# Patient Record
Sex: MALE | Race: WHITE | NOT HISPANIC OR LATINO | Employment: OTHER | URBAN - METROPOLITAN AREA
[De-identification: names, ages, dates, MRNs, and addresses within clinical notes are randomized per-mention and may not be internally consistent; named-entity substitution may affect disease eponyms.]

---

## 2017-03-28 ENCOUNTER — TELEPHONE (OUTPATIENT)
Dept: SPORTS MEDICINE | Facility: CLINIC | Age: 77
End: 2017-03-28

## 2017-03-28 NOTE — TELEPHONE ENCOUNTER
Returning call to patient.  Patient is interested in getting cortisone injection and joint aspiration.  He will call when he is ready to schedule.

## 2017-03-28 NOTE — TELEPHONE ENCOUNTER
----- Message from Carlton Franco sent at 3/28/2017 10:34 AM CDT -----  Contact: self  Pt request a call regarding the injections he received.

## 2017-04-12 ENCOUNTER — TELEPHONE (OUTPATIENT)
Dept: SPORTS MEDICINE | Facility: CLINIC | Age: 77
End: 2017-04-12

## 2017-04-12 NOTE — TELEPHONE ENCOUNTER
----- Message from Dinora Rice sent at 4/12/2017 11:11 AM CDT -----  Contact: self@home  Pt called to schedule an appt for the pain and swelling in his right knee. Pt is also requesting injection in the left knee. First available was 5/4/2017, but Pt feels he needs to be seen sooner. Please call Pt and advise    Call back is [phone]    Thank you

## 2017-04-18 ENCOUNTER — HOSPITAL ENCOUNTER (OUTPATIENT)
Dept: RADIOLOGY | Facility: HOSPITAL | Age: 77
Discharge: HOME | End: 2017-04-18
Attending: FAMILY MEDICINE
Payer: MEDICARE

## 2017-04-18 ENCOUNTER — OFFICE VISIT (OUTPATIENT)
Dept: SPORTS MEDICINE | Facility: CLINIC | Age: 77
End: 2017-04-18
Payer: MEDICARE

## 2017-04-18 VITALS — TEMPERATURE: 98 F | HEIGHT: 71 IN | WEIGHT: 225 LBS | BODY MASS INDEX: 31.5 KG/M2

## 2017-04-18 DIAGNOSIS — M17.0 PRIMARY OSTEOARTHRITIS OF BOTH KNEES: Primary | ICD-10-CM

## 2017-04-18 DIAGNOSIS — M25.562 PAIN IN BOTH KNEES, UNSPECIFIED CHRONICITY: ICD-10-CM

## 2017-04-18 DIAGNOSIS — M25.561 PAIN IN BOTH KNEES, UNSPECIFIED CHRONICITY: ICD-10-CM

## 2017-04-18 DIAGNOSIS — M17.12 PRIMARY OSTEOARTHRITIS OF LEFT KNEE: ICD-10-CM

## 2017-04-18 DIAGNOSIS — M25.562 CHRONIC PAIN OF BOTH KNEES: ICD-10-CM

## 2017-04-18 DIAGNOSIS — M25.561 CHRONIC PAIN OF BOTH KNEES: ICD-10-CM

## 2017-04-18 DIAGNOSIS — G89.29 CHRONIC PAIN OF BOTH KNEES: ICD-10-CM

## 2017-04-18 PROCEDURE — 73564 X-RAY EXAM KNEE 4 OR MORE: CPT | Mod: 26,50,, | Performed by: RADIOLOGY

## 2017-04-18 PROCEDURE — 73564 X-RAY EXAM KNEE 4 OR MORE: CPT | Mod: TC,50,PO

## 2017-04-18 PROCEDURE — 1157F ADVNC CARE PLAN IN RCRD: CPT | Mod: S$GLB,,, | Performed by: FAMILY MEDICINE

## 2017-04-18 PROCEDURE — 20611 DRAIN/INJ JOINT/BURSA W/US: CPT | Mod: 50,S$GLB,, | Performed by: FAMILY MEDICINE

## 2017-04-18 PROCEDURE — 1125F AMNT PAIN NOTED PAIN PRSNT: CPT | Mod: S$GLB,,, | Performed by: FAMILY MEDICINE

## 2017-04-18 PROCEDURE — 99214 OFFICE O/P EST MOD 30 MIN: CPT | Mod: 25,S$GLB,, | Performed by: FAMILY MEDICINE

## 2017-04-18 PROCEDURE — 99999 PR PBB SHADOW E&M-EST. PATIENT-LVL III: CPT | Mod: PBBFAC,,, | Performed by: FAMILY MEDICINE

## 2017-04-18 PROCEDURE — 1159F MED LIST DOCD IN RCRD: CPT | Mod: S$GLB,,, | Performed by: FAMILY MEDICINE

## 2017-04-18 PROCEDURE — 1160F RVW MEDS BY RX/DR IN RCRD: CPT | Mod: S$GLB,,, | Performed by: FAMILY MEDICINE

## 2017-04-18 RX ORDER — TRIAMCINOLONE ACETONIDE 40 MG/ML
40 INJECTION, SUSPENSION INTRA-ARTICULAR; INTRAMUSCULAR
Status: DISCONTINUED | OUTPATIENT
Start: 2017-04-18 | End: 2017-04-18 | Stop reason: HOSPADM

## 2017-04-18 RX ADMIN — TRIAMCINOLONE ACETONIDE 40 MG: 40 INJECTION, SUSPENSION INTRA-ARTICULAR; INTRAMUSCULAR at 03:04

## 2017-04-18 NOTE — MR AVS SNAPSHOT
[facility]  [address]  [facility] [address]  Phone: [phone]                  Korey Santos   2017 2:15 PM   Appointment    Description:  Male : 1940   Provider:  Vega Camarena MD   Department:  [facility]                To Do List           Future Appointments        Provider Department Dept Phone    2017 2:15 PM Vega Camarena MD [facility] [phone]    2017 10:15 AM Edgardo Tellez MD [facility] [phone]      Goals (5 Years of Data)     None      [facility]     [facility] On [facility] Nurse Care Line -  Assistance  Unless otherwise directed by your provider, please contact [facility], our nurse care line that is available for  assistance.     Registered nurses in the [facility] On Call Center provide: appointment scheduling, clinical advisement, health education, and other advisory services.  Call: [phone] (toll free)               Medications           Message regarding Medications     Verify the changes and/or additions to your medication regime listed below are the same as discussed with your clinician today.  If any of these changes or additions are incorrect, please notify your healthcare provider.             Verify that the below list of medications is an accurate representation of the medications you are currently taking.  If none reported, the list may be blank. If incorrect, please contact your healthcare provider. Carry this list with you in case of emergency.           Current Medications     aspirin (ECOTRIN) 325 MG EC tablet Take 1 tablet (325 mg total) by mouth 2 (two) times daily.    fluoxetine (PROZAC) 10 MG capsule Take 1 capsule (10 mg total) by mouth once daily.    meloxicam (MOBIC) 15 MG tablet Take 1 tablet (15 mg total) by mouth once daily.    multivitamin capsule Take 1 capsule by mouth once daily.      pravastatin (PRAVACHOL) 40 MG tablet  Take 1 tablet (40 mg total) by mouth once daily.    tamsulosin (FLOMAX) 0.4 mg Cp24 Take 1 capsule (0.4 mg total) by mouth once daily.    tramadol (ULTRAM) 50 mg tablet TAKE ONE TO TWO TABLETS BY MOUTH TWICE DAILY AS NEEDED FOR PAIN           Clinical Reference Information           Allergies as of 4/18/2017     Clindamycin      Immunizations Administered on Date of Encounter - 4/18/2017     None      [facility] Sign-Up     Activating your [facility] account is as easy as 1-2-3!     1) Visit [email or web address], select Sign Up Now, enter this activation code and your date of birth, then select Next.  [email or web address]  Expires: 6/2/2017  2:07 PM      2) Create a username and password to use when you visit [facility] in the future and select a security question in case you lose your password and select Next.    3) Enter your e-mail address and click Sign Up!    Additional Information  If you have questions, please e-mail [email or web address] or call [phone] to talk to our [facility] staff. Remember, [facility] is NOT to be used for urgent needs. For medical emergencies, dial 911.         Language Assistance Services     ATTENTION: Language assistance services are available, free of charge. Please call [phone].      ATENCIÓN: Si habla español, tiene a steele disposición servicios gratuitos de asistencia lingüística. Llame al [phone].     CHÚ Ý: N?u b?n nói Ti?ng Vi?t, có các d?ch v? h? tr? ngôn ng? mi?n phí dành cho b?n. G?i s? [phone].         [facility] complies with applicable Federal civil rights laws and does not discriminate on the basis of race, color, national origin, age, disability, or sex.

## 2017-04-18 NOTE — PROGRESS NOTES
Korey Santos, a 76 y.o. male, presents today for evaluation of his RIGHT and LEFT knee.      HISTORY OF PRESENT ILLNESS   Location: anterior knee, bilateral  Onset: insidious  Palliative:    Relative rest   Oral analgesics   R - VSI, 16.12, [place]  Provocative:    ADLs  Prior:    16.02 - R knee - arthroscopy - [facility]  Progression: worsening discomfort  Quality:    Swollen   Sharp at times    Radiation: none  Severity: per nursing documentation  Timing: intermittent w/ use  Trauma: none    Review of systems (ROS):  A 10+ review of systems was performed with pertinent positives and negatives noted above in the history of present illness. Other systems were negative unless otherwise specified.      PHYSICAL EXAMINATION  General:  The patient is alert and oriented x 3. Mood is pleasant. Observation of ears, eyes and nose reveal no gross abnormalities. HEENT: NCAT, sclera anicteric.   Lungs: Respirations are equal and unlabored.  Gait is coordinated. Patient can toe walk and heel walk without difficulty.    RIGHT/LEFT KNEE EXAMINATION    Observation/Inspection  Gait:   Antalgic   Alignment:  Neutral   Scars:   None   Muscle atrophy: Mild  Effusion:  Present   Warmth:  None   Discoloration:   none     Tenderness / Crepitus (T / C):         T / C      T / C  Patella   - / -   Lateral joint line   - / -     Peripatellar medial  -  Medial joint line    + / -  Peripatellar lateral -  Medial plica   - / -  Patellar tendon -   Popliteal fossa   - / -  Quad tendon   -   Gastrocnemius   -  Prepatellar Bursa - / -   Quadricep   -  Tibial tubercle  -  Thigh/hamstring  -  Pes anserine/HS -  Fibula    -  ITB   - / -  Tibia     -  Tib/fib joint  - / -  LCL    -    MFC   - / -   MCL: Proximal  -    LFC   - / -   Distal    -          ROM: (* = pain)  PASSIVE   ACTIVE    Left :   5 / 0 / 145*   5 / 0 / 145*     Right :    5 / 0 / 145*   5 / 0 / 145*    Patellofemoral examination:  See above noted areas of tenderness.    Patella position    Subluxation / dislocation: Centered        Sup. / Inf;   Normal   Crepitus (PF):    Absent   Patellar Mobility:       Medial-lateral:   Normal    Superior-inferior:  Normal    Inferior tilt   Normal    Patellar tendon:  Normal   Lateral tilt:    Normal   J-sign:     None   Patellofemoral grind:   No pain       Meniscal Signs:     Pain on terminal extension:  +*  Pain on terminal flexion:  +*  Tyrones maneuver:  +*  Squat     NT    Ligament Examination:  ACL / Lachman:  WNL  PCL-Post.  drawer: normal 0 to 2mm  MCL- Valgus:  normal 0 to 2mm  LCL- Varus:    normal 0 to 2mm  Pivot shift:  guarding   Dial Test:   difference c/w other side   At 30° flexion: normal (< 5°)    At 90° flexion: normal (< 5°)   Reverse Pivot Shift:   normal (Equal)     Strength: (* = with pain) Painful Side  Quadriceps   5/5  Hamstrin/5    Extremity Neuro-vascular Examination:   Sensation:  Grossly intact to light touch all dermatomal regions.   Motor Function:  Fully intact motor function at hip, knee, foot and ankle    DTRs;  quadriceps and  achilles 2+.  No clonus and downgoing Babinski.    Vascular status:  DP and PT pulses 2+, brisk capillary refill, symmetric.     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, panchito med compartment, right  #2 Kellgren-Kevin Grade II osteoarthritis of knee, panchito med compartment, left    No evidence of neurologic pathology  No evidence of vascular pathology    Imaging studies reviewed:   X-ray knee, bilateral 17.04    Plan:    We discussed the importance of appropriate diet, weight, and regular exercise including quadriceps strengthening     We discussed options including:  #1 watchful waiting  #2 physical therapy aimed at:    Core stability   RoM knee   Strengthening quadriceps   Gait training   #3 injection therapy:   CSI iaknee     Right,     Left,    VSI iaknee    Right, prior    Left, prior    Orthobiologics    #4 consultation re: TKA     The patient chooses #3 csi oscar moeller    Pain management: handout given  Bracing:   Physical therapy:   Activity (e.g. sports, work) restrictions: as tolerated   school/vocation:     Follow up appointment offered, deferred by patient  Should symptoms worsen or fail to resolve, consider:  Revisiting the above options

## 2017-04-18 NOTE — PROCEDURES
Large Joint Aspiration/Injection  Date/Time: 4/18/2017 3:16 PM  Performed by: CHAPIN SOTO  Authorized by: CHAPIN SOTO     Consent Done?:  Yes (Verbal)  Indications:  Pain  Procedure site marked: Yes    Timeout: Prior to procedure the correct patient, procedure, and site was verified      Location:  Knee  Site:  R knee and L knee  Prep: Patient was prepped and draped in usual sterile fashion    Ultrasonic Guidance for needle placement: Yes  Images are saved and documented.  Needle size:  18 G  Approach:  Lateral  Medications:  40 mg triamcinolone acetonide 40 mg/mL; 40 mg triamcinolone acetonide 40 mg/mL  Patient tolerance:  Patient tolerated the procedure well with no immediate complications    Additional Comments: Description of ultrasound utilization for needle guidance:   Ultrasound guidance used for needle localization.  Images saved and stored for documentation.  The knee joint was visualized.  Dynamic visualization of the 20g x 1.5  needle was continuous throughout the procedure.

## 2017-04-24 ENCOUNTER — TELEPHONE (OUTPATIENT)
Dept: SPORTS MEDICINE | Facility: CLINIC | Age: 77
End: 2017-04-24

## 2017-04-24 NOTE — TELEPHONE ENCOUNTER
----- Message from Marie Ibanez sent at 4/24/2017 11:22 AM CDT -----  Contact: self/home  Pt would like to speak with you regarding an appt for injections.

## 2017-05-04 ENCOUNTER — OFFICE VISIT (OUTPATIENT)
Dept: SPORTS MEDICINE | Facility: CLINIC | Age: 77
End: 2017-05-04
Payer: MEDICARE

## 2017-05-04 VITALS — HEIGHT: 71 IN | TEMPERATURE: 98 F | WEIGHT: 225 LBS | BODY MASS INDEX: 31.5 KG/M2

## 2017-05-04 DIAGNOSIS — M17.11 PRIMARY OSTEOARTHRITIS OF RIGHT KNEE: Primary | ICD-10-CM

## 2017-05-04 PROCEDURE — 99999 PR PBB SHADOW E&M-EST. PATIENT-LVL III: CPT | Mod: PBBFAC,,, | Performed by: FAMILY MEDICINE

## 2017-05-04 PROCEDURE — 20611 DRAIN/INJ JOINT/BURSA W/US: CPT | Mod: RT,S$GLB,, | Performed by: FAMILY MEDICINE

## 2017-05-04 PROCEDURE — 99499 UNLISTED E&M SERVICE: CPT | Mod: S$GLB,,, | Performed by: FAMILY MEDICINE

## 2017-05-04 NOTE — PROCEDURES
Large Joint Aspiration/Injection  Date/Time: 5/4/2017 11:53 AM  Performed by: CHAPIN SOTO  Authorized by: CHAPIN SOTO     Consent Done?:  Yes (Verbal)  Indications:  Pain  Procedure site marked: Yes    Timeout: Prior to procedure the correct patient, procedure, and site was verified      Location:  Knee  Site:  R knee  Prep: Patient was prepped and draped in usual sterile fashion    Ultrasonic Guidance for needle placement: Yes  Images are saved and documented.  Needle size:  20 G  Approach:  Lateral  Medications:  48 mg hylan g-f 20 48 mg/6 mL  Patient tolerance:  Patient tolerated the procedure well with no immediate complications

## 2017-05-18 ENCOUNTER — TELEPHONE (OUTPATIENT)
Dept: SPORTS MEDICINE | Facility: CLINIC | Age: 77
End: 2017-05-18

## 2017-05-18 DIAGNOSIS — M17.12 PRIMARY OSTEOARTHRITIS OF LEFT KNEE: Primary | ICD-10-CM

## 2017-05-18 NOTE — TELEPHONE ENCOUNTER
----- Message from Dinora Rice sent at 5/18/2017 12:48 PM CDT -----  Contact: self@home, [phone]  Pt called in stating that he needs his left knee injections and will need approval by his insurance. Please call Pt and advise    Thank you

## 2017-05-25 ENCOUNTER — OFFICE VISIT (OUTPATIENT)
Dept: SPORTS MEDICINE | Facility: CLINIC | Age: 77
End: 2017-05-25
Payer: MEDICARE

## 2017-05-25 VITALS — BODY MASS INDEX: 31.5 KG/M2 | TEMPERATURE: 98 F | HEIGHT: 71 IN | WEIGHT: 225 LBS

## 2017-05-25 DIAGNOSIS — M17.12 PRIMARY OSTEOARTHRITIS OF LEFT KNEE: ICD-10-CM

## 2017-05-25 DIAGNOSIS — M25.461 EFFUSION OF KNEE JOINT RIGHT: ICD-10-CM

## 2017-05-25 DIAGNOSIS — M17.0 PRIMARY OSTEOARTHRITIS OF BOTH KNEES: Primary | ICD-10-CM

## 2017-05-25 PROCEDURE — 99499 UNLISTED E&M SERVICE: CPT | Mod: S$GLB,,, | Performed by: FAMILY MEDICINE

## 2017-05-25 PROCEDURE — 20611 DRAIN/INJ JOINT/BURSA W/US: CPT | Mod: LT,S$GLB,, | Performed by: FAMILY MEDICINE

## 2017-05-25 PROCEDURE — 99999 PR PBB SHADOW E&M-EST. PATIENT-LVL III: CPT | Mod: PBBFAC,,, | Performed by: FAMILY MEDICINE

## 2017-05-25 NOTE — PROCEDURES
"Large Joint Aspiration/Injection  Date/Time: 5/25/2017 11:59 AM  Performed by: CHAPIN SOOT  Authorized by: CHAPIN SOTO     Consent Done?:  Yes (Verbal)  Indications:  Pain  Procedure site marked: Yes    Timeout: Prior to procedure the correct patient, procedure, and site was verified      Location:  Knee  Site:  L knee  Prep: Patient was prepped and draped in usual sterile fashion    Ultrasonic Guidance for needle placement: Yes  Images are saved and documented.  Needle size:  18 G  Approach:  Lateral  Medications:  48 mg hylan g-f 20 48 mg/6 mL  Patient tolerance:  Patient tolerated the procedure well with no immediate complications    Additional Comments: Description of ultrasound utilization for needle guidance:   Ultrasound guidance used for needle localization.  Images saved and stored for documentation.  The knee joint was visualized.  Dynamic visualization of the 20g x 1.5"  needle was continuous throughout the procedure.      "

## 2017-05-25 NOTE — PROCEDURES
"Large Joint Aspiration/Injection  Date/Time: 5/25/2017 12:00 PM  Performed by: CHAPIN SOTO  Authorized by: CHAPIN SOTO     Consent Done?:  Yes (Verbal)  Indications:  Pain  Procedure site marked: Yes    Timeout: Prior to procedure the correct patient, procedure, and site was verified      Location:  Knee  Site:  R knee  Prep: Patient was prepped and draped in usual sterile fashion    Ultrasonic Guidance for needle placement: Yes  Images are saved and documented.  Needle size:  18 G  Approach:  Lateral  Aspirate amount (ml):  22  Aspirate:  Clear  Patient tolerance:  Patient tolerated the procedure well with no immediate complications    Additional Comments: Description of ultrasound utilization for needle guidance:   Ultrasound guidance used for needle localization.  Images saved and stored for documentation.  The knee joint was visualized.  Dynamic visualization of the 20g x 1.5"  needle was continuous throughout the procedure.      "

## 2017-06-16 RX ORDER — TAMSULOSIN HYDROCHLORIDE 0.4 MG/1
1 CAPSULE ORAL DAILY
Qty: 90 CAPSULE | Refills: 0 | Status: SHIPPED | OUTPATIENT
Start: 2017-06-16 | End: 2017-09-29 | Stop reason: SDUPTHER

## 2017-06-16 NOTE — TELEPHONE ENCOUNTER
----- Message from Breann Felder sent at 6/16/2017 10:10 AM CDT -----  Contact: self/[phone]      RX request - refill or new RX.  Is this a refill or new RX:  Refill  RX name and strength: tamsulosin (FLOMAX) 0.4 mg Cp24  Directions: Take 1 capsule (0.4 mg total) by mouth once daily. - Oral  Is this a 30 day or 90 day RX:    Pharmacy name and phone #: [facility], [place] - [address] [facility].  # [phone]   Comments:  Please call and confirm when rx is ready.       Thank you!!!

## 2017-06-19 ENCOUNTER — TELEPHONE (OUTPATIENT)
Dept: INTERNAL MEDICINE | Facility: CLINIC | Age: 77
End: 2017-06-19

## 2017-06-19 RX ORDER — FLUOXETINE 10 MG/1
10 CAPSULE ORAL DAILY
Qty: 30 CAPSULE | Refills: 11 | Status: SHIPPED | OUTPATIENT
Start: 2017-06-19 | End: 2017-08-16

## 2017-06-19 RX ORDER — PRAVASTATIN SODIUM 40 MG/1
40 TABLET ORAL DAILY
Qty: 30 TABLET | Refills: 11 | Status: SHIPPED | OUTPATIENT
Start: 2017-06-19 | End: 2017-08-16 | Stop reason: SDUPTHER

## 2017-06-19 NOTE — TELEPHONE ENCOUNTER
MARRY: Spoke with Tomasz @ [facility] pharmacy to confirm receipt of Flomax rx that was sent electronically on 6/16/17. Harper has confirmed that pt picked up rx today.

## 2017-06-19 NOTE — TELEPHONE ENCOUNTER
Patient called direct and complained that pharmacy had been trying to reach us for 2 weeks and that no one had responded. Chart reviewed, flomax filled by Dr. William on 6/16.    I asked patient to contact pharmacy first for future refills and that they should be requesting refills ELECTRONICALLY, not by fax and also to contact our office should there be an issue.

## 2017-06-26 ENCOUNTER — TELEPHONE (OUTPATIENT)
Dept: SPORTS MEDICINE | Facility: CLINIC | Age: 77
End: 2017-06-26

## 2017-06-26 NOTE — TELEPHONE ENCOUNTER
----- Message from Alessandra Reyes sent at 6/26/2017 10:19 AM CDT -----  Contact: self   Pt is requesting a call back regarding being seen by Dr. Camarena sometime next week. pt stated he is going out of town the second week of July and needs to be seen before then. Please call pt at [phone]

## 2017-07-07 ENCOUNTER — TELEPHONE (OUTPATIENT)
Dept: INTERNAL MEDICINE | Facility: CLINIC | Age: 77
End: 2017-07-07

## 2017-07-07 ENCOUNTER — OFFICE VISIT (OUTPATIENT)
Dept: SPORTS MEDICINE | Facility: CLINIC | Age: 77
End: 2017-07-07
Payer: MEDICARE

## 2017-07-07 VITALS — HEIGHT: 71 IN | BODY MASS INDEX: 31.5 KG/M2 | TEMPERATURE: 98 F | WEIGHT: 225 LBS

## 2017-07-07 DIAGNOSIS — M25.461 EFFUSION OF RIGHT KNEE: ICD-10-CM

## 2017-07-07 DIAGNOSIS — M25.561 CHRONIC PAIN OF RIGHT KNEE: ICD-10-CM

## 2017-07-07 DIAGNOSIS — M17.11 PRIMARY OSTEOARTHRITIS OF RIGHT KNEE: Primary | ICD-10-CM

## 2017-07-07 DIAGNOSIS — G89.29 CHRONIC PAIN OF RIGHT KNEE: ICD-10-CM

## 2017-07-07 PROCEDURE — 99999 PR PBB SHADOW E&M-EST. PATIENT-LVL III: CPT | Mod: PBBFAC,,, | Performed by: FAMILY MEDICINE

## 2017-07-07 PROCEDURE — 99499 UNLISTED E&M SERVICE: CPT | Mod: S$GLB,,, | Performed by: FAMILY MEDICINE

## 2017-07-07 PROCEDURE — 99214 OFFICE O/P EST MOD 30 MIN: CPT | Mod: 25,S$GLB,, | Performed by: FAMILY MEDICINE

## 2017-07-07 PROCEDURE — 1159F MED LIST DOCD IN RCRD: CPT | Mod: S$GLB,,, | Performed by: FAMILY MEDICINE

## 2017-07-07 PROCEDURE — 20611 DRAIN/INJ JOINT/BURSA W/US: CPT | Mod: S$GLB,,, | Performed by: FAMILY MEDICINE

## 2017-07-07 PROCEDURE — 1125F AMNT PAIN NOTED PAIN PRSNT: CPT | Mod: S$GLB,,, | Performed by: FAMILY MEDICINE

## 2017-07-07 PROCEDURE — 1157F ADVNC CARE PLAN IN RCRD: CPT | Mod: S$GLB,,, | Performed by: FAMILY MEDICINE

## 2017-07-07 RX ORDER — TRIAMCINOLONE ACETONIDE 40 MG/ML
40 INJECTION, SUSPENSION INTRA-ARTICULAR; INTRAMUSCULAR
Status: DISCONTINUED | OUTPATIENT
Start: 2017-07-07 | End: 2017-07-07 | Stop reason: HOSPADM

## 2017-07-07 RX ADMIN — TRIAMCINOLONE ACETONIDE 40 MG: 40 INJECTION, SUSPENSION INTRA-ARTICULAR; INTRAMUSCULAR at 12:07

## 2017-07-07 NOTE — TELEPHONE ENCOUNTER
I think he will be OK to wait until then, but we can override him the last week of July if needed. Thank you.

## 2017-07-07 NOTE — PROGRESS NOTES
Korey Santos, a 76 y.o. male, presents today for evaluation of his RIGHT and LEFT knee.      HISTORY OF PRESENT ILLNESS   Location: anterior knee, bilateral  Onset: insidious  Palliative:    Relative rest   Oral analgesics   R - VSI, 16.12, [facility]   R VSI, iaKnee, 05/04/17, moderate%   L VSI, iaKnee, 05/25/17, moderate%   R asp, iaKnee, 05/25/17,    CSI, iaKnee, 04/18/17, moderate improvement  Provocative:    ADLs  Prior:    16.02 - R knee - arthroscopy - [facility]  Progression: worsening discomfort Right  Quality:    Swollen   Sharp at times  Radiation: none  Severity: per nursing documentation  Timing: intermittent w/ use  Trauma: none    Review of systems (ROS):  A 10+ review of systems was performed with pertinent positives and negatives noted above in the history of present illness. Other systems were negative unless otherwise specified.      PHYSICAL EXAMINATION  General:  The patient is alert and oriented x 3. Mood is pleasant. Observation of ears, eyes and nose reveal no gross abnormalities. HEENT: NCAT, sclera anicteric.   Lungs: Respirations are equal and unlabored.  Gait is coordinated. Patient can toe walk and heel walk without difficulty.    RIGHT/LEFT KNEE EXAMINATION    Observation/Inspection  Gait:   Antalgic   Alignment:  Neutral   Scars:   None   Muscle atrophy: Mild  Effusion:  Present   Warmth:  None   Discoloration:   none     Tenderness / Crepitus (T / C):         T / C      T / C  Patella   - / -   Lateral joint line   - / -     Peripatellar medial  -  Medial joint line    + / -  Peripatellar lateral -  Medial plica   - / -  Patellar tendon -   Popliteal fossa   - / -  Quad tendon   -   Gastrocnemius   -  Prepatellar Bursa - / -   Quadricep   -  Tibial tubercle  -  Thigh/hamstring  -  Pes anserine/HS -  Fibula    -  ITB   - / -  Tibia     -  Tib/fib joint  - / -  LCL    -    MFC   - / -   MCL: Proximal  -    LFC   - / -   Distal    -          ROM: (* = pain)  PASSIVE   ACTIVE     Left :   5 / 0 / 145*   5 / 0 / 145*     Right :    5 / 0 / 145*   5 / 0 / 145*    Patellofemoral examination:  See above noted areas of tenderness.   Patella position    Subluxation / dislocation: Centered        Sup. / Inf;   Normal   Crepitus (PF):    Absent   Patellar Mobility:       Medial-lateral:   Normal    Superior-inferior:  Normal    Inferior tilt   Normal    Patellar tendon:  Normal   Lateral tilt:    Normal   J-sign:     None   Patellofemoral grind:   No pain       Meniscal Signs:     Pain on terminal extension:  +*  Pain on terminal flexion:  +*  Tyrones maneuver:  +*  Squat     NT    Ligament Examination:  ACL / Lachman:  WNL  PCL-Post.  drawer: normal 0 to 2mm  MCL- Valgus:  normal 0 to 2mm  LCL- Varus:    normal 0 to 2mm  Pivot shift:  guarding   Dial Test:   difference c/w other side   At 30° flexion: normal (< 5°)    At 90° flexion: normal (< 5°)   Reverse Pivot Shift:   normal (Equal)     Strength: (* = with pain) Painful Side  Quadriceps   5/5  Hamstrin/5    Extremity Neuro-vascular Examination:   Sensation:  Grossly intact to light touch all dermatomal regions.   Motor Function:  Fully intact motor function at hip, knee, foot and ankle    DTRs;  quadriceps and  achilles 2+.  No clonus and downgoing Babinski.    Vascular status:  DP and PT pulses 2+, brisk capillary refill, symmetric.     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, panchito med compartment, right   W/ recurrent knee effusions  #2 Kellgren-Kevin Grade II osteoarthritis of knee, panchito med compartment, left    No evidence of neurologic pathology  No evidence of vascular pathology    Imaging studies reviewed:   X-ray knee, bilateral 17.04    Plan:    We discussed the importance of appropriate diet, weight, and regular exercise including quadriceps strengthening     We discussed options including:  #1 watchful waiting  #2 physical therapy aimed at:    Core  stability   RoM knee   Strengthening quadriceps   Gait training   #3 injection therapy:   CSI iaknee     Right, effective moderate%, repeat    Left, effective moderate%, repeat   VSI iaknee    Right, effective moderate%, repeat    Left, effective moderate%, repeat   Orthobiologics   #4 consultation re: TKA     The patient chooses #3 csi iaknee right    Pain management: handout given  Bracing:   Physical therapy:   Activity (e.g. sports, work) restrictions: as tolerated   school/vocation:     Follow up appointment offered, deferred by patient  A/e csi iaknee right  Should symptoms worsen or fail to resolve, consider:  Revisiting the above options

## 2017-07-07 NOTE — TELEPHONE ENCOUNTER
Left message for patient to call office regarding BP and forwarded message to JOSH Yu RN triage call.

## 2017-07-07 NOTE — PROCEDURES
"Large Joint Aspiration/Injection  Date/Time: 7/7/2017 12:05 PM  Performed by: CHAPIN SOTO  Authorized by: CHAPIN SOTO     Consent Done?:  Yes (Verbal)  Indications:  Pain  Procedure site marked: Yes    Timeout: Prior to procedure the correct patient, procedure, and site was verified      Location:  Knee  Site:  R knee  Prep: Patient was prepped and draped in usual sterile fashion    Ultrasonic Guidance for needle placement: Yes  Images are saved and documented.  Needle size:  18 G  Approach:  Lateral  Medications:  40 mg triamcinolone acetonide 40 mg/mL  Aspirate amount (ml):  35  Aspirate:  Clear  Patient tolerance:  Patient tolerated the procedure well with no immediate complications    Additional Comments: Description of ultrasound utilization for needle guidance:   Ultrasound guidance used for needle localization.  Images saved and stored for documentation.  The knee joint was visualized.  Dynamic visualization of the 20g x 1.5"  needle was continuous throughout the procedure.      "

## 2017-07-07 NOTE — TELEPHONE ENCOUNTER
----- Message from Nasima LISSETTE Coleman sent at 7/7/2017  4:31 PM CDT -----  Contact: Pt [phone]  Pt called requesting an appt before 08-09-17 for Hypertension.   Pt was requested to schedule with PCP for 190/? By another physician

## 2017-07-08 ENCOUNTER — OFFICE VISIT (OUTPATIENT)
Dept: FAMILY MEDICINE | Facility: CLINIC | Age: 77
End: 2017-07-08
Payer: MEDICARE

## 2017-07-08 ENCOUNTER — LAB VISIT (OUTPATIENT)
Dept: LAB | Facility: HOSPITAL | Age: 77
End: 2017-07-08
Attending: FAMILY MEDICINE
Payer: MEDICARE

## 2017-07-08 VITALS
WEIGHT: 233.94 LBS | BODY MASS INDEX: 31.69 KG/M2 | HEIGHT: 72 IN | DIASTOLIC BLOOD PRESSURE: 80 MMHG | OXYGEN SATURATION: 97 % | HEART RATE: 67 BPM | SYSTOLIC BLOOD PRESSURE: 152 MMHG

## 2017-07-08 DIAGNOSIS — Z12.5 SCREENING FOR MALIGNANT NEOPLASM OF PROSTATE: ICD-10-CM

## 2017-07-08 DIAGNOSIS — Z79.899 MEDICATION MANAGEMENT: ICD-10-CM

## 2017-07-08 DIAGNOSIS — R03.0 ELEVATED BLOOD PRESSURE READING: Primary | ICD-10-CM

## 2017-07-08 DIAGNOSIS — E78.5 HYPERLIPIDEMIA, UNSPECIFIED HYPERLIPIDEMIA TYPE: ICD-10-CM

## 2017-07-08 DIAGNOSIS — R03.0 ELEVATED BLOOD PRESSURE READING: ICD-10-CM

## 2017-07-08 LAB
25(OH)D3+25(OH)D2 SERPL-MCNC: 49 NG/ML
ALBUMIN SERPL BCP-MCNC: 4 G/DL
ALP SERPL-CCNC: 65 U/L
ALT SERPL W/O P-5'-P-CCNC: 20 U/L
ANION GAP SERPL CALC-SCNC: 11 MMOL/L
AST SERPL-CCNC: 22 U/L
BASOPHILS # BLD AUTO: 0.01 K/UL
BASOPHILS NFR BLD: 0.1 %
BILIRUB SERPL-MCNC: 0.4 MG/DL
BUN SERPL-MCNC: 15 MG/DL
CALCIUM SERPL-MCNC: 9.7 MG/DL
CHLORIDE SERPL-SCNC: 107 MMOL/L
CHOLEST/HDLC SERPL: 4.1 {RATIO}
CO2 SERPL-SCNC: 23 MMOL/L
COMPLEXED PSA SERPL-MCNC: 4.7 NG/ML
CREAT SERPL-MCNC: 1 MG/DL
DIFFERENTIAL METHOD: ABNORMAL
EOSINOPHIL # BLD AUTO: 0 K/UL
EOSINOPHIL NFR BLD: 0 %
ERYTHROCYTE [DISTWIDTH] IN BLOOD BY AUTOMATED COUNT: 13.8 %
EST. GFR  (AFRICAN AMERICAN): >60 ML/MIN/1.73 M^2
EST. GFR  (NON AFRICAN AMERICAN): >60 ML/MIN/1.73 M^2
ESTIMATED AVG GLUCOSE: 120 MG/DL
GLUCOSE SERPL-MCNC: 137 MG/DL
HBA1C MFR BLD HPLC: 5.8 %
HCT VFR BLD AUTO: 45.5 %
HDL/CHOLESTEROL RATIO: 24.3 %
HDLC SERPL-MCNC: 235 MG/DL
HDLC SERPL-MCNC: 57 MG/DL
HGB BLD-MCNC: 15 G/DL
LDLC SERPL CALC-MCNC: 169.8 MG/DL
LYMPHOCYTES # BLD AUTO: 1.6 K/UL
LYMPHOCYTES NFR BLD: 15.1 %
MCH RBC QN AUTO: 29.8 PG
MCHC RBC AUTO-ENTMCNC: 33 %
MCV RBC AUTO: 91 FL
MONOCYTES # BLD AUTO: 0.5 K/UL
MONOCYTES NFR BLD: 4.4 %
NEUTROPHILS # BLD AUTO: 8.3 K/UL
NEUTROPHILS NFR BLD: 80.2 %
NONHDLC SERPL-MCNC: 178 MG/DL
PLATELET # BLD AUTO: 215 K/UL
PMV BLD AUTO: 11.7 FL
POTASSIUM SERPL-SCNC: 4.5 MMOL/L
PROT SERPL-MCNC: 7.4 G/DL
RBC # BLD AUTO: 5.03 M/UL
SODIUM SERPL-SCNC: 141 MMOL/L
TRIGL SERPL-MCNC: 41 MG/DL
TSH SERPL DL<=0.005 MIU/L-ACNC: 0.64 UIU/ML
WBC # BLD AUTO: 10.37 K/UL

## 2017-07-08 PROCEDURE — 99213 OFFICE O/P EST LOW 20 MIN: CPT | Mod: S$GLB,,, | Performed by: FAMILY MEDICINE

## 2017-07-08 PROCEDURE — 84153 ASSAY OF PSA TOTAL: CPT

## 2017-07-08 PROCEDURE — 85025 COMPLETE CBC W/AUTO DIFF WBC: CPT

## 2017-07-08 PROCEDURE — 83036 HEMOGLOBIN GLYCOSYLATED A1C: CPT

## 2017-07-08 PROCEDURE — 80053 COMPREHEN METABOLIC PANEL: CPT

## 2017-07-08 PROCEDURE — 99499 UNLISTED E&M SERVICE: CPT | Mod: S$GLB,,, | Performed by: FAMILY MEDICINE

## 2017-07-08 PROCEDURE — 36415 COLL VENOUS BLD VENIPUNCTURE: CPT | Mod: PO

## 2017-07-08 PROCEDURE — 1159F MED LIST DOCD IN RCRD: CPT | Mod: S$GLB,,, | Performed by: FAMILY MEDICINE

## 2017-07-08 PROCEDURE — 80061 LIPID PANEL: CPT

## 2017-07-08 PROCEDURE — 1125F AMNT PAIN NOTED PAIN PRSNT: CPT | Mod: S$GLB,,, | Performed by: FAMILY MEDICINE

## 2017-07-08 PROCEDURE — 84443 ASSAY THYROID STIM HORMONE: CPT

## 2017-07-08 PROCEDURE — 99999 PR PBB SHADOW E&M-EST. PATIENT-LVL III: CPT | Mod: PBBFAC,,, | Performed by: FAMILY MEDICINE

## 2017-07-08 PROCEDURE — 82306 VITAMIN D 25 HYDROXY: CPT

## 2017-07-08 PROCEDURE — 1157F ADVNC CARE PLAN IN RCRD: CPT | Mod: S$GLB,,, | Performed by: FAMILY MEDICINE

## 2017-07-08 NOTE — PROGRESS NOTES
Office Visit    Patient Name: Korey Santos    : 1940  MRN: [id number]    Subjective:  Korey is a 76 y.o. male who presents today for:    Hypertension (went to Dr Camarena yesterday and B/P was 190/80)    Patient of Dr Bustamante with no history of hypertension (has borderline high blood pressure that is being monitored off of medications) here due to very elevated BP at Dr Camarena's sports med office. He was advised to come here for further evaluation after his BP was 190/80s yesterday.  He was not feeling well at the time-- sweaty and fatigued from the heat-- but he does report being in significant knee pain due to needing it drained and he had also just drunk a fair amount of coffee just prior to his visit. Shortly after his visit at the sports med office he started feeling back to normal.  He knee hurts significantly less now. He never had chest pain, lightheadedness, shortness of breath, nausea/vomiting.  He feels fine today and reports he can cut the grass outside in the heat for 1 hour without cardiopulmonary symptoms. He has an upcoming physical scheduled with his PCP Dr Bustamante and would like labs today since he is fasting.     Past Medical History  Past Medical History:   Diagnosis Date    Arthritis     Arthritis     Arthritis     Back pain, chronic     Hypertension     Sinus trouble        Past Surgical History  Past Surgical History:   Procedure Laterality Date    BACK SURGERY  2014    KNEE SURGERY      left hand      LEG SURGERY         Family History  Family History   Problem Relation Age of Onset    Cancer Father      lung or smoking    No Known Problems Mother     No Known Problems Sister     No Known Problems Brother     No Known Problems Maternal Aunt     No Known Problems Maternal Uncle     No Known Problems Paternal Aunt     No Known Problems Paternal Uncle     No Known Problems Maternal Grandmother     No Known Problems Maternal Grandfather     No Known  "Problems Paternal Grandmother     No Known Problems Paternal Grandfather     Glaucoma Neg Hx     Diabetes Neg Hx     Amblyopia Neg Hx     Blindness Neg Hx     Cataracts Neg Hx     Hypertension Neg Hx     Macular degeneration Neg Hx     Retinal detachment Neg Hx     Strabismus Neg Hx     Stroke Neg Hx     Thyroid disease Neg Hx        Social History  Social History     Social History    Marital status: Single     Spouse name: N/A    Number of children: N/A    Years of education: N/A     Occupational History    Not on file.     Social History Main Topics    Smoking status: Never Smoker    Smokeless tobacco: Never Used    Alcohol use 0.0 oz/week      Comment: very rarely    Drug use: No    Sexual activity: Yes     Partners: Female     Birth control/ protection: None     Other Topics Concern    Not on file     Social History Narrative    Ret. 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       Current Medications  Medications reviewed and updated.     Allergies   Review of patient's allergies indicates:   Allergen Reactions    Clindamycin Swelling     Throat swells       Review of Systems (Pertinent positives)  Review of Systems   Constitutional: Positive for fatigue. Negative for chills and fever.   Respiratory: Negative for shortness of breath.    Cardiovascular: Negative for chest pain.   Gastrointestinal: Negative for nausea and vomiting.   Musculoskeletal: Positive for arthralgias.   Neurological: Negative for dizziness and light-headedness.       BP (!) 152/80   Pulse 67   Ht 5' 11.5" (1.816 m)   Wt 106.1 kg (233 lb 14.5 oz)   SpO2 97%   BMI 32.17 kg/m²     Physical Exam   Constitutional: He is oriented to person, place, and time. He appears well-developed and well-nourished. No distress.   HENT:   Head: Normocephalic and atraumatic.   Eyes: Conjunctivae are normal.   Cardiovascular: Normal rate, regular rhythm and normal heart sounds.    Pulmonary/Chest: Effort normal and breath sounds " normal.   Musculoskeletal: He exhibits no edema.   Neurological: He is alert and oriented to person, place, and time.   Psychiatric: He has a normal mood and affect.   Vitals reviewed.        Assessment/Plan:  Korey Santos is a 76 y.o. male who presents today for :    Korey was seen today for hypertension.    Diagnoses and all orders for this visit:    Elevated blood pressure reading  Comments:  check and record home blood pressures and bring to upcoming physical with Dr Solorzano. blood work today as he is fasting-- to review at physical  Orders:  -     Comprehensive metabolic panel; Future  -     Lipid panel; Future  -     TSH; Future    Hyperlipidemia, unspecified hyperlipidemia type  -     Comprehensive metabolic panel; Future  -     Lipid panel; Future  -     TSH; Future    Medication management  -     Hemoglobin A1c; Future  -     Comprehensive metabolic panel; Future  -     Lipid panel; Future  -     TSH; Future  -     CBC auto differential; Future  -     PSA, Screening; Future  -     Vitamin D; Future    Screening for malignant neoplasm of prostate  -     PSA, Screening; Future            ICD-10-CM ICD-9-CM    1. Elevated blood pressure reading R03.0 796.2 Comprehensive metabolic panel      Lipid panel      TSH    check and record home blood pressures and bring to upcoming physical with Dr Solorzano. blood work today as he is fasting-- to review at physical   2. Hyperlipidemia, unspecified hyperlipidemia type E78.5 272.4 Comprehensive metabolic panel      Lipid panel      TSH   3. Medication management Z79.899 V58.69 Hemoglobin A1c      Comprehensive metabolic panel      Lipid panel      TSH      CBC auto differential      PSA, Screening      Vitamin D   4. Screening for malignant neoplasm of prostate Z12.5 V76.44 PSA, Screening       Return for return as needed for new concerns.

## 2017-07-12 NOTE — TELEPHONE ENCOUNTER
----- Message from Erik Orosco sent at 7/12/2017 12:54 PM CDT -----  Contact: self/ [phone] cell  Type: Sooner appointment than  is able to schedule    When is the first available appointment? 08/16/2017    What is the nature of the appointment? f/u    What appointment type: ep    Comments: Pt was told to make a f/u appt on next week for his lab results.  Please call and advise.    Thank you

## 2017-07-26 ENCOUNTER — TELEPHONE (OUTPATIENT)
Dept: FAMILY MEDICINE | Facility: CLINIC | Age: 77
End: 2017-07-26

## 2017-07-26 NOTE — TELEPHONE ENCOUNTER
----- Message from Erinn Chowdhury sent at 7/26/2017 10:02 AM CDT -----  Contact: Self/[phone] cell   Patient would like to get test results.  Name of test :  Labs  Date of test:  7/8/2017  Ordering provider: Dr.Laura Mata  Where was the test performed:  [facility]  Comments:  Pt also would like a copy of his lab results mailed to him at the address on file. Please call and advise        Thanks!!!

## 2017-07-26 NOTE — TELEPHONE ENCOUNTER
Called patient to inform her the requesting results have been printed. Also called to ask if wanted to pick them up or have them mailed. Left a message requesting a call back.

## 2017-07-27 ENCOUNTER — TELEPHONE (OUTPATIENT)
Dept: FAMILY MEDICINE | Facility: CLINIC | Age: 77
End: 2017-07-27

## 2017-07-27 NOTE — TELEPHONE ENCOUNTER
----- Message from Annette Jimenez sent at 7/27/2017 11:44 AM CDT -----  Contact: [phone]/ self   Patient called in returning your call. Please advise.

## 2017-07-28 NOTE — TELEPHONE ENCOUNTER
Returned patients call. He is asking that you review his labs. He stated his appointment with his PCP was cancelled. The physician is out of town. Please advise.

## 2017-07-28 NOTE — TELEPHONE ENCOUNTER
PSA level and cholesterol LDL levels are a bit elevated but overall stable.  He will need to discuss with his PCP whether his cholesterol medication should be changed.  Other labs such as complete blood count, thyroid,and  metabolic panel that checks liver function kidney function and electrolytes overall look okay.  There is nothing urgent, but I would still advise that he reschedule his appointment with his PCP as he needs to be seen regularly, especially since she is on prescription medications, thank you

## 2017-07-28 NOTE — TELEPHONE ENCOUNTER
----- Message from Stacie Dean sent at 7/28/2017 10:09 AM CDT -----  Contact: [phone]/self  Pt states he's returning your call and he would like you to leave a message on why you are calling.  Please advise

## 2017-08-16 ENCOUNTER — OFFICE VISIT (OUTPATIENT)
Dept: INTERNAL MEDICINE | Facility: CLINIC | Age: 77
End: 2017-08-16
Attending: FAMILY MEDICINE
Payer: MEDICARE

## 2017-08-16 VITALS
HEART RATE: 62 BPM | HEIGHT: 71 IN | BODY MASS INDEX: 33.02 KG/M2 | DIASTOLIC BLOOD PRESSURE: 80 MMHG | WEIGHT: 235.88 LBS | SYSTOLIC BLOOD PRESSURE: 142 MMHG | OXYGEN SATURATION: 97 %

## 2017-08-16 DIAGNOSIS — F51.03 PARADOXICAL INSOMNIA: ICD-10-CM

## 2017-08-16 DIAGNOSIS — E78.5 HYPERLIPIDEMIA, UNSPECIFIED HYPERLIPIDEMIA TYPE: ICD-10-CM

## 2017-08-16 DIAGNOSIS — Z00.00 ANNUAL PHYSICAL EXAM: Primary | ICD-10-CM

## 2017-08-16 DIAGNOSIS — I10 HYPERTENSION, ESSENTIAL: ICD-10-CM

## 2017-08-16 DIAGNOSIS — M17.0 OSTEOARTHRITIS OF BOTH KNEES, UNSPECIFIED OSTEOARTHRITIS TYPE: ICD-10-CM

## 2017-08-16 DIAGNOSIS — R73.9 ELEVATED BLOOD SUGAR: ICD-10-CM

## 2017-08-16 DIAGNOSIS — R97.20 ELEVATED PSA: ICD-10-CM

## 2017-08-16 PROBLEM — Z28.21 REFUSED PNEUMOCOCCAL VACCINE: Status: ACTIVE | Noted: 2017-08-16

## 2017-08-16 PROCEDURE — 99499 UNLISTED E&M SERVICE: CPT | Mod: S$GLB,,, | Performed by: FAMILY MEDICINE

## 2017-08-16 PROCEDURE — 99999 PR PBB SHADOW E&M-EST. PATIENT-LVL V: CPT | Mod: PBBFAC,,, | Performed by: FAMILY MEDICINE

## 2017-08-16 PROCEDURE — 99397 PER PM REEVAL EST PAT 65+ YR: CPT | Mod: S$GLB,,, | Performed by: FAMILY MEDICINE

## 2017-08-16 RX ORDER — PRAVASTATIN SODIUM 40 MG/1
40 TABLET ORAL DAILY
Qty: 30 TABLET | Refills: 11 | Status: SHIPPED | OUTPATIENT
Start: 2017-08-16 | End: 2023-01-26 | Stop reason: SDUPTHER

## 2017-08-16 RX ORDER — LISINOPRIL 10 MG/1
10 TABLET ORAL DAILY
Qty: 30 TABLET | Refills: 5 | Status: SHIPPED | OUTPATIENT
Start: 2017-08-16 | End: 2017-10-26

## 2017-08-16 NOTE — PROGRESS NOTES
Subjective:       Patient ID: Korey Santos is a 76 y.o. male.    Chief Complaint: Annual Exam    Established patient for an annual wellness check/physical exam. No specific complaints, please see ROS.        Review of Systems   Constitutional: Negative for chills, fatigue and fever.   HENT: Negative for congestion and trouble swallowing.    Eyes: Negative for redness.   Respiratory: Negative for cough, chest tightness and shortness of breath.    Cardiovascular: Negative for chest pain, palpitations and leg swelling.   Gastrointestinal: Negative for abdominal pain and blood in stool.   Genitourinary: Negative for hematuria.   Musculoskeletal: Positive for arthralgias, gait problem and joint swelling. Negative for back pain, myalgias and neck pain.   Skin: Negative for color change and rash.   Neurological: Negative for tremors, speech difficulty, weakness, numbness and headaches.   Hematological: Negative for adenopathy. Does not bruise/bleed easily.   Psychiatric/Behavioral: Positive for sleep disturbance. Negative for behavioral problems and confusion. The patient is not nervous/anxious.        Objective:      Physical Exam   Constitutional: He is oriented to person, place, and time. He appears well-developed and well-nourished. No distress.   Eyes: No scleral icterus.   Neck: Normal range of motion. Neck supple. No JVD present. Carotid bruit is not present. No tracheal deviation present. No thyromegaly present.   Cardiovascular: Normal rate, regular rhythm, normal heart sounds and intact distal pulses.  Exam reveals no gallop and no friction rub.    No murmur heard.  Pulmonary/Chest: Effort normal and breath sounds normal. No respiratory distress. He has no wheezes. He has no rales.   Abdominal: Soft. Bowel sounds are normal. He exhibits no distension and no mass. There is no tenderness. There is no rebound and no guarding.   Musculoskeletal: He exhibits no edema.        Right lower leg: He exhibits no  edema.        Left lower leg: He exhibits no edema.   Lymphadenopathy:     He has no cervical adenopathy.   Neurological: He is alert and oriented to person, place, and time. He displays no tremor. No cranial nerve deficit. Coordination and gait normal.   Skin: Skin is warm and dry. No rash noted. He is not diaphoretic. No erythema.   Psychiatric: He has a normal mood and affect. His behavior is normal. Judgment and thought content normal.   Nursing note and vitals reviewed.      Assessment:       1. Annual physical exam    2. Elevated blood sugar    3. Osteoarthritis of both knees, unspecified osteoarthritis type    4. Elevated PSA    5. Paradoxical insomnia    6. Hypertension, essential    7. Hyperlipidemia, unspecified hyperlipidemia type        Plan:   Korey was seen today for annual exam.    Diagnoses and all orders for this visit:    Annual physical exam    Elevated blood sugar  -     Glucose, fasting; Future  -     Hemoglobin A1c; Future    Osteoarthritis of both knees, unspecified osteoarthritis type    Elevated PSA  -     Ambulatory referral to Urology    Paradoxical insomnia  -     Ambulatory consult for Sleep Study    Hypertension, essential    Hyperlipidemia, unspecified hyperlipidemia type    Other orders  -     lisinopril 10 MG tablet; Take 1 tablet (10 mg total) by mouth once daily.  -     pravastatin (PRAVACHOL) 40 MG tablet; Take 1 tablet (40 mg total) by mouth once daily.      See meds, orders, follow up, routing and instructions sections of encounter.  A 76-year-old established male patient is in for annual physical examination.    Multiple questions concerning laboratory was drawn by another physician and this   was not reported to him yet.  He also has consistent elevated blood pressure   readings and consistent elevated sugar and lipid readings.    Discussed all the issues above.  He is overdue for followup with Dr. Quintero for   a stable but elevated PSA.  His main complaint is with knees  bilaterally.  He   has seen Dr. Camarena for evaluation.    Reassess in one month.  See laboratory orders for repeat A1c and fasting blood   glucose.  The patient has not been taking any antihypertensive or cholesterol   medicine recently.  We will initiate lisinopril 10 mg and renew pravastatin 40   mg.      VIKASH/HN  dd: 08/16/2017 17:35:43 (CDT)  td: 08/17/2017 02:18:33 (CDT)  Doc ID   #[id number]  Job ID #[id number]    CC:

## 2017-08-16 NOTE — PATIENT INSTRUCTIONS
I recommend that you follow a low-fat diet, which may help lower the LDL. Information about cholesterol, high blood pressure and healthy diet and activity recommendations can be found at the following links on the Internet.    http://www.nhlbi.nih.gov/health/health-topics/topics/hbc    http://www.nhlbi.nih.gov/health/educational/lose_wt/index.htm    http://www.nhlbi.nih.gov/files/docs/public/heart/hbp_low.pdf

## 2017-08-28 ENCOUNTER — OFFICE VISIT (OUTPATIENT)
Dept: SPORTS MEDICINE | Facility: CLINIC | Age: 77
End: 2017-08-28
Payer: MEDICARE

## 2017-08-28 VITALS — HEIGHT: 71 IN | BODY MASS INDEX: 32.9 KG/M2 | WEIGHT: 235 LBS | TEMPERATURE: 98 F

## 2017-08-28 DIAGNOSIS — M17.0 PRIMARY OSTEOARTHRITIS OF BOTH KNEES: Primary | ICD-10-CM

## 2017-08-28 PROCEDURE — 99214 OFFICE O/P EST MOD 30 MIN: CPT | Mod: 25,S$GLB,, | Performed by: FAMILY MEDICINE

## 2017-08-28 PROCEDURE — 20611 DRAIN/INJ JOINT/BURSA W/US: CPT | Mod: 50,S$GLB,, | Performed by: FAMILY MEDICINE

## 2017-08-28 PROCEDURE — 99999 PR PBB SHADOW E&M-EST. PATIENT-LVL III: CPT | Mod: PBBFAC,,, | Performed by: FAMILY MEDICINE

## 2017-08-28 PROCEDURE — 1157F ADVNC CARE PLAN IN RCRD: CPT | Mod: S$GLB,,, | Performed by: FAMILY MEDICINE

## 2017-08-28 PROCEDURE — 1159F MED LIST DOCD IN RCRD: CPT | Mod: S$GLB,,, | Performed by: FAMILY MEDICINE

## 2017-08-28 PROCEDURE — 1125F AMNT PAIN NOTED PAIN PRSNT: CPT | Mod: S$GLB,,, | Performed by: FAMILY MEDICINE

## 2017-08-28 PROCEDURE — 3008F BODY MASS INDEX DOCD: CPT | Mod: S$GLB,,, | Performed by: FAMILY MEDICINE

## 2017-08-28 RX ORDER — TRIAMCINOLONE ACETONIDE 40 MG/ML
40 INJECTION, SUSPENSION INTRA-ARTICULAR; INTRAMUSCULAR
Status: DISCONTINUED | OUTPATIENT
Start: 2017-08-28 | End: 2017-08-28 | Stop reason: HOSPADM

## 2017-08-28 RX ADMIN — TRIAMCINOLONE ACETONIDE 40 MG: 40 INJECTION, SUSPENSION INTRA-ARTICULAR; INTRAMUSCULAR at 11:08

## 2017-08-28 NOTE — PROGRESS NOTES
Korey Santos, a 76 y.o. male, presents today for evaluation of his RIGHT and LEFT knee.      HISTORY OF PRESENT ILLNESS   Location: anterior knee, bilateral  Onset: insidious  Palliative:    Relative rest   Oral analgesics   R - VSI, 16.12, [facility]   R VSI, iaKnee, 05/04/17, moderate%   L VSI, iaKnee, 05/25/17, moderate%   R asp, iaKnee, 05/25/17,    CSI, iaKnee, 04/18/17, moderate improvement   CSI, iaKnee, 07/07/17  Provocative:    ADLs  Prior:    16.02 - R knee - arthroscopy - [facility]  Progression: worsening discomfort Right  Quality:    Swollen   Sharp at times  Radiation: none  Severity: per nursing documentation  Timing: intermittent w/ use  Trauma: none    Review of systems (ROS):  A 10+ review of systems was performed with pertinent positives and negatives noted above in the history of present illness. Other systems were negative unless otherwise specified.      PHYSICAL EXAMINATION  General:  The patient is alert and oriented x 3. Mood is pleasant. Observation of ears, eyes and nose reveal no gross abnormalities. HEENT: NCAT, sclera anicteric.   Lungs: Respirations are equal and unlabored.  Gait is coordinated. Patient can toe walk and heel walk without difficulty.    RIGHT/LEFT KNEE EXAMINATION    Observation/Inspection  Gait:   Antalgic   Alignment:  Neutral   Scars:   None   Muscle atrophy: Mild  Effusion:  Present   Warmth:  None   Discoloration:   none     Tenderness / Crepitus (T / C):         T / C      T / C  Patella   - / -   Lateral joint line   - / -     Peripatellar medial  -  Medial joint line    + / -  Peripatellar lateral -  Medial plica   - / -  Patellar tendon -   Popliteal fossa   - / -  Quad tendon   -   Gastrocnemius   -  Prepatellar Bursa - / -   Quadricep   -  Tibial tubercle  -  Thigh/hamstring  -  Pes anserine/HS -  Fibula    -  ITB   - / -  Tibia     -  Tib/fib joint  - / -  LCL    -    MFC   - / -   MCL: Proximal  -    LFC   - / -   Distal    -          ROM: (* =  pain)  PASSIVE   ACTIVE    Left :   5 / 0 / 145*   5 / 0 / 145*     Right :    5 / 0 / 145*   5 / 0 / 145*    Patellofemoral examination:  See above noted areas of tenderness.   Patella position    Subluxation / dislocation: Centered        Sup. / Inf;   Normal   Crepitus (PF):    Absent   Patellar Mobility:       Medial-lateral:   Normal    Superior-inferior:  Normal    Inferior tilt   Normal    Patellar tendon:  Normal   Lateral tilt:    Normal   J-sign:     None   Patellofemoral grind:   No pain       Meniscal Signs:     Pain on terminal extension:  +*  Pain on terminal flexion:  +*  Tyrones maneuver:  +*  Squat     NT    Ligament Examination:  ACL / Lachman:  WNL  PCL-Post.  drawer: normal 0 to 2mm  MCL- Valgus:  normal 0 to 2mm  LCL- Varus:    normal 0 to 2mm  Pivot shift:  guarding   Dial Test:   difference c/w other side   At 30° flexion: normal (< 5°)    At 90° flexion: normal (< 5°)   Reverse Pivot Shift:   normal (Equal)     Strength: (* = with pain) Painful Side  Quadriceps   5/5  Hamstrin/5    Extremity Neuro-vascular Examination:   Sensation:  Grossly intact to light touch all dermatomal regions.   Motor Function:  Fully intact motor function at hip, knee, foot and ankle    DTRs;  quadriceps and  achilles 2+.  No clonus and downgoing Babinski.    Vascular status:  DP and PT pulses 2+, brisk capillary refill, symmetric.     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, panchito med compartment, right   W/ recurrent knee effusions  #2 Kellgren-Kevin Grade II osteoarthritis of knee, panchito med compartment, left    No evidence of neurologic pathology  No evidence of vascular pathology    Imaging studies reviewed:   X-ray knee, bilateral 17.04    Plan:    We discussed the importance of appropriate diet, weight, and regular exercise including quadriceps strengthening     We discussed options including:  #1 watchful waiting  #2 physical  therapy aimed at:    Core stability   RoM knee   Strengthening quadriceps   Gait training   #3 injection therapy:   CSI iaknee     Right, effective moderate%, repeat    Left, effective moderate%, repeat   VSI iaknee    Right, effective moderate%, repeat    Left, effective moderate%, repeat   Orthobiologics   #4 consultation re: TKA     The patient chooses #3 csi iaknee right    Pain management: handout given  Bracing:   Physical therapy:   Activity (e.g. sports, work) restrictions: as tolerated   school/vocation:     Follow up in x wks   A/e csi iaknee bilat  Should symptoms worsen or fail to resolve, consider:  Revisiting the above options

## 2017-08-28 NOTE — PROCEDURES
"Large Joint Aspiration/Injection  Date/Time: 8/28/2017 11:54 AM  Performed by: CHAPIN SOTO  Authorized by: CHAPIN SOTO     Consent Done?:  Yes (Verbal)  Indications:  Pain  Procedure site marked: Yes    Timeout: Prior to procedure the correct patient, procedure, and site was verified      Location:  Knee  Site:  R knee and L knee  Prep: Patient was prepped and draped in usual sterile fashion    Ultrasonic Guidance for needle placement: Yes  Images are saved and documented.  Needle size:  18 G  Approach:  Lateral  Medications:  40 mg triamcinolone acetonide 40 mg/mL; 40 mg triamcinolone acetonide 40 mg/mL  Patient tolerance:  Patient tolerated the procedure well with no immediate complications    Additional Comments: Description of ultrasound utilization for needle guidance:   Ultrasound guidance used for needle localization.  Images saved and stored for documentation.  The knee joint was visualized.  Dynamic visualization of the 20g x 1.5"  needle was continuous throughout the procedure.      "

## 2017-09-06 ENCOUNTER — OFFICE VISIT (OUTPATIENT)
Dept: UROLOGY | Facility: CLINIC | Age: 77
End: 2017-09-06
Attending: FAMILY MEDICINE
Payer: MEDICARE

## 2017-09-06 VITALS
HEART RATE: 65 BPM | WEIGHT: 233.25 LBS | SYSTOLIC BLOOD PRESSURE: 146 MMHG | DIASTOLIC BLOOD PRESSURE: 79 MMHG | HEIGHT: 71 IN | BODY MASS INDEX: 32.65 KG/M2

## 2017-09-06 DIAGNOSIS — N13.8 BPH WITH URINARY OBSTRUCTION: ICD-10-CM

## 2017-09-06 DIAGNOSIS — E78.5 HYPERLIPIDEMIA, UNSPECIFIED HYPERLIPIDEMIA TYPE: ICD-10-CM

## 2017-09-06 DIAGNOSIS — I10 HYPERTENSION, ESSENTIAL: ICD-10-CM

## 2017-09-06 DIAGNOSIS — N52.01 ERECTILE DYSFUNCTION DUE TO ARTERIAL INSUFFICIENCY: ICD-10-CM

## 2017-09-06 DIAGNOSIS — N40.1 BPH WITH URINARY OBSTRUCTION: ICD-10-CM

## 2017-09-06 DIAGNOSIS — R97.20 ELEVATED PSA: Primary | ICD-10-CM

## 2017-09-06 PROBLEM — Z28.21 REFUSED PNEUMOCOCCAL VACCINE: Status: RESOLVED | Noted: 2017-08-16 | Resolved: 2017-09-06

## 2017-09-06 PROCEDURE — 3077F SYST BP >= 140 MM HG: CPT | Mod: S$GLB,,, | Performed by: UROLOGY

## 2017-09-06 PROCEDURE — 1125F AMNT PAIN NOTED PAIN PRSNT: CPT | Mod: S$GLB,,, | Performed by: UROLOGY

## 2017-09-06 PROCEDURE — 3078F DIAST BP <80 MM HG: CPT | Mod: S$GLB,,, | Performed by: UROLOGY

## 2017-09-06 PROCEDURE — 99999 PR PBB SHADOW E&M-EST. PATIENT-LVL III: CPT | Mod: PBBFAC,,, | Performed by: UROLOGY

## 2017-09-06 PROCEDURE — 99214 OFFICE O/P EST MOD 30 MIN: CPT | Mod: S$GLB,,, | Performed by: UROLOGY

## 2017-09-06 PROCEDURE — 99499 UNLISTED E&M SERVICE: CPT | Mod: S$GLB,,, | Performed by: UROLOGY

## 2017-09-06 PROCEDURE — 1159F MED LIST DOCD IN RCRD: CPT | Mod: S$GLB,,, | Performed by: UROLOGY

## 2017-09-06 PROCEDURE — 1157F ADVNC CARE PLAN IN RCRD: CPT | Mod: S$GLB,,, | Performed by: UROLOGY

## 2017-09-06 PROCEDURE — 3008F BODY MASS INDEX DOCD: CPT | Mod: S$GLB,,, | Performed by: UROLOGY

## 2017-09-06 RX ORDER — SILDENAFIL CITRATE 20 MG/1
TABLET ORAL
Qty: 50 TABLET | Refills: 11 | Status: SHIPPED | OUTPATIENT
Start: 2017-09-06 | End: 2019-08-29 | Stop reason: ALTCHOICE

## 2017-09-06 NOTE — PATIENT INSTRUCTIONS
Oral Medications for Erectile Dysfunction  Prescription oral medications can be used for ED. They often work well. But, like all medications, they can have side effects. Also, they cant be used if a man has certain health problems or takes certain other medications. Talk with your doctor about oral ED medication. Ask whether it is right for you.  Types of Oral ED Medications  There are three types of prescription oral ED medications. Each one increases blood flow to the penis. When the penis is stimulated, an erection results. The three types are:  · Sildenafil citrate (Viagra)  · Tadalafil (Cialis)  · Vardenafil HCl (Levitra)  What Oral ED Medications Dont Do  There are some things ED medications cant do.  · They dont cure the cause of your ED. Without the medication, youll still have trouble getting an erection.  · They cant produce an erection without sexual stimulation.  · They wont increase sexual desire. They also wont solve any other sexual issues. Psychological, emotional, or relationship issues will not be fixed.  Taking Oral ED Medications Safely  · Do not combine ED medications with other treatments unless your doctor tells you to.  · Dont take ED medications more often or in larger doses than prescribed.  · Tell your doctor your health history. Mention all medications you take. This includes over-the-counter drugs, supplements, and herbs.  · Ask your doctor about whether you can drink alcohol while taking ED medication.  Possible Side Effects of Oral ED Medications  · Headache  · Facial flushing  · Runny or stuffy nose  · Indigestion  · Distortion of your color vision for a short time  · Sudden vision loss or hearing loss (rare)  Risks of Oral ED Medications   · Do not take ED medications if you take medications containing nitrates. The combination may drop your blood pressure to a dangerous level. Nitrates include nitroglycerin (a drug for angina). They are also in poppers, an inhaled  recreational drug. If youre not sure whether youre taking nitrates, ask your doctor or pharmacist.  · Medications called alpha-blockers can interact with ED medications. They can cause a sudden drop in blood pressure. Alpha-blockers are a common treatment for prostate problems. They also treat high blood pressure. Be sure your doctor knows if you take these medications.  · If youve had a heart attack or have heart disease and you have not had sex for a while, talk to your doctor. Having sex again can put extra strain on your heart. Your doctor can confirm that your heart is healthy enough for sex.  · It is rare, but some men taking ED medications have had sudden vision loss. This may be more likely if other health problems are present. These include high blood pressure and diabetes. Ask your doctor if you are at risk for this type of vision loss.  · In rare cases, an erection may last too long. This can badly damage the blood vessels in your penis. If an erection lasts longer than 4 hours, go to the emergency room right away.       © 5133-4424 [employer] Rio[employer], [address] [place] [address], [place] [address]. All rights reserved. This information is not intended as a substitute for professional medical care. Always follow your healthcare professional's instructions.

## 2017-09-06 NOTE — PROGRESS NOTES
CHIEF COMPLAINT:    Mr. Santos is a 76 y.o. male presenting with LUTS.    PRESENTING ILLNESS:    Korey Santos is a 76 y.o. male who c/o LUTS.  He has nocturia, decreased FOS, straining, and occasional urgency.  He is on flomax. He has noticed an improvement with the medication. No hematuria.  No dysuria.    He also c/o ED.  This has been present for > 1 year.  His T is normal.  He complains that oral meds are too expensive.     REVIEW OF SYSTEMS:    Patient denies bleeding diathesis, chills, dysuria, fever, flank pain, hematuria, stones, stress or urgency incontinence, TB or genitourinary trauma and urethral discharge.    NGUYEN  1. 1  2. 2  3. 2  4. 2  5. 2     PATIENT HISTORY:    Past Medical History:   Diagnosis Date    Arthritis     Arthritis     Arthritis     Back pain, chronic     Hypertension     Sinus trouble        Past Surgical History:   Procedure Laterality Date    BACK SURGERY  2014    KNEE SURGERY      left hand      LEG SURGERY         Family History   Problem Relation Age of Onset    Cancer Father      lung or smoking    No Known Problems Mother     No Known Problems Sister     No Known Problems Brother     No Known Problems Maternal Aunt     No Known Problems Maternal Uncle     No Known Problems Paternal Aunt     No Known Problems Paternal Uncle     No Known Problems Maternal Grandmother     No Known Problems Maternal Grandfather     No Known Problems Paternal Grandmother     No Known Problems Paternal Grandfather     Glaucoma Neg Hx     Diabetes Neg Hx     Amblyopia Neg Hx     Blindness Neg Hx     Cataracts Neg Hx     Hypertension Neg Hx     Macular degeneration Neg Hx     Retinal detachment Neg Hx     Strabismus Neg Hx     Stroke Neg Hx     Thyroid disease Neg Hx        Social History     Social History    Marital status: Single     Spouse name: N/A    Number of children: N/A    Years of education: N/A     Occupational History    Not on file.     Social  History Main Topics    Smoking status: Never Smoker    Smokeless tobacco: Never Used    Alcohol use 0.0 oz/week      Comment: very rarely    Drug use: No    Sexual activity: Yes     Partners: Female     Birth control/ protection: None     Other Topics Concern    Not on file     Social History Narrative    Ret. 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       Allergies:  Clindamycin    Medications:    Current Outpatient Prescriptions:     lisinopril 10 MG tablet, Take 1 tablet (10 mg total) by mouth once daily., Disp: 30 tablet, Rfl: 5    multivitamin capsule, Take 1 capsule by mouth once daily.  , Disp: , Rfl:     pravastatin (PRAVACHOL) 40 MG tablet, Take 1 tablet (40 mg total) by mouth once daily., Disp: 30 tablet, Rfl: 11    tamsulosin (FLOMAX) 0.4 mg Cp24, Take 1 capsule (0.4 mg total) by mouth once daily., Disp: 90 capsule, Rfl: 0    aspirin (ECOTRIN) 325 MG EC tablet, Take 1 tablet (325 mg total) by mouth 2 (two) times daily., Disp: 84 tablet, Rfl: 0    PHYSICAL EXAMINATION:    The patient generally appears in good health, is appropriately interactive, and is in no apparent distress.    Skin: No lesions.    Mental: Cooperative with normal affect.    Neuro: Grossly intact.    HEENT: Normal. No evidence of lymphadenopathy.    Chest: Clear to ascultation bilaterally, normal inspiratory effort.    Heart: Regular rhythm.  No murmurs    Abdomen: BS active. Soft, non-tender. No masses or organomegaly. Bladder is not palpable. No evidence of flank discomfort. No evidence of inguinal hernia.    Genitourinary: The penis is not circumcised with no evidence of plaques or induration. The urethral meatus is normal. The testes, epididymides, and cord structures are normal in size and contour bilaterally. The scrotum is normal in size and contour.    Normal anal sphincter tone. No rectal mass.    The prostate is 30 g. Normal landmarks. Lateral sulci. Median furrow intact.  No nodularity or induration. Seminal vesicles  are normal.    Extremities: No clubbing, cyanosis, or edema      LABS:  UA dipped negative today    Lab Results   Component Value Date    PSA 4.7 (H) 07/08/2017    PSA 4.7 (H) 10/07/2014    PSA 3.2 12/18/2013    PSADIAG 2.9 10/22/2014       IMPRESSION:    Encounter Diagnoses   Name Primary?    Elevated PSA Yes    BPH with urinary obstruction     Erectile dysfunction due to arterial insufficiency     Hyperlipidemia, unspecified hyperlipidemia type     Hypertension, essential    HTN, controlled      PLAN:    1. Continue flomax for his LUTS.    2. Discussed that his PSA is elevated.  However, based off his age and life expectancy a PSA is not recommended in him.  He voiced understanding.  We discussed that he could have occult prostate cancer, and the only way to diagnose this would be with a biopsy.  He doesn't want this.    3. Would no longer check a PSA on him.  4. Will try generic sildenafil for the ED. Side effects discussed.  A new Rx was given   5. RTC 1 year to see an GERMANIA.  6. I spent 25 minutes with the patient of which more than half was spent in direct consultation with the patient in regards to our treatment and plan.       Copy to:

## 2017-09-16 ENCOUNTER — LAB VISIT (OUTPATIENT)
Dept: LAB | Facility: HOSPITAL | Age: 77
End: 2017-09-16
Attending: FAMILY MEDICINE
Payer: MEDICARE

## 2017-09-16 DIAGNOSIS — R73.9 ELEVATED BLOOD SUGAR: ICD-10-CM

## 2017-09-16 LAB
ESTIMATED AVG GLUCOSE: 120 MG/DL
GLUCOSE SERPL-MCNC: 92 MG/DL
HBA1C MFR BLD HPLC: 5.8 %

## 2017-09-16 PROCEDURE — 82947 ASSAY GLUCOSE BLOOD QUANT: CPT

## 2017-09-16 PROCEDURE — 36415 COLL VENOUS BLD VENIPUNCTURE: CPT

## 2017-09-16 PROCEDURE — 83036 HEMOGLOBIN GLYCOSYLATED A1C: CPT

## 2017-09-18 ENCOUNTER — TELEPHONE (OUTPATIENT)
Dept: INTERNAL MEDICINE | Facility: CLINIC | Age: 77
End: 2017-09-18

## 2017-09-18 NOTE — TELEPHONE ENCOUNTER
----- Message from Juan Bustamante MD sent at 9/17/2017  7:14 AM CDT -----  Please call patient and report normal test/lab results. Thanks.

## 2017-09-27 ENCOUNTER — OFFICE VISIT (OUTPATIENT)
Dept: SPORTS MEDICINE | Facility: CLINIC | Age: 77
End: 2017-09-27
Payer: MEDICARE

## 2017-09-27 VITALS — WEIGHT: 233 LBS | HEIGHT: 71 IN | TEMPERATURE: 98 F | BODY MASS INDEX: 32.62 KG/M2

## 2017-09-27 DIAGNOSIS — M17.0 PRIMARY OSTEOARTHRITIS OF BOTH KNEES: Primary | ICD-10-CM

## 2017-09-27 PROCEDURE — 99999 PR PBB SHADOW E&M-EST. PATIENT-LVL III: CPT | Mod: PBBFAC,,, | Performed by: FAMILY MEDICINE

## 2017-09-27 PROCEDURE — 1159F MED LIST DOCD IN RCRD: CPT | Mod: S$GLB,,, | Performed by: FAMILY MEDICINE

## 2017-09-27 PROCEDURE — 1125F AMNT PAIN NOTED PAIN PRSNT: CPT | Mod: S$GLB,,, | Performed by: FAMILY MEDICINE

## 2017-09-27 PROCEDURE — 20611 DRAIN/INJ JOINT/BURSA W/US: CPT | Mod: 50,S$GLB,, | Performed by: FAMILY MEDICINE

## 2017-09-27 PROCEDURE — 1157F ADVNC CARE PLAN IN RCRD: CPT | Mod: S$GLB,,, | Performed by: FAMILY MEDICINE

## 2017-09-27 PROCEDURE — 99214 OFFICE O/P EST MOD 30 MIN: CPT | Mod: 25,S$GLB,, | Performed by: FAMILY MEDICINE

## 2017-09-27 PROCEDURE — 99499 UNLISTED E&M SERVICE: CPT | Mod: S$GLB,,, | Performed by: FAMILY MEDICINE

## 2017-09-27 PROCEDURE — 3008F BODY MASS INDEX DOCD: CPT | Mod: S$GLB,,, | Performed by: FAMILY MEDICINE

## 2017-09-27 RX ORDER — TRIAMCINOLONE ACETONIDE 40 MG/ML
40 INJECTION, SUSPENSION INTRA-ARTICULAR; INTRAMUSCULAR
Status: DISCONTINUED | OUTPATIENT
Start: 2017-09-27 | End: 2017-09-27 | Stop reason: HOSPADM

## 2017-09-27 RX ADMIN — TRIAMCINOLONE ACETONIDE 40 MG: 40 INJECTION, SUSPENSION INTRA-ARTICULAR; INTRAMUSCULAR at 10:09

## 2017-09-27 NOTE — PROCEDURES
"Large Joint Aspiration/Injection  Date/Time: 9/27/2017 10:02 AM  Performed by: CHAPIN SOTO  Authorized by: CHAPIN SOTO     Consent Done?:  Yes (Verbal)  Indications:  Pain  Procedure site marked: Yes    Timeout: Prior to procedure the correct patient, procedure, and site was verified      Location:  Knee  Site:  R knee and L knee  Prep: Patient was prepped and draped in usual sterile fashion    Ultrasonic Guidance for needle placement: Yes  Images are saved and documented.  Needle size:  18 G  Approach:  Lateral  Medications:  40 mg triamcinolone acetonide 40 mg/mL; 40 mg triamcinolone acetonide 40 mg/mL  Aspirate amount (ml):  18  Aspirate:  Clear  Patient tolerance:  Patient tolerated the procedure well with no immediate complications    Additional Comments: Description of ultrasound utilization for needle guidance:   Ultrasound guidance used for needle localization.  Images saved and stored for documentation.  The knee joint was visualized.  Dynamic visualization of the 20g x 1.5"  needle was continuous throughout the procedure.      "

## 2017-09-27 NOTE — PROGRESS NOTES
Korey Santos, a 76 y.o. male, presents today for evaluation of his RIGHT and LEFT knee.      HISTORY OF PRESENT ILLNESS   Location: anterior knee, bilateral  Onset: insidious  Palliative:    Relative rest   Oral analgesics   R - VSI, 16.12, [facility]   R VSI, iaKnee, 05/04/17, moderate%   L VSI, iaKnee, 05/25/17, moderate%   R asp, iaKnee, 05/25/17,    CSI, iaKnee, 04/18/17, moderate improvement   CSI, iaKnee, 07/07/17   Asp/CSI, iaknee, 08/28/17, moderate improvement   Provocative:    ADLs  Prior:    16.02 - R knee - arthroscopy - [facility]  Progression: worsening discomfort   Quality:    Swollen   Sharp at times  Radiation: none  Severity: per nursing documentation  Timing: intermittent w/ use  Trauma: none    Review of systems (ROS):  A 10+ review of systems was performed with pertinent positives and negatives noted above in the history of present illness. Other systems were negative unless otherwise specified.    PHYSICAL EXAMINATION  General:  The patient is alert and oriented x 3. Mood is pleasant. Observation of ears, eyes and nose reveal no gross abnormalities. HEENT: NCAT, sclera anicteric.   Lungs: Respirations are equal and unlabored.  Gait is coordinated. Patient can toe walk and heel walk without difficulty.    RIGHT/LEFT KNEE EXAMINATION    Observation/Inspection  Gait:   Antalgic   Alignment:  Neutral   Scars:   None   Muscle atrophy: Mild  Effusion:  Present   Warmth:  None   Discoloration:   none     Tenderness / Crepitus (T / C):         T / C      T / C  Patella   - / -   Lateral joint line   - / -     Peripatellar medial  -  Medial joint line    + / -  Peripatellar lateral -  Medial plica   - / -  Patellar tendon -   Popliteal fossa   - / -  Quad tendon   -   Gastrocnemius   -  Prepatellar Bursa - / -   Quadricep   -  Tibial tubercle  -  Thigh/hamstring  -  Pes anserine/HS -  Fibula    -  ITB   - / -  Tibia     -  Tib/fib joint  - / -  LCL    -    MFC   - / -    MCL: Proximal  -    LFC   - / -   Distal    -          ROM: (* = pain)  PASSIVE   ACTIVE    Left :   5 / 0 / 145*   5 / 0 / 145*     Right :    5 / 0 / 145*   5 / 0 / 145*    Patellofemoral examination:  See above noted areas of tenderness.   Patella position    Subluxation / dislocation: Centered        Sup. / Inf;   Normal   Crepitus (PF):    Absent   Patellar Mobility:       Medial-lateral:   Normal    Superior-inferior:  Normal    Inferior tilt   Normal    Patellar tendon:  Normal   Lateral tilt:    Normal   J-sign:     None   Patellofemoral grind:   No pain       Meniscal Signs:     Pain on terminal extension:  +*  Pain on terminal flexion:  +*  Tyrones maneuver:  +*  Squat     NT    Ligament Examination:  ACL / Lachman:  WNL  PCL-Post.  drawer: normal 0 to 2mm  MCL- Valgus:  normal 0 to 2mm  LCL- Varus:    normal 0 to 2mm  Pivot shift:  guarding   Dial Test:   difference c/w other side   At 30° flexion: normal (< 5°)    At 90° flexion: normal (< 5°)   Reverse Pivot Shift:   normal (Equal)     Strength: (* = with pain) Painful Side  Quadriceps   5/5  Hamstrin/5    Extremity Neuro-vascular Examination:   Sensation:  Grossly intact to light touch all dermatomal regions.   Motor Function:  Fully intact motor function at hip, knee, foot and ankle    DTRs;  quadriceps and  achilles 2+.  No clonus and downgoing Babinski.    Vascular status:  DP and PT pulses 2+, brisk capillary refill, symmetric.     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, panchito med compartment, right   W/ recurrent knee effusions  #2 Kellgren-Kevin Grade II osteoarthritis of knee, panchito med compartment, left   W/ recurrent knee effusions    No evidence of neurologic pathology  No evidence of vascular pathology    Imaging studies reviewed:   X-ray knee, bilateral 17.04    Plan:    We discussed the importance of appropriate diet, weight, and regular exercise including  quadriceps strengthening     We discussed options including:  #1 watchful waiting  #2 physical therapy aimed at:    Core stability   RoM knee   Strengthening quadriceps   Gait training   #3 injection therapy:   CSI iaknee     Right, effective moderate%, repeat    Left, effective moderate%, repeat   VSI iaknee    Right, effective moderate%, repeat    Left, effective moderate%, repeat   Orthobiologics   #4 consultation re: TKA     The patient chooses #3 csi iaknee bilat    Pain management: handout given  Bracing:   Physical therapy:   Activity (e.g. sports, work) restrictions: as tolerated   school/vocation: member of [employer]     Follow up per patient   Should symptoms worsen or fail to resolve, consider:  Revisiting the above options

## 2017-09-29 ENCOUNTER — OFFICE VISIT (OUTPATIENT)
Dept: INTERNAL MEDICINE | Facility: CLINIC | Age: 77
End: 2017-09-29
Attending: FAMILY MEDICINE
Payer: MEDICARE

## 2017-09-29 VITALS
SYSTOLIC BLOOD PRESSURE: 128 MMHG | OXYGEN SATURATION: 97 % | DIASTOLIC BLOOD PRESSURE: 68 MMHG | HEART RATE: 64 BPM | HEIGHT: 67 IN | BODY MASS INDEX: 37.44 KG/M2 | WEIGHT: 238.56 LBS | TEMPERATURE: 98 F

## 2017-09-29 DIAGNOSIS — K59.00 CONSTIPATION, UNSPECIFIED CONSTIPATION TYPE: ICD-10-CM

## 2017-09-29 DIAGNOSIS — I10 HYPERTENSION, ESSENTIAL: Primary | ICD-10-CM

## 2017-09-29 DIAGNOSIS — R13.10 DYSPHAGIA, UNSPECIFIED TYPE: ICD-10-CM

## 2017-09-29 DIAGNOSIS — E78.5 HYPERLIPIDEMIA, UNSPECIFIED HYPERLIPIDEMIA TYPE: ICD-10-CM

## 2017-09-29 PROBLEM — R73.9 ELEVATED BLOOD SUGAR: Status: RESOLVED | Noted: 2017-08-16 | Resolved: 2017-09-29

## 2017-09-29 PROCEDURE — 3074F SYST BP LT 130 MM HG: CPT | Mod: S$GLB,,, | Performed by: FAMILY MEDICINE

## 2017-09-29 PROCEDURE — 1159F MED LIST DOCD IN RCRD: CPT | Mod: S$GLB,,, | Performed by: FAMILY MEDICINE

## 2017-09-29 PROCEDURE — 1157F ADVNC CARE PLAN IN RCRD: CPT | Mod: S$GLB,,, | Performed by: FAMILY MEDICINE

## 2017-09-29 PROCEDURE — 99999 PR PBB SHADOW E&M-EST. PATIENT-LVL IV: CPT | Mod: PBBFAC,,, | Performed by: FAMILY MEDICINE

## 2017-09-29 PROCEDURE — 3078F DIAST BP <80 MM HG: CPT | Mod: S$GLB,,, | Performed by: FAMILY MEDICINE

## 2017-09-29 PROCEDURE — 3008F BODY MASS INDEX DOCD: CPT | Mod: S$GLB,,, | Performed by: FAMILY MEDICINE

## 2017-09-29 PROCEDURE — 99214 OFFICE O/P EST MOD 30 MIN: CPT | Mod: S$GLB,,, | Performed by: FAMILY MEDICINE

## 2017-09-29 PROCEDURE — 99499 UNLISTED E&M SERVICE: CPT | Mod: S$GLB,,, | Performed by: FAMILY MEDICINE

## 2017-09-29 PROCEDURE — 1125F AMNT PAIN NOTED PAIN PRSNT: CPT | Mod: S$GLB,,, | Performed by: FAMILY MEDICINE

## 2017-09-29 RX ORDER — TAMSULOSIN HYDROCHLORIDE 0.4 MG/1
0.8 CAPSULE ORAL DAILY
Qty: 180 CAPSULE | Refills: 0 | Status: SHIPPED | OUTPATIENT
Start: 2017-09-29 | End: 2018-04-25 | Stop reason: SDUPTHER

## 2017-09-29 RX ORDER — POLYETHYLENE GLYCOL 3350 17 G/17G
17 POWDER, FOR SOLUTION ORAL DAILY PRN
Qty: 510 G | Refills: 1 | Status: SHIPPED | OUTPATIENT
Start: 2017-09-29 | End: 2017-09-29 | Stop reason: SDUPTHER

## 2017-09-29 RX ORDER — POLYETHYLENE GLYCOL 3350 17 G/17G
17 POWDER, FOR SOLUTION ORAL DAILY PRN
Qty: 510 G | Refills: 1 | Status: SHIPPED | OUTPATIENT
Start: 2017-09-29 | End: 2019-12-04 | Stop reason: ALTCHOICE

## 2017-09-29 NOTE — PROGRESS NOTES
Patient, Korey Santos (MRN #[id number]), presented with a recorded BMI of 37.36 kg/m^2 and a documented comorbidity(s):  - Hypertension  - Hyperlipidemia  to which the severe obesity is a contributing factor. This is consistent with the definition of severe obesity (BMI 35.0-35.9) with comorbidity (ICD-10 E66.01, Z68.35). The patient's severe obesity was monitored, evaluated, addressed and/or treated. This addendum to the medical record is made on 09/29/2017.

## 2017-09-29 NOTE — PROGRESS NOTES
Subjective:       Patient ID: Korey Santos is a 76 y.o. male.    Chief Complaint: Follow-up    HPI  Review of Systems   Constitutional: Negative for chills, fatigue and fever.   HENT: Negative for congestion and trouble swallowing.    Eyes: Negative for redness.   Respiratory: Negative for cough, chest tightness and shortness of breath.    Cardiovascular: Negative for chest pain, palpitations and leg swelling.   Gastrointestinal: Positive for constipation. Negative for abdominal pain and blood in stool.   Genitourinary: Positive for difficulty urinating. Negative for hematuria.   Musculoskeletal: Negative for arthralgias, back pain, gait problem, joint swelling, myalgias and neck pain.   Skin: Negative for color change and rash.   Neurological: Negative for tremors, speech difficulty, weakness, numbness and headaches.   Hematological: Negative for adenopathy. Does not bruise/bleed easily.   Psychiatric/Behavioral: Negative for behavioral problems, confusion and sleep disturbance. The patient is not nervous/anxious.        Objective:      Physical Exam   Constitutional: He is oriented to person, place, and time. He appears well-developed and well-nourished. No distress.   Neck: Neck supple.   Pulmonary/Chest: Effort normal.   Musculoskeletal: He exhibits no edema.        Right lower leg: He exhibits no edema.        Left lower leg: He exhibits no edema.   Neurological: He is alert and oriented to person, place, and time.   Skin: Skin is warm and dry. No rash noted.   Psychiatric: He has a normal mood and affect. His behavior is normal. Judgment and thought content normal.   Nursing note and vitals reviewed.      Assessment:       1. Hypertension, essential    2. Hyperlipidemia, unspecified hyperlipidemia type    3. Dysphagia, unspecified type    4. Constipation, unspecified constipation type        Plan:   Korey was seen today for follow-up.    Diagnoses and all orders for this visit:    Hypertension,  essential    Hyperlipidemia, unspecified hyperlipidemia type  -     Lipid panel; Future  -     AST (SGOT); Future    Dysphagia, unspecified type  -     Ambulatory referral to Gastroenterology    Constipation, unspecified constipation type    Other orders  -     Discontinue: polyethylene glycol (GLYCOLAX) 17 gram/dose powder; Take 17 g by mouth daily as needed.  -     polyethylene glycol (GLYCOLAX) 17 gram/dose powder; Take 17 g by mouth daily as needed.  -     tamsulosin (FLOMAX) 0.4 mg Cp24; Take 2 capsules (0.8 mg total) by mouth once daily.      See meds, orders, follow up, routing and instructions sections of encounter.  A 76-year-old established male patient.  He is in for a followup on blood   pressure.  He had seen Dr. Camarena concerning his knee.  He had an injection.    States that, that makes him feel somewhat improved; and he is also having   arthrocentesis.    I went over all of his medications today.  He is compliant.  He will be due for   a repeat lipid panel in approximately six to eight weeks.      NETTIE  dd: 09/29/2017 16:31:40 (CDT)  td: 09/30/2017 08:12:16 (CDT)  Doc ID   #[id number]  Job ID #[id number]    CC:

## 2017-10-05 ENCOUNTER — OFFICE VISIT (OUTPATIENT)
Dept: GASTROENTEROLOGY | Facility: CLINIC | Age: 77
End: 2017-10-05
Attending: FAMILY MEDICINE
Payer: MEDICARE

## 2017-10-05 ENCOUNTER — TELEPHONE (OUTPATIENT)
Dept: GASTROENTEROLOGY | Facility: CLINIC | Age: 77
End: 2017-10-05

## 2017-10-05 VITALS
BODY MASS INDEX: 32.16 KG/M2 | HEART RATE: 57 BPM | DIASTOLIC BLOOD PRESSURE: 75 MMHG | HEIGHT: 71 IN | SYSTOLIC BLOOD PRESSURE: 127 MMHG | WEIGHT: 229.75 LBS

## 2017-10-05 DIAGNOSIS — K59.04 CHRONIC IDIOPATHIC CONSTIPATION: ICD-10-CM

## 2017-10-05 DIAGNOSIS — R13.12 OROPHARYNGEAL DYSPHAGIA: Primary | ICD-10-CM

## 2017-10-05 DIAGNOSIS — Z12.11 COLON CANCER SCREENING: ICD-10-CM

## 2017-10-05 PROCEDURE — 99204 OFFICE O/P NEW MOD 45 MIN: CPT | Mod: S$GLB,,, | Performed by: PHYSICIAN ASSISTANT

## 2017-10-05 PROCEDURE — 99999 PR PBB SHADOW E&M-EST. PATIENT-LVL V: CPT | Mod: PBBFAC,,, | Performed by: PHYSICIAN ASSISTANT

## 2017-10-05 NOTE — PROGRESS NOTES
[facility] Consultation Note    Reason for Consult:  The primary encounter diagnosis was Oropharyngeal dysphagia. Diagnoses of Chronic idiopathic constipation and Colon cancer screening were also pertinent to this visit.    PCP:   Juan Solorzano   [address] [place] [id number]    Referring MD:  Juan Solorzano Md  [address] [place] [address]    HPI:  This is a 76 y.o. male referred by Dr. Solorzano for evaluation of dysphagia    Today he reports orpharygeal dysphagia of pills, liquids, and food x 1 months  He says it started after he started taking blood pressure medicine 1 month ago.  Denies esophageal dysphagia or GERD  Also denies hx of TIA or CVA  He says he was told by his PCP that he will be having an EGD today, and is fustrated that this is not the case.    He also reports having chronic constipation requiring  multiple laxatives in order to have a BM. NO relief with taking a tool softener or miralax daily    2011 colonoscopy revealed three tubular adenomatous polyps  Today he says he was told  By his PCP that he did not need a colonoscopy, and says he does not wish to schedule one. I did discuss with him the possibility of missing a polyp that could potentially develop into cancer.    ROS:  Constitutional: No fevers, chills, No weight loss  ENT: No allergies  CV: No chest pain  Pulm: No cough, No shortness of breath  Ophtho: No vision changes  GI: see HPI  Derm: No rash  Heme: No lymphadenopathy, No bruising  MSK: No arthritis  : No dysuria, No hematuria  Endo: No hot or cold intolerance  Neuro: No syncope, No seizure  Psych: No anxiety, No depression    Medical History:  has a past medical history of Arthritis; Back pain, chronic; Chronic constipation; Hyperlipidemia; Hypertension; and Sinus trouble.    Surgical History:  has a past surgical history that includes left hand; Back surgery (2014); Leg Surgery; Knee surgery; Colonoscopy; and Upper  "gastrointestinal endoscopy.    Family History: family history includes Cancer in his father; No Known Problems in his brother, maternal aunt, maternal grandfather, maternal grandmother, maternal uncle, mother, paternal aunt, paternal grandfather, paternal grandmother, paternal uncle, and sister..     Social History:  reports that he has never smoked. He has never used smokeless tobacco. He reports that he drinks alcohol. He reports that he does not use drugs.    Review of patient's allergies indicates:   Allergen Reactions    Clindamycin Swelling     Throat swells       Current Outpatient Prescriptions on File Prior to Visit   Medication Sig Dispense Refill    lisinopril 10 MG tablet Take 1 tablet (10 mg total) by mouth once daily. 30 tablet 5    multivitamin capsule Take 1 capsule by mouth once daily.        polyethylene glycol (GLYCOLAX) 17 gram/dose powder Take 17 g by mouth daily as needed. 510 g 1    pravastatin (PRAVACHOL) 40 MG tablet Take 1 tablet (40 mg total) by mouth once daily. 30 tablet 11    sildenafil (REVATIO) 20 mg Tab Take 5 po 1 hour before sexual activity 50 tablet 11    tamsulosin (FLOMAX) 0.4 mg Cp24 Take 2 capsules (0.8 mg total) by mouth once daily. 180 capsule 0    aspirin (ECOTRIN) 325 MG EC tablet Take 1 tablet (325 mg total) by mouth 2 (two) times daily. 84 tablet 0     No current facility-administered medications on file prior to visit.          Objective Findings:    Vital Signs:  /75   Pulse (!) 57   Ht 5' 11" (1.803 m)   Wt 104.2 kg (229 lb 11.5 oz)   BMI 32.04 kg/m²   Body mass index is 32.04 kg/m².    Physical Exam:  General Appearance: Well appearing in no acute distress  Head:   Normocephalic, without obvious abnormality  Eyes:    No scleral icterus  ENT: no thush on throat exam  Lungs: CTA bilaterally in anterior and posterior fields, no wheezes, no crackles.  Heart:  Regular rate and rhythm, S1, S2 normal, no murmurs heard  Abdomen: Soft, non tender, non " distended with positive bowel sounds in all four quadrants.  Extremities: no edema  Skin: No rash  Neurologic: AAO x 3      Labs:  Lab Results   Component Value Date    WBC 10.37 07/08/2017    HGB 15.0 07/08/2017    HCT 45.5 07/08/2017     07/08/2017    CHOL 235 (H) 07/08/2017    TRIG 41 07/08/2017    HDL 57 07/08/2017    ALT 20 07/08/2017    AST 22 07/08/2017     07/08/2017    K 4.5 07/08/2017     07/08/2017    CREATININE 1.0 07/08/2017    BUN 15 07/08/2017    CO2 23 07/08/2017    TSH 0.642 07/08/2017    PSA 4.7 (H) 07/08/2017    GLUF 92 09/16/2017    HGBA1C 5.8 (H) 09/16/2017       Imaging:    Endoscopy:    2011 colonoscopy revealed three tubular adenomatous polyps    Assessment:  1. Oropharyngeal dysphagia    2. Chronic idiopathic constipation    3. Colon cancer screening      75yo M with oropharyngeal dysphagia x 1 month. The patient attributes this to the start of new medication, but the etiology is unclear.    Recommendations:  1. Order MBSS to evaluate oropharyngeal dysphagia     2. Schedule EGD at pt and pt's PCP request    3. Give yourself an enema, then drink a bottle of magnesium citrate.  Then Start linzess 145mcg for constipation    If EGD and MBSS is unrevealing, consider ENT referral.     Return in about 2 months (around 12/5/2017).      Order summary:  Orders Placed This Encounter    Fl Modified Barium Swallow Speech    SLP video swallow    linaclotide (LINZESS) 145 mcg Cap capsule    Case request GI: ESOPHAGOGASTRODUODENOSCOPY (EGD)         Thank you so much for allowing me to participate in the care of Korey Ramirez PA-C

## 2017-10-05 NOTE — TELEPHONE ENCOUNTER
Please call patient to schedule a modified barium with speech. Orders placed in epic by coy salazar  Thanks

## 2017-10-06 ENCOUNTER — TELEPHONE (OUTPATIENT)
Dept: GASTROENTEROLOGY | Facility: CLINIC | Age: 77
End: 2017-10-06

## 2017-10-10 ENCOUNTER — TELEPHONE (OUTPATIENT)
Dept: INTERNAL MEDICINE | Facility: CLINIC | Age: 77
End: 2017-10-10

## 2017-10-10 ENCOUNTER — TELEPHONE (OUTPATIENT)
Dept: GASTROENTEROLOGY | Facility: CLINIC | Age: 77
End: 2017-10-10

## 2017-10-10 DIAGNOSIS — R13.10 DYSPHAGIA, UNSPECIFIED TYPE: Primary | ICD-10-CM

## 2017-10-10 NOTE — TELEPHONE ENCOUNTER
----- Message from Lesli Matson MA sent at 10/10/2017  9:28 AM CDT -----  Contact: Home: [phone]   Fabienne   Pt said he spoke to you last week and that you were going to have someone call in a Rx for him (He does not know the name of the medication) and you were going to get him an appt in ENT. He is following up on both matters      Home: [phone]

## 2017-10-10 NOTE — TELEPHONE ENCOUNTER
Spoke with patient preciously at 11:20am. Notified pt that he does not have to go to ENT prior to EGD. Also that medication were already called out to pharmacy.    Spoke with pharmacy, they did receive RX and it is ready for . Will notify patient.    Patient previously stated that pharmacy did not receive medication.

## 2017-10-10 NOTE — TELEPHONE ENCOUNTER
----- Message from Sukh Fraire sent at 10/10/2017  9:44 AM CDT -----  Contact: self [phone]  Patient would like a call back from the office stated stomach doctor inform him he need to see an ENT doctor . Requesting a call back to discuss need some understanding ,. Please call and advise , Thanks !

## 2017-10-11 NOTE — TELEPHONE ENCOUNTER
Pt requesting referral ENT due to throat tightness and soreness for the past month. Pt states Gastro physician stated: pt should have went to the ENT before scheduling with the office.  Please advise

## 2017-10-12 ENCOUNTER — TELEPHONE (OUTPATIENT)
Dept: INTERNAL MEDICINE | Facility: CLINIC | Age: 77
End: 2017-10-12

## 2017-10-12 NOTE — TELEPHONE ENCOUNTER
Spoke with patient in reference  to scheduling ENT appt. Pt was scheduled appt at available time patient.

## 2017-10-12 NOTE — TELEPHONE ENCOUNTER
----- Message from Debi Antonio sent at 10/12/2017 11:18 AM CDT -----  Contact: pt [phone]  The is so upset he said no is calling him back in regards to a referral,please advise pt

## 2017-10-16 ENCOUNTER — NURSE TRIAGE (OUTPATIENT)
Dept: ADMINISTRATIVE | Facility: CLINIC | Age: 77
End: 2017-10-16

## 2017-10-16 NOTE — TELEPHONE ENCOUNTER
"    Reason for Disposition   [1] Severe difficulty swallowing (e.g., drooling or spitting, can't swallow water) AND [2] new onset AND [3] normal breathing    Answer Assessment - Initial Assessment Questions  1. SYMPTOM: "Are you having difficulty swallowing liquids, solids, or both?"      -  2. ONSET: "When did the swallowing problems begin?"       Weeks ago  3. CAUSE: "What do you think is causing the problem?"       unsure  4. CHRONIC/RECURRENT: "Is this a new problem for you?"  If no, ask: "How long have you had this problem?" (e.g., days, weeks, months)       -  5. OTHER SYMPTOMS: "Do you have any other symptoms?" (e.g., difficulty breathing, sore throat, swollen tongue, chest pain)      -  6. PREGNANCY: "Is there any chance you are pregnant?" "When was your last menstrual period?"      -  Patient was currently on the line to schedule an earlier appointment with ENT. Patient advised to go to ED if symptoms are worsening. Patient states "Ok", then disconnected call. Unable to perform detail triage, upset caller.    Protocols used: ST SWALLOWING DIFFICULTY-A-AH      "

## 2017-10-17 ENCOUNTER — OFFICE VISIT (OUTPATIENT)
Dept: OTOLARYNGOLOGY | Facility: CLINIC | Age: 77
End: 2017-10-17
Payer: MEDICARE

## 2017-10-17 VITALS
TEMPERATURE: 97 F | BODY MASS INDEX: 32.16 KG/M2 | WEIGHT: 230.63 LBS | DIASTOLIC BLOOD PRESSURE: 76 MMHG | SYSTOLIC BLOOD PRESSURE: 147 MMHG | HEART RATE: 61 BPM

## 2017-10-17 DIAGNOSIS — R68.2 DRY MOUTH: ICD-10-CM

## 2017-10-17 DIAGNOSIS — J34.9 SINUS DISORDER: ICD-10-CM

## 2017-10-17 DIAGNOSIS — J34.3 HYPERTROPHY OF BOTH INFERIOR NASAL TURBINATES: ICD-10-CM

## 2017-10-17 DIAGNOSIS — R07.0 THROAT DISCOMFORT: ICD-10-CM

## 2017-10-17 DIAGNOSIS — J34.2 NASAL SEPTAL DEVIATION: Primary | ICD-10-CM

## 2017-10-17 PROCEDURE — 99999 PR PBB SHADOW E&M-EST. PATIENT-LVL III: CPT | Mod: PBBFAC,,, | Performed by: OTOLARYNGOLOGY

## 2017-10-17 PROCEDURE — 99203 OFFICE O/P NEW LOW 30 MIN: CPT | Mod: S$GLB,,, | Performed by: OTOLARYNGOLOGY

## 2017-10-17 PROCEDURE — 99499 UNLISTED E&M SERVICE: CPT | Mod: S$GLB,,, | Performed by: OTOLARYNGOLOGY

## 2017-10-17 NOTE — PATIENT INSTRUCTIONS
Endoscopy performed  Anti GERD measures may help; literature provided   MBSS previously ordered; to be completed    1 tsp liquid benadryl +  1 tsp Maalox ; swallowed slowly TID may help soothe throat  Pt. is a candidate for septoplasty/turbinate reduction procedrues for nasal patency; Dr. NELLI Alvarez's card provied  Consider withdrawal/substitution for Lisinopril  Use of NSS may help; Flonase NSS 1-2 sprays @ lateral nostril once a day x 3-6 weeks may help  Consider sinus CT pending course  Keep appointment with Dr. MARIAH Perea 10/30/17

## 2017-10-17 NOTE — PROGRESS NOTES
"Subjective:       Patient ID: Korey Santos is a 77 y.o. male.    Chief Complaint: No chief complaint on file.    HPI: Mr. Garnica is a 77 year old CM whose handwritten reason for the visit today is "throat".    He made a call to nurse triage 10/16/17 with significant complaints of dysphagia.  The call was routed to Dr. Bustamante.  He points to the midline of the neck below the thyroid cartilage as the location of discomfort.  He denies delbert GERD symptoms.  He was placed on 10 mg of lisinopril ( and one other) about a month ago, about the same time the throat symptoms started.  He was examined by the GI service recently.  A modified barium swallow study was ordered but not yet completed. The patient indicates a GI endoscopy procedure performed years ago.  He was examined by Dr. Juan Bustamante 9/29/17 for hypertension, hyperlipidemia, constipation and unspecified dysphagia.  He indicates his "bad sinuses".  He indicates a constant runny nose symptoms without seasonal ALLERGIES.  He cannot sleep on his side due to nasal discharge and drip.  He only sleeps on his back.  He indicates dry mouth in the morning due to nasal obstruction symptoms chronically.    I believe he has an  appointment scheduled with Dr. Gutierrez Perea at the end of this month.    He is status post a CT scan of the neck without contrast 3/12/12 which indicated a subcutaneous lesion within the posterior neck or presenting a lipoma probably.  There was a hypodense lesion along the left floor the mouth compatible with a small ranula.  There is patchy opacification of the left ethmoid air cells also noted then.    PMH: High blood pressure, high cholesterol, arthritis, sinus and constipation  PSH: Back surgery, knee surgery  Family history:  ALLERGIES: Clindamycin caused throat swelling  Habits: 1 caffeinated drink per day  Review of Systems     Nose:  Positive for nasal obstruction and postnasal drip.    Mouth/Throat: Positive for pain swallowing, " impaired swallowing, hoarse voice, oral ulcers and neck lumps.   Cardiovascular:  Positive for chest pain.   Respiratory:  Positive for asthma, history of tuberculosis and recent cough.    Gastrointestinal:  Positive for history of stomach ulcers or pain, acid reflux and blood in stool.   Other:  Positive for kidney problem, bladder problem, prostate disease, arthritis, heat intolerance, cold intolerance and swollen glands. Negative for rash.    His medication list includes lisinopril 10 mg, Linzess capsules, multivitamins, Pravachol 40, sidenafill 20, Flomax 0.4, GlycoLax  His medical problem list includes BPH, ED, lumbar spondylosis and stenosis, hyperlipidemia, left inguinal hernia, insomnia, hypertension, medial meniscus tear, neck mass (posterior neck /lipoma)      Objective:     The patient has consented to an endoscopic study today which is been explained to him in detail.  The patient is transferred into the endoscopy suite.  Scope number [id number] is used.  Pictures are recorded through the device.  Procedure: 4% Xylocaine and Kvng-Synephrine sprayed in each nasal passage ×2.  Cetacaine is applied to the posterior pharynx ×2.  After waiting several minutes scoping is performed.  The left nasal passage is used as the conduit for the study is it is more patent.  There is evidence for a superior right nasal septal deviation/deflection with right inferior turbinate hypertrophy.  There is evidence for a divot the superior central nasopharynx without evidence for granulation or cyst formation.  The base of the tongue appears within normal limits.  The vallecula is clear.  The epiglottis appears within normal limits.  There are prominent tissues of the false cords bilaterally.  There is some edema of the right arytenoid greater than the left.  The vocal process areas of very mildly inflamed without delbert granuloma formation.  The true vocal cords appear within normal limits.  The upper tracheal mucosa appears within  normal limits.  The piriform sinuses are clear.  There is some mucus collection in each.  There is no evidence of true vocal cord paresis or paralysis.  The scope was withdrawn.    Blood pressure 147/76 pulse 61 temperature 97.1 height 5 feet 11 inches weight 230 pounds  Gen.: Alert and oriented gentleman in no acute distress; he does not sound hoarse; he is easily able to swallow his own secretions.  He is not dyspneic.  Physical Exam   Constitutional: He is oriented to person, place, and time. He appears well-developed and well-nourished.   HENT:   Head: Normocephalic.   Right Ear: Hearing, tympanic membrane and ear canal normal. No drainage. No foreign bodies. No mastoid tenderness. Tympanic membrane is not perforated. No decreased hearing is noted.   Left Ear: Hearing, tympanic membrane and ear canal normal. No drainage. No foreign bodies. No mastoid tenderness. Tympanic membrane is not perforated. No decreased hearing is noted.   Nose: Septal deviation present. No nose lacerations, nasal deformity or nasal septal hematoma. No epistaxis. Right sinus exhibits no maxillary sinus tenderness and no frontal sinus tenderness. Left sinus exhibits no maxillary sinus tenderness and no frontal sinus tenderness.       Mouth/Throat: Uvula is midline, oropharynx is clear and moist and mucous membranes are normal. He does not have dentures. No oral lesions. No trismus in the jaw. No uvula swelling or dental caries. No oropharyngeal exudate or tonsillar abscesses.       Neck: No thyromegaly present.       Pulmonary/Chest: Effort normal. No stridor.   Lymphadenopathy:     He has no cervical adenopathy.   Neurological: He is alert and oriented to person, place, and time.   Skin: No rash noted.   Psychiatric: His behavior is normal.       Assessment:       1. Nasal septal deviation    2. Hypertrophy of both inferior nasal turbinates    3. Dry mouth    4. Throat discomfort    5. Sinus disorder      6.    Small ranula left floor of  mouth per CT  7.      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 is a candidate for septoplasty/turbinate reduction procedrues for nasal patency; Dr. NELLI Alvarez's card provied  Consider withdrawal/substitution for Lisinopril  Use of NSS may help; Flonase NSS 1-2 sprays @ lateral nostril once a day x 3-6 weeks may help  Consider sinus CT pending course  Keep appointment with Dr. MARIAH Perea 10/30/17

## 2017-10-20 ENCOUNTER — OFFICE VISIT (OUTPATIENT)
Dept: SLEEP MEDICINE | Facility: CLINIC | Age: 77
End: 2017-10-20
Attending: FAMILY MEDICINE
Payer: MEDICARE

## 2017-10-20 VITALS
BODY MASS INDEX: 32.1 KG/M2 | SYSTOLIC BLOOD PRESSURE: 136 MMHG | HEIGHT: 71 IN | WEIGHT: 229.25 LBS | DIASTOLIC BLOOD PRESSURE: 82 MMHG | HEART RATE: 65 BPM

## 2017-10-20 DIAGNOSIS — G47.00 INSOMNIA, UNSPECIFIED TYPE: Primary | ICD-10-CM

## 2017-10-20 PROCEDURE — 99204 OFFICE O/P NEW MOD 45 MIN: CPT | Mod: S$GLB,,, | Performed by: PSYCHIATRY & NEUROLOGY

## 2017-10-20 PROCEDURE — 99999 PR PBB SHADOW E&M-EST. PATIENT-LVL III: CPT | Mod: PBBFAC,,, | Performed by: PSYCHIATRY & NEUROLOGY

## 2017-10-20 RX ORDER — TRAZODONE HYDROCHLORIDE 100 MG/1
TABLET ORAL
Qty: 15 TABLET | Refills: 0 | Status: SHIPPED | OUTPATIENT
Start: 2017-10-20 | End: 2019-08-29 | Stop reason: ALTCHOICE

## 2017-10-20 NOTE — PROGRESS NOTES
Korey Santos  was seen at the request of  Juan Bustamante MD for sleep evaluation.    10/20/2017 INITIAL HISTORY OF PRESENT ILLNESS:  Korey Sanots is a 77 y.o. male is here to be evaluated for a sleep disorder.       CHIEF COMPLAINT:      Reports difficulty falling and staying asleep since his 40's.  He just  Sits in his recliner most of the day and does not have a good structure - sleeps better when in relationship. Lives alone now.  Reports chronic sinus problem - states he requires surgery. Sinus problem is the reason he is sleeping better upright.     He states that he was tested for DALE in the 90's, but was not able to sleep.     Recently reports his throat tightening.     Using a rx med from his dentist for dry mouth.    Used Nasal sprays - preparing for nasal breathing. Sleeps better in upright position, while the TV is on all night  Goes to the gym 9-10 PM. Fixes things around the house. But some days he watches TV all day.    Drinks several glasses of wine and beer at night    Taking OTC sleep aid and ETOH to fall asleep.    Does not worry significantly - sometimes worries about his family.    Reports  dry mouth and sore throat  Denies nasal congestion   Reports  morning headaches  Denies  interrupted sleep  Denies frequent leg movements  Denies symptoms concerning for parasomnia    The ESS ([place] Sleepiness Score) taken on initial visit is 3 /24      SLEEP ROUTINE AND LIFESTYLE 10/20/2017 :    Occupation:retired    Bed partner:      Time to bed - wake up time on a workday : 6-7 Pm - dozes off - goes to bed -> wide awake-> back to chair to 6 Am to 11 AM.     Back to the recliner - using coffee to feel more alert. 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    BPH with urinary obstruction 04/23/2014    Erectile  dysfunction 04/23/2014    DJD (degenerative joint disease) of hip 05/09/2014    Lumbar spondylosis 06/11/2014    Lumbar stenosis 06/17/2014    Lumbar herniated disc 09/22/2014    Neck mass 10/07/2014    Hyperlipidemia 10/07/2014    Left inguinal hernia 10/28/2014    Osteoarthritis of both knees 03/26/2015    Insomnia 01/22/2016    Hypertension, essential 01/22/2016    Medial meniscus tear 02/22/2016    Elevated PSA 08/16/2017     Resolved Ambulatory Problems     Diagnosis Date Noted    DJD (degenerative joint disease) 09/04/2012    Chronic kidney disease, stage II (mild) 12/21/2012    Arthritis     Hip pain 05/09/2014    Sciatica 06/11/2014    Hernia 06/11/2014    Thoracic or lumbosacral neuritis or radiculitis 06/17/2014    Extremity pain 06/17/2014    Trigger finger 07/11/2014    Lumbar radiculitis 08/12/2014    Pain of left calf 08/27/2014    Lumbosacral or thoracic radiculopathy 08/27/2014    Preoperative clearance 09/17/2014    Lumbosacral radiculopathy 09/17/2014    Leg pain 09/25/2014    Low back pain radiating to left leg 11/19/2014    Impaired gait 11/19/2014    Decreased spinal mobility 11/19/2014    Mass 12/23/2014    Refused pneumococcal vaccine 08/16/2017    Elevated blood sugar 08/16/2017     Past Medical History:   Diagnosis Date    Arthritis     Back pain, chronic     Chronic constipation     Hyperlipidemia     Hypertension     Sinus trouble                 PAST SURGICAL HISTORY:    Past Surgical History:   Procedure Laterality Date    BACK SURGERY  2014    COLONOSCOPY      KNEE SURGERY      left hand      LEG SURGERY      UPPER GASTROINTESTINAL ENDOSCOPY           FAMILY HISTORY:                Family History   Problem Relation Age of Onset    Cancer Father      lung or smoking    No Known Problems Mother     No Known Problems Sister     No Known Problems Brother     No Known Problems Maternal Aunt     No Known Problems Maternal Uncle     No Known  Problems Paternal Aunt     No Known Problems Paternal Uncle     No Known Problems Maternal Grandmother     No Known Problems Maternal Grandfather     No Known Problems Paternal Grandmother     No Known Problems Paternal Grandfather     Glaucoma Neg Hx     Diabetes Neg Hx     Amblyopia Neg Hx     Blindness Neg Hx     Cataracts Neg Hx     Hypertension Neg Hx     Macular degeneration Neg Hx     Retinal detachment Neg Hx     Strabismus Neg Hx     Stroke Neg Hx     Thyroid disease Neg Hx     Colon cancer Neg Hx     Esophageal cancer Neg Hx        SOCIAL HISTORY:          Tobacco:   History   Smoking Status    Never Smoker   Smokeless Tobacco    Never Used       alcohol use:    History   Alcohol Use    0.0 oz/week     Comment: very rarely                   ALLERGIES:    Review of patient's allergies indicates:   Allergen Reactions    Clindamycin Swelling     Throat swells       CURRENT MEDICATIONS:    Current Outpatient Prescriptions   Medication Sig Dispense Refill    linaclotide (LINZESS) 145 mcg Cap capsule Take 1 capsule (145 mcg total) by mouth once daily. 30 capsule 3    lisinopril 10 MG tablet Take 1 tablet (10 mg total) by mouth once daily. 30 tablet 5    multivitamin capsule Take 1 capsule by mouth once daily.        polyethylene glycol (GLYCOLAX) 17 gram/dose powder Take 17 g by mouth daily as needed. 510 g 1    pravastatin (PRAVACHOL) 40 MG tablet Take 1 tablet (40 mg total) by mouth once daily. 30 tablet 11    sildenafil (REVATIO) 20 mg Tab Take 5 po 1 hour before sexual activity 50 tablet 11    tamsulosin (FLOMAX) 0.4 mg Cp24 Take 2 capsules (0.8 mg total) by mouth once daily. 180 capsule 0    aspirin (ECOTRIN) 325 MG EC tablet Take 1 tablet (325 mg total) by mouth 2 (two) times daily. 84 tablet 0     No current facility-administered medications for this visit.                       REVIEW OF SYSTEMS:   Sleep related symptoms as per HPI    denies weight gain  Denies  "dyspnea  Denies palpitations  Denies acid reflux   Denies polyuria  Denies  mood diturbance  Denies  anemia  Denies  muscle pain  Denies  Gait imbalance    Otherwise, a balance of 10 systems reviewed is negative.    PHYSICAL EXAM:  /82 (BP Location: Left arm, Patient Position: Sitting, BP Method: Large (Automatic))   Pulse 65   Ht 5' 11" (1.803 m)   Wt 104 kg (229 lb 4.5 oz)   BMI 31.98 kg/m²   GENERAL: Normal development, well groomed.  HEENT:   HEENT:  Conjunctivae are non-erythematous; Pupils equal, round, and reactive to light; Nose is symmetrical; Nasal mucosa is pink and moist; Septum is midline; Inferior turbinates are normal; Nasal airflow is normal; Posterior pharynx is pink; Modified Mallampati:III-IV; Posterior palate is low; Tonsils not visualized; Uvula is wide and elongated;Tongue is enlarged; Dentition is fair; No TMJ tenderness; Jaw opening and protrusion without click and without discomfort.  NECK: Supple. Neck circumference is 17 inches. No thyromegaly. No palpable nodes.     SKIN: On face and neck: No abrasions, no rashes, no lesions.  No subcutaneous nodules are palpable.  RESPIRATORY: Chest is clear to auscultation.  Normal chest expansion and non-labored breathing at rest.  CARDIOVASCULAR: Normal S1, S2.  No murmurs, gallops or rubs. No carotid bruits bilaterally.  No edema. No clubbing. No cyanosis.    NEURO: Oriented to time, place and person. Normal attention span and concentration. Gait normal.    PSYCH: Affect is full. Mood is normal  MUSCULOSKELETAL: Moves 4 extremities. Gait normal.         Using [facility]:       ASSESSMENT:        PLAN:    1.We had a big discussion on sleep hygiene.  2. Will start Trazodone PO PRN insomnia.  3. Will postpone the sleep study till some improvement in his sleep schedule.      Following recommendations were given in the AVS:   Please don't watch TV in the same recliner you sleep in - your brain  Please set up "bedtime/reclinertime"  No exercise " after 6 PM - but still try to exercise earlier    Will keep sleep study later.    Try to read instead of watching TV    Please try to follow through with nasal surgery recommended by ENT    Please try to walk outdoors/stay active during the day and see daylight early morning    Try to replace TV by radio at night.    Fitbit Charge, or Fitbit Kalpana, or Fitbit Blaze - with the heart rate    I gave you the sleep log.    The patient expressed understanding, but leans to mono-pharmacotherapy approach. I explained to him that there is a high chance of fail without sleep hygiene modification.          More than 25 minutes of this 45 minutes visit was spent in counseling: during our discussion today, we talked about the etiology of  DALE as well as the potential ramifications of untreated sleep apnea, which could include daytime sleepiness, hypertension, heart disease and/or stroke.  We discussed potential treatment options, which could include weight loss, body positioning, continuous positive airway pressure (CPAP), or referral for surgical consideration. Meanwhile, he  is urged to avoid supine sleep, weight gain and alcoholic beverages since all of these can worsen DALE.     Precautions: The patient was advised to abstain from driving should he feel sleepy or drowsy.    Follow up: MD/NP  after the sleep study has been completed.     Thank you for allowing me the opportunity to participate in the care of your patient.    This visit summary will be sent to referring provider via inbasket

## 2017-10-20 NOTE — PATIENT INSTRUCTIONS
"[email or web address]  Mon-Thursday    Please don't watch TV in the same recliner you sleep in - your brain  Please set up "bedtime/reclinertime"  No exercise after 6 PM - but still try to exercise earlier    Will keep sleep study later.    Try to read instead of watching TV    Please try to follow through with nasal surgery recommended by ENT    Please try to walk outdoors/stay active during the day and see daylight early morning    Try to replace TV by radio at night.    Fitbit Charge, or Fitbit Kalpana, or Fitbit Blaze - with the heart rate    I gave you the sleep log.        "

## 2017-10-24 ENCOUNTER — HOSPITAL ENCOUNTER (OUTPATIENT)
Dept: RADIOLOGY | Facility: HOSPITAL | Age: 77
Discharge: HOME | End: 2017-10-24
Attending: PHYSICIAN ASSISTANT
Payer: MEDICARE

## 2017-10-24 ENCOUNTER — CLINICAL SUPPORT (OUTPATIENT)
Dept: SPEECH THERAPY | Facility: HOSPITAL | Age: 77
End: 2017-10-24
Attending: OTOLARYNGOLOGY
Payer: MEDICARE

## 2017-10-24 DIAGNOSIS — R13.12 OROPHARYNGEAL DYSPHAGIA: ICD-10-CM

## 2017-10-24 PROCEDURE — 74230 X-RAY XM SWLNG FUNCJ C+: CPT | Mod: TC

## 2017-10-24 PROCEDURE — G8998 SWALLOW D/C STATUS: HCPCS | Mod: CH

## 2017-10-24 PROCEDURE — G8996 SWALLOW CURRENT STATUS: HCPCS | Mod: CH

## 2017-10-24 PROCEDURE — G8997 SWALLOW GOAL STATUS: HCPCS | Mod: CH

## 2017-10-24 PROCEDURE — 92611 MOTION FLUOROSCOPY/SWALLOW: CPT

## 2017-10-24 PROCEDURE — 74230 X-RAY XM SWLNG FUNCJ C+: CPT | Mod: 26,,, | Performed by: RADIOLOGY

## 2017-10-24 NOTE — PROGRESS NOTES
"TIME RECORD    Date: 10/24/2017    Start Time:  11:00  Stop Time:  11:16    PROCEDURES:    TIMED  Procedure Time Min.    Start:  Stop:     Start:  Stop:     Start:  Stop:     Start:  Stop:          UNTIMED  Procedure Time Min.   MBS  Start: 11:00  Stop: 11:16 16 mins     Start:  Stop:      Total Timed Minutes:  16  Total Timed Units:  0  Total Untimed Units:  1  Charges Billed/# of units:  1      REHAB SERVICE VIDEO SWALLOW STUDY    HICN:  [id number]  Onset Date:  9/2017  SOC Date:  10/24/17  Certification Period:  10/24/17 to 11/24/17  Primary Diagnosis:  Dysphagia   Treatment Diagnosis: n/a    Pertinent Medical History:    Past Medical History:   Diagnosis Date    Arthritis     Back pain, chronic     Chronic constipation     Hyperlipidemia     Hypertension     Sinus trouble      Prior Therapy Dates/Results (same condition):  No therapy received.   Prior Level of Function:  Independent with ADLs  Functional Deficits Leading to Referral:  He reports "throat tightness" when swallowing.  Social Cultural:  Pt drives, resides at home, independent with ADLs  Nutrition:  Regular diet and thin liquids  Signs of Abuse:  No  Medication:  See EMR  Alertness/Attention:  Alert and awake, attentive  Oral Motor:  WFL, adequate dentition in place  Patient Position:  Sitting  View:  Lateral  Presentations:  5cc, 10cc, 20cc, and self regulated sips of thin liquid barium via cup, straw;  tsp bites of barium pudding; tsp bites of diced peaches (drained) coated in barium powder; 1/2"  lexy cracker with barium paste frosting, and 1 barium tablet.  MBSS completed in lateral view.     Oral Phase:  WFL, timely mastication,  And no oral residue post swallow.   Pharyngeal Phase:  Pt presents with timely swallow reflex, adequate base of tongue retraction, adequate laryngeal lift and adequate glottic closure present. NO penetration or aspiration across all consistencies presented. Pt safe for continued oral diet of regular and thin " liquids.   Strategies/Compensatory Techniques Utilized:  n/a  Impressions:  Pt presents with functional and timely oral and pharyngeal phase of the swallow. No penetration or risk of aspiration or airway threat noted with any consistencies.   Recommendations/Precautions:    Upright for meals, straws ok, regular diet and thin liquids, whole meds with water.     Plan:    1. SLP reviewed results with pt s/p mbs.   2. NO further ST needs indicated.   3. SLP will send report to referring MD via [employer] system.     Therapist's Name: MEAVE Velazquez, [facility]-SLP  Speech Pathologist 10/24/2017      Therapist's Signature: ___________________________________  Date: 10/24/2017    I CERTIFY THE NEED FOR THESE SERVICES FURNISHED UNDER THIS PLAN OF TREATMENT AND WHILE UNDER MY CARE    Physician's comments: ________________________________________________________________________________________________________________________________________________      Physician's Name: ___________________________________

## 2017-10-26 ENCOUNTER — TELEPHONE (OUTPATIENT)
Dept: INTERNAL MEDICINE | Facility: CLINIC | Age: 77
End: 2017-10-26

## 2017-10-26 ENCOUNTER — TELEPHONE (OUTPATIENT)
Dept: SLEEP MEDICINE | Facility: CLINIC | Age: 77
End: 2017-10-26

## 2017-10-26 RX ORDER — LOSARTAN POTASSIUM 25 MG/1
25 TABLET ORAL DAILY
Qty: 30 TABLET | Refills: 1 | Status: SHIPPED | OUTPATIENT
Start: 2017-10-26 | End: 2018-04-25 | Stop reason: SDUPTHER

## 2017-10-26 NOTE — TELEPHONE ENCOUNTER
Pt is requesting that his lisinopril be changed to another bp medication because he believes that it is contributing to his swallowing problem. I offered pt to schedule an appointment to come in and discuss with you but he said he doesn't see the need and he has talked about it with you already.   Please advise.

## 2017-10-26 NOTE — TELEPHONE ENCOUNTER
Ale,    Please ask if he tried Trazodone up to 3 pills.  Please ask him to combine with Melatonin .

## 2017-10-26 NOTE — TELEPHONE ENCOUNTER
----- Message from Ale Michel MA sent at 10/26/2017 11:34 AM CDT -----  Contact: pt      ----- Message -----  From: Dusty Franco  Sent: 10/26/2017   9:54 AM  To: [facility] Staff    x_ 1st Request   _ 2nd Request   _ 3rd Request     Who: KYRA PEREZ [[id number]]    Why: pt is requesting a call back in reference to letting you know that neither of the sleeping medication helped him sleep.    What Number to Call Back: [phone]    When to Expect a call back: (Before the end of the day)   -- if call after 3:00 call back will be tomorrow.

## 2017-10-26 NOTE — TELEPHONE ENCOUNTER
----- Message from MAEVE Velazquez, CCC-SLP sent at 10/24/2017  1:40 PM CDT -----  Attn Doctor- Please see attached Modified Barium Swallow Study Report or Speech Evaluation.  This report is for your review. If you have any questions, please contact me at [phone].  No further ST needs.     Thank you,  MAEVE Velazquez, CCC/SLP   Speech-Language Pathologist  10/24/2017

## 2017-10-26 NOTE — TELEPHONE ENCOUNTER
----- Message from Valencia Ana Luisa sent at 10/26/2017  9:59 AM CDT -----  Contact: self/ [phone] cell  Pt would like to speak with Dr. Solorzano regarding the problems with his swallowing and his BP medication (lisinopril 10 MG tablet).  The pt states that it's an ongoing problem and was told by several of his other doctors that the medication may be contributing to the problems with his swallowing.  He stopped taking them for about 3 or 4 days and the problem seems to have gotten better.   He has taken all test recommended and nothing was found.  He wants to discuss an alternate BP medication.  He would like a call back today, If possible.  Please call and advise.    Thank you

## 2017-10-26 NOTE — TELEPHONE ENCOUNTER
Please relay to patient that the swallowing study is normal. Thank you.    Also, I will discontinue his lisinopril and substitute another medicine called losartan. Please schedule him a follow up in 2-3 weeks to check his Bp. Thank you.

## 2017-10-30 ENCOUNTER — OFFICE VISIT (OUTPATIENT)
Dept: OTOLARYNGOLOGY | Facility: CLINIC | Age: 77
End: 2017-10-30
Payer: MEDICARE

## 2017-10-30 VITALS
WEIGHT: 239.19 LBS | TEMPERATURE: 97 F | HEART RATE: 62 BPM | SYSTOLIC BLOOD PRESSURE: 155 MMHG | DIASTOLIC BLOOD PRESSURE: 85 MMHG | BODY MASS INDEX: 33.36 KG/M2

## 2017-10-30 DIAGNOSIS — J31.0 CHRONIC RHINITIS, UNSPECIFIED TYPE: ICD-10-CM

## 2017-10-30 DIAGNOSIS — R49.0 HOARSENESS: ICD-10-CM

## 2017-10-30 DIAGNOSIS — K21.9 LARYNGOPHARYNGEAL REFLUX: ICD-10-CM

## 2017-10-30 DIAGNOSIS — R13.14 DYSPHAGIA, PHARYNGOESOPHAGEAL: Primary | ICD-10-CM

## 2017-10-30 PROCEDURE — 31579 LARYNGOSCOPY TELESCOPIC: CPT | Mod: S$GLB,,, | Performed by: OTOLARYNGOLOGY

## 2017-10-30 PROCEDURE — 99499 UNLISTED E&M SERVICE: CPT | Mod: S$GLB,,, | Performed by: OTOLARYNGOLOGY

## 2017-10-30 PROCEDURE — 99214 OFFICE O/P EST MOD 30 MIN: CPT | Mod: 25,S$GLB,, | Performed by: OTOLARYNGOLOGY

## 2017-10-30 PROCEDURE — 99999 PR PBB SHADOW E&M-EST. PATIENT-LVL III: CPT | Mod: PBBFAC,,, | Performed by: OTOLARYNGOLOGY

## 2017-10-30 NOTE — PATIENT INSTRUCTIONS
Consider using FLONASE spray (over the counter) routinely.  Consider meeting with our sinus specialist, Dr. Guillaume.  Follow up with gastroenterology to complete their workup and to discuss the risks/benefits of a trial of treatment for reflux.  Call with questions        ACID REFLUX   What is acid reflux?    When we eat, food passes from the throat and into the stomach through a tube called the esophagus. At the bottom of the esophagus is a ring of muscles that acts as a valve between the esophagus and stomach, called the lower esophageal sphincter. Smoking, alcohol, and certain types of food may weaken the sphincter, so it may stop closing properly. The contents in the stomach then may leak back, or reflux, into the esophagus. This problem is called gastroesophageal reflux disease (GERD). Symptoms of GERD include heartburn, belching, regurgitation of stomach contents, and swallowing difficulties.    Sometimes, the stomach acid travels up through the esophagus and spills into the larynx or pharynx (voice box). This is called laryngopharyngeal reflux (LPR) and is irritating to the vocal folds and surrounding tissues. Often, patients with LPR do not experience heartburn as a symptom. More commonly, symptoms of LPR include hoarseness, excessive mucous resulting in frequent throat clearing, post-nasal drip, coughing, throat soreness or burning, choking episodes, difficulty swallowing, and sensation of a lump in the throat.     How is acid reflux treated?   Treatment for acid reflux can involve any combination of medication, lifestyle modifications, and surgery.   · Medications. Your doctor may prescribe a proton pump inhibitor (PPI) or an H2 blocker. If you are prescribed a PPI, take in on an empty stomach in the morning 30 minutes prior to eating breakfast. Keep in mind that it may take 4-6 weeks before symptoms begin to resolve, so do not stop medications without consulting your doctor.   · Lifestyle and dietary  modifications. Eat smaller meals at a slower pace. Avoid over-eating. If you are overweight, try to lose weight. Do not lie down or exercise directly after eating; eat your last meal of the day at least 2-3 hours prior to going to sleep. Avoid tight-fitting clothes. If you are a smoker, reduce or quit smoking. Elevate your head of bed 4-6 inches by putting phone books under the legs at the head of your bed or buy a wedge pillow, but do not use more than two regular pillows as this causes the body to curl and compresses your stomach.     Food group Foods to avoid to reduce reflux   Beverages  Whole milk, 2% milk, chocolate milk/hot chocolate, alcohol, coffee (regular and decaf), caffeinated tea, mint tea, carbonated beverages, citrus juice    Breads/grains Commercial sweet rolls, doughnuts, croissants, and other high-fat pastries    Fruits and vegetables Fried or cream-style vegetables, tomatoes, tomato-based products, citrus fruits, hot peppers    Soups and seasonings Cream, cheese, tomato-based soups, vinegar    Meats and proteins Fatty or fried meat/fish, senior, sausage, pepperoni, lunch meat, fried eggs    Fats and oil Lard, senior drippings, salt pork, meat drippings, gravies, highly seasoned salad dressings, nuts    Sweets/desserts Anything made with or from chocolate, peppermint, spearmint, whole milk, or cream; high-fat pastries, gum, hard candy             THROAT CLEARING     Why am I clearing my throat so often?   Irritation of the vocal folds and surrounding area can cause the urge to clear your throat. This irritation can be caused by acid reflux, allergies, or environmental factors, as well as from a cold or sore throat. Talk with your doctor about the treatment of possible underlying causes. However, throat clearing can turn into a cycle. You clear your throat after feeling an irritation, which causes more irritation, which causes you to continue clearing your throat, which causes more irritation.      What can I do about it?   · Ask a friend or family member to help you keep track - you may not even realize how often you do it.   · Replace the throat clearing with a different behavior.   · Take a sip of water and then swallow. Repeat until the sensation subsides.  · Swallow hard (even without water)   · Use the silent throat clear method by making the h sound when you exhale  · Breathe in deeply through your nose and exhale on shhh   · Hum quietly   · Keep yourself hydrated.   · Drink plenty of water   · Eat wet snacks (grapes, apples, melon, cucumber)   · Use a humidifier at home or at work   · Suck on hard candies, but avoid too much sugar and avoid anything with mint, menthol, camphor, or eucaplyptus, as these will have a more irritating effect.

## 2017-10-30 NOTE — LETTER
November 2, 2017      Christos Douglas III, MD  [address]  [facility] [address]           [facility]  [address] [facility] [address]  [facility] [address]  Phone: [phone]  Fax: [phone]          Patient: Korey Santos   MR Number: [id number]   YOB: 1940   Date of Visit: 10/30/2017       Dear Dr. Christos Douglas III:    Thank you for referring Korey Santos to me for evaluation. Attached you will find relevant portions of my assessment and plan of care.    If you have questions, please do not hesitate to call me. I look forward to following Korey Santos along with you.    Sincerely,    Holden Perea MD    Enclosure  CC:  Juan Bustamante MD    If you would like to receive this communication electronically, please contact [email or web address] or [phone] to request more information on [employer] Link access.    For providers and/or their staff who would like to refer a patient to [facility], please contact us through our one-stop-shop provider referral line, [facility], at [phone].    If you feel you have received this communication in error or would no longer like to receive these types of communications, please e-mail [email or web address]

## 2017-10-30 NOTE — PROGRESS NOTES
"[facility]  Department of Otorhinolaryngology and Communication Sciences    Korey Santos is a 77 y.o. male who presents to the [facility] for consultation at the kind request of Dr. Christos Douglas* for further management of dysphagia.    He complains of tightness with swallowing; primarily noticeable with large pills, but globus at baseline. Onset was gradual. Symptoms are mild. Duration is about 1 month; he attributes the problem to starting lisinopril at the same time.  His PCP changed his BP medication to losartan about a week ago; since this change, he has noted a little bit of relief. Time course is constant. Symptoms are  improving. He denies any exacerbating factors. Alleviating factors include stopping lisinopril; he feels the problem improved since he stopped taking it. Associated symptoms include mild voice change; he reports his voice is slightly lower. There have not been prior episodes.  He also reports a "bad sinus problem" and that his nose is constantly running.      He had an MBSS recently at [facility] which was normal.    He is not actively smoking. He denies otalgia, hemoptysis, hematemesis, fevers, chills, sweats, involuntary weight loss, neck mass.     He does not drink a significant amount of caffeine. He does not drink a significant amount of plain water.    Voice Handicap Index-10 (VHI-10) score is 14.   Reflux Symptom Index (RSI) score is 26.   Eating Assessment Tool-10 (EAT-10) score is 22.   Dyspnea Index (DI) score is 19.  Cough Severity Index (CSI) score is 0.    Past Medical History  He has a past medical history of Arthritis; Back pain, chronic; Chronic constipation; Hyperlipidemia; Hypertension; and Sinus trouble.    Past Surgical History  He has a past surgical history that includes left hand; Back surgery (2014); Leg Surgery; Knee surgery; Colonoscopy; and Upper gastrointestinal endoscopy.    Family History  His family history includes Cancer in his father; " No Known Problems in his brother, maternal aunt, maternal grandfather, maternal grandmother, maternal uncle, mother, paternal aunt, paternal grandfather, paternal grandmother, paternal uncle, and sister.    Social History  He reports that he has never smoked. He has never used smokeless tobacco. He reports that he drinks alcohol. He reports that he does not use drugs.    Allergies  He is allergic to clindamycin.    Medications  He has a current medication list which includes the following prescription(s): linaclotide, losartan, multivitamin, polyethylene glycol, pravastatin, sildenafil, tamsulosin, trazodone, and aspirin.    Review of Systems   Constitutional: Negative for fever.   HENT: Positive for sore throat.    Eyes: Negative for visual disturbance.   Respiratory: Negative for wheezing.    Cardiovascular: Negative for chest pain.   Gastrointestinal: Negative for nausea.   Musculoskeletal: Positive for arthralgias.   Skin: Negative for rash.   Neurological: Negative for tremors.   Hematological: Does not bruise/bleed easily.   Psychiatric/Behavioral: The patient is nervous/anxious.           Objective:     BP (!) 155/85   Pulse 62   Temp 96.5 °F (35.8 °C)   Wt 108.5 kg (239 lb 3.2 oz)   BMI 33.36 kg/m²    Physical Exam    Constitutional: comfortable, well dressed  Psychiatric: appropriate affect  Respiratory: comfortably breathing, symmetric chest rise, no stridor  Cardiovascular: upper extremities non-edematous  Lymphatic: no cervical lymphadenopathy  Neurologic: cranial nerves 3-12 grossly intact  Head: normocephalic  Eyes: conjunctivae and sclerae clear  Ears: normal pinnae, normal external auditory canals, tympanic membranes intact  Nose: mucosa pink and noncongested, no masses, no mucopurulence, no polyps  Oral cavity / oropharynx: no mucosal lesions  Neck: soft, full range of motion, laryngotracheal complex palpable with appropriate landmarks, larynx elevates on swallowing  Indirect laryngoscopy:  limited due to gag    Procedure  Flexible Laryngeal Videostroboscopy (67994): Laryngeal videostroboscopy is indicated to assess laryngeal vibratory biomechanics and vocal fold oscillation, which cannot be assessed with a plain light examination. This was carried out transnasally with a distal chip videoendoscope. After verbal consent was obtained, the patient was positioned and the nose was topically decongested with 1% phenylephrine and topically anesthetized with 4% lidocaine. The endoscope was passed through the most patent nasal cavity and positioned to image the nasopharynx, larynx, and hypopharynx in detail. The following features were examined: nasopharyngeal, laryngeal, hypopharyngeal masses; velopharyngeal strength, closure, and symmetry of motion; vocal fold range and symmetry of motion; laryngeal mucosal edema, erythema, inflammation, and hydration; salivary pooling; and gross laryngeal sensation. During phonation, the vocal folds were assessed for glottal closure; mucosal wave; vocal fold lesions; vibratory periodicity, amplitude, and phase symmetry; and vertical height match. The equipment was removed. The patient tolerated the procedure well without complication. All findings were normal:  - no masses, no mucosal abnormalities, no evidence of paralysis/paresis    Data Reviewed    MBSS 10/24/2017      Assessment:     Korey Santos is a 77 y.o. male with multiple upper aerodigestive complaints despite a normal head and neck examination, including laryngeal videostroboscopy, and normal MBSS. There may be contributions to his symptoms from allergy or reflux. It is possible that some of his symptoms may be lingering adverse effects of his prior ACE-inhibitor. Further, I suspect a behavioral/functoinal component to his symptoms.     Plan:        I had a discussion with the patient regarding his condition and the further workup and management options.      I provided him with behavioral throat clearing  suppression strategies, to which I suggested he adhere.    I recommended routine use of a topical nasal steroid spray. He may benefit from evaluation by an allergist-immunologist and/or my rhinologist partner, Dr. Guillaume.     I educated the patient on the impact of reflux on laryngopharyngeal disorders and provided suggestions on diet and lifestyle modifications that may help improve control of ongoing reflux. He may benefit from comprehensive gastroenterology evaluation.    He will follow up with me in the future on an as-needed basis.    All questions were answered, and the patient is in agreement with the above.     This visit was 25 minutes in duration, with over 50% of the time spent in direct face-to-face counseling and coordination of care regarding the issues outlined above.    Holden Perea M.D.  [facility]  Department of Otorhinolaryngology and Communication Sciences

## 2017-10-31 ENCOUNTER — TELEPHONE (OUTPATIENT)
Dept: INTERNAL MEDICINE | Facility: CLINIC | Age: 77
End: 2017-10-31

## 2017-10-31 NOTE — TELEPHONE ENCOUNTER
Called patient - lisinopril is not active on chart. Should not have been Rf'ed by pharmacy.    States he is on losartan and understood the directions from the nurse.    Sees me in 2 weeks

## 2017-10-31 NOTE — TELEPHONE ENCOUNTER
----- Message from Juan Bustamante MD sent at 10/31/2017  2:27 PM CDT -----  Contact: Pt [phone]      ----- Message -----  From: Farrah Arriaga  Sent: 10/31/2017  11:11 AM  To: Dianna PARADA Staff    Pt would like a call back from the nurse regarding why a prescription was sent in for his old blood pressure medication. Patient is confused on why this was being prescribed since he is now on new blood pressure medication.

## 2017-11-15 ENCOUNTER — LAB VISIT (OUTPATIENT)
Dept: LAB | Facility: HOSPITAL | Age: 77
End: 2017-11-15
Attending: FAMILY MEDICINE
Payer: MEDICARE

## 2017-11-15 DIAGNOSIS — E78.5 HYPERLIPIDEMIA, UNSPECIFIED HYPERLIPIDEMIA TYPE: ICD-10-CM

## 2017-11-15 LAB
AST SERPL-CCNC: 17 U/L
CHOLEST SERPL-MCNC: 186 MG/DL
CHOLEST/HDLC SERPL: 4 {RATIO}
HDLC SERPL-MCNC: 46 MG/DL
HDLC SERPL: 24.7 %
LDLC SERPL CALC-MCNC: 117.2 MG/DL
NONHDLC SERPL-MCNC: 140 MG/DL
TRIGL SERPL-MCNC: 114 MG/DL

## 2017-11-15 PROCEDURE — 84450 TRANSFERASE (AST) (SGOT): CPT

## 2017-11-15 PROCEDURE — 80061 LIPID PANEL: CPT

## 2017-11-15 PROCEDURE — 36415 COLL VENOUS BLD VENIPUNCTURE: CPT

## 2017-11-30 ENCOUNTER — TELEPHONE (OUTPATIENT)
Dept: SPORTS MEDICINE | Facility: CLINIC | Age: 77
End: 2017-11-30

## 2017-11-30 NOTE — TELEPHONE ENCOUNTER
----- Message from Alessandra Reyes sent at 11/30/2017  9:58 AM CST -----  Contact: self   Pt called stating he is experiencing Lt knee pain. He would like to be seen as soon as possible for draining and an injection. He wasn't sure if he needed a cortisone or gel injection. The next appt available was 12/11th and he stated he would like to be seen much sooner than that. [phone]

## 2017-12-01 ENCOUNTER — OFFICE VISIT (OUTPATIENT)
Dept: SPORTS MEDICINE | Facility: CLINIC | Age: 77
End: 2017-12-01
Payer: MEDICARE

## 2017-12-01 VITALS
DIASTOLIC BLOOD PRESSURE: 81 MMHG | BODY MASS INDEX: 32.2 KG/M2 | WEIGHT: 230 LBS | HEIGHT: 71 IN | SYSTOLIC BLOOD PRESSURE: 157 MMHG | HEART RATE: 53 BPM

## 2017-12-01 DIAGNOSIS — M17.11 PRIMARY OSTEOARTHRITIS OF RIGHT KNEE: Primary | ICD-10-CM

## 2017-12-01 DIAGNOSIS — M17.12 PRIMARY OSTEOARTHRITIS OF LEFT KNEE: ICD-10-CM

## 2017-12-01 PROCEDURE — 99214 OFFICE O/P EST MOD 30 MIN: CPT | Mod: 25,S$GLB,, | Performed by: PHYSICIAN ASSISTANT

## 2017-12-01 PROCEDURE — 20610 DRAIN/INJ JOINT/BURSA W/O US: CPT | Mod: LT,S$GLB,, | Performed by: PHYSICIAN ASSISTANT

## 2017-12-01 PROCEDURE — 99999 PR PBB SHADOW E&M-EST. PATIENT-LVL III: CPT | Mod: PBBFAC,,, | Performed by: PHYSICIAN ASSISTANT

## 2017-12-01 RX ORDER — TRIAMCINOLONE ACETONIDE 40 MG/ML
80 INJECTION, SUSPENSION INTRA-ARTICULAR; INTRAMUSCULAR
Status: COMPLETED | OUTPATIENT
Start: 2017-12-01 | End: 2017-12-01

## 2017-12-01 RX ORDER — BUPIVACAINE HYDROCHLORIDE 2.5 MG/ML
3 INJECTION, SOLUTION INFILTRATION; PERINEURAL
Status: COMPLETED | OUTPATIENT
Start: 2017-12-01 | End: 2017-12-01

## 2017-12-01 RX ADMIN — BUPIVACAINE HYDROCHLORIDE 7.5 MG: 2.5 INJECTION, SOLUTION INFILTRATION; PERINEURAL at 11:12

## 2017-12-01 RX ADMIN — TRIAMCINOLONE ACETONIDE 80 MG: 40 INJECTION, SUSPENSION INTRA-ARTICULAR; INTRAMUSCULAR at 11:12

## 2017-12-01 NOTE — PROGRESS NOTES
Korey Santos, a 77 y.o. male, presents today for evaluation of his left knee. He states that he is experiencing swelling and pain in his left knee. He has had multiple aspirations, steroid injections, and Synvisc injections in his bilateral knee in the past by Dr. Camarena. He is requesting a steroid injection today. He would like to receive the Synvisc injection again when it is approved.    HISTORY OF PRESENT ILLNESS   Location: anterior knee, bilateral  Onset: insidious  Palliative:    Relative rest   Oral analgesics   R - VSI, 16.12, [facility]   R VSI, iaKnee, 05/04/17, moderate%   L VSI, iaKnee, 05/25/17, moderate%   R asp, iaKnee, 05/25/17,    CSI, iaKnee, 04/18/17, moderate improvement   CSI, iaKnee, 07/07/17   Asp/CSI, iaknee, 08/28/17, moderate improvement   Provocative:    ADLs  Prior:    16.02 - R knee - arthroscopy - [facility]  Progression: worsening discomfort   Quality:    Swollen   Sharp at times  Radiation: none  Severity: per nursing documentation  Timing: intermittent w/ use  Trauma: none    Review of systems (ROS):  A 10+ review of systems was performed with pertinent positives and negatives noted above in the history of present illness. Other systems were negative unless otherwise specified.    PHYSICAL EXAMINATION  General:  The patient is alert and oriented x 3. Mood is pleasant. Observation of ears, eyes and nose reveal no gross abnormalities. HEENT: NCAT, sclera anicteric.   Lungs: Respirations are equal and unlabored.  Gait is coordinated. Patient can toe walk and heel walk without difficulty.    RIGHT/LEFT KNEE EXAMINATION    Observation/Inspection  Gait:   Antalgic   Alignment:  Neutral   Scars:   None   Muscle atrophy: Mild  Effusion:  Present   Warmth:  None   Discoloration:   none     Tenderness / Crepitus (T / C):         T / C      T / C  Patella   - / -   Lateral joint line   - / -     Peripatellar medial  -  Medial joint line    + / -  Peripatellar lateral -  Medial  plica   - / -  Patellar tendon -   Popliteal fossa   - / -  Quad tendon   -   Gastrocnemius   -  Prepatellar Bursa - / -   Quadricep   -  Tibial tubercle  -  Thigh/hamstring  -  Pes anserine/HS -  Fibula    -  ITB   - / -  Tibia     -  Tib/fib joint  - / -  LCL    -    MFC   - / -   MCL: Proximal  -    LFC   - / -   Distal    -          ROM: (* = pain)  PASSIVE   ACTIVE    Left :   5 / 0 / 140*   5 / 0 / 140*     Right :    5 / 0 / 140*   5 / 0 / 140*    Patellofemoral examination:  See above noted areas of tenderness.   Patella position    Subluxation / dislocation: Centered        Sup. / Inf;   Normal   Crepitus (PF):    Absent   Patellar Mobility:       Medial-lateral:   Normal    Superior-inferior:  Normal    Inferior tilt   Normal    Patellar tendon:  Normal   Lateral tilt:    Normal   J-sign:     None   Patellofemoral grind:   No pain       Meniscal Signs:     Pain on terminal extension:  +*  Pain on terminal flexion:  +*  Tyrones maneuver:  +*  Squat     NT    Ligament Examination:  ACL / Lachman:  WNL  PCL-Post.  drawer: normal 0 to 2mm  MCL- Valgus:  normal 0 to 2mm  LCL- Varus:    normal 0 to 2mm  Pivot shift:  guarding   Dial Test:   difference c/w other side   At 30° flexion: normal (< 5°)    At 90° flexion: normal (< 5°)   Reverse Pivot Shift:   normal (Equal)     Strength: (* = with pain) Painful Side  Quadriceps   5/5  Hamstrin/5    Extremity Neuro-vascular Examination:   Sensation:  Grossly intact to light touch all dermatomal regions.   Motor Function:  Fully intact motor function at hip, knee, foot and ankle    DTRs;  quadriceps and  achilles 2+.  No clonus and downgoing Babinski.    Vascular status:  DP and PT pulses 2+, brisk capillary refill, symmetric.     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  placed for bilateral knee Synvisc series    2. NSAIDS or Tylenol prn pain    3. Aspiration/Injection Procedure    A time out was performed, including verification of patient ID, procedure, site and side, availability of information and equipment, review of safety issues, and agreement with consent, the procedure site was marked.    Aspiration:  After time out was performed, the patient was prepped aseptically with povidone-iodine swabsticks. 15cc's of normal joint fluid was aspirated from the Superolateral  aspect of the left Knee Joint using a 18g x 1.5 needle in sterile fashion without complication.     Injection:  Following aspiration, a diagnostic and therapeutic injection of 2:3cc Kenalog/Marcaine was given under sterile technique in the supine position.     Koreykalpesh Santos had no adverse reactions to the medication. Pain decreased. He was instructed to apply ice to the joint for 20 minutes and avoid strenuous activities for 24-36 hours following the injection. He was warned of possible blood sugar and/or blood pressure changes during that time. Following that time, he can resume regular activities.    4. ACE wrap applied    5. RTC in 2 week with Alley Bailey PA-C for bilateral Synvisc series    6. I made the decision to obtain old records of the patient including previous notes and imaging.  I independently reviewed and interpreted the radiographs and/or MRIs today as well as prior imaging.

## 2017-12-14 ENCOUNTER — OFFICE VISIT (OUTPATIENT)
Dept: SPORTS MEDICINE | Facility: CLINIC | Age: 77
End: 2017-12-14
Payer: MEDICARE

## 2017-12-14 VITALS
WEIGHT: 230 LBS | BODY MASS INDEX: 32.2 KG/M2 | SYSTOLIC BLOOD PRESSURE: 161 MMHG | DIASTOLIC BLOOD PRESSURE: 88 MMHG | HEART RATE: 69 BPM | HEIGHT: 71 IN

## 2017-12-14 DIAGNOSIS — M17.12 PRIMARY OSTEOARTHRITIS OF LEFT KNEE: Primary | ICD-10-CM

## 2017-12-14 PROCEDURE — 99499 UNLISTED E&M SERVICE: CPT | Mod: S$GLB,,, | Performed by: PHYSICIAN ASSISTANT

## 2017-12-14 PROCEDURE — 20610 DRAIN/INJ JOINT/BURSA W/O US: CPT | Mod: LT,S$GLB,, | Performed by: PHYSICIAN ASSISTANT

## 2017-12-14 PROCEDURE — 99999 PR PBB SHADOW E&M-EST. PATIENT-LVL III: CPT | Mod: PBBFAC,,, | Performed by: PHYSICIAN ASSISTANT

## 2017-12-14 NOTE — PROGRESS NOTES
Patient is here for follow up of knee arthritis. Pt is requesting synvisc injection #1 left knee.  [facility] Has failed other conservative modalities including NSAIDS, activity modification, weight loss.    The prior shot was tolerated well.    PHYSICAL EXAMINATION:     General: The patient is alert and oriented x 3. Mood is pleasant.   Observation of ears, eyes and nose reveals no gross abnormalities. No   labored breathing observed.     No signs of infection or adverse reaction to knee.    PROCEDURE NOTE:  Injection Procedure  A time out was performed, including verification of patient ID, procedure, site and side, availability of information and equipment, review of safety issues, and agreement with consent, the procedure site was marked.    After time out was performed, the patient was prepped aseptically with povidone-iodine swabsticks. A diagnostic and therapeutic injection of 2cc Synvisc (1st) was given under sterile technique using a 22g x 1.5 needle from the Superolateral  aspect of the left Knee Joint in the supine position.      Koreykalpesh Santos had no adverse reactions to the medication. Pain decreased. He was instructed to apply ice to the joint for 20 minutes and avoid strenuous activities for 24-36 hours following the injection. He was warned of possible blood sugar and/or blood pressure changes during that time. Following that time, he can resume regular activities.    He was reminded to call the clinic immediately for any adverse side effects as explained in clinic today.    RTC 1 week for 2nd injection.  All questions were answered, pt will contact us for questions or concerns in the interim.

## 2017-12-22 ENCOUNTER — OFFICE VISIT (OUTPATIENT)
Dept: SPORTS MEDICINE | Facility: CLINIC | Age: 77
End: 2017-12-22
Payer: MEDICARE

## 2017-12-22 VITALS
DIASTOLIC BLOOD PRESSURE: 86 MMHG | SYSTOLIC BLOOD PRESSURE: 151 MMHG | HEART RATE: 64 BPM | WEIGHT: 230 LBS | HEIGHT: 71 IN | BODY MASS INDEX: 32.2 KG/M2

## 2017-12-22 DIAGNOSIS — M17.0 OSTEOARTHRITIS OF BOTH KNEES, UNSPECIFIED OSTEOARTHRITIS TYPE: Primary | ICD-10-CM

## 2017-12-22 PROCEDURE — 20610 DRAIN/INJ JOINT/BURSA W/O US: CPT | Mod: LT,S$GLB,, | Performed by: PHYSICIAN ASSISTANT

## 2017-12-22 PROCEDURE — 99999 PR PBB SHADOW E&M-EST. PATIENT-LVL III: CPT | Mod: PBBFAC,,, | Performed by: PHYSICIAN ASSISTANT

## 2017-12-22 PROCEDURE — 99499 UNLISTED E&M SERVICE: CPT | Mod: S$GLB,,, | Performed by: PHYSICIAN ASSISTANT

## 2017-12-22 NOTE — PROGRESS NOTES
Patient is here for follow up of knee arthritis. Pt is requesting synvisc injection #2 left knee.  [facility] Has failed other conservative modalities including NSAIDS, activity modification, weight loss.    The prior shot was tolerated well.    PHYSICAL EXAMINATION:     General: The patient is alert and oriented x 3. Mood is pleasant.   Observation of ears, eyes and nose reveals no gross abnormalities. No   labored breathing observed.     No signs of infection or adverse reaction to knee.    PROCEDURE NOTE:  Injection Procedure  A time out was performed, including verification of patient ID, procedure, site and side, availability of information and equipment, review of safety issues, and agreement with consent, the procedure site was marked.    After time out was performed, the patient was prepped aseptically with povidone-iodine swabsticks. A diagnostic and therapeutic injection of 2cc Synvisc (2nd) was given under sterile technique using a 22g x 1.5 needle from the Superolateral  aspect of the left Knee Joint in the supine position.      Koreykalpesh Santos had no adverse reactions to the medication. Pain decreased. He was instructed to apply ice to the joint for 20 minutes and avoid strenuous activities for 24-36 hours following the injection. He was warned of possible blood sugar and/or blood pressure changes during that time. Following that time, he can resume regular activities.    He was reminded to call the clinic immediately for any adverse side effects as explained in clinic today.    RTC 1 week for 3nd injection.  All questions were answered, pt will contact us for questions or concerns in the interim.

## 2017-12-29 ENCOUNTER — OFFICE VISIT (OUTPATIENT)
Dept: SPORTS MEDICINE | Facility: CLINIC | Age: 77
End: 2017-12-29
Payer: MEDICARE

## 2017-12-29 VITALS
HEART RATE: 54 BPM | HEIGHT: 71 IN | WEIGHT: 230 LBS | BODY MASS INDEX: 32.2 KG/M2 | DIASTOLIC BLOOD PRESSURE: 75 MMHG | SYSTOLIC BLOOD PRESSURE: 123 MMHG

## 2017-12-29 DIAGNOSIS — M17.0 PRIMARY OSTEOARTHRITIS OF BOTH KNEES: Primary | ICD-10-CM

## 2017-12-29 PROCEDURE — 99499 UNLISTED E&M SERVICE: CPT | Mod: S$GLB,,, | Performed by: PHYSICIAN ASSISTANT

## 2017-12-29 PROCEDURE — 99999 PR PBB SHADOW E&M-EST. PATIENT-LVL III: CPT | Mod: PBBFAC,,, | Performed by: PHYSICIAN ASSISTANT

## 2017-12-29 PROCEDURE — 20610 DRAIN/INJ JOINT/BURSA W/O US: CPT | Mod: LT,S$GLB,, | Performed by: PHYSICIAN ASSISTANT

## 2017-12-29 NOTE — PROGRESS NOTES
Patient is here for follow up of knee arthritis. Pt is requesting synvisc injection #3 left knee.  [facility] record. Has failed other conservative modalities including NSAIDS, activity modification, weight loss.    The prior shot was tolerated well.    PHYSICAL EXAMINATION:     General: The patient is alert and oriented x 3. Mood is pleasant.   Observation of ears, eyes and nose reveals no gross abnormalities. No   labored breathing observed.     No signs of infection or adverse reaction to knee.    PROCEDURE NOTE:  Injection Procedure  A time out was performed, including verification of patient ID, procedure, site and side, availability of information and equipment, review of safety issues, and agreement with consent, the procedure site was marked.    After time out was performed, the patient was prepped aseptically with povidone-iodine swabsticks. A diagnostic and therapeutic injection of 2cc Synvisc (3rd) was given under sterile technique using a 22g x 1.5 needle from the Superolateral  aspect of the left Knee Joint in the supine position.      Korey Santos had no adverse reactions to the medication. Pain decreased. He was instructed to apply ice to the joint for 20 minutes and avoid strenuous activities for 24-36 hours following the injection. He was warned of possible blood sugar and/or blood pressure changes during that time. Following that time, he can resume regular activities.    He was reminded to call the clinic immediately for any adverse side effects as explained in clinic today.    RTC to see Yeyo Solis PA-C in 5 months for follow-up.    Patient has been told in the past that he was a candidate for total knee arthroplasty. He would like to give the injections some time to work and then talk to Dr. Franco or Dr. Tolbert about a surgery. Wants to have surgery at [facility] [address] [facility].    All questions were answered, pt will contact us for questions or concerns in the  interim.

## 2018-01-16 ENCOUNTER — PES CALL (OUTPATIENT)
Dept: ADMINISTRATIVE | Facility: CLINIC | Age: 78
End: 2018-01-16

## 2018-01-24 ENCOUNTER — HOSPITAL ENCOUNTER (EMERGENCY)
Facility: HOSPITAL | Age: 78
Discharge: HOME | End: 2018-01-24
Attending: FAMILY MEDICINE
Payer: MEDICARE

## 2018-01-24 VITALS
DIASTOLIC BLOOD PRESSURE: 90 MMHG | HEIGHT: 71 IN | BODY MASS INDEX: 32.2 KG/M2 | SYSTOLIC BLOOD PRESSURE: 160 MMHG | WEIGHT: 230 LBS | RESPIRATION RATE: 20 BRPM | TEMPERATURE: 99 F | OXYGEN SATURATION: 97 % | HEART RATE: 103 BPM

## 2018-01-24 DIAGNOSIS — J32.9 SINUSITIS, UNSPECIFIED CHRONICITY, UNSPECIFIED LOCATION: Primary | ICD-10-CM

## 2018-01-24 PROCEDURE — 99283 EMERGENCY DEPT VISIT LOW MDM: CPT

## 2018-01-24 RX ORDER — FLUTICASONE PROPIONATE 50 MCG
1 SPRAY, SUSPENSION (ML) NASAL 2 TIMES DAILY PRN
Qty: 15 G | Refills: 0 | Status: SHIPPED | OUTPATIENT
Start: 2018-01-24 | End: 2018-01-30 | Stop reason: SDUPTHER

## 2018-01-24 RX ORDER — LISINOPRIL 10 MG/1
10 TABLET ORAL DAILY
Qty: 30 TABLET | Refills: 0 | OUTPATIENT
Start: 2018-01-24 | End: 2019-01-24

## 2018-01-24 RX ORDER — AMOXICILLIN AND CLAVULANATE POTASSIUM 875; 125 MG/1; MG/1
1 TABLET, FILM COATED ORAL 2 TIMES DAILY
Qty: 14 TABLET | Refills: 0 | Status: SHIPPED | OUTPATIENT
Start: 2018-01-24 | End: 2018-04-25

## 2018-01-24 NOTE — ED TRIAGE NOTES
PT reports fever, body aches, runny nose, sore throat and headache x 1 week. Pt reports fever of 101 at home

## 2018-01-24 NOTE — ED PROVIDER NOTES
Encounter Date: 1/24/2018       History     Chief Complaint   Patient presents with    Fever     PT reports fever, body aches, runny nose, sore throat and headache x 1 week. Pt reports fever of 101 at home     77-year-old male presents with chief complaint of fever.  Patient reports been having cough which is nonproductive and facial pressure for the course of last 1 week.  Reports his nose is been running for several months but has been worse over the last week.  Denies any nausea vomiting.  Does report a fever up to 100.3 this morning.          Review of patient's allergies indicates:   Allergen Reactions    Clindamycin Swelling     Throat swells     Past Medical History:   Diagnosis Date    Arthritis     Back pain, chronic     Chronic constipation     Hyperlipidemia     Hypertension     Sinus trouble      Past Surgical History:   Procedure Laterality Date    BACK SURGERY  2014    COLONOSCOPY      KNEE SURGERY      left hand      LEG SURGERY      UPPER GASTROINTESTINAL ENDOSCOPY       Family History   Problem Relation Age of Onset    Cancer Father      lung or smoking    No Known Problems Mother     No Known Problems Sister     No Known Problems Brother     No Known Problems Maternal Aunt     No Known Problems Maternal Uncle     No Known Problems Paternal Aunt     No Known Problems Paternal Uncle     No Known Problems Maternal Grandmother     No Known Problems Maternal Grandfather     No Known Problems Paternal Grandmother     No Known Problems Paternal Grandfather     Glaucoma Neg Hx     Diabetes Neg Hx     Amblyopia Neg Hx     Blindness Neg Hx     Cataracts Neg Hx     Hypertension Neg Hx     Macular degeneration Neg Hx     Retinal detachment Neg Hx     Strabismus Neg Hx     Stroke Neg Hx     Thyroid disease Neg Hx     Colon cancer Neg Hx     Esophageal cancer Neg Hx      Social History   Substance Use Topics    Smoking status: Never Smoker    Smokeless tobacco: Never Used     Alcohol use 0.0 oz/week      Comment: very rarely     Review of Systems   Constitutional: Positive for fever. Negative for chills.   HENT: Positive for congestion and rhinorrhea.    Respiratory: Positive for cough. Negative for shortness of breath.    All other systems reviewed and are negative.      Physical Exam     Initial Vitals [01/24/18 1450]   BP Pulse Resp Temp SpO2   (!) 143/87 103 20 98.5 °F (36.9 °C) 97 %      MAP       105.67         Physical Exam    Nursing note and vitals reviewed.  Constitutional: He appears well-developed and well-nourished.   HENT:   Head: Normocephalic and atraumatic.   Nose: Right sinus exhibits maxillary sinus tenderness and frontal sinus tenderness. Left sinus exhibits maxillary sinus tenderness and frontal sinus tenderness.   Eyes: Conjunctivae and EOM are normal. Pupils are equal, round, and reactive to light.   Neck: Normal range of motion. Neck supple.   Cardiovascular: Normal rate, regular rhythm and normal heart sounds.   Pulmonary/Chest: Breath sounds normal.   Abdominal: Soft. Bowel sounds are normal.   Musculoskeletal: Normal range of motion.   Neurological: He is alert and oriented to person, place, and time. He has normal reflexes.   Skin: Skin is warm. Capillary refill takes less than 2 seconds.   Psychiatric: He has a normal mood and affect. His behavior is normal.         ED Course   Procedures  Labs Reviewed - No data to display                            ED Course      Clinical Impression:   The encounter diagnosis was Sinusitis, unspecified chronicity, unspecified location.                           Guy Escoto MD  01/24/18 2423

## 2018-01-28 ENCOUNTER — NURSE TRIAGE (OUTPATIENT)
Dept: ADMINISTRATIVE | Facility: CLINIC | Age: 78
End: 2018-01-28

## 2018-01-28 NOTE — TELEPHONE ENCOUNTER
ED 1/24  Pt called re sinus pblms. Pt wants to know name of doctor to help with sinus problem. Sent office message to Dr Guillaume to call pt re appt. Call back with questions.     Reason for Disposition   Requesting regular office appointment    Protocols used: ST INFORMATION ONLY CALL-A-AH

## 2018-01-29 ENCOUNTER — TELEPHONE (OUTPATIENT)
Dept: OTOLARYNGOLOGY | Facility: CLINIC | Age: 78
End: 2018-01-29

## 2018-01-29 NOTE — TELEPHONE ENCOUNTER
I spoke with patient and due to flu-like symptoms advised him to see PCP.  He states he has appt with PCP tomorrow.  I advised him to call us back after that appt.  In the meantime I will ask Dr. Douglas to order CT of sinuses and make appt with either  or .

## 2018-01-30 ENCOUNTER — OFFICE VISIT (OUTPATIENT)
Dept: INTERNAL MEDICINE | Facility: CLINIC | Age: 78
End: 2018-01-30
Payer: MEDICARE

## 2018-01-30 ENCOUNTER — TELEPHONE (OUTPATIENT)
Dept: INTERNAL MEDICINE | Facility: CLINIC | Age: 78
End: 2018-01-30

## 2018-01-30 VITALS
DIASTOLIC BLOOD PRESSURE: 76 MMHG | WEIGHT: 229.25 LBS | HEIGHT: 71 IN | BODY MASS INDEX: 32.1 KG/M2 | SYSTOLIC BLOOD PRESSURE: 136 MMHG | HEART RATE: 86 BPM

## 2018-01-30 DIAGNOSIS — J31.0 CHRONIC RHINITIS, UNSPECIFIED TYPE: ICD-10-CM

## 2018-01-30 DIAGNOSIS — J06.9 UPPER RESPIRATORY TRACT INFECTION, UNSPECIFIED TYPE: Primary | ICD-10-CM

## 2018-01-30 DIAGNOSIS — J34.2 DEVIATED SEPTUM: ICD-10-CM

## 2018-01-30 LAB
FLUAV AG SPEC QL IA: NEGATIVE
FLUBV AG SPEC QL IA: NEGATIVE
SPECIMEN SOURCE: NORMAL

## 2018-01-30 PROCEDURE — 87400 INFLUENZA A/B EACH AG IA: CPT

## 2018-01-30 PROCEDURE — 99214 OFFICE O/P EST MOD 30 MIN: CPT | Mod: S$GLB,,, | Performed by: INTERNAL MEDICINE

## 2018-01-30 PROCEDURE — 99999 PR PBB SHADOW E&M-EST. PATIENT-LVL III: CPT | Mod: PBBFAC,,, | Performed by: INTERNAL MEDICINE

## 2018-01-30 PROCEDURE — 99499 UNLISTED E&M SERVICE: CPT | Mod: S$GLB,,, | Performed by: INTERNAL MEDICINE

## 2018-01-30 RX ORDER — FLUTICASONE PROPIONATE 50 MCG
1 SPRAY, SUSPENSION (ML) NASAL 2 TIMES DAILY PRN
Qty: 15 G | Refills: 0 | Status: SHIPPED | OUTPATIENT
Start: 2018-01-30 | End: 2019-08-29 | Stop reason: SDUPTHER

## 2018-01-30 RX ORDER — HYDROCODONE BITARTRATE AND ACETAMINOPHEN 5; 325 MG/1; MG/1
1 TABLET ORAL EVERY 6 HOURS PRN
Qty: 30 TABLET | Refills: 0 | Status: ON HOLD | OUTPATIENT
Start: 2018-01-30 | End: 2018-05-05 | Stop reason: HOSPADM

## 2018-01-30 RX ORDER — AMOXICILLIN AND CLAVULANATE POTASSIUM 875; 125 MG/1; MG/1
1 TABLET, FILM COATED ORAL 2 TIMES DAILY
Qty: 10 TABLET | Refills: 0 | Status: SHIPPED | OUTPATIENT
Start: 2018-01-30 | End: 2018-02-04

## 2018-01-30 NOTE — PROGRESS NOTES
CHIEF COMPLAINT:  Sinuses.    HISTORY OF PRESENT ILLNESS:  The patient is a 77-year-old gentleman who is   currently being treated for hypertension and hyperlipidemia who does have a   history of a deviated septum, who was referred to Dr. Alvarez for surgical   correction, who comes in today with complaints of sinus problems.  He has been   having pain, pressure, and fever.  Symptoms started about three weeks ago.    Symptoms got so bad that he went to the Emergency Room in [place].  He was   placed on Augmentin for 5 days.  He still has about two pills left.  He is   feeling somewhat better, but still feels bad.  His symptoms are not going away   completely.    REVIEW OF SYSTEMS:  The patient reports he had fever of 101 a few days ago.  No   cough.  He does complain of a frontal headache.  He states that his hair feels   like it is pins and needles when he puts his hat on.    PHYSICAL EXAMINATION:  GENERAL APPEARANCE:  No acute distress.  HEENT:  Conjunctivae clear.  He has no photophobia.  He does have bilateral   cataracts.  TMs were clear.  Nasal septum was midline.  He had a white   discharge.  He did report some pain on palpation over the frontal, but not the   maxillary sinuses.  Oropharynx was without erythema.  NECK:  Trachea is midline.  PULMONARY:  Good inspiratory and expiratory breath sounds were heard.  Lungs are   clear to auscultation.  CARDIOVASCULAR:  S1, S2.  EXTREMITIES:  With trace edema.  ABDOMEN:  Nontender, nondistended, without hepatosplenomegaly.    COMMENTS:  Did discuss with the patient about continuing Augmentin for 5 more   days.  A nasal swab was taken just to check for flu.  The patient is requesting   something for pain.  He has already been using Tylenol as well as Advil for his   headaches.    ASSESSMENT:  1.  Upper respiratory infection.  2.  Hypertension.  3.  Hyperlipidemia.    PLAN:  Did discuss with the patient about Norco.  He can take one 3 times a day   as needed for pain.   We will go ahead and refill Augmentin for 5 more days.  A   nasal swab will be sent in today.  We will contact the patient with results.      NILSON/ODALIS  dd: 01/30/2018 11:00:39 (CST)  td: 01/31/2018 04:25:48 (CST)  Doc ID   #[id number]  Job ID #[id number]    CC:

## 2018-01-30 NOTE — TELEPHONE ENCOUNTER
----- Message from Juan Bustamante MD sent at 1/30/2018  1:11 PM CST -----  Contact: self [phone]      ----- Message -----  From: Donna Chan  Sent: 1/29/2018  10:43 AM  To: Dianna Martinez A Staff    Patient has been having a bad sinus headache issue for 2 weeks, has been to the ED with no results and relief, he would like to be seen soon by his doctor and or be recommended to see a specialist. Please call patient back and advise.     thank you

## 2018-01-30 NOTE — TELEPHONE ENCOUNTER
----- Message from Bart Lopez MD sent at 1/30/2018  1:30 PM CST -----  Please notify that his flu swab was negative.

## 2018-02-01 ENCOUNTER — HOSPITAL ENCOUNTER (EMERGENCY)
Facility: HOSPITAL | Age: 78
Discharge: HOME | End: 2018-02-01
Attending: EMERGENCY MEDICINE
Payer: MEDICARE

## 2018-02-01 VITALS
RESPIRATION RATE: 16 BRPM | HEART RATE: 76 BPM | TEMPERATURE: 99 F | DIASTOLIC BLOOD PRESSURE: 78 MMHG | SYSTOLIC BLOOD PRESSURE: 162 MMHG | HEIGHT: 72 IN | OXYGEN SATURATION: 100 % | WEIGHT: 225 LBS | BODY MASS INDEX: 30.48 KG/M2

## 2018-02-01 DIAGNOSIS — I10 HTN (HYPERTENSION): ICD-10-CM

## 2018-02-01 DIAGNOSIS — R51.9 HEADACHE: ICD-10-CM

## 2018-02-01 DIAGNOSIS — G89.29 CHRONIC LOW BACK PAIN: ICD-10-CM

## 2018-02-01 DIAGNOSIS — R53.81 MALAISE: Primary | ICD-10-CM

## 2018-02-01 DIAGNOSIS — R07.9 CHEST PAIN: ICD-10-CM

## 2018-02-01 DIAGNOSIS — M54.50 CHRONIC LOW BACK PAIN: ICD-10-CM

## 2018-02-01 DIAGNOSIS — R53.1 WEAKNESS: ICD-10-CM

## 2018-02-01 LAB
ALBUMIN SERPL BCP-MCNC: 3.7 G/DL
ALP SERPL-CCNC: 56 U/L
ALT SERPL W/O P-5'-P-CCNC: 28 U/L
ANION GAP SERPL CALC-SCNC: 14 MMOL/L
AST SERPL-CCNC: 24 U/L
BASOPHILS # BLD AUTO: 0.04 K/UL
BASOPHILS NFR BLD: 0.5 %
BILIRUB SERPL-MCNC: 0.4 MG/DL
BILIRUB UR QL STRIP: NEGATIVE
BUN SERPL-MCNC: 16 MG/DL
CALCIUM SERPL-MCNC: 9.5 MG/DL
CHLORIDE SERPL-SCNC: 105 MMOL/L
CLARITY UR REFRACT.AUTO: ABNORMAL
CO2 SERPL-SCNC: 22 MMOL/L
COLOR UR AUTO: ABNORMAL
CREAT SERPL-MCNC: 0.9 MG/DL
DIFFERENTIAL METHOD: NORMAL
EOSINOPHIL # BLD AUTO: 0.2 K/UL
EOSINOPHIL NFR BLD: 2 %
ERYTHROCYTE [DISTWIDTH] IN BLOOD BY AUTOMATED COUNT: 13 %
EST. GFR  (AFRICAN AMERICAN): >60 ML/MIN/1.73 M^2
EST. GFR  (NON AFRICAN AMERICAN): >60 ML/MIN/1.73 M^2
GLUCOSE SERPL-MCNC: 94 MG/DL
GLUCOSE UR QL STRIP: NEGATIVE
HCT VFR BLD AUTO: 44.1 %
HGB BLD-MCNC: 14.4 G/DL
HGB UR QL STRIP: NEGATIVE
IMM GRANULOCYTES # BLD AUTO: 0.03 K/UL
IMM GRANULOCYTES NFR BLD AUTO: 0.4 %
KETONES UR QL STRIP: ABNORMAL
LEUKOCYTE ESTERASE UR QL STRIP: NEGATIVE
LYMPHOCYTES # BLD AUTO: 2.4 K/UL
LYMPHOCYTES NFR BLD: 29.4 %
MAGNESIUM SERPL-MCNC: 2.6 MG/DL
MCH RBC QN AUTO: 28.6 PG
MCHC RBC AUTO-ENTMCNC: 32.7 G/DL
MCV RBC AUTO: 88 FL
MONOCYTES # BLD AUTO: 0.7 K/UL
MONOCYTES NFR BLD: 8.4 %
NEUTROPHILS # BLD AUTO: 4.9 K/UL
NEUTROPHILS NFR BLD: 59.3 %
NITRITE UR QL STRIP: NEGATIVE
NRBC BLD-RTO: 0 /100 WBC
PH UR STRIP: 5 [PH] (ref 5–8)
PLATELET # BLD AUTO: 275 K/UL
PMV BLD AUTO: 10.4 FL
POTASSIUM SERPL-SCNC: 4.6 MMOL/L
PROT SERPL-MCNC: 7.6 G/DL
PROT UR QL STRIP: NEGATIVE
RBC # BLD AUTO: 5.03 M/UL
SODIUM SERPL-SCNC: 141 MMOL/L
SP GR UR STRIP: >=1.03 (ref 1–1.03)
TROPONIN I SERPL DL<=0.01 NG/ML-MCNC: <0.006 NG/ML
URN SPEC COLLECT METH UR: ABNORMAL
UROBILINOGEN UR STRIP-ACNC: 2 EU/DL
WBC # BLD AUTO: 8.31 K/UL

## 2018-02-01 PROCEDURE — 83735 ASSAY OF MAGNESIUM: CPT

## 2018-02-01 PROCEDURE — 99284 EMERGENCY DEPT VISIT MOD MDM: CPT | Mod: 25

## 2018-02-01 PROCEDURE — 96361 HYDRATE IV INFUSION ADD-ON: CPT

## 2018-02-01 PROCEDURE — 99285 EMERGENCY DEPT VISIT HI MDM: CPT | Mod: ,,, | Performed by: EMERGENCY MEDICINE

## 2018-02-01 PROCEDURE — 84484 ASSAY OF TROPONIN QUANT: CPT

## 2018-02-01 PROCEDURE — 93005 ELECTROCARDIOGRAM TRACING: CPT

## 2018-02-01 PROCEDURE — 93010 ELECTROCARDIOGRAM REPORT: CPT | Mod: ,,, | Performed by: INTERNAL MEDICINE

## 2018-02-01 PROCEDURE — 25000003 PHARM REV CODE 250: Performed by: EMERGENCY MEDICINE

## 2018-02-01 PROCEDURE — 85025 COMPLETE CBC W/AUTO DIFF WBC: CPT

## 2018-02-01 PROCEDURE — 81003 URINALYSIS AUTO W/O SCOPE: CPT

## 2018-02-01 PROCEDURE — 96360 HYDRATION IV INFUSION INIT: CPT

## 2018-02-01 PROCEDURE — 80053 COMPREHEN METABOLIC PANEL: CPT

## 2018-02-01 RX ADMIN — SODIUM CHLORIDE 1000 ML: 0.9 INJECTION, SOLUTION INTRAVENOUS at 06:02

## 2018-02-01 NOTE — ED TRIAGE NOTES
Presents to ER with sinusitis for 2 weeks along with headaches and nervousness.    Pt identifiers checked and correct  LOC: The patient is awake, alert, aware of environment with an appropriate affect. Oriented x3, speaking appropriately  APPEARANCE: Pt resting comfortably, in no acute distress, pt is clean and well groomed, clothing properly fastened  SKIN: Skin warm, dry and intact, normal skin turgor, moist mucus membranes  RESPIRATORY: Airway is open and patent, respirations are spontaneous, even and unlabored, normal effort and rate  MUSCULOSKELETAL: No obvious deformities.

## 2018-02-02 ENCOUNTER — TELEPHONE (OUTPATIENT)
Dept: INTERNAL MEDICINE | Facility: CLINIC | Age: 78
End: 2018-02-02

## 2018-02-02 NOTE — ED PROVIDER NOTES
Encounter Date: 2/1/2018       History     Chief Complaint   Patient presents with    Sinusitis     when in ent office moved around and got dizzy sent here for poss dehydration, ? sinus infection , had chills , had neg flu swab recently    Dehydration     77-year-old white male with a history of hypertension chronic low back pain hyperlipidemia and sinusitis went to the ENT doctor's office today and told him that he felt generally weak had some loss of weight over the last few weeks so he was sent to the ER for evaluation          Review of patient's allergies indicates:   Allergen Reactions    Clindamycin Swelling     Throat swells     Past Medical History:   Diagnosis Date    Arthritis     Back pain, chronic     Chronic constipation     Hyperlipidemia     Hypertension     Sinus trouble      Past Surgical History:   Procedure Laterality Date    BACK SURGERY  2014    COLONOSCOPY      KNEE SURGERY      left hand      LEG SURGERY      UPPER GASTROINTESTINAL ENDOSCOPY       Family History   Problem Relation Age of Onset    Cancer Father      lung or smoking    No Known Problems Mother     No Known Problems Sister     No Known Problems Brother     No Known Problems Maternal Aunt     No Known Problems Maternal Uncle     No Known Problems Paternal Aunt     No Known Problems Paternal Uncle     No Known Problems Maternal Grandmother     No Known Problems Maternal Grandfather     No Known Problems Paternal Grandmother     No Known Problems Paternal Grandfather     Glaucoma Neg Hx     Diabetes Neg Hx     Amblyopia Neg Hx     Blindness Neg Hx     Cataracts Neg Hx     Hypertension Neg Hx     Macular degeneration Neg Hx     Retinal detachment Neg Hx     Strabismus Neg Hx     Stroke Neg Hx     Thyroid disease Neg Hx     Colon cancer Neg Hx     Esophageal cancer Neg Hx      Social History   Substance Use Topics    Smoking status: Never Smoker    Smokeless tobacco: Never Used    Alcohol  use 0.0 oz/week      Comment: very rarely     Review of Systems   Constitutional: Negative for fever.   HENT: Negative for sore throat.    Respiratory: Negative for shortness of breath.    Cardiovascular: Negative for chest pain.   Gastrointestinal: Negative for nausea.   Genitourinary: Negative for dysuria.   Musculoskeletal: Negative for back pain.   Skin: Negative for rash.   Neurological: Negative for weakness.   Hematological: Does not bruise/bleed easily.       Physical Exam     Initial Vitals [02/01/18 1655]   BP Pulse Resp Temp SpO2   (!) 160/89 82 18 99 °F (37.2 °C) 98 %      MAP       112.67         Physical Exam    Nursing note and vitals reviewed.  Constitutional: He appears well-developed and well-nourished. No distress.   HENT:   Head: Normocephalic and atraumatic.   Mouth/Throat: Oropharynx is clear and moist.   Eyes: Conjunctivae and EOM are normal. Pupils are equal, round, and reactive to light.   Neck: Normal range of motion. Neck supple.   Cardiovascular: Normal rate, regular rhythm, normal heart sounds and intact distal pulses. Exam reveals no gallop and no friction rub.    No murmur heard.  Pulmonary/Chest: Breath sounds normal. No respiratory distress. He has no wheezes. He has no rhonchi. He has no rales.   Abdominal: Soft. Bowel sounds are normal. He exhibits no distension and no mass. There is no tenderness. There is no rebound and no guarding.   Musculoskeletal: Normal range of motion. He exhibits no edema or tenderness.   Neurological: He is alert and oriented to person, place, and time. He has normal strength and normal reflexes. He displays normal reflexes. No cranial nerve deficit or sensory deficit.   Skin: Skin is warm and dry. Capillary refill takes less than 2 seconds. No rash noted. No erythema.   Psychiatric: He has a normal mood and affect. His behavior is normal. Judgment and thought content normal.         ED Course   Procedures  Labs Reviewed - No data to display                             ED Course      Clinical Impression:   General malaise    Disposition:   Disposition: Discharged  77-year-old white male history of hypertension chronic low back pain sinusitis and hyperlipidemia went to the ENT doctor's office today told him they felt generally sort of weak and had some 10 pounds weight loss over the last few months and so they sent him to the ER for evaluation.  Here in the ER the patient does not look sick or toxic temp 99 pulse 82 respiratory rate of 18 blood pressure 160/89 and O2 sat room air 98% lungs clear to auscultation bilaterally heart regular rate and rhythmfor murmurs abdomen positive bowel sounds soft nontender nondistended no rebound guarding bruits or masses extremities no clubbing cyanosis or edema peripheral pulses 2+ ago I applied extremity neuro alert ×3 moving all extremities well nonfocal skin warm dry no rash or diaphoresis labs CBC normal comprehensive metabolic panel normal EKG normal sinus rhythm without arrhythmia or ischemia troponin negative chest x-ray read by radiologist as no acute process magnesium is normal urinalysis completely normal CT scan of the head no acute process per radiologist.  At the time of disposition the patient's neurological exam is again completely benign vital signs normal and stable I will send him home at this time diagnosis generalized malaise I asked him to follow up with his primary care doctor tomorrow return to the ER for altered mental status fever rash chest pain abdominal pain vomiting bleeding                        Abhilash Pulliam MD  02/01/18 3137

## 2018-02-02 NOTE — ED NOTES
"Patient stating he has concerns about getting to his car. Patient states he is unable to walk and was wheeled here from his doctor's office.  Patient appears frustrated and states he doesn't believe he should be discharged. Patient raising voice stating "what am I supposed to do, just get up and walk out?"  MD aware. CN notified. Transport requested to bring patient to parking garage.   "

## 2018-02-02 NOTE — TELEPHONE ENCOUNTER
----- Message from Percy Patterson LPN sent at 2/2/2018 10:47 AM CST -----  Above pt came to ENT on 02/01/2018 complaining of sinus problems. Upon assessment, pt was found to have generalized malaise, weakness, shakiness, and feeling lightheaded. Dr. Alvarez had the pt go to the ER to be seen. The pt received a CT scan of the head in the ER. This showed that the pt's sinuses are clear. Dr. Alvarez states that the pt does not need to f/u with ENT as he has no sinus issues at present. Pt will need to f/u with Primary Care or possibly an Allergist.    Thanks,  ÁNGEL Greer

## 2018-02-02 NOTE — ED NOTES
Patient assisted to bathroom with w/c and nurse assist for urine clean catch specimen collection.

## 2018-02-02 NOTE — TELEPHONE ENCOUNTER
FYI...Called  to scheduled no answer left voicemail message requesting a call back in regards to scheduling.

## 2018-02-02 NOTE — TELEPHONE ENCOUNTER
Called pt no answer left message on voicemail requesting a call back in regards to scheduling a appointment

## 2018-02-07 ENCOUNTER — TELEPHONE (OUTPATIENT)
Dept: INTERNAL MEDICINE | Facility: CLINIC | Age: 78
End: 2018-02-07

## 2018-02-14 ENCOUNTER — TELEPHONE (OUTPATIENT)
Dept: OTOLARYNGOLOGY | Facility: CLINIC | Age: 78
End: 2018-02-14

## 2018-02-14 NOTE — TELEPHONE ENCOUNTER
----- Message from Gretchen Everett MA sent at 2/12/2018  3:44 PM CST -----  Contact: patient      ----- Message -----  From: Naomi Chamberlain  Sent: 2/12/2018   3:04 PM  To: Antonio CISNEROS III Staff    Please call above patient need to get in ASAP waiting on a call from the nurse thanks

## 2018-02-15 ENCOUNTER — TELEPHONE (OUTPATIENT)
Dept: OTOLARYNGOLOGY | Facility: CLINIC | Age: 78
End: 2018-02-15

## 2018-02-15 NOTE — TELEPHONE ENCOUNTER
"Returned call. Spoke with pt. Informed pt that sinuses are clear per Dr. Alvarez via CT scan done while in ER. Pt c/o problems swallowing and requested to be seen. Informed pt that Dr. Alvarez wants him to see his PCP or an Allergist. Pt started to raise his voice. Stated "you people are not taking me seriously. I have one doctor telling me one thing and another telling me something different". Informed Bart Stockton, clinic manager, of the situation. Bart advised to have pt follow up with Dr. Mosley in immunology/allergy. Attempted to encourage pt to schedule an appointment with Dr. Mosley. Pt refused. Pt stated, "you do what you have to do and I will do what I have to do". Pt hung up the phone. Advised by Bart Stockton to offer pt an appointment with Dr. Guillaume if pt calls back.  "

## 2018-02-15 NOTE — TELEPHONE ENCOUNTER
----- Message from Julio Cesar Rubin sent at 2/15/2018  2:13 PM CST -----  Contact: PT   Pt requesting return call in regard to setting up a follow up appt sooner than offered slot of 2/21, please call [phone]

## 2018-02-28 ENCOUNTER — HOSPITAL ENCOUNTER (OUTPATIENT)
Dept: RADIOLOGY | Facility: HOSPITAL | Age: 78
Discharge: HOME | End: 2018-02-28
Attending: PHYSICIAN ASSISTANT
Payer: MEDICARE

## 2018-02-28 ENCOUNTER — OFFICE VISIT (OUTPATIENT)
Dept: SPORTS MEDICINE | Facility: CLINIC | Age: 78
End: 2018-02-28
Payer: MEDICARE

## 2018-02-28 VITALS
DIASTOLIC BLOOD PRESSURE: 87 MMHG | BODY MASS INDEX: 30.48 KG/M2 | HEIGHT: 72 IN | HEART RATE: 66 BPM | SYSTOLIC BLOOD PRESSURE: 138 MMHG | WEIGHT: 225 LBS

## 2018-02-28 DIAGNOSIS — M17.0 PRIMARY OSTEOARTHRITIS OF BOTH KNEES: ICD-10-CM

## 2018-02-28 DIAGNOSIS — M25.562 LEFT KNEE PAIN, UNSPECIFIED CHRONICITY: Primary | ICD-10-CM

## 2018-02-28 DIAGNOSIS — M25.562 LEFT KNEE PAIN, UNSPECIFIED CHRONICITY: ICD-10-CM

## 2018-02-28 PROCEDURE — 3079F DIAST BP 80-89 MM HG: CPT | Mod: S$GLB,,, | Performed by: PHYSICIAN ASSISTANT

## 2018-02-28 PROCEDURE — 99214 OFFICE O/P EST MOD 30 MIN: CPT | Mod: 25,S$GLB,, | Performed by: PHYSICIAN ASSISTANT

## 2018-02-28 PROCEDURE — 73564 X-RAY EXAM KNEE 4 OR MORE: CPT | Mod: TC,50,FY,PO

## 2018-02-28 PROCEDURE — 3075F SYST BP GE 130 - 139MM HG: CPT | Mod: S$GLB,,, | Performed by: PHYSICIAN ASSISTANT

## 2018-02-28 PROCEDURE — 20610 DRAIN/INJ JOINT/BURSA W/O US: CPT | Mod: LT,S$GLB,, | Performed by: PHYSICIAN ASSISTANT

## 2018-02-28 PROCEDURE — 73564 X-RAY EXAM KNEE 4 OR MORE: CPT | Mod: 26,50,, | Performed by: RADIOLOGY

## 2018-02-28 PROCEDURE — 99999 PR PBB SHADOW E&M-EST. PATIENT-LVL III: CPT | Mod: PBBFAC,,, | Performed by: PHYSICIAN ASSISTANT

## 2018-02-28 RX ORDER — LIDOCAINE HYDROCHLORIDE 10 MG/ML
7 INJECTION INFILTRATION; PERINEURAL ONCE
Status: COMPLETED | OUTPATIENT
Start: 2018-02-28 | End: 2018-02-28

## 2018-02-28 RX ADMIN — LIDOCAINE HYDROCHLORIDE 7 ML: 10 INJECTION INFILTRATION; PERINEURAL at 03:02

## 2018-02-28 NOTE — PROGRESS NOTES
Subjective:          Chief Complaint: Korey Santos is a 77 y.o. male who had concerns including Follow-up of the Left Knee.    Korey Santos, a 77 y.o. male, presents today for evaluation of his left knee. He states that he is experiencing swelling and pain in both knees, worst in his left knee. He has had multiple aspirations, steroid injections, and Synvisc injections in his bilateral knees. He reports his last series synvisc injection in the left knee was 2 months ago which gave him 0% relief. He would like to discuss surgery options in his left knee and a possible injection in his right knee today. The pain started 3-5 years ago and is becoming progressively worseover the past few months. He reports that the pain is a 7 /10 aching and throbbing pain today and not responding adequately to conservative measures which have included activity modifications, rest, and oral medication. Is affecting ADLs and limiting desired level of activity. Denies numbness, tingling, and radiation.  Pain is 8 /10 at its worst    Mechanical symptoms: none  Subjective instability: (--)   Worse with ambulation  Better with rest.   Nocturnal symptoms: (+)    DATE OF PROCEDURE: 2/22/16     PREOPERATIVE DIAGNOSES:   1. Right knee medial meniscus tear.      POSTOPERATIVE DIAGNOSES:   1. Right knee medial meniscus tear.      PROCEDURES:   1. Right knee arthroscopic partial medial meniscectomy            Review of Systems   Constitution: Negative for chills and fever.   HENT: Negative for congestion and sore throat.    Eyes: Negative for discharge and double vision.   Cardiovascular: Negative for chest pain, palpitations and syncope.   Respiratory: Negative for cough and shortness of breath.    Endocrine: Negative for cold intolerance and heat intolerance.   Skin: Negative for dry skin and rash.   Musculoskeletal: Positive for arthritis, back pain, joint pain, joint swelling, muscle cramps and stiffness. Negative for falls, gout,  muscle weakness, myalgias and neck pain.   Gastrointestinal: Negative for abdominal pain, nausea and vomiting.   Neurological: Negative for focal weakness, numbness and paresthesias.       Pain Related Questions  Over the past 3 days, what was your average pain during activity? (I.e. running, jogging, walking, climbing stairs, getting dressed, ect.): 8  Over the past 3 days, what was your highest pain level?: 8  Over the past 3 days, what was your lowest pain level? : 7    Other  How many nights a week are you awakened by your affected body part?: 7  Was the patient's HEIGHT measured or patient reported?: Patient Reported  Was the patient's WEIGHT measured or patient reported?: Measured      Objective:        General: Korey is well-developed, well-nourished, appears stated age, in no acute distress, alert and oriented to time, place and person.     General    Vitals reviewed.  Constitutional: He is oriented to person, place, and time. He appears well-developed and well-nourished. No distress.   Eyes: Conjunctivae and EOM are normal. Pupils are equal, round, and reactive to light.   Neck: Normal range of motion. Neck supple. No JVD present.   Cardiovascular: Normal heart sounds and intact distal pulses.    No murmur heard.  Pulmonary/Chest: Effort normal and breath sounds normal. No respiratory distress.   Abdominal: Soft. Bowel sounds are normal. He exhibits no distension. There is no tenderness.   Neurological: He is alert and oriented to person, place, and time. Coordination normal.   Psychiatric: He has a normal mood and affect. His behavior is normal. Judgment and thought content normal.     General Musculoskeletal Exam   Gait: normal and antalgic       Right Knee Exam     Inspection   Erythema: absent  Scars: present  Swelling: absent  Effusion: effusion  Deformity: deformity  Bruising: absent    Crepitus   The patient has crepitus of the patella.    Range of Motion   Extension: 0   Flexion: 130     Tests    Meniscus   Tyrone:  Medial - negative Lateral - negative  Ligament Examination Lachman: normal (-1 to 2mm) PCL-Posterior Drawer: normal (0 to 2mm)     MCL - Valgus: normal (0 to 2mm)  LCL - Varus: normalDial Test at 90 degrees: normal (< 5 degrees)  Patella   Patellar Glide (quadrants): Lateral - 2   Medial - 2  Patellar Grind: negative    Other   Sensation: normal    Comments:  Genu varum    Left Knee Exam     Inspection   Erythema: absent  Scars: absent  Swelling: present  Effusion: present  Deformity: deformity  Bruising: absent    Tenderness   The patient tender to palpation of the medial joint line.    Crepitus   The patient has crepitus of the patella.    Range of Motion   Extension: 0   Flexion:  120 abnormal     Tests   Meniscus   Tyrone:  Medial - negative Lateral - negative  Stability Lachman: normal (-1 to 2mm) PCL-Posterior Drawer: normal (0 to 2mm)  MCL - Valgus: normal (0 to 2mm)  LCL - Varus: normal (0 to 2mm)Dial Test at 90 degrees: normal (< 5 degrees)  Patella   Patellar Glide (Quadrants): Lateral - 2 Medial - 2  Patellar Grind: positive    Other   Sensation: normal    Comments:  Genu varum    Right Hip Exam     Tests   Rishi: negative  Left Hip Exam     Tests   Rishi: negative          Muscle Strength   Right Lower Extremity   Hip Abduction: 5/5   Quadriceps:  5/5   Hamstrin/5   Left Lower Extremity   Hip Abduction: 5/5   Quadriceps:  4/5   Hamstrin/5     Vascular Exam     Right Pulses  Dorsalis Pedis:      2+  Posterior Tibial:      2+        Left Pulses  Dorsalis Pedis:      2+  Posterior Tibial:      2+        Edema  Right Lower Leg: absent  Left Lower Leg: absent    Radiographic Findings 2018:    4 views bilateral    Moderate DJD.  The medial tibiofemoral joint spaces are narrowed bilaterally slightly more on the left side.  Slight irregularity involving the tibia tuberosity of the left knee.  No fracture or dislocation.  No bone destruction identified    Xrays of the knees  were ordered and reviewed by me today. These findings were discussed and reviewed with the patient.            Assessment:       Encounter Diagnoses   Name Primary?    Left knee pain, unspecified chronicity Yes    Primary osteoarthritis of both knees           Plan:       1. Aspiration Procedure  A time out was performed, including verification of patient ID, procedure, site and side, availability of information and equipment, review of safety issues, and agreement with consent, the procedure site was marked.    After time out was performed, the patient was prepped aseptically with povidone-iodine swabsticks. Approximately 7cc of 1% lidocaine plain was injected with a 25-gauge needle into the aspiration site without difficulty.  35cc's of normal joint fluid were aspirated from the Superolateral  aspect of the left Knee Joint using an 18g x 1.5 needle in sterile fashion without complication. Bandage was applied.     Korey RUDDY Santos was reminded to rest with RICE for 48 hours post injection and to call the clinic immediately for any adverse side effects as explained in clinic today.    2. We reviewed with Korey today, the pathology and natural history of his diagnosis. We have discussed a variety of treatment options including medications, physical therapy and other alternative treatments. I also explained the indications, risks and benefits of surgery. After discussion, he decided to proceed with surgery. The decision was made to go forward with      1. LEFT Total knee arthroplasty     The details of the surgical procedure were explained, including the location of probable incisions and a description of likely hardware and/or grafts to be used.  The patient understands the likely convalescence after surgery.  Also, we have thoroughly discussed the risks, benefits and alternatives to surgery, including, but not limited to, the risk of infection, joint stiffness, blood clot (including DVT and/or pulmonary  embolus), neurologic and vascular injury.  It was explained that, if tissue has been repaired or reconstructed, there is a chance of failure, which may require further management.    3. Information was given for Attune Total knee today. Pt wants to have surgery on March 23rd at [facility].  4. Pre-Authorization for Synvisc-one in the RIGHT knee placed today.  5. Consider medial  brace for RIGHT knee next visit.  6. Ice to the affected area 2-3x day for 15-20 minutes as needed for pain management.    7. IKDC, SF-12 and KOOS was not filled out today in clinic.     RTC in 2 weeks with Dr. Renita Franco Patient will fill out IKDC, SF-12 and KOOS on return.      All of the patient's questions were answered and the patient will contact us if they have any questions or concerns in the interim.          Patient questionnaires may have been collected.

## 2018-03-14 ENCOUNTER — OFFICE VISIT (OUTPATIENT)
Dept: SPORTS MEDICINE | Facility: CLINIC | Age: 78
End: 2018-03-14
Payer: MEDICARE

## 2018-03-14 VITALS
HEIGHT: 71 IN | WEIGHT: 225 LBS | DIASTOLIC BLOOD PRESSURE: 93 MMHG | HEART RATE: 67 BPM | BODY MASS INDEX: 31.5 KG/M2 | SYSTOLIC BLOOD PRESSURE: 157 MMHG

## 2018-03-14 DIAGNOSIS — M21.162 GENU VARUM OF BOTH LOWER EXTREMITIES: Primary | ICD-10-CM

## 2018-03-14 DIAGNOSIS — M17.32 POST-TRAUMATIC OSTEOARTHRITIS OF LEFT KNEE: ICD-10-CM

## 2018-03-14 DIAGNOSIS — M21.161 GENU VARUM OF BOTH LOWER EXTREMITIES: Primary | ICD-10-CM

## 2018-03-14 DIAGNOSIS — M17.31 POST-TRAUMATIC OSTEOARTHRITIS OF RIGHT KNEE: ICD-10-CM

## 2018-03-14 PROCEDURE — 3077F SYST BP >= 140 MM HG: CPT | Mod: CPTII,S$GLB,, | Performed by: ORTHOPAEDIC SURGERY

## 2018-03-14 PROCEDURE — 3080F DIAST BP >= 90 MM HG: CPT | Mod: CPTII,S$GLB,, | Performed by: ORTHOPAEDIC SURGERY

## 2018-03-14 PROCEDURE — 20611 DRAIN/INJ JOINT/BURSA W/US: CPT | Mod: RT,S$GLB,, | Performed by: ORTHOPAEDIC SURGERY

## 2018-03-14 PROCEDURE — 99999 PR PBB SHADOW E&M-EST. PATIENT-LVL IV: CPT | Mod: PBBFAC,,, | Performed by: ORTHOPAEDIC SURGERY

## 2018-03-14 PROCEDURE — 99214 OFFICE O/P EST MOD 30 MIN: CPT | Mod: 25,S$GLB,, | Performed by: ORTHOPAEDIC SURGERY

## 2018-03-14 RX ORDER — CELECOXIB 200 MG/1
200 CAPSULE ORAL 2 TIMES DAILY
Qty: 60 CAPSULE | Refills: 2 | Status: SHIPPED | OUTPATIENT
Start: 2018-03-14 | End: 2018-04-13

## 2018-03-14 NOTE — LETTER
March 14, 2018        Juan Bustamante MD  [address]  [facility] [address]             [facility]  [address]  [facility] [address]  Phone: [phone]   Patient: Korey Santos   MR Number: [id number]   YOB: 1940   Date of Visit: 3/14/2018       Dear Dr. Bustamante:    Thank you for referring Korey Santos to me for evaluation. Below are the relevant portions of my assessment and plan of care.        Encounter Diagnoses   Name Primary?    Genu varum of both lower extremities Yes    Post-traumatic osteoarthritis of right knee     Post-traumatic osteoarthritis of left knee             1. Aspiration Procedure  A time out was performed, including verification of patient ID, procedure, site and side, availability of information and equipment, review of safety issues, and agreement with consent, the procedure site was marked.    After time out was performed, the patient was prepped aseptically with povidone-iodine swabsticks. Approximately 7cc of 1% lidocaine plain was injected with a 25-gauge needle into the aspiration site without difficulty.  35cc's of normal joint fluid were aspirated from the Superolateral  aspect of the left Knee Joint using an 18g x 1.5 needle in sterile fashion without complication. Bandage was applied.     Korey Santos was reminded to rest with RICE for 48 hours post injection and to call the clinic immediately for any adverse side effects as explained in clinic today.    2. Ultrasound Guidance Procedure  right Knee Joint visualized with documented right knee minimal effusion with DJD noted. Dynamic visualization of the 21.5g x 1.5 needle performed.        3.  We reviewed with Korey today, the pathology and natural history of his diagnosis. We have discussed a variety of treatment options including medications, physical therapy and other alternative treatments. I also explained the indications, risks and benefits of surgery.  After discussion, he decided to proceed with surgery. The decision was made to go forward with      1. Left Total knee arthroplasty  ConforMIS iTotal    Staged:  1. Right Total knee arthroplasty  ConforMIS iTotal     The details of the surgical procedure were explained, including the location of probable incisions and a description of likely hardware and/or grafts to be used.  The patient understands the likely convalescence after surgery.  Also, we have thoroughly discussed the risks, benefits and alternatives to surgery, including, but not limited to, the risk of infection, joint stiffness, blood clot (including DVT and/or pulmonary embolus), neurologic and vascular injury.  It was explained that, if tissue has been repaired or reconstructed, there is a chance of failure, which may require further management.    3. 19678 - Manoj Downs, performed a custom orthotic / brace adjustment, fitting and training with the patient. The patient demonstrated understanding and proper care. This was performed for 15 minutes. Bilateral viscoskin    4. Dr. Kye Solorzano for preoperative clearance    5. IKDC, SF-12 and KOOS was filled out today in clinic.     RTC in 2 months with Dr. Renita Franco for preoperative history and physical . Patient will not fill out IKDC, SF-12 and KOOS on return.          If you have questions, please do not hesitate to call me. I look forward to following Korey along with you.    Sincerely,      MD FRANDY Nogueira

## 2018-03-14 NOTE — PATIENT INSTRUCTIONS
DESCRIPTION  Synvisc-One (hylan G-F 20) is an elastoviscous high molecular weight fluid containing hylan A and hylan B polymers produced from [employer]. Hylans are derivatives of hyaluronan (sodium hyaluronate). Hylan G-F 20 is unique in that the hyaluronan is chemically cross linked. Hyaluronan is a long-chain polymer containing repeating disaccharide units of Gd-dbgzpqvfhzv-F-acetylglucosamine.     INDICATIONS FOR USE  Synvisc-One is indicated for the treatment of pain in osteoarthritis (OA) of the knee in patients who have failed to respond adequately to conservative nonpharmacologic therapy and simple analgesics, e.g., acetaminophen.     Who is a candidate for Synvisc-One  Patients with knee osteoarthritis, who have tried diet, exercise and over-the-counter pain medication but still have pain, should talk to their doctor to see if Synvisc-One is right for them.     How Synvisc-One is administered  Synvisc-One is a single injection. It's a simple, in-office procedure that only takes a few minutes.     What you can expect following a Synvisc-One knee injection Synvisc-One can provide up to six months of osteoarthritis knee pain relief. Everyone responds differently, but in a medical study* patients experienced relief starting one month after the injection.     After the injection, you can resume normal day-to-day activities, but you should avoid any strenuous activities for about 48 hours.     Contraindication  Do not administer to patients with known hypersensitivity (allergy) to hyaluronan (sodium hyaluronate) preparations.   Do not inject Synvisc-One in the knees of patients having knee joint infections or skin diseases or infections in the area of theinjection site.    What are possible side effects?  Synvisc may occur short-term pain, swelling at the injection site and / or the appearance of synovial exudate after injection. In some cases, exudation may be significant and cause more prolonged pain.      Important Safety Information  Before trying Synvisc-One or SYNVISC, tell your doctor if you are allergic to products from birds - such as feathers, eggs or poultry - or if your leg is swollen or infected. Synvisc-One and SYNVISC are only for injection into the knee, performed by a doctor or other qualified health care professional. Synvisc-One and SYNVISC have not been tested to show pain relief in joints other than the knee. Talk to your doctor before resuming strenuous weight-bearing activities after treatment. Synvisc-One and SYNVISC have not been tested in children, pregnant women or women who are nursing. You should tell your doctor if you think you are pregnant or if you are nursing a child. The side effects most commonly seen when Synvisc-One or SYNVISC is injected into the knee were pain, swelling and/or fluid buildup in or around the knee. Cases where the swelling is extensive or painful should be discussed with your doctor. Allergic reactions such as rash and hives have been reported rarely.       After Knee Replacement: The First Month  Youll apply the same movement skills you learned in the hospital or rehab center to your exercise program at home. You may also continue meeting with your physical therapist. Following your exercise program brings big rewards. With your knee in shape, youll walk more easily and return to an active life sooner.    Maintaining your exercise program  Exercising is the only way to regain your strength and range of motion. With continued exercise, you may gain even more strength and range of motion than you had before surgery. Thats because before surgery, pain and stiffness may have limited your movement. So make exercise part of your daily routine. Continue meeting with your physical therapist as directed. He or she may add riding a stationary bike, swimming, or other new exercises to your program.  Walking in stride  Walking helps build a more normal, comfortable  stride. It also keeps you in shape and helps prevent blood clots. Begin by taking three or four short walks every day. Gradually increase how far, how long, and how many times a day you walk. After your walk, lie down, elevate your knee, and ice it to reduce swelling. Your doctor or physical therapist will instruct you when and where to use your walker, crutches, or cane. He or she will also let you know when you can stop using them.  Date Last Reviewed: 9/20/2015 © 2000-2017 [employer]. [address] [place] [address]. All rights reserved. This information is not intended as a substitute for professional medical care. Always follow your healthcare professional's instructions.        Understanding Knee Replacement  The knee is a hingelike joint, formed where the thighbone (femur), shinbone (tibia), and kneecap (patella) meet. It is supported by muscles, tendons, and ligaments and lined with cushioning cartilage. Over time, cartilage can wear away. As it does, the knee becomes stiff and painful. A knee prosthesis (artificial joint) can replace the painful joint and restore movement.    A healthy knee  A healthy knee joint bends easily. Cartilage, a smooth tissue, covers the ends of the thighbone and shinbone and the underside of the kneecap. Healthy cartilage absorbs stress and allows the bones to glide freely over each other. Joint fluid lubricates the cartilage surfaces, making movement even easier.       A problem knee  A problem knee is stiff or painful. Cartilage cracks or wears away due to usage, inflammation, or injury. Worn, roughened cartilage no longer allows the joint to glide freely, so it feels stiff and painful. As more cartilage wears away, exposed bones rub together when the knee bends, causing pain. With time, bone surfaces also become rough, making pain worse.       A knee prosthesis  A knee prosthesis lets your knee bend easily again. The roughened ends of the thighbone  and shinbone and the underside of the kneecap are replaced with metal and strong plastic components. With new smooth surfaces, the bones can once again glide freely jwithout arthritic pain. A knee prosthesis does have limitations. But it can let you walk and move with greater comfort.  Date Last Reviewed: 9/20/2015  © 2920-5883 [employer]. [address] [place] [address]. All rights reserved. This information is not intended as a substitute for professional medical care. Always follow your healthcare professional's instructions.        After Knee Replacement: Controlling Swelling  Swelling is common after total knee replacement. It may be worse after exercise. To help control swelling, follow the steps below.  Ice your knee  Wrap an ice pack or bag of frozen peas in a thin cloth, then place it on your knee. Dont use ice for more than 20 minutes at a time. If you have an ice machine, use it as directed.  Elevate your leg  Elevate your leg above your heart. Ask your health care provider about safe positions to do this.  Do ankle pumps    Continue doing ankle pumps. They help reduce swelling, improve circulation, and prevent blood clots. Point, then flex both feet slowly. Repeat this 10 to 30 times each hour.  Date Last Reviewed: 9/20/2015 © 2000-2017 [employer]. [address] [place] [address]. All rights reserved. This information is not intended as a substitute for professional medical care. Always follow your healthcare professional's instructions.        Total Knee Replacement  During total knee replacement surgery, your damaged knee joint is replaced with an artificial joint, called a prosthesis. This surgery almost always reduces joint pain and improves your quality of life.     The parts of the prosthesis are secured to the bones of the knee. Together they form the new joint.   Before your surgery  You will most likely arrive at the hospital on the morning of  the surgery. Be sure to follow all of your doctors instructions on preparing for surgery:  · Follow any directions you are given for taking medicines or for not eating or drinking before surgery.  · At the hospital, your temperature, pulse, breathing, and blood pressure will be checked.  · An IV (intravenous) line will be started to provide fluids and medicines needed during surgery.  The surgical procedure  When the surgical team is ready, youll be taken to the operating room. There youll be given anesthesia to help you sleep through surgery, or to make you numb from the waist down. Then an incision is made on the front or side of your knee. Any damaged bone is cleaned away, and the new joint components are put into place. The incision is closed with surgical staples or stitches.  After your surgery  After surgery, youll be sent to the recovery room. When you are fully awake, youll be moved to your room. The nurses will give you medicines to ease your pain. You may have a catheter (small tube) in your bladder. A continuous passive motion machine may be used on your knee to keep it from getting stiff. A sequential compression machine may be used to prevent blood clots by gently squeezing then releasing your leg. You may be given medicine to prevent blood clots. Soon, healthcare providers will help you get up and moving.  When to call your doctor  Once at home, call your doctor if you have any of the symptoms below:  · An increase in knee pain  · Pain or swelling in the calf or leg  · Unusual redness, heat, or drainage at the incision site  · Fever of 100.4°F (38°C) or higher  · Shaking chills  · Trouble breathing or chest pain (call 911)   Date Last Reviewed: 9/20/2015 © 2000-2017 "[employer]. [address] [place] [address], [place] [address]. All rights reserved. This information is not intended as a substitute for professional medical care. Always follow your healthcare professional's  instructions.        After Knee Replacement: Right After Surgery  Your healthcare team will monitor your progress after your surgery. You may receive general anesthesia (being put to sleep), an epidural nerve block in your spinal canal (numb from the waist down), or a femoral nerve block in your leg (entire leg is numb). Sometimes the femoral nerve block is added to another anesthesia for better pain control. Your team will use support equipment to help you recover. Be sure to let them know how you feel and how well your pain is controlled. You may also receive medicines, such as antibiotics and blood thinners.    Support equipment  Special tubes and machines help you recover after surgery. They may include:  · An intravenous (IV) line to provide needed fluids and medicines.  · A catheter tube to help drain your bladder.  · A drainage tube in your leg to release excess fluid and reduce swelling.  · An ice machine or ice pack to reduce inflammation. The ice machine tube carries cold water to the knee joint.  · A sequential compression machine (SCM) to prevent blood clots by gently squeezing and then releasing your foot or calf.  · A continual passive motion machine (CPM) to increase flexibility by gently moving your knee.  Managing pain  When pain is controlled, youll walk sooner and recover faster. So be honest about how much pain you feel. And dont be afraid to ask for pain medicine when you need it. Your nurse may give you IV or oral pain medicine. Or, you may have a patient-controlled analgesia (PCA) machine. This machine lets you push a button to give yourself a measured dose of pain medicine. Tell your nurse if the medicines dont reduce pain or if you suddenly feel worse.  Date Last Reviewed: 10/1/2015  © 1209-7847 [employer]. [address] [place] [address]. All rights reserved. This information is not intended as a substitute for professional medical care. Always follow your  healthcare professional's instructions.

## 2018-03-14 NOTE — PROGRESS NOTES
Subjective:          Chief Complaint: Korey Santos is a 77 y.o. male who had concerns including Pain of the Right Knee.    Korey Santos, a 77 y.o. male, presents today for evaluation of his left knee. He states that he is experiencing swelling and pain in both knees, worst in his left knee. He has had multiple aspirations, steroid injections, and Synvisc injections in his bilateral knees. He reports his last series synvisc injection in the left knee was 2 months ago which gave him 0% relief. He would like to discuss surgery options in his left knee and a possible injection in his right knee today. The pain started 3-5 years ago and is becoming progressively worseover the past few months. He reports that the pain is a 7 /10 aching and throbbing pain today and not responding adequately to conservative measures which have included activity modifications, rest, and oral medication. Is affecting ADLs and limiting desired level of activity. Denies numbness, tingling, and radiation.  Pain is 8 /10 at its worst    Mechanical symptoms: none  Subjective instability: (--)   Worse with ambulation  Better with rest.   Nocturnal symptoms: (+)    DATE OF PROCEDURE: 2/22/16     PREOPERATIVE DIAGNOSES:   1. Right knee medial meniscus tear.      POSTOPERATIVE DIAGNOSES:   1. Right knee medial meniscus tear.      PROCEDURES:   1. Right knee arthroscopic partial medial meniscectomy        Pain   Associated symptoms include joint swelling. Pertinent negatives include no abdominal pain, chest pain, chills, congestion, coughing, fever, myalgias, nausea, neck pain, numbness, rash, sore throat or vomiting.       Review of Systems   Constitution: Negative for chills and fever.   HENT: Negative for congestion and sore throat.    Eyes: Negative for discharge and double vision.   Cardiovascular: Negative for chest pain, palpitations and syncope.   Respiratory: Negative for cough and shortness of breath.    Endocrine: Negative for  cold intolerance and heat intolerance.   Skin: Negative for dry skin and rash.   Musculoskeletal: Positive for arthritis, back pain, joint pain, joint swelling, muscle cramps and stiffness. Negative for falls, gout, muscle weakness, myalgias and neck pain.   Gastrointestinal: Negative for abdominal pain, nausea and vomiting.   Neurological: Negative for focal weakness, numbness and paresthesias.       Pain Related Questions  Over the past 3 days, what was your average pain during activity? (I.e. running, jogging, walking, climbing stairs, getting dressed, ect.): 8  Over the past 3 days, what was your highest pain level?: 8  Over the past 3 days, what was your lowest pain level? : 8    Other  How many nights a week are you awakened by your affected body part?: 7  Was the patient's HEIGHT measured or patient reported?: Patient Reported  Was the patient's WEIGHT measured or patient reported?: Measured      Objective:        General: Korey is well-developed, well-nourished, appears stated age, in no acute distress, alert and oriented to time, place and person.     General    Vitals reviewed.  Constitutional: He is oriented to person, place, and time. He appears well-developed and well-nourished. No distress.   HENT:   Mouth/Throat: No oropharyngeal exudate.   Eyes: Conjunctivae and EOM are normal. Pupils are equal, round, and reactive to light. Right eye exhibits no discharge. Left eye exhibits no discharge.   Neck: Normal range of motion. Neck supple. No JVD present.   Cardiovascular: Normal heart sounds and intact distal pulses.    No murmur heard.  Pulmonary/Chest: Effort normal and breath sounds normal. No respiratory distress.   Abdominal: Soft. Bowel sounds are normal. He exhibits no distension. There is no tenderness.   Neurological: He is alert and oriented to person, place, and time. He has normal reflexes. No cranial nerve deficit. Coordination normal.   Psychiatric: He has a normal mood and affect. His  behavior is normal. Judgment and thought content normal.     General Musculoskeletal Exam   Gait: normal and antalgic       Right Knee Exam     Inspection   Erythema: absent  Scars: present  Swelling: absent  Effusion: effusion  Deformity: deformity  Bruising: absent    Tenderness   The patient is experiencing no tenderness.         Crepitus   The patient has crepitus of the patella.    Range of Motion   Extension: 10   Flexion: 130     Tests   Meniscus   Tyrone:  Medial - negative Lateral - negative  Ligament Examination Lachman: normal (-1 to 2mm) PCL-Posterior Drawer: normal (0 to 2mm)     MCL - Valgus: normal (0 to 2mm)  LCL - Varus: normalPivot Shift: normal (Equal)Reverse Pivot Shift: normal (Equal)Dial Test at 30 degrees: normal (< 5 degrees)Dial Test at 90 degrees: normal (< 5 degrees)  Posterior Sag Test: negative  Posterolateral Corner: unstable (>15 degrees difference)  Patella   Patellar Apprehension: negative  Passive Patellar Tilt: neutral  Patellar Tracking: normal  Patellar Glide (quadrants): Lateral - 2   Medial - 2  Q-Angle at 90 degrees: normal  Patellar Grind: negative  J-Sign: none    Other   Meniscal Cyst: absent  Popliteal (Baker's) Cyst: absent  Sensation: normal    Comments:  Genu varum    Left Knee Exam     Inspection   Erythema: absent  Scars: absent  Swelling: present  Effusion: present  Deformity: deformity  Bruising: absent    Tenderness   The patient tender to palpation of the medial joint line.    Crepitus   The patient has crepitus of the patella and lateral joint line.    Range of Motion   Extension: 10   Flexion:  130 abnormal     Tests   Meniscus   Tyrone:  Medial - negative Lateral - negative  Stability Lachman: normal (-1 to 2mm) PCL-Posterior Drawer: normal (0 to 2mm)  MCL - Valgus: normal (0 to 2mm)  LCL - Varus: normal (0 to 2mm)Pivot Shift: normal (Equal)Reverse Pivot Shift: normal (Equal)Dial Test at 30 degrees: normal (< 5 degrees)Dial Test at 90 degrees: normal (< 5  degrees)  Posterior Sag Test: negative  Posterolateral Corner: unstable (>15 degrees difference)  Patella   Patellar Apprehension: negative  Passive Patellar Tilt: neutral  Patellar Tracking: normal  Patellar Glide (Quadrants): Lateral - 2 Medial - 2  Q-Angle at 90 degrees: normal  Patellar Grind: positive  J-Sign: J sign absent    Other   Meniscal Cyst: absent  Popliteal (Baker's) Cyst: absent  Sensation: normal    Comments:  Genu varum    Right Hip Exam     Tests   Rishi: negative  Left Hip Exam     Tests   Rishi: negative          Muscle Strength   Right Lower Extremity   Hip Abduction: 5/5   Quadriceps:  5/5   Hamstrin/5   Left Lower Extremity   Hip Abduction: 5/5   Quadriceps:  4/5   Hamstrin/5     Reflexes     Left Side  Quadriceps:  2+  Achilles:  2+    Right Side   Quadriceps:  2+  Achilles:  2+    Vascular Exam     Right Pulses  Dorsalis Pedis:      2+  Posterior Tibial:      2+        Left Pulses  Dorsalis Pedis:      2+  Posterior Tibial:      2+        Edema  Right Lower Leg: absent  Left Lower Leg: absent    Radiographic Findings 2018:    4 views bilateral    Moderate DJD.  The medial tibiofemoral joint spaces are narrowed bilaterally slightly more on the left side.  Slight irregularity involving the tibia tuberosity of the left knee.  No fracture or dislocation.  No bone destruction identified    Xrays of the knees were ordered and reviewed by me today. These findings were discussed and reviewed with the patient.            Assessment:       Encounter Diagnoses   Name Primary?    Genu varum of both lower extremities Yes    Post-traumatic osteoarthritis of right knee     Post-traumatic osteoarthritis of left knee           Plan:       1. Aspiration Procedure  A time out was performed, including verification of patient ID, procedure, site and side, availability of information and equipment, review of safety issues, and agreement with consent, the procedure site was marked.    After time  out was performed, the patient was prepped aseptically with povidone-iodine swabsticks. Approximately 7cc of 1% lidocaine plain was injected with a 25-gauge needle into the aspiration site without difficulty.  35cc's of normal joint fluid were aspirated from the Superolateral  aspect of the RIGHT Knee Joint using an 18g x 1.5 needle in sterile fashion without complication. Bandage was applied.     Korey Santos was reminded to rest with RICE for 48 hours post injection and to call the clinic immediately for any adverse side effects as explained in clinic today.    Injection Procedure    After time out was performed, including verification of patient ID, procedure, site and side, availability of information and equipment, review of safety issues, and agreement with consent, the procedure site was marked and the patient was prepped aseptically. A diagnostic and therapeutic injection of 6cc SynviscOne and ethyl chloride spray was given under sterile technique using a 22g x 1.5 needle into the right Knee Joint in seated position.     The patient had no adverse reactions to the medication. Pain decreased. The patient was instructed to apply ice to the joint for 20 minutes and avoid strenuous activities for 24-36 hours following the injection. Patient was warned of possible blood sugar and/or blood pressure changes during that time. Following that time, patient can resume regular activities.    Patient was reminded to call the clinic immediately for any adverse side effects as explained in clinic today.    2. Ultrasound Guidance Procedure  right Knee Joint visualized with documented right knee minimal effusion with DJD noted. Dynamic visualization of the 21.5g x 1.5 needle performed.        3.  We reviewed with Korey today, the pathology and natural history of his diagnosis. We have discussed a variety of treatment options including medications, physical therapy and other alternative treatments. I also explained the  indications, risks and benefits of surgery. After discussion, he decided to proceed with surgery. The decision was made to go forward with      1. Left Total knee arthroplasty  ConforMIS iTotal    Staged:  1. Right Total knee arthroplasty  ConforMIS iTotal     The details of the surgical procedure were explained, including the location of probable incisions and a description of likely hardware and/or grafts to be used.  The patient understands the likely convalescence after surgery.  Also, we have thoroughly discussed the risks, benefits and alternatives to surgery, including, but not limited to, the risk of infection, joint stiffness, blood clot (including DVT and/or pulmonary embolus), neurologic and vascular injury.  It was explained that, if tissue has been repaired or reconstructed, there is a chance of failure, which may require further management.    3. [id number], performed a custom orthotic / brace adjustment, fitting and training with the patient. The patient demonstrated understanding and proper care. This was performed for 15 minutes. Bilateral viscoskin    4. Dr. Kye Solorzano for preoperative clearance    5. IKDC, SF-12 and KOOS was filled out today in clinic.     RTC in 2 months with Dr. Renita Franco for preoperative history and physical . Patient will not fill out IKDC, SF-12 and KOOS on return.            Patient questionnaires may have been collected.

## 2018-03-14 NOTE — LETTER
March 14, 2018      Abhilash Solis PA-C  [address]  [place] [address]           [facility]  [address]  [facility] [address]  Phone: [phone]          Patient: Korey Santos   MR Number: [id number]   YOB: 1940   Date of Visit: 3/14/2018       Dear Abhilash Solis:    Thank you for referring Korey Santos to me for evaluation. Attached you will find relevant portions of my assessment and plan of care.    If you have questions, please do not hesitate to call me. I look forward to following Korey Santos along with you.    Sincerely,    Renita Franco MD    Enclosure  CC:  No Recipients    If you would like to receive this communication electronically, please contact [email or web address] or [phone] to request more information on [employer] Link access.    For providers and/or their staff who would like to refer a patient to [facility], please contact us through our one-stop-shop provider referral line, [facility], at [phone].    If you feel you have received this communication in error or would no longer like to receive these types of communications, please e-mail [email or web address]

## 2018-03-15 DIAGNOSIS — M17.12 PRIMARY LOCALIZED OSTEOARTHROSIS OF LEFT LOWER LEG: Primary | ICD-10-CM

## 2018-03-21 ENCOUNTER — HOSPITAL ENCOUNTER (OUTPATIENT)
Dept: RADIOLOGY | Facility: HOSPITAL | Age: 78
Discharge: HOME | End: 2018-03-21
Attending: ORTHOPAEDIC SURGERY
Payer: MEDICARE

## 2018-03-21 DIAGNOSIS — M17.31 POST-TRAUMATIC OSTEOARTHRITIS OF RIGHT KNEE: ICD-10-CM

## 2018-03-21 DIAGNOSIS — M21.162 GENU VARUM OF BOTH LOWER EXTREMITIES: ICD-10-CM

## 2018-03-21 DIAGNOSIS — M21.161 GENU VARUM OF BOTH LOWER EXTREMITIES: ICD-10-CM

## 2018-03-21 DIAGNOSIS — M17.32 POST-TRAUMATIC OSTEOARTHRITIS OF LEFT KNEE: ICD-10-CM

## 2018-03-21 PROCEDURE — 73700 CT LOWER EXTREMITY W/O DYE: CPT | Mod: 26,LT,, | Performed by: RADIOLOGY

## 2018-03-21 PROCEDURE — 73700 CT LOWER EXTREMITY W/O DYE: CPT | Mod: TC,LT

## 2018-03-21 PROCEDURE — 73700 CT LOWER EXTREMITY W/O DYE: CPT | Mod: 26,RT,, | Performed by: RADIOLOGY

## 2018-03-21 PROCEDURE — 73700 CT LOWER EXTREMITY W/O DYE: CPT | Mod: TC,RT

## 2018-03-22 ENCOUNTER — PES CALL (OUTPATIENT)
Dept: ADMINISTRATIVE | Facility: CLINIC | Age: 78
End: 2018-03-22

## 2018-04-17 ENCOUNTER — CLINICAL SUPPORT (OUTPATIENT)
Dept: REHABILITATION | Facility: HOSPITAL | Age: 78
End: 2018-04-17
Attending: ORTHOPAEDIC SURGERY
Payer: MEDICARE

## 2018-04-17 DIAGNOSIS — M17.0 PRIMARY OSTEOARTHRITIS OF BOTH KNEES: Primary | ICD-10-CM

## 2018-04-17 PROCEDURE — G8979 MOBILITY GOAL STATUS: HCPCS | Mod: CK

## 2018-04-17 PROCEDURE — 97161 PT EVAL LOW COMPLEX 20 MIN: CPT

## 2018-04-17 PROCEDURE — 97110 THERAPEUTIC EXERCISES: CPT

## 2018-04-17 PROCEDURE — G8978 MOBILITY CURRENT STATUS: HCPCS | Mod: CM

## 2018-04-17 NOTE — PROGRESS NOTES
[facility]   PATIENT EVALUATION    Date: 04/17/2018    Start Time:  03:08 pm  Stop Time:  04:00 pm    Evaluation Date: 4/17/2018  Visit # authorized: 1      History     Primary Diagnosis:   1. Primary osteoarthritis of both knees       Treatment Diagnosis: Knee Pain 2/2 OA  Past Medical History:   Diagnosis Date    Arthritis     Back pain, chronic     Chronic constipation     Hyperlipidemia     Hypertension     Sinus trouble        Current Outpatient Prescriptions   Medication Sig    amoxicillin-clavulanate 875-125mg (AUGMENTIN) 875-125 mg per tablet Take 1 tablet by mouth 2 (two) times daily.    aspirin (ECOTRIN) 325 MG EC tablet Take 1 tablet (325 mg total) by mouth 2 (two) times daily.    fluticasone (FLONASE) 50 mcg/actuation nasal spray 1 spray (50 mcg total) by Each Nare route 2 (two) times daily as needed for Rhinitis.    hydrocodone-acetaminophen 5-325mg (NORCO) 5-325 mg per tablet Take 1 tablet by mouth every 6 (six) hours as needed for Pain.    linaclotide (LINZESS) 145 mcg Cap capsule Take 1 capsule (145 mcg total) by mouth once daily.    losartan (COZAAR) 25 MG tablet Take 1 tablet (25 mg total) by mouth once daily.    multivitamin capsule Take 1 capsule by mouth once daily.      polyethylene glycol (GLYCOLAX) 17 gram/dose powder Take 17 g by mouth daily as needed.    pravastatin (PRAVACHOL) 40 MG tablet Take 1 tablet (40 mg total) by mouth once daily.    sildenafil (REVATIO) 20 mg Tab Take 5 po 1 hour before sexual activity    tamsulosin (FLOMAX) 0.4 mg Cp24 Take 2 capsules (0.8 mg total) by mouth once daily.    trazodone (DESYREL) 100 MG tablet 1 pill PO PRN for insomnia at bedtime. .     No current facility-administered medications for this visit.        Review of patient's allergies indicates:   Allergen Reactions    Clindamycin Swelling     Throat swells         Subjective     History of Present Condition: Patient with pain for the past several yrs with pain  becoming worse in the past year    Onset Date: n/a  Date of Surgery: n/a  Precautions: fall    Mechanism of Injury: none    Pain Location: L knee    Pain Description: Aching and Sharp  Current Pain: 7/10  Least Pain: 5/10  Worst Pain: 10/10  Aggravating Factors: standing and walking, laying down at night  Relieving Factors: moving around, ice    Desired Activities: walk around, socialize with friends    Occupation: retired    Sports/Recreational Activities: none    Prior Level of Function: Assistive Device-SPC  Functional Deficits Leading to Referral/Nature of Injury: decreased walking   Patient Therapy Goals: to learn exercises  Cultural/Environmental/Spiritual Barriers to Treatment or Learning: none        Objective     Observation: B knee swelling  Posture: B genu varus  Gait: moderate antalgic gait    Palpation: Mild swelling in B knee  Joint Mobility: decreased B knee      Range of Motion: Passive   Left Right   Knee Extension  -7 0   Knee Flexion 125 125         Lower Extremity Strength:    Left Right   Hip Flexion 5/5 5/5   Hip Extension 4/5 4/5   Hip ABD 4-/5 4-/5   Knee extension 4+/5 4+/5   Knee Flexion 4/5 4/5       Treatment: NILDA WISDMO      PT reviewed FOTO scores for Korey Santos on 04/17/2018  FOTO score: 1/100    Functional Limitations Reports - G Codes  Category: mobility  Tool: FOTO      Current ():  99% limitation  Goal (): 67% limitation  Discharge ():  n/a      Assessment   Pt is a 77 y.o. male referred to outpatient PT presenting with L knee pain. Upon examination, pt demonstrates impairments in ROM, strength, ambulation, endurance and balance. These factors lead to pain and difficulty performing ADL's . FOTO score of 1 indicates sev disability due to pain. Pt will benefit from skilled PT services to address above listed impairments, thereby improving participation in ADL's and improving quality of life.  Pt demonstrates fair rehab potential.     The following goals were  discussed with the patient and patient is in agreement with them as to be addressed in the treatment plan. Pt was given HEP as listed above consisting of QS, SLR, performing each exercise with proper form and technique during session. Pt verbally understood the instructions as they were given and demonstrated proper form and technique during therapy. Pt was advised to perform these exercises free of pain, and to stop performing if pain occurs.       Initial Assessment (Pertinent finding, problem list and factors affecting outcome):   Medical necessity is demonstrated by the following problem list:   - Pain limits function of effected part for all activities  - Unable to participate in daily activities   - Requires skilled supervision to complete and progress HEP  - Fall risk - impaired balance   - Continued inability to participate in vocational pursuits      Rehab Potiential: fair    Short Term Goals (4 Weeks):   - Pt will increase ROM to 100 deg  - Pt will increase strength to 4+/5  - Decrease Pain to 6/10  - Pt independent with HEP with progressions.     Long Term Goals (12 Weeks):   - Pt will increase ROM to 120  - Pt will increase strength to 5/5  - Decrease Pain to 2/10  - Pt to return to I ambulation and ADL's with min pain.      Plan     Pt will be seen 2 times per week for 12 weeks post-op. Recommended Treatment Plan will include:   Therapeutic Exercise   Neuromuscular Reeducation   AAROM/PROM exercise  Manual soft tissue and/or joint mobilization  Modalities   Individualized Home Exercise Program   Patient education    Therapist: Genie Samayoa, PT  I CERTIFY THE NEED FOR THESE SERVICES FURNISHED UNDER THIS PLAN OF TREATMENT AND WHILE UNDER MY CARE    Physician's comments: ________________________________________________________________________________________________________________________________________________    Physician's Name: ___________________________________

## 2018-04-19 NOTE — PLAN OF CARE
[facility]   PATIENT EVALUATION    Date: 04/17/2018    Start Time:  03:08 pm  Stop Time:  04:00 pm    Evaluation Date: 4/17/2018  Visit # authorized: 1      History     Primary Diagnosis:   1. Primary osteoarthritis of both knees       Treatment Diagnosis: Knee Pain 2/2 OA  Past Medical History:   Diagnosis Date    Arthritis     Back pain, chronic     Chronic constipation     Hyperlipidemia     Hypertension     Sinus trouble        Current Outpatient Prescriptions   Medication Sig    amoxicillin-clavulanate 875-125mg (AUGMENTIN) 875-125 mg per tablet Take 1 tablet by mouth 2 (two) times daily.    aspirin (ECOTRIN) 325 MG EC tablet Take 1 tablet (325 mg total) by mouth 2 (two) times daily.    fluticasone (FLONASE) 50 mcg/actuation nasal spray 1 spray (50 mcg total) by Each Nare route 2 (two) times daily as needed for Rhinitis.    hydrocodone-acetaminophen 5-325mg (NORCO) 5-325 mg per tablet Take 1 tablet by mouth every 6 (six) hours as needed for Pain.    linaclotide (LINZESS) 145 mcg Cap capsule Take 1 capsule (145 mcg total) by mouth once daily.    losartan (COZAAR) 25 MG tablet Take 1 tablet (25 mg total) by mouth once daily.    multivitamin capsule Take 1 capsule by mouth once daily.      polyethylene glycol (GLYCOLAX) 17 gram/dose powder Take 17 g by mouth daily as needed.    pravastatin (PRAVACHOL) 40 MG tablet Take 1 tablet (40 mg total) by mouth once daily.    sildenafil (REVATIO) 20 mg Tab Take 5 po 1 hour before sexual activity    tamsulosin (FLOMAX) 0.4 mg Cp24 Take 2 capsules (0.8 mg total) by mouth once daily.    trazodone (DESYREL) 100 MG tablet 1 pill PO PRN for insomnia at bedtime. .     No current facility-administered medications for this visit.        Review of patient's allergies indicates:   Allergen Reactions    Clindamycin Swelling     Throat swells         Subjective     History of Present Condition: Patient with pain for the past several yrs with pain  becoming worse in the past year    Onset Date: n/a  Date of Surgery: n/a  Precautions: fall    Mechanism of Injury: none    Pain Location: L knee    Pain Description: Aching and Sharp  Current Pain: 7/10  Least Pain: 5/10  Worst Pain: 10/10  Aggravating Factors: standing and walking, laying down at night  Relieving Factors: moving around, ice    Desired Activities: walk around, socialize with friends    Occupation: retired    Sports/Recreational Activities: none    Prior Level of Function: Assistive Device-SPC  Functional Deficits Leading to Referral/Nature of Injury: decreased walking   Patient Therapy Goals: to learn exercises  Cultural/Environmental/Spiritual Barriers to Treatment or Learning: none        Objective     Observation: B knee swelling  Posture: B genu varus  Gait: moderate antalgic gait    Palpation: Mild swelling in B knee  Joint Mobility: decreased B knee      Range of Motion: Passive   Left Right   Knee Extension  -7 0   Knee Flexion 125 125         Lower Extremity Strength:    Left Right   Hip Flexion 5/5 5/5   Hip Extension 4/5 4/5   Hip ABD 4-/5 4-/5   Knee extension 4+/5 4+/5   Knee Flexion 4/5 4/5       Treatment: NILDA WISDOM      PT reviewed FOTO scores for Korey Santos on 04/17/2018  FOTO score: 1/100    Functional Limitations Reports - G Codes  Category: mobility  Tool: FOTO      Current ():  99% limitation  Goal (): 67% limitation  Discharge ():  n/a      Assessment   Pt is a 77 y.o. male referred to outpatient PT presenting with L knee pain. Upon examination, pt demonstrates impairments in ROM, strength, ambulation, endurance and balance. These factors lead to pain and difficulty performing ADL's . FOTO score of 1 indicates sev disability due to pain. Pt will benefit from skilled PT services to address above listed impairments, thereby improving participation in ADL's and improving quality of life.  Pt demonstrates fair rehab potential.     The following goals were  discussed with the patient and patient is in agreement with them as to be addressed in the treatment plan. Pt was given HEP as listed above consisting of QS, SLR, performing each exercise with proper form and technique during session. Pt verbally understood the instructions as they were given and demonstrated proper form and technique during therapy. Pt was advised to perform these exercises free of pain, and to stop performing if pain occurs.       Initial Assessment (Pertinent finding, problem list and factors affecting outcome):   Medical necessity is demonstrated by the following problem list:   - Pain limits function of effected part for all activities  - Unable to participate in daily activities   - Requires skilled supervision to complete and progress HEP  - Fall risk - impaired balance   - Continued inability to participate in vocational pursuits      Rehab Potiential: fair    Short Term Goals (4 Weeks):   - Pt will increase ROM to 100 deg  - Pt will increase strength to 4+/5  - Decrease Pain to 6/10  - Pt independent with HEP with progressions.     Long Term Goals (12 Weeks):   - Pt will increase ROM to 120  - Pt will increase strength to 5/5  - Decrease Pain to 2/10  - Pt to return to I ambulation and ADL's with min pain.      Plan     Pt will be seen 2 times per week for 12 weeks post-op. Recommended Treatment Plan will include:   Therapeutic Exercise   Neuromuscular Reeducation   AAROM/PROM exercise  Manual soft tissue and/or joint mobilization  Modalities   Individualized Home Exercise Program   Patient education    Therapist: Genie Samayoa, PT  I CERTIFY THE NEED FOR THESE SERVICES FURNISHED UNDER THIS PLAN OF TREATMENT AND WHILE UNDER MY CARE    Physician's comments: ________________________________________________________________________________________________________________________________________________    Physician's Name: ___________________________________

## 2018-04-23 ENCOUNTER — OFFICE VISIT (OUTPATIENT)
Dept: SPORTS MEDICINE | Facility: CLINIC | Age: 78
End: 2018-04-23
Payer: MEDICARE

## 2018-04-23 VITALS
SYSTOLIC BLOOD PRESSURE: 154 MMHG | HEIGHT: 71 IN | BODY MASS INDEX: 31.5 KG/M2 | HEART RATE: 64 BPM | WEIGHT: 225 LBS | DIASTOLIC BLOOD PRESSURE: 87 MMHG

## 2018-04-23 DIAGNOSIS — M21.161 GENU VARUM OF BOTH LOWER EXTREMITIES: Primary | ICD-10-CM

## 2018-04-23 DIAGNOSIS — M17.0 PRIMARY OSTEOARTHRITIS OF BOTH KNEES: ICD-10-CM

## 2018-04-23 DIAGNOSIS — M21.162 GENU VARUM OF BOTH LOWER EXTREMITIES: Primary | ICD-10-CM

## 2018-04-23 DIAGNOSIS — M17.31 POST-TRAUMATIC OSTEOARTHRITIS OF RIGHT KNEE: ICD-10-CM

## 2018-04-23 PROCEDURE — 99999 PR PBB SHADOW E&M-EST. PATIENT-LVL III: CPT | Mod: PBBFAC,,, | Performed by: ORTHOPAEDIC SURGERY

## 2018-04-23 PROCEDURE — 99214 OFFICE O/P EST MOD 30 MIN: CPT | Mod: 25,S$GLB,, | Performed by: ORTHOPAEDIC SURGERY

## 2018-04-23 PROCEDURE — 3079F DIAST BP 80-89 MM HG: CPT | Mod: CPTII,S$GLB,, | Performed by: ORTHOPAEDIC SURGERY

## 2018-04-23 PROCEDURE — 20610 DRAIN/INJ JOINT/BURSA W/O US: CPT | Mod: RT,S$GLB,, | Performed by: ORTHOPAEDIC SURGERY

## 2018-04-23 PROCEDURE — 3077F SYST BP >= 140 MM HG: CPT | Mod: CPTII,S$GLB,, | Performed by: ORTHOPAEDIC SURGERY

## 2018-04-23 RX ORDER — PROMETHAZINE HYDROCHLORIDE 25 MG/1
25 TABLET ORAL EVERY 6 HOURS PRN
Qty: 60 TABLET | Refills: 0 | Status: SHIPPED | OUTPATIENT
Start: 2018-04-23 | End: 2019-08-29 | Stop reason: ALTCHOICE

## 2018-04-23 RX ORDER — CELECOXIB 200 MG/1
200 CAPSULE ORAL 2 TIMES DAILY
Qty: 60 CAPSULE | Refills: 0 | Status: SHIPPED | OUTPATIENT
Start: 2018-04-23 | End: 2018-04-25

## 2018-04-23 RX ORDER — SODIUM CHLORIDE 9 MG/ML
INJECTION, SOLUTION INTRAVENOUS CONTINUOUS
Status: CANCELLED | OUTPATIENT
Start: 2018-04-23

## 2018-04-23 RX ORDER — TRAMADOL HYDROCHLORIDE 50 MG/1
50 TABLET ORAL EVERY 6 HOURS PRN
Qty: 60 TABLET | Refills: 0 | Status: SHIPPED | OUTPATIENT
Start: 2018-04-23 | End: 2018-04-25

## 2018-04-23 RX ORDER — ASPIRIN 325 MG
325 TABLET, DELAYED RELEASE (ENTERIC COATED) ORAL DAILY
Qty: 42 TABLET | Refills: 0 | COMMUNITY
Start: 2018-04-23 | End: 2024-03-05

## 2018-04-23 RX ORDER — OXYCODONE AND ACETAMINOPHEN 10; 325 MG/1; MG/1
1 TABLET ORAL EVERY 6 HOURS PRN
Qty: 60 TABLET | Refills: 0 | Status: SHIPPED | OUTPATIENT
Start: 2018-04-23 | End: 2018-09-19 | Stop reason: ALTCHOICE

## 2018-04-23 RX ORDER — MUPIROCIN 20 MG/G
OINTMENT TOPICAL
Status: CANCELLED | OUTPATIENT
Start: 2018-04-23

## 2018-04-23 NOTE — PROGRESS NOTES
Subjective:          Chief Complaint: Korey Santos is a 77 y.o. male who had concerns including Pain of the Left Knee.    Korey Santos, a 77 y.o. male, presents today for evaluation of his left knee. He states that he is experiencing swelling and pain in both knees, worst in his left knee. He has had multiple aspirations, steroid injections, and Synvisc injections in his bilateral knees. He reports his last series synvisc injection in the left knee was 2 months ago which gave him 0% relief. He would like to discuss surgery options in his left knee and a possible injection in his right knee today. The pain started 3-5 years ago and is becoming progressively worseover the past few months. He reports that the pain is a 7 /10 aching and throbbing pain today and not responding adequately to conservative measures which have included activity modifications, rest, and oral medication. Is affecting ADLs and limiting desired level of activity. Denies numbness, tingling, and radiation.  Pain is 8 /10 at its worst. He is asking to have his right knee drained and a cortisone injection placed.    Mechanical symptoms: none  Subjective instability: (--)   Worse with ambulation  Better with rest.   Nocturnal symptoms: (+)    DATE OF PROCEDURE: 2/22/16     PREOPERATIVE DIAGNOSES:   1. Right knee medial meniscus tear.      POSTOPERATIVE DIAGNOSES:   1. Right knee medial meniscus tear.      PROCEDURES:   1. Right knee arthroscopic partial medial meniscectomy        Pain   Associated symptoms include joint swelling. Pertinent negatives include no abdominal pain, chest pain, chills, congestion, coughing, fever, myalgias, nausea, neck pain, numbness, rash, sore throat or vomiting.       Review of Systems   Constitution: Negative for chills and fever.   HENT: Negative for congestion and sore throat.    Eyes: Negative for discharge and double vision.   Cardiovascular: Negative for chest pain, palpitations and syncope.    Respiratory: Negative for cough and shortness of breath.    Endocrine: Negative for cold intolerance and heat intolerance.   Skin: Negative for dry skin and rash.   Musculoskeletal: Positive for arthritis, back pain, joint pain, joint swelling, muscle cramps and stiffness. Negative for falls, gout, muscle weakness, myalgias and neck pain.   Gastrointestinal: Negative for abdominal pain, nausea and vomiting.   Neurological: Negative for focal weakness, numbness and paresthesias.       Pain Related Questions  Over the past 3 days, what was your average pain during activity? (I.e. running, jogging, walking, climbing stairs, getting dressed, ect.): 8  Over the past 3 days, what was your highest pain level?: 8  Over the past 3 days, what was your lowest pain level? : 6    Other  How many nights a week are you awakened by your affected body part?: 7  Was the patient's HEIGHT measured or patient reported?: Patient Reported  Was the patient's WEIGHT measured or patient reported?: Measured      Objective:        General: Korey is well-developed, well-nourished, appears stated age, in no acute distress, alert and oriented to time, place and person.     General    Vitals reviewed.  Constitutional: He is oriented to person, place, and time. He appears well-developed and well-nourished. No distress.   HENT:   Mouth/Throat: No oropharyngeal exudate.   Eyes: Conjunctivae and EOM are normal. Pupils are equal, round, and reactive to light. Right eye exhibits no discharge. Left eye exhibits no discharge.   Neck: Normal range of motion. Neck supple. No JVD present.   Cardiovascular: Normal heart sounds and intact distal pulses.    No murmur heard.  Pulmonary/Chest: Effort normal and breath sounds normal. No respiratory distress.   Abdominal: Soft. Bowel sounds are normal. He exhibits no distension. There is no tenderness.   Neurological: He is alert and oriented to person, place, and time. He has normal reflexes. No cranial nerve  deficit. Coordination normal.   Psychiatric: He has a normal mood and affect. His behavior is normal. Judgment and thought content normal.     General Musculoskeletal Exam   Gait: normal and antalgic       Right Knee Exam     Inspection   Erythema: absent  Scars: present  Swelling: absent  Effusion: effusion  Deformity: deformity  Bruising: absent    Tenderness   The patient is experiencing no tenderness.         Crepitus   The patient has crepitus of the patella.    Range of Motion   Extension: 10   Flexion: 130     Tests   Meniscus   Tyrone:  Medial - negative Lateral - negative  Ligament Examination Lachman: normal (-1 to 2mm) PCL-Posterior Drawer: normal (0 to 2mm)     MCL - Valgus: normal (0 to 2mm)  LCL - Varus: normalPivot Shift: normal (Equal)Reverse Pivot Shift: normal (Equal)Dial Test at 30 degrees: normal (< 5 degrees)Dial Test at 90 degrees: normal (< 5 degrees)  Posterior Sag Test: negative  Posterolateral Corner: unstable (>15 degrees difference)  Patella   Patellar Apprehension: negative  Passive Patellar Tilt: neutral  Patellar Tracking: normal  Patellar Glide (quadrants): Lateral - 2   Medial - 2  Q-Angle at 90 degrees: normal  Patellar Grind: negative  J-Sign: none    Other   Meniscal Cyst: absent  Popliteal (Baker's) Cyst: absent  Sensation: normal    Comments:  Genu varum    Left Knee Exam     Inspection   Erythema: absent  Scars: absent  Swelling: present  Effusion: present  Deformity: deformity  Bruising: absent    Tenderness   The patient tender to palpation of the medial joint line.    Crepitus   The patient has crepitus of the patella and lateral joint line.    Range of Motion   Extension: 10   Flexion:  130 abnormal     Tests   Meniscus   Tyrone:  Medial - negative Lateral - negative  Stability Lachman: normal (-1 to 2mm) PCL-Posterior Drawer: normal (0 to 2mm)  MCL - Valgus: normal (0 to 2mm)  LCL - Varus: normal (0 to 2mm)Pivot Shift: normal (Equal)Reverse Pivot Shift: normal  (Equal)Dial Test at 30 degrees: normal (< 5 degrees)Dial Test at 90 degrees: normal (< 5 degrees)  Posterior Sag Test: negative  Posterolateral Corner: unstable (>15 degrees difference)  Patella   Patellar Apprehension: negative  Passive Patellar Tilt: neutral  Patellar Tracking: normal  Patellar Glide (Quadrants): Lateral - 2 Medial - 2  Q-Angle at 90 degrees: normal  Patellar Grind: positive  J-Sign: J sign absent    Other   Meniscal Cyst: absent  Popliteal (Baker's) Cyst: absent  Sensation: normal    Comments:  Genu varum    Right Hip Exam     Tests   Rishi: negative  Left Hip Exam     Tests   Rishi: negative          Muscle Strength   Right Lower Extremity   Hip Abduction: 5/5   Quadriceps:  5/5   Hamstrin/5   Left Lower Extremity   Hip Abduction: 5/5   Quadriceps:  4/5   Hamstrin/5     Reflexes     Left Side  Quadriceps:  2+  Achilles:  2+    Right Side   Quadriceps:  2+  Achilles:  2+    Vascular Exam     Right Pulses  Dorsalis Pedis:      2+  Posterior Tibial:      2+        Left Pulses  Dorsalis Pedis:      2+  Posterior Tibial:      2+        Edema  Right Lower Leg: absent  Left Lower Leg: absent    Radiographic Findings 2018:    4 views bilateral    Moderate DJD.  The medial tibiofemoral joint spaces are narrowed bilaterally slightly more on the left side.  Slight irregularity involving the tibia tuberosity of the left knee.  No fracture or dislocation.  No bone destruction identified    Xrays of the knees were ordered and reviewed by me today. These findings were discussed and reviewed with the patient.            Assessment:       Encounter Diagnoses   Name Primary?    Genu varum of both lower extremities Yes    Post-traumatic osteoarthritis of right knee     Primary osteoarthritis of both knees           Plan:       1. IKDC, SF-12 and KOOS was not filled out today in clinic.     RTC in 3 weeks with Dr. Renita Franco Patient will not fill out IKDC, SF-12 and KOOS on return.    2.  We  reviewed with Korey today, the pathology and natural history of his diagnosis. We have discussed a variety of treatment options including medications, physical therapy and other alternative treatments. I also explained the indications, risks and benefits of surgery. After discussion, he decided to proceed with surgery. The decision was made to go forward with      1. Left Total knee arthroplasty  ConforMIS iTotal    Staged:  1. Right Total knee arthroplasty  ConforMIS iTotal     The details of the surgical procedure were explained, including the location of probable incisions and a description of likely hardware and/or grafts to be used.  The patient understands the likely convalescence after surgery.  Also, we have thoroughly discussed the risks, benefits and alternatives to surgery, including, but not limited to, the risk of infection, joint stiffness, blood clot (including DVT and/or pulmonary embolus), neurologic and vascular injury.  It was explained that, if tissue has been repaired or reconstructed, there is a chance of failure, which may require further management.    3. Aspiration Procedure RIGHT KNEE    After time out was performed, including verification of patient ID, procedure, site and side, availability of information and equipment, review of safety issues, and agreement with consent, the procedure site was marked and the patient was prepped aseptically. 10cc's of serosanguineous joint fluid was aspirated from the right Knee Joint using an 22g x 1.5 needle in sterile fashion without complication. Bandage was applied. Patient was reminded to rest with RICE for 48 hours post injection and to call the clinic immediately for any adverse side effects as explained in clinic today.            Patient questionnaires may have been collected.

## 2018-04-23 NOTE — H&P
Korey Santos  is here for a completion of his perioperative paperwork. he  Is scheduled to undergo     1. Left total knee arthroplasty (Conformis iTotal)     on 5/4/18.  He is a healthy individual and does need clearance for this procedure.     Risks, indications and benefits of the surgical procedure were discussed with the patient. All questions with regard to surgery, rehab, expected return to functional activities, activities of daily living and recreational endeavors were answered to his satisfaction.    Review of Systems   Constitution: Negative. Negative for chills, fever and night sweats.   HENT: Negative for congestion and headaches.    Eyes: Negative for blurred vision, left vision loss and right vision loss.   Cardiovascular: Negative for chest pain and syncope.   Respiratory: Negative for cough and shortness of breath.    Endocrine: Negative for polydipsia, polyphagia and polyuria.   Hematologic/Lymphatic: Negative for bleeding problem. Does not bruise/bleed easily.   Skin: Negative for dry skin, itching and rash.   Musculoskeletal: Negative for falls and muscle weakness.   Gastrointestinal: Negative for abdominal pain and bowel incontinence.   Genitourinary: Negative for bladder incontinence and nocturia.   Neurological: Negative for disturbances in coordination, loss of balance and seizures.   Psychiatric/Behavioral: Negative for depression. The patient does not have insomnia.    Allergic/Immunologic: Negative for hives and persistent infections.       PAST MEDICAL HISTORY:   Past Medical History:   Diagnosis Date    Arthritis     Back pain, chronic     Chronic constipation     Hyperlipidemia     Hypertension     Sinus trouble      PAST SURGICAL HISTORY:   Past Surgical History:   Procedure Laterality Date    BACK SURGERY  2014    COLONOSCOPY      KNEE SURGERY      left hand      LEG SURGERY      UPPER GASTROINTESTINAL ENDOSCOPY       FAMILY HISTORY:   Family History   Problem  Relation Age of Onset    Cancer Father      lung or smoking    No Known Problems Mother     No Known Problems Sister     No Known Problems Brother     No Known Problems Maternal Aunt     No Known Problems Maternal Uncle     No Known Problems Paternal Aunt     No Known Problems Paternal Uncle     No Known Problems Maternal Grandmother     No Known Problems Maternal Grandfather     No Known Problems Paternal Grandmother     No Known Problems Paternal Grandfather     Glaucoma Neg Hx     Diabetes Neg Hx     Amblyopia Neg Hx     Blindness Neg Hx     Cataracts Neg Hx     Hypertension Neg Hx     Macular degeneration Neg Hx     Retinal detachment Neg Hx     Strabismus Neg Hx     Stroke Neg Hx     Thyroid disease Neg Hx     Colon cancer Neg Hx     Esophageal cancer Neg Hx      SOCIAL HISTORY:   Social History     Social History    Marital status: Single     Spouse name: N/A    Number of children: N/A    Years of education: N/A     Occupational History    Not on file.     Social History Main Topics    Smoking status: Never Smoker    Smokeless tobacco: Never Used    Alcohol use 0.0 oz/week      Comment: very rarely    Drug use: No    Sexual activity: Yes     Partners: Female     Birth control/ protection: None     Other Topics Concern    Not on file     Social History Narrative    Ret. 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       MEDICATIONS:   Current Outpatient Prescriptions:     amoxicillin-clavulanate 875-125mg (AUGMENTIN) 875-125 mg per tablet, Take 1 tablet by mouth 2 (two) times daily., Disp: 14 tablet, Rfl: 0    aspirin (ECOTRIN) 325 MG EC tablet, Take 1 tablet (325 mg total) by mouth 2 (two) times daily., Disp: 84 tablet, Rfl: 0    fluticasone (FLONASE) 50 mcg/actuation nasal spray, 1 spray (50 mcg total) by Each Nare route 2 (two) times daily as needed for Rhinitis., Disp: 15 g, Rfl: 0    hydrocodone-acetaminophen 5-325mg (NORCO) 5-325 mg per tablet, Take 1 tablet by mouth  "every 6 (six) hours as needed for Pain., Disp: 30 tablet, Rfl: 0    linaclotide (LINZESS) 145 mcg Cap capsule, Take 1 capsule (145 mcg total) by mouth once daily., Disp: 30 capsule, Rfl: 3    losartan (COZAAR) 25 MG tablet, Take 1 tablet (25 mg total) by mouth once daily., Disp: 30 tablet, Rfl: 1    multivitamin capsule, Take 1 capsule by mouth once daily.  , Disp: , Rfl:     polyethylene glycol (GLYCOLAX) 17 gram/dose powder, Take 17 g by mouth daily as needed., Disp: 510 g, Rfl: 1    pravastatin (PRAVACHOL) 40 MG tablet, Take 1 tablet (40 mg total) by mouth once daily., Disp: 30 tablet, Rfl: 11    sildenafil (REVATIO) 20 mg Tab, Take 5 po 1 hour before sexual activity, Disp: 50 tablet, Rfl: 11    tamsulosin (FLOMAX) 0.4 mg Cp24, Take 2 capsules (0.8 mg total) by mouth once daily., Disp: 180 capsule, Rfl: 0    trazodone (DESYREL) 100 MG tablet, 1 pill PO PRN for insomnia at bedtime. ., Disp: 15 tablet, Rfl: 0  ALLERGIES:   Review of patient's allergies indicates:   Allergen Reactions    Clindamycin Swelling     Throat swells       VITAL SIGNS: BP (!) 154/87   Pulse 64   Ht 5' 11" (1.803 m)   Wt 102.1 kg (225 lb)   BMI 31.38 kg/m²        Once no other questions were asked, a brief history and physical exam was then performed.      PHYSICAL EXAM:  GEN: A&Ox3, WD WN NAD  HEENT: WNL  CHEST: CTAB, no W/R/R  HEART: RRR, no M/R/G  ABD: Soft, NT ND, BS x4 QUADS  MS; See Epic  NEURO: CN II-XII intact       The surgical consent was then reviewed with the patient, who agreed with all the contents of the consent form and it was signed. he was then given the [facility] surgery packet to bring with him to [facility] for the anesthesia portion of his perioperative paperwork.     PHYSICAL THERAPY:  He was also instructed regarding physical therapy and will begin on  POD#3. He was given a copy of the original prescription to schedule. Another copy of this prescription was also faxed to [facility].    POST OP CARE:instructions " were reviewed including care of the wound and dressing after surgery and when he can shower.     PAIN MANAGEMENT: Korey Santos was also given standard pain management regime, which includes the TENS unit given to him by Manoj Downs along with the education required for its use. He was also instructed regarding the Polar ice unit that will be in place after surgery and his postoperative pain medications.     PAIN MEDICATION:  Percocet 10/325mg 1 po q 4-6 hours prn pain  Ultram 50 mg one p.o. q.4-6 hours p.r.n. breakthrough pain,   Phenergan 25 mg one p.o. q.4-6 hours p.r.n. nausea and vomiting.  Celebrex 200 mg PO BID   mg PO daily    As there were no other questions to be asked, he was given my business card along with Renita Franco MD business card if he has any questions or concerns prior to surgery or in the postop period.

## 2018-04-23 NOTE — LETTER
April 23, 2018        Juan Bustamante MD  [address]  [facility] [address]             [facility]  [address]  [facility] [address]  Phone: [phone]   Patient: Korey Santos   MR Number: [id number]   YOB: 1940   Date of Visit: 4/23/2018       Dear Dr. Bustamante:    Thank you for referring Korey Santos to me for evaluation. Below are the relevant portions of my assessment and plan of care.        Encounter Diagnoses   Name Primary?    Genu varum of both lower extremities Yes    Post-traumatic osteoarthritis of right knee     Primary osteoarthritis of both knees             1. IKDC, SF-12 and KOOS was not filled out today in clinic.     RTC in 6 weeks with Dr. Renita Franco for post operative visitPatient will not fill out IKDC, SF-12 and KOOS on return.    2.  We reviewed with Korey today, the pathology and natural history of his diagnosis. We have discussed a variety of treatment options including medications, physical therapy and other alternative treatments. I also explained the indications, risks and benefits of surgery. After discussion, he decided to proceed with surgery. The decision was made to go forward with      1. Left Total knee arthroplasty  ConforMIS iTotal    Staged:  1. Right Total knee arthroplasty  ConforMIS iTotal     The details of the surgical procedure were explained, including the location of probable incisions and a description of likely hardware and/or grafts to be used.  The patient understands the likely convalescence after surgery.  Also, we have thoroughly discussed the risks, benefits and alternatives to surgery, including, but not limited to, the risk of infection, joint stiffness, blood clot (including DVT and/or pulmonary embolus), neurologic and vascular injury.  It was explained that, if tissue has been repaired or reconstructed, there is a chance of failure, which may require further management.    3. Dr. Fishman  Rashad for preoperative clearance          If you have questions, please do not hesitate to call me. I look forward to following Korey along with you.    Sincerely,      Ricardo Valdes MD           CC  No Recipients

## 2018-04-25 ENCOUNTER — OFFICE VISIT (OUTPATIENT)
Dept: INTERNAL MEDICINE | Facility: CLINIC | Age: 78
End: 2018-04-25
Attending: FAMILY MEDICINE
Payer: MEDICARE

## 2018-04-25 ENCOUNTER — CLINICAL SUPPORT (OUTPATIENT)
Dept: REHABILITATION | Facility: HOSPITAL | Age: 78
End: 2018-04-25
Attending: ORTHOPAEDIC SURGERY
Payer: MEDICARE

## 2018-04-25 VITALS — SYSTOLIC BLOOD PRESSURE: 145 MMHG | DIASTOLIC BLOOD PRESSURE: 70 MMHG | HEART RATE: 72 BPM

## 2018-04-25 DIAGNOSIS — M17.0 PRIMARY OSTEOARTHRITIS OF BOTH KNEES: Primary | ICD-10-CM

## 2018-04-25 DIAGNOSIS — I10 HYPERTENSION, ESSENTIAL: ICD-10-CM

## 2018-04-25 DIAGNOSIS — Z00.00 ANNUAL PHYSICAL EXAM: Primary | ICD-10-CM

## 2018-04-25 DIAGNOSIS — E78.5 HYPERLIPIDEMIA, UNSPECIFIED HYPERLIPIDEMIA TYPE: ICD-10-CM

## 2018-04-25 DIAGNOSIS — M17.0 OSTEOARTHRITIS OF BOTH KNEES, UNSPECIFIED OSTEOARTHRITIS TYPE: ICD-10-CM

## 2018-04-25 DIAGNOSIS — N40.1 BPH WITH URINARY OBSTRUCTION: ICD-10-CM

## 2018-04-25 DIAGNOSIS — N13.8 BPH WITH URINARY OBSTRUCTION: ICD-10-CM

## 2018-04-25 PROCEDURE — 3078F DIAST BP <80 MM HG: CPT | Mod: CPTII,S$GLB,, | Performed by: FAMILY MEDICINE

## 2018-04-25 PROCEDURE — 99214 OFFICE O/P EST MOD 30 MIN: CPT | Mod: S$GLB,,, | Performed by: FAMILY MEDICINE

## 2018-04-25 PROCEDURE — 97110 THERAPEUTIC EXERCISES: CPT

## 2018-04-25 PROCEDURE — 99999 PR PBB SHADOW E&M-EST. PATIENT-LVL II: CPT | Mod: PBBFAC,,, | Performed by: FAMILY MEDICINE

## 2018-04-25 PROCEDURE — 99499 UNLISTED E&M SERVICE: CPT | Mod: S$PBB,,, | Performed by: FAMILY MEDICINE

## 2018-04-25 PROCEDURE — 3077F SYST BP >= 140 MM HG: CPT | Mod: CPTII,S$GLB,, | Performed by: FAMILY MEDICINE

## 2018-04-25 RX ORDER — TAMSULOSIN HYDROCHLORIDE 0.4 MG/1
0.4 CAPSULE ORAL DAILY
Qty: 90 CAPSULE | Refills: 1 | Status: SHIPPED | OUTPATIENT
Start: 2018-04-25 | End: 2019-03-04 | Stop reason: SDUPTHER

## 2018-04-25 RX ORDER — LOSARTAN POTASSIUM 50 MG/1
50 TABLET ORAL DAILY
Qty: 90 TABLET | Refills: 1 | Status: SHIPPED | OUTPATIENT
Start: 2018-04-25 | End: 2018-06-25 | Stop reason: SDUPTHER

## 2018-04-25 NOTE — PROGRESS NOTES
Subjective:       Patient ID: Korey Santos is a 77 y.o. male.    Chief Complaint: No chief complaint on file.    Patient referred for a preoperative clearance. No acute complaints today. Refer to EPIC history, meds, allergies and problem list. Did not take BP med today. Had been changed from lisinopril to losartan recenlty.        Review of Systems   Constitutional: Negative for chills, fatigue and fever.   HENT: Negative for congestion and trouble swallowing.    Eyes: Negative for redness.   Respiratory: Negative for cough, chest tightness and shortness of breath.    Cardiovascular: Negative for chest pain, palpitations and leg swelling.   Gastrointestinal: Negative for abdominal pain and blood in stool.   Genitourinary: Positive for difficulty urinating. Negative for hematuria.   Musculoskeletal: Negative for arthralgias, back pain, gait problem, joint swelling, myalgias and neck pain.   Skin: Negative for color change and rash.   Neurological: Negative for tremors, speech difficulty, weakness, numbness and headaches.   Hematological: Negative for adenopathy. Does not bruise/bleed easily.   Psychiatric/Behavioral: Negative for behavioral problems, confusion and sleep disturbance. The patient is not nervous/anxious.        Objective:      Physical Exam   Constitutional: He is oriented to person, place, and time. He appears well-developed and well-nourished.   Eyes: No scleral icterus.   Neck: Normal range of motion. Neck supple. Carotid bruit is not present.   Cardiovascular: Normal rate, regular rhythm, normal heart sounds and intact distal pulses.  Exam reveals no gallop and no friction rub.    No murmur heard.  Pulmonary/Chest: Effort normal and breath sounds normal. No respiratory distress. He has no wheezes. He has no rales.   Abdominal: Soft. Bowel sounds are normal.   Musculoskeletal: He exhibits no edema.   Neurological: He is alert and oriented to person, place, and time. He displays no tremor. No  cranial nerve deficit. Coordination and gait normal.   Skin: Skin is warm and dry. No rash noted. He is not diaphoretic. No erythema.   Psychiatric: He has a normal mood and affect. His behavior is normal. Judgment and thought content normal.   Nursing note and vitals reviewed.      Assessment:       1. Annual physical exam    2. Hypertension, essential    3. Hyperlipidemia, unspecified hyperlipidemia type    4. Osteoarthritis of both knees, unspecified osteoarthritis type    5. BPH with urinary obstruction        Plan:   Diagnoses and all orders for this visit:    Annual physical exam    Hypertension, essential    Hyperlipidemia, unspecified hyperlipidemia type    Osteoarthritis of both knees, unspecified osteoarthritis type    BPH with urinary obstruction    Other orders  -     losartan (COZAAR) 50 MG tablet; Take 1 tablet (50 mg total) by mouth once daily.  -     tamsulosin (FLOMAX) 0.4 mg Cp24; Take 1 capsule (0.4 mg total) by mouth once daily.      See meds, orders, follow up, routing and instructions sections of encounter.  Dictation #1  MRN:[id number]  CSN:[id number]    ER visit - ?? Lisinopril allergy - CT and labs reviewed.    Feb labs and EKG nml.    Increase losartan to 50.    See meds, orders, follow up and instructions sections of encounter. Clear for surgery and cc to referring physician.    Renew flomax (has not been taking). Should see Urologist this summer. Last note reviewed.

## 2018-04-25 NOTE — PROGRESS NOTES
Outpatient Physical Therapy Progress Note     Start of treatment: 11:00  End of treatment: 12:15  Total Treatment time: 75        Subjective: Pt reports 7/10 knee pn upon arrival, reports that pn is worse with ext    Objective: Pt amb with SC in R hand, ACE wrap around L knee in tourniquet fashion    Treatment: Pt was instructed in and performed ther ex x 60 min for LE strengthening, stretching, ROM, flexibility, endurance  Stat bike 10 min  GSS 2 min  HSS 2 min  mini squat 20x  heel raise 20x  HS Curl 20x  LAQ 20x  SLR 20x  LLR 20x  Pt was instructed in proper donning of ACE wrap.    Pt received CP to L knee x 10 min      Assessment: Pt tatyana tx with ther ex fair due to continued c/o L knee pn. Pt with copious questions about all exs, and instructions requiring increased time to complete activities. Pt is a 77 y.o. male referred to outpatient PT presenting with L knee pain. Upon examination, pt demonstrates impairments in ROM, strength, ambulation, endurance and balance. These factors lead to pain and difficulty performing ADL's .  Pt will benefit from skilled PT services to address above listed impairments, thereby improving participation in ADL's and improving quality of life.  Pt demonstrates fair rehab potential.        Medical necessity is demonstrated by:   Fall risk  Unable to participate in daily activities  Continued inability to participate in vocational pursuits  Pain limits function of effected part for all activities  Unable to participate fully in daily activities  Requires skilled supervision to complete and progress HEP      Progress towards goals: fair    New/Revised Goals:    Plan:  Continue with established Plan of Care toward PT goals.

## 2018-05-02 ENCOUNTER — CLINICAL SUPPORT (OUTPATIENT)
Dept: REHABILITATION | Facility: HOSPITAL | Age: 78
End: 2018-05-02
Attending: ORTHOPAEDIC SURGERY
Payer: MEDICARE

## 2018-05-02 DIAGNOSIS — M17.0 PRIMARY OSTEOARTHRITIS OF BOTH KNEES: Primary | ICD-10-CM

## 2018-05-02 PROCEDURE — 97110 THERAPEUTIC EXERCISES: CPT

## 2018-05-02 NOTE — PROGRESS NOTES
Outpatient Physical Therapy Progress Note     Start of treatment: 11:00  End of treatment: 12:10  Total Treatment time: 70        Subjective: Pt reports 7/10 knee pn upon arrival.    Objective: Pt amb with SC in R hand, ACE wrap around L knee     Treatment: Pt was instructed in and performed ther ex x 60 min for LE strengthening, stretching, ROM, flexibility, endurance  Stat bike 10 min  GSS 2 min  HSS 2 min  mini squat 30x  heel raise 30x  HS Curl 30x  LAQ 30x  SLR 30x  LLR 30x      Pt received CP to L knee x 10 min      Assessment: Pt tatyana tx with progression of ther ex good with no increase c/o L knee pn.  Pt is a 77 y.o. male referred to outpatient PT presenting with L knee pain. Upon examination, pt demonstrates impairments in ROM, strength, ambulation, endurance and balance. These factors lead to pain and difficulty performing ADL's .  Pt will benefit from skilled PT services to address above listed impairments, thereby improving participation in ADL's and improving quality of life.  Pt demonstrates fair rehab potential.        Medical necessity is demonstrated by:   Fall risk  Unable to participate in daily activities  Continued inability to participate in vocational pursuits  Pain limits function of effected part for all activities  Unable to participate fully in daily activities  Requires skilled supervision to complete and progress HEP      Progress towards goals: fair    New/Revised Goals:    Plan:  Continue with established Plan of Care toward PT goals.

## 2018-05-03 ENCOUNTER — ANESTHESIA EVENT (OUTPATIENT)
Dept: SURGERY | Facility: HOSPITAL | Age: 78
DRG: 470 | End: 2018-05-03
Payer: MEDICARE

## 2018-05-03 ENCOUNTER — TELEPHONE (OUTPATIENT)
Dept: SPORTS MEDICINE | Facility: CLINIC | Age: 78
End: 2018-05-03

## 2018-05-03 NOTE — TELEPHONE ENCOUNTER
Spoke with patient and reinformed him of his surgery arrival time for 5:15 am tomorrow morning 5/4/18.

## 2018-05-04 ENCOUNTER — HOSPITAL ENCOUNTER (INPATIENT)
Facility: HOSPITAL | Age: 78
LOS: 2 days | Discharge: HOME HEALTH | DRG: 470 | End: 2018-05-06
Attending: ORTHOPAEDIC SURGERY | Admitting: ORTHOPAEDIC SURGERY
Payer: MEDICARE

## 2018-05-04 ENCOUNTER — ANESTHESIA (OUTPATIENT)
Dept: SURGERY | Facility: HOSPITAL | Age: 78
DRG: 470 | End: 2018-05-04
Payer: MEDICARE

## 2018-05-04 DIAGNOSIS — M21.161 GENU VARUM OF BOTH LOWER EXTREMITIES: ICD-10-CM

## 2018-05-04 DIAGNOSIS — M17.0 PRIMARY OSTEOARTHRITIS OF BOTH KNEES: ICD-10-CM

## 2018-05-04 DIAGNOSIS — M21.162 GENU VARUM OF BOTH LOWER EXTREMITIES: ICD-10-CM

## 2018-05-04 LAB
ABO + RH BLD: NORMAL
ANION GAP SERPL CALC-SCNC: 6 MMOL/L
BLD GP AB SCN CELLS X3 SERPL QL: NORMAL
BUN SERPL-MCNC: 15 MG/DL
CALCIUM SERPL-MCNC: 8.4 MG/DL
CHLORIDE SERPL-SCNC: 112 MMOL/L
CO2 SERPL-SCNC: 22 MMOL/L
CREAT SERPL-MCNC: 0.8 MG/DL
EST. GFR  (AFRICAN AMERICAN): >60 ML/MIN/1.73 M^2
EST. GFR  (NON AFRICAN AMERICAN): >60 ML/MIN/1.73 M^2
GLUCOSE SERPL-MCNC: 103 MG/DL
INR PPP: 1
POTASSIUM SERPL-SCNC: 4.1 MMOL/L
PROTHROMBIN TIME: 10.6 SEC
SODIUM SERPL-SCNC: 140 MMOL/L

## 2018-05-04 PROCEDURE — 37000008 HC ANESTHESIA 1ST 15 MINUTES: Performed by: ORTHOPAEDIC SURGERY

## 2018-05-04 PROCEDURE — 63600175 PHARM REV CODE 636 W HCPCS: Performed by: NURSE ANESTHETIST, CERTIFIED REGISTERED

## 2018-05-04 PROCEDURE — 25000003 PHARM REV CODE 250: Performed by: NURSE ANESTHETIST, CERTIFIED REGISTERED

## 2018-05-04 PROCEDURE — 64448 NJX AA&/STRD FEM NRV NFS IMG: CPT | Mod: 59,LT,, | Performed by: ANESTHESIOLOGY

## 2018-05-04 PROCEDURE — 36415 COLL VENOUS BLD VENIPUNCTURE: CPT

## 2018-05-04 PROCEDURE — 85610 PROTHROMBIN TIME: CPT

## 2018-05-04 PROCEDURE — 27201423 OPTIME MED/SURG SUP & DEVICES STERILE SUPPLY: Performed by: ORTHOPAEDIC SURGERY

## 2018-05-04 PROCEDURE — 97535 SELF CARE MNGMENT TRAINING: CPT

## 2018-05-04 PROCEDURE — 94799 UNLISTED PULMONARY SVC/PX: CPT

## 2018-05-04 PROCEDURE — 25000003 PHARM REV CODE 250: Performed by: ORTHOPAEDIC SURGERY

## 2018-05-04 PROCEDURE — 71000039 HC RECOVERY, EACH ADD'L HOUR: Performed by: ORTHOPAEDIC SURGERY

## 2018-05-04 PROCEDURE — 36000711: Performed by: ORTHOPAEDIC SURGERY

## 2018-05-04 PROCEDURE — 11000001 HC ACUTE MED/SURG PRIVATE ROOM

## 2018-05-04 PROCEDURE — 86850 RBC ANTIBODY SCREEN: CPT

## 2018-05-04 PROCEDURE — 80048 BASIC METABOLIC PNL TOTAL CA: CPT

## 2018-05-04 PROCEDURE — 97530 THERAPEUTIC ACTIVITIES: CPT

## 2018-05-04 PROCEDURE — C1776 JOINT DEVICE (IMPLANTABLE): HCPCS | Performed by: ORTHOPAEDIC SURGERY

## 2018-05-04 PROCEDURE — 27200750 HC INSULATED NEEDLE/ STIMUPLEX: Performed by: ANESTHESIOLOGY

## 2018-05-04 PROCEDURE — 63600175 PHARM REV CODE 636 W HCPCS: Performed by: ANESTHESIOLOGY

## 2018-05-04 PROCEDURE — 63600175 PHARM REV CODE 636 W HCPCS

## 2018-05-04 PROCEDURE — 27800517 HC TRAY,EPIDURAL-CONTINUOUS: Performed by: ANESTHESIOLOGY

## 2018-05-04 PROCEDURE — D9220A PRA ANESTHESIA: Mod: CRNA,,, | Performed by: NURSE ANESTHETIST, CERTIFIED REGISTERED

## 2018-05-04 PROCEDURE — C1729 CATH, DRAINAGE: HCPCS | Performed by: ORTHOPAEDIC SURGERY

## 2018-05-04 PROCEDURE — 25000003 PHARM REV CODE 250

## 2018-05-04 PROCEDURE — C1713 ANCHOR/SCREW BN/BN,TIS/BN: HCPCS | Performed by: ORTHOPAEDIC SURGERY

## 2018-05-04 PROCEDURE — 25000003 PHARM REV CODE 250: Performed by: ANESTHESIOLOGY

## 2018-05-04 PROCEDURE — 0SRD0JZ REPLACEMENT OF LEFT KNEE JOINT WITH SYNTHETIC SUBSTITUTE, OPEN APPROACH: ICD-10-PCS | Performed by: ORTHOPAEDIC SURGERY

## 2018-05-04 PROCEDURE — 97161 PT EVAL LOW COMPLEX 20 MIN: CPT

## 2018-05-04 PROCEDURE — D9220A PRA ANESTHESIA: Mod: ANES,,, | Performed by: ANESTHESIOLOGY

## 2018-05-04 PROCEDURE — 97165 OT EVAL LOW COMPLEX 30 MIN: CPT

## 2018-05-04 PROCEDURE — 71000033 HC RECOVERY, INTIAL HOUR: Performed by: ORTHOPAEDIC SURGERY

## 2018-05-04 PROCEDURE — 64450 NJX AA&/STRD OTHER PN/BRANCH: CPT | Performed by: ANESTHESIOLOGY

## 2018-05-04 PROCEDURE — 37000009 HC ANESTHESIA EA ADD 15 MINS: Performed by: ORTHOPAEDIC SURGERY

## 2018-05-04 PROCEDURE — 76942 ECHO GUIDE FOR BIOPSY: CPT | Performed by: ANESTHESIOLOGY

## 2018-05-04 PROCEDURE — 27447 TOTAL KNEE ARTHROPLASTY: CPT | Mod: 62,LT,, | Performed by: ORTHOPAEDIC SURGERY

## 2018-05-04 PROCEDURE — 76942 ECHO GUIDE FOR BIOPSY: CPT | Mod: 26,,, | Performed by: ANESTHESIOLOGY

## 2018-05-04 PROCEDURE — 36000710: Performed by: ORTHOPAEDIC SURGERY

## 2018-05-04 PROCEDURE — 63600175 PHARM REV CODE 636 W HCPCS: Performed by: ORTHOPAEDIC SURGERY

## 2018-05-04 PROCEDURE — 64450 NJX AA&/STRD OTHER PN/BRANCH: CPT | Mod: 59,LT,, | Performed by: ANESTHESIOLOGY

## 2018-05-04 PROCEDURE — 94761 N-INVAS EAR/PLS OXIMETRY MLT: CPT

## 2018-05-04 DEVICE — IMPLANTABLE DEVICE: Type: IMPLANTABLE DEVICE | Site: KNEE | Status: FUNCTIONAL

## 2018-05-04 DEVICE — CEMENT BONE SMPLX HV GENTMYCN: Type: IMPLANTABLE DEVICE | Site: KNEE | Status: FUNCTIONAL

## 2018-05-04 RX ORDER — CEFAZOLIN SODIUM 1 G/3ML
2 INJECTION, POWDER, FOR SOLUTION INTRAMUSCULAR; INTRAVENOUS
Status: DISCONTINUED | OUTPATIENT
Start: 2018-05-04 | End: 2018-05-04 | Stop reason: HOSPADM

## 2018-05-04 RX ORDER — ACETAMINOPHEN 10 MG/ML
1000 INJECTION, SOLUTION INTRAVENOUS ONCE
Status: COMPLETED | OUTPATIENT
Start: 2018-05-04 | End: 2018-05-04

## 2018-05-04 RX ORDER — MIDAZOLAM HYDROCHLORIDE 1 MG/ML
INJECTION, SOLUTION INTRAMUSCULAR; INTRAVENOUS
Status: DISCONTINUED | OUTPATIENT
Start: 2018-05-04 | End: 2018-05-04

## 2018-05-04 RX ORDER — MUPIROCIN 20 MG/G
1 OINTMENT TOPICAL
Status: DISCONTINUED | OUTPATIENT
Start: 2018-05-04 | End: 2018-05-04

## 2018-05-04 RX ORDER — LIDOCAINE HCL/PF 100 MG/5ML
SYRINGE (ML) INTRAVENOUS
Status: DISCONTINUED | OUTPATIENT
Start: 2018-05-04 | End: 2018-05-04

## 2018-05-04 RX ORDER — MORPHINE SULFATE 2 MG/ML
2 INJECTION, SOLUTION INTRAMUSCULAR; INTRAVENOUS
Status: DISCONTINUED | OUTPATIENT
Start: 2018-05-04 | End: 2018-05-06 | Stop reason: HOSPADM

## 2018-05-04 RX ORDER — VANCOMYCIN HYDROCHLORIDE
1500
Status: COMPLETED | OUTPATIENT
Start: 2018-05-04 | End: 2018-05-04

## 2018-05-04 RX ORDER — ROPIVACAINE HYDROCHLORIDE 2 MG/ML
8 INJECTION, SOLUTION EPIDURAL; INFILTRATION; PERINEURAL CONTINUOUS
Status: DISPENSED | OUTPATIENT
Start: 2018-05-04 | End: 2018-05-06

## 2018-05-04 RX ORDER — MIDAZOLAM HYDROCHLORIDE 1 MG/ML
INJECTION INTRAMUSCULAR; INTRAVENOUS
Status: COMPLETED
Start: 2018-05-04 | End: 2018-05-04

## 2018-05-04 RX ORDER — FENTANYL CITRATE 50 UG/ML
25 INJECTION, SOLUTION INTRAMUSCULAR; INTRAVENOUS EVERY 5 MIN PRN
Status: DISCONTINUED | OUTPATIENT
Start: 2018-05-04 | End: 2018-05-04 | Stop reason: HOSPADM

## 2018-05-04 RX ORDER — SODIUM CHLORIDE 9 MG/ML
INJECTION, SOLUTION INTRAVENOUS
Status: DISCONTINUED | OUTPATIENT
Start: 2018-05-04 | End: 2018-05-04 | Stop reason: HOSPADM

## 2018-05-04 RX ORDER — SODIUM CHLORIDE 9 MG/ML
INJECTION, SOLUTION INTRAVENOUS CONTINUOUS
Status: DISCONTINUED | OUTPATIENT
Start: 2018-05-04 | End: 2018-05-05

## 2018-05-04 RX ORDER — MUPIROCIN 20 MG/G
OINTMENT TOPICAL
Status: DISCONTINUED | OUTPATIENT
Start: 2018-05-04 | End: 2018-05-04 | Stop reason: HOSPADM

## 2018-05-04 RX ORDER — PREGABALIN 75 MG/1
75 CAPSULE ORAL NIGHTLY
Status: DISCONTINUED | OUTPATIENT
Start: 2018-05-04 | End: 2018-05-04

## 2018-05-04 RX ORDER — CELECOXIB 200 MG/1
200 CAPSULE ORAL DAILY
Status: DISCONTINUED | OUTPATIENT
Start: 2018-05-05 | End: 2018-05-06 | Stop reason: HOSPADM

## 2018-05-04 RX ORDER — MORPHINE SULFATE 2 MG/ML
2 INJECTION, SOLUTION INTRAMUSCULAR; INTRAVENOUS
Status: DISCONTINUED | OUTPATIENT
Start: 2018-05-04 | End: 2018-05-04

## 2018-05-04 RX ORDER — CEFAZOLIN SODIUM 1 G/3ML
2 INJECTION, POWDER, FOR SOLUTION INTRAMUSCULAR; INTRAVENOUS
Status: COMPLETED | OUTPATIENT
Start: 2018-05-04 | End: 2018-05-04

## 2018-05-04 RX ORDER — OXYCODONE HYDROCHLORIDE 5 MG/1
10 TABLET ORAL
Status: DISCONTINUED | OUTPATIENT
Start: 2018-05-04 | End: 2018-05-04

## 2018-05-04 RX ORDER — PROPOFOL 10 MG/ML
VIAL (ML) INTRAVENOUS
Status: DISCONTINUED | OUTPATIENT
Start: 2018-05-04 | End: 2018-05-04

## 2018-05-04 RX ORDER — KETAMINE HCL IN 0.9 % NACL 50 MG/5 ML
SYRINGE (ML) INTRAVENOUS
Status: DISPENSED
Start: 2018-05-04 | End: 2018-05-04

## 2018-05-04 RX ORDER — OXYCODONE HYDROCHLORIDE 5 MG/1
5 TABLET ORAL
Status: DISCONTINUED | OUTPATIENT
Start: 2018-05-04 | End: 2018-05-04

## 2018-05-04 RX ORDER — PREGABALIN 75 MG/1
75 CAPSULE ORAL ONCE
Status: COMPLETED | OUTPATIENT
Start: 2018-05-04 | End: 2018-05-04

## 2018-05-04 RX ORDER — SODIUM CHLORIDE 9 MG/ML
INJECTION, SOLUTION INTRAVENOUS CONTINUOUS
Status: ACTIVE | OUTPATIENT
Start: 2018-05-04 | End: 2018-05-05

## 2018-05-04 RX ORDER — SODIUM CHLORIDE 0.9 % (FLUSH) 0.9 %
3 SYRINGE (ML) INJECTION EVERY 8 HOURS PRN
Status: DISCONTINUED | OUTPATIENT
Start: 2018-05-04 | End: 2018-05-06 | Stop reason: HOSPADM

## 2018-05-04 RX ORDER — CELECOXIB 200 MG/1
CAPSULE ORAL
Status: DISPENSED
Start: 2018-05-04 | End: 2018-05-04

## 2018-05-04 RX ORDER — PREGABALIN 75 MG/1
CAPSULE ORAL
Status: COMPLETED
Start: 2018-05-04 | End: 2018-05-04

## 2018-05-04 RX ORDER — ACETAMINOPHEN 500 MG
1000 TABLET ORAL EVERY 6 HOURS
Status: DISCONTINUED | OUTPATIENT
Start: 2018-05-04 | End: 2018-05-04

## 2018-05-04 RX ORDER — OXYCODONE HYDROCHLORIDE 5 MG/1
5 TABLET ORAL
Status: DISCONTINUED | OUTPATIENT
Start: 2018-05-04 | End: 2018-05-06 | Stop reason: HOSPADM

## 2018-05-04 RX ORDER — POLYETHYLENE GLYCOL 3350 17 G/17G
17 POWDER, FOR SOLUTION ORAL DAILY
Status: DISCONTINUED | OUTPATIENT
Start: 2018-05-05 | End: 2018-05-06 | Stop reason: HOSPADM

## 2018-05-04 RX ORDER — ONDANSETRON 2 MG/ML
4 INJECTION INTRAMUSCULAR; INTRAVENOUS EVERY 8 HOURS PRN
Status: DISCONTINUED | OUTPATIENT
Start: 2018-05-04 | End: 2018-05-04

## 2018-05-04 RX ORDER — AMOXICILLIN 250 MG
1 CAPSULE ORAL 2 TIMES DAILY
Status: DISCONTINUED | OUTPATIENT
Start: 2018-05-04 | End: 2018-05-06 | Stop reason: HOSPADM

## 2018-05-04 RX ORDER — PREGABALIN 75 MG/1
75 CAPSULE ORAL NIGHTLY
Status: DISCONTINUED | OUTPATIENT
Start: 2018-05-04 | End: 2018-05-06 | Stop reason: HOSPADM

## 2018-05-04 RX ORDER — ACETAMINOPHEN 500 MG
1000 TABLET ORAL EVERY 6 HOURS
Status: DISPENSED | OUTPATIENT
Start: 2018-05-04 | End: 2018-05-06

## 2018-05-04 RX ORDER — NALOXONE HCL 0.4 MG/ML
0.02 VIAL (ML) INJECTION
Status: DISCONTINUED | OUTPATIENT
Start: 2018-05-04 | End: 2018-05-06 | Stop reason: HOSPADM

## 2018-05-04 RX ORDER — VANCOMYCIN HYDROCHLORIDE
1500
Status: COMPLETED | OUTPATIENT
Start: 2018-05-04 | End: 2018-05-05

## 2018-05-04 RX ORDER — FAMOTIDINE 20 MG/1
20 TABLET, FILM COATED ORAL 2 TIMES DAILY
Status: DISCONTINUED | OUTPATIENT
Start: 2018-05-04 | End: 2018-05-06 | Stop reason: HOSPADM

## 2018-05-04 RX ORDER — PROPOFOL 10 MG/ML
VIAL (ML) INTRAVENOUS CONTINUOUS PRN
Status: DISCONTINUED | OUTPATIENT
Start: 2018-05-04 | End: 2018-05-04

## 2018-05-04 RX ORDER — OXYCODONE HYDROCHLORIDE 5 MG/1
15 TABLET ORAL
Status: DISCONTINUED | OUTPATIENT
Start: 2018-05-04 | End: 2018-05-06 | Stop reason: HOSPADM

## 2018-05-04 RX ORDER — ONDANSETRON 2 MG/ML
4 INJECTION INTRAMUSCULAR; INTRAVENOUS EVERY 12 HOURS PRN
Status: DISCONTINUED | OUTPATIENT
Start: 2018-05-04 | End: 2018-05-04

## 2018-05-04 RX ORDER — ACETAMINOPHEN 10 MG/ML
1000 INJECTION, SOLUTION INTRAVENOUS ONCE
Status: DISCONTINUED | OUTPATIENT
Start: 2018-05-04 | End: 2018-05-04

## 2018-05-04 RX ORDER — ASPIRIN 325 MG
325 TABLET, DELAYED RELEASE (ENTERIC COATED) ORAL 2 TIMES DAILY
Status: DISCONTINUED | OUTPATIENT
Start: 2018-05-04 | End: 2018-05-06 | Stop reason: HOSPADM

## 2018-05-04 RX ORDER — OXYCODONE HYDROCHLORIDE 5 MG/1
15 TABLET ORAL
Status: DISCONTINUED | OUTPATIENT
Start: 2018-05-04 | End: 2018-05-04

## 2018-05-04 RX ORDER — ROPIVACAINE HYDROCHLORIDE 2 MG/ML
8 INJECTION, SOLUTION EPIDURAL; INFILTRATION; PERINEURAL CONTINUOUS
Status: DISCONTINUED | OUTPATIENT
Start: 2018-05-04 | End: 2018-05-04

## 2018-05-04 RX ORDER — DEXAMETHASONE SODIUM PHOSPHATE 4 MG/ML
INJECTION, SOLUTION INTRA-ARTICULAR; INTRALESIONAL; INTRAMUSCULAR; INTRAVENOUS; SOFT TISSUE
Status: DISCONTINUED | OUTPATIENT
Start: 2018-05-04 | End: 2018-05-04

## 2018-05-04 RX ORDER — RAMELTEON 8 MG/1
8 TABLET ORAL NIGHTLY PRN
Status: DISCONTINUED | OUTPATIENT
Start: 2018-05-04 | End: 2018-05-06 | Stop reason: HOSPADM

## 2018-05-04 RX ORDER — ONDANSETRON 8 MG/1
8 TABLET, ORALLY DISINTEGRATING ORAL EVERY 8 HOURS PRN
Status: DISCONTINUED | OUTPATIENT
Start: 2018-05-04 | End: 2018-05-06 | Stop reason: HOSPADM

## 2018-05-04 RX ORDER — SODIUM CHLORIDE 0.9 % (FLUSH) 0.9 %
3 SYRINGE (ML) INJECTION
Status: DISCONTINUED | OUTPATIENT
Start: 2018-05-04 | End: 2018-05-04 | Stop reason: HOSPADM

## 2018-05-04 RX ORDER — FENTANYL CITRATE 50 UG/ML
INJECTION, SOLUTION INTRAMUSCULAR; INTRAVENOUS
Status: COMPLETED
Start: 2018-05-04 | End: 2018-05-04

## 2018-05-04 RX ORDER — MIDAZOLAM HYDROCHLORIDE 1 MG/ML
0.5 INJECTION INTRAMUSCULAR; INTRAVENOUS EVERY 5 MIN PRN
Status: DISCONTINUED | OUTPATIENT
Start: 2018-05-04 | End: 2018-05-04 | Stop reason: HOSPADM

## 2018-05-04 RX ORDER — OXYCODONE HYDROCHLORIDE 5 MG/1
10 TABLET ORAL
Status: DISCONTINUED | OUTPATIENT
Start: 2018-05-04 | End: 2018-05-06 | Stop reason: HOSPADM

## 2018-05-04 RX ORDER — BISACODYL 10 MG
10 SUPPOSITORY, RECTAL RECTAL EVERY 12 HOURS PRN
Status: DISCONTINUED | OUTPATIENT
Start: 2018-05-04 | End: 2018-05-06 | Stop reason: HOSPADM

## 2018-05-04 RX ORDER — KETAMINE HCL IN 0.9 % NACL 50 MG/5 ML
SYRINGE (ML) INTRAVENOUS
Status: DISCONTINUED | OUTPATIENT
Start: 2018-05-04 | End: 2018-05-04

## 2018-05-04 RX ORDER — CELECOXIB 200 MG/1
400 CAPSULE ORAL ONCE
Status: COMPLETED | OUTPATIENT
Start: 2018-05-04 | End: 2018-05-04

## 2018-05-04 RX ADMIN — FENTANYL CITRATE 50 MCG: 50 INJECTION, SOLUTION INTRAMUSCULAR; INTRAVENOUS at 08:05

## 2018-05-04 RX ADMIN — MIDAZOLAM HYDROCHLORIDE 2 MG: 1 INJECTION, SOLUTION INTRAMUSCULAR; INTRAVENOUS at 07:05

## 2018-05-04 RX ADMIN — TRANEXAMIC ACID 1 G: 100 INJECTION, SOLUTION INTRAVENOUS at 07:05

## 2018-05-04 RX ADMIN — ACETAMINOPHEN 1000 MG: 500 TABLET, FILM COATED ORAL at 04:05

## 2018-05-04 RX ADMIN — OXYCODONE HYDROCHLORIDE 10 MG: 5 TABLET ORAL at 11:05

## 2018-05-04 RX ADMIN — ROPIVACAINE HYDROCHLORIDE 8 ML/HR: 2 INJECTION, SOLUTION EPIDURAL; INFILTRATION at 10:05

## 2018-05-04 RX ADMIN — FAMOTIDINE 20 MG: 20 TABLET, FILM COATED ORAL at 09:05

## 2018-05-04 RX ADMIN — Medication 50 MG: at 07:05

## 2018-05-04 RX ADMIN — PROPOFOL 50 MG: 10 INJECTION, EMULSION INTRAVENOUS at 07:05

## 2018-05-04 RX ADMIN — FAMOTIDINE 20 MG: 20 TABLET, FILM COATED ORAL at 10:05

## 2018-05-04 RX ADMIN — PREGABALIN 75 MG: 75 CAPSULE ORAL at 07:05

## 2018-05-04 RX ADMIN — MUPIROCIN: 20 OINTMENT TOPICAL at 06:05

## 2018-05-04 RX ADMIN — ASPIRIN 325 MG: 325 TABLET, DELAYED RELEASE ORAL at 04:05

## 2018-05-04 RX ADMIN — ACETAMINOPHEN 1000 MG: 10 INJECTION, SOLUTION INTRAVENOUS at 10:05

## 2018-05-04 RX ADMIN — SODIUM CHLORIDE 1000 ML: 0.9 INJECTION, SOLUTION INTRAVENOUS at 06:05

## 2018-05-04 RX ADMIN — RAMELTEON 8 MG: 8 TABLET, FILM COATED ORAL at 10:05

## 2018-05-04 RX ADMIN — Medication 1500 MG: at 10:05

## 2018-05-04 RX ADMIN — CEFAZOLIN SODIUM 2 G: 1 INJECTION, POWDER, FOR SOLUTION INTRAMUSCULAR; INTRAVENOUS at 04:05

## 2018-05-04 RX ADMIN — OXYCODONE HYDROCHLORIDE 15 MG: 5 TABLET ORAL at 09:05

## 2018-05-04 RX ADMIN — CEFAZOLIN SODIUM 2 G: 1 INJECTION, POWDER, FOR SOLUTION INTRAMUSCULAR; INTRAVENOUS at 10:05

## 2018-05-04 RX ADMIN — SODIUM CHLORIDE, SODIUM GLUCONATE, SODIUM ACETATE, POTASSIUM CHLORIDE, MAGNESIUM CHLORIDE, SODIUM PHOSPHATE, DIBASIC, AND POTASSIUM PHOSPHATE: .53; .5; .37; .037; .03; .012; .00082 INJECTION, SOLUTION INTRAVENOUS at 07:05

## 2018-05-04 RX ADMIN — Medication 1500 MG: at 07:05

## 2018-05-04 RX ADMIN — CEFAZOLIN 2 G: 330 INJECTION, POWDER, FOR SOLUTION INTRAMUSCULAR; INTRAVENOUS at 07:05

## 2018-05-04 RX ADMIN — CELECOXIB 400 MG: 200 CAPSULE ORAL at 07:05

## 2018-05-04 RX ADMIN — PREGABALIN 75 MG: 75 CAPSULE ORAL at 09:05

## 2018-05-04 RX ADMIN — ACETAMINOPHEN 1000 MG: 500 TABLET, FILM COATED ORAL at 11:05

## 2018-05-04 RX ADMIN — DEXAMETHASONE SODIUM PHOSPHATE 8 MG: 4 INJECTION, SOLUTION INTRAMUSCULAR; INTRAVENOUS at 07:05

## 2018-05-04 RX ADMIN — STANDARDIZED SENNA CONCENTRATE AND DOCUSATE SODIUM 1 TABLET: 8.6; 5 TABLET, FILM COATED ORAL at 09:05

## 2018-05-04 RX ADMIN — PROPOFOL 100 MCG/KG/MIN: 10 INJECTION, EMULSION INTRAVENOUS at 07:05

## 2018-05-04 RX ADMIN — SODIUM CHLORIDE: 0.9 INJECTION, SOLUTION INTRAVENOUS at 10:05

## 2018-05-04 RX ADMIN — FENTANYL CITRATE 100 MCG: 50 INJECTION, SOLUTION INTRAMUSCULAR; INTRAVENOUS at 06:05

## 2018-05-04 RX ADMIN — LIDOCAINE HYDROCHLORIDE 75 MG: 20 INJECTION, SOLUTION INTRAVENOUS at 07:05

## 2018-05-04 RX ADMIN — MIDAZOLAM HYDROCHLORIDE 2 MG: 1 INJECTION, SOLUTION INTRAMUSCULAR; INTRAVENOUS at 06:05

## 2018-05-04 RX ADMIN — STANDARDIZED SENNA CONCENTRATE AND DOCUSATE SODIUM 1 TABLET: 8.6; 5 TABLET, FILM COATED ORAL at 10:05

## 2018-05-04 NOTE — ANESTHESIA POSTPROCEDURE EVALUATION
"Anesthesia Post Evaluation    Patient: Korey Santos    Procedure(s) Performed: Procedure(s) (LRB):  REPLACEMENT-KNEE-TOTAL (Left)    Final Anesthesia Type: spinal  Patient location during evaluation: PACU  Patient participation: Yes- Able to Participate  Level of consciousness: awake and alert  Post-procedure vital signs: reviewed and stable  Pain management: adequate  Airway patency: patent  PONV status at discharge: No PONV  Anesthetic complications: no      Cardiovascular status: hemodynamically stable  Respiratory status: unassisted  Hydration status: euvolemic  Follow-up not needed.        Visit Vitals  /60   Pulse 65   Temp 36.6 °C (97.9 °F) (Temporal)   Resp 17   Ht 5' 11" (1.803 m)   Wt 102.1 kg (225 lb)   SpO2 99%   BMI 31.38 kg/m²       Pain/Meredith Score: Pain Assessment Performed: Yes (5/4/2018  7:00 AM)  Presence of Pain: non-verbal indicators absent (5/4/2018  7:00 AM)  Pain Rating Prior to Med Admin: 0 (5/4/2018  7:11 AM)  Meredith Score: 8 (5/4/2018  7:00 AM)      "

## 2018-05-04 NOTE — PLAN OF CARE
Problem: Occupational Therapy Goal  Goal: Occupational Therapy Goal  Goals to be met by: 7 days    Patient will increase functional independence with ADLs by performing:    UE Dressing with Supervision.  LE Dressing with Supervision with AD as needed.  Grooming while standing with Supervision.  Toileting from bedside commode with Supervision for hygiene and clothing management.   Stand pivot transfers with Supervision.  Toilet transfer to bedside commode with Supervision.       Outcome: Ongoing (interventions implemented as appropriate)  OT eval complete. Pt tolerated session well. Pt's functional outcomes established today based on his presenting functional level    Comments: Cont OT POC

## 2018-05-04 NOTE — BRIEF OP NOTE
Operative Note       Surgery Date: 5/4/2018     Surgeon(s) and Role:     * Renita Franco MD - Primary     * Ricardo Valdes MD - Fellow     * Alley Bailey PA-C    Pre-op Diagnosis:  Primary localized osteoarthrosis of left lower leg [M17.12]    Post-op Diagnosis:  Primary osteoarthritis left knee    Procedure(s) (LRB):  REPLACEMENT-KNEE-TOTAL (Left)    Anesthesia: General    Findings/Key Components:  As above, left knee degenerative joint disease    Core Measure Documentation:  Were antibiotics extended? No  Was the patient administered a VTE Prophylaxis? No. Short procedure; low risk  Estimated Blood Loss: minimal           Specimens     None        Implants:   Implant Name Type Inv. Item Serial No.  Lot No. LRB No. Used   Tibial Tray Implant   [id number] CONFORMIS  Left 1   Femoral Implant   [id number] CONFORMIS  Left 1   Patella     CONFORMIS [id number] Left 1   CEMENT BONE SIMPLEX HV - [id number]  CEMENT BONE SIMPLEX HV  [employer] CATHERINE. [id number] Left 1   CEMENT BONE SIMPLEX HV - [id number]  CEMENT BONE SIMPLEX HV  [employer] CATHERINE. [id number] Left 1   Lateral Insert A   [id number]   Left 1   6mm Medial insert     [id number]     Left 1       Complications: none           Disposition: PACU - hemodynamically stable.           Condition: Stable    Attestation:  I was present for the entire procedure.

## 2018-05-04 NOTE — PT/OT/SLP EVAL
Occupational Therapy   Evaluation    Name: Korey Santos  MRN: [id number]  Admitting Diagnosis:  <principal problem not specified> Day of Surgery    Recommendations:     Discharge recommendations: Outpatient PT    Barriers to discharge: Inaccessible home environment    Equipment recommendations: rolling walker and shower chair    History:     Occupational Profile:  Living Environment: Pt lives alone in duplex w/ 1STE and full set of stairs to 2nd floor. Pt reports he is able to live on first floor initially. He has both a tub/shower and walk-in.   Previous level of function: PTA, pt was independent w/ ADLs and mod I for mobility  Equipment Owned: rolling walker (may be out of date), cane (using PTA) and raised toilet seat   Assistance Upon Discharge: Pt reports he will have assistance from his neighbors upon d/c from hospital.     Past Medical History:   Diagnosis Date    Arthritis     Back pain, chronic     BPH with urinary obstruction     Chronic constipation     DJD (degenerative joint disease)     Hip    Hyperlipidemia     Hypertension     Laryngopharyngeal reflux     Left inguinal hernia     Lumbar herniated disc     Lumbar stenosis     OA (osteoarthritis) of knee     Bilateral    Paradoxical insomnia     Sinus trouble        Past Surgical History:   Procedure Laterality Date    BACK SURGERY  2014    COLONOSCOPY      KNEE SURGERY      left hand      LEG SURGERY      UPPER GASTROINTESTINAL ENDOSCOPY         Subjective     Communicated with: RN prior to session.    Pt agreeable to Evaluation.    Pain/Comfort:  Pain level: 0/10 in L knee    Objective:     Pt found supine in bed with blood pressure cuff, del valle catheter, telemetry, PNC, PCEA, pulse ox, Peripheral IV, FCD, hemovac, and CPM    Orthopedic Precautions: LLE weight bearing as tolerated    Occupational Performance:    Bed Mobility:    Supine to sit: CGA   Sit to supine: CGA    Transfers:    Sit<>Stand: CGA, SW     Ambulation:    Pt  ambulated 3 steps forward, backward, and side stepped to HOB w/ CGA and SW - no signs of LOB or SOB noted.     Activities of Daily Living:  UE dressing: Maximum Assistance to margarito gown around back  LE dressing: Total Assistance to margarito socks  Pt educated on LE dressing techniques and need for AD with LE dressing      Patient left supine in bed with all lines intact and RN notified.    [facility] 6 Click: Self-care  [facility] Total Score: 16    Education:    Assessment:     Korey Santos is a 77 y.o. male with a medical diagnosis of <principal problem not specified>.  He presents with decreased function of L LE impeding his ability to perform ADLs and functional mobility and would benefit from OT services to maximize functional (I) and safety.    Performance deficits affecting function are weakness, impaired endurance, impaired self care skills, impaired functional mobilty, gait instability, impaired balance, decreased lower extremity function, decreased safety awareness, pain, impaired skin, decreased ROM, edema, orthopedic precautions.    Rehab Prognosis:  Good    Plan:     Patient to be seen QD to address the above listed problems via self-care/home management, therapeutic activities, therapeutic exercises    Plan of Care Reviewed with: patient    This Plan of care has been discussed with the patient who was involved in its development and understands and is in agreement with the identified goals and treatment plan    GOALS:    Occupational Therapy Goals        Problem: Occupational Therapy Goal    Goal Priority Disciplines Outcome Interventions   Occupational Therapy Goal     OT, PT/OT Ongoing (interventions implemented as appropriate)    Description:  Goals to be met by: 7 days    Patient will increase functional independence with ADLs by performing:    UE Dressing with Supervision.  LE Dressing with Supervision with AD as needed.  Grooming while standing with Supervision.  Toileting from bedside commode with  Supervision for hygiene and clothing management.   Stand pivot transfers with Supervision.  Toilet transfer to bedside commode with Supervision.                         Time Tracking:     OT Received On: 5/4/2018  OT Start Time: 1305  OT Stop Time: 1325   OT Total Time: 20 minutes     Billable Minutes:  Evaluation 12 minutes  Self Care/Home Management 8 minutes    Lauren Spann, OT  5/4/2018

## 2018-05-04 NOTE — PROGRESS NOTES
Notified Dr. Mullins with anesthesia that pt did hold aspirin for procedure.  MD states that is fine. No new orders received at this time. Will cont to monitor pt.

## 2018-05-04 NOTE — ANESTHESIA PROCEDURE NOTES
IPACK    Patient location during procedure: pre-op   Block not for primary anesthetic.  Reason for block: at surgeon's request and post-op pain management   Post-op Pain Location: left posterior knee  Start time: 5/4/2018 6:40 AM  Timeout: 5/4/2018 6:37 AM   End time: 5/4/2018 7:00 AM  Staffing  Anesthesiologist: SYLVIE AVILES  Resident/CRNA: XAVI DAWKINS  Performed: resident/CRNA   Preanesthetic Checklist  Completed: patient identified, site marked, surgical consent, pre-op evaluation, timeout performed, IV checked, risks and benefits discussed and monitors and equipment checked  Peripheral Block  Patient position: supine  Prep: ChloraPrep  Patient monitoring: heart rate, cardiac monitor, continuous pulse ox, continuous capnometry and frequent blood pressure checks  Block type: I PACK  Laterality: left  Injection technique: single shot  Needle  Needle type: Stimuplex   Needle gauge: 21 G  Needle length: 4 in  Needle localization: anatomical landmarks and ultrasound guidance   -ultrasound image captured on disc.  Assessment  Injection assessment: negative aspiration, negative parasthesia and local visualized surrounding nerve  Paresthesia pain: none  Heart rate change: no  Slow fractionated injection: yes  Medications:  Bolus administered: 20 mL of 0.25 ropivacaine  Epinephrine added: 3.75 mcg/mL (1/300,000)  Additional Notes  VSS.  [facility] RN monitoring vitals throughout procedure.  Patient tolerated procedure well.

## 2018-05-04 NOTE — TRANSFER OF CARE
"Anesthesia Transfer of Care Note    Patient: Korey Santos    Procedure(s) Performed: Procedure(s) (LRB):  REPLACEMENT-KNEE-TOTAL (Left)    Patient location: PACU    Anesthesia Type: general    Transport from OR: Transported from OR on 100% O2 by closed face mask with adequate spontaneous ventilation    Post pain: adequate analgesia    Post assessment: no apparent anesthetic complications and tolerated procedure well    Post vital signs: stable    Level of consciousness: awake and oriented    Nausea/Vomiting: no nausea/vomiting    Complications: none    Transfer of care protocol was followed      Last vitals:   Visit Vitals  BP (!) 142/64 (BP Location: Right arm, Patient Position: Lying)   Pulse 73   Temp 36.7 °C (98 °F) (Oral)   Resp 11   Ht 5' 11" (1.803 m)   Wt 102.1 kg (225 lb)   SpO2 99%   BMI 31.38 kg/m²     "

## 2018-05-04 NOTE — DISCHARGE INSTRUCTIONS
[address], [place]                                                                                          [phone]                   Postoperative Instructions for Knee Surgery                 Your Surgery Included:   Open               Arthroscopic   [] Ligament Repair       [] Diagnostic           [] ACL     [] PCL     [] MCL     [] PLLC      [] Synovectomy / Plica Removal [] Meniscal Cartilage Repair / Transplantation      [] Lysis of Adhesions / Manipulation [] Articular Cartilage Repair      [] Interval Release           [] Microfracture       [] OATS   [] ACI      [] Meniscectomy           [] Osteochondral Allograft      [] Meniscal Cartilage Repair  [] Patellar Realignment       [] Debridement / Chondroplasty         [] Lateral Release   [] Ligament Repair      [] Articular Cartilage Repair          [] Extensor Mechanism             []   Microfracture  []  OATS         []  Cartilage Biopsy [] Tendon Repair          [] Ligament Reconstruction          [] Patella                  [] Quadriceps             []   ACL    []   PCL  [] High Tibial Osteotomy       [] PRP Arthrocentesis  [] Joint Replacement         [] Amniox Arthrocentesis           [] Unicompartmental   [] Patellofemoral                    [x] Total Knee                  Call our office ([phone]) immediately if you experience any of the following:       Excessive bleeding or pus like drainage at the incision site       Uncontrollable pain not relieved by pain medication       Excessive swelling or redness at the incision site       Fever above 101.5 degrees not controlled with Tylenol or Motrin       Shortness of Breath       Any foul odor or blistering from the surgery site    FOR EMERGENCIES: If any unusual problems or difficulties occur, call our office at [phone], or page the  at [phone] who will direct your call appropriately    1.   Pain Management: A cold therapy cuff,  pain medications, local injections, and in some cases, regional anesthesia injections are used to manage your post-operative pain. The decision to use each of these options is based on their risks and benefits.     Medications: You were given one or more of the following medication prescriptions before leaving the hospital. Have the prescriptions filled at a pharmacy on your way home and follow the instructions on the bottles. If you need a refill, please call your pharmacy.      Narcotic Medication (usually Vicodin ES, Lortab, Percocet or Nucynta): Begin taking the medication before your knee starts to hurt. Some patients do not like to take any medication, but if you wait until your pain is severe before taking, you will be very uncomfortable for several hours waiting for the narcotic to work. Always take with food.     Nausea / Vomiting: For this issue, we prescribe Phenergan, use this medication as directed.     Cold Therapy: You may have been sent home with a [facility]® cold therapy unit and wrap for your knee. Fill with ice and water to the indicated fill line and use throughout the day for the first two days and then as needed to help relieve pain and control swelling.      Regional Anesthesia Injections (Blocks): You may have been given a regional nerve block either before or after surgery. This may make your entire leg numb for 24-36 hours.                            * Proceed with caution when bearing weight on your leg.     2.   Diet: Eat a bland diet for the first day after surgery. Progress your diet as tolerated. Constipation may occur with Narcotic usage, contact our office if you are experiencing constipation.    3.   Activity: Limit your activity during the first 48 hours, keep your leg elevated with pillows under your heel. After the first 48 hours at home, increase your activity level based on your symptoms.    4.   Dressing Change: Remove the dressing on the 3rd day. It is normal for some blood  "to be seen on the dressings. It is also normal for you to see apparent bruising on the skin around your knee when you remove the dressing. If present, leave the steri-strip tape across the incisions. If you are concerned by the drainage or the appearance of your knee, please call one of the numbers listed below.    5.   Showering: You may shower on the 3rd day after surgery with waterproof bandages over small incisions. If you have an incision with Prineo (clear mesh), it does not need to be covered. Do not submerge in any water until after your postoperative appointment in clinic.    6.   Your procedure did not require a post-operative brace.    7.   Your procedure did not require a Continuous Passive Motion (CPM) device.    8.   Weight Bearing: You may have been sent home with crutches. If instructed (see below), use these crutches at all times unless at complete rest.      [] Non-weight bearing for     0 weeks (you may touch your toes to the floor)      [] Partial weight bearing for  0 weeks    [] 25% Body Weight   [] 50% Body Weight      [x] Full weight bearing            [x]  NOW    []  after 0 weeks     9.  Knee Exercises: Begin these exercises the first day after surgery in order to help you regain your motion and strength. You may do the following marked exercises:     [x] Quad Sets - Begin activating your quadriceps muscle by driving your          knee downward into full knee extension while seated on a table or bed   with a towel rolled and propped under your heel     [x] Straight Leg Raise (SLR) - While luis alberto your quadriceps muscle, lift     your fully extended leg to the level of your non-operative knee (as shown)     [x] Heel Slides - With the knee straight, slide your heel slowly toward your   buttocks, hold at the endpoint for 10-15 seconds, then slowly straighten     [x] Ankle pumps - With your knee straight, move your ankle in a "pumping"    fashion to activate your calf and leg muscles      " 10.  Physical Therapy: Physical therapy is an essential component to your recovery from surgery. Your physical therapy will start in 3 days.    FIRST POSTOPERATIVE VISIT: As scheduled.

## 2018-05-04 NOTE — OP NOTE
DATE OF PROCEDURE:  5/4/2018    ATTENDING SURGEON: Surgeon(s) and Role:     * Renita Franco MD - Primary  Co-surgeon:  Ricardo Valdes MD      Co-Surgeon Duties: Due to the complexity of the case and the need for significant intra-operative decision making co-surgeon duties were medically necessary. The chronicity of the disease process and the level of deformity dictated that co-surgeon duties be undertaken. A separate note will be dictated/reported by Dr. Ricardo Valdes stating the specific co-surgeon activities and roles performed during the surgery.           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    COMPLICATIONS: NONE    URINE OUTPUT: 0 mL    ESTIMATED BLOOD LOSS: 200 mL    CONDITION:  Good      INDICATIONS FOR OPERATIVE PROCEDURES:  Korey Santos is a 77 y.o. male with history of left knee pain and pathology. He noted significant problems in the area of concern with problems on activities of daily living and aggressive use of the right leg. As a result of these problems and problems with overall activity level, the patient was deemed to be an appropriate candidate for operative intervention. There was significant genu varus noted and based on the patient's age and functional level, the patient was deemed to be an appropriate candidate for use of Conformis Implant products.    IMPLANTS UTILIZED:   ConforMIS tibial femoral implant  ConforMIS total tibial tray  ConforMIS 6 mm medial insert  ConforMIS lateral A insert  ConforMIS 41 mm x 10 mm patellar component  Smartset Gentamicin  bone Cement    FINDINGS:     ARTICULAR CARTILAGE LESION(S):  Medial Femoral Condyle: ICRS Grade 4A      Size: 3.5 x 4.0 cm  Medial tibial plateau: ICRS Grade 4A      Size: 3.0 x 3.5 cm        Lateral Femoral Condyle: ICRS Grade 2      Size: 1.5 x 2.0 cm  Lateral tibial plateau: ICRS Grade 2      Size: 2.0 x 2.0 cm        Patellar surface: ICRS Grade 4A      Size: 3.0 x 3.5 cm  Trochlear groove: ICRS Grade 4A      Size: 3.0 x 3.0 cm    EXAMINATION UNDER ANESTHESIA:   Extension 5 degrees  Flexion 140 degrees  Lachman Maneuver:  Negative  Anterior Drawer: Negative  Posterior Drawer:  Negative    DESCRIPTION OF PROCEDURE:   The patient was brought into the Operating Room and placed in supine position. Upon application of femoral block in the preoperative holding area, the patient underwentGeneral to stabilize the area. The patient was given the appropriate dose of antibiotics based on body weight. Timeout was utilized to verify the RIGHT LEFT BILATERAL:[id number]} side as the operative side. Both upper extremities were placed in comfortable position. The nonoperative leg was carefully padded along the heel and peroneal nerve regions. Carpenter catheter was applied. Operative leg was then prepped and draped in a sterile fashion with ChloraPrep material with a bump under the right hip and popliteal post along the right side of the table. Once prepped and draped in sterile fashion, Coban was placed on the knee. A medial based incision was carried down the skin down to fat and fascia. A medial subvastus approach was performed and the patella was subluxed lateral  Flaps were raised superiorly and inferiorly as well as medially and laterally along the region of concern.      Attention was turned to the distal femur and the # 1 preparation device from ConforMIS was used to create the distal lug holes for the implant and # 2 guide. We then drilled the distal femoral region as medially and laterally along the region of concern. We placed  a # 2 cutting block, which was applied made the distal pin holes, which were then removed simultaneously and drilled proximal pin holes and pins to stabilize the distal femoral cutting block. This was left in place and the saw blade used to create the distal femoral cut. Bone was removed. We then assessed our cut with a #3 guide from the ConforMIS set. The cut was found to be excellent. As a result, we turned our attention to the proximal tibia. ACL structure was resected. The PCL structure was recessed. Medial and lateral  meniscus remnants were removed with the Bovie cautery. We then applied the # 4 cutting system directly over the area of concern. We used to currettes to remove the cartilage from the proximal tibia down to the subchondral bone level. Extramedullary alignment polly was used to verify appropriate alignment. The cutting guide was pinned into position and an oscillating saw used, we made the proximal cut. We used the # 3 guide to assess our extension gap and the # 6 guide to assess our flexion gap. There gaps were symmetric flexion and extension gaps with appropriate alingment as well.    With this performed, we then visualized the distal femus once again and placed the # 7 femoral cutting guide into position and pinned this with the appropriate distal femoral rotation. The previously placed pin holes along the distal femur were used to position this cutting guide and the oblique pin holes created and the pins placed to hold the guide in the appropriated rotation. The central pins were removed. Anterior and posterior condylar and chamfer cuts were created as well as the distal lug holes for the femoral component. The # 8/9 femoral guide was applied and the final posterior chamfer cuts were created. We placed the trial femoral and tibial components demonstrating great stability in flexion and extension with varus and valgus load as well as great flexion without shift of the tibial component.    We  re-exposed the proximal tibia, placed the preparation tray for the tibial Region and pinned this into position. The central tower was applied and the proximal tibia drilled centrally followed by application of the trial keel. We then removed the trial keel.We exposed the patella, sized the patella, demonstrating the 41 mm patellar component would most normalize the patient's anatomy. As a result, we removed approximately 10 mm of bone, drilled 3 peg holes and placed the trial patella fit in excellent fashion. The knee was taken through range of motion demonstrating excellent tracking and stability. As a result, trial components were left in place. An Esmarch was then used to exsanguinate the limb. Tourniquet was inflated. Trial components were removed. We exposed the femoral, proximal tibial plateau and patellar regions. Pulsavac was used to remove all blood elements and the areas dried and prepared for cementing. We then mixed cement and placed cement first at the tibial region and placed the tibial component position and extruded cement was removed. We then turned our attention to the femoral and patellar regions. These dried areas were then cemented with extruded cement removed from the femoral component as well as applied cement to the patellar component. The we then performed trial reduction with the trial inserts applied. Knee was taken to extension demonstrating excellent stability with no signs of hyperextension remaining. The tourniquet was released and all bleeding sites were cauterized. Final pulsavac lavage was performed with application of 3 liters of fluid through the knee. Trial components were removed and the actual 6 mm medial insert was applied along with the size A lateral insert. Prior to placement of these inserts Exparel mixture was applied to the posterior capsular, medial subvastus region and anterior fatpad regions of the knee with a 20 gauge spinal needle. Further irrigation performed along  the area of concern. A drain was taken out of the lateral aspect of the knee. Following placement of the drain, we then closed the synovial layer and parapatellar tendon region with a series of #1 Vicryl sutures placed in figure-of-eight fashion. We then closed subcutaneous tissues with   3-0 Vicryl sutures placed in inverted fashion. Skin was closed with running 4-0 Monocryl sutures placed in subcuticular fashion along with application of Dermabond ointment and tape. We did apply intraarticular Exparel for postoperative pain control. We applied Xeroform gauze, ABD pads, cast padding, sterile electrode pads proximally and distally, a long-leg FELICE hose stocking and cooling unit.   The patient was allowed to recover from the anesthetic. was removed. The patient was taken to Recovery Room in stable condition. At the completion case, all instrument and sponge counts were correct. Subcutaneous tissues were closed with 3-0 Vicryl sutures placed in inverted fashion. Skin was closed with running 4-0 Monocryl sutures placed in subcuticular fashion along with application of Dermabond ointment and Dermabond tape. We then applied Xeroform gauze, ABD pads, cast padding, a long-leg FELICE hose stocking and cooling unit. The patient was allowed to recover from the anesthetic. LMA was removed. The patient was taken to Recovery Room in stable condition. At the completion of the case, all instrument and sponge counts were correct.    NOTE: I was present and scrubbed for the key portions of the procedure.    PHYSICAL THERAPY:  The patient should begin physical therapy on postoperative   day # 3 and will be advanced to outpatient therapy as soon as   Possible following discharge.    Weight bearing:as tolerated  left leg  Range of Motion:Full normal motion symmetric to opposite side    Continuous passive motion (CPM) machine start on   postop day # 0 at  10 degrees hyperextension to 120 degrees flexion as tolerated for 6-8 hours per day  for 4 weeks.     The patient is to be taken out of the continuous passive motion (CPM)  machine as much as possible and begin active assisted range of motion programs.    Immediate specific exercises should include:extension exercises, quadriceps load with a heel roll, prone hang procedures with 5-10 lbs. ankle weights.    Gait program should include: active extension with a heel-to-toe gait working on maintaining extension    Discharge home  Follow-up as scheduled  Condition stable  Activity as above

## 2018-05-04 NOTE — PLAN OF CARE
Problem: Physical Therapy Goal  Goal: Physical Therapy Goal  Goals to be met by: 18     Patient will increase functional independence with mobility by performin. Supine to sit with Supervision  2. Sit to supine with Supervision  3. Sit to stand transfer with Stand-by Assistance  4. Gait  x 150 feet with Stand-by Assistance using Rolling Walker.   5. Ascend/Descend 6 inch curb step with Contact Guard Assistance using Rolling Walker.  6. Lower extremity exercise program x 30 reps per handout, with independence    Outcome: Outcome(s) achieved Date Met: 18  PT eval complete and POC initiated.

## 2018-05-04 NOTE — ANESTHESIA PREPROCEDURE EVALUATION
05/04/2018  Korey Santos is a 77 y.o., male presenting for L TKA.    Past Medical History:   Diagnosis Date    Arthritis     Back pain, chronic     BPH with urinary obstruction     Chronic constipation     DJD (degenerative joint disease)     Hip    Hyperlipidemia     Hypertension     Laryngopharyngeal reflux     Left inguinal hernia     Lumbar herniated disc     Lumbar stenosis     OA (osteoarthritis) of knee     Bilateral    Paradoxical insomnia     Sinus trouble      Past Surgical History:   Procedure Laterality Date    BACK SURGERY  2014    COLONOSCOPY      KNEE SURGERY      left hand      LEG SURGERY      UPPER GASTROINTESTINAL ENDOSCOPY       Review of patient's allergies indicates:   Allergen Reactions    Clindamycin Swelling     Throat swells    Lisinopril Swelling and Other (See Comments)     Throat swelling     No current facility-administered medications on file prior to encounter.      Current Outpatient Prescriptions on File Prior to Encounter   Medication Sig Dispense Refill    fluticasone (FLONASE) 50 mcg/actuation nasal spray 1 spray (50 mcg total) by Each Nare route 2 (two) times daily as needed for Rhinitis. 15 g 0    hydrocodone-acetaminophen 5-325mg (NORCO) 5-325 mg per tablet Take 1 tablet by mouth every 6 (six) hours as needed for Pain. 30 tablet 0    multivitamin capsule Take 1 capsule by mouth every morning.       polyethylene glycol (GLYCOLAX) 17 gram/dose powder Take 17 g by mouth daily as needed. 510 g 1    linaclotide (LINZESS) 145 mcg Cap capsule Take 1 capsule (145 mcg total) by mouth once daily. (Patient taking differently: Take 145 mcg by mouth daily as needed. ) 30 capsule 3    pravastatin (PRAVACHOL) 40 MG tablet Take 1 tablet (40 mg total) by mouth once daily. (Patient taking differently: Take 40 mg by mouth nightly. ) 30 tablet 11     sildenafil (REVATIO) 20 mg Tab Take 5 po 1 hour before sexual activity 50 tablet 11    trazodone (DESYREL) 100 MG tablet 1 pill PO PRN for insomnia at bedtime. . 15 tablet 0     Lab Results   Component Value Date    WBC 8.31 02/01/2018    HGB 14.4 02/01/2018    HCT 44.1 02/01/2018    MCV 88 02/01/2018     02/01/2018     BMP  Lab Results   Component Value Date     02/01/2018    K 4.6 02/01/2018     02/01/2018    CO2 22 (L) 02/01/2018    BUN 16 02/01/2018    CREATININE 0.9 02/01/2018    CALCIUM 9.5 02/01/2018    ANIONGAP 14 02/01/2018    ESTGFRAFRICA >60.0 02/01/2018    EGFRNONAA >60.0 02/01/2018         Anesthesia Evaluation    I have reviewed the Patient Summary Reports.     I have reviewed the Medications.     Review of Systems  Anesthesia Hx:  No problems with previous Anesthesia   Denies Personal Hx of Anesthesia complications.   Cardiovascular:   Exercise tolerance: poor Hypertension Limited by knee pain   Pulmonary:  Pulmonary Normal    Renal/:  Renal/ Normal     Hepatic/GI:   GERD, well controlled    Musculoskeletal:   Arthritis  Previous L4/5 microdiskectomy   Neurological:   Denies CVA. Denies Seizures.        Physical Exam  General:  Obesity    Airway/Jaw/Neck:  Airway Findings: Mouth Opening: Normal Tongue: Normal  General Airway Assessment: Adult  Mallampati: III  TM Distance: Normal, at least 6 cm  Jaw/Neck Findings:  Neck ROM: Extension Decreased, Mild      Dental:  Dental Findings: In tact        Mental Status:  Mental Status Findings:  Cooperative, Alert and Oriented, Flat Affect         Anesthesia Plan  Type of Anesthesia, risks & benefits discussed:  Anesthesia Type:  spinal, regional  Patient's Preference:   Intra-op Monitoring Plan: standard ASA monitors  Intra-op Monitoring Plan Comments:   Post Op Pain Control Plan: per primary service following discharge from PACU, IV/PO Opioids PRN and multimodal analgesia  Post Op Pain Control Plan Comments:   Induction:   IV  Beta  Blocker:  Patient is not currently on a Beta-Blocker (No further documentation required).       Informed Consent: Patient understands risks and agrees with Anesthesia plan.  Questions answered. Anesthesia consent signed with patient.  ASA Score: 3     Day of Surgery Review of History & Physical:    H&P update referred to the surgeon.         Ready For Surgery From Anesthesia Perspective.

## 2018-05-04 NOTE — INTERVAL H&P NOTE
The patient has been examined and the H&P has been reviewed:    I concur with the findings and no changes have occurred since H&P was written.    Anesthesia/Surgery risks, benefits and alternative options discussed and understood by patient/family.          Active Hospital Problems    Diagnosis  POA    Genu varum of both lower extremities [M21.161, M21.162]  Yes      Resolved Hospital Problems    Diagnosis Date Resolved POA   No resolved problems to display.

## 2018-05-04 NOTE — ANESTHESIA PROCEDURE NOTES
Spinal    Diagnosis: Osteoarthritis  Patient location during procedure: OR  Start time: 5/4/2018 7:16 AM  Timeout: 5/4/2018 7:15 AM  End time: 5/4/2018 7:24 AM  Staffing  Anesthesiologist: ANTOINETTE STEVE  Performed: anesthesiologist   Preanesthetic Checklist  Completed: patient identified, site marked, surgical consent, pre-op evaluation, timeout performed, IV checked, risks and benefits discussed and monitors and equipment checked  Spinal Block  Patient position: sitting  Prep: ChloraPrep  Patient monitoring: heart rate and continuous pulse ox  Approach: midline  Location: L3-4  Injection technique: single shot  CSF Fluid: clear free-flowing CSF  Needle  Needle type: Sofia   Needle gauge: 25 G  Needle length: 3.5 in  Additional Documentation: negative aspiration for heme, incremental injection and no paresthesia on injection  Needle localization: anatomical landmarks  Medications:  Bolus administered: 2.3 mL of 2.0 mepivacaine  Epinephrine added: none  Opioid administered: 2.3 mcg of

## 2018-05-04 NOTE — PT/OT/SLP EVAL
Physical Therapy Evaluation    Patient Name:  Korey Santos   MRN:  [id number]    Recommendations:     Discharge recommendations: Outpatient PT  Barriers to discharge: None  Equipment recommendations: rolling walker and shower chair    Assessment:     Korey Santos is a 77 y.o. male admitted with a medical diagnosis of L TKA.  He presents with the below impairments/functional limitations.  Pt tolerated evaluation well today and is motivated to do well with recent joint replacement.  Pt is a good candidate for skilled PT services to address the below deficits and to increase functional independence.      Rehab Prognosis: good; patient would benefit from acute skilled PT services to address these deficits and reach maximum level of function.      Rehab Identified impairments/functional limitations:   weakness, impaired endurance, impaired sensation, impaired self care skills, impaired functional mobilty, gait instability, impaired balance, decreased coordination, decreased lower extremity function, decreased safety awareness, pain, decreased ROM, impaired skin, edema, orthopedic precautions    Recent Surgery: Procedure(s) (LRB):  REPLACEMENT-KNEE-TOTAL (Left) Day of Surgery    Plan:     During this hospitalization, patient to be seen BID to address the above listed problems via gait training, therapeutic activity, therapeutic exercise, and neuromuscular re-education   Plan of Care Reviewed with: patient    Subjective     Communicated with RN prior to session.  Patient found supine upon PT entry, agreeable to evaluation.      Chief Complaint: none  Patient comments/goals: to return home safely    Pain/Comfort:  Pain level prior: 3/10 in L knee  Pain level post: 3/10 in L knee    Patients cultural, spiritual, Anabaptist conflicts given the current situation: none stated    Living Environment:  Pt lives alone in [facility] w/ 1STE and full set of stairs to 2nd floor. Pt reports he is able to live on first floor  initially. He has both a tub/shower and walk-in.   Previous level of function: PTA, pt was independent w/ ADLs and mod I for mobility  Equipment Owned: rolling walker (may be out of date), cane (using PTA) and raised toilet seat   Assistance Upon Discharge: Pt reports he will have assistance from his neighbors upon d/c from hospital.   Objective:     Patient found supine in bed with blood pressure cuff, telemetry, perineural catheter, polar ice, peripheral IV, pulse ox, and FCD.     General Precautions: Standard, fall  Orthopedic Precautions:  LLE weight bearing as tolerated  Braces: none     Physical Exam:  Postural examination/scapula alignment: WFL    Skin integrity: Visible skin intact  Edema: Mild LLE    Sensation:   Intact    Lower Extremity Range of Motion:  Right Lower Extremity: WFL  Left Lower Extremity: WFL except knee    Lower Extremity Strength:  Right Lower Extremity: WFL  Left Lower Extremity: WFL except knee    Functional Mobility:    Bed Mobility:    Supine to sit: CGA  Sit to supine: CGA    Transfers:    Sit<>Stand: CGA with SW    Ambulation:    Pt ambulated 3 steps forward and backward and 1-2 sidesteps with SW and CGA.  Pt educated on 3 point gait pattern.  Pt able to verbalize and demonstrate understanding.       AM-PAC 6 CLICK MOBILITY  Total Score: 18     Therapeutic Activities and Exercises:  PT evaluation performed.  Pt educated on role of PT, safety with functional mobility.     Patient left supine in bed with all lines intact and RN notified.    GOALS:    Physical Therapy Goals        Problem: Physical Therapy Goal    Goal Priority Disciplines Outcome Goal Variances Interventions   Physical Therapy Goal     PT/OT, PT      Description:  Goals to be met by: 18     Patient will increase functional independence with mobility by performin. Supine to sit with Supervision  2. Sit to supine with Supervision  3. Sit to stand transfer with Stand-by Assistance  4. Gait  x 150 feet with  Stand-by Assistance using Rolling Walker.   5. Ascend/Descend 6 inch curb step with Contact Guard Assistance using Rolling Walker.  6. Lower extremity exercise program x 30 reps per handout, with independence                       History:     Past Medical History:   Diagnosis Date    Arthritis     Back pain, chronic     BPH with urinary obstruction     Chronic constipation     DJD (degenerative joint disease)     Hip    Hyperlipidemia     Hypertension     Laryngopharyngeal reflux     Left inguinal hernia     Lumbar herniated disc     Lumbar stenosis     OA (osteoarthritis) of knee     Bilateral    Paradoxical insomnia     Sinus trouble        Past Surgical History:   Procedure Laterality Date    BACK SURGERY  2014    COLONOSCOPY      KNEE SURGERY      left hand      LEG SURGERY      UPPER GASTROINTESTINAL ENDOSCOPY         Time Tracking:     PT Received On: 05/04/18  PT Start Time: 1305     PT Stop Time: 1325  PT Total Time (min): 20 min     Billable Minutes: Evaluation 12; Therapeutic Activity 8      Chalo Paulino, PT, DPT  5/4/2018   [phone]

## 2018-05-04 NOTE — H&P (VIEW-ONLY)
Korey Santos  is here for a completion of his perioperative paperwork. he  Is scheduled to undergo     1. Left total knee arthroplasty (Conformis iTotal)     on 5/4/18.  He is a healthy individual and does need clearance for this procedure.     Risks, indications and benefits of the surgical procedure were discussed with the patient. All questions with regard to surgery, rehab, expected return to functional activities, activities of daily living and recreational endeavors were answered to his satisfaction.    Review of Systems   Constitution: Negative. Negative for chills, fever and night sweats.   HENT: Negative for congestion and headaches.    Eyes: Negative for blurred vision, left vision loss and right vision loss.   Cardiovascular: Negative for chest pain and syncope.   Respiratory: Negative for cough and shortness of breath.    Endocrine: Negative for polydipsia, polyphagia and polyuria.   Hematologic/Lymphatic: Negative for bleeding problem. Does not bruise/bleed easily.   Skin: Negative for dry skin, itching and rash.   Musculoskeletal: Negative for falls and muscle weakness.   Gastrointestinal: Negative for abdominal pain and bowel incontinence.   Genitourinary: Negative for bladder incontinence and nocturia.   Neurological: Negative for disturbances in coordination, loss of balance and seizures.   Psychiatric/Behavioral: Negative for depression. The patient does not have insomnia.    Allergic/Immunologic: Negative for hives and persistent infections.       PAST MEDICAL HISTORY:   Past Medical History:   Diagnosis Date    Arthritis     Back pain, chronic     Chronic constipation     Hyperlipidemia     Hypertension     Sinus trouble      PAST SURGICAL HISTORY:   Past Surgical History:   Procedure Laterality Date    BACK SURGERY  2014    COLONOSCOPY      KNEE SURGERY      left hand      LEG SURGERY      UPPER GASTROINTESTINAL ENDOSCOPY       FAMILY HISTORY:   Family History   Problem  Relation Age of Onset    Cancer Father      lung or smoking    No Known Problems Mother     No Known Problems Sister     No Known Problems Brother     No Known Problems Maternal Aunt     No Known Problems Maternal Uncle     No Known Problems Paternal Aunt     No Known Problems Paternal Uncle     No Known Problems Maternal Grandmother     No Known Problems Maternal Grandfather     No Known Problems Paternal Grandmother     No Known Problems Paternal Grandfather     Glaucoma Neg Hx     Diabetes Neg Hx     Amblyopia Neg Hx     Blindness Neg Hx     Cataracts Neg Hx     Hypertension Neg Hx     Macular degeneration Neg Hx     Retinal detachment Neg Hx     Strabismus Neg Hx     Stroke Neg Hx     Thyroid disease Neg Hx     Colon cancer Neg Hx     Esophageal cancer Neg Hx      SOCIAL HISTORY:   Social History     Social History    Marital status: Single     Spouse name: N/A    Number of children: N/A    Years of education: N/A     Occupational History    Not on file.     Social History Main Topics    Smoking status: Never Smoker    Smokeless tobacco: Never Used    Alcohol use 0.0 oz/week      Comment: very rarely    Drug use: No    Sexual activity: Yes     Partners: Female     Birth control/ protection: None     Other Topics Concern    Not on file     Social History Narrative    Ret. 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       MEDICATIONS:   Current Outpatient Prescriptions:     amoxicillin-clavulanate 875-125mg (AUGMENTIN) 875-125 mg per tablet, Take 1 tablet by mouth 2 (two) times daily., Disp: 14 tablet, Rfl: 0    aspirin (ECOTRIN) 325 MG EC tablet, Take 1 tablet (325 mg total) by mouth 2 (two) times daily., Disp: 84 tablet, Rfl: 0    fluticasone (FLONASE) 50 mcg/actuation nasal spray, 1 spray (50 mcg total) by Each Nare route 2 (two) times daily as needed for Rhinitis., Disp: 15 g, Rfl: 0    hydrocodone-acetaminophen 5-325mg (NORCO) 5-325 mg per tablet, Take 1 tablet by mouth  "every 6 (six) hours as needed for Pain., Disp: 30 tablet, Rfl: 0    linaclotide (LINZESS) 145 mcg Cap capsule, Take 1 capsule (145 mcg total) by mouth once daily., Disp: 30 capsule, Rfl: 3    losartan (COZAAR) 25 MG tablet, Take 1 tablet (25 mg total) by mouth once daily., Disp: 30 tablet, Rfl: 1    multivitamin capsule, Take 1 capsule by mouth once daily.  , Disp: , Rfl:     polyethylene glycol (GLYCOLAX) 17 gram/dose powder, Take 17 g by mouth daily as needed., Disp: 510 g, Rfl: 1    pravastatin (PRAVACHOL) 40 MG tablet, Take 1 tablet (40 mg total) by mouth once daily., Disp: 30 tablet, Rfl: 11    sildenafil (REVATIO) 20 mg Tab, Take 5 po 1 hour before sexual activity, Disp: 50 tablet, Rfl: 11    tamsulosin (FLOMAX) 0.4 mg Cp24, Take 2 capsules (0.8 mg total) by mouth once daily., Disp: 180 capsule, Rfl: 0    trazodone (DESYREL) 100 MG tablet, 1 pill PO PRN for insomnia at bedtime. ., Disp: 15 tablet, Rfl: 0  ALLERGIES:   Review of patient's allergies indicates:   Allergen Reactions    Clindamycin Swelling     Throat swells       VITAL SIGNS: BP (!) 154/87   Pulse 64   Ht 5' 11" (1.803 m)   Wt 102.1 kg (225 lb)   BMI 31.38 kg/m²        Once no other questions were asked, a brief history and physical exam was then performed.      PHYSICAL EXAM:  GEN: A&Ox3, WD WN NAD  HEENT: WNL  CHEST: CTAB, no W/R/R  HEART: RRR, no M/R/G  ABD: Soft, NT ND, BS x4 QUADS  MS; See Epic  NEURO: CN II-XII intact       The surgical consent was then reviewed with the patient, who agreed with all the contents of the consent form and it was signed. he was then given the [facility] surgery packet to bring with him to [facility] for the anesthesia portion of his perioperative paperwork.     PHYSICAL THERAPY:  He was also instructed regarding physical therapy and will begin on  POD#3. He was given a copy of the original prescription to schedule. Another copy of this prescription was also faxed to [facility].    POST OP CARE:instructions " were reviewed including care of the wound and dressing after surgery and when he can shower.     PAIN MANAGEMENT: Korey Santos was also given standard pain management regime, which includes the TENS unit given to him by Manoj Downs along with the education required for its use. He was also instructed regarding the Polar ice unit that will be in place after surgery and his postoperative pain medications.     PAIN MEDICATION:  Percocet 10/325mg 1 po q 4-6 hours prn pain  Ultram 50 mg one p.o. q.4-6 hours p.r.n. breakthrough pain,   Phenergan 25 mg one p.o. q.4-6 hours p.r.n. nausea and vomiting.  Celebrex 200 mg PO BID   mg PO daily    As there were no other questions to be asked, he was given my business card along with Renita Franco MD business card if he has any questions or concerns prior to surgery or in the postop period.

## 2018-05-04 NOTE — NURSING
Pt arrived to unit via stretcher. AA+Ox4. Follows commands. Denies numbness and tingling. Rates pain as tolerable. Denies nausea. Oriented to room. Call bell in reach. Advised pt to call with any concerns. Will continue to monitor.

## 2018-05-04 NOTE — ANESTHESIA PROCEDURE NOTES
Adductor Canal Catheter    Patient location during procedure: pre-op   Block not for primary anesthetic.  Reason for block: at surgeon's request and post-op pain management   Post-op Pain Location: Left knee pain  Start time: 5/4/2018 6:37 AM  Timeout: 5/4/2018 6:37 AM   End time: 5/4/2018 6:57 AM  Staffing  Anesthesiologist: SYLVIE AVILES  Resident/CRNA: XAVI DAWKINS  Performed: resident/CRNA   Preanesthetic Checklist  Completed: patient identified, site marked, surgical consent, pre-op evaluation, timeout performed, IV checked, risks and benefits discussed and monitors and equipment checked  Peripheral Block  Patient position: supine  Prep: ChloraPrep and site prepped and draped  Patient monitoring: heart rate, cardiac monitor, continuous pulse ox, continuous capnometry and frequent blood pressure checks  Block type: adductor canal  Laterality: left  Injection technique: continuous  Needle  Needle type: Tuohy   Needle gauge: 17 G  Needle length: 3.5 in  Needle localization: anatomical landmarks and ultrasound guidance  Catheter type: spring wound  Catheter size: 19 G  Test dose: lidocaine 1.5% with Epi 1-to-200,000 and negative   -ultrasound image captured on disc.  Assessment  Injection assessment: negative aspiration, negative parasthesia and local visualized surrounding nerve  Paresthesia pain: none  Heart rate change: no  Slow fractionated injection: yes  Medications:  Bolus administered: 20 mL of 0.25 ropivacaine  Epinephrine added: 3.75 mcg/mL (1/300,000)  Additional Notes  VSS.  [facility] RN monitoring vitals throughout procedure.  Patient tolerated procedure well.

## 2018-05-05 PROBLEM — M17.12 OSTEOARTHRITIS OF LEFT KNEE: Status: ACTIVE | Noted: 2018-05-05

## 2018-05-05 PROCEDURE — 25000003 PHARM REV CODE 250: Performed by: ORTHOPAEDIC SURGERY

## 2018-05-05 PROCEDURE — 97116 GAIT TRAINING THERAPY: CPT

## 2018-05-05 PROCEDURE — 11000001 HC ACUTE MED/SURG PRIVATE ROOM

## 2018-05-05 PROCEDURE — 97110 THERAPEUTIC EXERCISES: CPT

## 2018-05-05 PROCEDURE — 99231 SBSQ HOSP IP/OBS SF/LOW 25: CPT | Mod: ,,, | Performed by: ANESTHESIOLOGY

## 2018-05-05 PROCEDURE — 25000003 PHARM REV CODE 250: Performed by: STUDENT IN AN ORGANIZED HEALTH CARE EDUCATION/TRAINING PROGRAM

## 2018-05-05 PROCEDURE — 97535 SELF CARE MNGMENT TRAINING: CPT

## 2018-05-05 PROCEDURE — 25000003 PHARM REV CODE 250: Performed by: ANESTHESIOLOGY

## 2018-05-05 PROCEDURE — 63600175 PHARM REV CODE 636 W HCPCS: Performed by: ANESTHESIOLOGY

## 2018-05-05 PROCEDURE — 97530 THERAPEUTIC ACTIVITIES: CPT

## 2018-05-05 RX ORDER — PRAVASTATIN SODIUM 40 MG/1
40 TABLET ORAL NIGHTLY
Status: DISCONTINUED | OUTPATIENT
Start: 2018-05-05 | End: 2018-05-06 | Stop reason: HOSPADM

## 2018-05-05 RX ORDER — ASPIRIN 325 MG
325 TABLET, DELAYED RELEASE (ENTERIC COATED) ORAL EVERY MORNING
Status: DISCONTINUED | OUTPATIENT
Start: 2018-05-05 | End: 2018-05-06 | Stop reason: HOSPADM

## 2018-05-05 RX ORDER — POLYETHYLENE GLYCOL 3350 17 G/17G
17 POWDER, FOR SOLUTION ORAL DAILY
Status: DISCONTINUED | OUTPATIENT
Start: 2018-05-05 | End: 2018-05-06 | Stop reason: HOSPADM

## 2018-05-05 RX ORDER — FLUTICASONE PROPIONATE 50 MCG
1 SPRAY, SUSPENSION (ML) NASAL 2 TIMES DAILY PRN
Status: DISCONTINUED | OUTPATIENT
Start: 2018-05-05 | End: 2018-05-06 | Stop reason: HOSPADM

## 2018-05-05 RX ORDER — POLYETHYLENE GLYCOL 3350 17 G/17G
17 POWDER, FOR SOLUTION ORAL ONCE
Status: COMPLETED | OUTPATIENT
Start: 2018-05-05 | End: 2018-05-05

## 2018-05-05 RX ORDER — CELECOXIB 200 MG/1
200 CAPSULE ORAL DAILY
Status: DISCONTINUED | OUTPATIENT
Start: 2018-05-05 | End: 2018-05-06 | Stop reason: HOSPADM

## 2018-05-05 RX ORDER — TRAZODONE HYDROCHLORIDE 100 MG/1
100 TABLET ORAL NIGHTLY PRN
Status: DISCONTINUED | OUTPATIENT
Start: 2018-05-05 | End: 2018-05-06 | Stop reason: HOSPADM

## 2018-05-05 RX ORDER — LOSARTAN POTASSIUM 25 MG/1
50 TABLET ORAL EVERY MORNING
Status: DISCONTINUED | OUTPATIENT
Start: 2018-05-05 | End: 2018-05-06 | Stop reason: HOSPADM

## 2018-05-05 RX ORDER — TAMSULOSIN HYDROCHLORIDE 0.4 MG/1
0.4 CAPSULE ORAL EVERY MORNING
Status: DISCONTINUED | OUTPATIENT
Start: 2018-05-05 | End: 2018-05-06 | Stop reason: HOSPADM

## 2018-05-05 RX ADMIN — POLYETHYLENE GLYCOL 3350 17 G: 17 POWDER, FOR SOLUTION ORAL at 03:05

## 2018-05-05 RX ADMIN — TAMSULOSIN HYDROCHLORIDE 0.4 MG: 0.4 CAPSULE ORAL at 07:05

## 2018-05-05 RX ADMIN — ROPIVACAINE HYDROCHLORIDE 8 ML/HR: 2 INJECTION, SOLUTION EPIDURAL; INFILTRATION at 09:05

## 2018-05-05 RX ADMIN — ACETAMINOPHEN 1000 MG: 500 TABLET, FILM COATED ORAL at 11:05

## 2018-05-05 RX ADMIN — POLYETHYLENE GLYCOL 3350 17 G: 17 POWDER, FOR SOLUTION ORAL at 09:05

## 2018-05-05 RX ADMIN — ROPIVACAINE HYDROCHLORIDE 8 ML/HR: 2 INJECTION, SOLUTION EPIDURAL; INFILTRATION at 11:05

## 2018-05-05 RX ADMIN — OXYCODONE HYDROCHLORIDE 15 MG: 5 TABLET ORAL at 06:05

## 2018-05-05 RX ADMIN — THERA TABS 1 TABLET: TAB at 09:05

## 2018-05-05 RX ADMIN — RAMELTEON 8 MG: 8 TABLET, FILM COATED ORAL at 08:05

## 2018-05-05 RX ADMIN — CELECOXIB 200 MG: 200 CAPSULE ORAL at 09:05

## 2018-05-05 RX ADMIN — FAMOTIDINE 20 MG: 20 TABLET, FILM COATED ORAL at 08:05

## 2018-05-05 RX ADMIN — ASPIRIN 325 MG: 325 TABLET, DELAYED RELEASE ORAL at 08:05

## 2018-05-05 RX ADMIN — ACETAMINOPHEN 1000 MG: 500 TABLET, FILM COATED ORAL at 05:05

## 2018-05-05 RX ADMIN — STANDARDIZED SENNA CONCENTRATE AND DOCUSATE SODIUM 1 TABLET: 8.6; 5 TABLET, FILM COATED ORAL at 08:05

## 2018-05-05 RX ADMIN — FAMOTIDINE 20 MG: 20 TABLET, FILM COATED ORAL at 09:05

## 2018-05-05 RX ADMIN — OXYCODONE HYDROCHLORIDE 15 MG: 5 TABLET ORAL at 12:05

## 2018-05-05 RX ADMIN — PREGABALIN 75 MG: 75 CAPSULE ORAL at 08:05

## 2018-05-05 RX ADMIN — ASPIRIN 325 MG: 325 TABLET, DELAYED RELEASE ORAL at 07:05

## 2018-05-05 RX ADMIN — STANDARDIZED SENNA CONCENTRATE AND DOCUSATE SODIUM 1 TABLET: 8.6; 5 TABLET, FILM COATED ORAL at 09:05

## 2018-05-05 RX ADMIN — LOSARTAN POTASSIUM 50 MG: 25 TABLET, FILM COATED ORAL at 07:05

## 2018-05-05 NOTE — SUBJECTIVE & OBJECTIVE
Principal Problem:Osteoarthritis of left knee    Principal Orthopedic Problem: same    Interval History: Patient seen and examined at bedside.  No acute events overnight.  Pain controlled.    Review of patient's allergies indicates:   Allergen Reactions    Clindamycin Swelling     Throat swells    Lisinopril Swelling and Other (See Comments)     Throat swelling       Current Facility-Administered Medications   Medication    0.9%  NaCl infusion    acetaminophen tablet 1,000 mg    aspirin EC tablet 325 mg    aspirin EC tablet 325 mg    bisacodyl suppository 10 mg    celecoxib capsule 200 mg    celecoxib capsule 200 mg    famotidine tablet 20 mg    fluticasone 50 mcg/actuation nasal spray 50 mcg    losartan tablet 50 mg    morphine injection 2 mg    morphine injection 2 mg    morphine injection 2 mg    multivitamin tablet 1 tablet    naloxone 0.4 mg/mL injection 0.02 mg    ondansetron disintegrating tablet 8 mg    oxyCODONE immediate release tablet 10 mg    oxyCODONE immediate release tablet 15 mg    oxyCODONE immediate release tablet 5 mg    polyethylene glycol packet 17 g    polyethylene glycol packet 17 g    pravastatin tablet 40 mg    pregabalin capsule 75 mg    promethazine (PHENERGAN) 6.25 mg in dextrose 5 % 50 mL IVPB    ramelteon tablet 8 mg    ropivacaine (PF) 2 mg/ml (0.2%) infusion    ropivacaine 0.2% ON-Q C-BLOC 400 ML (SELECT A FLOW)    senna-docusate 8.6-50 mg per tablet 1 tablet    sodium chloride 0.9% flush 3 mL    tamsulosin 24 hr capsule 0.4 mg    tranexamic acid (CYKLOKAPRON) 1,000 mg in sodium chloride 0.9% 100 mL    traZODone tablet 100 mg     Objective:     Vital Signs (Most Recent):  Temp: 96.5 °F (35.8 °C) (05/05/18 0545)  Pulse: 76 (05/05/18 0545)  Resp: 18 (05/05/18 0545)  BP: (!) 148/76 (05/05/18 0545)  SpO2: 97 % (05/05/18 0545) Vital Signs (24h Range):  Temp:  [96.5 °F (35.8 °C)-97.9 °F (36.6 °C)] 96.5 °F (35.8 °C)  Pulse:  [54-87] 76  Resp:  [10-20]  "18  SpO2:  [93 %-100 %] 97 %  BP: (112-164)/(57-88) 148/76     Weight: 102.1 kg (225 lb)  Height: 5' 11" (180.3 cm)  Body mass index is 31.38 kg/m².      Intake/Output Summary (Last 24 hours) at 05/05/18 0644  Last data filed at 05/05/18 0552   Gross per 24 hour   Intake             2000 ml   Output             1925 ml   Net               75 ml       Ortho/SPM Exam   AAOx4  NAD  RRR  No increased WOB  Aquacel c/d/i  Polar ice in place  SILT and motor intact T/SP/DP  WWP extremities  FCDs in place and functioning      Significant Labs: All pertinent labs within the past 24 hours have been reviewed.    Significant Imaging: X-Ray: I have reviewed all pertinent results/findings and my personal findings are:  stable appearing implants  "

## 2018-05-05 NOTE — PT/OT/SLP PROGRESS
Physical Therapy      Patient Name:  Korey Santos   MRN:  [id number]    Patient not seen today secondary to Patient is scheduled to be discharged today, but attempted PM treatment and patient was in bed with a pillow covering his forehead as He reported feeling very nauseated and with swelling and pain on his stomach, RN was aware. Will follow-up on next scheduled visit.    Trell Baker, PTA

## 2018-05-05 NOTE — HOSPITAL COURSE
On 5/4/2018, the patient arrived to the [facility] for proper pre-operative management.  Upon completion of pre-operative preparation, the patient was taken back to the operative theatre.  A left TKA was performed without complication and the patient was transported to the post anesthesia care unit in stable condition.  After appropriate recovery from the anaesthetic agents used during the surgery, the patient was then transported to the hospital inpatient floor.  The interim of the hospital stay from arrival on the floor up to discharge has been uncomplicated. The patient has experienced minimal electrolyte imbalances that have been repleated accordingly.  The patient's diet has progressed to a regular diet with no nausea or vomiting.  The patient's pain has been controlled using a multimodal approach with the help of the anesthesia pain service. Currently, the patient's pain is well controlled on an oral regimen.  The patient has been voiding without difficulty ever since surgery. The patient began participation in physical therapy on post-operative day one and has progressed throughout the entire hospital stay.  Currently the patient is ambulating with moderate assistance and the physical therapy team feels that the patient's progress is sufficient to allow the patient to be discharged home safely.  The patient agrees with this assessment and desires a discharge to home today.

## 2018-05-05 NOTE — ANESTHESIA POST-OP PAIN MANAGEMENT
Acute Pain Service Progress Note    Korey Santos is a 77 y.o., male, [id number].    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       Subjective:     General appearance of alert, oriented, no complaints   Pain with rest: 6    Numbers   Pain with movement: 8    Numbers   Side Effects    1. Pruritis No    2. Nausea No    3. Motor Blockade No, 0=Ability to raise lower extremities off bed    4. Sedation No, 1=awake and alert    Objective:       Catheter site clean, dry, intact        Vitals   Vitals:    05/05/18 0730   BP: 128/72   Pulse: (!) 58   Resp: 18   Temp: 35.6 °C (96.1 °F)        Labs    Admission on 05/04/2018   Component Date Value Ref Range Status    Prothrombin Time 05/04/2018 10.6  9.0 - 12.5 sec Final    INR 05/04/2018 1.0  0.8 - 1.2 Final    Group & Rh 05/04/2018 O NEG   Final    Indirect Nadya 05/04/2018 NEG   Final    Sodium 05/04/2018 140  136 - 145 mmol/L Final    Potassium 05/04/2018 4.1  3.5 - 5.1 mmol/L Final    Chloride 05/04/2018 112* 95 - 110 mmol/L Final    CO2 05/04/2018 22* 23 - 29 mmol/L Final    Glucose 05/04/2018 103  70 - 110 mg/dL Final    BUN, Bld 05/04/2018 15  8 - 23 mg/dL Final    Creatinine 05/04/2018 0.8  0.5 - 1.4 mg/dL Final    Calcium 05/04/2018 8.4* 8.7 - 10.5 mg/dL Final    Anion Gap 05/04/2018 6* 8 - 16 mmol/L Final    eGFR if African American 05/04/2018 >60.0  >60 mL/min/1.73 m^2 Final    eGFR if non African American 05/04/2018  >60.0  >60 mL/min/1.73 m^2 Final        Meds   Current Facility-Administered Medications   Medication Dose Route Frequency Provider Last Rate Last Dose    acetaminophen tablet 1,000 mg  1,000 mg Oral Q6H Ricardo Valdes MD   1,000 mg at 05/05/18 0531    aspirin EC tablet 325 mg  325 mg Oral BID Ricardo Valdes MD   325 mg at 05/04/18 1659    aspirin EC tablet 325 mg  325 mg Oral FLAQUITO Valdes MD   325 mg at 05/05/18 0714    bisacodyl suppository 10 mg  10 mg Rectal Q12H PRN Ricardo Valdes MD        celecoxib capsule 200 mg  200 mg Oral Daily Nickie Hernandes MD   200 mg at 05/05/18 0915    celecoxib capsule 200 mg  200 mg Oral Daily Ricardo Valdes MD        famotidine tablet 20 mg  20 mg Oral BID Ricardo Valdes MD   20 mg at 05/05/18 0914    fluticasone 50 mcg/actuation nasal spray 50 mcg  1 spray Each Nare BID PRN Ricardo Valdes MD        losartan tablet 50 mg  50 mg Oral FLAQUITO Valdes MD   50 mg at 05/05/18 0714    morphine injection 2 mg  2 mg Intravenous Q3H PRN Ricardo Valdes MD        morphine injection 2 mg  2 mg Intravenous Q3H PRN Ricardo Valdes MD        morphine injection 2 mg  2 mg Intravenous Q3H PRN Ricardo Valdes MD        multivitamin tablet 1 tablet  1 tablet Oral Daily Ricardo Valdes MD   1 tablet at 05/05/18 0917    naloxone 0.4 mg/mL injection 0.02 mg  0.02 mg Intravenous PRN Ricardo Valdes MD        ondansetron disintegrating tablet 8 mg  8 mg Oral Q8H PRN Nickie Hernandes MD        oxyCODONE immediate release tablet 10 mg  10 mg Oral Q3H PRN Ricardo Valdes MD   10 mg at 05/04/18 1100    oxyCODONE immediate release tablet 15 mg  15 mg Oral Q3H PRN Ricardo Valdes MD   15 mg at 05/05/18 0621    oxyCODONE immediate release tablet 5 mg  5 mg Oral Q3H PRN Ricardo Valdes MD        polyethylene glycol packet 17 g  17 g Oral Daily Ricardo Valdes MD   17 g at 05/05/18 0940    polyethylene glycol packet 17 g  17 g Oral Daily Ricardo Valdes MD        pravastatin  tablet 40 mg  40 mg Oral Nightly Ricardo Valdes MD        pregabalin capsule 75 mg  75 mg Oral QHS Ricardo Valdes MD   75 mg at 05/04/18 2125    promethazine (PHENERGAN) 6.25 mg in dextrose 5 % 50 mL IVPB  6.25 mg Intravenous Q6H PRN Ricardo Valdes MD        ramelteon tablet 8 mg  8 mg Oral Nightly PRN Ricardo Valdes MD   8 mg at 05/04/18 2222    ropivacaine (PF) 2 mg/ml (0.2%) infusion  8 mL/hr Perineural Continuous Arlene Muñoz MD 8 mL/hr at 05/05/18 0928 8 mL/hr at 05/05/18 0928    ropivacaine 0.2% ON-Q C-BLOC 400 ML (SELECT A FLOW)   Perineural Continuous PRN Ricardo Valdes MD        senna-docusate 8.6-50 mg per tablet 1 tablet  1 tablet Oral BID Ricardo Valdes MD   1 tablet at 05/05/18 0914    sodium chloride 0.9% flush 3 mL  3 mL Intra-Catheter Q8H PRN Ricardo Valdes MD        tamsulosin 24 hr capsule 0.4 mg  0.4 mg Oral QAM Ricardo Valdes MD   0.4 mg at 05/05/18 0713    tranexamic acid (CYKLOKAPRON) 1,000 mg in sodium chloride 0.9% 100 mL  1,000 mg Intravenous Once Ricardo Valdes MD        traZODone tablet 100 mg  100 mg Oral Nightly PRN Ricardo Valdes MD            Anticoagulant dose ASA at 325mg.    Assessment/Plan:     Pain control adequate. Bolused 10cc this AM. Patient is not a PSH patient, and does not have a caregiver or family member to be home with him for the next 48hours for going home with an OnQ. Paused PNC at 1030, and will see how patient does with PO PRN meds. Plan for d/c today after PNC removal per primary team.       Evaluator Daniela Olivares    Patient seen on rounds with Dr. Olivares.  He is doing well and discharge is anticipated today as long as pain is manageable. On Q is not an option for him.

## 2018-05-05 NOTE — NURSING
During nursing rounds, patient is in bed, sleeping, nonverbal pain indicators absent.  Siderails up x2, polar ice on left knee, ropivacaine infusing at 8ml/hr.  Valerie Puente LPN

## 2018-05-05 NOTE — PLAN OF CARE
Pt being discharged home, with [facility], put in call to let them know that patient was being discharged home today.  Also, patient will need walker with wheels.  Put in call to [employer], and faxed orders and paperwork to [phone].  CM confirmed patient's address, [address], [facility], [place], [place], [address].  Also, confirmed phone number [phone].  Pt ready to discharge home after walker with wheels is delivered to the room.      Pt's family is providing transportation home.

## 2018-05-05 NOTE — ANESTHESIA POST-OP PAIN MANAGEMENT
Acute Pain Service and Perioperative Surgical Home Progress Note    HPI  Korey Sanots is a 77 y.o., male, PHx significant for HTN, HLD and BPH.       Interval history  No issues overnight. Bolused catheter 10cc this AM as patient complaining of pain.     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       Subjective:       General appearance of alert, oriented, no complaints   Pain with rest: 6    Numbers   Pain with movement: 9    Numbers   Side Effects    1. Pruritis No    2. Nausea No    3. Motor Blockade No, 0=Ability to raise lower extremities off bed    4. Sedation No, 1=awake and alert    Schedule Medications:    acetaminophen  1,000 mg Oral Q6H    aspirin  325 mg Oral BID    aspirin  325 mg Oral QAM    celecoxib  200 mg Oral Daily    celecoxib  200 mg Oral Daily    famotidine  20 mg Oral BID    losartan  50 mg Oral QAM    multivitamin  1 tablet Oral Daily    polyethylene glycol  17 g Oral Daily    polyethylene glycol  17 g Oral Daily    pravastatin  40 mg Oral Nightly    pregabalin  75 mg Oral QHS    senna-docusate 8.6-50 mg  1 tablet Oral BID    tamsulosin  0.4 mg Oral QAM    tranexamic acid (CYKLOKAPRON) IVPB  1,000 mg Intravenous Once        Continuous Infusions:   sodium chloride 0.9% 150 mL/hr at 05/04/18 1036    ropivacaine (PF) 2 mg/ml (0.2%) 8 mL/hr (05/05/18 0928)    ropivacaine          PRN Medications:  bisacodyl, fluticasone, morphine, morphine, morphine, naloxone, ondansetron, oxyCODONE, oxyCODONE, oxyCODONE, promethazine (PHENERGAN) IVPB, ramelteon, ropivacaine, sodium  chloride 0.9%, traZODone       Antibiotics:  Antibiotics     None             Objective:     Catheter site clean, dry, intact          Vital Signs (Most Recent):  Temp: 35.6 °C (96.1 °F) (05/05/18 0730)  Pulse: (!) 58 (05/05/18 0730)  Resp: 18 (05/05/18 0730)  BP: 128/72 (05/05/18 0730)  SpO2: 99 % (05/05/18 0730) Vital Signs Range (Last 24H):  Temp:  [35.6 °C (96.1 °F)-36.6 °C (97.9 °F)]   Pulse:  [54-87]   Resp:  [12-20]   BP: (112-164)/(57-88)   SpO2:  [93 %-100 %]          I & O (Last 24H):  Intake/Output Summary (Last 24 hours) at 05/05/18 0937  Last data filed at 05/05/18 0552   Gross per 24 hour   Intake              500 ml   Output             1775 ml   Net            -1275 ml       Physical Exam:    GA: Alert, comfortable, no acute distress.   Pulmonary: Clear to auscultation A/P/L. No wheezing, crackles, or rhonchi.  Cardiac: RRR S1 & S2 w/o rubs/murmurs/gallops.   Abdominal:Bowel sounds present. No tenderness to palpation or distension. No appreciable hepatosplenomegaly.   Skin: No jaundice, rashes, or visible lesions.         Laboratory:  CBC: No results for input(s): WBC, RBC, HGB, HCT, PLT, MCV, MCH, MCHC in the last 72 hours.    BMP: Recent Labs      05/04/18   1038   NA  140   K  4.1   CO2  22*   CL  112*   BUN  15   CREATININE  0.8   GLU  103   CALCIUM  8.4*       Recent Labs      05/04/18   0620   INR  1.0         Anticoagulant dose ASA at 325mg.    Assessment:         Pain control adequate. Bolused catheter this AM as patient with 6/10 pain at rest.     Plan:     1) Pain:    Adductor canal perineural catheter in place and infusing. Good level. Multimodal pain regimen with acetaminophen, Celebrex, Lyrica, and prn oxycodone given  Will continue to monitor. Plan to D/C home with On-[facility] if patient discharged today.   2) HTN: restuarted home meds. BP well controlled   3) FEN/GI: Tolerating liquids, advance diet as tolerated.    4) Dispo: Pt working well with PT/OT. Case management and SW for placement.  Plan for D/C today if patient works well with PT and meets goals.          Evaluator Daniela Olivares MD

## 2018-05-05 NOTE — PLAN OF CARE
Problem: Patient Care Overview  Goal: Plan of Care Review  Patient AAox4, plan was for patient to be discharged home today.  However, patient is not feeling well and states that he is nauseated due to constipation issues.  Order obtained for additional miralax and this was given to patient, patient did refuse the bisocodyl suppository.  Patient has polar ice and tricia hose to left knee, ropivicaine was restarted at 8ml/hr.  Patient remains free from falls and ambulated with PT/OT today and in room.  Valerie Puente LPN

## 2018-05-05 NOTE — PT/OT/SLP PROGRESS
"Physical Therapy Treatment    Patient Name:  Korey Santos   MRN:  [id number]    Recommendations:     Discharge Recommendations:  outpatient PT   Discharge Equipment Recommendations: shower chair, walker, rolling   Barriers to discharge: None    Assessment:     Korey Santos is a 77 y.o. male admitted with a medical diagnosis of Osteoarthritis of left knee.  He presents with the following impairments/functional limitations:  weakness, impaired endurance, impaired functional mobilty, gait instability, decreased lower extremity function, pain, edema, decreased ROM, impaired skin . Patient appears anxious at times, interrupting treatment to ask questions, but overall participation and mobility was very good. Patient ambulates with decreased cecilia and antalgic gait pattern.    Rehab Prognosis:  good; patient would benefit from acute skilled PT services to address these deficits and reach maximum level of function.      Recent Surgery: Procedure(s) (LRB):  REPLACEMENT-KNEE-TOTAL (Left) 1 Day Post-Op    Plan:     During this hospitalization, patient to be seen BID to address the above listed problems via gait training, therapeutic activities, therapeutic exercises, neuromuscular re-education  · Plan of Care Expires:  06/04/18   Plan of Care Reviewed with: patient    Subjective     Communicated with RN prior to session.  Patient found on bedside chair upon PT entry to room, agreeable to treatment.    Patient states " They want to send me home today, but I would like to stay an extra day, because I live by myself".    Chief Complaint: tightness on the back of his knee".  Patient comments/goals: to get stronger  Pain/Comfort:  · Pain Rating 1: 4/10  · Location - Side 1: Left  · Location - Orientation 1: generalized  · Location 1: knee  · Pain Addressed 1: Pre-medicate for activity, Reposition, Distraction  · Pain Rating Post-Intervention 1: 4/10    Patients cultural, spiritual, Taoism conflicts given the " current situation: None    Objective:     Patient found with: perineural catheter     General Precautions: Standard,     Orthopedic Precautions:N/A   Braces:       Functional Mobility:  · Transfers:     · Sit to Stand:  stand by assistance with rolling walker  · Bed to Chair: stand by assistance with  rolling walker  using  Stand Pivot  · Gait: 100 ft with RW and SBA with chair follow and cues to correct posture and proper RW management.      AM-PAC 6 CLICK MOBILITY  Turning over in bed (including adjusting bedclothes, sheets and blankets)?: 3  Sitting down on and standing up from a chair with arms (e.g., wheelchair, bedside commode, etc.): 4  Moving from lying on back to sitting on the side of the bed?: 3  Moving to and from a bed to a chair (including a wheelchair)?: 3  Need to walk in hospital room?: 3  Climbing 3-5 steps with a railing?: 3  Total Score: 19       Therapeutic Activities and Exercises:   Assisted changing from pants to shorts. Educated on proper management of the RW, posture and transfer mechanics. Treatment was interrupted by MD. There ex : SAQ, HS, QUAD SETS AND AP 2X12 REPS B LE.    Patient left up in chair with all lines intact and call button in reach..    GOALS:    Physical Therapy Goals        Problem: Physical Therapy Goal    Goal Priority Disciplines Outcome Goal Variances Interventions   Physical Therapy Goal     PT/OT, PT Ongoing (interventions implemented as appropriate)     Description:  Goals to be met by: 18     Patient will increase functional independence with mobility by performin. Supine to sit with Supervision  2. Sit to supine with Supervision  3. Sit to stand transfer with Stand-by Assistance- MET  4. Gait  x 150 feet with Stand-by Assistance using Rolling Walker.   5. Ascend/Descend 6 inch curb step with Contact Guard Assistance using Rolling Walker.  6. Lower extremity exercise program x 30 reps per handout, with independence                        Time Tracking:      PT Received On: 05/05/18  PT Start Time: 1036     PT Stop Time: 1119  PT Total Time (min): 43 min     Billable Minutes: Gait Training 15, Therapeutic Activity 15 and Therapeutic Exercise 13    Treatment Type: Treatment  PT/PTA: PTA     PTA Visit Number: 1     Trell Baker, PTA  05/05/2018

## 2018-05-05 NOTE — PLAN OF CARE
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       Future Appointments  Date Time Provider Department Center   5/16/2018 1:15 PM Renita Franco MD [facility] [place]   6/25/2018 1:20 PM Juan Solorzano MD [facility]     Follow-up Information     Renita Franco MD On 5/16/2018.    Specialties:  Sports Medicine, Orthopedic Surgery  Why:  For wound re-check  Contact information:  [address] [facility] [address]  [phone]                     I have seen and examined this patient face to face today. My clinical findings that support the need for the home health skilled services and home bound status are the following:  Weakness/numbness causing balance and gait disturbance due to Joint Replacement making it taxing to leave home.    Allergies:  Review of patient's allergies indicates:   Allergen Reactions    Clindamycin Swelling     Throat swells    Lisinopril Swelling and Other (See Comments)     Throat swelling       Diet: regular diet    Activities: activity as tolerated    Nursing:   SN to complete comprehensive assessment including routine vital signs. Instruct on disease process and s/s of complications to report to MD. Follow specific home health arthoplasty protocol. Review/verify medication list sent home with the patient at time of discharge  and instruct patient/caregiver as needed.    Notify MD if SBP > 160 or < 90; DBP > 90 or <  50; HR > 120 or < 50; Temp > 101    Home Medical Equipment:  Walker, 3-1 bedside commode, transfer tub bench    CONSULTS:    Physical Therapy to evaluate and treat. Evaluate for home safety and equipment needs; Establish/upgrade home exercise program. Perform / instruct on therapeutic exercises, gait training, transfer training, and Range of Motion.    WOUND CARE ORDERS  Do not remove dressings unless directed by sports clinic    Do not get dressings wet. Do not shower.     If dressing continues to be saturated or there are signs of infection, please call sports Clinic [phone] for further instructions and to make appt to be seen.       Medications: Review discharge medications with patient and family and provide education.      Current Discharge Medication List      CONTINUE these medications which have NOT CHANGED    Details   aspirin (ECOTRIN) 325 MG EC tablet Take 1 tablet (325 mg total) by mouth once daily.  Qty: 42 tablet, Refills: 0      fluticasone (FLONASE) 50 mcg/actuation nasal spray 1 spray (50 mcg total) by Each Nare route 2 (two) times daily as needed for Rhinitis.  Qty: 15 g, Refills: 0    Associated Diagnoses: Chronic rhinitis, unspecified type      losartan (COZAAR) 50 MG tablet Take 1 tablet (50 mg total) by mouth once daily.  Qty: 90 tablet, Refills: 1      polyethylene glycol (GLYCOLAX) 17 gram/dose powder Take 17 g by mouth daily as needed.  Qty: 510 g, Refills: 1      tamsulosin (FLOMAX) 0.4 mg Cp24 Take 1 capsule (0.4 mg total) by mouth once daily.  Qty: 90 capsule, Refills: 1      linaclotide (LINZESS) 145 mcg Cap capsule Take 1 capsule (145 mcg total) by mouth once daily.  Qty: 30 capsule, Refills: 3    Associated Diagnoses: Chronic idiopathic constipation      oxyCODONE-acetaminophen (PERCOCET)  mg per tablet Take 1 tablet by mouth every 6 (six) hours as needed for Pain.  Qty: 60 tablet, Refills: 0      pravastatin (PRAVACHOL) 40 MG tablet Take 1 tablet (40 mg total) by mouth once  daily.  Qty: 30 tablet, Refills: 11      promethazine (PHENERGAN) 25 MG tablet Take 1 tablet (25 mg total) by mouth every 6 (six) hours as needed for Nausea.  Qty: 60 tablet, Refills: 0      sildenafil (REVATIO) 20 mg Tab Take 5 po 1 hour before sexual activity  Qty: 50 tablet, Refills: 11      trazodone (DESYREL) 100 MG tablet 1 pill PO PRN for insomnia at bedtime. .  Qty: 15 tablet, Refills: 0         STOP taking these medications       hydrocodone-acetaminophen 5-325mg (NORCO) 5-325 mg per tablet Comments:   Reason for Stopping:         multivitamin capsule Comments:   Reason for Stopping:               I certify that this patient is confined to his home and needs intermittent skilled nursing care and physical therapy.

## 2018-05-05 NOTE — PROGRESS NOTES
[facility]  Orthopedics  Progress Note    Patient Name: Korey Santos  MRN: [id number]  Admission Date: 5/4/2018  Hospital Length of Stay: 1 days  Attending Provider: Renita Franco MD  Primary Care Provider: Juan Solorzano MD  Follow-up For: Procedure(s) (LRB):  REPLACEMENT-KNEE-TOTAL (Left)    Post-Operative Day: 1 Day Post-Op  Subjective:     Principal Problem:Osteoarthritis of left knee    Principal Orthopedic Problem: same    Interval History: Patient seen and examined at bedside.  No acute events overnight.  Pain controlled.    Review of patient's allergies indicates:   Allergen Reactions    Clindamycin Swelling     Throat swells    Lisinopril Swelling and Other (See Comments)     Throat swelling       Current Facility-Administered Medications   Medication    0.9%  NaCl infusion    acetaminophen tablet 1,000 mg    aspirin EC tablet 325 mg    aspirin EC tablet 325 mg    bisacodyl suppository 10 mg    celecoxib capsule 200 mg    celecoxib capsule 200 mg    famotidine tablet 20 mg    fluticasone 50 mcg/actuation nasal spray 50 mcg    losartan tablet 50 mg    morphine injection 2 mg    morphine injection 2 mg    morphine injection 2 mg    multivitamin tablet 1 tablet    naloxone 0.4 mg/mL injection 0.02 mg    ondansetron disintegrating tablet 8 mg    oxyCODONE immediate release tablet 10 mg    oxyCODONE immediate release tablet 15 mg    oxyCODONE immediate release tablet 5 mg    polyethylene glycol packet 17 g    polyethylene glycol packet 17 g    pravastatin tablet 40 mg    pregabalin capsule 75 mg    promethazine (PHENERGAN) 6.25 mg in dextrose 5 % 50 mL IVPB    ramelteon tablet 8 mg    ropivacaine (PF) 2 mg/ml (0.2%) infusion    ropivacaine 0.2% ON-Q C-BLOC 400 ML (SELECT A FLOW)    senna-docusate 8.6-50 mg per tablet 1 tablet    sodium chloride 0.9% flush 3 mL    tamsulosin 24 hr capsule 0.4 mg    tranexamic acid (CYKLOKAPRON) 1,000 mg in sodium  "chloride 0.9% 100 mL    traZODone tablet 100 mg     Objective:     Vital Signs (Most Recent):  Temp: 96.5 °F (35.8 °C) (05/05/18 0545)  Pulse: 76 (05/05/18 0545)  Resp: 18 (05/05/18 0545)  BP: (!) 148/76 (05/05/18 0545)  SpO2: 97 % (05/05/18 0545) Vital Signs (24h Range):  Temp:  [96.5 °F (35.8 °C)-97.9 °F (36.6 °C)] 96.5 °F (35.8 °C)  Pulse:  [54-87] 76  Resp:  [10-20] 18  SpO2:  [93 %-100 %] 97 %  BP: (112-164)/(57-88) 148/76     Weight: 102.1 kg (225 lb)  Height: 5' 11" (180.3 cm)  Body mass index is 31.38 kg/m².      Intake/Output Summary (Last 24 hours) at 05/05/18 0644  Last data filed at 05/05/18 0552   Gross per 24 hour   Intake             2000 ml   Output             1925 ml   Net               75 ml       Ortho/SPM Exam   AAOx4  NAD  RRR  No increased WOB  Aquacel c/d/i  Polar ice in place  SILT and motor intact T/SP/DP  WWP extremities  FCDs in place and functioning      Significant Labs: All pertinent labs within the past 24 hours have been reviewed.    Significant Imaging: X-Ray: I have reviewed all pertinent results/findings and my personal findings are:  stable appearing implants    Assessment/Plan:     * Osteoarthritis of left knee    77 y.o. male POD1 s/p left TKA    Pain control: multimodal  PT/OT: WBAT LLE  DVT PPx:  BID, FCDs at all times when not ambulating  Abx: postop Ancef  Drain: discontinued this morning    Dispo: f/u PT recs, home today                Sandro Lopez MD  Orthopedics  [facility]  "

## 2018-05-05 NOTE — HPI
Korey Santos, a 77 y.o. male, presents today for evaluation of his left knee. He states that he is experiencing swelling and pain in both knees, worst in his left knee. He has had multiple aspirations, steroid injections, and Synvisc injections in his bilateral knees. He reports his last series synvisc injection in the left knee was 2 months ago which gave him 0% relief. He would like to discuss surgery options in his left knee and a possible injection in his right knee today. The pain started 3-5 years ago and is becoming progressively worseover the past few months. He reports that the pain is a 7 /10 aching and throbbing pain today and not responding adequately to conservative measures which have included activity modifications, rest, and oral medication. Is affecting ADLs and limiting desired level of activity. Denies numbness, tingling, and radiation.  Pain is 8 /10 at its worst

## 2018-05-05 NOTE — PLAN OF CARE
Problem: Physical Therapy Goal  Goal: Physical Therapy Goal  Goals to be met by: 18     Patient will increase functional independence with mobility by performin. Supine to sit with Supervision  2. Sit to supine with Supervision  3. Sit to stand transfer with Stand-by Assistance- MET  4. Gait  x 150 feet with Stand-by Assistance using Rolling Walker.   5. Ascend/Descend 6 inch curb step with Contact Guard Assistance using Rolling Walker.  6. Lower extremity exercise program x 30 reps per handout, with independence      Outcome: Ongoing (interventions implemented as appropriate)  Patient's mobility continues to improve well as evidence of ability to transfer and walking range.

## 2018-05-05 NOTE — PT/OT/SLP PROGRESS
Occupational Therapy   Treatment    Name: Korey Santos  MRN: [id number]  Admitting Diagnosis:  Osteoarthritis of left knee  1 Day Post-Op    Recommendations:     Discharge Recommendations: outpatient OT  Discharge Equipment Recommendations:  walker, rolling, shower chair  Barriers to discharge:  None    Subjective     Communicated with: RN prior to session.  Pain/Comfort:  · Pain Rating 1: 0/10    Patients cultural, spiritual, Baptism conflicts given the current situation: None    Objective:     Patient found with: perineural catheter    General Precautions: Standard, fall   Orthopedic Precautions:LLE weight bearing as tolerated   Braces: N/A     Occupational Performance:    Bed Mobility:    · Patient completed Supine to Sit with stand by assistance     Functional Mobility/Transfers:  · Patient completed Sit <> Stand Transfer with stand by assistance  with  rolling walker   · Patient completed Bed <> Chair Transfer using Stand Pivot technique with contact guard assistance with rolling walker  · Patient completed Toilet Transfer Stand Pivot technique with contact guard assistance with  rolling walker  · Functional Mobility: Pt was able to walk to bathroom c CGA and RW.    Activities of Daily Living:  · Grooming: modified independence to wash hands while standing at sink.  · UB Dressing: modified independence to don t-shirt.  · LB Dressing: modified independence to don underwear and pants, and min A to don socks and shoes c sock-aid and long handled shoe horn.  · Toileting: modified independence for toilet hygiene.    Patient left up in chair with all lines intact, call button in reach and RN notified    AMPA 6 Click:  AMPA Total Score: 23    Treatment & Education:  Educated pt on bathing and car T/F's.  Education:    Assessment:     Korey Santos is a 77 y.o. male with a medical diagnosis of Osteoarthritis of left knee.  He was able to perform supine/sit, sit/stand, bed/chair, and toilet T/F c SBA and  RW.  Able to perform UB/LB dressing, grooming, and toilet T/F c mod I.  Pt required cues for safety and to take his time.  Performance deficits affecting function are impaired self care skills, impaired functional mobilty, orthopedic precautions.      Rehab Prognosis:  Good; patient would benefit from acute skilled OT services to address these deficits and reach maximum level of function.       Plan:     Patient to be seen daily to address the above listed problems via self-care/home management, therapeutic activities, therapeutic exercises  · Plan of Care Expires:    · Plan of Care Reviewed with: patient    This Plan of care has been discussed with the patient who was involved in its development and understands and is in agreement with the identified goals and treatment plan    GOALS:    Occupational Therapy Goals        Problem: Occupational Therapy Goal    Goal Priority Disciplines Outcome Interventions   Occupational Therapy Goal     OT, PT/OT Ongoing (interventions implemented as appropriate)    Description:  Goals to be met by: 7 days    Patient will increase functional independence with ADLs by performing:    UE Dressing with Supervision.  LE Dressing with Supervision with AD as needed.  Grooming while standing with Supervision.  Toileting from bedside commode with Supervision for hygiene and clothing management.   Stand pivot transfers with Supervision.  Toilet transfer to bedside commode with Supervision.                         Time Tracking:     OT Date of Treatment: 05/05/18  OT Start Time: 0920  OT Stop Time: 1013  OT Total Time (min): 53 min    Billable Minutes:Self Care/Home Management 53    AMIE Vargas  5/5/2018

## 2018-05-05 NOTE — DISCHARGE SUMMARY
[facility]  Orthopedics  Discharge Summary      Patient Name: Korey Santos  MRN: [id number]  Admission Date: 5/4/2018  Hospital Length of Stay: 2 days  Discharge Date and Time:  05/06/2018  Attending Physician: Renita Franco MD   Discharging Provider: Sandro Lopez MD  Primary Care Provider: Juan Solorzano MD    HPI:   Korey Santos, a 77 y.o. male, presents today for evaluation of his left knee. He states that he is experiencing swelling and pain in both knees, worst in his left knee. He has had multiple aspirations, steroid injections, and Synvisc injections in his bilateral knees. He reports his last series synvisc injection in the left knee was 2 months ago which gave him 0% relief. He would like to discuss surgery options in his left knee and a possible injection in his right knee today. The pain started 3-5 years ago and is becoming progressively worseover the past few months. He reports that the pain is a 7 /10 aching and throbbing pain today and not responding adequately to conservative measures which have included activity modifications, rest, and oral medication. Is affecting ADLs and limiting desired level of activity. Denies numbness, tingling, and radiation.  Pain is 8 /10 at its worst    Procedure(s) (LRB):  REPLACEMENT-KNEE-TOTAL (Left)      Hospital Course:  On 5/4/2018, the patient arrived to the [facility] for proper pre-operative management.  Upon completion of pre-operative preparation, the patient was taken back to the operative theatre.  A left TKA was performed without complication and the patient was transported to the post anesthesia care unit in stable condition.  After appropriate recovery from the anaesthetic agents used during the surgery, the patient was then transported to the hospital inpatient floor.  The interim of the hospital stay from arrival on the floor up to discharge has been uncomplicated. The patient has  experienced minimal electrolyte imbalances that have been repleated accordingly.  The patient's diet has progressed to a regular diet. He had some nausea on POD1 that improved before discharge.  The patient's pain has been controlled using a multimodal approach with the help of the anesthesia pain service. Currently, the patient's pain is well controlled on an oral regimen.  The patient has been voiding without difficulty ever since surgery. The patient began participation in physical therapy on post-operative day one and has progressed throughout the entire hospital stay.  Currently the patient is ambulating with moderate assistance and the physical therapy team feels that the patient's progress is sufficient to allow the patient to be discharged home safely.  The patient agrees with this assessment and desires a discharge to home today.            Consults         Status Ordering Provider     Inpatient consult to Pain Management  Once     Provider:  (Not yet assigned)    Acknowledged SUBHASH ALEGRIA     Inpatient consult to Respiratory Care  Once     Provider:  (Not yet assigned)    Acknowledged SUBHASH ALEGRIA     Inpatient consult to Social Work  Once     Provider:  (Not yet assigned)    Acknowledged SUBHASH ALEGRIA          Significant Diagnostic Studies: Labs:   BMP:   Recent Labs  Lab 05/04/18  1038         K 4.1   *   CO2 22*   BUN 15   CREATININE 0.8   CALCIUM 8.4*     Radiology: X-Ray: left knee      Pending Diagnostic Studies:     None        Final Active Diagnoses:    Diagnosis Date Noted POA    PRINCIPAL PROBLEM:  Osteoarthritis of left knee [M17.12] 05/05/2018 Yes    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    Specialties:  Sports Medicine,  "Orthopedic Surgery  Why:  For wound re-check  Contact information:  [address] [address]  [phone]                 Patient Instructions:     WALKER FOR HOME USE   Order Specific Question Answer Comments   Type of Walker: Adult (5'4"-6'6")    With wheels? No    Height: 5' 11" (1.803 m)    Weight: 102.1 kg (225 lb)    Length of need (1-99 months): 99    Please check all that apply: Walker will be used for gait training.      3 IN 1 COMMODE FOR HOME USE   Order Specific Question Answer Comments   Type: Standard    Height: 5' 11" (1.803 m)    Weight: 102.1 kg (225 lb)    Length of need (1-99 months): 99      TRANSFER TUB BENCH FOR HOME USE   Order Specific Question Answer Comments   Type of Transfer Tub Bench: Unpadded    Height: 5' 11" (1.803 m)    Weight: 102.1 kg (225 lb)    Length of need (1-99 months): 99      Activity as tolerated     Sponge bath only until clinic visit     Keep surgical extremity elevated     Lifting restrictions   Order Comments: No strenuous exercise or lifting of > 10 lbs     Weight bearing as tolerated     No driving, operating heavy equipment or signing legal documents while taking pain medication     Leave dressing on - Keep it clean, dry, and intact until clinic visit   Order Comments: Do not remove surgical dressing for 2 weeks post-op. This will be done only by MD at initial post-op visit. If dressing is completely saturated, replace with identical dressing - silver-impregnated hydrocolloid dressing.     Do not get dressings wet. Do not shower.     If dressing continues to be saturated or there are signs of infection, please call [facility] [phone] for further instructions and to make appt to be seen.     Call MD for:  temperature >100.4     Call MD for:  persistent nausea and vomiting     Call MD for:  severe uncontrolled pain     Call MD for:  difficulty breathing, headache or visual disturbances     Call MD for:  redness, tenderness, or signs of infection " (pain, swelling, redness, odor or green/yellow discharge around incision site)     Call MD for:  hives     Call MD for:  persistent dizziness or light-headedness     Call MD for:  extreme fatigue       Medications:  Reconciled Home Medications:      Medication List      CHANGE how you take these medications    aspirin 325 MG EC tablet  Commonly known as:  ECOTRIN  Take 1 tablet (325 mg total) by mouth once daily.  What changed:  when to take this     linaclotide 145 mcg Cap capsule  Commonly known as:  LINZESS  Take 1 capsule (145 mcg total) by mouth once daily.  What changed:  · when to take this  · reasons to take this     losartan 50 MG tablet  Commonly known as:  COZAAR  Take 1 tablet (50 mg total) by mouth once daily.  What changed:  when to take this     pravastatin 40 MG tablet  Commonly known as:  PRAVACHOL  Take 1 tablet (40 mg total) by mouth once daily.  What changed:  when to take this     tamsulosin 0.4 mg Cp24  Commonly known as:  FLOMAX  Take 1 capsule (0.4 mg total) by mouth once daily.  What changed:  when to take this        CONTINUE taking these medications    fluticasone 50 mcg/actuation nasal spray  Commonly known as:  FLONASE  1 spray (50 mcg total) by Each Nare route 2 (two) times daily as needed for Rhinitis.     oxyCODONE-acetaminophen  mg per tablet  Commonly known as:  PERCOCET  Take 1 tablet by mouth every 6 (six) hours as needed for Pain.     polyethylene glycol 17 gram/dose powder  Commonly known as:  GLYCOLAX  Take 17 g by mouth daily as needed.     promethazine 25 MG tablet  Commonly known as:  PHENERGAN  Take 1 tablet (25 mg total) by mouth every 6 (six) hours as needed for Nausea.     sildenafil (antihypertensive) 20 mg Tab  Commonly known as:  REVATIO  Take 5 po 1 hour before sexual activity     traZODone 100 MG tablet  Commonly known as:  DESYREL  1 pill PO PRN for insomnia at bedtime. .        STOP taking these medications    hydrocodone-acetaminophen 5-325mg 5-325 mg per  tablet  Commonly known as:  NORCO     multivitamin capsule            Sandro Lopez MD  Orthopedics  [facility]

## 2018-05-05 NOTE — ASSESSMENT & PLAN NOTE
77 y.o. male POD1 s/p left TKA    Pain control: multimodal  PT/OT: WBAT LLE  DVT PPx:  BID, FCDs at all times when not ambulating  Abx: postop Ancef  Drain: discontinued this morning    Dispo: f/u PT recs, home today

## 2018-05-06 VITALS
BODY MASS INDEX: 31.5 KG/M2 | HEART RATE: 72 BPM | WEIGHT: 225 LBS | HEIGHT: 71 IN | DIASTOLIC BLOOD PRESSURE: 69 MMHG | SYSTOLIC BLOOD PRESSURE: 142 MMHG | RESPIRATION RATE: 16 BRPM | OXYGEN SATURATION: 95 % | TEMPERATURE: 96 F

## 2018-05-06 PROCEDURE — 97530 THERAPEUTIC ACTIVITIES: CPT

## 2018-05-06 PROCEDURE — 63600175 PHARM REV CODE 636 W HCPCS: Performed by: STUDENT IN AN ORGANIZED HEALTH CARE EDUCATION/TRAINING PROGRAM

## 2018-05-06 PROCEDURE — 97110 THERAPEUTIC EXERCISES: CPT

## 2018-05-06 PROCEDURE — 97116 GAIT TRAINING THERAPY: CPT

## 2018-05-06 PROCEDURE — 25000003 PHARM REV CODE 250: Performed by: STUDENT IN AN ORGANIZED HEALTH CARE EDUCATION/TRAINING PROGRAM

## 2018-05-06 PROCEDURE — 25000003 PHARM REV CODE 250: Performed by: ORTHOPAEDIC SURGERY

## 2018-05-06 PROCEDURE — 99231 SBSQ HOSP IP/OBS SF/LOW 25: CPT | Mod: ,,, | Performed by: ANESTHESIOLOGY

## 2018-05-06 RX ORDER — DIPHENHYDRAMINE HCL 25 MG
25 CAPSULE ORAL EVERY 6 HOURS PRN
Status: DISCONTINUED | OUTPATIENT
Start: 2018-05-06 | End: 2018-05-06

## 2018-05-06 RX ORDER — SYRING-NEEDL,DISP,INSUL,0.3 ML 29 G X1/2"
296 SYRINGE, EMPTY DISPOSABLE MISCELLANEOUS DAILY PRN
Status: DISCONTINUED | OUTPATIENT
Start: 2018-05-06 | End: 2018-05-06 | Stop reason: HOSPADM

## 2018-05-06 RX ORDER — PSEUDOEPHEDRINE/ACETAMINOPHEN 30MG-500MG
100 TABLET ORAL DAILY PRN
Status: DISCONTINUED | OUTPATIENT
Start: 2018-05-06 | End: 2018-05-06 | Stop reason: HOSPADM

## 2018-05-06 RX ORDER — DIPHENHYDRAMINE HYDROCHLORIDE 50 MG/ML
25 INJECTION INTRAMUSCULAR; INTRAVENOUS EVERY 6 HOURS PRN
Status: DISCONTINUED | OUTPATIENT
Start: 2018-05-06 | End: 2018-05-06 | Stop reason: HOSPADM

## 2018-05-06 RX ADMIN — THERA TABS 1 TABLET: TAB at 09:05

## 2018-05-06 RX ADMIN — ACETAMINOPHEN 1000 MG: 500 TABLET, FILM COATED ORAL at 06:05

## 2018-05-06 RX ADMIN — FAMOTIDINE 20 MG: 20 TABLET, FILM COATED ORAL at 09:05

## 2018-05-06 RX ADMIN — TAMSULOSIN HYDROCHLORIDE 0.4 MG: 0.4 CAPSULE ORAL at 06:05

## 2018-05-06 RX ADMIN — POLYETHYLENE GLYCOL 3350 17 G: 17 POWDER, FOR SOLUTION ORAL at 09:05

## 2018-05-06 RX ADMIN — DIPHENHYDRAMINE HYDROCHLORIDE 25 MG: 50 INJECTION INTRAMUSCULAR; INTRAVENOUS at 06:05

## 2018-05-06 RX ADMIN — BISACODYL 10 MG: 10 SUPPOSITORY RECTAL at 10:05

## 2018-05-06 RX ADMIN — STANDARDIZED SENNA CONCENTRATE AND DOCUSATE SODIUM 1 TABLET: 8.6; 5 TABLET, FILM COATED ORAL at 09:05

## 2018-05-06 RX ADMIN — ASPIRIN 325 MG: 325 TABLET, DELAYED RELEASE ORAL at 09:05

## 2018-05-06 RX ADMIN — MAGESIUM CITRATE 296 ML: 1.75 LIQUID ORAL at 02:05

## 2018-05-06 RX ADMIN — ENEMA 1 ENEMA: 19; 7 ENEMA RECTAL at 11:05

## 2018-05-06 RX ADMIN — Medication 100 ML: at 02:05

## 2018-05-06 RX ADMIN — SODIUM CHLORIDE 500 ML: 0.9 INJECTION, SOLUTION INTRAVENOUS at 02:05

## 2018-05-06 NOTE — PROGRESS NOTES
[facility]  Orthopedics  Progress Note    Patient Name: Korey Santos  MRN: [id number]  Admission Date: 5/4/2018  Hospital Length of Stay: 2 days  Attending Provider: Renita Franco MD  Primary Care Provider: Juan Solorzano MD  Follow-up For: Procedure(s) (LRB):  REPLACEMENT-KNEE-TOTAL (Left)    Post-Operative Day: 2 Days Post-Op  Subjective:     Principal Problem:Osteoarthritis of left knee    Principal Orthopedic Problem: same    Interval History: Patient seen and examined at bedside.  No acute events overnight.  Pain controlled. Walked 100 feet with PT yesterday. Had to stay overnight secondary to some nausea yesterday.    Review of patient's allergies indicates:   Allergen Reactions    Clindamycin Swelling     Throat swells    Lisinopril Swelling and Other (See Comments)     Throat swelling       Current Facility-Administered Medications   Medication    acetaminophen tablet 1,000 mg    aspirin EC tablet 325 mg    aspirin EC tablet 325 mg    bisacodyl suppository 10 mg    celecoxib capsule 200 mg    celecoxib capsule 200 mg    famotidine tablet 20 mg    fluticasone 50 mcg/actuation nasal spray 50 mcg    losartan tablet 50 mg    morphine injection 2 mg    morphine injection 2 mg    morphine injection 2 mg    multivitamin tablet 1 tablet    naloxone 0.4 mg/mL injection 0.02 mg    ondansetron disintegrating tablet 8 mg    oxyCODONE immediate release tablet 10 mg    oxyCODONE immediate release tablet 15 mg    oxyCODONE immediate release tablet 5 mg    polyethylene glycol packet 17 g    polyethylene glycol packet 17 g    pravastatin tablet 40 mg    pregabalin capsule 75 mg    promethazine (PHENERGAN) 6.25 mg in dextrose 5 % 50 mL IVPB    ramelteon tablet 8 mg    ropivacaine (PF) 2 mg/ml (0.2%) infusion    ropivacaine 0.2% ON-Q C-BLOC 400 ML (SELECT A FLOW)    senna-docusate 8.6-50 mg per tablet 1 tablet    sodium chloride 0.9% flush 3 mL    tamsulosin 24 hr  "capsule 0.4 mg    tranexamic acid (CYKLOKAPRON) 1,000 mg in sodium chloride 0.9% 100 mL    traZODone tablet 100 mg     Objective:     Vital Signs (Most Recent):  Temp: 98.7 °F (37.1 °C) (05/06/18 0435)  Pulse: 69 (05/06/18 0435)  Resp: 18 (05/06/18 0435)  BP: (!) 150/73 (05/06/18 0435)  SpO2: 98 % (05/06/18 0435) Vital Signs (24h Range):  Temp:  [96.1 °F (35.6 °C)-98.7 °F (37.1 °C)] 98.7 °F (37.1 °C)  Pulse:  [57-69] 69  Resp:  [16-18] 18  SpO2:  [97 %-99 %] 98 %  BP: (109-150)/(55-76) 150/73     Weight: 102.1 kg (225 lb)  Height: 5' 11" (180.3 cm)  Body mass index is 31.38 kg/m².      Intake/Output Summary (Last 24 hours) at 05/06/18 0623  Last data filed at 05/06/18 0448   Gross per 24 hour   Intake            919.2 ml   Output             1425 ml   Net           -505.8 ml       Ortho/SPM Exam     AAOx4  NAD  RRR  No increased WOB  Aquacel c/d/i  Polar ice in place  SILT and motor intact T/SP/DP  WWP extremities  FCDs in place and functioning      Significant Labs: All pertinent labs within the past 24 hours have been reviewed.    Significant Imaging: X-Ray: I have reviewed all pertinent results/findings and my personal findings are:  stable appearing implants    Assessment/Plan:     * Osteoarthritis of left knee    77 y.o. male POD2 s/p left TKA    Pain control: multimodal  PT/OT: WBAT LLE  DVT PPx:  BID, FCDs at all times when not ambulating  Abx: postop Ancef    Dispo: f/u PT recs, home today                Sandro Lopez MD  Orthopedics  [facility]  "

## 2018-05-06 NOTE — PLAN OF CARE
Problem: Physical Therapy Goal  Goal: Physical Therapy Goal  Goals to be met by: 18     Patient will increase functional independence with mobility by performin. Supine to sit with Supervision  2. Sit to supine with Supervision  3. Sit to stand transfer with Stand-by Assistance- MET  4. Gait  x 150 feet with Stand-by Assistance using Rolling Walker.   5. Ascend/Descend 6 inch curb step with Contact Guard Assistance using Rolling Walker.  6. Lower extremity exercise program x 30 reps per handout, with independence       Outcome: Ongoing (interventions implemented as appropriate)  Patient's strength continues to improve with each visit.

## 2018-05-06 NOTE — PLAN OF CARE
CM advised pt is going to d/c today. [facility] arranged yesterday, family will tx home. Pt is clear to d/c from a CM standpoint.

## 2018-05-06 NOTE — PLAN OF CARE
Problem: Patient Care Overview  Goal: Plan of Care Review  Patient AAOx4, patient is still pending discharge today.  However, patient is still with c/o of no bowel movement.  Patient has been given a bisacodyl suppository and enema this morning.  Patient refuses to take pain medication due to constipation issues.  Polar ice in applied to left knee, scd's used when in bed.  Valerie Puente LPN

## 2018-05-06 NOTE — ANESTHESIA POST-OP PAIN MANAGEMENT
Acute Pain Service Progress Note    Korey Santos is a 77 y.o., male, [id number].    Surgery:    REPLACEMENT-KNEE-TOTAL (Left Knee) - ORR LENGTH OF STAY:1;REGIONAL W/O CATHETER-ADDUCTOR;EXPAREL-BUPIVACAINE LIPOSOME INJECTION 120CC AND CLONIDINE/EPI/KETOROLAC/ROPIVACAINE INJECTION 30CC     Post Op Day #: 2    Catheter type: perineural  adductor    Infusion type: Ropivacaine 0.2%  8 basal    Problem List:    There are no hospital problems to display for this patient.      Subjective:     General appearance of alert, oriented, no complaints   Pain with rest: 6    Numbers   Pain with movement: 8    Numbers   Side Effects    1. Pruritis No    2. Nausea No    3. Motor Blockade No, 0=Ability to raise lower extremities off bed    4. Sedation No, 1=awake and alert    Objective:       Catheter site clean, dry, intact        Vitals   There were no vitals filed for this visit.     Labs    No visits with results within 2 Day(s) from this visit.   Latest known visit with results is:   Admission on 02/01/2018, Discharged on 02/01/2018   Component Date Value Ref Range Status    WBC 02/01/2018 8.31  3.90 - 12.70 K/uL Final    RBC 02/01/2018 5.03  4.60 - 6.20 M/uL Final    Hemoglobin 02/01/2018 14.4  14.0 - 18.0 g/dL Final    Hematocrit 02/01/2018 44.1  40.0 - 54.0 % Final    MCV 02/01/2018 88  82 - 98 fL Final    MCH 02/01/2018 28.6  27.0 - 31.0 pg Final    MCHC 02/01/2018 32.7  32.0 - 36.0 g/dL Final    RDW 02/01/2018 13.0  11.5 - 14.5 % Final    Platelets 02/01/2018 275  150 - 350 K/uL Final    MPV 02/01/2018 10.4  9.2 - 12.9 fL Final    Immature Granulocytes 02/01/2018 0.4  0.0 - 0.5 % Final    Gran # (ANC) 02/01/2018 4.9  1.8 - 7.7 K/uL Final    Immature Grans (Abs) 02/01/2018 0.03  0.00 - 0.04 K/uL Final    Lymph # 02/01/2018 2.4  1.0 - 4.8 K/uL Final    Mono # 02/01/2018 0.7  0.3 - 1.0 K/uL Final    Eos # 02/01/2018 0.2  0.0 - 0.5 K/uL Final    Baso # 02/01/2018 0.04  0.00 - 0.20 K/uL Final    nRBC  02/01/2018 0  0 /100 WBC Final    Gran% 02/01/2018 59.3  38.0 - 73.0 % Final    Lymph% 02/01/2018 29.4  18.0 - 48.0 % Final    Mono% 02/01/2018 8.4  4.0 - 15.0 % Final    Eosinophil% 02/01/2018 2.0  0.0 - 8.0 % Final    Basophil% 02/01/2018 0.5  0.0 - 1.9 % Final    Differential Method 02/01/2018 Automated   Final    Sodium 02/01/2018 141  136 - 145 mmol/L Final    Potassium 02/01/2018 4.6  3.5 - 5.1 mmol/L Final    Chloride 02/01/2018 105  95 - 110 mmol/L Final    CO2 02/01/2018 22* 23 - 29 mmol/L Final    Glucose 02/01/2018 94  70 - 110 mg/dL Final    BUN, Bld 02/01/2018 16  8 - 23 mg/dL Final    Creatinine 02/01/2018 0.9  0.5 - 1.4 mg/dL Final    Calcium 02/01/2018 9.5  8.7 - 10.5 mg/dL Final    Total Protein 02/01/2018 7.6  6.0 - 8.4 g/dL Final    Albumin 02/01/2018 3.7  3.5 - 5.2 g/dL Final    Total Bilirubin 02/01/2018 0.4  0.1 - 1.0 mg/dL Final    Alkaline Phosphatase 02/01/2018 56  55 - 135 U/L Final    AST 02/01/2018 24  10 - 40 U/L Final    ALT 02/01/2018 28  10 - 44 U/L Final    Anion Gap 02/01/2018 14  8 - 16 mmol/L Final    eGFR if African American 02/01/2018 >60.0  >60 mL/min/1.73 m^2 Final    eGFR if non African American 02/01/2018 >60.0  >60 mL/min/1.73 m^2 Final    Troponin I 02/01/2018 <0.006  0.000 - 0.026 ng/mL Final    Specimen UA 02/01/2018 Urine, Clean Catch   Final    Color, UA 02/01/2018 Mary  Yellow, Straw, Mary Final    Appearance, UA 02/01/2018 Hazy* Clear Final    pH, UA 02/01/2018 5.0  5.0 - 8.0 Final    Specific Gravity, UA 02/01/2018 >=1.030* 1.005 - 1.030 Final    Protein, UA 02/01/2018 Negative  Negative Final    Glucose, UA 02/01/2018 Negative  Negative Final    Ketones, UA 02/01/2018 1+* Negative Final    Bilirubin (UA) 02/01/2018 Negative  Negative Final    Occult Blood UA 02/01/2018 Negative  Negative Final    Nitrite, UA 02/01/2018 Negative  Negative Final    Urobilinogen, UA 02/01/2018 2.0  <2.0 EU/dL Final    Leukocytes, UA 02/01/2018  Negative  Negative Final    Magnesium 02/01/2018 2.6  1.6 - 2.6 mg/dL Final        Meds   No current facility-administered medications for this visit.      No current outpatient prescriptions on file.     Facility-Administered Medications Ordered in Other Visits   Medication Dose Route Frequency Provider Last Rate Last Dose    acetaminophen tablet 1,000 mg  1,000 mg Oral Q6H Ricardo Valdes MD   1,000 mg at 05/06/18 0614    aspirin EC tablet 325 mg  325 mg Oral BID Ricardo Valdes MD   325 mg at 05/05/18 2058    aspirin EC tablet 325 mg  325 mg Oral FLAQUITO Valdes MD   325 mg at 05/05/18 0714    bisacodyl suppository 10 mg  10 mg Rectal Q12H PRN Ricardo Valdes MD        celecoxib capsule 200 mg  200 mg Oral Daily Nickie Hernandes MD   200 mg at 05/05/18 0915    celecoxib capsule 200 mg  200 mg Oral Daily Ricardo Valdes MD        diphenhydrAMINE injection 25 mg  25 mg Intravenous Q6H PRN Sandro Adrián Li MD   25 mg at 05/06/18 0645    famotidine tablet 20 mg  20 mg Oral BID Ricardo Valdes MD   20 mg at 05/05/18 2058    fluticasone 50 mcg/actuation nasal spray 50 mcg  1 spray Each Nare BID PRN Ricardo Valdes MD        losartan tablet 50 mg  50 mg Oral FLAQUITO Valdes MD   50 mg at 05/05/18 0714    morphine injection 2 mg  2 mg Intravenous Q3H PRN Ricardo Valdes MD        morphine injection 2 mg  2 mg Intravenous Q3H PRN Ricardo Valdes MD        morphine injection 2 mg  2 mg Intravenous Q3H PRN Ricardo Valdes MD        multivitamin tablet 1 tablet  1 tablet Oral Daily Ricardo Valdes MD   1 tablet at 05/05/18 0917    naloxone 0.4 mg/mL injection 0.02 mg  0.02 mg Intravenous PRN Ricardo Valdes MD        ondansetron disintegrating tablet 8 mg  8 mg Oral Q8H PRN Nickie Hernandes MD        oxyCODONE immediate release tablet 10 mg  10 mg Oral Q3H PRN Ricardo Valdes MD   10 mg at 05/04/18 1100    oxyCODONE immediate release tablet 15 mg  15 mg Oral Q3H PRN Ricardo Valdes MD   15 mg  at 05/05/18 1219    oxyCODONE immediate release tablet 5 mg  5 mg Oral Q3H PRN Ricardo Valdes MD        polyethylene glycol packet 17 g  17 g Oral Daily Ricardo Valdes MD   17 g at 05/05/18 0917    polyethylene glycol packet 17 g  17 g Oral Daily Ricardo Valdes MD        pravastatin tablet 40 mg  40 mg Oral Nightly Ricardo Valdes MD        pregabalin capsule 75 mg  75 mg Oral QHS Ricardo Valdes MD   75 mg at 05/05/18 2057    promethazine (PHENERGAN) 6.25 mg in dextrose 5 % 50 mL IVPB  6.25 mg Intravenous Q6H PRN Ricardo Valdes MD        ramelteon tablet 8 mg  8 mg Oral Nightly PRN Ricardo Valdes MD   8 mg at 05/05/18 2057    ropivacaine (PF) 2 mg/ml (0.2%) infusion  8 mL/hr Perineural Continuous Arlene Muñoz MD 8 mL/hr at 05/05/18 2339 8 mL/hr at 05/05/18 2339    ropivacaine 0.2% ON-Q C-BLOC 400 ML (SELECT A FLOW)   Perineural Continuous PRN Ricardo Valdes MD        senna-docusate 8.6-50 mg per tablet 1 tablet  1 tablet Oral BID Ricardo Valdes MD   1 tablet at 05/05/18 2057    sodium chloride 0.9% flush 3 mL  3 mL Intra-Catheter Q8H PRN Ricardo Valdes MD        tamsulosin 24 hr capsule 0.4 mg  0.4 mg Oral QAM Ricardo Valdes MD   0.4 mg at 05/06/18 0615    tranexamic acid (CYKLOKAPRON) 1,000 mg in sodium chloride 0.9% 100 mL  1,000 mg Intravenous Once Ricardo Valdes MD        traZODone tablet 100 mg  100 mg Oral Nightly PRN Ricardo Valdes MD            Anticoagulant dose ASA at 325mg.    Assessment/Plan:     Pain control adequate. PNC paused this AM. Will pull later this morning.    Kenney Jones MD  PGY-4 Anesthesiology  P: [phone]

## 2018-05-06 NOTE — PT/OT/SLP PROGRESS
Physical Therapy Treatment    Patient Name:  Korey Santos   MRN:  [id number]    Recommendations:     Discharge Recommendations:  outpatient PT, outpatient OT   Discharge Equipment Recommendations: walker, rolling, shower chair   Barriers to discharge: None    Assessment:     Korey Santos is a 77 y.o. male admitted with a medical diagnosis of Osteoarthritis of left knee.  He presents with the following impairments/functional limitations:  weakness, impaired endurance, impaired self care skills, impaired functional mobilty, edema, decreased ROM . Patient declined to ambulate outside his room today due to fear of having an accident(BM) as RN just gave him a suppository. Patient ambulates with antalgic gait pattern, but no loss of balance of fall risk noted.    Rehab Prognosis:  good; patient would benefit from acute skilled PT services to address these deficits and reach maximum level of function.      Recent Surgery: Procedure(s) (LRB):  REPLACEMENT-KNEE-TOTAL (Left) 2 Days Post-Op    Plan:     During this hospitalization, patient to be seen BID to address the above listed problems via gait training, therapeutic activities, therapeutic exercises, neuromuscular re-education  · Plan of Care Expires:  06/04/18   Plan of Care Reviewed with: patient (Simultaneous filing. User may not have seen previous data.)    Subjective     Communicated with RN prior to session.  Patient found on bedside chair upon PT entry to room, agreeable to treatment.      Chief Complaint: stomach cramps  Patient comments/goals: to get better  Pain/Comfort:  · Pain Rating 1: 5/10  · Location - Side 1: Left  · Location - Orientation 1: generalized  · Location 1: knee  · Pain Addressed 1: Pre-medicate for activity, Reposition, Distraction  · Pain Rating Post-Intervention 1: 5/10    Patients cultural, spiritual, Mandaeism conflicts given the current situation: None stated    Objective:     Patient found with: cryotherapy     General  Precautions: Standard, fall   Orthopedic Precautions:LLE weight bearing as tolerated   Braces: N/A     Functional Mobility:  · Transfers:     · Sit to Stand:  supervision with rolling walker  · Bed to Chair: supervision with  no AD  using  Stand Pivot  · Gait: 72 ft in his room with RW and SBA.      AM-PAC 6 CLICK MOBILITY  Turning over in bed (including adjusting bedclothes, sheets and blankets)?: 4  Sitting down on and standing up from a chair with arms (e.g., wheelchair, bedside commode, etc.): 4  Moving from lying on back to sitting on the side of the bed?: 4  Moving to and from a bed to a chair (including a wheelchair)?: 4  Need to walk in hospital room?: 4  Climbing 3-5 steps with a railing?: 3  Total Score: 23       Therapeutic Activities and Exercises:   Doffed polar ice. Educated and assisted to bedside commode. There ex: SAQ, HS, AP AND QUAD SETS 2X15 REPS.    Patient left up in chair with all lines intact and call button in reach..    GOALS:    Physical Therapy Goals        Problem: Physical Therapy Goal    Goal Priority Disciplines Outcome Goal Variances Interventions   Physical Therapy Goal     PT/OT, PT Ongoing (interventions implemented as appropriate)     Description:  Goals to be met by: 18     Patient will increase functional independence with mobility by performin. Supine to sit with Supervision  2. Sit to supine with Supervision  3. Sit to stand transfer with Stand-by Assistance- MET  4. Gait  x 150 feet with Stand-by Assistance using Rolling Walker.   5. Ascend/Descend 6 inch curb step with Contact Guard Assistance using Rolling Walker.  6. Lower extremity exercise program x 30 reps per handout, with independence                        Time Tracking:     PT Received On: 18  PT Start Time: 1048     PT Stop Time: 1128  PT Total Time (min): 40 min     Billable Minutes: Gait Training 15, Therapeutic Activity 15 and Therapeutic Exercise 10    Treatment Type: Treatment  PT/PTA: PTA      PTA Visit Number: 2     Trell Baker, GADIEL  05/06/2018

## 2018-05-06 NOTE — ASSESSMENT & PLAN NOTE
77 y.o. male POD2 s/p left TKA    Pain control: multimodal  PT/OT: WBAT LLE  DVT PPx:  BID, FCDs at all times when not ambulating  Abx: postop Ancef    Dispo: f/u PT recs, home today

## 2018-05-06 NOTE — NURSING
Patient c/o of constipation. Scheduled miralax and senna given this AM. PRN suppository given. Now patient requesting enema. Explained to patient to let the suppository take effect first. Inquired about patient's ride home and he stated his friend in [place] is picking him up.     Dr. Graham ordered sodium phosphate enema to be given.

## 2018-05-06 NOTE — SUBJECTIVE & OBJECTIVE
Principal Problem:Osteoarthritis of left knee    Principal Orthopedic Problem: same    Interval History: Patient seen and examined at bedside.  No acute events overnight.  Pain controlled. Walked 100 feet with PT yesterday. Had to stay overnight secondary to some nausea yesterday.    Review of patient's allergies indicates:   Allergen Reactions    Clindamycin Swelling     Throat swells    Lisinopril Swelling and Other (See Comments)     Throat swelling       Current Facility-Administered Medications   Medication    acetaminophen tablet 1,000 mg    aspirin EC tablet 325 mg    aspirin EC tablet 325 mg    bisacodyl suppository 10 mg    celecoxib capsule 200 mg    celecoxib capsule 200 mg    famotidine tablet 20 mg    fluticasone 50 mcg/actuation nasal spray 50 mcg    losartan tablet 50 mg    morphine injection 2 mg    morphine injection 2 mg    morphine injection 2 mg    multivitamin tablet 1 tablet    naloxone 0.4 mg/mL injection 0.02 mg    ondansetron disintegrating tablet 8 mg    oxyCODONE immediate release tablet 10 mg    oxyCODONE immediate release tablet 15 mg    oxyCODONE immediate release tablet 5 mg    polyethylene glycol packet 17 g    polyethylene glycol packet 17 g    pravastatin tablet 40 mg    pregabalin capsule 75 mg    promethazine (PHENERGAN) 6.25 mg in dextrose 5 % 50 mL IVPB    ramelteon tablet 8 mg    ropivacaine (PF) 2 mg/ml (0.2%) infusion    ropivacaine 0.2% ON-Q C-BLOC 400 ML (SELECT A FLOW)    senna-docusate 8.6-50 mg per tablet 1 tablet    sodium chloride 0.9% flush 3 mL    tamsulosin 24 hr capsule 0.4 mg    tranexamic acid (CYKLOKAPRON) 1,000 mg in sodium chloride 0.9% 100 mL    traZODone tablet 100 mg     Objective:     Vital Signs (Most Recent):  Temp: 98.7 °F (37.1 °C) (05/06/18 0435)  Pulse: 69 (05/06/18 0435)  Resp: 18 (05/06/18 0435)  BP: (!) 150/73 (05/06/18 0435)  SpO2: 98 % (05/06/18 0435) Vital Signs (24h Range):  Temp:  [96.1 °F (35.6 °C)-98.7 °F  "(37.1 °C)] 98.7 °F (37.1 °C)  Pulse:  [57-69] 69  Resp:  [16-18] 18  SpO2:  [97 %-99 %] 98 %  BP: (109-150)/(55-76) 150/73     Weight: 102.1 kg (225 lb)  Height: 5' 11" (180.3 cm)  Body mass index is 31.38 kg/m².      Intake/Output Summary (Last 24 hours) at 05/06/18 0623  Last data filed at 05/06/18 0448   Gross per 24 hour   Intake            919.2 ml   Output             1425 ml   Net           -505.8 ml       Ortho/SPM Exam     AAOx4  NAD  RRR  No increased WOB  Aquacel c/d/i  Polar ice in place  SILT and motor intact T/SP/DP  WWP extremities  FCDs in place and functioning      Significant Labs: All pertinent labs within the past 24 hours have been reviewed.    Significant Imaging: X-Ray: I have reviewed all pertinent results/findings and my personal findings are:  stable appearing implants  "

## 2018-05-07 NOTE — OP NOTE
DATE OF PROCEDURE:  5/4/2018     ATTENDING SURGEON: Surgeon(s) and Role:     * Renita Franco MD - Primary  Co-surgeon:  Ricardo Valdes MD        Co-Surgeon Duties: Due to the complexity of the case and the need for significant intra-operative decision making co-surgeon duties were medically necessary. The chronicity of the disease process and the level of deformity dictated that co-surgeon duties be undertaken. A separate note will be dictated/reported by Dr. Renita Franco stating the specific co-surgeon activities and roles performed during the surgery.              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     COMPLICATIONS: NONE     URINE OUTPUT: 0 mL     ESTIMATED BLOOD LOSS: 200 mL     CONDITION:  Good        INDICATIONS FOR OPERATIVE PROCEDURES:  Korey Santos is a 77 y.o. male with history of left knee pain and pathology. He noted significant problems in the area of concern with problems on activities of daily living and aggressive use of the right leg. As a result of these problems and problems with overall activity level, the patient was deemed to be an appropriate candidate for operative intervention. There was significant genu varus noted and based on the patient's age and functional level, the patient was deemed to be an appropriate candidate for use of Conformis Implant products.     IMPLANTS UTILIZED:   ConforMIS tibial femoral implant  ConforMIS total tibial tray  ConforMIS 6 mm medial insert  ConforMIS lateral A insert  ConforMIS 41 mm x 10 mm patellar  component  Smartset Gentamicin bone Cement     FINDINGS:      ARTICULAR CARTILAGE LESION(S):  Medial Femoral Condyle: ICRS Grade 4A                 Size: 3.5 x 4.0 cm  Medial tibial plateau: ICRS Grade 4A                 Size: 3.0 x 3.5 cm           Lateral Femoral Condyle: ICRS Grade 2                 Size: 1.5 x 2.0 cm  Lateral tibial plateau: ICRS Grade 2                 Size: 2.0 x 2.0 cm           Patellar surface: ICRS Grade 4A                 Size: 3.0 x 3.5 cm  Trochlear groove: ICRS Grade 4A                 Size: 3.0 x 3.0 cm     EXAMINATION UNDER ANESTHESIA:   Extension 5 degrees  Flexion 140 degrees  Lachman Maneuver:  Negative  Anterior Drawer: Negative  Posterior Drawer:  Negative     DESCRIPTION OF PROCEDURE:   The patient was brought into the Operating Room and placed in supine position. Upon application of femoral block in the preoperative holding area, the patient underwentGeneral to stabilize the area. The patient was given the appropriate dose of antibiotics based on body weight. Timeout was utilized to verify the RIGHT LEFT BILATERAL:42125} side as the operative side. Both upper extremities were placed in comfortable position. The nonoperative leg was carefully padded along the heel and peroneal nerve regions. Carpenter catheter was applied. Operative leg was then prepped and draped in a sterile fashion with ChloraPrep material with a bump under the right hip and popliteal post along the right side of the table. Once prepped and draped in sterile fashion, Coban was placed on the knee. A medial based incision was carried down the skin down to fat and fascia. A medial subvastus approach was performed and the patella was subluxed lateral  Flaps were raised superiorly and inferiorly as well as medially and laterally along the region of concern.      Attention was turned to the distal femur and the # 1 preparation device from ConforMIS was used to create the distal lug holes for the implant and # 2 guide.  We then drilled the distal femoral region as medially and laterally along the region of concern. We placed a # 2 cutting block, which was applied made the distal pin holes, which were then removed simultaneously and drilled proximal pin holes and pins to stabilize the distal femoral cutting block. This was left in place and the saw blade used to create the distal femoral cut. Bone was removed. We then assessed our cut with a #3 guide from the ConforMIS set. The cut was found to be excellent. As a result, we turned our attention to the proximal tibia. ACL structure was resected. The PCL structure was recessed. Medial and lateral  meniscus remnants were removed with the Bovie cautery. We then applied the # 4 cutting system directly over the area of concern. We used to currettes to remove the cartilage from the proximal tibia down to the subchondral bone level. Extramedullary alignment polly was used to verify appropriate alignment. The cutting guide was pinned into position and an oscillating saw used, we made the proximal cut. We used the # 3 guide to assess our extension gap and the # 6 guide to assess our flexion gap. There gaps were symmetric flexion and extension gaps with appropriate alingment as well.     With this performed, we then visualized the distal femus once again and placed the # 7 femoral cutting guide into position and pinned this with the appropriate distal femoral rotation. The previously placed pin holes along the distal femur were used to position this cutting guide and the oblique pin holes created and the pins placed to hold the guide in the appropriated rotation. The central pins were removed. Anterior and posterior condylar and chamfer cuts were created as well as the distal lug holes for the femoral component. The # 8/9 femoral guide was applied and the final posterior chamfer cuts were created. We placed the trial femoral and tibial components demonstrating great stability in flexion and  extension with varus and valgus load as well as great flexion without shift of the tibial component.     We re-exposed the proximal tibia, placed the preparation tray for the tibial Region and pinned this into position. The central tower was applied and the proximal tibia drilled centrally followed by application of the trial keel. We then removed the trial keel.We exposed the patella, sized the patella, demonstrating the 41 mm patellar component would most normalize the patient's anatomy. As a result, we removed approximately 10 mm of bone, drilled 3 peg holes and placed the trial patella fit in excellent fashion. The knee was taken through range of motion demonstrating excellent tracking and stability. As a result, trial components were left in place. An Esmarch was then used to exsanguinate the limb. Tourniquet was inflated. Trial components were removed. We exposed the femoral, proximal tibial plateau and patellar regions. Pulsavac was used to remove all blood elements and the areas dried and prepared for cementing. We then mixed cement and placed cement first at the tibial region and placed the tibial component position and extruded cement was removed. We then turned our attention to the femoral and patellar regions. These dried areas were then cemented with extruded cement removed from the femoral component as well as applied cement to the patellar component. The we then performed trial reduction with the trial inserts applied. Knee was taken to extension demonstrating excellent stability with no signs of hyperextension remaining. The tourniquet was released and all bleeding sites were cauterized. Final pulsavac lavage was performed with application of 3 liters of fluid through the knee. Trial components were removed and the actual 6 mm medial insert was applied along with the size A lateral insert. Prior to placement of these inserts Exparel mixture was applied to the posterior capsular, medial subvastus  region and anterior fatpad regions of the knee with a 20 gauge spinal needle. Further irrigation performed along the area of concern. A drain was taken out of the lateral aspect of the knee. Following placement of the drain, we then closed the synovial layer and parapatellar tendon region with a series of #1 Vicryl sutures placed in figure-of-eight fashion. We then closed subcutaneous tissues with   3-0 Vicryl sutures placed in inverted fashion. Skin was closed with running 4-0 Monocryl sutures placed in subcuticular fashion along with application of Dermabond ointment and tape. We did apply intraarticular Exparel for postoperative pain control. We applied Xeroform gauze, ABD pads, cast padding, sterile electrode pads proximally and distally, a long-leg FELICE hose stocking and cooling unit.   The patient was allowed to recover from the anesthetic. was removed. The patient was taken to Recovery Room in stable condition. At the completion case, all instrument and sponge counts were correct. Subcutaneous tissues were closed with 3-0 Vicryl sutures placed in inverted fashion. Skin was closed with running 4-0 Monocryl sutures placed in subcuticular fashion along with application of Dermabond ointment and Dermabond tape. We then applied Xeroform gauze, ABD pads, cast padding, a long-leg FELICE hose stocking and cooling unit. The patient was allowed to recover from the anesthetic. LMA was removed. The patient was taken to Recovery Room in stable condition. At the completion of the case, all instrument and sponge counts were correct.     NOTE: I was present and scrubbed for the key portions of the procedure.     PHYSICAL THERAPY:  The patient should begin physical therapy on postoperative   day # 3 and will be advanced to outpatient therapy as soon as   Possible following discharge.     Weight bearing:as tolerated  left leg  Range of Motion:Full normal motion symmetric to opposite side     Continuous passive motion (CPM) machine  start on   postop day # 0 at  10 degrees hyperextension to 120 degrees flexion as tolerated for 6-8 hours per day for 4 weeks.     The patient is to be taken out of the continuous passive motion (CPM)  machine as much as possible and begin active assisted range of motion programs.     Immediate specific exercises should include:extension exercises, quadriceps load with a heel roll, prone hang procedures with 5-10 lbs. ankle weights.     Gait program should include: active extension with a heel-to-toe gait working on maintaining extension     Discharge home  Follow-up as scheduled  Condition stable  Activity as above

## 2018-05-08 ENCOUNTER — TELEPHONE (OUTPATIENT)
Dept: SPORTS MEDICINE | Facility: CLINIC | Age: 78
End: 2018-05-08

## 2018-05-08 NOTE — TELEPHONE ENCOUNTER
Called patient today. He had questions about TENs unit. He did not receive one prior to surgery. He also had several complaints pertaining to the medical equipment received after surgery. He was instructed to use CPM device for 6-8 hours for 3 weeks after surgery.    All of the patient's questions were answered and the patient will contact us if they have any questions or concerns in the interim.      ----- Message from Denise Lazar MA sent at 5/8/2018 10:36 AM CDT -----  Contact: Self      ----- Message -----  From: Martha Diggs  Sent: 5/8/2018  10:01 AM  To: [facility] Staff    Pt is calling stating he's been calling the past two days and has not received a returned call.  Pt says he even called Patient Relations and did not received a returned call.  Pt is requesting Staff contact him, as soon as possible.    He can be reached at [phone].    Thank you.

## 2018-05-09 ENCOUNTER — TELEPHONE (OUTPATIENT)
Dept: SPORTS MEDICINE | Facility: CLINIC | Age: 78
End: 2018-05-09

## 2018-05-09 NOTE — TELEPHONE ENCOUNTER
Called Ruel with [facility] back and he said that the patient never got the TENS unit at his pre-op appointment, I told him I will have Manoj Downs give him a call to see how they can get it to him since he has no one that can come pick it up. Ruel said the patient is hearing a lot of clicking and his pain is at a 7. Told him I spoke with Dr. Valdes and the clicking is normal he needs to be putting full weight on it and it should get better, if he is that concerned about it he can come in and see Yeyo Solis PA-C to get x-rays to show him that the component is fine in his knee and also the pain he is having is normal postop pain. Ruel said he will relay this to the patient.

## 2018-05-09 NOTE — TELEPHONE ENCOUNTER
Called patient, no answer. Message lefted.  ----- Message from Denise Lazar MA sent at 5/9/2018  2:54 PM CDT -----  Contact: patient Relations-Edna   Please give the patient a call   ----- Message -----  From: Gia Thorpe  Sent: 5/9/2018   2:26 PM  To: [facility] Staff    Pt is requesting a call back, Edna stated pt did not leave a detailed message.pt can be reached at [phone].

## 2018-05-09 NOTE — TELEPHONE ENCOUNTER
----- Message from Mirza Fragoso sent at 5/9/2018 11:04 AM CDT -----  Contact: [facility]   Need a call regarding patient equipment, tens unit.    States patient left hospital without the equipment.     Call him @ [phone]

## 2018-05-16 ENCOUNTER — HOSPITAL ENCOUNTER (OUTPATIENT)
Dept: RADIOLOGY | Facility: HOSPITAL | Age: 78
Discharge: HOME | End: 2018-05-16
Attending: ORTHOPAEDIC SURGERY
Payer: MEDICARE

## 2018-05-16 ENCOUNTER — OFFICE VISIT (OUTPATIENT)
Dept: SPORTS MEDICINE | Facility: CLINIC | Age: 78
End: 2018-05-16
Payer: MEDICARE

## 2018-05-16 VITALS
BODY MASS INDEX: 31.5 KG/M2 | DIASTOLIC BLOOD PRESSURE: 76 MMHG | HEART RATE: 81 BPM | WEIGHT: 225 LBS | HEIGHT: 71 IN | SYSTOLIC BLOOD PRESSURE: 152 MMHG

## 2018-05-16 DIAGNOSIS — Z09 SURGERY FOLLOW-UP EXAMINATION: ICD-10-CM

## 2018-05-16 DIAGNOSIS — M17.12 PRIMARY OSTEOARTHRITIS OF LEFT KNEE: Primary | ICD-10-CM

## 2018-05-16 DIAGNOSIS — M21.162 GENU VARUM OF BOTH LOWER EXTREMITIES: ICD-10-CM

## 2018-05-16 DIAGNOSIS — M17.31 POST-TRAUMATIC OSTEOARTHRITIS OF RIGHT KNEE: ICD-10-CM

## 2018-05-16 DIAGNOSIS — G89.29 CHRONIC PAIN OF LEFT KNEE: Primary | ICD-10-CM

## 2018-05-16 DIAGNOSIS — Z96.652 STATUS POST TOTAL LEFT KNEE REPLACEMENT: ICD-10-CM

## 2018-05-16 DIAGNOSIS — M25.562 CHRONIC PAIN OF LEFT KNEE: ICD-10-CM

## 2018-05-16 DIAGNOSIS — M21.161 GENU VARUM OF BOTH LOWER EXTREMITIES: ICD-10-CM

## 2018-05-16 DIAGNOSIS — M17.12 PRIMARY OSTEOARTHRITIS OF LEFT KNEE: ICD-10-CM

## 2018-05-16 DIAGNOSIS — M25.562 CHRONIC PAIN OF LEFT KNEE: Primary | ICD-10-CM

## 2018-05-16 DIAGNOSIS — G89.29 CHRONIC PAIN OF LEFT KNEE: ICD-10-CM

## 2018-05-16 PROCEDURE — 99024 POSTOP FOLLOW-UP VISIT: CPT | Mod: S$GLB,,, | Performed by: ORTHOPAEDIC SURGERY

## 2018-05-16 PROCEDURE — 73560 X-RAY EXAM OF KNEE 1 OR 2: CPT | Mod: 26,LT,, | Performed by: RADIOLOGY

## 2018-05-16 PROCEDURE — 73560 X-RAY EXAM OF KNEE 1 OR 2: CPT | Mod: TC,FY,PO,LT

## 2018-05-16 PROCEDURE — 99999 PR PBB SHADOW E&M-EST. PATIENT-LVL V: CPT | Mod: PBBFAC,,, | Performed by: ORTHOPAEDIC SURGERY

## 2018-05-16 RX ORDER — OXYCODONE AND ACETAMINOPHEN 10; 325 MG/1; MG/1
1 TABLET ORAL EVERY 6 HOURS PRN
Qty: 42 TABLET | Refills: 0 | Status: SHIPPED | OUTPATIENT
Start: 2018-05-16 | End: 2019-08-29 | Stop reason: ALTCHOICE

## 2018-05-16 RX ORDER — CELECOXIB 200 MG/1
200 CAPSULE ORAL 2 TIMES DAILY
Qty: 60 CAPSULE | Refills: 2 | Status: SHIPPED | OUTPATIENT
Start: 2018-05-16 | End: 2018-06-15

## 2018-05-16 RX ORDER — TRAMADOL HYDROCHLORIDE 50 MG/1
50 TABLET ORAL EVERY 6 HOURS PRN
Qty: 30 TABLET | Refills: 0 | Status: SHIPPED | OUTPATIENT
Start: 2018-05-16 | End: 2018-05-26

## 2018-05-18 ENCOUNTER — TELEPHONE (OUTPATIENT)
Dept: SPORTS MEDICINE | Facility: CLINIC | Age: 78
End: 2018-05-18

## 2018-05-18 NOTE — TELEPHONE ENCOUNTER
----- Message from Mayte Caba sent at 5/18/2018 10:44 AM CDT -----  Contact: self  Pt called requesting a call back from Dr. Franco or Denise regarding his Physical Therapy in [place], [address] [place] [address] [place] [address], [phone]. Stating that the therapy facility know nothing about him beginning therapy there. Pt could be reached at [phone]

## 2018-05-18 NOTE — TELEPHONE ENCOUNTER
Spoke with patient regarding his concerns on not being able to get in for PT. He said he attempted to call and schedule his appointments, but was told they did not receive the orders we faxed over. I resent the orders and called and left a message for someone at the PT office to call me back to make sure everything is in order. Will follow up with patient on Monday.

## 2018-05-21 ENCOUNTER — TELEPHONE (OUTPATIENT)
Dept: SPORTS MEDICINE | Facility: CLINIC | Age: 78
End: 2018-05-21

## 2018-05-21 NOTE — TELEPHONE ENCOUNTER
----- Message from Charisse Chaidez sent at 5/21/2018 11:44 AM CDT -----  Contact: Pt  Pt is calling to speak with nurse in regards to therapy and can be reached at [phone].    Thank you

## 2018-05-21 NOTE — TELEPHONE ENCOUNTER
Spoke with patient regarding his concerns about getting in soon enough for PT. I told him we were working with a supervisor at the [facility] location to get him in as soon as possible.   45

## 2018-05-22 ENCOUNTER — TELEPHONE (OUTPATIENT)
Dept: SPORTS MEDICINE | Facility: CLINIC | Age: 78
End: 2018-05-22

## 2018-05-22 NOTE — TELEPHONE ENCOUNTER
----- Message from Anabel Ruffin RN sent at 5/22/2018  8:41 AM CDT -----  Contact: adela Mcfadden, could you call him and make sure that he is ok? Let him know that Yeyo will be out until next week due to his wife going into labor this AM.    ----- Message -----  From: Mayte Caba  Sent: 5/21/2018  12:07 PM  To: Will Hung Staff    Pt called requesting another call back from Yeyo. Pt called stating that he received missed calls from Yeyo and Guerita and was returning their call. Pt could be reached at [phone]

## 2018-05-22 NOTE — TELEPHONE ENCOUNTER
Spoke with patient regarding his concerns about his PT scheduling. Have confirmed his appointment Friday 5/25/18. Have left another message with the therapy location, and still have not heard back regarding my original messages.

## 2018-05-25 ENCOUNTER — CLINICAL SUPPORT (OUTPATIENT)
Dept: REHABILITATION | Facility: HOSPITAL | Age: 78
End: 2018-05-25
Payer: MEDICARE

## 2018-05-25 DIAGNOSIS — R26.89 ANTALGIC GAIT: ICD-10-CM

## 2018-05-25 DIAGNOSIS — R26.89 DECREASED FUNCTIONAL MOBILITY: ICD-10-CM

## 2018-05-25 DIAGNOSIS — M25.662 DECREASED RANGE OF MOTION OF LEFT KNEE: ICD-10-CM

## 2018-05-25 DIAGNOSIS — R29.898 WEAKNESS OF BOTH LOWER EXTREMITIES: ICD-10-CM

## 2018-05-25 PROCEDURE — G8978 MOBILITY CURRENT STATUS: HCPCS | Mod: CM,PO

## 2018-05-25 PROCEDURE — 97110 THERAPEUTIC EXERCISES: CPT | Mod: PO

## 2018-05-25 PROCEDURE — G8979 MOBILITY GOAL STATUS: HCPCS | Mod: CK,PO

## 2018-05-25 PROCEDURE — 97161 PT EVAL LOW COMPLEX 20 MIN: CPT | Mod: PO

## 2018-05-25 NOTE — PLAN OF CARE
TIME RECORD    Date: 5/25/2018    Start Time:  11:00  Stop Time:  12:00    PROCEDURES:    TIMED  Procedure Min.   TE 15                     UNTIMED  Procedure Min.   PT eval 45         Total Timed Minutes:  15  Total Timed Units:  1  Total Untimed Units:  1  Charges Billed/# of units:  2    [facility]    PHYSICAL THERAPY EVALUATION  Onset Date: 05/04/18  Medical Diagnosis: decreased ROM L knee, weakness B LE, antalgic gait, decreased functional mobility  Treatment Diagnosis:   1. Decreased range of motion of left knee     2. Weakness of both lower extremities     3. Antalgic gait     4. Decreased functional mobility       Physician: Renita Franco MD  Past Medical History:   Diagnosis Date    Arthritis     Back pain, chronic     BPH with urinary obstruction     Chronic constipation     DJD (degenerative joint disease)     Hip    Hyperlipidemia     Hypertension     Laryngopharyngeal reflux     Left inguinal hernia     Lumbar herniated disc     Lumbar stenosis     OA (osteoarthritis) of knee     Bilateral    Paradoxical insomnia     Sinus trouble      Precautions: Standard,   Prior Therapy: PT previously for L and R knee  Medications: Korey FOX Yasmeenmala has a current medication list which includes the following prescription(s): aspirin, celecoxib, fluticasone, linaclotide, losartan, oxycodone-acetaminophen, oxycodone-acetaminophen, polyethylene glycol, pravastatin, promethazine, sildenafil, tamsulosin, and trazodone.  Nutrition:  Overweight  History of Present Illness: LTKA 05/04/18  Prior Level of Function: Independent  Social History: Lives by himself  Place of Residence (Steps/Adaptations): two story home, currently sleeping downstairs  Functional Deficits Leading to Referral/Nature of Injury: Difficulty ascending/descending steps, vehicle transfers, transfers from low surfaces, ambulating without an AD, performing usual household duties and yard work, and prolonged  "standing  Patient Therapy Goals: none    Subjective     Korey Santos states has had trouble with his knees for years and recently had L TKA. He is currently sleeping in his recliner as he has difficulty getting comfortable in his bed and his bedroom is upstairs. He has difficulty ascending/descending the steps in his home. He was discharged from home health over a week ago and has been doing his HEP from home health but not every day. He has been using the CPM but it is set at around 80 degrees because no one told him what to put it on. Currently he is having trouble walking without an AD as he feels off balance(prior to surgery he was using crutches to ambulate), having to use hands to lift his leg into his vehicle but no difficulty with driving. He is having difficulty with transfers from low surfaces and is still using the using BSC over toilet. He is getting help from his neighbors with his usual household duties and yard work as he is unable to perform them at this time. When standing for a long period of time he is having swelling in his leg.     Pain:  Location: groin  and L LE   Description: "bad pain, soreness, someone hit me with needle or ice pick"  Activities Which Increase Pain: Bending, Walking, Flexing and moving  Activities Which Decrease Pain: pain medication, ice and standing  Pain Scale: 6/10 at best 6/10 now  7/10 at worst    Objective     Posture: sacral sitting, L knee extending, B knees flexed in standing, genu varum R knee  Palpation: no TTP  Sensation: decreased light touch med and lat calf L  DTRs: N/A  Range of Motion/Strength:      Knee  Right   Left  Pain/Dysfunction with Movement    AROM PROM MMT AROM PROM MMT    Flexion 120 122 5/5 90 109 3+/5    Extension -9 -5 5/5 -15 -4 3+/5        L/E MMT Right Left Pain/Dysfunction with Movement   Hip Flexion 3+/5 3/5    Hip Extension 3/5 3/5    Hip Abduction 3/5 3/5    Ankle DF 5/5 5/5    Ankle PF 5/5 5/5        Flexibility: SLR 50*, R " 55  Gait: With AD.  Device Used -  Pick-up walker  Analysis: Assistance none, antalgic gait pattern, B knees flexed during stance phase  Bed Mobility:Independent  Transfers: Independent, increased reliance on UEs for transfers  Special Tests: patella hypomobile L compared to R,   Other: N/A      CMS Impairment/Limitation/Restriction for FOTO Knee Survey    Therapist reviewed FOTO scores for Korey Santos on 5/25/2018.   FOTO documents entered into [employer] - see Media section.    Limitation Score: 99%  Category: Mobility    Current : CM = at least 80% but < 100% impaired limited or restricted  Goal: CK = at least 40% but < 60% impaired, limited or restricted  Discharge: N/A at this time       TREATMENT     Time In: 11:45  Time Out: 12:00    PT Evaluation Completed? Yes  Discussed Plan of Care with patient: Yes    Korey received 15 minutes of therapeutic exercise & instruction including:  SLR, HS stretch in supine, long sitting, and seated   Korey received 0 minutes of manual therapy including:      Written Home Exercises Provided: Patient was given handouts of supine, seated, and long sitting HS stretching, and SLR  Korey demo fair understanding of the education provided. Patient demo fair return demo of skill of exercises.    See EMR in Patient Instructions for HEP given: Yes      Assessment       Initial Assessment (Pertinent finding, problem list and factors affecting outcome): Mr. Santos is a 77 year old male, who presents to the clinic with complaints of knee pain and decreased functional mobility s/p L TKA. He demonstrates decreased AROM of his L knee that limits his ability to perform transfers, ambulate with a normal gait pattern, and perform his usual ADLs. His L LE weakness also limits his ability to transfer without UE leverage, ambulate with a normal gait pattern and without an AD, and perform his usual ADLs. When ambulating he has an antalgic gait with B knee flexion during stance phase. When  ascending/descending steps he requires UE leverage and uses a step to gait pattern. His current score on FOTO Knee places him in the 80%<100% impaired, limited, or restricted category. He would benefit from physical therapy to improve his strength, ROM, flexibility, and gait in order to improve his functional mobility.    History  Co-morbidities and personal factors that may impact the plan of care Co-morbidities:   chronic pain, prior knee surgery R      Personal Factors:   no deficits     moderate   Examination  Body Structures and Functions, activity limitations and participation restrictions that may impact the plan of care Body Regions:   lower extremities    Body Systems:    ROM  strength  balance  gait    Participation Restrictions:   Difficulty with transfers, ambulation, prolonged standing, usual household duties, and yard work    Activity limitations:   Learning and applying knowledge  no deficits    General Tasks and Commands  no deficits    Communication  no deficits    Mobility  walking  using transportation (bus, train, plane, car)    Self care  dressing    Domestic Life  cooking  doing house work (cleaning house, washing dishes, laundry)  yard work    Interactions/Relationships  no deficits    Life Areas  no deficits    Community and Social Life  recreation and leisure         low   Clinical Presentation evolving clinical presentation with changing clinical characteristics moderate   Decision Making/ Complexity Score: FOTO 99%       Rehab Potiential: good  Spiritual/Cultural Needs: None  Barriers to Rehab: None  Short Term Goals (4 Weeks):   1.  This patient will be independent with initial HEP for L/E strengthening and knee ROM.  2.  This patient will increase L knee AROM to 115 degrees in order to be able to perform vehicle transfers with no difficulty.  3.  This patient will increase L hip and knee MMT 1 full muscle grade all deficit motions in order to perform his usual household duties with no  difficulty.  4.  This patient will be able to ambulate greater than 150 feet with a normal gait pattern without an AD.   5.  Patient will be able to achieve greater than or equal to 25 on the FOTO Knee placing patient in 60%<80% impaired, limited, or restricted category demonstrating overall improved functional ability with lower extremity.   Long Term Goals (8 Weeks):   1.  This patient will be independent with updated HEP.  2.  This patient will increase L knee AROM to 130 degrees in order to be able to perform his usual yard work with no difficulty.  3.  This patient will increase L L/E MMT to 5/5 in order to be able to ascend/descend steps with a reciprocal gait.  4.  This patient will be able to ambulate greater than 200 yards without use of assistive device without pain or gait deficits.  5.  Patient will be able to achieve greater than or equal to 50 on the FOTO Knee placing patient in 40%<60% impaired, limited, or restricted category demonstrating overall improved functional ability with lower extremity.     Plan     Certification Period: 05/25/18 to 07/25/18  Recommended Treatment Plan: 3 times per week for 8 weeks: Gait Training, Group Therapy, Manual Therapy, Moist Heat/ Ice, Neuromuscular Re-ed, Patient Education and Therapeutic Exercise  Other Recommendations: modalities prn, IASTM prn, kinesiotape prn, Functional Dry Needling prn       Marlyn Camarillo, PT  5/25/2018      I CERTIFY THE NEED FOR THESE SERVICES FURNISHED UNDER THIS PLAN OF TREATMENT AND WHILE UNDER MY CARE    Physician's comments: ________________________________________________________________________________________________________________________________________________      Physician's Name: ___________________________________

## 2018-05-25 NOTE — PATIENT INSTRUCTIONS
Supine: Leg Stretch With Strap (Intermediate)        Lie on back with one knee bent, foot flat on floor. Hook strap around other foot. Straighten knee. Raise leg further toward its maximal range. Hold 10 seconds. Relax leg completely down to floor.   Repeat 10 times per session. Do 2 sessions per day.    Copyright © [employer]. All rights reserved.   Chair Sitting        Sit at edge of seat, spine straight, one leg extended. Put a hand on each thigh and bend forward from the hip, keeping spine straight. Allow hand on extended leg to reach toward toes. Support upper body with other arm. Hold 10 seconds.  Repeat 10 times per session. Do 2 sessions per day.    Copyright © [employer]. All rights reserved.   Strengthening: Straight Leg Raise (Phase 1)        Tighten muscles on front of right thigh, then lift leg no higher than opposite knee, keeping knee locked.   Repeat 10 times per set. Do 3 sets per session. Do 2 sessions per day.     [email or web address]     Copyright © [employer]. All rights reserved.   Strengthening: Straight Leg Raise (Phase 1)        Tighten muscles on front of right thigh, then lift leg ____ inches from surface, keeping knee locked.   Repeat ____ times per set. Do ____ sets per session. Do ____ sessions per day.     [email or web address]     Copyright © [employer]. All rights reserved.   Hamstring Stretch        Sitting with operated leg straight on bed, and foot of other leg on floor, lean forward toward toes of straight leg. Hold 10 seconds.  Repeat 10 times. Do 2 sessions per day.     [email or web address]     Copyright © [employer]. All rights reserved.

## 2018-05-27 PROBLEM — R26.89 ANTALGIC GAIT: Status: ACTIVE | Noted: 2018-05-27

## 2018-05-27 PROBLEM — M25.662 DECREASED RANGE OF MOTION OF LEFT KNEE: Status: ACTIVE | Noted: 2018-05-27

## 2018-05-27 PROBLEM — R29.898 WEAKNESS OF BOTH LOWER EXTREMITIES: Status: ACTIVE | Noted: 2018-05-27

## 2018-05-27 PROBLEM — R26.89 DECREASED FUNCTIONAL MOBILITY: Status: ACTIVE | Noted: 2018-05-27

## 2018-05-28 ENCOUNTER — CLINICAL SUPPORT (OUTPATIENT)
Dept: REHABILITATION | Facility: HOSPITAL | Age: 78
End: 2018-05-28
Payer: MEDICARE

## 2018-05-28 DIAGNOSIS — R26.89 ANTALGIC GAIT: ICD-10-CM

## 2018-05-28 DIAGNOSIS — R29.898 WEAKNESS OF BOTH LOWER EXTREMITIES: ICD-10-CM

## 2018-05-28 DIAGNOSIS — R26.89 DECREASED FUNCTIONAL MOBILITY: ICD-10-CM

## 2018-05-28 DIAGNOSIS — M25.662 DECREASED RANGE OF MOTION OF LEFT KNEE: ICD-10-CM

## 2018-05-28 PROCEDURE — 97110 THERAPEUTIC EXERCISES: CPT | Mod: PO

## 2018-05-28 NOTE — PATIENT INSTRUCTIONS
Strengthening: Hip Extension (Prone)        Tighten muscles on front of left thigh, then lift leg, keeping knee locked.  Repeat 10 times per set. Do 3 sets per session. Do 2 sessions per day.     [email or web address]     Copyright © [employer]. All rights reserved.   Strengthening: Hip Abduction (Side-Lying)        Tighten muscles on front of left thigh, then lift leg, keeping knee locked.   Repeat 10 times per set. Do 3 sets per session. Do 2 sessions per day.     [email or web address]     Copyright © [employer]. All rights reserved.   Strengthening: Hip Adduction (Side-Lying)        Tighten muscles on front of right thigh, then lift leg 4 inches from surface, keeping knee locked.   Repeat 10 times per set. Do 3 sets per session. Do 2 sessions per day.     [email or web address]     Copyright © [employer]. All rights reserved.   Knee Extension: Resisted (Sitting)        With band looped around right ankle and under other foot, straighten leg with ankle loop. Keep other leg bent to increase resistance.  Repeat 10 times per set. Do 3 sets per session. Do 2 sessions per day.     [email or web address]     Copyright © [employer]. All rights reserved.

## 2018-05-28 NOTE — PROGRESS NOTES
"DAILY TREATMENT NOTE    DATE: 5/28/2018    Start Time:  3:00  Stop Time:  3:45    PROCEDURES:    TIMED  Procedure Min.   TE 40   Bike 5NC                 UNTIMED  Procedure Min.             Total Timed Minutes:  40  Total Timed Units:  3  Total Untimed Units:  0  Charges Billed/# of units:  3      Progress/Current Status    Subjective:     Patient ID: Korey Santos is a 77 y.o. male.  Diagnosis:   1. Decreased range of motion of left knee     2. Weakness of both lower extremities     3. Antalgic gait     4. Decreased functional mobility       Pain: 6 /10  Patient reports being really sore after his evaluation. He has some pain in his knee today and is still getting a lot of swelling. The popping in his knee is driving him crazy, he is only getting it when walking.     Objective:     Patient ambulated into the clinic with SPC with B knee flexion during stance phase of gait. He initiated the session on stationary bike x 5 minutes supervised by PT making full revolutions. He was educated and performed therapeutic exercises as per log, 1:1 with PT x 40 minutes to improve his strength, ROM, and flexibility. He declined a cold pack at the end of the session.    Date  05/28/18   VISIT 2/50   Gcode 06/25/18   FOTO 2/5   Cap Visit   Cap Total 90.96  204.16   FTF 06/25/18   MT --       HS stretch 10 x 10"       Bike st 12 L1 x 5'       SAQ 2 x 10   SLR 2 x 10   SL Hip Abd 1 x 10   Heel Slides 1 x 10   SL Hip Add 1 x 10   Prone hip ext 1 x 10   Prone hang 3'   LAQs 1 x 10   Seated Hip Flex --       TKE --   Hip Abd --   Hip Flex --   Hip Ext --   HS Curls --   Knee Ext --   Step Ups --   Step Downs --   Gait --   CP declined   Initials DG     AROM 104* flexion to -9*extension  Assessment:     Patient was able to begin making progress towards his goals as he was able to perform all of today's activities with no increase in symptoms prior to leaving the clinic. He required frequent cues to avoid substitutions and to perform " his exercises correctly. He would benefit from continued physical therapy to improve his strength, ROM, flexibility, and gait in order to return to his PLOF.    Patient Education/Response:     Patient given handouts of today's exercises to add to his HEP and instructed to perform his HEP twice a day to his tolerance. He was also educated that it was ok for him to return to the gym to ride the stationary bike there but to wait on using the weight machines. He verbalized understanding instructions.     Plans and Goals:     Continue with the plan of care and progress as the patient tolerates.     Short Term Goals (4 Weeks):   1.  This patient will be independent with initial HEP for L/E strengthening and knee ROM.  2.  This patient will increase L knee AROM to 115 degrees in order to be able to perform vehicle transfers with no difficulty.  3.  This patient will increase L hip and knee MMT 1 full muscle grade all deficit motions in order to perform his usual household duties with no difficulty.  4.  This patient will be able to ambulate greater than 150 feet with a normal gait pattern without an AD.   5.  Patient will be able to achieve greater than or equal to 25 on the FOTO Knee placing patient in 60%<80% impaired, limited, or restricted category demonstrating overall improved functional ability with lower extremity.   Long Term Goals (8 Weeks):   1.  This patient will be independent with updated HEP.  2.  This patient will increase L knee AROM to 130 degrees in order to be able to perform his usual yard work with no difficulty.  3.  This patient will increase L L/E MMT to 5/5 in order to be able to ascend/descend steps with a reciprocal gait.  4.  This patient will be able to ambulate greater than 200 yards without use of assistive device without pain or gait deficits.  5.  Patient will be able to achieve greater than or equal to 50 on the FOTO Knee placing patient in 40%<60% impaired, limited, or restricted  category demonstrating overall improved functional ability with lower extremity.

## 2018-05-30 ENCOUNTER — CLINICAL SUPPORT (OUTPATIENT)
Dept: REHABILITATION | Facility: HOSPITAL | Age: 78
End: 2018-05-30
Payer: MEDICARE

## 2018-05-30 DIAGNOSIS — R29.898 WEAKNESS OF BOTH LOWER EXTREMITIES: ICD-10-CM

## 2018-05-30 DIAGNOSIS — R26.89 ANTALGIC GAIT: ICD-10-CM

## 2018-05-30 DIAGNOSIS — R26.89 DECREASED FUNCTIONAL MOBILITY: ICD-10-CM

## 2018-05-30 DIAGNOSIS — M25.662 DECREASED RANGE OF MOTION OF LEFT KNEE: ICD-10-CM

## 2018-05-30 PROCEDURE — 97110 THERAPEUTIC EXERCISES: CPT | Mod: PO

## 2018-05-30 NOTE — PROGRESS NOTES
"DAILY TREATMENT NOTE    DATE: 5/30/2018    Start Time:  1:00  Stop Time:  2:15    PROCEDURES:    TIMED  Procedure Min.   TE 60   Bike 5NC   CP 10NC             UNTIMED  Procedure Min.             Total Timed Minutes:  60  Total Timed Units:  4  Total Untimed Units:  0  Charges Billed/# of units:  4      Progress/Current Status    Subjective:     Patient ID: Korey Santos is a 77 y.o. male.  Diagnosis:   1. Decreased range of motion of left knee     2. Weakness of both lower extremities     3. Antalgic gait     4. Decreased functional mobility       Pain: 6 /10  Patient reports his back started hurting yesterday after doing his exercises at home. He is still getting the popping and it is cramping his walking ability.     Objective:     Patient ambulated into the clinic with SPC with B knee flexion during stance phase of gait. He initiated the session on stationary bike x 5 minutes supervised by PT making full revolutions. He was educated and performed therapeutic exercises as per log, along with today's progressions 1:1 with PT x 60 minutes to improve his strength, ROM, and flexibility. He received a cold pack in long sitting to L knee x 10 minutes at the end of the session. .    Date  05/30/18 05/28/18   VISIT 3/50 2/50   Gcode 06/25/18 06/25/18   FOTO 3/5 2/5   Cap Visit   Cap Total 121.28  325.44 90.96  204.16   FTF 06/25/18 06/25/18   POC exp 07/25/18    MT -- --        HS stretch 10 x 10" 10 x 10"        Bike st 12 L1 x 5' L1 x 5'        SAQ 3 x 10 2 x 10   SLR 3 x 10 2 x 10   SL Hip Abd 2 x 10 1 x 10   Heel Slides 1 x 10 1 x 10   SL Hip Add 2 x 10 1 x 10   Prone hip ext 2 x 10 1 x 10   Prone hang 3' 3'   Prone HS curl 1 x 10    LAQs 2 x 10 1 x 10   Seated Hip Flex -- --        TKE -- --   Hip Abd -- --   Hip Flex -- --   Hip Ext -- --   HS Curls --- --   Knee Ext -- --   Step Ups -- --   Step Downs --- --   Gait -- --   CP  declined   Initials DG DG     AROM 105* flexion to -9*extension  Assessment: "     Patient continues to require frequent cues to perform his exercises correctly and to stay focused on the activity at hand instead discussing how he does not want to be discharged from therapy too soon and have to do his exercises on his own. He performed all of today's progressions with no increase in symptoms prior to leaving the clinic. He would benefit from continued physical therapy to improve his strength, ROM, flexibility, and gait in order to return to his PLOF.    Patient Education/Response:     Patient was instructed to perform his HEP twice a day to his tolerance. He was also educated again that it was ok for him to return to the gym to ride the stationary bike there but to wait on using the weight machines. He verbalized understanding instructions.     Plans and Goals:     Continue with the plan of care and progress as the patient tolerates. Add weight to SAQ next visit.     Short Term Goals (4 Weeks):   1.  This patient will be independent with initial HEP for L/E strengthening and knee ROM.  2.  This patient will increase L knee AROM to 115 degrees in order to be able to perform vehicle transfers with no difficulty.  3.  This patient will increase L hip and knee MMT 1 full muscle grade all deficit motions in order to perform his usual household duties with no difficulty.  4.  This patient will be able to ambulate greater than 150 feet with a normal gait pattern without an AD.   5.  Patient will be able to achieve greater than or equal to 25 on the FOTO Knee placing patient in 60%<80% impaired, limited, or restricted category demonstrating overall improved functional ability with lower extremity.   Long Term Goals (8 Weeks):   1.  This patient will be independent with updated HEP.  2.  This patient will increase L knee AROM to 130 degrees in order to be able to perform his usual yard work with no difficulty.  3.  This patient will increase L L/E MMT to 5/5 in order to be able to ascend/descend steps  with a reciprocal gait.  4.  This patient will be able to ambulate greater than 200 yards without use of assistive device without pain or gait deficits.  5.  Patient will be able to achieve greater than or equal to 50 on the FOTO Knee placing patient in 40%<60% impaired, limited, or restricted category demonstrating overall improved functional ability with lower extremity.

## 2018-06-04 ENCOUNTER — CLINICAL SUPPORT (OUTPATIENT)
Dept: REHABILITATION | Facility: HOSPITAL | Age: 78
End: 2018-06-04
Payer: MEDICARE

## 2018-06-04 DIAGNOSIS — R26.89 DECREASED FUNCTIONAL MOBILITY: ICD-10-CM

## 2018-06-04 DIAGNOSIS — R26.89 ANTALGIC GAIT: ICD-10-CM

## 2018-06-04 DIAGNOSIS — M25.662 DECREASED RANGE OF MOTION OF LEFT KNEE: ICD-10-CM

## 2018-06-04 DIAGNOSIS — R29.898 WEAKNESS OF BOTH LOWER EXTREMITIES: ICD-10-CM

## 2018-06-04 PROCEDURE — 97110 THERAPEUTIC EXERCISES: CPT | Mod: PO

## 2018-06-04 NOTE — PROGRESS NOTES
"DAILY TREATMENT NOTE    DATE: 6/4/2018    Start Time:  1:00  Stop Time:  2:10    PROCEDURES:    TIMED  Procedure Min.   Bike sup 5 NC   TE 55   CP 10 NC             UNTIMED  Procedure Min.             Total Timed Minutes:  55  Total Timed Units:  4  Total Untimed Units:  0  Charges Billed/# of units:  4 (TE-4)      Progress/Current Status    Subjective:     Patient ID: Korey Santos is a 77 y.o. male.  Diagnosis:   1. Decreased range of motion of left knee     2. Weakness of both lower extremities     3. Antalgic gait     4. Decreased functional mobility       Pain: 6 /10  Patient reports his left knee continues to hurt.  Patient reports his knee continues to clunk like two pieces of metal hitting.  Patient reports he is going to tell his doctor that he is not satisfied with the noise and demand that he do something about it.    Objective:     Patient ambulated into the clinic with SPC with B knee flexion during stance phase of gait.  He initiated the session on stationary bike x 5 minutes supervised by PTA making full revolutions.  He was educated and performed therapeutic exercises as per log below, along with new exercises added and today's progressions, 1:1 with PTA x 55 minutes to improve his strength, ROM, and flexibility. He received a cold pack in long sitting to L knee x 10 minutes at the end of the session. .    Date  06/04/18 05/30/18 05/28/18   VISIT 4/50 3/50 2/50   Gcode 06/25/18 06/25/18 06/25/18   FOTO 4/5 3/5 2/5   Cap Visit   Cap Total 121.28  446.72 121.28  325.44 90.96  204.16   FTF 06/25/18 06/25/18 06/25/18   POC exp 07/25/18 07/25/18          Bike st 12 L1 x 5' L1 x 5' L1 x 5'   Table:      HS stretch 10 x 10" 10 x 10" 10 x 10"   SAQ 3 x 10 3 x 10 2 x 10   Heel Slides 2 x 10 1 x 10 1 x 10   SLR 3 x 10 3 x 10 2 x 10   SL Hip Abd 3 x 10 2 x 10 1 x 10   SL Hip Add 3 x 10 2 x 10 1 x 10   Prone hip ext 3 x 10 2 x 10 1 x 10   Prone hang 3' 3' 3'   Prone HS curl 2 x 10 1 x 10    Seated:    "   LAQs 3 x 10 2 x 10 1 x 10   Seated Hip Flex -- -- --   Standing:      Heel raises 1 x 10 -- --   Toe raises 1 x 10     Hip Abd -- -- --   Hip Flex -- -- --   Hip Ext -- -- --   HS Curls -- --- --   Step Ups -- -- --   Gait -- -- --   CP declined  declined         Initials GWA 1/6 DG DG     AROM 105* flexion to -9*extension  Assessment:     Patient continues to require frequent cues to perform his exercises correctly and to stay focused on the activity at hand instead discussing how he does not want to be discharged from therapy too soon and have to do his exercises on his own.  Patient completed his therapy along with new exercises added and today's progressions with no increase in symptoms prior to leaving the clinic. He would benefit from continued physical therapy to improve his strength, ROM, flexibility, and gait in order to return to his PLOF.    Patient Education/Response:     Patient was instructed to perform his HEP twice a day to his tolerance. He was also educated again that it was ok for him to return to the gym to ride the stationary bike there but to wait on using the weight machines. He verbalized understanding instructions.     Plans and Goals:     Continue with the plan of care and progress as the patient tolerates. Add weight to SAQ next visit.     Short Term Goals (4 Weeks):   1.  This patient will be independent with initial HEP for L/E strengthening and knee ROM.  2.  This patient will increase L knee AROM to 115 degrees in order to be able to perform vehicle transfers with no difficulty.  3.  This patient will increase L hip and knee MMT 1 full muscle grade all deficit motions in order to perform his usual household duties with no difficulty.  4.  This patient will be able to ambulate greater than 150 feet with a normal gait pattern without an AD.   5.  Patient will be able to achieve greater than or equal to 25 on the FOTO Knee placing patient in 60%<80% impaired, limited, or restricted  category demonstrating overall improved functional ability with lower extremity.     Long Term Goals (8 Weeks):   1.  This patient will be independent with updated HEP.  2.  This patient will increase L knee AROM to 130 degrees in order to be able to perform his usual yard work with no difficulty.  3.  This patient will increase L L/E MMT to 5/5 in order to be able to ascend/descend steps with a reciprocal gait.  4.  This patient will be able to ambulate greater than 200 yards without use of assistive device without pain or gait deficits.  5.  Patient will be able to achieve greater than or equal to 50 on the FOTO Knee placing patient in 40%<60% impaired, limited, or restricted category demonstrating overall improved functional ability with lower extremity.

## 2018-06-06 ENCOUNTER — CLINICAL SUPPORT (OUTPATIENT)
Dept: REHABILITATION | Facility: HOSPITAL | Age: 78
End: 2018-06-06
Payer: MEDICARE

## 2018-06-06 DIAGNOSIS — R29.898 WEAKNESS OF BOTH LOWER EXTREMITIES: ICD-10-CM

## 2018-06-06 DIAGNOSIS — R26.89 ANTALGIC GAIT: ICD-10-CM

## 2018-06-06 DIAGNOSIS — R26.89 DECREASED FUNCTIONAL MOBILITY: ICD-10-CM

## 2018-06-06 DIAGNOSIS — M25.662 DECREASED RANGE OF MOTION OF LEFT KNEE: ICD-10-CM

## 2018-06-06 PROCEDURE — 97110 THERAPEUTIC EXERCISES: CPT | Mod: PO

## 2018-06-06 NOTE — PROGRESS NOTES
"DAILY TREATMENT NOTE    DATE: 6/6/2018    Start Time:  1:00  Stop Time:  2:00    PROCEDURES:    TIMED  Procedure Min.   TE 30   TE sup 25 NC   Bike sup 5 NC             UNTIMED  Procedure Min.             Total Timed Minutes:  30  Total Timed Units:  2  Total Untimed Units:  0  Charges Billed/# of units:  2 (TE-2)      Progress/Current Status    Subjective:     Patient ID: Korey Santos is a 77 y.o. male.  Diagnosis:   1. Decreased range of motion of left knee     2. Weakness of both lower extremities     3. Antalgic gait     4. Decreased functional mobility       Pain: 6 /10  Patient reports he continues with episodes of sharp pain and does not sleep through the night because of pain.    Objective:     Patient ambulated into the clinic with SPC with B knee flexion during stance phase of gait.  He initiated the session on stationary bike x 5 minutes supervised by PTA making full revolutions.  He was educated and performed therapeutic exercises as per log below, along with new exercises added and today's progressions, 1:1 with PTA x 30 minutes and supervised exercises by PTA x 25 minutes to improve his strength, ROM, and flexibility. He received a cold pack in long sitting to L knee x 10 minutes at the end of the session. .    Date  06/06/18 06/04/18 05/30/18 05/28/18   VISIT 5/50 4/50 3/50 2/50   Gcode 06/25/18 06/25/18 06/25/18 06/25/18   FOTO 5/5 4/5 3/5 2/5   Cap Visit   Cap Total 60.64  507.36 121.28  446.72 121.28  325.44 90.96  204.16   FTF 06/25/18 06/25/18 06/25/18 06/25/18   POC exp 07/25/18 07/25/18 07/25/18    DOS - 5/04/18              Bike st 12 L1 x 5' L1 x 5' L1 x 5' L1 x 5'   Table:       HS stretch 10 x 10" 10 x 10" 10 x 10" 10 x 10"   SAQ 3 x 10 3 x 10 3 x 10 2 x 10   Heel Slides 2 x 10 2 x 10 1 x 10 1 x 10   SLR 3 x 10 3 x 10 3 x 10 2 x 10   SL Hip Abd 3 x 10 3 x 10 2 x 10 1 x 10   SL Hip Add 3 x 10 3 x 10 2 x 10 1 x 10   Prone hip ext 3 x 10 3 x 10 2 x 10 1 x 10   Prone hang 3' x 3# 3' 3' 3' "   Prone HS curl 3 x 10 2 x 10 1 x 10    Seated:       LAQs 3 x 10 3 x 10 2 x 10 1 x 10   Seated Hip Flex -- -- -- --   Standing:       Heel raises 1 x 10 1 x 10 -- --   Toe raises 1 x 10 1 x 10     Hip Abd -- -- -- --   Hip Flex -- -- -- --   Hip Ext -- -- -- --   HS Curls -- -- --- --   Step Ups -- -- -- --   Gait -- -- -- --   CP declined declined  declined          Initials GWA 2/6 GWA 1/6 DG DG     Functional limitation reporting disability percentage was obtained through use of FOTO knee Survey indicating 74% disability     AROM 105* flexion to -9*extension  Assessment:     Patient continues to require frequent cues to perform his exercises correctly and to stay focused on the activity at hand instead discussing how he does not want to be discharged from therapy too soon and have to do his exercises on his own.  Patient completed his therapy along with today's progressions with no increase in symptoms prior to leaving the clinic. He would benefit from continued physical therapy to improve his strength, ROM, flexibility, and gait in order to return to his PLOF.    Patient Education/Response:     Patient was instructed to perform his HEP twice a day to his tolerance. He was also educated again that it was ok for him to return to the gym to ride the stationary bike there but to wait on using the weight machines. He verbalized understanding instructions.     Plans and Goals:     Continue with the plan of care and progress as the patient tolerates. Add weight to SAQ next visit.     Short Term Goals (4 Weeks):   1.  This patient will be independent with initial HEP for L/E strengthening and knee ROM.  2.  This patient will increase L knee AROM to 115 degrees in order to be able to perform vehicle transfers with no difficulty.  3.  This patient will increase L hip and knee MMT 1 full muscle grade all deficit motions in order to perform his usual household duties with no difficulty.  4.  This patient will be able to  ambulate greater than 150 feet with a normal gait pattern without an AD.   5.  Patient will be able to achieve greater than or equal to 25 on the FOTO Knee placing patient in 60%<80% impaired, limited, or restricted category demonstrating overall improved functional ability with lower extremity.     Long Term Goals (8 Weeks):   1.  This patient will be independent with updated HEP.  2.  This patient will increase L knee AROM to 130 degrees in order to be able to perform his usual yard work with no difficulty.  3.  This patient will increase L L/E MMT to 5/5 in order to be able to ascend/descend steps with a reciprocal gait.  4.  This patient will be able to ambulate greater than 200 yards without use of assistive device without pain or gait deficits.  5.  Patient will be able to achieve greater than or equal to 50 on the FOTO Knee placing patient in 40%<60% impaired, limited, or restricted category demonstrating overall improved functional ability with lower extremity.

## 2018-06-08 ENCOUNTER — CLINICAL SUPPORT (OUTPATIENT)
Dept: REHABILITATION | Facility: HOSPITAL | Age: 78
End: 2018-06-08
Payer: MEDICARE

## 2018-06-08 DIAGNOSIS — R26.89 DECREASED FUNCTIONAL MOBILITY: ICD-10-CM

## 2018-06-08 DIAGNOSIS — M25.662 DECREASED RANGE OF MOTION OF LEFT KNEE: ICD-10-CM

## 2018-06-08 DIAGNOSIS — R26.89 ANTALGIC GAIT: ICD-10-CM

## 2018-06-08 DIAGNOSIS — R29.898 WEAKNESS OF BOTH LOWER EXTREMITIES: ICD-10-CM

## 2018-06-08 PROCEDURE — 97110 THERAPEUTIC EXERCISES: CPT | Mod: PO

## 2018-06-08 NOTE — PROGRESS NOTES
"DAILY TREATMENT NOTE    DATE: 6/8/2018    Start Time:  11:00  Stop Time:  12:00    PROCEDURES:    TIMED  Procedure Min.   TE 55       Bike sup 5 NC             UNTIMED  Procedure Min.             Total Timed Minutes:  55  Total Timed Units:  4  Total Untimed Units:  0  Charges Billed/# of units:  4 (TE-4)      Progress/Current Status    Subjective:     Patient ID: Korey Santos is a 77 y.o. male.  Diagnosis:   1. Decreased range of motion of left knee     2. Weakness of both lower extremities     3. Antalgic gait     4. Decreased functional mobility       Pain: 6 /10  He reports having medial knee pain and still having that popping sound in his knee. He is requesting an easy day today as he is constipated and took a laxative before coming to therapy.     Objective:     Patient ambulated into the clinic with SPC with oversize steps gait.  He initiated the session on stationary bike x 5 minutes supervised by PT making full revolutions. He was educated and performed therapeutic exercises as per log below, along with today's progressions, 1:1 with PT x 55 minutes to improve his strength, ROM, and flexibility. He declined a cold pack at the end of the session. .    Date  06/08/18 06/06/18 06/04/18 05/30/18 05/28/18   VISIT 6/50 5/50 4/50 3/50 2/50   Gcode 06/25/18 06/25/18 06/25/18 06/25/18 06/25/18   FOTO -- 5/5 4/5 3/5 2/5   Cap Visit   Cap Total 121. 28  628.64 60.64  507.36 121.28  446.72 121.28  325.44 90.96  204.16   FTF 06/25/18 06/25/18 06/25/18 06/25/18 06/25/18   POC exp 07/25/18 07/25/18 07/25/18 07/25/18    DOS - 5/04/18                Bike st 12 L2 x 5', st 11 L1 x 5' L1 x 5' L1 x 5' L1 x 5'   Table:        HS stretch 10 x 10" 10 x 10" 10 x 10" 10 x 10" 10 x 10"   SAQ 2 x 10 x 1# 3 x 10 3 x 10 3 x 10 2 x 10   Heel Slides 1 x 10 2 x 10 2 x 10 1 x 10 1 x 10   SLR 1 x 10 x 1# 3 x 10 3 x 10 3 x 10 2 x 10   SL Hip Abd 2 x 10 x 1# 3 x 10 3 x 10 2 x 10 1 x 10   SL Hip Add 2 x 10 x 1# 3 x 10 3 x 10 2 x 10 1 x " "10   Prone hip ext 2 x 10 x 1# 3 x 10 3 x 10 2 x 10 1 x 10   Prone hang 4" x 3# 3' x 3# 3' 3' 3'   Prone HS curl oot 3 x 10 2 x 10 1 x 10    Seated:        LAQs 2 x 10 x 1# 3 x 10 3 x 10 2 x 10 1 x 10   Seated Hip Flex -- -- -- -- --   Standing:        Heel raises oot 1 x 10 1 x 10 -- --   Toe raises oot 1 x 10 1 x 10     Hip Abd -- -- -- -- --   Hip Flex -- -- -- -- --   Hip Ext -- -- -- -- --   HS Curls -- -- -- --- --   Step Ups -- -- -- -- --   Gait -- -- -- -- --   CP declined declined declined  declined           Initials DG GWA 2/6 GWA 1/6 DG DG     Functional limitation reporting disability percentage was obtained through use of FOTO knee Survey indicating 74% disability     Assessment:     He continues to require frequent cues to perform all of his exercises correctly. He did not complete all of his usual exercises to requiring frequent rest breaks due to stomach issues. Patient was able to complete all of today's activities with no increase in symptoms prior to leaving the clinic. He would benefit from continued physical therapy to improve his strength, ROM, flexibility, and gait in order to return to his PLOF.    Patient Education/Response:     Patient was instructed to perform his HEP twice a day to his tolerance. He verbalized understanding instructions.     Plans and Goals:     Continue with the plan of care and progress as the patient tolerates.     Short Term Goals (4 Weeks):   1.  This patient will be independent with initial HEP for L/E strengthening and knee ROM.  2.  This patient will increase L knee AROM to 115 degrees in order to be able to perform vehicle transfers with no difficulty.  3.  This patient will increase L hip and knee MMT 1 full muscle grade all deficit motions in order to perform his usual household duties with no difficulty.  4.  This patient will be able to ambulate greater than 150 feet with a normal gait pattern without an AD.   5.  Patient will be able to achieve greater " than or equal to 25 on the FOTO Knee placing patient in 60%<80% impaired, limited, or restricted category demonstrating overall improved functional ability with lower extremity. MET    Long Term Goals (8 Weeks):   1.  This patient will be independent with updated HEP.  2.  This patient will increase L knee AROM to 130 degrees in order to be able to perform his usual yard work with no difficulty.  3.  This patient will increase L L/E MMT to 5/5 in order to be able to ascend/descend steps with a reciprocal gait.  4.  This patient will be able to ambulate greater than 200 yards without use of assistive device without pain or gait deficits.  5.  Patient will be able to achieve greater than or equal to 50 on the FOTO Knee placing patient in 40%<60% impaired, limited, or restricted category demonstrating overall improved functional ability with lower extremity.

## 2018-06-11 ENCOUNTER — CLINICAL SUPPORT (OUTPATIENT)
Dept: REHABILITATION | Facility: HOSPITAL | Age: 78
End: 2018-06-11
Payer: MEDICARE

## 2018-06-11 DIAGNOSIS — R26.89 DECREASED FUNCTIONAL MOBILITY: ICD-10-CM

## 2018-06-11 DIAGNOSIS — R26.89 ANTALGIC GAIT: ICD-10-CM

## 2018-06-11 DIAGNOSIS — R29.898 WEAKNESS OF BOTH LOWER EXTREMITIES: ICD-10-CM

## 2018-06-11 DIAGNOSIS — M25.662 DECREASED RANGE OF MOTION OF LEFT KNEE: ICD-10-CM

## 2018-06-11 PROCEDURE — 97110 THERAPEUTIC EXERCISES: CPT | Mod: PO

## 2018-06-11 NOTE — PROGRESS NOTES
"DAILY TREATMENT NOTE    DATE: 6/11/2018    Start Time:  1:00  Stop Time:  2:00    PROCEDURES:    TIMED  Procedure Min.   TE 55   Bike sup 5 NC                 UNTIMED  Procedure Min.             Total Timed Minutes:  55  Total Timed Units:  4  Total Untimed Units:  0  Charges Billed/# of units:  4 (TE-4)      Progress/Current Status    Subjective:     Patient ID: Korey Santos is a 77 y.o. male.  Diagnosis:   1. Decreased range of motion of left knee     2. Weakness of both lower extremities     3. Antalgic gait     4. Decreased functional mobility       Pain: 6 /10  Patient reports he has trouble sleeping.    Objective:     Patient ambulated into the clinic with SPC with oversize steps gait.  He initiated the session on stationary bike x 5 minutes supervised by PTA making full revolutions.  He was educated and performed therapeutic exercises as per log below, along with today's progressions, 1:1 with PTA x 55 minutes to improve his strength, ROM, and flexibility. He declined a cold pack at the end of the session. .    Date  06/11/18 06/08/18 06/06/18 06/04/18 05/30/18 05/28/18   VISIT 7/50 6/50 5/50 4/50 3/50 2/50   Gcode 06/25/18 06/25/18 06/25/18 06/25/18 06/25/18 06/25/18   FOTO -- -- 5/5 4/5 3/5 2/5   Cap Visit   Cap Total 121.28  749.92 121. 28  628.64 60.64  507.36 121.28  446.72 121.28  325.44 90.96  204.16   FTF 06/25/18 06/25/18 06/25/18 06/25/18 06/25/18 06/25/18   POC exp 07/25/18 07/25/18 07/25/18 07/25/18 07/25/18    DOS - 5/04/18                  Bike st 12 L2 x 5', st 11 L2 x 5', st 11 L1 x 5' L1 x 5' L1 x 5' L1 x 5'   Table:         HS stretch 10 x 10" 10 x 10" 10 x 10" 10 x 10" 10 x 10" 10 x 10"   SAQ 3 x 10 x 1# 2 x 10 x 1# 3 x 10 3 x 10 3 x 10 2 x 10   Heel Slides 2 x 10 1 x 10 2 x 10 2 x 10 1 x 10 1 x 10   SLR 2 x 10 x 1# 1 x 10 x 1# 3 x 10 3 x 10 3 x 10 2 x 10   SL Hip Abd 2 x 10 x 1# 2 x 10 x 1# 3 x 10 3 x 10 2 x 10 1 x 10   SL Hip Add 2 x 10 x 1# 2 x 10 x 1# 3 x 10 3 x 10 2 x 10 1 x 10 " "  Prone hip ext 2 x 10 x 1# 2 x 10 x 1# 3 x 10 3 x 10 2 x 10 1 x 10   Prone hang 4' x 4# 4" x 3# 3' x 3# 3' 3' 3'   Prone HS curl oot oot 3 x 10 2 x 10 1 x 10    Seated:         LAQs 2 x 10 x 1# 2 x 10 x 1# 3 x 10 3 x 10 2 x 10 1 x 10   Seated Hip Flex -- -- -- -- -- --   Standing:         Heel raises oot oot 1 x 10 1 x 10 -- --   Toe raises oot oot 1 x 10 1 x 10     Hip Abd -- -- -- -- -- --   Hip Flex -- -- -- -- -- --   Hip Ext -- -- -- -- -- --   HS Curls -- -- -- -- --- --   Step Ups -- -- -- -- -- --   Gait -- -- -- -- -- --   CP declined declined declined declined  declined            Initials GWA 1/6 DG GWA 2/6 GWA 1/6 DG DG     AROM: flex - 112 deg, ext -  -11 deg  PROM: flex - 122 deg, ext -  -5 deg   Assessment:     Patient continues to require frequent cues to perform all of his exercises correctly and cues for proper positioning with side lying exercises.  Patient completed his therapy along with today's progressions with no increase in symptoms prior to leaving the clinic.  He would benefit from continued physical therapy to improve his strength, ROM, flexibility, and gait in order to return to his PLOF.    Patient Education/Response:     Patient was instructed to perform his HEP twice a day to his tolerance. He verbalized understanding instructions.     Plans and Goals:     Continue with the plan of care and progress as the patient tolerates.     Short Term Goals (4 Weeks):   1.  This patient will be independent with initial HEP for L/E strengthening and knee ROM.  2.  This patient will increase L knee AROM to 115 degrees in order to be able to perform vehicle transfers with no difficulty.  3.  This patient will increase L hip and knee MMT 1 full muscle grade all deficit motions in order to perform his usual household duties with no difficulty.  4.  This patient will be able to ambulate greater than 150 feet with a normal gait pattern without an AD.   5.  Patient will be able to achieve greater than " or equal to 25 on the FOTO Knee placing patient in 60%<80% impaired, limited, or restricted category demonstrating overall improved functional ability with lower extremity. MET    Long Term Goals (8 Weeks):   1.  This patient will be independent with updated HEP.  2.  This patient will increase L knee AROM to 130 degrees in order to be able to perform his usual yard work with no difficulty.  3.  This patient will increase L L/E MMT to 5/5 in order to be able to ascend/descend steps with a reciprocal gait.  4.  This patient will be able to ambulate greater than 200 yards without use of assistive device without pain or gait deficits.  5.  Patient will be able to achieve greater than or equal to 50 on the FOTO Knee placing patient in 40%<60% impaired, limited, or restricted category demonstrating overall improved functional ability with lower extremity.

## 2018-06-12 ENCOUNTER — TELEPHONE (OUTPATIENT)
Dept: SPORTS MEDICINE | Facility: CLINIC | Age: 78
End: 2018-06-12

## 2018-06-12 NOTE — TELEPHONE ENCOUNTER
"Spoke with pt regarding below information. Upon introduction on phone call, pt was very aggressive and agitated. Attempted to get more detail about the reason he was calling, asking if he requested to speak with anyone specific, or what it was regarding. Pt stated that he did ask to speak with a certain person and it was regarding his surgery last month. Pt continued to get louder as conversation went on. I did tell pt that staff should give him a call back before 5pm on the day he sent a message, but that I would like more detail so that I could address situation. Pt became very frustrated and asked that I "hang on". Pt left me on hold for >2 min before I terminated that call. Will have Yeyo Solis follow up with pt request for phone call return since this is the specific person pt was trying to reach.    ----- Message from Nicole Cabral sent at 6/12/2018 10:04 AM CDT -----  Contact: Self/ [phone]  The patient would like to speak with a supervisor to make a complaint about not getting a call back from the staff regarding surgery that he had on last month  and his knee pain.  The patient is saying he called on yesterday morning but never got a call back before 5pm.  The patient doctor is Dr. Franco.     "

## 2018-06-12 NOTE — TELEPHONE ENCOUNTER
Pt called with questions about his FELICE hose, blood clots, and ASA.     Pt was instructed to discontinue the FELICE hose due to his activity status and continue ASA until his 6 week post op follow-up with Dr. Franco.     All of the patient's questions were answered and the patient will contact us if they have any questions or concerns in the interim.

## 2018-06-18 ENCOUNTER — CLINICAL SUPPORT (OUTPATIENT)
Dept: REHABILITATION | Facility: HOSPITAL | Age: 78
End: 2018-06-18
Payer: MEDICARE

## 2018-06-18 DIAGNOSIS — R29.898 WEAKNESS OF BOTH LOWER EXTREMITIES: ICD-10-CM

## 2018-06-18 DIAGNOSIS — M25.662 DECREASED RANGE OF MOTION OF LEFT KNEE: ICD-10-CM

## 2018-06-18 DIAGNOSIS — R26.89 DECREASED FUNCTIONAL MOBILITY: ICD-10-CM

## 2018-06-18 DIAGNOSIS — R26.89 ANTALGIC GAIT: ICD-10-CM

## 2018-06-18 PROCEDURE — 97110 THERAPEUTIC EXERCISES: CPT | Mod: PO

## 2018-06-18 NOTE — PROGRESS NOTES
"DAILY TREATMENT NOTE    DATE: 6/18/2018    Start Time:  1:00  Stop Time:  2:00    PROCEDURES:    TIMED  Procedure Min.   TE 30   Bike sup 5 NC   TE sup 25NC             UNTIMED  Procedure Min.             Total Timed Minutes:  30  Total Timed Units:  2  Total Untimed Units:  0  Charges Billed/# of units:  2 (TE-2)      Progress/Current Status    Subjective:     Patient ID: Korey Santos is a 77 y.o. male.  Diagnosis:   1. Decreased range of motion of left knee     2. Weakness of both lower extremities     3. Antalgic gait     4. Decreased functional mobility       Pain: 6 /10  Patient reports he is still having soreness, stabbing, and swelling, popping when he walks. He also reports not performing his HEP on a consistent basis while travelling.     Objective:     Patient ambulated into the clinic with SPC with a normal gait pattern.  He initiated the session on stationary bike x 5 minutes supervised by PT making full revolutions.  He was educated and performed therapeutic exercises as per log below, along with today's progressions, supervised by PT x 25 minutes and 1:1 with PT x 30 minutes to improve his strength, ROM, and flexibility. He declined a cold pack at the end of the session. .    Date  06/18/18 06/11/18 06/08/18 06/06/18 06/04/18 05/30/18 05/28/18   VISIT 8/50 7/50 6/50 5/50 4/50 3/50 2/50   Gcode 06/25/18 06/25/18 06/25/18 06/25/18 06/25/18 06/25/18 06/25/18   FOTO -- -- -- 5/5 4/5 3/5 2/5   Cap Visit   Cap Total 60.64  810.56 121.28  749.92 121. 28  628.64 60.64  507.36 121.28  446.72 121.28  325.44 90.96  204.16   FTF 06/25/18 06/25/18 06/25/18 06/25/18 06/25/18 06/25/18 06/25/18   POC exp 07/25/18 07/25/18 07/25/18 07/25/18 07/25/18 07/25/18    DOS - 5/04/18                    Bike st 12 L4 x 5', st 11 L2 x 5', st 11 L2 x 5', st 11 L1 x 5' L1 x 5' L1 x 5' L1 x 5'   Table:          HS stretch 10 x 10" 10 x 10" 10 x 10" 10 x 10" 10 x 10" 10 x 10" 10 x 10"   SAQ  3 x 10 x 1# 3 x 10 x 1# 2 x 10 x 1# " "3 x 10 3 x 10 3 x 10 2 x 10   Heel Slides oot 2 x 10 1 x 10 2 x 10 2 x 10 1 x 10 1 x 10   SLR 3 x 10 x 1# 2 x 10 x 1# 1 x 10 x 1# 3 x 10 3 x 10 3 x 10 2 x 10   SL Hip Abd 3 x 10 x 1# 2 x 10 x 1# 2 x 10 x 1# 3 x 10 3 x 10 2 x 10 1 x 10   SL Hip Add 3 x 10 x 1# 2 x 10 x 1# 2 x 10 x 1# 3 x 10 3 x 10 2 x 10 1 x 10   Prone hip ext 3 x 10 x 1# 2 x 10 x 1# 2 x 10 x 1# 3 x 10 3 x 10 2 x 10 1 x 10   Prone hang 5' x 5# 4' x 4# 4" x 3# 3' x 3# 3' 3' 3'   Prone HS curl 2 x 10 x 1# oot oot 3 x 10 2 x 10 1 x 10    Seated:          LAQs oot 2 x 10 x 1# 2 x 10 x 1# 3 x 10 3 x 10 2 x 10 1 x 10   Seated Hip Flex -- -- -- -- -- -- --   Standing:          Heel raises oot oot oot 1 x 10 1 x 10 -- --   Toe raises oot oot oot 1 x 10 1 x 10     Min squats          Hip Abd -- -- -- -- -- -- --   Hip Flex -- -- -- -- -- -- --   Hip Ext -- -- -- -- -- -- --   HS Curls -- -- -- -- -- --- --   Step Ups -- -- -- -- -- -- --   Gait -- -- -- -- -- -- --   CP declined declined declined declined declined  declined             Initials DG GWA 1/6 DG GWA 2/6 GWA 1/6 DG DG     AROM: flex - 116 deg, ext -  -10 deg    Assessment:     Patient continues to require frequent cues through out the session to perform his exercises correctly and to move from one exercise to the next. Patient completed all of today's progressions with no increase in symptoms prior to leaving the clinic. He would benefit from continued physical therapy to improve his strength, ROM, flexibility, and gait in order to return to his PLOF.    Patient Education/Response:     Patient was instructed again on the importance of performing his HEP on a regular basis to maintain the strength and ROM gains made during his therapy sessions. He verbalized understanding instructions.     Plans and Goals:     Continue with the plan of care and progress as the patient tolerates. Begin squats next visit.     Short Term Goals (4 Weeks):   1.  This patient will be independent with initial HEP for L/E " strengthening and knee ROM.  2.  This patient will increase L knee AROM to 115 degrees in order to be able to perform vehicle transfers with no difficulty.  3.  This patient will increase L hip and knee MMT 1 full muscle grade all deficit motions in order to perform his usual household duties with no difficulty.  4.  This patient will be able to ambulate greater than 150 feet with a normal gait pattern without an AD.   5.  Patient will be able to achieve greater than or equal to 25 on the FOTO Knee placing patient in 60%<80% impaired, limited, or restricted category demonstrating overall improved functional ability with lower extremity. MET    Long Term Goals (8 Weeks):   1.  This patient will be independent with updated HEP.  2.  This patient will increase L knee AROM to 130 degrees in order to be able to perform his usual yard work with no difficulty.  3.  This patient will increase L L/E MMT to 5/5 in order to be able to ascend/descend steps with a reciprocal gait.  4.  This patient will be able to ambulate greater than 200 yards without use of assistive device without pain or gait deficits.  5.  Patient will be able to achieve greater than or equal to 50 on the FOTO Knee placing patient in 40%<60% impaired, limited, or restricted category demonstrating overall improved functional ability with lower extremity.

## 2018-06-20 ENCOUNTER — CLINICAL SUPPORT (OUTPATIENT)
Dept: REHABILITATION | Facility: HOSPITAL | Age: 78
End: 2018-06-20
Payer: MEDICARE

## 2018-06-20 DIAGNOSIS — R29.898 WEAKNESS OF BOTH LOWER EXTREMITIES: ICD-10-CM

## 2018-06-20 DIAGNOSIS — R26.89 ANTALGIC GAIT: ICD-10-CM

## 2018-06-20 DIAGNOSIS — R26.89 DECREASED FUNCTIONAL MOBILITY: ICD-10-CM

## 2018-06-20 DIAGNOSIS — M25.662 DECREASED RANGE OF MOTION OF LEFT KNEE: ICD-10-CM

## 2018-06-20 PROCEDURE — 97110 THERAPEUTIC EXERCISES: CPT | Mod: PO

## 2018-06-20 NOTE — PROGRESS NOTES
"DAILY TREATMENT NOTE    DATE: 6/20/2018    Start Time:  1:00  Stop Time:  2:10    PROCEDURES:    TIMED  Procedure Min.   Bike sup 5 NC   TE 25   TE sup 30 NC   CP 10 NC         UNTIMED  Procedure Min.             Total Timed Minutes:  25  Total Timed Units:  2  Total Untimed Units:  0  Charges Billed/# of units:  2 (TE-2)      Progress/Current Status    Subjective:     Patient ID: Korey Santos is a 77 y.o. male.  Diagnosis:   1. Decreased range of motion of left knee     2. Weakness of both lower extremities     3. Antalgic gait     4. Decreased functional mobility       Pain: 6 /10  Patient reports he continues with more lateral knee pain than anywhere else.    Objective:     Patient ambulated into the clinic with SPC with a normal gait pattern.  He initiated the session on stationary bike x 5 minutes supervised by PTA making full revolutions.  He was educated and performed therapeutic exercises as per log below, along with today's progressions, supervised by PTA x 30 minutes and 1:1 with PTA x 25 minutes to improve his strength, ROM, and flexibility.  He received a cold pack at the end of the session in long sitting x 10 minutes.    Date  06/20/18 06/18/18 06/11/18 06/08/18 06/06/18 06/04/18 05/30/18 05/28/18   VISIT 9/50 8/50 7/50 6/50 5/50 4/50 3/50 2/50   Gcode 06/25/18 06/25/18 06/25/18 06/25/18 06/25/18 06/25/18 06/25/18 06/25/18   FOTO -- -- -- -- 5/5 4/5 3/5 2/5   Cap Visit   Cap Total 60.64  871.20 60.64  810.56 121.28  749.92 121. 28  628.64 60.64  507.36 121.28  446.72 121.28  325.44 90.96  204.16   FTF 06/25/18 06/25/18 06/25/18 06/25/18 06/25/18 06/25/18 06/25/18 06/25/18   POC exp 07/25/18 07/25/18 07/25/18 07/25/18 07/25/18 07/25/18 07/25/18    DOS - 5/04/18                      Bike st 12 L4 x 5', st 11 L4 x 5', st 11 L2 x 5', st 11 L2 x 5', st 11 L1 x 5' L1 x 5' L1 x 5' L1 x 5'   Table:           HS stretch 10 x 10" 10 x 10" 10 x 10" 10 x 10" 10 x 10" 10 x 10" 10 x 10" 10 x 10"   SAQ 3 x 10 x " "1#  3 x 10 x 1# 3 x 10 x 1# 2 x 10 x 1# 3 x 10 3 x 10 3 x 10 2 x 10   Heel Slides 2 x 10 oot 2 x 10 1 x 10 2 x 10 2 x 10 1 x 10 1 x 10   SLR 3 x 10 x 1# 3 x 10 x 1# 2 x 10 x 1# 1 x 10 x 1# 3 x 10 3 x 10 3 x 10 2 x 10   SL Hip Abd 3 x 10 x 1# 3 x 10 x 1# 2 x 10 x 1# 2 x 10 x 1# 3 x 10 3 x 10 2 x 10 1 x 10   SL Hip Add 3 x 10 x 1# 3 x 10 x 1# 2 x 10 x 1# 2 x 10 x 1# 3 x 10 3 x 10 2 x 10 1 x 10   Prone hip ext 3 x 10 x 1# 3 x 10 x 1# 2 x 10 x 1# 2 x 10 x 1# 3 x 10 3 x 10 2 x 10 1 x 10   Prone hang 5' x 5# 5' x 5# 4' x 4# 4" x 3# 3' x 3# 3' 3' 3'   Prone HS curl 2 x 10 x 1# 2 x 10 x 1# oot oot 3 x 10 2 x 10 1 x 10    Seated:           LAQs 2 x 10 x 1# oot 2 x 10 x 1# 2 x 10 x 1# 3 x 10 3 x 10 2 x 10 1 x 10   Seated Hip Flex -- -- -- -- -- -- -- --   Standing:           Heel raises oot oot oot oot 1 x 10 1 x 10 -- --   Toe raises oot oot oot oot 1 x 10 1 x 10     Min squats           Hip Abd -- -- -- -- -- -- -- --   Hip Flex -- -- -- -- -- -- -- --   Hip Ext -- -- -- -- -- -- -- --   HS Curls -- -- -- -- -- -- --- --   Step Ups -- -- -- -- -- -- -- --   Gait -- -- -- -- -- -- -- --   CP 10' declined declined declined declined declined  declined              Initials GWA 1/6 DG GWA 1/6 DG GWA 2/6 GWA 1/6 DG DG     AROM: flex - 116 deg, ext -  -10 deg    Assessment:     Patient continues to require frequent cues through out the session to perform his exercises correctly and to move from one exercise to the next. Patient completed his therapy today with no increase in symptoms prior to leaving the clinic. He would benefit from continued physical therapy to improve his strength, ROM, flexibility, and gait in order to return to his PLOF.    Patient Education/Response:     Patient was instructed again on the importance of performing his HEP on a regular basis to maintain the strength and ROM gains made during his therapy sessions. He verbalized understanding instructions.     Plans and Goals:     Continue with the plan of " care and progress as the patient tolerates. Begin squats next visit.     Short Term Goals (4 Weeks):   1.  This patient will be independent with initial HEP for L/E strengthening and knee ROM.  2.  This patient will increase L knee AROM to 115 degrees in order to be able to perform vehicle transfers with no difficulty.  3.  This patient will increase L hip and knee MMT 1 full muscle grade all deficit motions in order to perform his usual household duties with no difficulty.  4.  This patient will be able to ambulate greater than 150 feet with a normal gait pattern without an AD.   5.  Patient will be able to achieve greater than or equal to 25 on the FOTO Knee placing patient in 60%<80% impaired, limited, or restricted category demonstrating overall improved functional ability with lower extremity. MET    Long Term Goals (8 Weeks):   1.  This patient will be independent with updated HEP.  2.  This patient will increase L knee AROM to 130 degrees in order to be able to perform his usual yard work with no difficulty.  3.  This patient will increase L L/E MMT to 5/5 in order to be able to ascend/descend steps with a reciprocal gait.  4.  This patient will be able to ambulate greater than 200 yards without use of assistive device without pain or gait deficits.  5.  Patient will be able to achieve greater than or equal to 50 on the FOTO Knee placing patient in 40%<60% impaired, limited, or restricted category demonstrating overall improved functional ability with lower extremity.

## 2018-06-22 ENCOUNTER — CLINICAL SUPPORT (OUTPATIENT)
Dept: REHABILITATION | Facility: HOSPITAL | Age: 78
End: 2018-06-22
Payer: MEDICARE

## 2018-06-22 DIAGNOSIS — R29.898 WEAKNESS OF BOTH LOWER EXTREMITIES: ICD-10-CM

## 2018-06-22 DIAGNOSIS — R26.89 DECREASED FUNCTIONAL MOBILITY: ICD-10-CM

## 2018-06-22 DIAGNOSIS — M25.662 DECREASED RANGE OF MOTION OF LEFT KNEE: ICD-10-CM

## 2018-06-22 DIAGNOSIS — R26.89 ANTALGIC GAIT: ICD-10-CM

## 2018-06-22 PROCEDURE — 97110 THERAPEUTIC EXERCISES: CPT | Mod: PO

## 2018-06-22 NOTE — PROGRESS NOTES
DAILY TREATMENT NOTE    DATE: 6/22/2018    Start Time:  11:10  Stop Time:  12:10    PROCEDURES:    TIMED  Procedure Min.   TE 25   TE sup 20 NC   CP 10 NC   Bike sup 5 NC         UNTIMED  Procedure Min.             Total Timed Minutes:  25  Total Timed Units:  2  Total Untimed Units:  0  Charges Billed/# of units:  2 (TE-2)      Progress/Current Status    Subjective:     Patient ID: Korey Santos is a 77 y.o. male.  Diagnosis:   1. Decreased range of motion of left knee     2. Weakness of both lower extremities     3. Antalgic gait     4. Decreased functional mobility       Pain: 6 /10  Patient reports he rode the stationary bike [facility] for 20 minutes and walked the treadmill for 20 minutes but feels that it's the prone hanging for 5 minutes that his causing his knee to hurt.    Objective:     Patient ambulated into the clinic with SPC with a normal gait pattern.  He initiated the session on stationary bike x 5 minutes supervised by PTA making full revolutions.  He was educated and performed therapeutic exercises as per log below, along with today's progressions, supervised by PTA x 20 minutes and 1:1 with PTA x 25 minutes to improve his strength, ROM, and flexibility.  He received a cold pack at the end of the session in long sitting x 10 minutes.    Date  06/22/18 06/20/18 06/18/18 06/11/18 06/08/18 06/06/18 06/04/18 05/30/18 05/28/18   VISIT 10/50 9/50 8/50 7/50 6/50 5/50 4/50 3/50 2/50   Gcode 06/25/18 06/25/18 06/25/18 06/25/18 06/25/18 06/25/18 06/25/18 06/25/18 06/25/18   FOTO -- -- -- -- -- 5/5 4/5 3/5 2/5   Cap Visit   Cap Total 60.64  931.84 60.64  871.20 60.64  810.56 121.28  749.92 121. 28  628.64 60.64  507.36 121.28  446.72 121.28  325.44 90.96  204.16   FTF 06/25/18 06/25/18 06/25/18 06/25/18 06/25/18 06/25/18 06/25/18 06/25/18 06/25/18   POC exp 07/25/18 07/25/18 07/25/18 07/25/18 07/25/18 07/25/18 07/25/18 07/25/18    DOS - 5/04/18                        Bike st 12 L4 x 5', st 11 L4 x 5',  "st 11 L4 x 5', st 11 L2 x 5', st 11 L2 x 5', st 11 L1 x 5' L1 x 5' L1 x 5' L1 x 5'   Table:            HS stretch 10 x 10" 10 x 10" 10 x 10" 10 x 10" 10 x 10" 10 x 10" 10 x 10" 10 x 10" 10 x 10"   SAQ 2 x 10 x 1.5# 3 x 10 x 1#  3 x 10 x 1# 3 x 10 x 1# 2 x 10 x 1# 3 x 10 3 x 10 3 x 10 2 x 10   Heel Slides 2 x 10 2 x 10 oot 2 x 10 1 x 10 2 x 10 2 x 10 1 x 10 1 x 10   SLR 2 x 10 x 1.5# 3 x 10 x 1# 3 x 10 x 1# 2 x 10 x 1# 1 x 10 x 1# 3 x 10 3 x 10 3 x 10 2 x 10   SL Hip Abd 2 x 10 x 1.5# 3 x 10 x 1# 3 x 10 x 1# 2 x 10 x 1# 2 x 10 x 1# 3 x 10 3 x 10 2 x 10 1 x 10   SL Hip Add 2 x 10 x 1.5# 3 x 10 x 1# 3 x 10 x 1# 2 x 10 x 1# 2 x 10 x 1# 3 x 10 3 x 10 2 x 10 1 x 10   Prone hip ext 2 x 10 x 1.5# 3 x 10 x 1# 3 x 10 x 1# 2 x 10 x 1# 2 x 10 x 1# 3 x 10 3 x 10 2 x 10 1 x 10   Prone hang  5' x 5# 5' x 5# 4' x 4# 4" x 3# 3' x 3# 3' 3' 3'   Prone HS curl 2 x 10 x 1.5# 2 x 10 x 1# 2 x 10 x 1# oot oot 3 x 10 2 x 10 1 x 10    Seated:            LAQs 2 x 10 x 1.5# 2 x 10 x 1# oot 2 x 10 x 1# 2 x 10 x 1# 3 x 10 3 x 10 2 x 10 1 x 10   Seated Hip Flex -- -- -- -- -- -- -- -- --   Standing:            Heel raises oot oot oot oot oot 1 x 10 1 x 10 -- --   Toe raises oot oot oot oot oot 1 x 10 1 x 10     Min squats --           Hip Abd -- -- -- -- -- -- -- -- --   Hip Flex -- -- -- -- -- -- -- -- --   Hip Ext -- -- -- -- -- -- -- -- --   HS Curls -- -- -- -- -- -- -- --- --   Step Ups -- -- -- -- -- -- -- -- --   Gait -- -- -- -- -- -- -- -- --   CP 10' 10' declined declined declined declined declined  declined               Initials GWA 2/6 GWA 1/6 DG GWA 1/6 DG GWA 2/6 GWA 1/6 DG DG     AROM: flex - 116 deg, ext -  -10 deg    Assessment:     Patient continues to require frequent cues through out the session to perform his exercises correctly and to move from one exercise to the next.  Patient completed his therapy along with today's progressions with no increase in symptoms prior to leaving the clinic. He would benefit from continued " physical therapy to improve his strength, ROM, flexibility, and gait in order to return to his PLOF.    Patient Education/Response:     Patient was instructed again on the importance of performing his HEP on a regular basis to maintain the strength and ROM gains made during his therapy sessions. He verbalized understanding instructions.     Plans and Goals:     Continue with the plan of care and progress as the patient tolerates. Begin squats next visit.     Short Term Goals (4 Weeks):   1.  This patient will be independent with initial HEP for L/E strengthening and knee ROM.  2.  This patient will increase L knee AROM to 115 degrees in order to be able to perform vehicle transfers with no difficulty.  3.  This patient will increase L hip and knee MMT 1 full muscle grade all deficit motions in order to perform his usual household duties with no difficulty.  4.  This patient will be able to ambulate greater than 150 feet with a normal gait pattern without an AD.   5.  Patient will be able to achieve greater than or equal to 25 on the FOTO Knee placing patient in 60%<80% impaired, limited, or restricted category demonstrating overall improved functional ability with lower extremity. MET    Long Term Goals (8 Weeks):   1.  This patient will be independent with updated HEP.  2.  This patient will increase L knee AROM to 130 degrees in order to be able to perform his usual yard work with no difficulty.  3.  This patient will increase L L/E MMT to 5/5 in order to be able to ascend/descend steps with a reciprocal gait.  4.  This patient will be able to ambulate greater than 200 yards without use of assistive device without pain or gait deficits.  5.  Patient will be able to achieve greater than or equal to 50 on the FOTO Knee placing patient in 40%<60% impaired, limited, or restricted category demonstrating overall improved functional ability with lower extremity.

## 2018-06-25 ENCOUNTER — OFFICE VISIT (OUTPATIENT)
Dept: INTERNAL MEDICINE | Facility: CLINIC | Age: 78
End: 2018-06-25
Attending: FAMILY MEDICINE
Payer: MEDICARE

## 2018-06-25 VITALS
HEART RATE: 83 BPM | HEIGHT: 71 IN | SYSTOLIC BLOOD PRESSURE: 112 MMHG | WEIGHT: 229.13 LBS | DIASTOLIC BLOOD PRESSURE: 58 MMHG | BODY MASS INDEX: 32.08 KG/M2

## 2018-06-25 DIAGNOSIS — R97.20 ELEVATED PSA: ICD-10-CM

## 2018-06-25 DIAGNOSIS — M17.0 OSTEOARTHRITIS OF BOTH KNEES, UNSPECIFIED OSTEOARTHRITIS TYPE: ICD-10-CM

## 2018-06-25 DIAGNOSIS — E78.5 HYPERLIPIDEMIA, UNSPECIFIED HYPERLIPIDEMIA TYPE: ICD-10-CM

## 2018-06-25 DIAGNOSIS — Z12.11 COLON CANCER SCREENING: ICD-10-CM

## 2018-06-25 DIAGNOSIS — I10 HYPERTENSION, ESSENTIAL: Primary | ICD-10-CM

## 2018-06-25 DIAGNOSIS — M47.816 LUMBAR SPONDYLOSIS: ICD-10-CM

## 2018-06-25 DIAGNOSIS — K63.5 POLYP OF COLON, UNSPECIFIED PART OF COLON, UNSPECIFIED TYPE: ICD-10-CM

## 2018-06-25 DIAGNOSIS — R60.9 POSTOPERATIVE EDEMA: ICD-10-CM

## 2018-06-25 DIAGNOSIS — Z96.652 STATUS POST TOTAL LEFT KNEE REPLACEMENT: ICD-10-CM

## 2018-06-25 PROBLEM — R26.89 DECREASED FUNCTIONAL MOBILITY: Status: RESOLVED | Noted: 2018-05-27 | Resolved: 2018-06-25

## 2018-06-25 PROBLEM — Z09 SURGERY FOLLOW-UP EXAMINATION: Status: RESOLVED | Noted: 2018-05-16 | Resolved: 2018-06-25

## 2018-06-25 PROBLEM — M17.12 OSTEOARTHRITIS OF LEFT KNEE: Status: RESOLVED | Noted: 2018-05-05 | Resolved: 2018-06-25

## 2018-06-25 PROBLEM — M25.662 DECREASED RANGE OF MOTION OF LEFT KNEE: Status: RESOLVED | Noted: 2018-05-27 | Resolved: 2018-06-25

## 2018-06-25 PROBLEM — R29.898 WEAKNESS OF BOTH LOWER EXTREMITIES: Status: RESOLVED | Noted: 2018-05-27 | Resolved: 2018-06-25

## 2018-06-25 PROCEDURE — 3078F DIAST BP <80 MM HG: CPT | Mod: CPTII,S$GLB,, | Performed by: FAMILY MEDICINE

## 2018-06-25 PROCEDURE — 3074F SYST BP LT 130 MM HG: CPT | Mod: CPTII,S$GLB,, | Performed by: FAMILY MEDICINE

## 2018-06-25 PROCEDURE — 99999 PR PBB SHADOW E&M-EST. PATIENT-LVL III: CPT | Mod: PBBFAC,,, | Performed by: FAMILY MEDICINE

## 2018-06-25 PROCEDURE — 99499 UNLISTED E&M SERVICE: CPT | Mod: S$PBB,,, | Performed by: FAMILY MEDICINE

## 2018-06-25 PROCEDURE — 99214 OFFICE O/P EST MOD 30 MIN: CPT | Mod: S$GLB,,, | Performed by: FAMILY MEDICINE

## 2018-06-25 RX ORDER — LOSARTAN POTASSIUM 25 MG/1
25 TABLET ORAL DAILY PRN
COMMUNITY
Start: 2018-04-26 | End: 2019-12-04 | Stop reason: ALTCHOICE

## 2018-06-25 NOTE — PROGRESS NOTES
Subjective:       Patient ID: Korey Santos is a 77 y.o. male.    Chief Complaint: Follow-up    HPI  Review of Systems   Constitutional: Positive for fatigue. Negative for chills and fever.   HENT: Negative for congestion and trouble swallowing.    Eyes: Negative for redness.   Respiratory: Negative for cough, chest tightness and shortness of breath.    Cardiovascular: Negative for chest pain, palpitations and leg swelling.   Gastrointestinal: Negative for abdominal pain and blood in stool.   Genitourinary: Negative for hematuria.   Musculoskeletal: Positive for arthralgias and gait problem. Negative for back pain, joint swelling, myalgias and neck pain.   Skin: Negative for color change and rash.   Neurological: Negative for tremors, speech difficulty, weakness, numbness and headaches.   Hematological: Negative for adenopathy. Does not bruise/bleed easily.   Psychiatric/Behavioral: Negative for behavioral problems, confusion and sleep disturbance. The patient is not nervous/anxious.        Objective:      Physical Exam   Constitutional: He is oriented to person, place, and time. He appears well-developed and well-nourished.   Eyes: No scleral icterus.   Neck: Normal range of motion. Neck supple. Carotid bruit is not present.   Cardiovascular: Normal rate, regular rhythm, normal heart sounds and intact distal pulses.  Exam reveals no gallop and no friction rub.    No murmur heard.  Pulmonary/Chest: Effort normal and breath sounds normal. No respiratory distress. He has no wheezes. He has no rales.   Abdominal: Soft. Bowel sounds are normal.   Musculoskeletal: He exhibits no edema.   Neurological: He is alert and oriented to person, place, and time. He displays no tremor. No cranial nerve deficit. Coordination and gait normal.   Skin: Skin is warm and dry. No rash noted. He is not diaphoretic. No erythema.   Psychiatric: He has a normal mood and affect. His behavior is normal. Judgment and thought content  normal.   Nursing note and vitals reviewed.      Assessment:       1. Hypertension, essential    2. Hyperlipidemia, unspecified hyperlipidemia type    3. Lumbar spondylosis    4. Osteoarthritis of both knees, unspecified osteoarthritis type    5. Status post total left knee replacement    6. Polyp of colon, unspecified part of colon, unspecified type    7. Colon cancer screening    8. Elevated PSA    9. Postoperative edema        Plan:   Korey was seen today for follow-up.    Diagnoses and all orders for this visit:    Hypertension, essential    Hyperlipidemia, unspecified hyperlipidemia type    Lumbar spondylosis    Osteoarthritis of both knees, unspecified osteoarthritis type    Status post total left knee replacement    Polyp of colon, unspecified part of colon, unspecified type  -     Case request GI: COLONOSCOPY    Colon cancer screening  -     Case request GI: COLONOSCOPY    Elevated PSA  Comments:  due for urology follow up 9/2018    Postoperative edema  -     CAR Ultrasound doppler venous legs bilat; Future      See meds, orders, follow up, routing and instructions sections of encounter.  This is a 77-year-old gentleman.  He is in for a followup.  He has no acute   complaints today.  He had a left TKA and is doing well.  He has greater than 90   degrees flexion and approximately 10 degrees shy of full extension.  He does   have some medial pain.  He is currently in physical therapy and has followups   with his orthopedist.    He has no chest pain, dyspnea, diaphoresis, syncope, near-syncope or   palpitations.  He did have an elevated PSA last year and was to follow up with   Dr. Quintero.  He has no other acute issues at this time.    RECOMMENDATIONS:  See orders.  Presumed followup in four to six months.    Continue physical therapy.      VIKASH/HN  dd: 06/25/2018 15:50:47 (CDT)  td: 06/26/2018 05:24:20 (CDT)  Doc ID   #[id number]  Job ID #[id number]    CC:

## 2018-06-27 ENCOUNTER — CLINICAL SUPPORT (OUTPATIENT)
Dept: REHABILITATION | Facility: HOSPITAL | Age: 78
End: 2018-06-27
Payer: MEDICARE

## 2018-06-27 DIAGNOSIS — M25.662 DECREASED RANGE OF MOTION OF LEFT KNEE: Primary | ICD-10-CM

## 2018-06-27 DIAGNOSIS — R26.89 DECREASED FUNCTIONAL MOBILITY: ICD-10-CM

## 2018-06-27 DIAGNOSIS — R29.898 WEAKNESS OF BOTH LOWER EXTREMITIES: ICD-10-CM

## 2018-06-27 DIAGNOSIS — R26.89 ANTALGIC GAIT: ICD-10-CM

## 2018-06-27 PROCEDURE — G8979 MOBILITY GOAL STATUS: HCPCS | Mod: CK,PO

## 2018-06-27 PROCEDURE — G8978 MOBILITY CURRENT STATUS: HCPCS | Mod: CL,PO

## 2018-06-27 PROCEDURE — 97110 THERAPEUTIC EXERCISES: CPT | Mod: PO

## 2018-06-27 NOTE — PROGRESS NOTES
DAILY TREATMENT NOTE    DATE: 6/27/2018    Start Time:  11:00  Stop Time:  12:00    PROCEDURES:    TIMED  Procedure Min.   TE 55   Bike 5NC                 UNTIMED  Procedure Min.             Total Timed Minutes:  55  Total Timed Units:  4  Total Untimed Units:  0  Charges Billed/# of units:  4 (TE-4)      Progress/Current Status    Subjective:     Patient ID: Korey Santos is a 77 y.o. male.  Diagnosis:   1. Decreased range of motion of left knee     2. Antalgic gait     3. Weakness of both lower extremities     4. Decreased functional mobility       Pain: 5 /10  He reports still having stabbing pains, pain on medial knee, and can't sleep in the bed at night. He is not perfomring his HEP daily as prescribed, he is doing maybe every other day.     Objective:     Patient ambulated into the clinic with SPC with a normal gait pattern.  He initiated the session on stationary bike x 5 minutes supervised by PT making full revolutions.  He was educated and performed therapeutic exercises as per log below, along with today's progressions, reassessment, and new exercises, 1:1 with PT x 55 minutes to improve his strength, ROM, and flexibility.  He declined a cold pack at the end of the session.    Date  06/27/18 06/22/18 06/20/18 06/18/18 06/11/18 06/08/18 06/06/18 06/04/18 05/30/18 05/28/18   VISIT 11/50 10/50 9/50 8/50 7/50 6/50 5/50 4/50 3/50 2/50   Gcode 06/25/18 06/25/18 06/25/18 06/25/18 06/25/18 06/25/18 06/25/18 06/25/18 06/25/18 06/25/18   FOTO -- -- -- -- -- -- 5/5 4/5 3/5 2/5   Cap Visit   Cap Total 121.28 60.64  931.84 60.64  871.20 60.64  810.56 121.28  749.92 121. 28  628.64 60.64  507.36 121.28  446.72 121.28  325.44 90.96  204.16   FTF 06/25/18 06/25/18 06/25/18 06/25/18 06/25/18 06/25/18 06/25/18 06/25/18 06/25/18 06/25/18   POC exp 07/25/18 07/25/18 07/25/18 07/25/18 07/25/18 07/25/18 07/25/18 07/25/18 07/25/18    DOS - 5/04/18                          Bike st 12 5' L4 x 5', st 11 L4 x 5', st 11 L4 x  "5', st 11 L2 x 5', st 11 L2 x 5', st 11 L1 x 5' L1 x 5' L1 x 5' L1 x 5'   Table:             HS stretch 10 x 10" 10 x 10" 10 x 10" 10 x 10" 10 x 10" 10 x 10" 10 x 10" 10 x 10" 10 x 10" 10 x 10"   SAQ 3 x 10 x 1.5# 2 x 10 x 1.5# 3 x 10 x 1#  3 x 10 x 1# 3 x 10 x 1# 2 x 10 x 1# 3 x 10 3 x 10 3 x 10 2 x 10   Heel Slides -- 2 x 10 2 x 10 oot 2 x 10 1 x 10 2 x 10 2 x 10 1 x 10 1 x 10   SLR 3 x 10 x 1.5# 2 x 10 x 1.5# 3 x 10 x 1# 3 x 10 x 1# 2 x 10 x 1# 1 x 10 x 1# 3 x 10 3 x 10 3 x 10 2 x 10   SL Hip Abd 3 x 10 x 1.5# 2 x 10 x 1.5# 3 x 10 x 1# 3 x 10 x 1# 2 x 10 x 1# 2 x 10 x 1# 3 x 10 3 x 10 2 x 10 1 x 10   SL Hip Add 3 x 10 x 1.5# 2 x 10 x 1.5# 3 x 10 x 1# 3 x 10 x 1# 2 x 10 x 1# 2 x 10 x 1# 3 x 10 3 x 10 2 x 10 1 x 10   Prone hip ext 3 x 10 x 1.5# 2 x 10 x 1.5# 3 x 10 x 1# 3 x 10 x 1# 2 x 10 x 1# 2 x 10 x 1# 3 x 10 3 x 10 2 x 10 1 x 10   Prone hang 5' x 5.5#  5' x 5# 5' x 5# 4' x 4# 4" x 3# 3' x 3# 3' 3' 3'   Prone HS curl oot 2 x 10 x 1.5# 2 x 10 x 1# 2 x 10 x 1# oot oot 3 x 10 2 x 10 1 x 10    Seated:             LAQs oot 2 x 10 x 1.5# 2 x 10 x 1# oot 2 x 10 x 1# 2 x 10 x 1# 3 x 10 3 x 10 2 x 10 1 x 10   Seated Hip Flex -- -- -- -- -- -- -- -- -- --   Standing:             Heel raises oot oot oot oot oot oot 1 x 10 1 x 10 -- --   Toe raises 1 x 10 oot oot oot oot oot 1 x 10 1 x 10     Min squats 1 x 10 --           Hip Abd -- -- -- -- -- -- -- -- -- --   Hip Flex -- -- -- -- -- -- -- -- -- --   Hip Ext -- -- -- -- -- -- -- -- -- --   TKE Next? -- -- -- -- -- -- -- --- --   Step Ups Next? -- -- -- -- -- -- -- -- --   Gait -- -- -- -- -- -- -- -- -- --   CP declined 10' 10' declined declined declined declined declined  declined                Initials DG GWA 2/6 GWA 1/6 DG GWA 1/6 DG GWA 2/6 GWA 1/6 DG DG     AROM: flex - 115 deg, ext   -10 deg  PROM flex 125 deg ,  -4 extension deg    L/E MMT Right Left Pain/Dysfunction with Movement   Hip Flexion 4/5 4/5    Hip Extension 3+/5 3+/5    Hip Abduction 3+/5 3+/5    Knee " Flexion 4/5 3+/5    Knee Extension 5/5 4/5    Ankle DF 4-/5 4-/5    Ankle PF 5/5 5/5      FOTO Knee 26, G code CL, 60%<80% impaired, limited, or restricted category.   Assessment:     Patient demonstrates an increase AROM and PROM of knee flexion but his extension continues to be limited with a soft end feel. His B LE strength is slowly increasing as he is not compliant with performing his HEP on a regular basis. He admits to only performing his HEP every other day. He continues to require frequent cues to perform all of his usual exercises correctly. When ambulating he continues to have a wide KURT with decreased knee extension bilaterally during stance phase. He performed all of today's activities with no increase in symptoms prior to leaving the clinic. He would benefit from continued physical therapy to improve his strength, ROM, flexibility, and gait in order to return to his PLOF.    Patient Education/Response:     Patient was instructed again on the importance of performing his HEP on a regular basis to maintain the strength and ROM gains made during his therapy sessions. He verbalized understanding instructions.     Plans and Goals:     Continue with the plan of care and progress as the patient tolerates.     Short Term Goals (4 Weeks):   1.  This patient will be independent with initial HEP for L/E strengthening and knee ROM.  2.  This patient will increase L knee AROM to 115 degrees in order to be able to perform vehicle transfers with no difficulty.  3.  This patient will increase L hip and knee MMT 1 full muscle grade all deficit motions in order to perform his usual household duties with no difficulty.  4.  This patient will be able to ambulate greater than 150 feet with a normal gait pattern without an AD.   5.  Patient will be able to achieve greater than or equal to 25 on the FOTO Knee placing patient in 60%<80% impaired, limited, or restricted category demonstrating overall improved functional  ability with lower extremity. MET    Long Term Goals (8 Weeks):   1.  This patient will be independent with updated HEP.  2.  This patient will increase L knee AROM to 130 degrees in order to be able to perform his usual yard work with no difficulty.  3.  This patient will increase L L/E MMT to 5/5 in order to be able to ascend/descend steps with a reciprocal gait.  4.  This patient will be able to ambulate greater than 200 yards without use of assistive device without pain or gait deficits.  5.  Patient will be able to achieve greater than or equal to 50 on the FOTO Knee placing patient in 40%<60% impaired, limited, or restricted category demonstrating overall improved functional ability with lower extremity.

## 2018-06-28 ENCOUNTER — CLINICAL SUPPORT (OUTPATIENT)
Dept: CARDIOLOGY | Facility: CLINIC | Age: 78
End: 2018-06-28
Attending: FAMILY MEDICINE
Payer: MEDICARE

## 2018-06-28 DIAGNOSIS — R60.9 POSTOPERATIVE EDEMA: ICD-10-CM

## 2018-06-28 PROCEDURE — 93970 EXTREMITY STUDY: CPT | Mod: S$GLB,,, | Performed by: INTERNAL MEDICINE

## 2018-06-29 ENCOUNTER — CLINICAL SUPPORT (OUTPATIENT)
Dept: REHABILITATION | Facility: HOSPITAL | Age: 78
End: 2018-06-29
Payer: MEDICARE

## 2018-06-29 ENCOUNTER — DOCUMENTATION ONLY (OUTPATIENT)
Dept: REHABILITATION | Facility: HOSPITAL | Age: 78
End: 2018-06-29

## 2018-06-29 ENCOUNTER — TELEPHONE (OUTPATIENT)
Dept: INTERNAL MEDICINE | Facility: CLINIC | Age: 78
End: 2018-06-29

## 2018-06-29 DIAGNOSIS — R26.89 ANTALGIC GAIT: ICD-10-CM

## 2018-06-29 PROCEDURE — 97110 THERAPEUTIC EXERCISES: CPT | Mod: PO

## 2018-06-29 NOTE — PROGRESS NOTES
"DAILY TREATMENT NOTE    DATE: 6/29/2018    Start Time:  11:05  Stop Time:  12:10    PROCEDURES:    TIMED  Procedure Min.   Bike sup 5 NC   TE 25   TE sup 25 NC   CP 10 NC         UNTIMED  Procedure Min.             Total Timed Minutes:  25  Total Timed Units:  2  Total Untimed Units:  0  Charges Billed/# of units:  2 (TE-2)      Progress/Current Status    Subjective:     Patient ID: Korey Santos is a 77 y.o. male.  Diagnosis:   1. Antalgic gait       Pain: 6 /10  Patient reports he continues with pain in his left knee.    Objective:     Patient ambulated into the clinic with SPC with a normal gait pattern.  He initiated the session on stationary bike x 5 minutes supervised by PTA making full revolutions.  He was educated and performed therapeutic exercises as per log below, along with today's progressions, reassessment, and new exercises, 1:1 with PTA x 25 minutes to improve his strength, ROM, and flexibility.  He declined a cold pack at the end of the session.    Date  06/29/18 06/27/18 06/22/18 06/20/18 06/18/18 06/11/18 06/08/18 06/06/18 06/04/18 05/30/18 05/28/18   VISIT 12/50 11/50 10/50 9/50 8/50 7/50 6/50 5/50 4/50 3/50 2/50   Gcode 07/27/18 06/25/18 06/25/18 06/25/18 06/25/18 06/25/18 06/25/18 06/25/18 06/25/18 06/25/18 06/25/18   FOTO -- -- -- -- -- -- -- 5/5 4/5 3/5 2/5   Cap Visit   Cap Total 60.64  1113.76 121.28  1053.12 60.64  931.84 60.64  871.20 60.64  810.56 121.28  749.92 121. 28  628.64 60.64  507.36 121.28  446.72 121.28  325.44 90.96  204.16   FTF 07/29/18 06/25/18 06/25/18 06/25/18 06/25/18 06/25/18 06/25/18 06/25/18 06/25/18 06/25/18 06/25/18   POC exp 07/25/18 07/25/18 07/25/18 07/25/18 07/25/18 07/25/18 07/25/18 07/25/18 07/25/18 07/25/18    DOS - 5/04/18                            Bike st 12 L4 x 5', st 11 5' L4 x 5', st 11 L4 x 5', st 11 L4 x 5', st 11 L2 x 5', st 11 L2 x 5', st 11 L1 x 5' L1 x 5' L1 x 5' L1 x 5'   Table:              HS stretch 10 x 10" 10 x 10" 10 x 10" 10 x 10" 10 " "x 10" 10 x 10" 10 x 10" 10 x 10" 10 x 10" 10 x 10" 10 x 10"   SAQ 3 x 10 x 1.5# 3 x 10 x 1.5# 2 x 10 x 1.5# 3 x 10 x 1#  3 x 10 x 1# 3 x 10 x 1# 2 x 10 x 1# 3 x 10 3 x 10 3 x 10 2 x 10   Heel Slides -- -- 2 x 10 2 x 10 oot 2 x 10 1 x 10 2 x 10 2 x 10 1 x 10 1 x 10   SLR 3 x 10 x 1.5# 3 x 10 x 1.5# 2 x 10 x 1.5# 3 x 10 x 1# 3 x 10 x 1# 2 x 10 x 1# 1 x 10 x 1# 3 x 10 3 x 10 3 x 10 2 x 10   SL Hip Abd 3 x 10 x 1.5# 3 x 10 x 1.5# 2 x 10 x 1.5# 3 x 10 x 1# 3 x 10 x 1# 2 x 10 x 1# 2 x 10 x 1# 3 x 10 3 x 10 2 x 10 1 x 10   SL Hip Add 3 x 10 x 1# 3 x 10 x 1.5# 2 x 10 x 1.5# 3 x 10 x 1# 3 x 10 x 1# 2 x 10 x 1# 2 x 10 x 1# 3 x 10 3 x 10 2 x 10 1 x 10   Prone hip ext 3 x 10 x 1.5# 3 x 10 x 1.5# 2 x 10 x 1.5# 3 x 10 x 1# 3 x 10 x 1# 2 x 10 x 1# 2 x 10 x 1# 3 x 10 3 x 10 2 x 10 1 x 10   Prone hang 3' x 5# 5' x 5.5#  5' x 5# 5' x 5# 4' x 4# 4" x 3# 3' x 3# 3' 3' 3'   Prone HS curl 2 x 10 x 1.5# oot 2 x 10 x 1.5# 2 x 10 x 1# 2 x 10 x 1# oot oot 3 x 10 2 x 10 1 x 10    Seated:              LAQs 3 x 10 x 1.5# oot 2 x 10 x 1.5# 2 x 10 x 1# oot 2 x 10 x 1# 2 x 10 x 1# 3 x 10 3 x 10 2 x 10 1 x 10   Seated Hip Flex -- -- -- -- -- -- -- -- -- -- --   Standing:              Heel raises 1 x 10 oot oot oot oot oot oot 1 x 10 1 x 10 -- --   Toe raises 1 x 10 1 x 10 oot oot oot oot oot 1 x 10 1 x 10     Min squats 2 x 10 1 x 10 --           Hip Abd -- -- -- -- -- -- -- -- -- -- --   Hip Flex -- -- -- -- -- -- -- -- -- -- --   Hip Ext -- -- -- -- -- -- -- -- -- -- --   TKE 10 x 3" BTB Next? -- -- -- -- -- -- -- --- --   Step Ups L1  1 x 15 Next? -- -- -- -- -- -- -- -- --   Gait -- -- -- -- -- -- -- -- -- -- --   CP 10' declined 10' 10' declined declined declined declined declined  declined                 Initials GWA 1/6 DG GWA 2/6 GWA 1/6 DG GWA 1/6 DG GWA 2/6 GWA 1/6 DG DG     AROM: flex - 115 deg, ext   -10 deg  PROM flex 125 deg ,  -4 extension deg    Assessment:     Patient continues to require frequent cues to perform all of his usual " exercises correctly along with cues for proper positioning for side lying exercises.  When ambulating he continues to have a wide KURT with decreased knee extension bilaterally during stance phase. Patient completed his therapy along with new exercises added and today's progressions with no increase in symptoms prior to leaving the clinic. He would benefit from continued physical therapy to improve his strength, ROM, flexibility, and gait in order to return to his PLOF.    Patient Education/Response:     Patient was instructed again on the importance of performing his HEP on a regular basis to maintain the strength and ROM gains made during his therapy sessions. He verbalized understanding instructions.     Plans and Goals:     Continue with the plan of care and progress as the patient tolerates.     Short Term Goals (4 Weeks):   1.  This patient will be independent with initial HEP for L/E strengthening and knee ROM.  2.  This patient will increase L knee AROM to 115 degrees in order to be able to perform vehicle transfers with no difficulty.  3.  This patient will increase L hip and knee MMT 1 full muscle grade all deficit motions in order to perform his usual household duties with no difficulty.  4.  This patient will be able to ambulate greater than 150 feet with a normal gait pattern without an AD.   5.  Patient will be able to achieve greater than or equal to 25 on the FOTO Knee placing patient in 60%<80% impaired, limited, or restricted category demonstrating overall improved functional ability with lower extremity. MET    Long Term Goals (8 Weeks):   1.  This patient will be independent with updated HEP.  2.  This patient will increase L knee AROM to 130 degrees in order to be able to perform his usual yard work with no difficulty.  3.  This patient will increase L L/E MMT to 5/5 in order to be able to ascend/descend steps with a reciprocal gait.  4.  This patient will be able to ambulate greater than 200  yards without use of assistive device without pain or gait deficits.  5.  Patient will be able to achieve greater than or equal to 50 on the FOTO Knee placing patient in 40%<60% impaired, limited, or restricted category demonstrating overall improved functional ability with lower extremity.

## 2018-06-29 NOTE — TELEPHONE ENCOUNTER
----- Message from Juan Bustamante MD sent at 6/28/2018  5:39 PM CDT -----  Please call patient and report normal test/lab results. Thanks.

## 2018-06-29 NOTE — PROGRESS NOTES
Face to Face PTA Conference performed with Malik Lorenz, PTA regarding patient's current status, overall progress, and plan of care    Face to Face PTA Conference performed with Marlyn Camarillo, PT regarding patient's current status, overall progress, and plan of care    Malik Lorenz  6/29/2018

## 2018-07-02 ENCOUNTER — CLINICAL SUPPORT (OUTPATIENT)
Dept: REHABILITATION | Facility: HOSPITAL | Age: 78
End: 2018-07-02
Payer: MEDICARE

## 2018-07-02 DIAGNOSIS — M25.662 DECREASED RANGE OF MOTION OF LEFT KNEE: Primary | ICD-10-CM

## 2018-07-02 DIAGNOSIS — R26.89 ANTALGIC GAIT: ICD-10-CM

## 2018-07-02 DIAGNOSIS — Z12.11 SPECIAL SCREENING FOR MALIGNANT NEOPLASMS, COLON: Primary | ICD-10-CM

## 2018-07-02 DIAGNOSIS — R29.898 WEAKNESS OF BOTH LOWER EXTREMITIES: ICD-10-CM

## 2018-07-02 DIAGNOSIS — R26.89 DECREASED FUNCTIONAL MOBILITY: ICD-10-CM

## 2018-07-02 PROCEDURE — 97110 THERAPEUTIC EXERCISES: CPT | Mod: PO

## 2018-07-02 RX ORDER — POLYETHYLENE GLYCOL 3350, SODIUM SULFATE ANHYDROUS, SODIUM BICARBONATE, SODIUM CHLORIDE, POTASSIUM CHLORIDE 236; 22.74; 6.74; 5.86; 2.97 G/4L; G/4L; G/4L; G/4L; G/4L
4 POWDER, FOR SOLUTION ORAL ONCE
Qty: 4000 ML | Refills: 0 | Status: SHIPPED | OUTPATIENT
Start: 2018-07-02 | End: 2018-07-02

## 2018-07-02 NOTE — PROGRESS NOTES
DAILY TREATMENT NOTE      Therapy Diagnosis:   Encounter Diagnoses   Name Primary?    Antalgic gait     Decreased range of motion of left knee Yes    Weakness of both lower extremities     Decreased functional mobility        DATE: 7/2/2018    Start Time:  1:10 pm  Stop Time:  2:10 pm    PROCEDURES:    TIMED  Procedure Min.   TE 45   TE sup 0   MT 5   CP 10         UNTIMED  Procedure Min.             Total Timed Minutes:  45  Total Timed Units:  3  Total Untimed Units:  0  Charges Billed/# of units:  3      Progress/Current Status    Subjective:     Patient ID: Korey Santos is a 77 y.o. male.  Diagnosis:   1. Decreased range of motion of left knee     2. Antalgic gait     3. Weakness of both lower extremities     4. Decreased functional mobility       Pain: 6 /10  Patient reports he continues with pain in his left knee.    Objective:     Patient ambulated into the clinic with SPC with a normal gait pattern.      Korey received therapeutic exercises to develop strength, endurance, ROM and flexibility for 55 minutes including:      Date  7/2/18 06/29/18 06/27/18 06/22/18 06/20/18 06/18/18 06/11/18 06/08/18 06/06/18 06/04/18 05/30/18 05/28/18   VISIT 13/50 12/50 11/50 10/50 9/50 8/50 7/50 6/50 5/50 4/50 3/50 2/50   Gcode 7/27/18 07/27/18 06/25/18 06/25/18 06/25/18 06/25/18 06/25/18 06/25/18 06/25/18 06/25/18 06/25/18 06/25/18   FOTO  -- -- -- -- -- -- -- 5/5 4/5 3/5 2/5   Cap Visit   Cap Total 78  1191 60.64  1113.76 121.28  1053.12 60.64  931.84 60.64  871.20 60.64  810.56 121.28  749.92 121. 28  628.64 60.64  507.36 121.28  446.72 121.28  325.44 90.96  204.16   FTF 7/29/18 07/29/18 06/25/18 06/25/18 06/25/18 06/25/18 06/25/18 06/25/18 06/25/18 06/25/18 06/25/18 06/25/18   POC exp 7/25/18 07/25/18 07/25/18 07/25/18 07/25/18 07/25/18 07/25/18 07/25/18 07/25/18 07/25/18 07/25/18    DOS - 5/04/18                              Bike st 12 L4 x 5', st 11 L4 x 5', st 11 5' L4 x 5', st 11 L4 x 5', st 11 L4 x 5', st  "11 L2 x 5', st 11 L2 x 5', st 11 L1 x 5' L1 x 5' L1 x 5' L1 x 5'   Table:               HS stretch 10 x 10" 10 x 10" 10 x 10" 10 x 10" 10 x 10" 10 x 10" 10 x 10" 10 x 10" 10 x 10" 10 x 10" 10 x 10" 10 x 10"   SAQ 3 x 10 2# 3 x 10 x 1.5# 3 x 10 x 1.5# 2 x 10 x 1.5# 3 x 10 x 1#  3 x 10 x 1# 3 x 10 x 1# 2 x 10 x 1# 3 x 10 3 x 10 3 x 10 2 x 10   Heel Slides  -- -- 2 x 10 2 x 10 oot 2 x 10 1 x 10 2 x 10 2 x 10 1 x 10 1 x 10   SLR 3 x 10 1.5# 3 x 10 x 1.5# 3 x 10 x 1.5# 2 x 10 x 1.5# 3 x 10 x 1# 3 x 10 x 1# 2 x 10 x 1# 1 x 10 x 1# 3 x 10 3 x 10 3 x 10 2 x 10   SL Hip Abd 3 x 10 1.5# 3 x 10 x 1.5# 3 x 10 x 1.5# 2 x 10 x 1.5# 3 x 10 x 1# 3 x 10 x 1# 2 x 10 x 1# 2 x 10 x 1# 3 x 10 3 x 10 2 x 10 1 x 10   SL Hip Add 3 x 10 1.5# 3 x 10 x 1# 3 x 10 x 1.5# 2 x 10 x 1.5# 3 x 10 x 1# 3 x 10 x 1# 2 x 10 x 1# 2 x 10 x 1# 3 x 10 3 x 10 2 x 10 1 x 10   Prone hip ext 3 x 10 1.5# 3 x 10 x 1.5# 3 x 10 x 1.5# 2 x 10 x 1.5# 3 x 10 x 1# 3 x 10 x 1# 2 x 10 x 1# 2 x 10 x 1# 3 x 10 3 x 10 2 x 10 1 x 10   Prone hang  3' x 5# 5' x 5.5#  5' x 5# 5' x 5# 4' x 4# 4" x 3# 3' x 3# 3' 3' 3'   Prone HS curl 3 x 10 1.5# 2 x 10 x 1.5# oot 2 x 10 x 1.5# 2 x 10 x 1# 2 x 10 x 1# oot oot 3 x 10 2 x 10 1 x 10    Ext with straps 3'              Seated:               LAQs 3 x 10 2# 3 x 10 x 1.5# oot 2 x 10 x 1.5# 2 x 10 x 1# oot 2 x 10 x 1# 2 x 10 x 1# 3 x 10 3 x 10 2 x 10 1 x 10   Seated Hip Flex  -- -- -- -- -- -- -- -- -- -- --   Standing:               Heel raises 1 x 15 1 x 10 oot oot oot oot oot oot 1 x 10 1 x 10 -- --   Toe raises 1 x 15 1 x 10 1 x 10 oot oot oot oot oot 1 x 10 1 x 10     Min squats 2 x 10 2 x 10 1 x 10 --           Hip Abd  -- -- -- -- -- -- -- -- -- -- --   Hip Flex  -- -- -- -- -- -- -- -- -- -- --   Hip Ext  -- -- -- -- -- -- -- -- -- -- --   TKE 20 x 3" BTB 10 x 3" BTB Next? -- -- -- -- -- -- -- --- --   Step Ups fwd L1 2 x 10              Step Ups lat L1 2 x 10 L1  1 x 15 Next? -- -- -- -- -- -- -- -- --   Gait  -- -- -- -- -- -- -- " -- -- -- --   CP 10' 10' declined 10' 10' declined declined declined declined declined  declined                  Initials JL GWA 1/6 DG GWA 2/6 GWA 1/6 DG GWA 1/6 DG GWA 2/6 GWA 1/6 DG DG     AROM: flex - 115 deg, ext   -10 deg  PROM flex 125 deg ,  -4 extension deg      Korey received the following manual therapy techniques were applied to the: L knee for 5 minutes:  Knee ext mobs at end range  Post glide to femur at end range ext grade II-IV        Assessment:     Patient tolerated tmt without adverse reactions. Pt progressed standing therex without c/o. He has reports of inc pain with extension ROM exercises. He continues to have dec ext ROM limiting function. He is progressing well with strengthening. Pt required cuing throughout session to perform exercises properly.       Patient completed his therapy along with new exercises added and today's progressions with no increase in symptoms prior to leaving the clinic. He would benefit from continued physical therapy to improve his strength, ROM, flexibility, and gait in order to return to his PLOF.    Patient Education/Response:     Patient was instructed again on the importance of performing his HEP on a regular basis to maintain the strength and ROM gains made during his therapy sessions. He verbalized understanding instructions.     Plans and Goals:     Continue with the plan of care and progress as the patient tolerates.     Short Term Goals (4 Weeks):   1.  This patient will be independent with initial HEP for L/E strengthening and knee ROM.  2.  This patient will increase L knee AROM to 115 degrees in order to be able to perform vehicle transfers with no difficulty.  3.  This patient will increase L hip and knee MMT 1 full muscle grade all deficit motions in order to perform his usual household duties with no difficulty.  4.  This patient will be able to ambulate greater than 150 feet with a normal gait pattern without an AD.   5.  Patient will be able to  achieve greater than or equal to 25 on the FOTO Knee placing patient in 60%<80% impaired, limited, or restricted category demonstrating overall improved functional ability with lower extremity. MET    Long Term Goals (8 Weeks):   1.  This patient will be independent with updated HEP.  2.  This patient will increase L knee AROM to 130 degrees in order to be able to perform his usual yard work with no difficulty.  3.  This patient will increase L L/E MMT to 5/5 in order to be able to ascend/descend steps with a reciprocal gait.  4.  This patient will be able to ambulate greater than 200 yards without use of assistive device without pain or gait deficits.  5.  Patient will be able to achieve greater than or equal to 50 on the FOTO Knee placing patient in 40%<60% impaired, limited, or restricted category demonstrating overall improved functional ability with lower extremity.

## 2018-07-02 NOTE — PROGRESS NOTES
DAILY TREATMENT NOTE    DATE: 7/2/2018    Start Time:  11:05  Stop Time:  12:10    PROCEDURES:    TIMED  Procedure Min.   Bike sup 5 NC   TE 25   TE sup 25 NC   CP 10 NC         UNTIMED  Procedure Min.             Total Timed Minutes:  25  Total Timed Units:  2  Total Untimed Units:  0  Charges Billed/# of units:  2 (TE-2)      Progress/Current Status    Subjective:     Patient ID: Korey Santos is a 77 y.o. male.  Diagnosis:   1. Decreased range of motion of left knee     2. Antalgic gait     3. Weakness of both lower extremities     4. Decreased functional mobility       Pain: 6 /10  Patient reports he continues with pain in his left knee.    Objective:     Patient ambulated into the clinic with SPC with a normal gait pattern.  He initiated the session on stationary bike x 5 minutes supervised by PTA making full revolutions.  He was educated and performed therapeutic exercises as per log below, along with today's progressions, reassessment, and new exercises, 1:1 with PTA x 25 minutes to improve his strength, ROM, and flexibility.  He declined a cold pack at the end of the session.    Date  07/02/18 06/29/18 06/27/18 06/22/18 06/20/18 06/18/18 06/11/18 06/08/18 06/06/18 06/04/18 05/30/18 05/28/18   VISIT 13/50 12/50 11/50 10/50 9/50 8/50 7/50 6/50 5/50 4/50 3/50 2/50   Gcode 07/27/18 07/27/18 06/25/18 06/25/18 06/25/18 06/25/18 06/25/18 06/25/18 06/25/18 06/25/18 06/25/18 06/25/18   FOTO -- -- -- -- -- -- -- -- 5/5 4/5 3/5 2/5   Cap Visit   Cap Total  60.64  1113.76 121.28  1053.12 60.64  931.84 60.64  871.20 60.64  810.56 121.28  749.92 121. 28  628.64 60.64  507.36 121.28  446.72 121.28  325.44 90.96  204.16   FTF 07/29/18 07/29/18 06/25/18 06/25/18 06/25/18 06/25/18 06/25/18 06/25/18 06/25/18 06/25/18 06/25/18 06/25/18   POC exp 07/25/18 07/25/18 07/25/18 07/25/18 07/25/18 07/25/18 07/25/18 07/25/18 07/25/18 07/25/18 07/25/18    DOS - 5/04/18                              Bike st 12  L4 x 5', st 11 5' L4 x  "5', st 11 L4 x 5', st 11 L4 x 5', st 11 L2 x 5', st 11 L2 x 5', st 11 L1 x 5' L1 x 5' L1 x 5' L1 x 5'   Table:               HS stretch  10 x 10" 10 x 10" 10 x 10" 10 x 10" 10 x 10" 10 x 10" 10 x 10" 10 x 10" 10 x 10" 10 x 10" 10 x 10"   SAQ  3 x 10 x 1.5# 3 x 10 x 1.5# 2 x 10 x 1.5# 3 x 10 x 1#  3 x 10 x 1# 3 x 10 x 1# 2 x 10 x 1# 3 x 10 3 x 10 3 x 10 2 x 10   Heel Slides  -- -- 2 x 10 2 x 10 oot 2 x 10 1 x 10 2 x 10 2 x 10 1 x 10 1 x 10   SLR  3 x 10 x 1.5# 3 x 10 x 1.5# 2 x 10 x 1.5# 3 x 10 x 1# 3 x 10 x 1# 2 x 10 x 1# 1 x 10 x 1# 3 x 10 3 x 10 3 x 10 2 x 10   SL Hip Abd  3 x 10 x 1.5# 3 x 10 x 1.5# 2 x 10 x 1.5# 3 x 10 x 1# 3 x 10 x 1# 2 x 10 x 1# 2 x 10 x 1# 3 x 10 3 x 10 2 x 10 1 x 10   SL Hip Add  3 x 10 x 1# 3 x 10 x 1.5# 2 x 10 x 1.5# 3 x 10 x 1# 3 x 10 x 1# 2 x 10 x 1# 2 x 10 x 1# 3 x 10 3 x 10 2 x 10 1 x 10   Prone hip ext  3 x 10 x 1.5# 3 x 10 x 1.5# 2 x 10 x 1.5# 3 x 10 x 1# 3 x 10 x 1# 2 x 10 x 1# 2 x 10 x 1# 3 x 10 3 x 10 2 x 10 1 x 10   Prone hang  3' x 5# 5' x 5.5#  5' x 5# 5' x 5# 4' x 4# 4" x 3# 3' x 3# 3' 3' 3'   Prone HS curl  2 x 10 x 1.5# oot 2 x 10 x 1.5# 2 x 10 x 1# 2 x 10 x 1# oot oot 3 x 10 2 x 10 1 x 10    Seated:               LAQs  3 x 10 x 1.5# oot 2 x 10 x 1.5# 2 x 10 x 1# oot 2 x 10 x 1# 2 x 10 x 1# 3 x 10 3 x 10 2 x 10 1 x 10   Seated Hip Flex  -- -- -- -- -- -- -- -- -- -- --   Standing:               Heel raises  1 x 10 oot oot oot oot oot oot 1 x 10 1 x 10 -- --   Toe raises  1 x 10 1 x 10 oot oot oot oot oot 1 x 10 1 x 10     Min squats  2 x 10 1 x 10 --           Hip Abd  -- -- -- -- -- -- -- -- -- -- --   Hip Flex  -- -- -- -- -- -- -- -- -- -- --   Hip Ext  -- -- -- -- -- -- -- -- -- -- --   TKE  10 x 3" BTB Next? -- -- -- -- -- -- -- --- --   Step Ups  L1  1 x 15 Next? -- -- -- -- -- -- -- -- --   Gait  -- -- -- -- -- -- -- -- -- -- --   CP  10' declined 10' 10' declined declined declined declined declined  declined                  Initials  GWA 1/6 DG GWA 2/6 GWA 1/6 DG " GWA 1/6 DG GWA 2/6 GWA 1/6 DG DG     AROM: flex - 115 deg, ext   -10 deg  PROM flex 125 deg ,  -4 extension deg    Assessment:     Patient continues to require frequent cues to perform all of his usual exercises correctly along with cues for proper positioning for side lying exercises.  When ambulating he continues to have a wide KURT with decreased knee extension bilaterally during stance phase. Patient completed his therapy along with new exercises added and today's progressions with no increase in symptoms prior to leaving the clinic. He would benefit from continued physical therapy to improve his strength, ROM, flexibility, and gait in order to return to his PLOF.    Patient Education/Response:     Patient was instructed again on the importance of performing his HEP on a regular basis to maintain the strength and ROM gains made during his therapy sessions. He verbalized understanding instructions.     Plans and Goals:     Continue with the plan of care and progress as the patient tolerates.     Short Term Goals (4 Weeks):   1.  This patient will be independent with initial HEP for L/E strengthening and knee ROM.  2.  This patient will increase L knee AROM to 115 degrees in order to be able to perform vehicle transfers with no difficulty.  3.  This patient will increase L hip and knee MMT 1 full muscle grade all deficit motions in order to perform his usual household duties with no difficulty.  4.  This patient will be able to ambulate greater than 150 feet with a normal gait pattern without an AD.   5.  Patient will be able to achieve greater than or equal to 25 on the FOTO Knee placing patient in 60%<80% impaired, limited, or restricted category demonstrating overall improved functional ability with lower extremity. MET    Long Term Goals (8 Weeks):   1.  This patient will be independent with updated HEP.  2.  This patient will increase L knee AROM to 130 degrees in order to be able to perform his usual yard  work with no difficulty.  3.  This patient will increase L L/E MMT to 5/5 in order to be able to ascend/descend steps with a reciprocal gait.  4.  This patient will be able to ambulate greater than 200 yards without use of assistive device without pain or gait deficits.  5.  Patient will be able to achieve greater than or equal to 50 on the FOTO Knee placing patient in 40%<60% impaired, limited, or restricted category demonstrating overall improved functional ability with lower extremity.

## 2018-07-05 ENCOUNTER — CLINICAL SUPPORT (OUTPATIENT)
Dept: REHABILITATION | Facility: HOSPITAL | Age: 78
End: 2018-07-05
Payer: MEDICARE

## 2018-07-05 DIAGNOSIS — R29.898 WEAKNESS OF BOTH LOWER EXTREMITIES: ICD-10-CM

## 2018-07-05 DIAGNOSIS — M25.662 DECREASED RANGE OF MOTION OF LEFT KNEE: ICD-10-CM

## 2018-07-05 DIAGNOSIS — R26.89 DECREASED FUNCTIONAL MOBILITY: ICD-10-CM

## 2018-07-05 DIAGNOSIS — R26.89 ANTALGIC GAIT: ICD-10-CM

## 2018-07-05 PROCEDURE — 97110 THERAPEUTIC EXERCISES: CPT | Mod: PO

## 2018-07-05 NOTE — PROGRESS NOTES
DAILY TREATMENT NOTE      Therapy Diagnosis:   Encounter Diagnoses   Name Primary?    Decreased range of motion of left knee     Weakness of both lower extremities     Antalgic gait     Decreased functional mobility        DATE: 7/5/2018    Start Time:  1:00  Stop Time:  2:10    PROCEDURES:    TIMED  Procedure Min.   TE 55   Bike 5NC       CP 10NC         UNTIMED  Procedure Min.             Total Timed Minutes:  55  Total Timed Units:  4  Total Untimed Units:  0  Charges Billed/# of units:  4      Progress/Current Status    Subjective:     Patient ID: Korey Santos is a 77 y.o. male.  Diagnosis:   1. Decreased range of motion of left knee     2. Weakness of both lower extremities     3. Antalgic gait     4. Decreased functional mobility       Pain: 6 /10  Patient reports he continues to have pain in the knee, the clicking in his knee and the swelling. He had increased pain for several days after the last visit after doing the new exercise. He has only been working on his extension at home maybe once, sometimes twice a day for a few minutes at a time. He continues to have difficulty getting up/down the steps in his home, and getting up from a toilet seat.     Objective:     Patient ambulated into the clinic with SPC with a normal gait pattern.      Korey received therapeutic exercises to develop strength, endurance, ROM and flexibility for 55 minutes including:      Date  07/05/18 7/2/18 06/29/18 06/27/18 06/22/18 06/20/18 06/18/18 06/11/18 06/08/18 06/06/18 06/04/18 05/30/18 05/28/18   VISIT 14/50 13/50 12/50 11/50 10/50 9/50 8/50 7/50 6/50 5/50 4/50 3/50 2/50   Gcode 07/27/18 7/27/18 07/27/18 06/25/18 06/25/18 06/25/18 06/25/18 06/25/18 06/25/18 06/25/18 06/25/18 06/25/18 06/25/18   FOTO --  -- -- -- -- -- -- -- 5/5 4/5 3/5 2/5   Cap Visit   Cap Total 121.28  1312.28 78  1191 60.64  1113.76 121.28  1053.12 60.64  931.84 60.64  871.20 60.64  810.56 121.28  749.92 121. 28  628.64 60.64  507.36  "121.28  446.72 121.28  325.44 90.96  204.16   FTF 07/29/18 7/29/18 07/29/18 06/25/18 06/25/18 06/25/18 06/25/18 06/25/18 06/25/18 06/25/18 06/25/18 06/25/18 06/25/18   POC exp 7/25/18 7/25/18 07/25/18 07/25/18 07/25/18 07/25/18 07/25/18 07/25/18 07/25/18 07/25/18 07/25/18 07/25/18    DOS - 5/04/18                                Bike st 12 L6 x 5' L4 x 5', st 11 L4 x 5', st 11 5' L4 x 5', st 11 L4 x 5', st 11 L4 x 5', st 11 L2 x 5', st 11 L2 x 5', st 11 L1 x 5' L1 x 5' L1 x 5' L1 x 5'   Table:                HS stretch 5 x 10" 10 x 10" 10 x 10" 10 x 10" 10 x 10" 10 x 10" 10 x 10" 10 x 10" 10 x 10" 10 x 10" 10 x 10" 10 x 10" 10 x 10"   SAQ 3 x 10 x 2.5# 3 x 10 2# 3 x 10 x 1.5# 3 x 10 x 1.5# 2 x 10 x 1.5# 3 x 10 x 1#  3 x 10 x 1# 3 x 10 x 1# 2 x 10 x 1# 3 x 10 3 x 10 3 x 10 2 x 10   Heel Slides --  -- -- 2 x 10 2 x 10 oot 2 x 10 1 x 10 2 x 10 2 x 10 1 x 10 1 x 10   SLR 3 x 10 x 2# 3 x 10 1.5# 3 x 10 x 1.5# 3 x 10 x 1.5# 2 x 10 x 1.5# 3 x 10 x 1# 3 x 10 x 1# 2 x 10 x 1# 1 x 10 x 1# 3 x 10 3 x 10 3 x 10 2 x 10   SL Hip Abd 3 x 10 x 2# 3 x 10 1.5# 3 x 10 x 1.5# 3 x 10 x 1.5# 2 x 10 x 1.5# 3 x 10 x 1# 3 x 10 x 1# 2 x 10 x 1# 2 x 10 x 1# 3 x 10 3 x 10 2 x 10 1 x 10   SL Hip Add 3 x 10 x 2# 3 x 10 1.5# 3 x 10 x 1# 3 x 10 x 1.5# 2 x 10 x 1.5# 3 x 10 x 1# 3 x 10 x 1# 2 x 10 x 1# 2 x 10 x 1# 3 x 10 3 x 10 2 x 10 1 x 10   Prone hip ext 3 x 10 x 2# 3 x 10 1.5# 3 x 10 x 1.5# 3 x 10 x 1.5# 2 x 10 x 1.5# 3 x 10 x 1# 3 x 10 x 1# 2 x 10 x 1# 2 x 10 x 1# 3 x 10 3 x 10 2 x 10 1 x 10   Prone hang held  3' x 5# 5' x 5.5#  5' x 5# 5' x 5# 4' x 4# 4" x 3# 3' x 3# 3' 3' 3'   Prone HS curl 3 x 10 x 2# 3 x 10 1.5# 2 x 10 x 1.5# oot 2 x 10 x 1.5# 2 x 10 x 1# 2 x 10 x 1# oot oot 3 x 10 2 x 10 1 x 10    Ext with straps 5' 3'              Seated:                LAQs oot 3 x 10 2# 3 x 10 x 1.5# oot 2 x 10 x 1.5# 2 x 10 x 1# oot 2 x 10 x 1# 2 x 10 x 1# 3 x 10 3 x 10 2 x 10 1 x 10   Standing:                Heel raises SL 1 x 10 1 x 15 1 x 10 oot oot " "oot oot oot oot 1 x 10 1 x 10 -- --   Toe raises -- 1 x 15 1 x 10 1 x 10 oot oot oot oot oot 1 x 10 1 x 10     Min squats -- 2 x 10 2 x 10 1 x 10 --           Hip Abd Next?  -- -- -- -- -- -- -- -- -- -- --   Hip Flex Next?  -- -- -- -- -- -- -- -- -- -- --   Hip Ext Next?  -- -- -- -- -- -- -- -- -- -- --   TKE oot 20 x 3" BTB 10 x 3" BTB Next? -- -- -- -- -- -- -- --- --   Step Ups fwd L2 1 x 10 L1 2 x 10              Step Ups lat L2 1 x 10 L1 2 x 10 L1  1 x 15 Next? -- -- -- -- -- -- -- -- --   Chair squats 1 x 10  -- -- -- -- -- -- -- -- -- -- --   CP 10' 10' 10' declined 10' 10' declined declined declined declined declined  declined                   Initials DG JL GWA 1/6 DG GWA 2/6 GWA 1/6 DG GWA 1/6 DG GWA 2/6 GWA 1/6 DG DG     AROM: flex - 115 deg, ext   -10 deg  PROM flex 125 deg ,  -4 extension deg    Assessment:     Despite the patient reporting he is performing his HEP on a regular basis, he continues to require cues to perform each exercise correctly and to move from one exercise to the next. He had complaints of increased soreness with extension stretch. He was able to perform all of today's progressions and new exercises with no increase in symptoms prior to leaving the clinic. He continues to have decreased ext ROM limiting function. He is progressing well with strengthening. He would benefit from continued physical therapy to improve his strength, ROM, flexibility, and gait in order to return to his PLOF.    Patient Education/Response:     Patient was educated again on the importance of performing his HEP on a regular basis to maintain the strength and ROM gains made during his therapy sessions. He was also instructed that he could begin resuming his usual exercises at the gym. He verbalized understanding instructions.     Plans and Goals:     Continue with the plan of care and progress as the patient tolerates.     Short Term Goals (4 Weeks):   1.  This patient will be independent with initial HEP " for L/E strengthening and knee ROM.  2.  This patient will increase L knee AROM to 115 degrees in order to be able to perform vehicle transfers with no difficulty.  3.  This patient will increase L hip and knee MMT 1 full muscle grade all deficit motions in order to perform his usual household duties with no difficulty.  4.  This patient will be able to ambulate greater than 150 feet with a normal gait pattern without an AD.   5.  Patient will be able to achieve greater than or equal to 25 on the FOTO Knee placing patient in 60%<80% impaired, limited, or restricted category demonstrating overall improved functional ability with lower extremity. MET    Long Term Goals (8 Weeks):   1.  This patient will be independent with updated HEP.  2.  This patient will increase L knee AROM to 130 degrees in order to be able to perform his usual yard work with no difficulty.  3.  This patient will increase L L/E MMT to 5/5 in order to be able to ascend/descend steps with a reciprocal gait.  4.  This patient will be able to ambulate greater than 200 yards without use of assistive device without pain or gait deficits.  5.  Patient will be able to achieve greater than or equal to 50 on the FOTO Knee placing patient in 40%<60% impaired, limited, or restricted category demonstrating overall improved functional ability with lower extremity.

## 2018-07-16 ENCOUNTER — CLINICAL SUPPORT (OUTPATIENT)
Dept: REHABILITATION | Facility: HOSPITAL | Age: 78
End: 2018-07-16
Payer: MEDICARE

## 2018-07-16 DIAGNOSIS — R26.89 ANTALGIC GAIT: ICD-10-CM

## 2018-07-16 DIAGNOSIS — R29.898 WEAKNESS OF BOTH LOWER EXTREMITIES: ICD-10-CM

## 2018-07-16 DIAGNOSIS — M25.662 DECREASED RANGE OF MOTION OF LEFT KNEE: ICD-10-CM

## 2018-07-16 DIAGNOSIS — R26.89 DECREASED FUNCTIONAL MOBILITY: ICD-10-CM

## 2018-07-16 PROCEDURE — 97110 THERAPEUTIC EXERCISES: CPT | Mod: PO

## 2018-07-16 NOTE — PROGRESS NOTES
DAILY TREATMENT NOTE      Therapy Diagnosis:   Encounter Diagnoses   Name Primary?    Decreased range of motion of left knee     Weakness of both lower extremities     Antalgic gait     Decreased functional mobility        DATE: 7/16/2018    Start Time:  1:00  Stop Time:  2:10    PROCEDURES:    TIMED  Procedure Min.   TE 55   Bike 5NC       CP 10NC         UNTIMED  Procedure Min.             Total Timed Minutes:  55  Total Timed Units:  4  Total Untimed Units:  0  Charges Billed/# of units:  4      Progress/Current Status    Subjective:     Patient ID: Korey Santos is a 77 y.o. male.  Diagnosis:   1. Decreased range of motion of left knee     2. Weakness of both lower extremities     3. Antalgic gait     4. Decreased functional mobility       Pain: 6 /10  Patient reports having pain in his L knee and still can't get comfortable in bed at night, still having the clicking and popping. He has been going to the gym on the days he does not go to therapy. He has been working out in the pool while on vacation. He has been working on his extension once a day.     Objective:     Patient ambulated into the clinic with SPC with a normal gait pattern.      Korey received therapeutic exercises to develop strength, endurance, ROM and flexibility for 55 minutes including:      Date  07/16/18 07/05/18 7/2/18 06/29/18 06/27/18 06/22/18 06/20/18 06/18/18 06/11/18 06/08/18 06/06/18 06/04/18 05/30/18 05/28/18   VISIT 15/50 14/50 13/50 12/50 11/50 10/50 9/50 8/50 7/50 6/50 5/50 4/50 3/50 2/50   Gcode 07/27/18 07/27/18 7/27/18 07/27/18 06/25/18 06/25/18 06/25/18 06/25/18 06/25/18 06/25/18 06/25/18 06/25/18 06/25/18 06/25/18   FOTO -- --  -- -- -- -- -- -- -- 5/5 4/5 3/5 2/5   Cap Visit   Cap Total 121.28  1433.56 121.28  1312.28 78  1191 60.64  1113.76 121.28  1053.12 60.64  931.84 60.64  871.20 60.64  810.56 121.28  749.92 121. 28  628.64 60.64  507.36 121.28  446.72 121.28  325.44 90.96  204.16   FTF 07/29/18 07/29/18  "7/29/18 07/29/18 06/25/18 06/25/18 06/25/18 06/25/18 06/25/18 06/25/18 06/25/18 06/25/18 06/25/18 06/25/18   POC exp 07/25/18 7/25/18 7/25/18 07/25/18 07/25/18 07/25/18 07/25/18 07/25/18 07/25/18 07/25/18 07/25/18 07/25/18 07/25/18    DOS - 5/04/18                                  Bike st 12 L6 x 5' L6 x 5' L4 x 5', st 11 L4 x 5', st 11 5' L4 x 5', st 11 L4 x 5', st 11 L4 x 5', st 11 L2 x 5', st 11 L2 x 5', st 11 L1 x 5' L1 x 5' L1 x 5' L1 x 5'   Table:                 HS stretch 5 x 10" 5 x 10" 10 x 10" 10 x 10" 10 x 10" 10 x 10" 10 x 10" 10 x 10" 10 x 10" 10 x 10" 10 x 10" 10 x 10" 10 x 10" 10 x 10"   SAQ 3 x 10 x 3# 3 x 10 x 2.5# 3 x 10 2# 3 x 10 x 1.5# 3 x 10 x 1.5# 2 x 10 x 1.5# 3 x 10 x 1#  3 x 10 x 1# 3 x 10 x 1# 2 x 10 x 1# 3 x 10 3 x 10 3 x 10 2 x 10   Heel Slides -- --  -- -- 2 x 10 2 x 10 oot 2 x 10 1 x 10 2 x 10 2 x 10 1 x 10 1 x 10   SLR 3 x 10 x 2# 3 x 10 x 2# 3 x 10 1.5# 3 x 10 x 1.5# 3 x 10 x 1.5# 2 x 10 x 1.5# 3 x 10 x 1# 3 x 10 x 1# 2 x 10 x 1# 1 x 10 x 1# 3 x 10 3 x 10 3 x 10 2 x 10   SL Hip Abd -- 3 x 10 x 2# 3 x 10 1.5# 3 x 10 x 1.5# 3 x 10 x 1.5# 2 x 10 x 1.5# 3 x 10 x 1# 3 x 10 x 1# 2 x 10 x 1# 2 x 10 x 1# 3 x 10 3 x 10 2 x 10 1 x 10   SL Hip Add -- 3 x 10 x 2# 3 x 10 1.5# 3 x 10 x 1# 3 x 10 x 1.5# 2 x 10 x 1.5# 3 x 10 x 1# 3 x 10 x 1# 2 x 10 x 1# 2 x 10 x 1# 3 x 10 3 x 10 2 x 10 1 x 10   Prone hip ext -- 3 x 10 x 2# 3 x 10 1.5# 3 x 10 x 1.5# 3 x 10 x 1.5# 2 x 10 x 1.5# 3 x 10 x 1# 3 x 10 x 1# 2 x 10 x 1# 2 x 10 x 1# 3 x 10 3 x 10 2 x 10 1 x 10   Prone hang 7.5# x 5' held  3' x 5# 5' x 5.5#  5' x 5# 5' x 5# 4' x 4# 4" x 3# 3' x 3# 3' 3' 3'   Prone HS curl oot 3 x 10 x 2# 3 x 10 1.5# 2 x 10 x 1.5# oot 2 x 10 x 1.5# 2 x 10 x 1# 2 x 10 x 1# oot oot 3 x 10 2 x 10 1 x 10    Ext with straps held 5' 3'              Seated:                 LAQs oot oot 3 x 10 2# 3 x 10 x 1.5# oot 2 x 10 x 1.5# 2 x 10 x 1# oot 2 x 10 x 1# 2 x 10 x 1# 3 x 10 3 x 10 2 x 10 1 x 10   Standing:                 Heel raises " "SL 1 x 15 SL 1 x 10 1 x 15 1 x 10 oot oot oot oot oot oot 1 x 10 1 x 10 -- --   Toe raises -- -- 1 x 15 1 x 10 1 x 10 oot oot oot oot oot 1 x 10 1 x 10     Min squats SL 1 x 10 -- 2 x 10 2 x 10 1 x 10 --           Hip Abd 2 plates 2 x 10 Next?  -- -- -- -- -- -- -- -- -- -- --   Hip Add 2 plates 2 x 10                Hip Flex 2 plates 2 x 10 Next?  -- -- -- -- -- -- -- -- -- -- --   Hip Ext 2 plates 2 x 10 Next?  -- -- -- -- -- -- -- -- -- -- --   TKE  oot 20 x 3" BTB 10 x 3" BTB Next? -- -- -- -- -- -- -- --- --   Step Ups fwd L2 2 x 10 L2 1 x 10 L1 2 x 10              Step Ups lat L2 2 x 10 L2 1 x 10 L1 2 x 10 L1  1 x 15 Next? -- -- -- -- -- -- -- -- --   Chair squats 1 x 10 1 x 10  -- -- -- -- -- -- -- -- -- -- --   CP 10' 10' 10' 10' declined 10' 10' declined declined declined declined declined  declined                    Initials DG DG JL GWA 1/6 DG GWA 2/6 GWA 1/6 DG GWA 1/6 DG GWA 2/6 GWA 1/6 DG DG     AROM: flex - 120 deg, ext   -10 deg  PROM flex 125 deg ,  -4 extension deg    Assessment:     He continues to require cues to perform his exercises correctly and to avoid reliance on UE leverage for weight bearing exercises. He was able to perform all of today's progressions and new exercises with no increase in symptoms prior to leaving the clinic. He continues to be limited with extension but continues to only work on his extension once a day for 5 minutes at a time. He would benefit from continued physical therapy to improve his strength, ROM, flexibility, and gait in order to return to his PLOF.    Patient Education/Response:     Patient was educated once again on the importance of working on his extension several times a day along with performing his HEP. He verbalized understanding instructions.     Plans and Goals:     Continue with the plan of care and progress as the patient tolerates.     Short Term Goals (4 Weeks):   1.  This patient will be independent with initial HEP for L/E strengthening and " knee ROM.  2.  This patient will increase L knee AROM to 115 degrees in order to be able to perform vehicle transfers with no difficulty.  3.  This patient will increase L hip and knee MMT 1 full muscle grade all deficit motions in order to perform his usual household duties with no difficulty.  4.  This patient will be able to ambulate greater than 150 feet with a normal gait pattern without an AD.   5.  Patient will be able to achieve greater than or equal to 25 on the FOTO Knee placing patient in 60%<80% impaired, limited, or restricted category demonstrating overall improved functional ability with lower extremity. MET    Long Term Goals (8 Weeks):   1.  This patient will be independent with updated HEP.  2.  This patient will increase L knee AROM to 130 degrees in order to be able to perform his usual yard work with no difficulty.  3.  This patient will increase L L/E MMT to 5/5 in order to be able to ascend/descend steps with a reciprocal gait.  4.  This patient will be able to ambulate greater than 200 yards without use of assistive device without pain or gait deficits.  5.  Patient will be able to achieve greater than or equal to 50 on the FOTO Knee placing patient in 40%<60% impaired, limited, or restricted category demonstrating overall improved functional ability with lower extremity.

## 2018-07-19 ENCOUNTER — CLINICAL SUPPORT (OUTPATIENT)
Dept: REHABILITATION | Facility: HOSPITAL | Age: 78
End: 2018-07-19
Payer: MEDICARE

## 2018-07-19 DIAGNOSIS — R26.89 DECREASED FUNCTIONAL MOBILITY: ICD-10-CM

## 2018-07-19 DIAGNOSIS — R26.89 ANTALGIC GAIT: ICD-10-CM

## 2018-07-19 DIAGNOSIS — M25.662 DECREASED RANGE OF MOTION OF LEFT KNEE: ICD-10-CM

## 2018-07-19 DIAGNOSIS — R29.898 WEAKNESS OF BOTH LOWER EXTREMITIES: ICD-10-CM

## 2018-07-19 PROCEDURE — 97110 THERAPEUTIC EXERCISES: CPT | Mod: PO

## 2018-07-19 NOTE — PROGRESS NOTES
DAILY TREATMENT NOTE      Therapy Diagnosis:   Encounter Diagnoses   Name Primary?    Decreased range of motion of left knee     Weakness of both lower extremities     Antalgic gait     Decreased functional mobility        DATE: 7/19/2018    Start Time:  1:00  Stop Time:  2:10    PROCEDURES:    TIMED  Procedure Min.   TE 55   Bike 5NC       CP 10NC         UNTIMED  Procedure Min.             Total Timed Minutes:  55  Total Timed Units:  4  Total Untimed Units:  0  Charges Billed/# of units:  4      Progress/Current Status    Subjective:     Patient ID: Korey Santos is a 77 y.o. male.  Diagnosis:   1. Decreased range of motion of left knee     2. Weakness of both lower extremities     3. Antalgic gait     4. Decreased functional mobility       Pain: 6 /10  Patient reports he continues to have pain along the medial knee, clicking, popping, and feels wobbly when he walks. He is still having trouble with sleeping and he feels the swelling is the same as when he came out of the hospital. He has been going to [place] and working out at the gym doing the bike for 15 minutes, treadmill(fwd, retro, and sideways) for 15 minutes, light weights for upper body, knee extension machine and hamstring curl machine.     Objective:     Patient ambulated into the clinic with [facility] with a normal gait pattern.      Korey received therapeutic exercises to develop strength, endurance, ROM and flexibility for 55 minutes including:      Date  07/19/18 07/16/18 07/05/18 7/2/18 06/29/18 06/27/18 06/22/18 06/20/18 06/18/18 06/11/18 06/08/18 06/06/18 06/04/18 05/30/18 05/28/18   VISIT 16/50 15/50 14/50 13/50 12/50 11/50 10/50 9/50 8/50 7/50 6/50 5/50 4/50 3/50 2/50   Gcode 07/27/18 07/27/18 07/27/18 7/27/18 07/27/18 06/25/18 06/25/18 06/25/18 06/25/18 06/25/18 06/25/18 06/25/18 06/25/18 06/25/18 06/25/18   FOTO -- -- --  -- -- -- -- -- -- -- 5/5 4/5 3/5 2/5   Cap Visit   Cap Total 121.28  1554.84 121.28  1433.56 121.28  1312.28 78  1191  "60.64  1113.76 121.28  1053.12 60.64  931.84 60.64  871.20 60.64  810.56 121.28  749.92 121. 28  628.64 60.64  507.36 121.28  446.72 121.28  325.44 90.96  204.16   FTF 07/29/18 07/29/18 07/29/18 7/29/18 07/29/18 06/25/18 06/25/18 06/25/18 06/25/18 06/25/18 06/25/18 06/25/18 06/25/18 06/25/18 06/25/18   POC exp 07/25/18 07/25/18 7/25/18 7/25/18 07/25/18 07/25/18 07/25/18 07/25/18 07/25/18 07/25/18 07/25/18 07/25/18 07/25/18 07/25/18    DOS - 5/04/18                                    Bike st 12 L6 x 5' L6 x 5' L6 x 5' L4 x 5', st 11 L4 x 5', st 11 5' L4 x 5', st 11 L4 x 5', st 11 L4 x 5', st 11 L2 x 5', st 11 L2 x 5', st 11 L1 x 5' L1 x 5' L1 x 5' L1 x 5'   Table:                  HS stretch @ home 5 x 10" 5 x 10" 10 x 10" 10 x 10" 10 x 10" 10 x 10" 10 x 10" 10 x 10" 10 x 10" 10 x 10" 10 x 10" 10 x 10" 10 x 10" 10 x 10"   SAQ @ home 3 x 10 x 3# 3 x 10 x 2.5# 3 x 10 2# 3 x 10 x 1.5# 3 x 10 x 1.5# 2 x 10 x 1.5# 3 x 10 x 1#  3 x 10 x 1# 3 x 10 x 1# 2 x 10 x 1# 3 x 10 3 x 10 3 x 10 2 x 10   Heel Slides -- -- --  -- -- 2 x 10 2 x 10 oot 2 x 10 1 x 10 2 x 10 2 x 10 1 x 10 1 x 10   SLR @ home 3 x 10 x 2# 3 x 10 x 2# 3 x 10 1.5# 3 x 10 x 1.5# 3 x 10 x 1.5# 2 x 10 x 1.5# 3 x 10 x 1# 3 x 10 x 1# 2 x 10 x 1# 1 x 10 x 1# 3 x 10 3 x 10 3 x 10 2 x 10   SL Hip Abd D/C -- 3 x 10 x 2# 3 x 10 1.5# 3 x 10 x 1.5# 3 x 10 x 1.5# 2 x 10 x 1.5# 3 x 10 x 1# 3 x 10 x 1# 2 x 10 x 1# 2 x 10 x 1# 3 x 10 3 x 10 2 x 10 1 x 10   SL Hip Add D/C -- 3 x 10 x 2# 3 x 10 1.5# 3 x 10 x 1# 3 x 10 x 1.5# 2 x 10 x 1.5# 3 x 10 x 1# 3 x 10 x 1# 2 x 10 x 1# 2 x 10 x 1# 3 x 10 3 x 10 2 x 10 1 x 10   Prone hip ext D/C -- 3 x 10 x 2# 3 x 10 1.5# 3 x 10 x 1.5# 3 x 10 x 1.5# 2 x 10 x 1.5# 3 x 10 x 1# 3 x 10 x 1# 2 x 10 x 1# 2 x 10 x 1# 3 x 10 3 x 10 2 x 10 1 x 10   Prone hang 7.5 7.5# x 5' held  3' x 5# 5' x 5.5#  5' x 5# 5' x 5# 4' x 4# 4" x 3# 3' x 3# 3' 3' 3'   Prone HS curl 2 x 10 x 7.5# oot 3 x 10 x 2# 3 x 10 1.5# 2 x 10 x 1.5# oot 2 x 10 x 1.5# 2 x 10 x 1# 2 x " "10 x 1# oot oot 3 x 10 2 x 10 1 x 10    Ext with straps held held 5' 3'              Seated:                  LAQs @ gym oot oot 3 x 10 2# 3 x 10 x 1.5# oot 2 x 10 x 1.5# 2 x 10 x 1# oot 2 x 10 x 1# 2 x 10 x 1# 3 x 10 3 x 10 2 x 10 1 x 10   Standing:                  Heel raises SL 1 x 15 SL 1 x 15 SL 1 x 10 1 x 15 1 x 10 oot oot oot oot oot oot 1 x 10 1 x 10 -- --   Toe raises -- -- -- 1 x 15 1 x 10 1 x 10 oot oot oot oot oot 1 x 10 1 x 10     Min squats SL 1 x 15 SL 1 x 10 -- 2 x 10 2 x 10 1 x 10 --           Hip Abd 2 plates 3 x 10 2 plates 2 x 10 Next?  -- -- -- -- -- -- -- -- -- -- --   Hip Add 2 plates 3 x 10 2 plates 2 x 10                Hip Flex 2 plates 3 x 10 2 plates 2 x 10 Next?  -- -- -- -- -- -- -- -- -- -- --   Hip Ext 2 plates 3 x 10 2 plates 2 x 10 Next?  -- -- -- -- -- -- -- -- -- -- --   TKE   oot 20 x 3" BTB 10 x 3" BTB Next? -- -- -- -- -- -- -- --- --   Step Ups fwd L2 3 x 10 L2 2 x 10 L2 1 x 10 L1 2 x 10              Step Ups lat L2 3 x 10 L2 2 x 10 L2 1 x 10 L1 2 x 10 L1  1 x 15 Next? -- -- -- -- -- -- -- -- --   Step downs L1 1 x 10                 Chair squats 1 x 10 1 x 10 1 x 10  -- -- -- -- -- -- -- -- -- -- --   CP 10' 10' 10' 10' 10' declined 10' 10' declined declined declined declined declined  declined                     Initials DG DG DG JL GWA 1/6 DG GWA 2/6 GWA 1/6 DG GWA 1/6 DG GWA 2/6 GWA 1/6 DG DG       Assessment:     Patient required cues to only performed the prescribed number of reps of each exercise, instead of doing two to the three times that amount. He was able to perform all of today's progressions and new exercises with no increase in symptoms prior to leaving the clinic. He continues to only work on his extension once a day for 5 minutes at a time. He would benefit from continued physical therapy to improve his strength, ROM, flexibility, and gait in order to return to his PLOF.    Patient Education/Response:     Patient was educated once again on the importance of " working on his extension several times a day along with performing his HEP. He was also educated that he is going to be discharged to his HEP after next week. He verbalized understanding instructions.     Plans and Goals:     Continue with the plan of care and progress as the patient tolerates.     Short Term Goals (4 Weeks):   1.  This patient will be independent with initial HEP for L/E strengthening and knee ROM.  2.  This patient will increase L knee AROM to 115 degrees in order to be able to perform vehicle transfers with no difficulty.  3.  This patient will increase L hip and knee MMT 1 full muscle grade all deficit motions in order to perform his usual household duties with no difficulty.  4.  This patient will be able to ambulate greater than 150 feet with a normal gait pattern without an AD.   5.  Patient will be able to achieve greater than or equal to 25 on the FOTO Knee placing patient in 60%<80% impaired, limited, or restricted category demonstrating overall improved functional ability with lower extremity. MET    Long Term Goals (8 Weeks):   1.  This patient will be independent with updated HEP.  2.  This patient will increase L knee AROM to 130 degrees in order to be able to perform his usual yard work with no difficulty.  3.  This patient will increase L L/E MMT to 5/5 in order to be able to ascend/descend steps with a reciprocal gait.  4.  This patient will be able to ambulate greater than 200 yards without use of assistive device without pain or gait deficits.  5.  Patient will be able to achieve greater than or equal to 50 on the FOTO Knee placing patient in 40%<60% impaired, limited, or restricted category demonstrating overall improved functional ability with lower extremity.

## 2018-07-23 ENCOUNTER — CLINICAL SUPPORT (OUTPATIENT)
Dept: REHABILITATION | Facility: HOSPITAL | Age: 78
End: 2018-07-23
Payer: MEDICARE

## 2018-07-23 DIAGNOSIS — R26.89 DECREASED FUNCTIONAL MOBILITY: ICD-10-CM

## 2018-07-23 DIAGNOSIS — M25.662 DECREASED RANGE OF MOTION OF LEFT KNEE: ICD-10-CM

## 2018-07-23 DIAGNOSIS — R26.89 ANTALGIC GAIT: ICD-10-CM

## 2018-07-23 DIAGNOSIS — R29.898 WEAKNESS OF BOTH LOWER EXTREMITIES: ICD-10-CM

## 2018-07-23 PROCEDURE — 97110 THERAPEUTIC EXERCISES: CPT | Mod: PO

## 2018-07-23 NOTE — PROGRESS NOTES
DAILY TREATMENT NOTE    DATE: 7/23/2018    Start Time:  1:00  Stop Time:  2:10    PROCEDURES:    TIMED  Procedure Min.   TE 30   TE sup 25 NC   Bike sup 5 NC   CP 10 NC         UNTIMED  Procedure Min.             Total Timed Minutes:  30  Total Timed Units:  2  Total Untimed Units:  0  Charges Billed/# of units:  2 (TE-2)      Progress/Current Status    Subjective:     Patient ID: Korey Santos is a 77 y.o. male.  Diagnosis:   1. Decreased range of motion of left knee     2. Weakness of both lower extremities     3. Antalgic gait     4. Decreased functional mobility         Pain: 6 /10  Patient reports he continues to have pain along the medial knee, clicking, popping, and feels wobbly when he walks.  He has been going to [place] and working out at the gym doing the bike for 15 minutes, treadmill(fwd, retro, and sideways) for 15 minutes, light weights for upper body, knee extension machine and hamstring curl machine.     Objective:     Patient ambulated into the clinic with [facility] with a normal gait pattern.      Korey received therapeutic exercises to develop strength, endurance, ROM and flexibility for 30 minutes including:      Date  07/23/18 07/19/18 07/16/18 07/05/18 7/2/18 06/29/18 06/27/18 06/22/18 06/20/18 06/18/18 06/11/18 06/08/18 06/06/18   VISIT 17/50 16/50 15/50 14/50 13/50 12/50 11/50 10/50 9/50 8/50 7/50 6/50 5/50   Gcode 07/27/18 07/27/18 07/27/18 07/27/18 7/27/18 07/27/18 06/25/18 06/25/18 06/25/18 06/25/18 06/25/18 06/25/18 06/25/18   FOTO -- -- -- --  -- -- -- -- -- -- -- 5/5   Cap Visit   Cap Total 60.64  1615.48 121.28  1554.84 121.28  1433.56 121.28  1312.28 78  1191 60.64  1113.76 121.28  1053.12 60.64  931.84 60.64  871.20 60.64  810.56 121.28  749.92 121. 28  628.64 60.64  507.36   FTF 07/29/18 07/29/18 07/29/18 07/29/18 7/29/18 07/29/18 06/25/18 06/25/18 06/25/18 06/25/18 06/25/18 06/25/18 06/25/18   POC exp 07/25/18 07/25/18 07/25/18 7/25/18 7/25/18 07/25/18 07/25/18 07/25/18 07/25/18  "07/25/18 07/25/18 07/25/18 07/25/18   DOS - 5/04/18                                Bike st 12 L6 x 5' L6 x 5' L6 x 5' L6 x 5' L4 x 5', st 11 L4 x 5', st 11 5' L4 x 5', st 11 L4 x 5', st 11 L4 x 5', st 11 L2 x 5', st 11 L2 x 5', st 11 L1 x 5'   Table:                Prone hang 7.5# x 5' 7.5 7.5# x 5' held  3' x 5# 5' x 5.5#  5' x 5# 5' x 5# 4' x 4# 4" x 3# 3' x 3#   Prone HS curl 3 x 10 x 7.5# 2 x 10 x 7.5# oot 3 x 10 x 2# 3 x 10 1.5# 2 x 10 x 1.5# oot 2 x 10 x 1.5# 2 x 10 x 1# 2 x 10 x 1# oot oot 3 x 10   Seated:                Standing:                Heel raises SL 1 x 15 SL 1 x 15 SL 1 x 15 SL 1 x 10 1 x 15 1 x 10 oot oot oot oot oot oot 1 x 10   Min squats SL 1 x 15 SL 1 x 15 SL 1 x 10 -- 2 x 10 2 x 10 1 x 10 --        Hip Abd 2 plates 3 x 10 2 plates 3 x 10 2 plates 2 x 10 Next?  -- -- -- -- -- -- -- --   Hip Add 2 plates 3 x 10 2 plates 3 x 10 2 plates 2 x 10             Hip Flex 2 plates 3 x 10 2 plates 3 x 10 2 plates 2 x 10 Next?  -- -- -- -- -- -- -- --   Hip Ext 2 plates 3 x 10 2 plates 3 x 10 2 plates 2 x 10 Next?  -- -- -- -- -- -- -- --   TKE    oot 20 x 3" BTB 10 x 3" BTB Next? -- -- -- -- -- --   Step Ups fwd L2  3 x 10 L2 3 x 10 L2 2 x 10 L2 1 x 10 L1 2 x 10           Step Ups lat L2  3 x 10 L2 3 x 10 L2 2 x 10 L2 1 x 10 L1 2 x 10 L1  1 x 15 Next? -- -- -- -- -- --   Step downs L1  2 x 10 L1 1 x 10              Chair squats 2 x 10 1 x 10 1 x 10 1 x 10  -- -- -- -- -- -- -- --   CP 10' 10' 10' 10' 10' 10' declined 10' 10' declined declined declined declined                   Initials GWA 1/6 DG DG DG JL GWA 1/6 DG GWA 2/6 GWA 1/6 DG GWA 1/6 DG GWA 2/6       Assessment:     Patient required cues to only performed the prescribed number of reps of each exercise, instead of doing two to the three times that amount. He was able to perform all of today's progressions with no increase in symptoms prior to leaving the clinic. He continues to only work on his extension once a day for 5 minutes at a time. He " would benefit from continued physical therapy to improve his strength, ROM, flexibility, and gait in order to return to his PLOF.    Patient Education/Response:     Patient was educated once again on the importance of working on his extension several times a day along with performing his HEP.  He was reminded that he is going to be discharged to his HEP after this week.  He verbalized understanding instructions.     Plans and Goals:     Continue with the plan of care and progress as the patient tolerates.     Short Term Goals (4 Weeks):   1.  This patient will be independent with initial HEP for L/E strengthening and knee ROM.  2.  This patient will increase L knee AROM to 115 degrees in order to be able to perform vehicle transfers with no difficulty.  3.  This patient will increase L hip and knee MMT 1 full muscle grade all deficit motions in order to perform his usual household duties with no difficulty.  4.  This patient will be able to ambulate greater than 150 feet with a normal gait pattern without an AD.   5.  Patient will be able to achieve greater than or equal to 25 on the FOTO Knee placing patient in 60%<80% impaired, limited, or restricted category demonstrating overall improved functional ability with lower extremity. MET    Long Term Goals (8 Weeks):   1.  This patient will be independent with updated HEP.  2.  This patient will increase L knee AROM to 130 degrees in order to be able to perform his usual yard work with no difficulty.  3.  This patient will increase L L/E MMT to 5/5 in order to be able to ascend/descend steps with a reciprocal gait.  4.  This patient will be able to ambulate greater than 200 yards without use of assistive device without pain or gait deficits.  5.  Patient will be able to achieve greater than or equal to 50 on the FOTO Knee placing patient in 40%<60% impaired, limited, or restricted category demonstrating overall improved functional ability with lower extremity.

## 2018-07-26 ENCOUNTER — CLINICAL SUPPORT (OUTPATIENT)
Dept: REHABILITATION | Facility: HOSPITAL | Age: 78
End: 2018-07-26
Payer: MEDICARE

## 2018-07-26 DIAGNOSIS — M25.662 DECREASED RANGE OF MOTION OF LEFT KNEE: ICD-10-CM

## 2018-07-26 DIAGNOSIS — R29.898 WEAKNESS OF BOTH LOWER EXTREMITIES: ICD-10-CM

## 2018-07-26 DIAGNOSIS — R26.89 ANTALGIC GAIT: ICD-10-CM

## 2018-07-26 DIAGNOSIS — R26.89 DECREASED FUNCTIONAL MOBILITY: ICD-10-CM

## 2018-07-26 PROCEDURE — 97110 THERAPEUTIC EXERCISES: CPT | Mod: PO

## 2018-07-26 PROCEDURE — G8979 MOBILITY GOAL STATUS: HCPCS | Mod: CK,PO

## 2018-07-26 PROCEDURE — G8980 MOBILITY D/C STATUS: HCPCS | Mod: CL,PO

## 2018-07-26 NOTE — PROGRESS NOTES
"DAILY TREATMENT NOTE    DATE: 7/26/2018    Start Time:  5:00  Stop Time:  5:53    PROCEDURES:    TIMED  Procedure Min.   TE 38       Bike sup 5 NC   CP 10 NC         UNTIMED  Procedure Min.             Total Timed Minutes:  38  Total Timed Units:  3  Total Untimed Units:  0  Charges Billed/# of units:  3 (TE-3)      Progress/Current Status    Subjective:     Patient ID: Korey Santos is a 77 y.o. male.  Diagnosis:   1. Decreased range of motion of left knee     2. Weakness of both lower extremities     3. Antalgic gait     4. Decreased functional mobility         Pain: 5 /10  Patient reports he continues to have stabbing pain in the knee but not with every step, still can't get comfortable sleeping in the bed, and when making sharp turns he gets pain. His biggest complaints is the "damn" clicking.     Objective:     Patient ambulated into the clinic with [facility] with a normal gait pattern.      Korey received therapeutic exercises to develop strength, endurance, ROM and flexibility for 38 minutes including:      Date  07/26/18 07/23/18 07/19/18 07/16/18 07/05/18 7/2/18 06/29/18 06/27/18 06/22/18 06/20/18 06/18/18 06/11/18 06/08/18 06/06/18   VISIT 18/50 17/50 16/50 15/50 14/50 13/50 12/50 11/50 10/50 9/50 8/50 7/50 6/50 5/50   Gcode 07/27/18 07/27/18 07/27/18 07/27/18 07/27/18 7/27/18 07/27/18 06/25/18 06/25/18 06/25/18 06/25/18 06/25/18 06/25/18 06/25/18   FOTO -- -- -- -- --  -- -- -- -- -- -- -- 5/5   Cap Visit   Cap Total 90.96  1706.44 60.64  1615.48 121.28  1554.84 121.28  1433.56 121.28  1312.28 78  1191 60.64  1113.76 121.28  1053.12 60.64  931.84 60.64  871.20 60.64  810.56 121.28  749.92 121. 28  628.64 60.64  507.36   FTF 07/29/18 07/29/18 07/29/18 07/29/18 07/29/18 7/29/18 07/29/18 06/25/18 06/25/18 06/25/18 06/25/18 06/25/18 06/25/18 06/25/18   POC exp 07/25/18 07/25/18 07/25/18 07/25/18 7/25/18 7/25/18 07/25/18 07/25/18 07/25/18 07/25/18 07/25/18 07/25/18 07/25/18 07/25/18   DOS - 5/04/18             " "                     Bike st [address] [place] L6 x 5' L6 x 5' L6 x 5' L6 x 5' L6 x 5' L4 x 5', st 11 L4 x 5', st 11 5' L4 x 5', st 11 L4 x 5', st 11 L4 x 5', st 11 L2 x 5', st 11 L2 x 5', st 11 L1 x 5'   Table:                 Prone hang oot 7.5# x 5' 7.5 7.5# x 5' held  3' x 5# 5' x 5.5#  5' x 5# 5' x 5# 4' x 4# 4" x 3# 3' x 3#   Prone HS curl oot 3 x 10 x 7.5# 2 x 10 x 7.5# oot 3 x 10 x 2# 3 x 10 1.5# 2 x 10 x 1.5# oot 2 x 10 x 1.5# 2 x 10 x 1# 2 x 10 x 1# oot oot 3 x 10   Seated:                 Standing:                 Heel raises oot SL 1 x 15 SL 1 x 15 SL 1 x 15 SL 1 x 10 1 x 15 1 x 10 oot oot oot oot oot oot 1 x 10   Min squats oot SL 1 x 15 SL 1 x 15 SL 1 x 10 -- 2 x 10 2 x 10 1 x 10 --        Hip Abd 3 plates 2 x 15 2 plates 3 x 10 2 plates 3 x 10 2 plates 2 x 10 Next?  -- -- -- -- -- -- -- --   Hip Add 3 plates 2 x 15 2 plates 3 x 10 2 plates 3 x 10 2 plates 2 x 10             Hip Flex 3 plates 2 x 15 2 plates 3 x 10 2 plates 3 x 10 2 plates 2 x 10 Next?  -- -- -- -- -- -- -- --   Hip Ext 3 plates 2 x 15 2 plates 3 x 10 2 plates 3 x 10 2 plates 2 x 10 Next?  -- -- -- -- -- -- -- --   TKE --    oot 20 x 3" BTB 10 x 3" BTB Next? -- -- -- -- -- --   Step Ups fwd L2 3 x 10 L2  3 x 10 L2 3 x 10 L2 2 x 10 L2 1 x 10 L1 2 x 10           Step Ups lat L2 3 x 10 L2  3 x 10 L2 3 x 10 L2 2 x 10 L2 1 x 10 L1 2 x 10 L1  1 x 15 Next? -- -- -- -- -- --   Step downs L1 2 x 10 L1  2 x 10 L1 1 x 10              Chair squats oot 2 x 10 1 x 10 1 x 10 1 x 10  -- -- -- -- -- -- -- --   CP 10' 10' 10' 10' 10' 10' 10' declined 10' 10' declined declined declined declined                    Initials DG GWA 1/6 DG DG DG JL GWA 1/6 DG GWA 2/6 GWA 1/6 DG GWA 1/6 DG GWA 2/6     AROM -3* to 125*  PROM  0- 130*    L/E MMT Right Left Pain/Dysfunction with Movement   Hip Flexion 4/5 4+/5    Hip Extension 3/5 3/5    Hip Abduction 3/5 3/5    Knee Flexion 4+/5 4+/5    Knee Extension 5/5 5/5    Ankle DF 5/5 5/5    Ankle PF 5/5 5/5      FOTO Knee 28, G " code CL, 60%<80%  Assessment:     Patient is able to demonstrate AROM and PROM WFL with no complaints of pain. When ambulating he has been observed using a normal gait pattern with and without an AD. His B LE strength has improved but he continues to have complaints of pain with his usual ADLs. He has been able to return to his usual workouts at [facility] with no difficulty at this time. His current score on FOTO Knee places him in the 60%<80% impaired, limited, or restricted category. He has met all goals set for him to his max rehab potential at this time. He is independent with his HEP and is ready for discharge to his [facility].     Patient Education/Response:     Patient was educated once again on the importance of working on his extension several times a day along with performing his HEP. He verbalized understanding instructions.     Plans and Goals:     Discharge to [facility].     Short Term Goals (4 Weeks):   1.  This patient will be independent with initial HEP for L/E strengthening and knee ROM. MET  2.  This patient will increase L knee AROM to 115 degrees in order to be able to perform vehicle transfers with no difficulty. MET  3.  This patient will increase L hip and knee MMT 1 full muscle grade all deficit motions in order to perform his usual household duties with no difficulty. MET  4.  This patient will be able to ambulate greater than 150 feet with a normal gait pattern without an AD. MET  5.  Patient will be able to achieve greater than or equal to 25 on the FOTO Knee placing patient in 60%<80% impaired, limited, or restricted category demonstrating overall improved functional ability with lower extremity. MET    Long Term Goals (8 Weeks):   1.  This patient will be independent with updated HEP. MET  2.  This patient will increase L knee AROM to 130 degrees in order to be able to perform his usual yard work with no difficulty. PARTIALLY MET  3.  This patient will increase L L/E MMT to 5/5 in  order to be able to ascend/descend steps with a reciprocal gait. PARTIALLY MET  4.  This patient will be able to ambulate greater than 200 yards without use of assistive device without pain or gait deficits. PARTIALLY MET  5.  Patient will be able to achieve greater than or equal to 50 on the FOTO Knee placing patient in 40%<60% impaired, limited, or restricted category demonstrating overall improved functional ability with lower extremity. NOT MET

## 2018-07-29 PROBLEM — R29.898 WEAKNESS OF BOTH LOWER EXTREMITIES: Status: RESOLVED | Noted: 2018-05-27 | Resolved: 2018-07-29

## 2018-07-29 PROBLEM — R26.89 ANTALGIC GAIT: Status: RESOLVED | Noted: 2018-05-27 | Resolved: 2018-07-29

## 2018-07-29 PROBLEM — R26.89 DECREASED FUNCTIONAL MOBILITY: Status: RESOLVED | Noted: 2018-05-27 | Resolved: 2018-07-29

## 2018-07-29 PROBLEM — M25.662 DECREASED RANGE OF MOTION OF LEFT KNEE: Status: RESOLVED | Noted: 2018-05-27 | Resolved: 2018-07-29

## 2018-07-30 NOTE — PLAN OF CARE
REHAB SERVICES OUTPATIENT DISCHARGE SUMMARY  Physical Therapy      Name:  Korey Santos  Date:  07/26/18  Date of Evaluation:  05/25/18  Physician:  Renita Franco MD  Total # Of Visits: 18   Cancelled:  7  No Shows:  0  Diagnosis:    1. Decreased range of motion of left knee     2. Weakness of both lower extremities     3. Antalgic gait     4. Decreased functional mobility         Physical/Functional Status:  At time of discharge, patient was able to demonstrate AROM and PROM WFL with no complaints of pain. When ambulating he has been observed using a normal gait pattern with and without an AD. His B LE strength has improved but he continues to have complaints of pain with his usual ADLs. He has been able to return to his usual workouts at [facility] with no difficulty at this time. His current score on FOTO Knee places him in the 60%<80% impaired, limited, or restricted category. He has met all goals set for him to his max rehab potential at this time. He is independent with his HEP and is ready for discharge to his [facility].     The patient is to be discharged from our Therapy service for the following reason(s):  Patient has reached the maximum rehab potential for the present time    Degree of Goal Achievement:  Patient has partially met goals    Patient Education:  Patient educated to continue with his HEP on a regular basis to maintain his strength, ROM, and flexibility.     Discharge Plan:  Home Program:  For ROM, flexibility, strength, and balance.     Marlyn Camarillo, PT

## 2018-08-21 ENCOUNTER — TELEPHONE (OUTPATIENT)
Dept: SPORTS MEDICINE | Facility: CLINIC | Age: 78
End: 2018-08-21

## 2018-08-21 NOTE — TELEPHONE ENCOUNTER
----- Message from Lesa Triana MA sent at 8/20/2018  5:30 PM CDT -----  Contact: pt      ----- Message -----  From: Octavia Romo  Sent: 8/20/2018  12:07 PM  To: [facility] Staff    Pt would like to be called back regarding scheduling an appt for  9/19/2018, please call  Pt can be reached at [phone]

## 2018-08-21 NOTE — TELEPHONE ENCOUNTER
Spoke with patient to set up an appointment with Dr. Franco. I made him a follow up appointment for 9/19 at 2:15pm

## 2018-08-22 ENCOUNTER — ANESTHESIA EVENT (OUTPATIENT)
Dept: ENDOSCOPY | Facility: HOSPITAL | Age: 78
End: 2018-08-22

## 2018-08-22 ENCOUNTER — ANESTHESIA (OUTPATIENT)
Dept: ENDOSCOPY | Facility: HOSPITAL | Age: 78
End: 2018-08-22

## 2018-08-23 DIAGNOSIS — K63.5 POLYP OF COLON, UNSPECIFIED PART OF COLON, UNSPECIFIED TYPE: Primary | ICD-10-CM

## 2018-08-23 DIAGNOSIS — Z12.11 COLON CANCER SCREENING: ICD-10-CM

## 2018-09-19 ENCOUNTER — OFFICE VISIT (OUTPATIENT)
Dept: INTERNAL MEDICINE | Facility: CLINIC | Age: 78
End: 2018-09-19
Payer: MEDICARE

## 2018-09-19 ENCOUNTER — TELEPHONE (OUTPATIENT)
Dept: SPORTS MEDICINE | Facility: CLINIC | Age: 78
End: 2018-09-19

## 2018-09-19 VITALS
HEART RATE: 60 BPM | BODY MASS INDEX: 32.35 KG/M2 | SYSTOLIC BLOOD PRESSURE: 132 MMHG | DIASTOLIC BLOOD PRESSURE: 76 MMHG | WEIGHT: 231.06 LBS | HEIGHT: 71 IN

## 2018-09-19 DIAGNOSIS — N13.8 BPH WITH URINARY OBSTRUCTION: ICD-10-CM

## 2018-09-19 DIAGNOSIS — E66.09 CLASS 1 OBESITY DUE TO EXCESS CALORIES WITH SERIOUS COMORBIDITY AND BODY MASS INDEX (BMI) OF 30.0 TO 30.9 IN ADULT: ICD-10-CM

## 2018-09-19 DIAGNOSIS — Z96.652 STATUS POST TOTAL LEFT KNEE REPLACEMENT: ICD-10-CM

## 2018-09-19 DIAGNOSIS — M48.061 SPINAL STENOSIS OF LUMBAR REGION, UNSPECIFIED WHETHER NEUROGENIC CLAUDICATION PRESENT: ICD-10-CM

## 2018-09-19 DIAGNOSIS — N40.1 BPH WITH URINARY OBSTRUCTION: ICD-10-CM

## 2018-09-19 DIAGNOSIS — R97.20 ELEVATED PSA: ICD-10-CM

## 2018-09-19 DIAGNOSIS — M47.816 LUMBAR SPONDYLOSIS: ICD-10-CM

## 2018-09-19 DIAGNOSIS — E78.5 HYPERLIPIDEMIA, UNSPECIFIED HYPERLIPIDEMIA TYPE: ICD-10-CM

## 2018-09-19 DIAGNOSIS — I10 HYPERTENSION, ESSENTIAL: ICD-10-CM

## 2018-09-19 DIAGNOSIS — M47.816 LUMBAR FACET ARTHROPATHY: ICD-10-CM

## 2018-09-19 DIAGNOSIS — Z00.00 ENCOUNTER FOR PREVENTIVE HEALTH EXAMINATION: Primary | ICD-10-CM

## 2018-09-19 DIAGNOSIS — M17.0 PRIMARY OSTEOARTHRITIS OF BOTH KNEES: ICD-10-CM

## 2018-09-19 PROBLEM — E66.811 CLASS 1 OBESITY DUE TO EXCESS CALORIES WITH SERIOUS COMORBIDITY IN ADULT: Status: ACTIVE | Noted: 2018-09-19

## 2018-09-19 PROCEDURE — 3075F SYST BP GE 130 - 139MM HG: CPT | Mod: CPTII,S$GLB,, | Performed by: NURSE PRACTITIONER

## 2018-09-19 PROCEDURE — 99499 UNLISTED E&M SERVICE: CPT | Mod: S$GLB,,, | Performed by: NURSE PRACTITIONER

## 2018-09-19 PROCEDURE — 99999 PR PBB SHADOW E&M-EST. PATIENT-LVL IV: CPT | Mod: PBBFAC,,, | Performed by: NURSE PRACTITIONER

## 2018-09-19 PROCEDURE — 3078F DIAST BP <80 MM HG: CPT | Mod: CPTII,S$GLB,, | Performed by: NURSE PRACTITIONER

## 2018-09-19 PROCEDURE — G0439 PPPS, SUBSEQ VISIT: HCPCS | Mod: S$GLB,,, | Performed by: NURSE PRACTITIONER

## 2018-09-19 PROCEDURE — 99214 OFFICE O/P EST MOD 30 MIN: CPT | Mod: PBBFAC | Performed by: NURSE PRACTITIONER

## 2018-09-19 NOTE — TELEPHONE ENCOUNTER
Called patient and let him know that Dr. Franco will no longer be in clinic this afternoon due to an emergency surgery. I told him that his appointment was cancelled and to please call us back to get rescheduled.

## 2018-09-19 NOTE — PATIENT INSTRUCTIONS
Counseling and Referral of Other Preventative  (Italic type indicates deductible and co-insurance are waived)    Patient Name: Korey Santos  Today's Date: 9/19/2018    Health Maintenance       Date Due Completion Date    Influenza Vaccine 11/07/2018 (Originally 8/1/2018) 12/10/2014 (Declined)    Override on 12/10/2014: Declined    Colonoscopy 12/03/2018 (Originally 2/14/2016) 2/14/2011    Zoster Vaccine 09/18/2019 (Originally 10/7/2001) ---    TETANUS VACCINE 09/19/2019 (Originally 10/7/1958) ---    Lipid Panel 11/15/2022 11/15/2017        No orders of the defined types were placed in this encounter.    The following information is provided to all patients.  This information is to help you find resources for any of the problems found today that may be affecting your health:                Living healthy guide: www.[facility].[place].gov      Understanding Diabetes: www.diabetes.org      Eating healthy: www.cdc.gov/healthyweight      CDC home safety checklist: www.cdc.gov/steadi/patient.html      Agency on Aging: www.goea.[place].[facility]      Alcoholics anonymous (AA): www.aa.org      Physical Activity: www.ruthann.nih.gov/nd8bexk      Tobacco use: www.quitwithusla.org

## 2018-09-19 NOTE — PROGRESS NOTES
"Korey Santos presented for a  Medicare AWV and comprehensive Health Risk Assessment today. The following components were reviewed and updated:    · Medical history  · Family History  · Social history  · Allergies and Current Medications  · Health Risk Assessment  · Health Maintenance  · Care Team     ** See Completed Assessments for Annual Wellness Visit within the encounter summary.**       The following assessments were completed:  · Living Situation  · CAGE  · Depression Screening  · Timed Get Up and Go  · Whisper Test  · Cognitive Function Screening  ·   ·   ·   · Nutrition Screening  · ADL Screening  · PAQ Screening    Vitals:    09/19/18 1109   BP: 132/76   BP Location: Left arm   Pulse: 60   Weight: 104.8 kg (231 lb 0.7 oz)   Height: 5' 11" (1.803 m)     Body mass index is 32.22 kg/m².  Physical Exam   Constitutional: He is oriented to person, place, and time.   Overweight   HENT:   Head: Normocephalic.   Cardiovascular: Normal rate and regular rhythm.   Pulmonary/Chest: Effort normal and breath sounds normal.   Abdominal: Soft. Bowel sounds are normal. He exhibits no mass.   Obese   Musculoskeletal: Normal range of motion.   + edema left leg   Neurological: He is alert and oriented to person, place, and time.   Skin: Skin is warm and dry.   Psychiatric: He has a normal mood and affect.   Nursing note and vitals reviewed.        Diagnoses and health risks identified today and associated recommendations/orders:    1. Encounter for preventive health examination  Here for Health Risk Assessment/Annual Wellness Visit.  Health maintenance reviewed and updated. Follow up in one year.  Declined all immunizations - influenza, TDAP, pneumonia, shingles  Encourage to reschedule colonoscopy.    2. Hypertension, essential  Chronic, reports he is not taking medication. . Followed by PCP.    3. Hyperlipidemia, unspecified hyperlipidemia type  Chronic, reports he is not taking pravastatin. Followed by PCP.    4. BPH " with urinary obstruction  Chronic, stable on current medication. Followed by Urology    5. Elevated PSA  Chronic, stable. Followed by Urology.    6. Primary osteoarthritis of both knees  Chronic, S/P TKA.  Followed by Sports Medicine.    7. Status post total left knee replacement  Reporting persistent knee pain. Appointment for follow up Sports Medicine today.     8. Lumbar spondylosis  Chronic, stable on current PRN medication. Followed by PCP.    9. Spinal stenosis of lumbar region, unspecified whether neurogenic claudication present  Chronic, stable on current PRN medication. Followed by PCP.    10. Lumbar facet arthropathy  Chronic, stable. Noted Lumbar XR 1/19/11. . Followed by PCP.    11. Class 1 obesity due to excess calories with serious comorbidity and body mass index (BMI) of 30.0 to 30.9 in adult  Chronic, reinforced diet/exercise per PCP recommendations.. Followed by PCP.      Provided Korey with a 5-10 year written screening schedule and personal prevention plan. Recommendations were developed using the USPSTF age appropriate recommendations. Education, counseling, and referrals were provided as needed. After Visit Summary printed and given to patient which includes a list of additional screenings\tests needed.    Follow-up in about 3 months (around 12/22/2018).with PCP    Miriam Burr NP

## 2018-10-10 ENCOUNTER — TELEPHONE (OUTPATIENT)
Dept: SPORTS MEDICINE | Facility: CLINIC | Age: 78
End: 2018-10-10

## 2018-10-10 NOTE — TELEPHONE ENCOUNTER
The patient was contacted regarding their call to the office earlier and a voice mail was left to call the office back at their convenience.    ----- Message from Juno Chadwick sent at 10/10/2018 11:08 AM CDT -----  Contact: patient  Please call pt at [phone]. Patient would like to speak to staff to ask why does Dr Franco always cancels clinic?  Thank you

## 2018-10-11 ENCOUNTER — OFFICE VISIT (OUTPATIENT)
Dept: INTERNAL MEDICINE | Facility: CLINIC | Age: 78
End: 2018-10-11
Payer: MEDICARE

## 2018-10-11 VITALS
HEIGHT: 71 IN | WEIGHT: 233.69 LBS | OXYGEN SATURATION: 98 % | SYSTOLIC BLOOD PRESSURE: 146 MMHG | HEART RATE: 60 BPM | BODY MASS INDEX: 32.72 KG/M2 | DIASTOLIC BLOOD PRESSURE: 80 MMHG

## 2018-10-11 DIAGNOSIS — M65.331 TRIGGER MIDDLE FINGER OF RIGHT HAND: ICD-10-CM

## 2018-10-11 DIAGNOSIS — M65.4 DE QUERVAIN'S TENOSYNOVITIS, RIGHT: Primary | ICD-10-CM

## 2018-10-11 PROCEDURE — 99213 OFFICE O/P EST LOW 20 MIN: CPT | Mod: S$PBB,,, | Performed by: NURSE PRACTITIONER

## 2018-10-11 PROCEDURE — 3077F SYST BP >= 140 MM HG: CPT | Mod: CPTII,,, | Performed by: NURSE PRACTITIONER

## 2018-10-11 PROCEDURE — 99999 PR PBB SHADOW E&M-EST. PATIENT-LVL V: CPT | Mod: PBBFAC,,, | Performed by: NURSE PRACTITIONER

## 2018-10-11 PROCEDURE — 99215 OFFICE O/P EST HI 40 MIN: CPT | Mod: PBBFAC | Performed by: NURSE PRACTITIONER

## 2018-10-11 PROCEDURE — 1101F PT FALLS ASSESS-DOCD LE1/YR: CPT | Mod: CPTII,,, | Performed by: NURSE PRACTITIONER

## 2018-10-11 PROCEDURE — 3079F DIAST BP 80-89 MM HG: CPT | Mod: CPTII,,, | Performed by: NURSE PRACTITIONER

## 2018-10-11 RX ORDER — NAPROXEN 500 MG/1
500 TABLET ORAL 2 TIMES DAILY WITH MEALS
Qty: 30 TABLET | Refills: 0 | Status: SHIPPED | OUTPATIENT
Start: 2018-10-11 | End: 2019-08-13

## 2018-10-11 NOTE — PROGRESS NOTES
INTERNAL MEDICINE URGENT CARE NOTE    CHIEF COMPLAINT     Chief Complaint   Patient presents with    Wrist Pain     R wrist, burning pain       HPI     Korey Santos is a 78 y.o. male with lumbar stenosis and spondylosis, rhinitis, HTN, HLD, BPH, ED, laryngopharyngeal reflux, insomnia, and OA who presents for an urgent visit today.  He is an established pt of Dr Bustamante     Here with c/o right wrist pain - x 2 weeks   Located: to the radial aspect of the right wrist  Described as burning  Injury or trauma- Denies injury and trauma   Aggravating - shaking hands and ulnar flexion.   alleviating - none   Treatment- ice and heat. Ibuprofen. No relief.     Has h/o left and right sided trigger finger       Past Medical History:  Past Medical History:   Diagnosis Date    Arthritis     Back pain, chronic     BPH with urinary obstruction     Chronic constipation     Colon polyp     DJD (degenerative joint disease)     Hip    Hyperlipidemia     Hypertension     Laryngopharyngeal reflux     Left inguinal hernia     Lumbar herniated disc     Lumbar stenosis     OA (osteoarthritis) of knee     Bilateral    Paradoxical insomnia     Sinus trouble        Home Medications:  Prior to Admission medications    Medication Sig Start Date End Date Taking? Authorizing Provider   fluticasone (FLONASE) 50 mcg/actuation nasal spray 1 spray (50 mcg total) by Each Nare route 2 (two) times daily as needed for Rhinitis. 1/30/18  Yes Bart Lopez MD   linaclotide (LINZESS) 145 mcg Cap capsule Take 1 capsule (145 mcg total) by mouth once daily.  Patient taking differently: Take 145 mcg by mouth daily as needed.  10/5/17  Yes Arik Ramirez PA-C   losartan (COZAAR) 25 MG tablet 25 mg daily as needed.  4/26/18  Yes Historical Provider, MD   multivitamin capsule Take 1 capsule by mouth once daily.   Yes Historical Provider, MD   oxyCODONE-acetaminophen (PERCOCET)  mg per tablet Take 1 tablet by mouth every 6 (six)  hours as needed. 5/16/18  Yes Abhilash Solis PA-C   polyethylene glycol (GLYCOLAX) 17 gram/dose powder Take 17 g by mouth daily as needed. 9/29/17  Yes Juan Bustamante MD   pravastatin (PRAVACHOL) 40 MG tablet Take 1 tablet (40 mg total) by mouth once daily.  Patient taking differently: Take 40 mg by mouth nightly as needed.  8/16/17  Yes Juan Bustamante MD   promethazine (PHENERGAN) 25 MG tablet Take 1 tablet (25 mg total) by mouth every 6 (six) hours as needed for Nausea. 4/23/18  Yes Ricardo Valdes MD   sildenafil (REVATIO) 20 mg Tab Take 5 po 1 hour before sexual activity 9/6/17  Yes Liu Quintero MD   tamsulosin (FLOMAX) 0.4 mg Cp24 Take 1 capsule (0.4 mg total) by mouth once daily.  Patient taking differently: Take 0.4 mg by mouth every morning.  4/25/18  Yes Juan Bustamante MD   trazodone (DESYREL) 100 MG tablet 1 pill PO PRN for insomnia at bedtime. . 10/20/17  Yes Josy Murguia MD   aspirin (ECOTRIN) 325 MG EC tablet Take 1 tablet (325 mg total) by mouth once daily.  Patient taking differently: Take 325 mg by mouth every morning.  4/23/18 9/19/18  Ricardo Valdes MD       Review of Systems:  Review of Systems   Constitutional: Negative for chills and fever.   HENT: Negative for congestion, rhinorrhea, sinus pressure and sore throat.    Eyes: Negative for visual disturbance.   Respiratory: Negative for cough and shortness of breath.    Cardiovascular: Negative for chest pain, palpitations and leg swelling.   Gastrointestinal: Negative for abdominal pain, constipation, diarrhea, nausea and vomiting.   Genitourinary: Negative for dysuria, frequency and urgency.   Musculoskeletal: Positive for arthralgias. Negative for myalgias.   Skin: Negative for rash.   Neurological: Negative for dizziness, light-headedness and headaches.       Health Maintainence:   Immunizations:  Health Maintenance       Date Due Completion Date    Influenza Vaccine 11/07/2018 (Originally 8/1/2018) 12/10/2014  "(Declined)    Override on 12/10/2014: Declined    Colonoscopy 12/03/2018 (Originally 2/14/2016) 2/14/2011    Zoster Vaccine 09/18/2019 (Originally 10/7/2001) ---    TETANUS VACCINE 09/19/2019 (Originally 10/7/1958) ---    Lipid Panel 11/15/2022 11/15/2017           PHYSICAL EXAM     BP (!) 146/80 (BP Location: Left arm, Patient Position: Sitting, BP Method: Large (Manual))   Pulse 60   Ht 5' 11" (1.803 m)   Wt 106 kg (233 lb 11 oz)   SpO2 98%   BMI 32.59 kg/m²     Physical Exam   Constitutional: He is oriented to person, place, and time. He appears well-developed and well-nourished.   HENT:   Head: Normocephalic and atraumatic.   Eyes: Pupils are equal, round, and reactive to light.   Cardiovascular: Normal rate and regular rhythm.   Pulmonary/Chest: Effort normal.   Musculoskeletal:        Right wrist: He exhibits tenderness (over the right radial styloid ). He exhibits normal range of motion, no swelling, no effusion, no crepitus, no deformity and no laceration.        Arms:       Hands:  Neurological: He is alert and oriented to person, place, and time.   Psychiatric: He has a normal mood and affect.   Vitals reviewed.      LABS     Lab Results   Component Value Date    HGBA1C 5.8 (H) 09/16/2017     CMP  Sodium   Date Value Ref Range Status   05/04/2018 140 136 - 145 mmol/L Final     Potassium   Date Value Ref Range Status   05/04/2018 4.1 3.5 - 5.1 mmol/L Final     Chloride   Date Value Ref Range Status   05/04/2018 112 (H) 95 - 110 mmol/L Final     CO2   Date Value Ref Range Status   05/04/2018 22 (L) 23 - 29 mmol/L Final     Glucose   Date Value Ref Range Status   05/04/2018 103 70 - 110 mg/dL Final     BUN, Bld   Date Value Ref Range Status   05/04/2018 15 8 - 23 mg/dL Final     Creatinine   Date Value Ref Range Status   05/04/2018 0.8 0.5 - 1.4 mg/dL Final     Calcium   Date Value Ref Range Status   05/04/2018 8.4 (L) 8.7 - 10.5 mg/dL Final     Total Protein   Date Value Ref Range Status   02/01/2018 " 7.6 6.0 - 8.4 g/dL Final     Albumin   Date Value Ref Range Status   02/01/2018 3.7 3.5 - 5.2 g/dL Final   09/26/2012 4.3 3.6 - 5.1 g/dL Final     Comment:     Albumin:  THIS TEST WAS PERFORMED AT:  [facility]  [id number] [place] [id number]  ECTOR Garcia MD, PhD     Total Bilirubin   Date Value Ref Range Status   02/01/2018 0.4 0.1 - 1.0 mg/dL Final     Comment:     For infants and newborns, interpretation of results should be based  on gestational age, weight and in agreement with clinical  observations.  Premature Infant recommended reference ranges:  Up to 24 hours.............<8.0 mg/dL  Up to 48 hours............<12.0 mg/dL  3-5 days..................<15.0 mg/dL  6-29 days.................<15.0 mg/dL       Alkaline Phosphatase   Date Value Ref Range Status   02/01/2018 56 55 - 135 U/L Final     AST   Date Value Ref Range Status   02/01/2018 24 10 - 40 U/L Final     Comment:     *Result may be interfered by visible hemolysis     ALT   Date Value Ref Range Status   02/01/2018 28 10 - 44 U/L Final     Anion Gap   Date Value Ref Range Status   05/04/2018 6 (L) 8 - 16 mmol/L Final     eGFR if    Date Value Ref Range Status   05/04/2018 >60.0 >60 mL/min/1.73 m^2 Final     eGFR if non    Date Value Ref Range Status   05/04/2018 >60.0 >60 mL/min/1.73 m^2 Final     Comment:     Calculation used to obtain the estimated glomerular filtration  rate (eGFR) is the CKD-EPI equation.        Lab Results   Component Value Date    WBC 8.31 02/01/2018    HGB 14.4 02/01/2018    HCT 44.1 02/01/2018    MCV 88 02/01/2018     02/01/2018     Lab Results   Component Value Date    CHOL 186 11/15/2017    CHOL 235 (H) 07/08/2017    CHOL 170 04/20/2016     Lab Results   Component Value Date    HDL 46 11/15/2017    HDL 57 07/08/2017    HDL 51 04/20/2016     Lab Results   Component Value Date    LDLCALC 117.2 11/15/2017    LDLCALC 169.8 (H) 07/08/2017     LDLCALC 106.0 04/20/2016     Lab Results   Component Value Date    TRIG 114 11/15/2017    TRIG 41 07/08/2017    TRIG 65 04/20/2016     Lab Results   Component Value Date    CHOLHDL 24.7 11/15/2017    CHOLHDL 24.3 07/08/2017    CHOLHDL 30.0 04/20/2016     Lab Results   Component Value Date    TSH 0.642 07/08/2017       ASSESSMENT/PLAN     Korey Santos is a 78 y.o. male with  Past Medical History:   Diagnosis Date    Arthritis     Back pain, chronic     BPH with urinary obstruction     Chronic constipation     Colon polyp     DJD (degenerative joint disease)     Hip    Hyperlipidemia     Hypertension     Laryngopharyngeal reflux     Left inguinal hernia     Lumbar herniated disc     Lumbar stenosis     OA (osteoarthritis) of knee     Bilateral    Paradoxical insomnia     Sinus trouble      De Quervain's tenosynovitis, right- Advised to use forearm based thumb spica splint x 4 weeks. Apply ice as needed. May use naproxen as needed. Referred to hand ortho for possible injection   -     naproxen (NAPROSYN) 500 MG tablet; Take 1 tablet (500 mg total) by mouth 2 (two) times daily with meals.  Dispense: 30 tablet; Refill: 0  -     Ambulatory Referral to Orthopedics    Trigger middle finger of right hand- referred to hand ortho   -     Ambulatory Referral to Orthopedics    Follow up as needed and with PCP     Patient education provided from [facility]. Patient was counseled on when and how to seek emergent care.       Rolanda PETERSON, APRN, FNP-c   Department of Internal Medicine - [facility]  9:28 AM

## 2018-10-11 NOTE — PATIENT INSTRUCTIONS
Splint: Forearm based thumb spica splint     Ice    Naproxen as needed.       De Quervain Tenosynovitis    De Quervain tenosynovitis is inflammation of tendons and synovium on the thumb side of the wrist. Tendons are fibers that attach muscle to bone. Synovium is a slick membrane that helps tendons move. Movements done over and over can irritate and inflame these tissues. This can cause pain when you touch or grasp objects, turn or twist your wrist, or make a fist. You may also have pain and swelling near the base of the thumb or numbness along the back of your thumb and index finger. You may also feel the thumb catch or snap when you move it.  Treatment will depend on how bad the pain is. It can often be treated with medicines, injections, splinting, and home care. If your case is severe, you may be referred to a specialist to talk about surgery.  Home care  Your healthcare provider may prescribe medicines to relieve pain and reduce inflammation. A steroid medicine may be injected near the tendons. This reduces swelling. The healthcare provider may also suggest taking over-the-counter medicines like ibuprofen or naproxen. These help reduce inflammation. Take all medicines only as directed.  The following are general care guidelines:  · Avoid repetitive movements of your wrist and thumb.  · Note any activity that causes pain or swelling. If possible, avoid or limit that activity.  · Put a cold pack on your thumb. You can make your own cold pack by wrapping a plastic bag of ice or bag of frozen vegetables in a thin towel. Hold this to your thumb for up to 20 minutes at a time. Don't put ice directly on the skin.  · Your healthcare provider may put a splint on the thumb to hold it still. Use the splint as you have been instructed. In some cases, you may need to use a splint 24 hours a day for 4 to 6 weeks. This will allow the wrist and thumb to heal.  Follow-up care  Follow up with your healthcare provider, or as  advised. You may need more treatment if your injury is severe or if your symptoms don't get better. This additional treatment may include local injections, physical therapy, and surgery.  When to seek medical advice  Call your healthcare provider right away if any of these occur:  · Increase in pain or swelling  · If you have fever, chills, redness, warmth, or drainage  · Symptoms get worse after taking medicine  · Pain spreads farther down the thumb or into the forearm  · Pain continues to get in the way of daily life  Date Last Reviewed: 1/18/2016  © 9784-7081 [employer]. [address] [place] [address]. All rights reserved. This information is not intended as a substitute for professional medical care. Always follow your healthcare professional's instructions.

## 2018-10-15 ENCOUNTER — TELEPHONE (OUTPATIENT)
Dept: ORTHOPEDICS | Facility: CLINIC | Age: 78
End: 2018-10-15

## 2018-10-15 NOTE — TELEPHONE ENCOUNTER
----- Message from Richard Craft sent at 10/15/2018 10:27 AM CDT -----            Name of Who is Calling: KYRA PEREZ [[id number]]      What is the request in detail: Pt would like to see Dr. Cheng at the [place] location. Please call to discuss.      Can the clinic reply by [facility]: no      [facility] Back [facility] [phone]

## 2018-10-24 ENCOUNTER — HOSPITAL ENCOUNTER (OUTPATIENT)
Dept: RADIOLOGY | Facility: HOSPITAL | Age: 78
Discharge: HOME | End: 2018-10-24
Attending: ORTHOPAEDIC SURGERY
Payer: MEDICARE

## 2018-10-24 ENCOUNTER — OFFICE VISIT (OUTPATIENT)
Dept: SPORTS MEDICINE | Facility: CLINIC | Age: 78
End: 2018-10-24
Payer: MEDICARE

## 2018-10-24 VITALS
HEART RATE: 64 BPM | HEIGHT: 71 IN | WEIGHT: 233 LBS | BODY MASS INDEX: 32.62 KG/M2 | DIASTOLIC BLOOD PRESSURE: 86 MMHG | SYSTOLIC BLOOD PRESSURE: 169 MMHG

## 2018-10-24 DIAGNOSIS — M25.562 LEFT KNEE PAIN, UNSPECIFIED CHRONICITY: ICD-10-CM

## 2018-10-24 DIAGNOSIS — Z96.652 STATUS POST TOTAL LEFT KNEE REPLACEMENT: ICD-10-CM

## 2018-10-24 DIAGNOSIS — M17.12 PRIMARY OSTEOARTHRITIS OF LEFT KNEE: Primary | ICD-10-CM

## 2018-10-24 PROCEDURE — 3077F SYST BP >= 140 MM HG: CPT | Mod: CPTII,,, | Performed by: PHYSICIAN ASSISTANT

## 2018-10-24 PROCEDURE — 73564 X-RAY EXAM KNEE 4 OR MORE: CPT | Mod: 26,50,, | Performed by: RADIOLOGY

## 2018-10-24 PROCEDURE — 73564 X-RAY EXAM KNEE 4 OR MORE: CPT | Mod: TC,50,FY,PO

## 2018-10-24 PROCEDURE — 1101F PT FALLS ASSESS-DOCD LE1/YR: CPT | Mod: CPTII,,, | Performed by: PHYSICIAN ASSISTANT

## 2018-10-24 PROCEDURE — 99214 OFFICE O/P EST MOD 30 MIN: CPT | Mod: S$PBB,,, | Performed by: PHYSICIAN ASSISTANT

## 2018-10-24 PROCEDURE — 99999 PR PBB SHADOW E&M-EST. PATIENT-LVL IV: CPT | Mod: PBBFAC,,, | Performed by: PHYSICIAN ASSISTANT

## 2018-10-24 PROCEDURE — 3079F DIAST BP 80-89 MM HG: CPT | Mod: CPTII,,, | Performed by: PHYSICIAN ASSISTANT

## 2018-10-24 PROCEDURE — 99214 OFFICE O/P EST MOD 30 MIN: CPT | Mod: PBBFAC,25,PO | Performed by: PHYSICIAN ASSISTANT

## 2018-10-24 PROCEDURE — 97110 THERAPEUTIC EXERCISES: CPT | Mod: GP,,, | Performed by: PHYSICIAN ASSISTANT

## 2018-10-24 RX ORDER — MELOXICAM 15 MG/1
15 TABLET ORAL DAILY
Qty: 30 TABLET | Refills: 2 | Status: SHIPPED | OUTPATIENT
Start: 2018-10-24 | End: 2019-02-13 | Stop reason: SDUPTHER

## 2018-10-24 NOTE — PROGRESS NOTES
Subjective:          Chief Complaint: Korey Santos is a 78 y.o. male who had concerns including Pain of the Left Knee.    Pt presents for 6 month evaluation s/p L TKA.  Pt complains of clicking while walking. He has been performing open chain exercises at the gym. No longer doing PT. Mild pain with squatting. Denies fever, chills, discharge from wound site, and N/V.      DATE OF PROCEDURE:  5/4/2018     ATTENDING SURGEON: Surgeon(s) and Role:     * Renita Franco MD - Primary  Co-surgeon:  Ricardo Valdes MD        Co-Surgeon Duties: Due to the complexity of the case and the need for significant intra-operative decision making co-surgeon duties were medically necessary. The chronicity of the disease process and the level of deformity dictated that co-surgeon duties be undertaken. A separate note will be dictated/reported by Dr. Renita Franco stating the specific co-surgeon activities and roles performed during the surgery.        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 Right knee medial meniscus tear.      POSTOPERATIVE DIAGNOSES:   1. Right knee medial meniscus tear.      PROCEDURES:   1. Right knee arthroscopic partial medial meniscectomy        Pain   Associated symptoms include joint swelling. Pertinent negatives include no abdominal pain, chest pain, chills, congestion, coughing, fever, myalgias, nausea, neck pain, numbness, rash, sore throat or vomiting.       Review of Systems   Constitution: Negative for chills and fever.   HENT: Negative for congestion and sore throat.    Eyes: Negative for discharge and double vision.    Cardiovascular: Negative for chest pain, palpitations and syncope.   Respiratory: Negative for cough and shortness of breath.    Endocrine: Negative for cold intolerance and heat intolerance.   Skin: Negative for dry skin and rash.   Musculoskeletal: Positive for arthritis, back pain, joint pain, joint swelling and muscle cramps. Negative for falls, gout, muscle weakness, myalgias, neck pain and stiffness.   Gastrointestinal: Negative for abdominal pain, nausea and vomiting.   Neurological: Negative for focal weakness, numbness and paresthesias.       Pain Related Questions  Over the past 3 days, what was your average pain during activity? (I.e. running, jogging, walking, climbing stairs, getting dressed, ect.): 7  Over the past 3 days, what was your highest pain level?: 6  Over the past 3 days, what was your lowest pain level? : 5    Other  How many nights a week are you awakened by your affected body part?: 7  Was the patient's HEIGHT measured or patient reported?: Patient Reported  Was the patient's WEIGHT measured or patient reported?: Measured      Objective:        General: Korey is well-developed, well-nourished, appears stated age, in no acute distress, alert and oriented to time, place and person.     General    Vitals reviewed.  Constitutional: He is oriented to person, place, and time. He appears well-developed and well-nourished. No distress.   HENT:   Mouth/Throat: No oropharyngeal exudate.   Eyes: Conjunctivae and EOM are normal. Pupils are equal, round, and reactive to light. Right eye exhibits no discharge. Left eye exhibits no discharge.   Neck: Normal range of motion. Neck supple. No JVD present.   Cardiovascular: Normal heart sounds and intact distal pulses.    No murmur heard.  Pulmonary/Chest: Effort normal and breath sounds normal. No respiratory distress.   Abdominal: Soft. Bowel sounds are normal. He exhibits no distension. There is no tenderness.   Neurological: He is alert and oriented  to person, place, and time. He has normal reflexes. No cranial nerve deficit. Coordination normal.   Psychiatric: He has a normal mood and affect. His behavior is normal. Judgment and thought content normal.     General Musculoskeletal Exam   Gait: normal and antalgic       Right Knee Exam     Inspection   Erythema: absent  Scars: present  Swelling: absent  Effusion: absent  Deformity: absent  Bruising: absent    Tenderness   The patient is tender to palpation of the no tenderness and medial joint line.    Crepitus   The patient has crepitus of the patella.    Range of Motion   Extension: 10   Flexion: 130     Tests   Meniscus   Tyrone:  Medial - negative Lateral - negative  Ligament Examination Lachman: normal (-1 to 2mm) PCL-Posterior Drawer: normal (0 to 2mm)     MCL - Valgus: normal (0 to 2mm)  LCL - Varus: normalPivot Shift: normal (Equal)Reverse Pivot Shift: normal (Equal)Dial Test at 30 degrees: normal (< 5 degrees)Dial Test at 90 degrees: normal (< 5 degrees)  Posterior Sag Test: negative  Posterolateral Corner: unstable (>15 degrees difference)  Patella   Patellar apprehension: negative  Passive Patellar Tilt: neutral  Patellar Tracking: normal  Patellar Glide (quadrants): Lateral - 1   Medial - 2  Q-Angle at 90 degrees: normal  Patellar Grind: positive  J-Sign: none    Other   Meniscal Cyst: absent  Popliteal (Baker's) Cyst: absent  Sensation: normal    Comments:  Genu varum    Left Knee Exam     Inspection   Erythema: absent  Scars: present  Swelling: present  Effusion: absent  Deformity: absent  Bruising: absent    Tenderness   The patient is experiencing no tenderness.     Crepitus   The patient has crepitus of the patella.    Range of Motion   Extension: 10   Flexion:  130 abnormal     Tests   Meniscus   Tyrone:  Medial - negative Lateral - negative  Stability Lachman: normal (-1 to 2mm) PCL-Posterior Drawer: normal (0 to 2mm)  MCL - Valgus: normal (0 to 2mm)  LCL - Varus: normal (0 to 2mm)Pivot  Shift: normal (Equal)Reverse Pivot Shift: normal (Equal)Dial Test at 30 degrees: normal (< 5 degrees)Dial Test at 90 degrees: normal (< 5 degrees)  Posterior Sag Test: negative  Posterolateral Corner: unstable (>15 degrees difference)  Patella   Patellar apprehension: negative  Passive Patellar Tilt: neutral  Patellar Tracking: normal  Patellar Glide (Quadrants): Lateral - 1 Medial - 2  Q-Angle at 90 degrees: normal  Patellar Grind: negative  J-Sign: J sign absent    Other   Meniscal Cyst: absent  Popliteal (Baker's) Cyst: absent  Sensation: normal    Comments:  Incision well healed.  Mild swelling  Compartments soft  Neurovascular status intact in extremity      Right Hip Exam     Tests   Rishi: negative  Left Hip Exam     Tests   Rishi: negative          Muscle Strength   Right Lower Extremity   Hip Abduction: 5/5   Quadriceps:  5/5   Hamstrin/5   Left Lower Extremity   Hip Abduction: 5/5   Quadriceps:  4/5   Hamstrin/5     Reflexes     Left Side  Quadriceps:  2+  Achilles:  2+    Right Side   Quadriceps:  2+  Achilles:  2+    Vascular Exam     Right Pulses  Dorsalis Pedis:      2+  Posterior Tibial:      2+        Left Pulses  Dorsalis Pedis:      2+  Posterior Tibial:      2+        Edema  Right Lower Leg: absent  Left Lower Leg: absent      Radiographic Findings 10/24/2018:    XR KNEE ORTHO BILAT WITH FLEXION    CLINICAL HISTORY:  Pain in left knee    TECHNIQUE:  AP standing of both knees, PA flexion standing views of both knees, and Merchant views of both knees were performed.  Lateral views of both knees were also performed.    COMPARISON:  No 2018 ne    FINDINGS:  Left knee arthroplasty identified.  The position alignment is satisfactory and unchanged as compared to the previous study.  DJD involving the right knee.  The patellofemoral and the medial tibiofemoral joint spaces are narrowed.  No fracture or bone destruction identified    Xrays of the knees were ordered and reviewed by me today.  These findings were discussed and reviewed with the patient.            Assessment:       Encounter Diagnoses   Name Primary?    Primary osteoarthritis of left knee Yes    Status post total left knee replacement     Left knee pain, unspecified chronicity           Plan:       1. IKDC, SF-12 and KOOS was filled out today in clinic.     RTC in 3 months with Dr. Renita Franco Patient will fill out IKDC, SF-12 and KOOS and bilateral knee series on return.    2.  We reviewed with Korey today, the pathology and natural history of his diagnosis. We have discussed a variety of treatment options including medications, physical therapy and other alternative treatments. I also explained the indications, risks and benefits of surgery. After discussion, he decided to proceed with surgery. The decision was made to go forward with     1. Right Total knee arthroplasty  ConforMIS iTotal     The details of the surgical procedure were explained, including the location of probable incisions and a description of likely hardware and/or grafts to be used.  The patient understands the likely convalescence after surgery.  Also, we have thoroughly discussed the risks, benefits and alternatives to surgery, including, but not limited to, the risk of infection, joint stiffness, blood clot (including DVT and/or pulmonary embolus), neurologic and vascular injury.  It was explained that, if tissue has been repaired or reconstructed, there is a chance of failure, which may require further management.    3. HEP 45831 - Yeyo Solis PA-C, instructed and demonstrated a patellofemoral HEP. The patient then demonstrated understanding of exercises and proper technique. This program was performed for 15 minutes.     4. Mobic 15 mg 1 time daily PRN for pain management. Patient understands to take with food and/or OTC prilosec to decrease GI side effects.    All of the patient's questions were answered and the patient will contact us if they have any  questions or concerns in the interim.            Patient questionnaires may have been collected.

## 2018-11-15 ENCOUNTER — OFFICE VISIT (OUTPATIENT)
Dept: ORTHOPEDICS | Facility: CLINIC | Age: 78
End: 2018-11-15
Attending: ORTHOPAEDIC SURGERY
Payer: MEDICARE

## 2018-11-15 VITALS — BODY MASS INDEX: 32.62 KG/M2 | HEIGHT: 71 IN | WEIGHT: 233 LBS

## 2018-11-15 DIAGNOSIS — M65.331 TRIGGER MIDDLE FINGER OF RIGHT HAND: ICD-10-CM

## 2018-11-15 PROCEDURE — 99999 PR PBB SHADOW E&M-EST. PATIENT-LVL III: CPT | Mod: PBBFAC,HCNC,, | Performed by: ORTHOPAEDIC SURGERY

## 2018-11-15 PROCEDURE — 99213 OFFICE O/P EST LOW 20 MIN: CPT | Mod: 25,HCNC,S$GLB, | Performed by: ORTHOPAEDIC SURGERY

## 2018-11-15 PROCEDURE — 1101F PT FALLS ASSESS-DOCD LE1/YR: CPT | Mod: CPTII,HCNC,S$GLB, | Performed by: ORTHOPAEDIC SURGERY

## 2018-11-15 PROCEDURE — 20550 NJX 1 TENDON SHEATH/LIGAMENT: CPT | Mod: 51,F7,HCNC,S$GLB | Performed by: ORTHOPAEDIC SURGERY

## 2018-11-15 RX ORDER — TRIAMCINOLONE ACETONIDE 40 MG/ML
40 INJECTION, SUSPENSION INTRA-ARTICULAR; INTRAMUSCULAR
Status: COMPLETED | OUTPATIENT
Start: 2018-11-15 | End: 2018-11-15

## 2018-11-15 RX ADMIN — TRIAMCINOLONE ACETONIDE 40 MG: 40 INJECTION, SUSPENSION INTRA-ARTICULAR; INTRAMUSCULAR at 10:11

## 2018-11-15 NOTE — LETTER
November 15, 2018      Rolanda Anderson, NP  [address]  [facility] [id number]           [facility]  [address]  [place] [address]  Phone: [phone]          Patient: Korey Santos   MR Number: [id number]   YOB: 1940   Date of Visit: 11/15/2018       Dear Rolanda Anderson:    Thank you for referring Korey Santos to me for evaluation. Attached you will find relevant portions of my assessment and plan of care.    If you have questions, please do not hesitate to call me. I look forward to following Korey Santos along with you.    Sincerely,    Luis Cheng Jr., MD    Enclosure  CC:  No Recipients    If you would like to receive this communication electronically, please contact [email or web address] or [phone] to request more information on [employer] access.    For providers and/or their staff who would like to refer a patient to [facility], please contact us through our one-stop-shop provider referral line, [facility], at [phone].    If you feel you have received this communication in error or would no longer like to receive these types of communications, please e-mail [email or web address]

## 2018-11-15 NOTE — PROGRESS NOTES
HISTORY OF PRESENT ILLNESS:  Mr. Santos seen previously for triggering of the   left hand, had successful surgery in the left hand, but now having triggering   of the right middle finger for the past month or so.  Symptoms are worse with   gripping.  No numbness or tingling, but he also has pain in the right wrist,   again pain with use.    PHYSICAL EXAMINATION:  RIGHT HAND:  There is tenderness over the flexor tendon sheath to the right   middle finger with mild triggering noted.  Also a positive Finkelstein test over   the right wrist and tenderness over the first dorsal compartment.    No x-rays.    IMPRESSION:  1.  Triggering of the right middle finger.  2.  De Quervain tendinitis, right wrist.    PLAN:  I explained the nature of problem to the patient, discussed options for   treatment including injection versus surgery.  He would like to try injections   both today.  After pause for timeout, he identified the right middle finger and   right wrist, each area injected with combination of Kenalog 20 mg, 0.5 mL   Xylocaine, sterile technique, tolerated the procedure well without complication.    He does have a wrist and thumb wrap.  I recommended that he continue with that   and followup in 1-2 months.      RADHIKA  dd: 11/15/2018 10:32:49 (CST)  td: 11/16/2018 00:53:24 (CST)  Doc ID   #[id number]  Job ID #[id number]    CC:

## 2019-02-11 NOTE — PROGRESS NOTES
Subjective:          Chief Complaint: Korey Santos is a 78 y.o. male who had concerns including Pain of the Left Knee.    Pt presents for 9 month evaluation s/p L TKA.  Pt complains of clicking while walking. He has been performing open chain exercises at the gym. No longer doing PT. Mild pain with squatting. Denies fever, chills, discharge from wound site, and N/V.      DATE OF PROCEDURE:  5/4/2018     ATTENDING SURGEON: Surgeon(s) and Role:     * Renita Franco MD - Primary  Co-surgeon:  Ricardo Valdes MD        Co-Surgeon Duties: Due to the complexity of the case and the need for significant intra-operative decision making co-surgeon duties were medically necessary. The chronicity of the disease process and the level of deformity dictated that co-surgeon duties be undertaken. A separate note will be dictated/reported by Dr. Renita Franco stating the specific co-surgeon activities and roles performed during the surgery.        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 Right knee medial meniscus tear.      POSTOPERATIVE DIAGNOSES:   1. Right knee medial meniscus tear.      PROCEDURES:   1. Right knee arthroscopic partial medial meniscectomy        Pain   Associated symptoms include joint swelling. Pertinent negatives include no abdominal pain, chest pain, chills, congestion, coughing, fever, myalgias, nausea, neck pain, numbness, rash, sore throat or vomiting.       Review of Systems   Constitution: Negative for chills and fever.   HENT: Negative for congestion and sore throat.    Eyes: Negative for discharge and double vision.    Cardiovascular: Negative for chest pain, palpitations and syncope.   Respiratory: Negative for cough and shortness of breath.    Endocrine: Negative for cold intolerance and heat intolerance.   Skin: Negative for dry skin and rash.   Musculoskeletal: Positive for arthritis, back pain, joint pain, joint swelling and muscle cramps. Negative for falls, gout, muscle weakness, myalgias, neck pain and stiffness.   Gastrointestinal: Negative for abdominal pain, nausea and vomiting.   Neurological: Negative for focal weakness, numbness and paresthesias.                   Objective:        General: Korey is well-developed, well-nourished, appears stated age, in no acute distress, alert and oriented to time, place and person.     General    Vitals reviewed.  Constitutional: He is oriented to person, place, and time. He appears well-developed and well-nourished. No distress.   HENT:   Mouth/Throat: No oropharyngeal exudate.   Eyes: Conjunctivae and EOM are normal. Pupils are equal, round, and reactive to light. Right eye exhibits no discharge. Left eye exhibits no discharge.   Neck: Normal range of motion. Neck supple. No JVD present.   Cardiovascular: Normal heart sounds and intact distal pulses.    No murmur heard.  Pulmonary/Chest: Effort normal and breath sounds normal. No respiratory distress.   Abdominal: Soft. Bowel sounds are normal. He exhibits no distension. There is no tenderness.   Neurological: He is alert and oriented to person, place, and time. He has normal reflexes. No cranial nerve deficit. Coordination normal.   Psychiatric: He has a normal mood and affect. His behavior is normal. Judgment and thought content normal.     General Musculoskeletal Exam   Gait: normal and antalgic       Right Knee Exam     Inspection   Erythema: absent  Scars: present  Swelling: absent  Effusion: absent  Deformity: absent  Bruising: absent    Tenderness   The patient is tender to palpation of the no tenderness and medial  joint line.    Crepitus   The patient has crepitus of the patella.    Range of Motion   Extension: 10   Flexion: 130     Tests   Meniscus   Tyrone:  Medial - negative Lateral - negative  Ligament Examination Lachman: normal (-1 to 2mm) PCL-Posterior Drawer: normal (0 to 2mm)     MCL - Valgus: normal (0 to 2mm)  LCL - Varus: normalPivot Shift: normal (Equal)Reverse Pivot Shift: normal (Equal)Dial Test at 30 degrees: normal (< 5 degrees)Dial Test at 90 degrees: normal (< 5 degrees)  Posterior Sag Test: negative  Posterolateral Corner: unstable (>15 degrees difference)  Patella   Patellar apprehension: negative  Passive Patellar Tilt: neutral  Patellar Tracking: normal  Patellar Glide (quadrants): Lateral - 1   Medial - 2  Q-Angle at 90 degrees: normal  Patellar Grind: positive  J-Sign: none    Other   Meniscal Cyst: absent  Popliteal (Baker's) Cyst: absent  Sensation: normal    Comments:  Genu varum    Left Knee Exam     Inspection   Erythema: absent  Scars: present  Swelling: absent  Effusion: absent  Deformity: absent  Bruising: absent    Tenderness   The patient is experiencing no tenderness.     Crepitus   The patient has crepitus of the patella.    Range of Motion   Extension: 10   Flexion:  130 abnormal     Tests   Meniscus   Tyrone:  Medial - negative Lateral - negative  Stability Lachman: normal (-1 to 2mm) PCL-Posterior Drawer: normal (0 to 2mm)  MCL - Valgus: normal (0 to 2mm)  LCL - Varus: normal (0 to 2mm)Pivot Shift: normal (Equal)Reverse Pivot Shift: normal (Equal)Dial Test at 30 degrees: normal (< 5 degrees)Dial Test at 90 degrees: normal (< 5 degrees)  Posterior Sag Test: negative  Posterolateral Corner: unstable (>15 degrees difference)  Patella   Patellar apprehension: negative  Passive Patellar Tilt: neutral  Patellar Tracking: normal  Patellar Glide (Quadrants): Lateral - 1 Medial - 2  Q-Angle at 90 degrees: normal  Patellar Grind: negative  J-Sign: J sign absent    Other   Meniscal Cyst:  absent  Popliteal (Baker's) Cyst: absent  Sensation: normal    Comments:  Incision well healed.        Right Hip Exam     Tests   Rishi: negative  Left Hip Exam     Tests   Rishi: negative          Muscle Strength   Right Lower Extremity   Hip Abduction: 5/5   Quadriceps:  5/5   Hamstrin/5   Left Lower Extremity   Hip Abduction: 5/5   Quadriceps:  4/5   Hamstrin/5     Reflexes     Left Side  Quadriceps:  2+  Achilles:  2+    Right Side   Quadriceps:  2+  Achilles:  2+    Vascular Exam     Right Pulses  Dorsalis Pedis:      2+  Posterior Tibial:      2+        Left Pulses  Dorsalis Pedis:      2+  Posterior Tibial:      2+        Edema  Right Lower Leg: absent  Left Lower Leg: absent      Radiographic Findings 2019:    10/24/2018 EXAMINATION:  XR KNEE ORTHO BILAT WITH FLEXION    CLINICAL HISTORY:  Pain in left knee    TECHNIQUE:  AP standing of both knees, PA flexion standing views of both knees, and Merchant views of both knees were performed.  Lateral views of both knees were also performed.    COMPARISON:  None 10/24/2018    FINDINGS:  Left knee arthroplasty identified.  The position alignment is satisfactory and unchanged as compared to the previous study.    DJD with significant narrowing of the patellofemoral and medial tibiofemoral joint space identified on the right side.  No fracture or dislocation.  No bone destruction identified      Impression       See above      Electronically signed by: Compa Copeland MD  Date: 2019  Time: 11:13             Assessment:       Encounter Diagnoses   Name Primary?    Left knee pain, unspecified chronicity Yes    Primary osteoarthritis of left knee           Plan:       1. IKDC, SF-12 and KOOS was filled out today in clinic.     RTC in 6 months with Dr. Renita Franco Patient will fill out IKDC, SF-12 and KOOS and bilateral knee series on return.    2.  We reviewed with Korey today, the pathology and natural history of his diagnosis. We have discussed a  variety of treatment options including medications, physical therapy and other alternative treatments. I also explained the indications, risks and benefits of surgery. After discussion, he decided to proceed with surgery. The decision was made to go forward with     1. Right Total knee arthroplasty  ConforMIS iTotal     The details of the surgical procedure were explained, including the location of probable incisions and a description of likely hardware and/or grafts to be used.  The patient understands the likely convalescence after surgery.  Also, we have thoroughly discussed the risks, benefits and alternatives to surgery, including, but not limited to, the risk of infection, joint stiffness, blood clot (including DVT and/or pulmonary embolus), neurologic and vascular injury.  It was explained that, if tissue has been repaired or reconstructed, there is a chance of failure, which may require further management.    3.  Mobic 15 mg 1 time daily PRN for pain management. Patient understands to take with food and/or OTC prilosec to decrease GI side effects.    4. HEP 17529 - Renita Franco MD and SMA Kath, instructed and demonstrated a CORE HEP. The patient then demonstrated understanding of exercises and proper technique. This program was performed for 15 minutes.   Avoid deep squats and leg press.      All of the patient's questions were answered and the patient will contact us if they have any questions or concerns in the interim.            Patient questionnaires may have been collected.

## 2019-02-13 ENCOUNTER — HOSPITAL ENCOUNTER (OUTPATIENT)
Dept: RADIOLOGY | Facility: HOSPITAL | Age: 79
Discharge: HOME | End: 2019-02-13
Attending: ORTHOPAEDIC SURGERY
Payer: MEDICARE

## 2019-02-13 ENCOUNTER — OFFICE VISIT (OUTPATIENT)
Dept: SPORTS MEDICINE | Facility: CLINIC | Age: 79
End: 2019-02-13
Payer: MEDICARE

## 2019-02-13 VITALS
HEIGHT: 71 IN | BODY MASS INDEX: 32.62 KG/M2 | HEART RATE: 61 BPM | SYSTOLIC BLOOD PRESSURE: 134 MMHG | DIASTOLIC BLOOD PRESSURE: 78 MMHG | WEIGHT: 233 LBS

## 2019-02-13 DIAGNOSIS — M17.12 PRIMARY OSTEOARTHRITIS OF LEFT KNEE: ICD-10-CM

## 2019-02-13 DIAGNOSIS — M25.562 LEFT KNEE PAIN, UNSPECIFIED CHRONICITY: ICD-10-CM

## 2019-02-13 DIAGNOSIS — K59.04 CHRONIC IDIOPATHIC CONSTIPATION: ICD-10-CM

## 2019-02-13 DIAGNOSIS — M25.562 LEFT KNEE PAIN, UNSPECIFIED CHRONICITY: Primary | ICD-10-CM

## 2019-02-13 PROCEDURE — 99999 PR PBB SHADOW E&M-EST. PATIENT-LVL III: ICD-10-PCS | Mod: PBBFAC,HCNC,, | Performed by: ORTHOPAEDIC SURGERY

## 2019-02-13 PROCEDURE — 3078F PR MOST RECENT DIASTOLIC BLOOD PRESSURE < 80 MM HG: ICD-10-PCS | Mod: HCNC,CPTII,S$GLB, | Performed by: ORTHOPAEDIC SURGERY

## 2019-02-13 PROCEDURE — 73564 X-RAY EXAM KNEE 4 OR MORE: CPT | Mod: TC,50,HCNC,FY,PO

## 2019-02-13 PROCEDURE — 3075F PR MOST RECENT SYSTOLIC BLOOD PRESS GE 130-139MM HG: ICD-10-PCS | Mod: HCNC,CPTII,S$GLB, | Performed by: ORTHOPAEDIC SURGERY

## 2019-02-13 PROCEDURE — 3075F SYST BP GE 130 - 139MM HG: CPT | Mod: HCNC,CPTII,S$GLB, | Performed by: ORTHOPAEDIC SURGERY

## 2019-02-13 PROCEDURE — 97110 THERAPEUTIC EXERCISES: CPT | Mod: GP,HCNC,S$GLB, | Performed by: ORTHOPAEDIC SURGERY

## 2019-02-13 PROCEDURE — 99214 OFFICE O/P EST MOD 30 MIN: CPT | Mod: 25,HCNC,S$GLB, | Performed by: ORTHOPAEDIC SURGERY

## 2019-02-13 PROCEDURE — 1101F PT FALLS ASSESS-DOCD LE1/YR: CPT | Mod: HCNC,CPTII,S$GLB, | Performed by: ORTHOPAEDIC SURGERY

## 2019-02-13 PROCEDURE — 1101F PR PT FALLS ASSESS DOC 0-1 FALLS W/OUT INJ PAST YR: ICD-10-PCS | Mod: HCNC,CPTII,S$GLB, | Performed by: ORTHOPAEDIC SURGERY

## 2019-02-13 PROCEDURE — 99999 PR PBB SHADOW E&M-EST. PATIENT-LVL III: CPT | Mod: PBBFAC,HCNC,, | Performed by: ORTHOPAEDIC SURGERY

## 2019-02-13 PROCEDURE — 97110 PR THERAPEUTIC EXERCISES: ICD-10-PCS | Mod: GP,HCNC,S$GLB, | Performed by: ORTHOPAEDIC SURGERY

## 2019-02-13 PROCEDURE — 99214 PR OFFICE/OUTPT VISIT, EST, LEVL IV, 30-39 MIN: ICD-10-PCS | Mod: 25,HCNC,S$GLB, | Performed by: ORTHOPAEDIC SURGERY

## 2019-02-13 PROCEDURE — 73564 XR KNEE ORTHO BILAT WITH FLEXION: ICD-10-PCS | Mod: 26,50,HCNC, | Performed by: RADIOLOGY

## 2019-02-13 PROCEDURE — 3078F DIAST BP <80 MM HG: CPT | Mod: HCNC,CPTII,S$GLB, | Performed by: ORTHOPAEDIC SURGERY

## 2019-02-13 PROCEDURE — 73564 X-RAY EXAM KNEE 4 OR MORE: CPT | Mod: 26,50,HCNC, | Performed by: RADIOLOGY

## 2019-02-13 RX ORDER — MELOXICAM 15 MG/1
15 TABLET ORAL DAILY
Qty: 30 TABLET | Refills: 2 | Status: SHIPPED | OUTPATIENT
Start: 2019-02-13 | End: 2019-08-13

## 2019-02-13 NOTE — TELEPHONE ENCOUNTER
----- Message from Meera Wharton MA sent at 2/13/2019  3:11 PM CST -----  Contact: Self/ [phone]      ----- Message -----  From: Nicole Cabral  Sent: 2/13/2019   1:47 PM  To: Salvador GAY Staff    Patient was calling back to give the doctor his information for his medication. The medication is called  Linzess 145 mcg capsules.

## 2019-03-04 RX ORDER — TAMSULOSIN HYDROCHLORIDE 0.4 MG/1
CAPSULE ORAL
Qty: 90 CAPSULE | Refills: 1 | Status: SHIPPED | OUTPATIENT
Start: 2019-03-04 | End: 2019-10-22 | Stop reason: SDUPTHER

## 2019-04-10 ENCOUNTER — PES CALL (OUTPATIENT)
Dept: ADMINISTRATIVE | Facility: CLINIC | Age: 79
End: 2019-04-10

## 2019-06-18 ENCOUNTER — TELEPHONE (OUTPATIENT)
Dept: SPORTS MEDICINE | Facility: CLINIC | Age: 79
End: 2019-06-18

## 2019-06-18 NOTE — TELEPHONE ENCOUNTER
Patient understands we can not inject the L. knee due to TKA. Discussed future surgery of the right knee per recent visit with Dr. Franco. Patient denies wanting surgery within 90 days and would like to have a CSI of his R. knee tomorrow with f/u for the Euflexxa Series.    Chavez Serna   Sports Medicine Assistant   [facility]         ----- Message from Alejandra Manuel sent at 6/18/2019  9:20 AM CDT -----  Contact: Self  Patient Returning Call from [facility]    Who Left Message for Patient: Chavez    Communication Preference: [phone]     Additional Information: Euflexxa injection

## 2019-06-18 NOTE — TELEPHONE ENCOUNTER
Patient is s/p Left knee TKA, LVM to call the clinic back.    Chavez Serna   Sports Medicine Assistant   [facility]       ----- Message from Chavez Serna MA sent at 6/14/2019 12:09 PM CDT -----  Contact: patient       ----- Message -----  From: Lonny Calderon  Sent: 6/14/2019  10:33 AM  To: Nicol DUQUE Staff    Please call pt at [phone]    Patient is requesting to have future Euflexxa injections for the knee pain    Patient requested to send message to Dr Camarena instead of Dr Franco    Thank you

## 2019-06-19 ENCOUNTER — OFFICE VISIT (OUTPATIENT)
Dept: SPORTS MEDICINE | Facility: CLINIC | Age: 79
End: 2019-06-19
Payer: MEDICARE

## 2019-06-19 VITALS — HEIGHT: 71 IN | TEMPERATURE: 98 F | BODY MASS INDEX: 32.62 KG/M2 | WEIGHT: 233 LBS

## 2019-06-19 DIAGNOSIS — M25.561 CHRONIC PAIN OF RIGHT KNEE: ICD-10-CM

## 2019-06-19 DIAGNOSIS — G89.29 CHRONIC PAIN OF RIGHT KNEE: ICD-10-CM

## 2019-06-19 DIAGNOSIS — M25.561 BILATERAL CHRONIC KNEE PAIN: ICD-10-CM

## 2019-06-19 DIAGNOSIS — Z96.652 STATUS POST TOTAL KNEE REPLACEMENT, LEFT: ICD-10-CM

## 2019-06-19 DIAGNOSIS — G89.29 BILATERAL CHRONIC KNEE PAIN: ICD-10-CM

## 2019-06-19 DIAGNOSIS — M17.11 PRIMARY OSTEOARTHRITIS OF RIGHT KNEE: Primary | ICD-10-CM

## 2019-06-19 DIAGNOSIS — M25.562 BILATERAL CHRONIC KNEE PAIN: ICD-10-CM

## 2019-06-19 PROCEDURE — 99214 PR OFFICE/OUTPT VISIT, EST, LEVL IV, 30-39 MIN: ICD-10-PCS | Mod: 25,HCNC,S$GLB, | Performed by: FAMILY MEDICINE

## 2019-06-19 PROCEDURE — 99999 PR PBB SHADOW E&M-EST. PATIENT-LVL III: CPT | Mod: PBBFAC,HCNC,, | Performed by: FAMILY MEDICINE

## 2019-06-19 PROCEDURE — 1101F PR PT FALLS ASSESS DOC 0-1 FALLS W/OUT INJ PAST YR: ICD-10-PCS | Mod: HCNC,CPTII,S$GLB, | Performed by: FAMILY MEDICINE

## 2019-06-19 PROCEDURE — 99999 PR PBB SHADOW E&M-EST. PATIENT-LVL III: ICD-10-PCS | Mod: PBBFAC,HCNC,, | Performed by: FAMILY MEDICINE

## 2019-06-19 PROCEDURE — 20611 DRAIN/INJ JOINT/BURSA W/US: CPT | Mod: HCNC,RT,S$GLB, | Performed by: FAMILY MEDICINE

## 2019-06-19 PROCEDURE — 99214 OFFICE O/P EST MOD 30 MIN: CPT | Mod: 25,HCNC,S$GLB, | Performed by: FAMILY MEDICINE

## 2019-06-19 PROCEDURE — 1101F PT FALLS ASSESS-DOCD LE1/YR: CPT | Mod: HCNC,CPTII,S$GLB, | Performed by: FAMILY MEDICINE

## 2019-06-19 PROCEDURE — 20611 LARGE JOINT ASPIRATION/INJECTION: R KNEE: ICD-10-PCS | Mod: HCNC,RT,S$GLB, | Performed by: FAMILY MEDICINE

## 2019-06-19 RX ORDER — TRIAMCINOLONE ACETONIDE 40 MG/ML
40 INJECTION, SUSPENSION INTRA-ARTICULAR; INTRAMUSCULAR
Status: DISCONTINUED | OUTPATIENT
Start: 2019-06-19 | End: 2019-06-19 | Stop reason: HOSPADM

## 2019-06-19 RX ADMIN — TRIAMCINOLONE ACETONIDE 40 MG: 40 INJECTION, SUSPENSION INTRA-ARTICULAR; INTRAMUSCULAR at 02:06

## 2019-06-19 NOTE — PROGRESS NOTES
Korey Santos, a 78 y.o. male, presents today for evaluation of his RIGHT and LEFT knee.      HISTORY OF PRESENT ILLNESS   Location: anterior knee, bilateral  Onset: insidious  Palliative:    Relative rest   Oral analgesics   R - VSI, 16.12, [facility]   R VSI, iaKnee, 05/04/17, moderate%   L VSI, iaKnee, 05/25/17, moderate%   R asp, iaKnee, 05/25/17,    CSI, iaKnee, 04/18/17, moderate improvement   CSI, iaKnee, 07/07/17   Asp/CSI, iaknee, 08/28/17, moderate improvement    CSI/ASP, R/L. iaKnee, 17.09.27 per Team Salvador   VSI -Synvisc, R. iaKnee, 17.12.29 per Team Salvador   VSI-SynviscOne, R. iaKnee, 18.03.14 per Team Salvador   ASP, R. iaKnee, 18.04 per Dr. Franco     Provocative:    ADLs  Prior:  has a past surgical history that includes left hand; Back surgery (2014); Joint replacement (Left, 05/04/2018) Dr. Franco; and Spine surgery.   16.02 - R knee - arthroscopy - [facility]  Progression: worsening discomfort   Quality:    Swollen   Sharp at times  Radiation: none  Severity: per nursing documentation  Timing: intermittent w/ use  Trauma: none    Review of systems (ROS):  A 10+ review of systems was performed with pertinent positives and negatives noted above in the history of present illness. Other systems were negative unless otherwise specified.    PHYSICAL EXAMINATION  General:  The patient is alert and oriented x 3. Mood is pleasant. Observation of ears, eyes and nose reveal no gross abnormalities. HEENT: NCAT, sclera anicteric.   Lungs: Respirations are equal and unlabored.  Gait is coordinated. Patient can toe walk and heel walk without difficulty.    RIGHT/LEFT KNEE EXAMINATION    Observation/Inspection  Gait:   Antalgic   Alignment:  Neutral   Scars:   None   Muscle atrophy: Mild  Effusion:  Present   Warmth:  None   Discoloration:   none     Tenderness / Crepitus (T / C):         T / C      T / C  Patella   - / -   Lateral joint line   - / -     Peripatellar medial  -  Medial joint line    + /  -  Peripatellar lateral -  Medial plica   - / -  Patellar tendon -   Popliteal fossa   - / -  Quad tendon   -   Gastrocnemius   -  Prepatellar Bursa - / -   Quadricep   -  Tibial tubercle  -  Thigh/hamstring  -  Pes anserine/HS -  Fibula    -  ITB   - / -  Tibia     -  Tib/fib joint  - / -  LCL    -    MFC   - / -   MCL: Proximal  -    LFC   - / -   Distal    -          ROM: (* = pain)  PASSIVE   ACTIVE    Left :   5 / 0 / 145*   5 / 0 / 145*     Right :    5 / 0 / 145*   5 / 0 / 145*    Patellofemoral examination:  See above noted areas of tenderness.   Patella position    Subluxation / dislocation: Centered        Sup. / Inf;   Normal   Crepitus (PF):    Absent   Patellar Mobility:       Medial-lateral:   Normal    Superior-inferior:  Normal    Inferior tilt   Normal    Patellar tendon:  Normal   Lateral tilt:    Normal   J-sign:     None   Patellofemoral grind:   No pain       Meniscal Signs:     Pain on terminal extension:  +*  Pain on terminal flexion:  +*  Tyrones maneuver:  +*  Squat     NT    Ligament Examination:  ACL / Lachman:  WNL  PCL-Post.  drawer: normal 0 to 2mm  MCL- Valgus:  normal 0 to 2mm  LCL- Varus:    normal 0 to 2mm  Pivot shift:  guarding   Dial Test:   difference c/w other side   At 30° flexion: normal (< 5°)    At 90° flexion: normal (< 5°)   Reverse Pivot Shift:   normal (Equal)     Strength: (* = with pain) Painful Side  Quadriceps   5/5  Hamstrin/5    Extremity Neuro-vascular Examination:   Sensation:  Grossly intact to light touch all dermatomal regions.   Motor Function:  Fully intact motor function at hip, knee, foot and ankle    DTRs;  quadriceps and  achilles 2+.  No clonus and downgoing Babinski.    Vascular status:  DP and PT pulses 2+, brisk capillary refill, symmetric.     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, panchito med compartment, right   W/ recurrent knee effusions  #2 s/p TKA, DJones,  left    No evidence of neurologic pathology  No evidence of vascular pathology    Imaging studies reviewed:   X-ray knee, bilateral 19.02  MRI knee, right 16.02    Plan:    We discussed the importance of appropriate diet, weight, and regular exercise including quadriceps strengthening     We discussed options including:  #1 watchful waiting  #2 physical therapy aimed at:    Core stability   RoM knee   Strengthening quadriceps   Gait training   #3 injection therapy:   CSI iaknee     Right, effective moderate%, repeat   VSI iaknee    Right, effective moderate%, repeat   Orthobiologics   #4 consultation re: TKA     The patient chooses #2 and #3 csi iaknee right and #3 vsi iakne right    Pain management: handout given  Bracing:   Physical therapy: fPT, @ [facility], begin as above   Activity (e.g. sports, work) restrictions: as tolerated   school/vocation: member of [employer]     Follow up for vsi iaknee right  Should symptoms worsen or fail to resolve, consider:  Revisiting the above options

## 2019-06-19 NOTE — PROCEDURES
"Large Joint Aspiration/Injection: R knee  Date/Time: 6/19/2019 2:51 PM  Performed by: Vega Camarena MD  Authorized by: Vega Camarena MD     Consent Done?:  Yes (Verbal)  Indications:  Pain  Procedure site marked: Yes    Timeout: Prior to procedure the correct patient, procedure, and site was verified      Location:  Knee  Site:  R knee  Prep: Patient was prepped and draped in usual sterile fashion    Ultrasonic Guidance for needle placement: Yes  Images are saved and documented.  Needle size:  20 G  Approach:  Lateral  Medications:  40 mg triamcinolone acetonide 40 mg/mL  Patient tolerance:  Patient tolerated the procedure well with no immediate complications    Additional Comments: Description of ultrasound utilization for needle guidance:   Ultrasound guidance used for needle localization. Images saved and stored for documentation. The knee joint was visualized. Dynamic visualization of the 20g x 3.5" needle was continuous throughout the procedure.      "

## 2019-07-01 ENCOUNTER — PES CALL (OUTPATIENT)
Dept: ADMINISTRATIVE | Facility: CLINIC | Age: 79
End: 2019-07-01

## 2019-07-17 ENCOUNTER — OFFICE VISIT (OUTPATIENT)
Dept: SPORTS MEDICINE | Facility: CLINIC | Age: 79
End: 2019-07-17
Payer: MEDICARE

## 2019-07-17 VITALS — TEMPERATURE: 98 F | WEIGHT: 233 LBS | BODY MASS INDEX: 32.5 KG/M2

## 2019-07-17 DIAGNOSIS — M17.11 PRIMARY OSTEOARTHRITIS OF RIGHT KNEE: Primary | ICD-10-CM

## 2019-07-17 PROCEDURE — 20611 DRAIN/INJ JOINT/BURSA W/US: CPT | Mod: HCNC,RT,S$GLB, | Performed by: FAMILY MEDICINE

## 2019-07-17 PROCEDURE — 20611 LARGE JOINT ASPIRATION/INJECTION: R KNEE: ICD-10-PCS | Mod: HCNC,RT,S$GLB, | Performed by: FAMILY MEDICINE

## 2019-07-17 PROCEDURE — 99999 PR PBB SHADOW E&M-EST. PATIENT-LVL II: CPT | Mod: PBBFAC,HCNC,, | Performed by: FAMILY MEDICINE

## 2019-07-17 PROCEDURE — 99999 PR PBB SHADOW E&M-EST. PATIENT-LVL II: ICD-10-PCS | Mod: PBBFAC,HCNC,, | Performed by: FAMILY MEDICINE

## 2019-07-17 PROCEDURE — 99499 UNLISTED E&M SERVICE: CPT | Mod: HCNC,S$GLB,, | Performed by: FAMILY MEDICINE

## 2019-07-17 PROCEDURE — 99499 NO LOS: ICD-10-PCS | Mod: HCNC,S$GLB,, | Performed by: FAMILY MEDICINE

## 2019-07-17 NOTE — PROCEDURES
"Large Joint Aspiration/Injection: R knee  Date/Time: 7/17/2019 10:41 AM  Performed by: Vega Camarena MD  Authorized by: Vega Camarena MD     Consent Done?:  Yes (Verbal)  Indications:  Pain  Procedure site marked: Yes    Timeout: Prior to procedure the correct patient, procedure, and site was verified      Location:  Knee  Site:  R knee  Prep: Patient was prepped and draped in usual sterile fashion    Ultrasonic Guidance for needle placement: Yes  Images are saved and documented.  Needle size:  20 G  Approach:  Lateral  Medications:  20 mg sodium hyaluronate (EUFLEXXA) 10 mg/mL(mw 2.4 -3.6 million)  Patient tolerance:  Patient tolerated the procedure well with no immediate complications    Additional Comments: Description of ultrasound utilization for needle guidance:   Ultrasound guidance used for needle localization. Images saved and stored for documentation. The knee joint was visualized. Dynamic visualization of the 20g x 3.5" needle was continuous throughout the procedure.      "

## 2019-07-24 ENCOUNTER — OFFICE VISIT (OUTPATIENT)
Dept: SPORTS MEDICINE | Facility: CLINIC | Age: 79
End: 2019-07-24
Payer: MEDICARE

## 2019-07-24 VITALS — TEMPERATURE: 98 F | WEIGHT: 233 LBS | BODY MASS INDEX: 32.62 KG/M2 | HEIGHT: 71 IN

## 2019-07-24 DIAGNOSIS — M17.11 PRIMARY OSTEOARTHRITIS OF RIGHT KNEE: Primary | ICD-10-CM

## 2019-07-24 PROCEDURE — 99499 NO LOS: ICD-10-PCS | Mod: HCNC,S$GLB,, | Performed by: FAMILY MEDICINE

## 2019-07-24 PROCEDURE — 20611 DRAIN/INJ JOINT/BURSA W/US: CPT | Mod: HCNC,RT,S$GLB, | Performed by: FAMILY MEDICINE

## 2019-07-24 PROCEDURE — 99999 PR PBB SHADOW E&M-EST. PATIENT-LVL III: ICD-10-PCS | Mod: PBBFAC,HCNC,, | Performed by: FAMILY MEDICINE

## 2019-07-24 PROCEDURE — 99999 PR PBB SHADOW E&M-EST. PATIENT-LVL III: CPT | Mod: PBBFAC,HCNC,, | Performed by: FAMILY MEDICINE

## 2019-07-24 PROCEDURE — 20611 LARGE JOINT ASPIRATION/INJECTION: R KNEE: ICD-10-PCS | Mod: HCNC,RT,S$GLB, | Performed by: FAMILY MEDICINE

## 2019-07-24 PROCEDURE — 99499 UNLISTED E&M SERVICE: CPT | Mod: HCNC,S$GLB,, | Performed by: FAMILY MEDICINE

## 2019-07-24 NOTE — PROCEDURES
"Large Joint Aspiration/Injection: R knee  Date/Time: 7/24/2019 10:55 AM  Performed by: Vega Camarena MD  Authorized by: Vega Camarena MD     Consent Done?:  Yes (Verbal)  Indications:  Pain  Procedure site marked: Yes    Timeout: Prior to procedure the correct patient, procedure, and site was verified      Location:  Knee  Site:  R knee  Prep: Patient was prepped and draped in usual sterile fashion    Ultrasonic Guidance for needle placement: Yes  Images are saved and documented.  Needle size:  20 G  Approach:  Lateral  Medications:  20 mg sodium hyaluronate (EUFLEXXA) 10 mg/mL(mw 2.4 -3.6 million)  Patient tolerance:  Patient tolerated the procedure well with no immediate complications    Additional Comments: Description of ultrasound utilization for needle guidance:   Ultrasound guidance used for needle localization. Images saved and stored for documentation. The knee joint was visualized. Dynamic visualization of the 20g x 3.5" needle was continuous throughout the procedure.      "

## 2019-07-31 ENCOUNTER — OFFICE VISIT (OUTPATIENT)
Dept: SPORTS MEDICINE | Facility: CLINIC | Age: 79
End: 2019-07-31
Payer: MEDICARE

## 2019-07-31 VITALS — BODY MASS INDEX: 32.62 KG/M2 | TEMPERATURE: 98 F | WEIGHT: 233 LBS | HEIGHT: 71 IN

## 2019-07-31 DIAGNOSIS — M17.11 PRIMARY OSTEOARTHRITIS OF RIGHT KNEE: Primary | ICD-10-CM

## 2019-07-31 PROCEDURE — 20611 LARGE JOINT ASPIRATION/INJECTION: R KNEE: ICD-10-PCS | Mod: HCNC,RT,S$GLB, | Performed by: FAMILY MEDICINE

## 2019-07-31 PROCEDURE — 99499 UNLISTED E&M SERVICE: CPT | Mod: HCNC,S$GLB,, | Performed by: FAMILY MEDICINE

## 2019-07-31 PROCEDURE — 99999 PR PBB SHADOW E&M-EST. PATIENT-LVL III: ICD-10-PCS | Mod: PBBFAC,HCNC,, | Performed by: FAMILY MEDICINE

## 2019-07-31 PROCEDURE — 99499 NO LOS: ICD-10-PCS | Mod: HCNC,S$GLB,, | Performed by: FAMILY MEDICINE

## 2019-07-31 PROCEDURE — 99999 PR PBB SHADOW E&M-EST. PATIENT-LVL III: CPT | Mod: PBBFAC,HCNC,, | Performed by: FAMILY MEDICINE

## 2019-07-31 PROCEDURE — 20611 DRAIN/INJ JOINT/BURSA W/US: CPT | Mod: HCNC,RT,S$GLB, | Performed by: FAMILY MEDICINE

## 2019-07-31 NOTE — PROCEDURES
"Large Joint Aspiration/Injection: R knee  Date/Time: 7/31/2019 10:31 AM  Performed by: Vega Camarena MD  Authorized by: Vega Camarena MD     Consent Done?:  Yes (Verbal)  Indications:  Pain  Procedure site marked: Yes    Timeout: Prior to procedure the correct patient, procedure, and site was verified      Location:  Knee  Site:  R knee  Prep: Patient was prepped and draped in usual sterile fashion    Ultrasonic Guidance for needle placement: Yes  Images are saved and documented.  Needle size:  20 G  Approach:  Lateral  Medications:  20 mg sodium hyaluronate (EUFLEXXA) 10 mg/mL(mw 2.4 -3.6 million)  Patient tolerance:  Patient tolerated the procedure well with no immediate complications    Additional Comments: Description of ultrasound utilization for needle guidance:   Ultrasound guidance used for needle localization. Images saved and stored for documentation. The knee joint was visualized. Dynamic visualization of the 20g x 3.5" needle was continuous throughout the procedure.      "

## 2019-08-13 ENCOUNTER — HOSPITAL ENCOUNTER (EMERGENCY)
Facility: HOSPITAL | Age: 79
Discharge: HOME | End: 2019-08-13
Attending: FAMILY MEDICINE
Payer: MEDICARE

## 2019-08-13 VITALS
RESPIRATION RATE: 16 BRPM | OXYGEN SATURATION: 99 % | SYSTOLIC BLOOD PRESSURE: 170 MMHG | TEMPERATURE: 99 F | BODY MASS INDEX: 32.2 KG/M2 | HEART RATE: 80 BPM | HEIGHT: 71 IN | DIASTOLIC BLOOD PRESSURE: 81 MMHG | WEIGHT: 230 LBS

## 2019-08-13 DIAGNOSIS — S61.532A PUNCTURE WOUND OF LEFT WRIST, INITIAL ENCOUNTER: Primary | ICD-10-CM

## 2019-08-13 PROCEDURE — 25000003 PHARM REV CODE 250: Mod: HCNC,ER | Performed by: PHYSICIAN ASSISTANT

## 2019-08-13 PROCEDURE — 90715 TDAP VACCINE 7 YRS/> IM: CPT | Mod: HCNC,ER | Performed by: PHYSICIAN ASSISTANT

## 2019-08-13 PROCEDURE — 63600175 PHARM REV CODE 636 W HCPCS: Mod: HCNC,ER | Performed by: PHYSICIAN ASSISTANT

## 2019-08-13 PROCEDURE — 90471 IMMUNIZATION ADMIN: CPT | Mod: HCNC,ER | Performed by: PHYSICIAN ASSISTANT

## 2019-08-13 PROCEDURE — 99284 EMERGENCY DEPT VISIT MOD MDM: CPT | Mod: HCNC,ER,25

## 2019-08-13 RX ORDER — CEPHALEXIN 250 MG/1
250 CAPSULE ORAL 4 TIMES DAILY
Qty: 28 CAPSULE | Refills: 0 | Status: SHIPPED | OUTPATIENT
Start: 2019-08-13 | End: 2019-08-20

## 2019-08-13 RX ORDER — CEPHALEXIN 500 MG/1
500 CAPSULE ORAL
Status: COMPLETED | OUTPATIENT
Start: 2019-08-13 | End: 2019-08-13

## 2019-08-13 RX ORDER — NAPROXEN 500 MG/1
500 TABLET ORAL 2 TIMES DAILY WITH MEALS
Qty: 10 TABLET | Refills: 0 | Status: SHIPPED | OUTPATIENT
Start: 2019-08-13 | End: 2019-12-04 | Stop reason: ALTCHOICE

## 2019-08-13 RX ADMIN — BACITRACIN ZINC, NEOMYCIN SULFATE, POLYMYXIN B SULFATE 1 EACH: 3.5; 5000; 4 OINTMENT TOPICAL at 12:08

## 2019-08-13 RX ADMIN — CEPHALEXIN 500 MG: 500 CAPSULE ORAL at 12:08

## 2019-08-13 RX ADMIN — CLOSTRIDIUM TETANI TOXOID ANTIGEN (FORMALDEHYDE INACTIVATED), CORYNEBACTERIUM DIPHTHERIAE TOXOID ANTIGEN (FORMALDEHYDE INACTIVATED), BORDETELLA PERTUSSIS TOXOID ANTIGEN (GLUTARALDEHYDE INACTIVATED), BORDETELLA PERTUSSIS FILAMENTOUS HEMAGGLUTININ ANTIGEN (FORMALDEHYDE INACTIVATED), BORDETELLA PERTUSSIS PERTACTIN ANTIGEN, AND BORDETELLA PERTUSSIS FIMBRIAE 2/3 ANTIGEN 0.5 ML: 5; 2; 2.5; 5; 3; 5 INJECTION, SUSPENSION INTRAMUSCULAR at 12:08

## 2019-08-13 NOTE — ED PROVIDER NOTES
Encounter Date: 8/13/2019       History     Chief Complaint   Patient presents with    Laceration     to left wrist from fence, bleeding controlled.      78-year-old male presents to the emergency department for evaluation of puncture wound to his left wrist.  He reports that he was working outside in his garden just prior to arrival when he was reaching for something and accidentally struck his left wrist on the top of the fence.  He reports that the bleeding was moderate but able to be controlled with a bandage.  He denies any numbness, tingling, weakness or swelling to the upper extremities. He denies any other symptoms including headache, dizziness, neck pain, chest pain or abdominal pain. No treatment was attempted prior to arrival.  He is unsure of the date of his last tetanus immunization.  He reports that he takes an aspirin daily, but denies any other blood thinning medications.        Review of patient's allergies indicates:   Allergen Reactions    Clindamycin Swelling     Throat swells    Lisinopril Swelling and Other (See Comments)     Throat swelling     Past Medical History:   Diagnosis Date    Arthritis     Back pain, chronic     BPH with urinary obstruction     Chronic constipation     Colon polyp     DJD (degenerative joint disease)     Hip    Hyperlipidemia     Hypertension     Laryngopharyngeal reflux     Left inguinal hernia     Lumbar herniated disc     Lumbar stenosis     OA (osteoarthritis) of knee     Bilateral    Paradoxical insomnia     Sinus trouble      Past Surgical History:   Procedure Laterality Date    ARTHROSCOPY-MENISCECTOMY Right 2/22/2016    [facility] at [facility]    BACK SURGERY  2014    CHONDROPLASTY-KNEE Right 2/22/2016    [facility] at [facility] OR    COLONOSCOPY      COLONOSCOPY N/A 8/22/2018    Performed by Cedric Arrington MD at [facility] (4TH FLR)    KELLI-TRANSFORAMINAL Left 8/12/2014    Performed by Gemma  ZBIGNIEW Collins MD at [facility] PAIN MGT    KELLI-TRANSFORAMINAL Left 6/17/2014    Per[facility] by [facility] at [facility]    EXCISION MASS EXTREMITY Left 12/23/2014    Performed by [facility]al [facility]valcaba [facility] at [facility] OR 2ND FLR    JOINT REPLACEMENT Left 05/04/2018    KNEE SURGERY      left hand      left long A1 pulley release Left 7/11/2014    [facility] Farias [facility] at [facility] OR    LEG SURGERY      MICRODISKECTOMY-MINIMALLY INVASIVE L4-L5 LEFT Left 9/22/2014    Performed by Raymond Redding MD at [facility] OR 2ND FLR    REPLACEMENT-KNEE-TOTAL Left 5/4/2018    Performed by Renita Franco MD at [facility] OR 1ST FLR    SPINE SURGERY      UPPER GASTROINTESTINAL ENDOSCOPY       Family History   Problem Relation Age of Onset    Cancer Father         lung or smoking    No Known Problems Mother     No Known Problems Sister     No Known Problems Brother     No Known Problems Maternal Aunt     No Known Problems Maternal Uncle     No Known Problems Paternal Aunt     No Known Problems Paternal Uncle     No Known Problems Maternal Grandmother     No Known Problems Maternal Grandfather     No Known Problems Paternal Grandmother     No Known Problems Paternal Grandfather     No Known Problems Daughter     No Known Problems Son     Glaucoma Neg Hx     Diabetes Neg Hx     Amblyopia Neg Hx     Blindness Neg Hx     Cataracts Neg Hx     Hypertension Neg Hx     Macular degeneration Neg Hx     Retinal detachment Neg Hx     Strabismus Neg Hx     Stroke Neg Hx     Thyroid disease Neg Hx     Colon cancer Neg Hx     Esophageal cancer Neg Hx      Social History     Tobacco Use    Smoking status: Never Smoker    Smokeless tobacco: Never Used   Substance Use Topics    Alcohol use: Yes     Alcohol/week: 0.0 oz     Comment: approximately 2 beers monthly    Drug use: No     Review of Systems   Constitutional: Negative for activity change, appetite change and fever.   HENT: Negative for congestion, rhinorrhea,  sinus pressure and sore throat.    Eyes: Negative for photophobia and visual disturbance.   Respiratory: Negative for cough and shortness of breath.    Cardiovascular: Negative for chest pain.   Gastrointestinal: Negative for abdominal pain, constipation and vomiting.   Genitourinary: Negative for decreased urine volume, dysuria and flank pain.   Musculoskeletal: Negative for back pain, joint swelling, neck pain and neck stiffness.   Skin: Positive for wound. Negative for rash.   Neurological: Negative for dizziness, syncope, weakness, light-headedness, numbness and headaches.   Hematological: Does not bruise/bleed easily.       Physical Exam     Initial Vitals [08/13/19 1230]   BP Pulse Resp Temp SpO2   (!) 159/93 85 18 98.8 °F (37.1 °C) 99 %      MAP       --         Physical Exam    Nursing note and vitals reviewed.  Constitutional: He appears well-developed and well-nourished. He is not diaphoretic. No distress.   HENT:   Head: Normocephalic and atraumatic.   Right Ear: External ear normal.   Left Ear: External ear normal.   Mouth/Throat: Oropharynx is clear and moist. No oropharyngeal exudate.   Eyes: Conjunctivae and EOM are normal. Pupils are equal, round, and reactive to light.   Neck: Normal range of motion. Neck supple.   Cardiovascular: Normal rate, regular rhythm and normal heart sounds.   Pulmonary/Chest: Breath sounds normal. No respiratory distress. He has no wheezes. He has no rhonchi. He has no rales. He exhibits no tenderness.   Musculoskeletal:        Left shoulder: He exhibits normal range of motion and no tenderness.        Left elbow: He exhibits normal range of motion. No tenderness found.        Left wrist: He exhibits laceration. He exhibits normal range of motion, no tenderness, no bony tenderness, no swelling and no effusion.        Arms:       Left hand: He exhibits normal range of motion, no tenderness, no bony tenderness, normal capillary refill, no laceration and no swelling. Normal  sensation noted. Normal strength noted.   Lymphadenopathy:     He has no cervical adenopathy.   Neurological: He is alert and oriented to person, place, and time.   Skin: Skin is warm and dry.   Psychiatric: He has a normal mood and affect.         ED Course   Procedures  Labs Reviewed - No data to display       Imaging Results    None          Medical Decision Making:   Initial Assessment:   78-year-old male presents to the emergency department for evaluation of puncture wounds to the left wrist.  Physical exam reveals a nontoxic-appearing male in no acute distress. Patient is afebrile, mildly hypertensive but other vital signs within normal limits.  Neurological exam reveals an alert and oriented patient.  No evidence of head injury noted. Neck is supple, nontender to palpation. Lungs clear to auscultation bilaterally. No respiratory distress or accessory muscle use noted. Examination of the left upper extremity reveals 2 small puncture wounds noted to the ventral aspect of the left wrist.  No active bleeding noted. Peripheral pulses intact.  Capillary refill less than 3 sec.  No evidence of joint intrusion or vascular injury. No foreign bodies noted. Full range of motion, sensation and peripheral pulses intact in upper extremities bilaterally.  Differential Diagnosis:   Puncture wound  I carefully considered but doubt serious etiology including fracture, dislocation or vascular injury.  ED Management:  Wound was irrigated and bandaged in the emergency department.  No indication for sutures at this time.  Patient's tetanus immunization was brought up-to-date.  Patient covered with Keflex upon discharge. Instructed the patient to follow up with his primary care provider for re-evaluation and to return to the emergency department immediately for any new or worsening symptoms.                      Clinical Impression:       ICD-10-CM ICD-9-CM   1. Puncture wound of left wrist, initial encounter S61.532A 881.02                                 Mackenzie Mosley PA-C  08/13/19 [id number]

## 2019-08-29 ENCOUNTER — HOSPITAL ENCOUNTER (OUTPATIENT)
Dept: RADIOLOGY | Facility: HOSPITAL | Age: 79
Discharge: HOME | End: 2019-08-29
Attending: FAMILY MEDICINE
Payer: MEDICARE

## 2019-08-29 ENCOUNTER — OFFICE VISIT (OUTPATIENT)
Dept: INTERNAL MEDICINE | Facility: CLINIC | Age: 79
End: 2019-08-29
Attending: FAMILY MEDICINE
Payer: MEDICARE

## 2019-08-29 VITALS
SYSTOLIC BLOOD PRESSURE: 138 MMHG | HEIGHT: 71 IN | DIASTOLIC BLOOD PRESSURE: 70 MMHG | HEART RATE: 60 BPM | WEIGHT: 233.38 LBS | OXYGEN SATURATION: 96 % | BODY MASS INDEX: 32.67 KG/M2 | TEMPERATURE: 98 F

## 2019-08-29 DIAGNOSIS — G56.02 CARPAL TUNNEL SYNDROME OF LEFT WRIST: ICD-10-CM

## 2019-08-29 DIAGNOSIS — R97.20 ELEVATED PSA: ICD-10-CM

## 2019-08-29 DIAGNOSIS — K63.5 POLYP OF COLON, UNSPECIFIED PART OF COLON, UNSPECIFIED TYPE: ICD-10-CM

## 2019-08-29 DIAGNOSIS — G56.02 CARPAL TUNNEL SYNDROME OF LEFT WRIST: Primary | ICD-10-CM

## 2019-08-29 DIAGNOSIS — J31.0 CHRONIC RHINITIS: ICD-10-CM

## 2019-08-29 DIAGNOSIS — I10 HYPERTENSION, ESSENTIAL: ICD-10-CM

## 2019-08-29 DIAGNOSIS — E78.5 HYPERLIPIDEMIA, UNSPECIFIED HYPERLIPIDEMIA TYPE: ICD-10-CM

## 2019-08-29 DIAGNOSIS — Z12.11 COLON CANCER SCREENING: ICD-10-CM

## 2019-08-29 DIAGNOSIS — M19.032 OSTEOARTHRITIS OF LEFT WRIST, UNSPECIFIED OSTEOARTHRITIS TYPE: ICD-10-CM

## 2019-08-29 PROBLEM — Z91.199 NONCOMPLIANCE: Status: ACTIVE | Noted: 2019-08-29

## 2019-08-29 PROCEDURE — 99999 PR PBB SHADOW E&M-EST. PATIENT-LVL V: ICD-10-PCS | Mod: PBBFAC,HCNC,, | Performed by: FAMILY MEDICINE

## 2019-08-29 PROCEDURE — 3078F DIAST BP <80 MM HG: CPT | Mod: HCNC,CPTII,S$GLB, | Performed by: FAMILY MEDICINE

## 2019-08-29 PROCEDURE — 1101F PT FALLS ASSESS-DOCD LE1/YR: CPT | Mod: HCNC,CPTII,S$GLB, | Performed by: FAMILY MEDICINE

## 2019-08-29 PROCEDURE — 73110 XR WRIST COMPLETE 3 VIEWS LEFT: ICD-10-PCS | Mod: 26,HCNC,LT, | Performed by: RADIOLOGY

## 2019-08-29 PROCEDURE — 3075F PR MOST RECENT SYSTOLIC BLOOD PRESS GE 130-139MM HG: ICD-10-PCS | Mod: HCNC,CPTII,S$GLB, | Performed by: FAMILY MEDICINE

## 2019-08-29 PROCEDURE — 99214 OFFICE O/P EST MOD 30 MIN: CPT | Mod: HCNC,S$GLB,, | Performed by: FAMILY MEDICINE

## 2019-08-29 PROCEDURE — 3078F PR MOST RECENT DIASTOLIC BLOOD PRESSURE < 80 MM HG: ICD-10-PCS | Mod: HCNC,CPTII,S$GLB, | Performed by: FAMILY MEDICINE

## 2019-08-29 PROCEDURE — 99999 PR PBB SHADOW E&M-EST. PATIENT-LVL V: CPT | Mod: PBBFAC,HCNC,, | Performed by: FAMILY MEDICINE

## 2019-08-29 PROCEDURE — 1101F PR PT FALLS ASSESS DOC 0-1 FALLS W/OUT INJ PAST YR: ICD-10-PCS | Mod: HCNC,CPTII,S$GLB, | Performed by: FAMILY MEDICINE

## 2019-08-29 PROCEDURE — 73110 X-RAY EXAM OF WRIST: CPT | Mod: 26,HCNC,LT, | Performed by: RADIOLOGY

## 2019-08-29 PROCEDURE — 3075F SYST BP GE 130 - 139MM HG: CPT | Mod: HCNC,CPTII,S$GLB, | Performed by: FAMILY MEDICINE

## 2019-08-29 PROCEDURE — 73110 X-RAY EXAM OF WRIST: CPT | Mod: TC,HCNC,LT

## 2019-08-29 PROCEDURE — 99214 PR OFFICE/OUTPT VISIT, EST, LEVL IV, 30-39 MIN: ICD-10-PCS | Mod: HCNC,S$GLB,, | Performed by: FAMILY MEDICINE

## 2019-08-29 RX ORDER — FLUTICASONE PROPIONATE 50 MCG
2 SPRAY, SUSPENSION (ML) NASAL DAILY PRN
Qty: 15 G | Refills: 5 | Status: SHIPPED | OUTPATIENT
Start: 2019-08-29 | End: 2021-02-12

## 2019-08-29 NOTE — PATIENT INSTRUCTIONS
Schedule lab orders for today.     If not contacted in a couple weeks - Colonoscopy Scheduling Number - [phone]        Information about cholesterol, high blood pressure and healthy diet and activity recommendations can be found at the following links on the Internet:    http://www.nhlbi.nih.gov/health/health-topics/topics/hbc  http://www.nhlbi.nih.gov/health/educational/lose_wt/index.htm  Http://www.nhlbi.nih.gov/files/docs/public/heart/hbp_low.pdf  http://www.heart.org/HEARTORG/  http://diabetes.org/  https://www.cdc.gov/  Https://healthfinder.gov/  https://health.gov/dietaryguidelines/2015/guidelines/  https://health.gov/paguidelines/second-edition/pdf/Physical_Activity_Guidelines_2nd_edition.pdf

## 2019-08-29 NOTE — PROGRESS NOTES
Subjective:       Patient ID: Korey Santos is a 78 y.o. male.    Chief Complaint: Follow-up    Established patient follows up for management of chronic medical illnesses with complaints today. Please see dictation and ROS for interval problems, specific complaints and disease management discussion.    P, S, Fm, Soc Hx's; Meds, allergies reviewed and reconciled.  Health maintenance file reviewed and addressed items due.    Review of Systems   Constitutional: Negative for chills, fatigue, fever and unexpected weight change.   HENT: Positive for congestion and postnasal drip. Negative for trouble swallowing.    Eyes: Negative for redness and visual disturbance.   Respiratory: Negative for cough, chest tightness and shortness of breath.    Cardiovascular: Negative for chest pain, palpitations and leg swelling.   Gastrointestinal: Negative for abdominal pain and blood in stool.   Genitourinary: Positive for difficulty urinating. Negative for hematuria.   Musculoskeletal: Positive for arthralgias, gait problem and joint swelling. Negative for back pain, myalgias and neck pain.   Skin: Negative for color change and rash.   Neurological: Positive for numbness. Negative for tremors, speech difficulty, weakness and headaches.   Hematological: Negative for adenopathy. Does not bruise/bleed easily.   Psychiatric/Behavioral: Negative for behavioral problems, confusion and sleep disturbance. The patient is not nervous/anxious.        Objective:      Physical Exam   Constitutional: He is oriented to person, place, and time. He appears well-developed and well-nourished. No distress.   HENT:   Head: Normocephalic and atraumatic.   Eyes: Conjunctivae are normal. No scleral icterus.   Neck: Normal range of motion. Neck supple. Carotid bruit is not present.       Cardiovascular: Normal rate, regular rhythm, normal heart sounds and intact distal pulses. Exam reveals no gallop and no friction rub.   No murmur  heard.  Pulmonary/Chest: Effort normal and breath sounds normal. No respiratory distress. He has no wheezes. He has no rales.   Abdominal: He exhibits no distension and no mass. There is no tenderness. There is no guarding.   Musculoskeletal: He exhibits no edema.        Left wrist: He exhibits swelling and deformity. He exhibits normal range of motion and no tenderness.        Left hand: Normal sensation noted. Normal strength noted.        Right lower leg: He exhibits no edema.        Left lower leg: He exhibits no edema.   Lymphadenopathy:        Head (right side): No submental and no submandibular adenopathy present.        Head (left side): No submental and no submandibular adenopathy present.     He has no cervical adenopathy.   Neurological: He is alert and oriented to person, place, and time. He displays no tremor. No cranial nerve deficit. Coordination and gait normal.   Skin: Skin is warm and dry. No rash noted. He is not diaphoretic. No erythema.   Psychiatric: He has a normal mood and affect. His behavior is normal. Judgment and thought content normal.   Nursing note and vitals reviewed.      Assessment:       1. Carpal tunnel syndrome of left wrist    2. Osteoarthritis of left wrist, unspecified osteoarthritis type    3. Hypertension, essential    4. Hyperlipidemia, unspecified hyperlipidemia type    5. Elevated PSA    6. Colon cancer screening    7. Polyp of colon, unspecified part of colon, unspecified type    8. Chronic rhinitis        Plan:     Medication List with Changes/Refills   Current Medications    ASPIRIN (ECOTRIN) 325 MG EC TABLET    Take 1 tablet (325 mg total) by mouth once daily.    LINACLOTIDE (LINZESS) 145 MCG CAP CAPSULE    Take 1 capsule (145 mcg total) by mouth daily as needed.    LOSARTAN (COZAAR) 25 MG TABLET    25 mg daily as needed.     MULTIVITAMIN CAPSULE    Take 1 capsule by mouth once daily.    NAPROXEN (NAPROSYN) 500 MG TABLET    Take 1 tablet (500 mg total) by mouth 2  (two) times daily with meals.    POLYETHYLENE GLYCOL (GLYCOLAX) 17 GRAM/DOSE POWDER    Take 17 g by mouth daily as needed.    PRAVASTATIN (PRAVACHOL) 40 MG TABLET    Take 1 tablet (40 mg total) by mouth once daily.    TAMSULOSIN (FLOMAX) 0.4 MG CAP    TAKE 1 CAPSULE BY MOUTH ONCE DAILY   Changed and/or Refilled Medications    Modified Medication Previous Medication    FLUTICASONE PROPIONATE (FLONASE) 50 MCG/ACTUATION NASAL SPRAY fluticasone (FLONASE) 50 mcg/actuation nasal spray       2 sprays (100 mcg total) by Each Nostril route daily as needed for Rhinitis.    1 spray (50 mcg total) by Each Nare route 2 (two) times daily as needed for Rhinitis.   Discontinued Medications    OXYCODONE-ACETAMINOPHEN (PERCOCET)  MG PER TABLET    Take 1 tablet by mouth every 6 (six) hours as needed.    PROMETHAZINE (PHENERGAN) 25 MG TABLET    Take 1 tablet (25 mg total) by mouth every 6 (six) hours as needed for Nausea.    SILDENAFIL (REVATIO) 20 MG TAB    Take 5 po 1 hour before sexual activity    TRAZODONE (DESYREL) 100 MG TABLET    1 pill PO PRN for insomnia at bedtime. .     Korey was seen today for follow-up.    Diagnoses and all orders for this visit:    Carpal tunnel syndrome of left wrist  -     X-Ray Wrist Complete Left; Future  -     Ambulatory referral to Orthopedics    Osteoarthritis of left wrist, unspecified osteoarthritis type  -     X-Ray Wrist Complete Left; Future  -     Ambulatory referral to Orthopedics    Hypertension, essential  -     Comprehensive metabolic panel; Standing    Hyperlipidemia, unspecified hyperlipidemia type  -     Lipid panel; Standing    Elevated PSA  -     Ambulatory referral to Urology    Colon cancer screening  -     Case request GI: COLONOSCOPY    Polyp of colon, unspecified part of colon, unspecified type  -     Case request GI: COLONOSCOPY    Chronic rhinitis  -     fluticasone propionate (FLONASE) 50 mcg/actuation nasal spray; 2 sprays (100 mcg total) by Each Nostril route daily  as needed for Rhinitis.      See meds, orders, follow up, routing and instructions sections of encounter.  This is a 78-year-old gentleman who has lost to follow up.  He presented with a   puncture wound to the left wrist, seen at the Emergency Room three weeks ago.    He states he developed subsequently some swelling to the left thenar area   dorsally approximately a week after the injury and also some radiating pain to   the middle digit of the aforementioned hand.  No motor loss.  No fever or   chills.  No redness.  No warmth.    He is overdue for health maintenance.  Several items were not addressed by the   patient including colonoscopy and followups with Urology for elevated PSA.  We   will place those referrals and make those recommendations at this time.  The   patient states he would think about pursuing those followups.    As far as his hand is concerned, there is some slight prominence of the snuffbox   to the left wrist while it is not particularly tender.  Range of motion is   symmetric with the unaffected side.  Finkelstein test is negative.  His   neurovascular examination is normal.  Radial pulses intact.  He has a slightly   evident healing pinpoint wound to the radial aspect and no intrinsic hand   atrophy was noted.    X-ray at the patient's request.  I did offer a referral to the hand surgical   specialist for ? carpal tunnel syndrome.  The patient declined at this time.  I   did discuss colonoscopy, laboratory, etc. with him and proposed 6 and 12-month   followup.      VIKASH/ODALIS  dd: 08/29/2019 12:42:09 (CDT)  td: 08/30/2019 07:43:28 (CDT)  Doc ID   #[id number]  Job ID #[id number]    CC:

## 2019-08-30 ENCOUNTER — TELEPHONE (OUTPATIENT)
Dept: INTERNAL MEDICINE | Facility: CLINIC | Age: 79
End: 2019-08-30

## 2019-08-30 NOTE — TELEPHONE ENCOUNTER
Left voice message for patient to schedule appointment from referral to [facility].  Dusty COX  [phone]

## 2019-10-22 RX ORDER — TAMSULOSIN HYDROCHLORIDE 0.4 MG/1
1 CAPSULE ORAL DAILY
Qty: 90 CAPSULE | Refills: 0 | Status: SHIPPED | OUTPATIENT
Start: 2019-10-22 | End: 2020-02-24 | Stop reason: SDUPTHER

## 2019-10-22 NOTE — TELEPHONE ENCOUNTER
----- Message from Luna Farooq sent at 10/22/2019 10:38 AM CDT -----  Contact: Patient [phone]  Prescription Request:     Name of medication: tamsulosin (FLOMAX) 0.4 mg Cap    Reason for request: Refill    Pharmacy: [facility] [address] [place] [address] [facility]    Requesting 90 day supply.    Thank You

## 2019-12-03 ENCOUNTER — TELEPHONE (OUTPATIENT)
Dept: INTERNAL MEDICINE | Facility: CLINIC | Age: 79
End: 2019-12-03

## 2019-12-04 ENCOUNTER — OFFICE VISIT (OUTPATIENT)
Dept: INTERNAL MEDICINE | Facility: CLINIC | Age: 79
End: 2019-12-04
Payer: MEDICARE

## 2019-12-04 ENCOUNTER — OFFICE VISIT (OUTPATIENT)
Dept: INTERNAL MEDICINE | Facility: CLINIC | Age: 79
End: 2019-12-04
Attending: FAMILY MEDICINE
Payer: MEDICARE

## 2019-12-04 VITALS
BODY MASS INDEX: 33.4 KG/M2 | HEART RATE: 66 BPM | SYSTOLIC BLOOD PRESSURE: 126 MMHG | HEIGHT: 71 IN | DIASTOLIC BLOOD PRESSURE: 60 MMHG | WEIGHT: 238.56 LBS | OXYGEN SATURATION: 97 % | TEMPERATURE: 98 F

## 2019-12-04 VITALS
HEART RATE: 59 BPM | SYSTOLIC BLOOD PRESSURE: 134 MMHG | HEIGHT: 71 IN | WEIGHT: 237.63 LBS | DIASTOLIC BLOOD PRESSURE: 80 MMHG | BODY MASS INDEX: 33.27 KG/M2

## 2019-12-04 DIAGNOSIS — E66.09 CLASS 1 OBESITY DUE TO EXCESS CALORIES WITH SERIOUS COMORBIDITY IN ADULT, UNSPECIFIED BMI: ICD-10-CM

## 2019-12-04 DIAGNOSIS — M47.816 LUMBAR FACET ARTHROPATHY: ICD-10-CM

## 2019-12-04 DIAGNOSIS — M48.061 SPINAL STENOSIS OF LUMBAR REGION, UNSPECIFIED WHETHER NEUROGENIC CLAUDICATION PRESENT: ICD-10-CM

## 2019-12-04 DIAGNOSIS — D17.9 LIPOMA, UNSPECIFIED SITE: ICD-10-CM

## 2019-12-04 DIAGNOSIS — Z00.00 ENCOUNTER FOR PREVENTIVE HEALTH EXAMINATION: Primary | ICD-10-CM

## 2019-12-04 DIAGNOSIS — I10 HYPERTENSION, ESSENTIAL: ICD-10-CM

## 2019-12-04 DIAGNOSIS — N40.1 BPH WITH URINARY OBSTRUCTION: ICD-10-CM

## 2019-12-04 DIAGNOSIS — M47.816 LUMBAR SPONDYLOSIS: ICD-10-CM

## 2019-12-04 DIAGNOSIS — K63.5 POLYP OF COLON, UNSPECIFIED PART OF COLON, UNSPECIFIED TYPE: ICD-10-CM

## 2019-12-04 DIAGNOSIS — K21.9 LARYNGOPHARYNGEAL REFLUX: ICD-10-CM

## 2019-12-04 DIAGNOSIS — N13.8 BPH WITH URINARY OBSTRUCTION: ICD-10-CM

## 2019-12-04 DIAGNOSIS — R22.1 NECK MASS: ICD-10-CM

## 2019-12-04 DIAGNOSIS — E78.5 HYPERLIPIDEMIA, UNSPECIFIED HYPERLIPIDEMIA TYPE: ICD-10-CM

## 2019-12-04 DIAGNOSIS — K40.90 UNILATERAL INGUINAL HERNIA WITHOUT OBSTRUCTION OR GANGRENE, RECURRENCE NOT SPECIFIED: ICD-10-CM

## 2019-12-04 DIAGNOSIS — R97.20 ELEVATED PSA: ICD-10-CM

## 2019-12-04 DIAGNOSIS — M17.12 PRIMARY OSTEOARTHRITIS OF LEFT KNEE: ICD-10-CM

## 2019-12-04 DIAGNOSIS — Z12.11 COLON CANCER SCREENING: ICD-10-CM

## 2019-12-04 DIAGNOSIS — Z96.652 STATUS POST TOTAL LEFT KNEE REPLACEMENT: ICD-10-CM

## 2019-12-04 DIAGNOSIS — Z00.00 ANNUAL PHYSICAL EXAM: Primary | ICD-10-CM

## 2019-12-04 PROCEDURE — 3079F PR MOST RECENT DIASTOLIC BLOOD PRESSURE 80-89 MM HG: ICD-10-PCS | Mod: HCNC,CPTII,S$GLB, | Performed by: NURSE PRACTITIONER

## 2019-12-04 PROCEDURE — 99999 PR PBB SHADOW E&M-EST. PATIENT-LVL V: CPT | Mod: PBBFAC,HCNC,, | Performed by: FAMILY MEDICINE

## 2019-12-04 PROCEDURE — 3075F PR MOST RECENT SYSTOLIC BLOOD PRESS GE 130-139MM HG: ICD-10-PCS | Mod: HCNC,CPTII,S$GLB, | Performed by: NURSE PRACTITIONER

## 2019-12-04 PROCEDURE — 99999 PR PBB SHADOW E&M-EST. PATIENT-LVL V: ICD-10-PCS | Mod: PBBFAC,HCNC,, | Performed by: FAMILY MEDICINE

## 2019-12-04 PROCEDURE — G0439 PPPS, SUBSEQ VISIT: HCPCS | Mod: HCNC,S$GLB,, | Performed by: NURSE PRACTITIONER

## 2019-12-04 PROCEDURE — G0439 PR MEDICARE ANNUAL WELLNESS SUBSEQUENT VISIT: ICD-10-PCS | Mod: HCNC,S$GLB,, | Performed by: NURSE PRACTITIONER

## 2019-12-04 PROCEDURE — 3074F PR MOST RECENT SYSTOLIC BLOOD PRESSURE < 130 MM HG: ICD-10-PCS | Mod: HCNC,CPTII,S$GLB, | Performed by: FAMILY MEDICINE

## 2019-12-04 PROCEDURE — 3075F SYST BP GE 130 - 139MM HG: CPT | Mod: HCNC,CPTII,S$GLB, | Performed by: NURSE PRACTITIONER

## 2019-12-04 PROCEDURE — 99999 PR PBB SHADOW E&M-EST. PATIENT-LVL IV: CPT | Mod: PBBFAC,HCNC,, | Performed by: NURSE PRACTITIONER

## 2019-12-04 PROCEDURE — 3074F SYST BP LT 130 MM HG: CPT | Mod: HCNC,CPTII,S$GLB, | Performed by: FAMILY MEDICINE

## 2019-12-04 PROCEDURE — 99397 PR PREVENTIVE VISIT,EST,65 & OVER: ICD-10-PCS | Mod: HCNC,S$GLB,, | Performed by: FAMILY MEDICINE

## 2019-12-04 PROCEDURE — 3078F DIAST BP <80 MM HG: CPT | Mod: HCNC,CPTII,S$GLB, | Performed by: FAMILY MEDICINE

## 2019-12-04 PROCEDURE — 99397 PER PM REEVAL EST PAT 65+ YR: CPT | Mod: HCNC,S$GLB,, | Performed by: FAMILY MEDICINE

## 2019-12-04 PROCEDURE — 3079F DIAST BP 80-89 MM HG: CPT | Mod: HCNC,CPTII,S$GLB, | Performed by: NURSE PRACTITIONER

## 2019-12-04 PROCEDURE — 99999 PR PBB SHADOW E&M-EST. PATIENT-LVL IV: ICD-10-PCS | Mod: PBBFAC,HCNC,, | Performed by: NURSE PRACTITIONER

## 2019-12-04 PROCEDURE — 3078F PR MOST RECENT DIASTOLIC BLOOD PRESSURE < 80 MM HG: ICD-10-PCS | Mod: HCNC,CPTII,S$GLB, | Performed by: FAMILY MEDICINE

## 2019-12-04 RX ORDER — TRAZODONE HYDROCHLORIDE 50 MG/1
50 TABLET ORAL NIGHTLY
Qty: 30 TABLET | Refills: 1 | Status: SHIPPED | OUTPATIENT
Start: 2019-12-04 | End: 2022-06-14

## 2019-12-04 RX ORDER — AZELASTINE 1 MG/ML
1 SPRAY, METERED NASAL 2 TIMES DAILY PRN
Qty: 30 ML | Refills: 0 | Status: SHIPPED | OUTPATIENT
Start: 2019-12-04

## 2019-12-04 NOTE — PATIENT INSTRUCTIONS
If not contacted in a couple weeks by colonoscopy scheduling department - call Colonoscopy Scheduling Number - [phone].    Information about cholesterol, high blood pressure and healthy diet and activity recommendations can be found at the following links on the Internet:    http://www.nhlbi.nih.gov/health/health-topics/topics/hbc  http://www.nhlbi.nih.gov/health/educational/lose_wt/index.htm  Http://www.nhlbi.nih.gov/files/docs/public/heart/hbp_low.pdf  http://www.heart.org/HEARTORG/  http://diabetes.org/  https://www.cdc.gov/  Https://healthfinder.gov/  https://health.gov/dietaryguidelines/2015/guidelines/  https://health.gov/paguidelines/second-edition/pdf/Physical_Activity_Guidelines_2nd_edition.pdf

## 2019-12-04 NOTE — PATIENT INSTRUCTIONS
Counseling and Referral of Other Preventative  (Italic type indicates deductible and co-insurance are waived)    Patient Name: Korey Santos  Today's Date: 12/4/2019    Health Maintenance       Date Due Completion Date    Colonoscopy 02/14/2016 2/14/2011    Shingles Vaccine (1 of 2) 12/04/2020 (Originally 10/7/1991) Obtain new shingles vaccine - SHINGRIX - when available    Influenza Vaccine (1) 12/04/2020 (Originally 9/1/2019) 10/2/2008    Lipid Panel 08/29/2024 8/29/2019    TETANUS VACCINE 08/13/2029 8/13/2019        No orders of the defined types were placed in this encounter.    The following information is provided to all patients.  This information is to help you find resources for any of the problems found today that may be affecting your health:                Living healthy guide: www.[facility].[place].gov      Understanding Diabetes: www.diabetes.org      Eating healthy: www.cdc.gov/healthyweight      CDC home safety checklist: www.cdc.gov/steadi/patient.html      Agency on Aging: www.goea.[place].[facility]      Alcoholics anonymous (AA): www.aa.org      Physical Activity: www.ruthann.nih.gov/ei9beub      Tobacco use: www.quitwithusla.org

## 2019-12-04 NOTE — PROGRESS NOTES
"Korey Santos presented for a  Medicare AWV and comprehensive Health Risk Assessment today. The following components were reviewed and updated:    · Medical history  · Family History  · Social history  · Allergies and Current Medications  · Health Risk Assessment  · Health Maintenance  · Care Team     ** See Completed Assessments for Annual Wellness Visit within the encounter summary.**       The following assessments were completed:  · Living Situation  · CAGE  · Depression Screening  · Timed Get Up and Go  · Whisper Test  · Cognitive Function Screening      ·   · Nutrition Screening  · ADL Screening  · PAQ Screening    Vitals:    12/04/19 1035   BP: 134/80   BP Location: Right arm   Pulse: (!) 59   Weight: 107.8 kg (237 lb 10.5 oz)   Height: 5' 11" (1.803 m)     Body mass index is 33.15 kg/m².  Physical Exam   Constitutional: He is oriented to person, place, and time. He appears well-developed and well-nourished.   HENT:   Head: Normocephalic.   Cardiovascular: Normal rate and regular rhythm.   Pulmonary/Chest: Effort normal and breath sounds normal.   Abdominal: Soft. Bowel sounds are normal.   Musculoskeletal: Normal range of motion.   1+ edema LLL, trace edema RLL   Neurological: He is alert and oriented to person, place, and time.   Skin: Skin is warm and dry.   Approximate 3.5 x 3.5 hardened mass posterior neck, freely moveable, no pain with palpation, no erythema   Psychiatric: He has a normal mood and affect.   Nursing note and vitals reviewed.        Diagnoses and health risks identified today and associated recommendations/orders:    1. Encounter for preventive health examination  Here for Health Risk Assessment/Annual Wellness Visit.  Health maintenance reviewed and updated. Follow up in one year.  Declined all immunizations.  Declined colonoscopy - reports he has no one to stay with him after procedure.    2. Hypertension, essential  Chronic, reports he is taking medication PRN. Followed by " PCP.    3. Hyperlipidemia, unspecified hyperlipidemia type  Chronic, reports he is taking medication PRN. Followed by PCP.    4. BPH with urinary obstruction  Chronic, stable on current medication. Followed by PCP, Urology.    5. Elevated PSA  Chronic, stable. Followed by PCP, Urology.    6. Class 1 obesity due to excess calories with serious comorbidity in adult, unspecified BMI  Chronic, reinforced diet. Reports he is exercising at gym 3 times weekly. Followed by PCP.    7. Polyp of colon, unspecified part of colon, unspecified type  Stable. Declines colonoscopy - reports he has no one to stay with him.. Followed by PCP.    8. Laryngopharyngeal reflux  Chronic, stable. Followed by PCP.    9. Primary osteoarthritis of left knee  Chronic, reporting persistent bilateral knee pain. Followed by PCP, Orthopedics.    10. Status post total left knee replacement  Stable. Followed by PCP, Orthopedics.    11. Lumbar facet arthropathy  Chronic, stable. Followed by PCP.    12. Laryngopharyngeal reflux  Chronic, stable. Followed by PCP.    13. Lumbar spondylosis  Chronic, stable. Followed by PCP.    14. Spinal stenosis of lumbar region, unspecified whether neurogenic claudication present  Chronic, stable. Followed by PCP.    15. Neck mass  Chronic, requesting referral to have removed. Has appointment with PCP today - advised to discuss with him.      Provided Korey with a 5-10 year written screening schedule and personal prevention plan. Recommendations were developed using the USPSTF age appropriate recommendations. Education, counseling, and referrals were provided as needed. After Visit Summary printed and given to patient which includes a list of additional screenings\tests needed.    Follow up today (on 12/4/2019).with PCP    Miriam Burr NP

## 2019-12-04 NOTE — PROGRESS NOTES
Subjective:       Patient ID: Korey Santos is a 79 y.o. male.    Chief Complaint: Annual Exam    Established patient for an annual wellness check/physical exam and also chronic disease management. Specific complaints - see dictation and please see ROS.  P, S, Fm, Soc Hx's; Meds, allergies reviewed and reconciled.  Health maintenance file reviewed and addressed items due.    Concerned for CVD and would like to be stress tested. 3 friends had MI's this year. He has no exertional sx.       Review of Systems   Constitutional: Negative for chills, fatigue, fever and unexpected weight change.   HENT: Positive for rhinorrhea. Negative for congestion and trouble swallowing.    Eyes: Negative for redness and visual disturbance.   Respiratory: Negative for cough, chest tightness and shortness of breath.    Cardiovascular: Negative for chest pain, palpitations and leg swelling.   Gastrointestinal: Negative for abdominal pain and blood in stool.   Genitourinary: Negative for difficulty urinating and hematuria.   Musculoskeletal: Positive for arthralgias and gait problem. Negative for back pain, joint swelling, myalgias and neck pain.   Skin: Negative for color change and rash.   Neurological: Negative for tremors, speech difficulty, weakness, numbness and headaches.   Hematological: Negative for adenopathy. Does not bruise/bleed easily.   Psychiatric/Behavioral: Negative for behavioral problems, confusion and sleep disturbance. The patient is not nervous/anxious.        Objective:      Physical Exam   Constitutional: He appears well-developed and well-nourished.   HENT:   Head: Normocephalic.   Right Ear: Tympanic membrane, external ear and ear canal normal.   Left Ear: Tympanic membrane, external ear and ear canal normal.   Mouth/Throat: Oropharynx is clear and moist.   Eyes: Pupils are equal, round, and reactive to light.   Neck: Normal range of motion. Neck supple. Carotid bruit is not present. No thyromegaly present.    Cardiovascular: Normal rate, regular rhythm, normal heart sounds and intact distal pulses. Exam reveals no gallop and no friction rub.   No murmur heard.  Pulmonary/Chest: Effort normal and breath sounds normal.   Abdominal: Soft. He exhibits no distension and no mass. There is no tenderness. There is no rebound and no guarding. A hernia is present. Hernia confirmed positive in the left inguinal area.   Musculoskeletal: Normal range of motion. He exhibits no edema or tenderness.   Lymphadenopathy:     He has no cervical adenopathy.   Neurological: He is alert. He has normal strength. He displays normal reflexes. No cranial nerve deficit or sensory deficit. Coordination and gait normal.   Skin: Skin is warm and dry.        Psychiatric: He has a normal mood and affect. His behavior is normal. Judgment and thought content normal.   Nursing note and vitals reviewed.      Assessment:       1. Annual physical exam    2. Hypertension, essential    3. Hyperlipidemia, unspecified hyperlipidemia type    4. Lumbar spondylosis    5. BPH with urinary obstruction    6. Elevated PSA    7. Polyp of colon, unspecified part of colon, unspecified type    8. Colon cancer screening    9. Lipoma, unspecified site    10. Unilateral inguinal hernia without obstruction or gangrene, recurrence not specified        Plan:     Medication List with Changes/Refills   New Medications    AZELASTINE (ASTELIN) 137 MCG (0.1 %) NASAL SPRAY    1 spray (137 mcg total) by Nasal route 2 (two) times daily as needed for Rhinitis.    TRAZODONE (DESYREL) 50 MG TABLET    Take 1 tablet (50 mg total) by mouth every evening.   Current Medications    ASPIRIN (ECOTRIN) 325 MG EC TABLET    Take 1 tablet (325 mg total) by mouth once daily.    FLUTICASONE PROPIONATE (FLONASE) 50 MCG/ACTUATION NASAL SPRAY    2 sprays (100 mcg total) by Each Nostril route daily as needed for Rhinitis.    LINACLOTIDE (LINZESS) 145 MCG CAP CAPSULE    Take 1 capsule (145 mcg total) by  mouth daily as needed.    MULTIVITAMIN CAPSULE    Take 1 capsule by mouth once daily.    PRAVASTATIN (PRAVACHOL) 40 MG TABLET    Take 1 tablet (40 mg total) by mouth once daily.    TAMSULOSIN (FLOMAX) 0.4 MG CAP    Take 1 capsule (0.4 mg total) by mouth once daily.   Discontinued Medications    LOSARTAN (COZAAR) 25 MG TABLET    25 mg daily as needed.     NAPROXEN (NAPROSYN) 500 MG TABLET    Take 1 tablet (500 mg total) by mouth 2 (two) times daily with meals.    POLYETHYLENE GLYCOL (GLYCOLAX) 17 GRAM/DOSE POWDER    Take 17 g by mouth daily as needed.     Korey was seen today for annual exam.    Diagnoses and all orders for this visit:    Annual physical exam    Hypertension, essential  -     Stress Echo Which stress agent will be used? Treadmill Exercise; Future    Hyperlipidemia, unspecified hyperlipidemia type  -     Stress Echo Which stress agent will be used? Treadmill Exercise; Future    Lumbar spondylosis    BPH with urinary obstruction  -     Ambulatory referral to Urology    Elevated PSA  -     Ambulatory referral to Urology    Polyp of colon, unspecified part of colon, unspecified type  -     Case request GI: COLONOSCOPY    Colon cancer screening  -     Case request GI: COLONOSCOPY    Lipoma, unspecified site  -     Ambulatory referral to General Surgery    Unilateral inguinal hernia without obstruction or gangrene, recurrence not specified  -     Ambulatory referral to General Surgery    Other orders  -     azelastine (ASTELIN) 137 mcg (0.1 %) nasal spray; 1 spray (137 mcg total) by Nasal route 2 (two) times daily as needed for Rhinitis.  -     traZODone (DESYREL) 50 MG tablet; Take 1 tablet (50 mg total) by mouth every evening.      See meds, orders, follow up, routing and instructions sections of encounter and AVS. Discussed with patient and provided on AVS.

## 2019-12-04 NOTE — PROGRESS NOTES
I offered to discuss end of life issues, including information on how to make advance directives that the patient could use to name someone who would make medical decisions on their behalf if they became too ill to make themselves.    ___Patient declined  _X_Advanced Directives on file.

## 2019-12-08 ENCOUNTER — PATIENT OUTREACH (OUTPATIENT)
Dept: ADMINISTRATIVE | Facility: OTHER | Age: 79
End: 2019-12-08

## 2019-12-10 ENCOUNTER — HOSPITAL ENCOUNTER (OUTPATIENT)
Dept: CARDIOLOGY | Facility: CLINIC | Age: 79
Discharge: HOME | End: 2019-12-10
Attending: FAMILY MEDICINE
Payer: MEDICARE

## 2019-12-10 ENCOUNTER — OFFICE VISIT (OUTPATIENT)
Dept: SURGERY | Facility: CLINIC | Age: 79
End: 2019-12-10
Attending: FAMILY MEDICINE
Payer: MEDICARE

## 2019-12-10 VITALS
TEMPERATURE: 98 F | HEIGHT: 71 IN | SYSTOLIC BLOOD PRESSURE: 161 MMHG | HEART RATE: 60 BPM | BODY MASS INDEX: 33.32 KG/M2 | DIASTOLIC BLOOD PRESSURE: 76 MMHG | WEIGHT: 238 LBS

## 2019-12-10 VITALS — HEIGHT: 71 IN | BODY MASS INDEX: 32.2 KG/M2 | WEIGHT: 230 LBS

## 2019-12-10 DIAGNOSIS — E78.5 HYPERLIPIDEMIA, UNSPECIFIED HYPERLIPIDEMIA TYPE: ICD-10-CM

## 2019-12-10 DIAGNOSIS — I10 HYPERTENSION, ESSENTIAL: ICD-10-CM

## 2019-12-10 DIAGNOSIS — D17.0 LIPOMA OF NECK: Primary | ICD-10-CM

## 2019-12-10 LAB
ASCENDING AORTA: 3.96 CM
AV INDEX (PROSTH): 1.12
AV MEAN GRADIENT: 2 MMHG
AV PEAK GRADIENT: 2 MMHG
AV VALVE AREA: 3.88 CM2
AV VELOCITY RATIO: 1.05
BSA FOR ECHO PROCEDURE: 2.29 M2
CV ECHO LV RWT: 0.33 CM
CV STRESS BASE HR: 58 BPM
DIASTOLIC BLOOD PRESSURE: 96 MMHG
DOP CALC AO PEAK VEL: 0.79 M/S
DOP CALC AO VTI: 17.75 CM
DOP CALC LVOT AREA: 3.5 CM2
DOP CALC LVOT DIAMETER: 2.1 CM
DOP CALC LVOT PEAK VEL: 0.83 M/S
DOP CALC LVOT STROKE VOLUME: 68.93 CM3
DOP CALCLVOT PEAK VEL VTI: 19.91 CM
E WAVE DECELERATION TIME: 316.42 MSEC
E/A RATIO: 0.64
E/E' RATIO: 8.92 M/S
ECHO LV POSTERIOR WALL: 0.85 CM (ref 0.6–1.1)
FRACTIONAL SHORTENING: 35 % (ref 28–44)
INTERVENTRICULAR SEPTUM: 0.91 CM (ref 0.6–1.1)
IVRT: 0.18 MSEC
LA MAJOR: 5.19 CM
LA MINOR: 4.78 CM
LA WIDTH: 4.35 CM
LEFT ATRIUM SIZE: 4.7 CM
LEFT ATRIUM VOLUME INDEX: 38.6 ML/M2
LEFT ATRIUM VOLUME: 86.48 CM3
LEFT INTERNAL DIMENSION IN SYSTOLE: 3.37 CM (ref 2.1–4)
LEFT VENTRICLE DIASTOLIC VOLUME INDEX: 57.43 ML/M2
LEFT VENTRICLE DIASTOLIC VOLUME: 128.53 ML
LEFT VENTRICLE MASS INDEX: 73 G/M2
LEFT VENTRICLE SYSTOLIC VOLUME INDEX: 20.7 ML/M2
LEFT VENTRICLE SYSTOLIC VOLUME: 46.39 ML
LEFT VENTRICULAR INTERNAL DIMENSION IN DIASTOLE: 5.18 CM (ref 3.5–6)
LEFT VENTRICULAR MASS: 163.06 G
LV LATERAL E/E' RATIO: 8.29 M/S
LV SEPTAL E/E' RATIO: 9.67 M/S
MV PEAK A VEL: 0.91 M/S
MV PEAK E VEL: 0.58 M/S
OHS CV CPX 1 MINUTE RECOVERY HEART RATE: 98 BPM
OHS CV CPX 85 PERCENT MAX PREDICTED HEART RATE MALE: 121
OHS CV CPX ESTIMATED METS: 15
OHS CV CPX MAX PREDICTED HEART RATE: 142
OHS CV CPX PATIENT IS FEMALE: 0
OHS CV CPX PATIENT IS MALE: 1
OHS CV CPX PEAK DIASTOLIC BLOOD PRESSURE: 94 MMHG
OHS CV CPX PEAK HEAR RATE: 133 BPM
OHS CV CPX PEAK RATE PRESSURE PRODUCT: NORMAL
OHS CV CPX PEAK SYSTOLIC BLOOD PRESSURE: 215 MMHG
OHS CV CPX PERCENT MAX PREDICTED HEART RATE ACHIEVED: 94
OHS CV CPX RATE PRESSURE PRODUCT PRESENTING: 9860
PULM VEIN S/D RATIO: 1.97
PV PEAK D VEL: 0.34 M/S
PV PEAK S VEL: 0.67 M/S
RA MAJOR: 4.62 CM
RA PRESSURE: 3 MMHG
RA WIDTH: 3.38 CM
RIGHT VENTRICULAR END-DIASTOLIC DIMENSION: 3.09 CM
RV TISSUE DOPPLER FREE WALL SYSTOLIC VELOCITY 1 (APICAL 4 CHAMBER VIEW): 13.28 CM/S
SINUS: 3.84 CM
STJ: 3.75 CM
STRESS ECHO POST EXERCISE DUR MIN: 9 MINUTES
STRESS ECHO POST EXERCISE DUR SEC: 0 SECONDS
SYSTOLIC BLOOD PRESSURE: 170 MMHG
TDI LATERAL: 0.07 M/S
TDI SEPTAL: 0.06 M/S
TDI: 0.07 M/S
TRICUSPID ANNULAR PLANE SYSTOLIC EXCURSION: 1.98 CM

## 2019-12-10 PROCEDURE — 99999 PR PBB SHADOW E&M-EST. PATIENT-LVL III: ICD-10-PCS | Mod: PBBFAC,HCNC,, | Performed by: SURGERY

## 2019-12-10 PROCEDURE — 93351 STRESS ECHO (CUPID ONLY): ICD-10-PCS | Mod: HCNC,S$GLB,, | Performed by: INTERNAL MEDICINE

## 2019-12-10 PROCEDURE — 99203 OFFICE O/P NEW LOW 30 MIN: CPT | Mod: HCNC,S$GLB,, | Performed by: SURGERY

## 2019-12-10 PROCEDURE — 1125F AMNT PAIN NOTED PAIN PRSNT: CPT | Mod: HCNC,S$GLB,, | Performed by: SURGERY

## 2019-12-10 PROCEDURE — 3077F PR MOST RECENT SYSTOLIC BLOOD PRESSURE >= 140 MM HG: ICD-10-PCS | Mod: HCNC,CPTII,S$GLB, | Performed by: SURGERY

## 2019-12-10 PROCEDURE — 3078F DIAST BP <80 MM HG: CPT | Mod: HCNC,CPTII,S$GLB, | Performed by: SURGERY

## 2019-12-10 PROCEDURE — 1101F PR PT FALLS ASSESS DOC 0-1 FALLS W/OUT INJ PAST YR: ICD-10-PCS | Mod: HCNC,CPTII,S$GLB, | Performed by: SURGERY

## 2019-12-10 PROCEDURE — 3078F PR MOST RECENT DIASTOLIC BLOOD PRESSURE < 80 MM HG: ICD-10-PCS | Mod: HCNC,CPTII,S$GLB, | Performed by: SURGERY

## 2019-12-10 PROCEDURE — 1159F MED LIST DOCD IN RCRD: CPT | Mod: HCNC,S$GLB,, | Performed by: SURGERY

## 2019-12-10 PROCEDURE — 3077F SYST BP >= 140 MM HG: CPT | Mod: HCNC,CPTII,S$GLB, | Performed by: SURGERY

## 2019-12-10 PROCEDURE — 99999 PR PBB SHADOW E&M-EST. PATIENT-LVL III: CPT | Mod: PBBFAC,HCNC,, | Performed by: SURGERY

## 2019-12-10 PROCEDURE — 1101F PT FALLS ASSESS-DOCD LE1/YR: CPT | Mod: HCNC,CPTII,S$GLB, | Performed by: SURGERY

## 2019-12-10 PROCEDURE — 99203 PR OFFICE/OUTPT VISIT, NEW, LEVL III, 30-44 MIN: ICD-10-PCS | Mod: HCNC,S$GLB,, | Performed by: SURGERY

## 2019-12-10 PROCEDURE — 1125F PR PAIN SEVERITY QUANTIFIED, PAIN PRESENT: ICD-10-PCS | Mod: HCNC,S$GLB,, | Performed by: SURGERY

## 2019-12-10 PROCEDURE — 1159F PR MEDICATION LIST DOCUMENTED IN MEDICAL RECORD: ICD-10-PCS | Mod: HCNC,S$GLB,, | Performed by: SURGERY

## 2019-12-10 PROCEDURE — 93351 STRESS TTE COMPLETE: CPT | Mod: HCNC,S$GLB,, | Performed by: INTERNAL MEDICINE

## 2019-12-10 NOTE — PROGRESS NOTES
"History & Physical  General Surgery    SUBJECTIVE:     History of Present Illness:  Patient is a 79 y.o. male presents to clinic for evaluation of lipoma on back of neck x 5 years. Pt reports he has had this "lump" for quite some. He notes some neck stiffness that is worse in the morning. Pt had a friend with a similar presentation who had it removed so now he wants to discuss removal. Pt denies HA, neck pain, change in vision, difficulty swallowing, decreased ROM, voice change, sore throat and fever.     Chief Complaint   Patient presents with    Consult       Review of patient's allergies indicates:   Allergen Reactions    Clindamycin Swelling     Throat swells    Lisinopril Swelling and Other (See Comments)     Throat swelling       Current Outpatient Medications   Medication Sig Dispense Refill    azelastine (ASTELIN) 137 mcg (0.1 %) nasal spray 1 spray (137 mcg total) by Nasal route 2 (two) times daily as needed for Rhinitis. 30 mL 0    fluticasone propionate (FLONASE) 50 mcg/actuation nasal spray 2 sprays (100 mcg total) by Each Nostril route daily as needed for Rhinitis. 15 g 5    linaclotide (LINZESS) 145 mcg Cap capsule Take 1 capsule (145 mcg total) by mouth daily as needed. 30 capsule 6    multivitamin capsule Take 1 capsule by mouth once daily.      pravastatin (PRAVACHOL) 40 MG tablet Take 1 tablet (40 mg total) by mouth once daily. (Patient taking differently: Take 40 mg by mouth nightly as needed. ) 30 tablet 11    tamsulosin (FLOMAX) 0.4 mg Cap Take 1 capsule (0.4 mg total) by mouth once daily. 90 capsule 0    traZODone (DESYREL) 50 MG tablet Take 1 tablet (50 mg total) by mouth every evening. 30 tablet 1    aspirin (ECOTRIN) 325 MG EC tablet Take 1 tablet (325 mg total) by mouth once daily. (Patient taking differently: Take 325 mg by mouth every other day. ) 42 tablet 0     No current facility-administered medications for this visit.        Past Medical History:   Diagnosis Date    " Arthritis     Back pain, chronic     BPH with urinary obstruction     Chronic constipation     Colon polyp     DJD (degenerative joint disease)     Hip    Hyperlipidemia     Hypertension     Laryngopharyngeal reflux     Left inguinal hernia     Lumbar herniated disc     Lumbar stenosis     OA (osteoarthritis) of knee     Bilateral    Paradoxical insomnia     Sinus trouble      Past Surgical History:   Procedure Laterality Date    BACK SURGERY  2014    COLONOSCOPY      JOINT REPLACEMENT Left 05/04/2018    KNEE SURGERY      left hand      LEG SURGERY      SPINE SURGERY      UPPER GASTROINTESTINAL ENDOSCOPY       Family History   Problem Relation Age of Onset    Cancer Father         lung or smoking    No Known Problems Mother     No Known Problems Sister     No Known Problems Brother     No Known Problems Maternal Aunt     No Known Problems Maternal Uncle     No Known Problems Paternal Aunt     No Known Problems Paternal Uncle     No Known Problems Maternal Grandmother     No Known Problems Maternal Grandfather     No Known Problems Paternal Grandmother     No Known Problems Paternal Grandfather     No Known Problems Daughter     No Known Problems Son     Glaucoma Neg Hx     Diabetes Neg Hx     Amblyopia Neg Hx     Blindness Neg Hx     Cataracts Neg Hx     Hypertension Neg Hx     Macular degeneration Neg Hx     Retinal detachment Neg Hx     Strabismus Neg Hx     Stroke Neg Hx     Thyroid disease Neg Hx     Colon cancer Neg Hx     Esophageal cancer Neg Hx      Social History     Tobacco Use    Smoking status: Never Smoker    Smokeless tobacco: Never Used   Substance Use Topics    Alcohol use: Yes     Alcohol/week: 0.0 standard drinks     Comment: approximately 2 beers weekly    Drug use: No        Review of Systems:  Review of Systems   Constitutional: Negative for chills and fever.   HENT: Negative for sore throat, trouble swallowing and voice change.   "  Respiratory: Negative for cough and shortness of breath.    Cardiovascular: Negative for chest pain and palpitations.   Gastrointestinal: Negative for abdominal pain, blood in stool, nausea and vomiting.   Genitourinary: Negative for dysuria, frequency, scrotal swelling and testicular pain.   Musculoskeletal: Positive for neck stiffness. Negative for back pain and neck pain.   Skin: Negative for color change and wound.   Neurological: Negative for weakness and headaches.       OBJECTIVE:     Vital Signs (Most Recent)  Temp: 98.4 °F (36.9 °C) (12/10/19 1013)  Pulse: 60 (12/10/19 1013)  BP: (!) 161/76 (12/10/19 1013)  5' 11" (1.803 m)  108 kg (238 lb)     Physical Exam:  Physical Exam   Constitutional: He is oriented to person, place, and time. He appears well-developed and well-nourished.   HENT:   Head: Normocephalic.   Right Ear: External ear normal.   Left Ear: External ear normal.   Nose: Nose normal.   Mouth/Throat: Oropharynx is clear and moist.   Eyes: Pupils are equal, round, and reactive to light. No scleral icterus.   Neck: Normal range of motion. Neck supple. No thyromegaly present.   5cm soft mobile mass on back of neck   Cardiovascular: Normal rate, regular rhythm and normal heart sounds.   Pulmonary/Chest: Effort normal and breath sounds normal. No respiratory distress. He exhibits no tenderness.   Abdominal: Soft. He exhibits no distension. There is no tenderness.   Musculoskeletal: He exhibits no edema or deformity.   Lymphadenopathy:     He has no cervical adenopathy.   Neurological: He is alert and oriented to person, place, and time.   Skin: Skin is warm and dry. No rash noted. No erythema.   Psychiatric: He has a normal mood and affect. His behavior is normal.     ASSESSMENT/PLAN:     79 year old man with posterior neck lipoma. Desires removal.    PLAN:Plan   Plan for removal in minors.  Consent day of procedure.    Franky Patterson MD  Acute Care Surgery  [facility] Department of Surgery       "

## 2019-12-10 NOTE — LETTER
December 16, 2019      Juan Bustamante MD  [address]  [place] [address]           [facility] - General Surgery  [address]  [place] [address]  Phone: [phone]          Patient: Korey Santos   MR Number: [id number]   YOB: 1940   Date of Visit: 12/10/2019       Dear Dr. Juan Bustamante:    Thank you for referring Korey Santos to me for evaluation. Attached you will find relevant portions of my assessment and plan of care.    If you have questions, please do not hesitate to call me. I look forward to following Korey Santos along with you.    Sincerely,    Franky Patterson MD    Enclosure  CC:  No Recipients    If you would like to receive this communication electronically, please contact [email or web address] or [phone] to request more information on [employer] access.    For providers and/or their staff who would like to refer a patient to [facility], please contact us through our one-stop-shop provider referral line, [facility], at [phone].    If you feel you have received this communication in error or would no longer like to receive these types of communications, please e-mail [email or web address]

## 2019-12-11 ENCOUNTER — TELEPHONE (OUTPATIENT)
Dept: INTERNAL MEDICINE | Facility: CLINIC | Age: 79
End: 2019-12-11

## 2019-12-11 NOTE — TELEPHONE ENCOUNTER
----- Message from Lonny Calderon sent at 12/11/2019  9:10 AM CST -----  Contact: pt   Please call pt at [phone]    Refill request for a laxative     Use [facility] [address] [place] [address] [place] [phone] (Phone)  [phone] (Fax)    Thank you

## 2019-12-12 RX ORDER — POLYETHYLENE GLYCOL 3350 17 G/17G
17 POWDER, FOR SOLUTION ORAL DAILY PRN
Qty: 510 G | Refills: 1 | Status: SHIPPED | OUTPATIENT
Start: 2019-12-12 | End: 2024-01-05

## 2019-12-12 NOTE — TELEPHONE ENCOUNTER
Requested medication filled and sent by electronic prescription. Please call and notify the patient. Thank you.  MiraLax for constipation.    Our records indicate patient is due for a colonoscopy.  Discussed his recent visit.  Their number is [phone] for him to call for appointment.  Thank you

## 2019-12-12 NOTE — TELEPHONE ENCOUNTER
spoke to pt informing that requested medication refilled and sent by electronic prescription to the pharmacy. Advised pt to call pharmacy prior . Pt verbalized understanding    Also, advised pt of colonoscopy. Pt verbalized understanding and still has the phone number

## 2020-01-21 ENCOUNTER — PATIENT OUTREACH (OUTPATIENT)
Dept: ADMINISTRATIVE | Facility: OTHER | Age: 80
End: 2020-01-21

## 2020-01-21 NOTE — PROGRESS NOTES
Chart reviewed. Care Everywhere updates requested. Immunizations reconciled. HM updated.  Open GI request found

## 2020-01-22 ENCOUNTER — TELEPHONE (OUTPATIENT)
Dept: SURGERY | Facility: CLINIC | Age: 80
End: 2020-01-22

## 2020-01-22 NOTE — TELEPHONE ENCOUNTER
Left message on patient's voicemail that call was returned and for him to call back about rescheduling his Removal appt.  Direct phone number given for him to call back.

## 2020-01-22 NOTE — TELEPHONE ENCOUNTER
----- Message from Roya Puga sent at 1/22/2020  8:32 AM CST -----  Contact: Patient  Staff Message     Caller name: Pt     Reason for call: Pt wants to reschedule 01/28 procedure.        Communication Preference: [phone]    Additional Information:

## 2020-01-23 ENCOUNTER — OFFICE VISIT (OUTPATIENT)
Dept: OPTOMETRY | Facility: CLINIC | Age: 80
End: 2020-01-23
Payer: COMMERCIAL

## 2020-01-23 DIAGNOSIS — H04.123 DRY EYE SYNDROME OF BOTH EYES: ICD-10-CM

## 2020-01-23 DIAGNOSIS — H52.203 ASTIGMATISM OF BOTH EYES WITH PRESBYOPIA: Primary | ICD-10-CM

## 2020-01-23 DIAGNOSIS — H52.4 ASTIGMATISM OF BOTH EYES WITH PRESBYOPIA: Primary | ICD-10-CM

## 2020-01-23 DIAGNOSIS — H25.13 NUCLEAR SCLEROSIS OF BOTH EYES: ICD-10-CM

## 2020-01-23 PROCEDURE — 99999 PR PBB SHADOW E&M-EST. PATIENT-LVL II: CPT | Mod: PBBFAC,,, | Performed by: OPTOMETRIST

## 2020-01-23 PROCEDURE — 92015 DETERMINE REFRACTIVE STATE: CPT | Mod: S$GLB,,, | Performed by: OPTOMETRIST

## 2020-01-23 PROCEDURE — 92004 COMPRE OPH EXAM NEW PT 1/>: CPT | Mod: S$GLB,,, | Performed by: OPTOMETRIST

## 2020-01-23 PROCEDURE — 92004 PR EYE EXAM, NEW PATIENT,COMPREHESV: ICD-10-PCS | Mod: S$GLB,,, | Performed by: OPTOMETRIST

## 2020-01-23 PROCEDURE — 99999 PR PBB SHADOW E&M-EST. PATIENT-LVL II: ICD-10-PCS | Mod: PBBFAC,,, | Performed by: OPTOMETRIST

## 2020-01-23 PROCEDURE — 92015 PR REFRACTION: ICD-10-PCS | Mod: S$GLB,,, | Performed by: OPTOMETRIST

## 2020-01-23 RX ORDER — CYCLOSPORINE 0.5 MG/ML
1 EMULSION OPHTHALMIC 2 TIMES DAILY
Qty: 60 VIAL | Refills: 11 | Status: SHIPPED | OUTPATIENT
Start: 2020-01-23 | End: 2023-01-26

## 2020-01-23 NOTE — PROGRESS NOTES
HPI     78 y/o male is here today for a complete eye exam  DLS 10/4/16 Dr Aldana  Pt. States he has blurred vision in the mornings. Last about 1 hour.  Uses otc reading glasses +1.50. Requesting a refraction today. He want   distanceonly, and near only  Dry eyes uses AT in the morning only. He has questions about the causes of   dry eye.  No eye surgery  No flashes or floaters         Last edited by Anita Lainez on 1/23/2020  1:25 PM. (History)            Assessment /Plan     For exam results, see Encounter Report.    Astigmatism of both eyes with presbyopia    Dry eye syndrome of both eyes    Nuclear sclerosis of both eyes    Other orders  -     cycloSPORINE (RESTASIS) 0.05 % ophthalmic emulsion; Place 1 drop into both eyes 2 (two) times daily.  Dispense: 60 vial; Refill: 11      1. Updated SRx. Pt not interested in PALs, wants NVO and remain uncorrected in the distance. Monitor yearly.     2. Educated pt. Recommend ATs TID-QID + octavio QHS. Also given Rx for restasis -- printed copy for patient to take to his pharmacy of choice. Monitor.     3. Moderate NS OU. Not visually significant. No need for removal at this time. Monitor yearly.       RTC in 1 year for annual eye exam or prn.

## 2020-01-24 ENCOUNTER — TELEPHONE (OUTPATIENT)
Dept: SURGERY | Facility: CLINIC | Age: 80
End: 2020-01-24

## 2020-01-24 NOTE — TELEPHONE ENCOUNTER
Left message on patient's voicemail for him to call back and reschedule his lipoma removal scheduled for 1/28/2020.  Direct phone number given for him to call back.

## 2020-02-21 NOTE — TELEPHONE ENCOUNTER
----- Message from James Oneill sent at 2/21/2020 11:02 AM CST -----  Contact: Self [phone]  Requesting an RX refill or new RX.  Is this a refill or new RX:  refill  RX name and strength: tamsulosin (FLOMAX) 0.4 mg Cap  Directions (copy/paste from chart):    Is this a 30 day or 90 day RX:  90 day  Local pharmacy or mail order pharmacy:  local  Pharmacy name and phone # (copy/paste from chart):  [facility] [address] [place] [address] [place] [employer] [phone] (Phone)  [phone] (Fax)   Comments:  Patient states the MG was going to change for this medication, please advise.

## 2020-02-24 RX ORDER — TAMSULOSIN HYDROCHLORIDE 0.4 MG/1
1 CAPSULE ORAL DAILY
Qty: 90 CAPSULE | Refills: 0 | Status: SHIPPED | OUTPATIENT
Start: 2020-02-24 | End: 2020-06-05 | Stop reason: SDUPTHER

## 2020-02-24 RX ORDER — TAMSULOSIN HYDROCHLORIDE 0.4 MG/1
CAPSULE ORAL
Qty: 90 CAPSULE | Refills: 3 | Status: SHIPPED | OUTPATIENT
Start: 2020-02-24 | End: 2020-05-24

## 2020-02-24 NOTE — TELEPHONE ENCOUNTER
spoke to pt informing that requested medication refilled and sent by electronic prescription to the pharmacy. Advised pt to call pharmacy prior . Pt verbalized understanding

## 2020-06-05 RX ORDER — TAMSULOSIN HYDROCHLORIDE 0.4 MG/1
1 CAPSULE ORAL DAILY
Qty: 90 CAPSULE | Refills: 0 | Status: SHIPPED | OUTPATIENT
Start: 2020-06-05 | End: 2020-09-30 | Stop reason: SDUPTHER

## 2020-06-05 NOTE — TELEPHONE ENCOUNTER
----- Message from Ángela Garcia sent at 6/5/2020  9:40 AM CDT -----  Contact: Self   Requesting an RX refill or new RX.  Is this a refill or new RX:    RX name and strength: tamsulosin (FLOMAX) 0.4 mg Cap   Directions (copy/paste from chart):   Take 1 capsule (0.4 mg total) by mouth once daily. - Oral  Is this a 30 day or 90 day RX:    Local pharmacy or mail order pharmacy:    Pharmacy name and phone # (copy/paste from chart):   [facility] [address] [place], [place] [address] [place] [phone] (Phone)  [phone] (Fax)      Comments:

## 2020-09-30 NOTE — TELEPHONE ENCOUNTER
Care Due:                  Date            Visit Type   Department     Provider  --------------------------------------------------------------------------------                                ESTABLISHED   [facility] INTERNAL  Last Visit: 12-      PATIENT      MEDICINE       ORLIN REILLY  Next Visit: None Scheduled  None         None Found                                                            Last  Test          Frequency    Reason                     Performed    Due Date  --------------------------------------------------------------------------------    Office Visit  12 months..  azelastine, tamsulosin,    12- 11-                             traZODone................    Powered by [employer]. Reference number: [id number]. 9/30/2020 4:41:18 PM CDT

## 2020-09-30 NOTE — TELEPHONE ENCOUNTER
----- Message from Letty Moise sent at 9/30/2020  3:51 PM CDT -----  Contact: Korey self [phone]  Type:  RX Refill Request    Who Called:  Korey self  Refill or New Rx: refill  RX Name and Strength: tamsulosin (FLOMAX) 0.4 mg Cap dispense 90  How is the patient currently taking it? (ex. 1XDay):  Is this a 30 day or 90 day RX  Preferred Pharmacy with phone number: [address] [place] [facility] [phone] (Phone)   Local or Mail Order: local  Ordering Provider: Dr Bustamante   Would the patient rather a call back or a response via [facility]? Call back  Best Call Back Number: [phone]  Additional Information:

## 2020-10-01 RX ORDER — TAMSULOSIN HYDROCHLORIDE 0.4 MG/1
1 CAPSULE ORAL DAILY
Qty: 90 CAPSULE | Refills: 0 | Status: SHIPPED | OUTPATIENT
Start: 2020-10-01 | End: 2021-03-02 | Stop reason: SDUPTHER

## 2020-10-01 NOTE — PROGRESS NOTES
Refill Authorization Note   Korey Santos is requesting a refill authorization.     Brief assessment and rationale for refill: APPROVE     Medication-related problems identified: Requires appointment    Medication Therapy Plan: CDMR. appt(annual)    Medication reconciliation completed: No                    Comments:      Orders Placed This Encounter    tamsulosin (FLOMAX) 0.4 mg Cap      Requested Prescriptions   Signed Prescriptions Disp Refills    tamsulosin (FLOMAX) 0.4 mg Cap 90 capsule 0     Sig: Take 1 capsule (0.4 mg total) by mouth once daily.       Urology: Alpha-Adrenergic Blocker Failed - 9/30/2020  4:40 PM        Failed - Last BP in normal range within 360 days     BP Readings from Last 3 Encounters:   12/10/19 (!) 161/76   12/04/19 126/60   12/04/19 134/80              Passed - Patient is at least 18 years old        Passed - Office Visit within last 12 months or future 90 days.     Recent Outpatient Visits            8 months ago Astigmatism of both eyes with presbyopia    [facility] Marisela Wilde, OD    9 months ago Lipoma of neck    [facility] Franky Patterson MD    10 months ago Annual physical exam    [facility] Juan Bustamante MD    10 months ago Encounter for preventive health examination    [facility] Miriam Burr, NP    1 year ago Carpal tunnel syndrome of left wrist    [facility] Juan Bustamante MD                    Passed - Prostate Cancer is not on problem list            Appointments  past 12m or future 3m with PCP    Date Provider   Last Visit   12/4/2019 Juan Bustamante MD   Next Visit   Visit date not found Juan Bustamante MD   ED visits in past 90 days: 0     Note composed:11:53 AM 10/01/2020

## 2020-10-01 NOTE — PROGRESS NOTES
Provider Staff:     Please schedule patient for Annual    Thanks!  [facility]     Appointments  past 12m or future 3m with PCP    Date Provider   Last Visit   12/4/2019 Juan Bustamante MD   Next Visit   Visit date not found Juan Bustamante MD     Note composed:11:53 AM 10/01/2020

## 2020-11-03 ENCOUNTER — PES CALL (OUTPATIENT)
Dept: ADMINISTRATIVE | Facility: CLINIC | Age: 80
End: 2020-11-03

## 2021-01-13 ENCOUNTER — OFFICE VISIT (OUTPATIENT)
Dept: INTERNAL MEDICINE | Facility: CLINIC | Age: 81
End: 2021-01-13
Payer: MEDICARE

## 2021-01-13 ENCOUNTER — TELEPHONE (OUTPATIENT)
Dept: INTERNAL MEDICINE | Facility: CLINIC | Age: 81
End: 2021-01-13

## 2021-01-13 ENCOUNTER — TELEPHONE (OUTPATIENT)
Dept: FAMILY MEDICINE | Facility: CLINIC | Age: 81
End: 2021-01-13

## 2021-01-13 VITALS
HEIGHT: 71 IN | WEIGHT: 242.5 LBS | OXYGEN SATURATION: 97 % | HEART RATE: 66 BPM | SYSTOLIC BLOOD PRESSURE: 130 MMHG | DIASTOLIC BLOOD PRESSURE: 74 MMHG | BODY MASS INDEX: 33.95 KG/M2

## 2021-01-13 DIAGNOSIS — N13.8 BPH WITH URINARY OBSTRUCTION: ICD-10-CM

## 2021-01-13 DIAGNOSIS — N40.1 BPH WITH URINARY OBSTRUCTION: ICD-10-CM

## 2021-01-13 DIAGNOSIS — Z23 NEED FOR PROPHYLACTIC VACCINATION AND INOCULATION AGAINST INFLUENZA: ICD-10-CM

## 2021-01-13 DIAGNOSIS — M25.511 ACUTE PAIN OF RIGHT SHOULDER: Primary | ICD-10-CM

## 2021-01-13 DIAGNOSIS — E78.5 HYPERLIPIDEMIA, UNSPECIFIED HYPERLIPIDEMIA TYPE: ICD-10-CM

## 2021-01-13 DIAGNOSIS — I10 HYPERTENSION, ESSENTIAL: ICD-10-CM

## 2021-01-13 PROCEDURE — 99214 PR OFFICE/OUTPT VISIT, EST, LEVL IV, 30-39 MIN: ICD-10-PCS | Mod: 25,S$GLB,, | Performed by: INTERNAL MEDICINE

## 2021-01-13 PROCEDURE — 99214 OFFICE O/P EST MOD 30 MIN: CPT | Mod: 25,S$GLB,, | Performed by: INTERNAL MEDICINE

## 2021-01-13 PROCEDURE — 1159F MED LIST DOCD IN RCRD: CPT | Mod: S$GLB,,, | Performed by: INTERNAL MEDICINE

## 2021-01-13 PROCEDURE — 3078F DIAST BP <80 MM HG: CPT | Mod: CPTII,S$GLB,, | Performed by: INTERNAL MEDICINE

## 2021-01-13 PROCEDURE — 1125F AMNT PAIN NOTED PAIN PRSNT: CPT | Mod: S$GLB,,, | Performed by: INTERNAL MEDICINE

## 2021-01-13 PROCEDURE — 1125F PR PAIN SEVERITY QUANTIFIED, PAIN PRESENT: ICD-10-PCS | Mod: S$GLB,,, | Performed by: INTERNAL MEDICINE

## 2021-01-13 PROCEDURE — 99999 PR PBB SHADOW E&M-EST. PATIENT-LVL III: ICD-10-PCS | Mod: PBBFAC,,, | Performed by: INTERNAL MEDICINE

## 2021-01-13 PROCEDURE — 1159F PR MEDICATION LIST DOCUMENTED IN MEDICAL RECORD: ICD-10-PCS | Mod: S$GLB,,, | Performed by: INTERNAL MEDICINE

## 2021-01-13 PROCEDURE — 3078F PR MOST RECENT DIASTOLIC BLOOD PRESSURE < 80 MM HG: ICD-10-PCS | Mod: CPTII,S$GLB,, | Performed by: INTERNAL MEDICINE

## 2021-01-13 PROCEDURE — G0008 FLU VACCINE - QUADRIVALENT - ADJUVANTED: ICD-10-PCS | Mod: S$GLB,,, | Performed by: INTERNAL MEDICINE

## 2021-01-13 PROCEDURE — 1157F ADVNC CARE PLAN IN RCRD: CPT | Mod: S$GLB,,, | Performed by: INTERNAL MEDICINE

## 2021-01-13 PROCEDURE — 99999 PR PBB SHADOW E&M-EST. PATIENT-LVL III: CPT | Mod: PBBFAC,,, | Performed by: INTERNAL MEDICINE

## 2021-01-13 PROCEDURE — 1157F PR ADVANCE CARE PLAN OR EQUIV PRESENT IN MEDICAL RECORD: ICD-10-PCS | Mod: S$GLB,,, | Performed by: INTERNAL MEDICINE

## 2021-01-13 PROCEDURE — G0008 ADMIN INFLUENZA VIRUS VAC: HCPCS | Mod: S$GLB,,, | Performed by: INTERNAL MEDICINE

## 2021-01-13 PROCEDURE — 90694 FLU VACCINE - QUADRIVALENT - ADJUVANTED: ICD-10-PCS | Mod: S$GLB,,, | Performed by: INTERNAL MEDICINE

## 2021-01-13 PROCEDURE — 3075F SYST BP GE 130 - 139MM HG: CPT | Mod: CPTII,S$GLB,, | Performed by: INTERNAL MEDICINE

## 2021-01-13 PROCEDURE — 3075F PR MOST RECENT SYSTOLIC BLOOD PRESS GE 130-139MM HG: ICD-10-PCS | Mod: CPTII,S$GLB,, | Performed by: INTERNAL MEDICINE

## 2021-01-13 PROCEDURE — 90694 VACC AIIV4 NO PRSRV 0.5ML IM: CPT | Mod: S$GLB,,, | Performed by: INTERNAL MEDICINE

## 2021-01-13 RX ORDER — FINASTERIDE 5 MG/1
5 TABLET, FILM COATED ORAL DAILY
Qty: 90 TABLET | Refills: 3 | Status: SHIPPED | OUTPATIENT
Start: 2021-01-13 | End: 2022-06-14 | Stop reason: SDUPTHER

## 2021-01-13 RX ORDER — OXYCODONE AND ACETAMINOPHEN 5; 325 MG/1; MG/1
1 TABLET ORAL EVERY 4 HOURS PRN
Qty: 30 TABLET | Refills: 0 | Status: SHIPPED | OUTPATIENT
Start: 2021-01-13 | End: 2022-06-14

## 2021-01-13 RX ORDER — MELOXICAM 7.5 MG/1
7.5 TABLET ORAL DAILY
Qty: 30 TABLET | Refills: 3 | Status: SHIPPED | OUTPATIENT
Start: 2021-01-13 | End: 2022-06-14 | Stop reason: SDUPTHER

## 2021-01-19 ENCOUNTER — HOSPITAL ENCOUNTER (OUTPATIENT)
Dept: RADIOLOGY | Facility: HOSPITAL | Age: 81
Discharge: HOME | End: 2021-01-19
Attending: INTERNAL MEDICINE
Payer: MEDICARE

## 2021-01-19 DIAGNOSIS — M25.511 ACUTE PAIN OF RIGHT SHOULDER: ICD-10-CM

## 2021-01-19 PROCEDURE — 73221 MRI JOINT UPR EXTREM W/O DYE: CPT | Mod: TC,PO,RT

## 2021-01-21 ENCOUNTER — TELEPHONE (OUTPATIENT)
Dept: INTERNAL MEDICINE | Facility: CLINIC | Age: 81
End: 2021-01-21

## 2021-01-21 DIAGNOSIS — M25.511 ACUTE PAIN OF RIGHT SHOULDER: Primary | ICD-10-CM

## 2021-01-22 ENCOUNTER — OFFICE VISIT (OUTPATIENT)
Dept: SPORTS MEDICINE | Facility: CLINIC | Age: 81
End: 2021-01-22
Payer: MEDICARE

## 2021-01-22 ENCOUNTER — HOSPITAL ENCOUNTER (OUTPATIENT)
Dept: RADIOLOGY | Facility: HOSPITAL | Age: 81
Discharge: HOME | End: 2021-01-22
Attending: PHYSICIAN ASSISTANT
Payer: MEDICARE

## 2021-01-22 VITALS
HEIGHT: 71 IN | WEIGHT: 241 LBS | DIASTOLIC BLOOD PRESSURE: 82 MMHG | SYSTOLIC BLOOD PRESSURE: 160 MMHG | BODY MASS INDEX: 33.74 KG/M2

## 2021-01-22 DIAGNOSIS — M25.511 RIGHT SHOULDER PAIN, UNSPECIFIED CHRONICITY: ICD-10-CM

## 2021-01-22 DIAGNOSIS — M25.511 ACUTE PAIN OF RIGHT SHOULDER: ICD-10-CM

## 2021-01-22 DIAGNOSIS — S40.011A CONTUSION OF RIGHT SHOULDER, INITIAL ENCOUNTER: ICD-10-CM

## 2021-01-22 DIAGNOSIS — S42.254A CLOSED NONDISPLACED FRACTURE OF GREATER TUBEROSITY OF RIGHT HUMERUS, INITIAL ENCOUNTER: Primary | ICD-10-CM

## 2021-01-22 PROCEDURE — 99214 PR OFFICE/OUTPT VISIT, EST, LEVL IV, 30-39 MIN: ICD-10-PCS | Mod: S$GLB,,, | Performed by: PHYSICIAN ASSISTANT

## 2021-01-22 PROCEDURE — 3079F PR MOST RECENT DIASTOLIC BLOOD PRESSURE 80-89 MM HG: ICD-10-PCS | Mod: CPTII,S$GLB,, | Performed by: PHYSICIAN ASSISTANT

## 2021-01-22 PROCEDURE — 99999 PR PBB SHADOW E&M-EST. PATIENT-LVL IV: CPT | Mod: PBBFAC,,, | Performed by: PHYSICIAN ASSISTANT

## 2021-01-22 PROCEDURE — 1125F AMNT PAIN NOTED PAIN PRSNT: CPT | Mod: S$GLB,,, | Performed by: PHYSICIAN ASSISTANT

## 2021-01-22 PROCEDURE — 3077F PR MOST RECENT SYSTOLIC BLOOD PRESSURE >= 140 MM HG: ICD-10-PCS | Mod: CPTII,S$GLB,, | Performed by: PHYSICIAN ASSISTANT

## 2021-01-22 PROCEDURE — 99999 PR PBB SHADOW E&M-EST. PATIENT-LVL IV: ICD-10-PCS | Mod: PBBFAC,,, | Performed by: PHYSICIAN ASSISTANT

## 2021-01-22 PROCEDURE — 3079F DIAST BP 80-89 MM HG: CPT | Mod: CPTII,S$GLB,, | Performed by: PHYSICIAN ASSISTANT

## 2021-01-22 PROCEDURE — 3077F SYST BP >= 140 MM HG: CPT | Mod: CPTII,S$GLB,, | Performed by: PHYSICIAN ASSISTANT

## 2021-01-22 PROCEDURE — 1159F MED LIST DOCD IN RCRD: CPT | Mod: S$GLB,,, | Performed by: PHYSICIAN ASSISTANT

## 2021-01-22 PROCEDURE — 1159F PR MEDICATION LIST DOCUMENTED IN MEDICAL RECORD: ICD-10-PCS | Mod: S$GLB,,, | Performed by: PHYSICIAN ASSISTANT

## 2021-01-22 PROCEDURE — 1125F PR PAIN SEVERITY QUANTIFIED, PAIN PRESENT: ICD-10-PCS | Mod: S$GLB,,, | Performed by: PHYSICIAN ASSISTANT

## 2021-01-22 PROCEDURE — 1157F PR ADVANCE CARE PLAN OR EQUIV PRESENT IN MEDICAL RECORD: ICD-10-PCS | Mod: S$GLB,,, | Performed by: PHYSICIAN ASSISTANT

## 2021-01-22 PROCEDURE — 99214 OFFICE O/P EST MOD 30 MIN: CPT | Mod: S$GLB,,, | Performed by: PHYSICIAN ASSISTANT

## 2021-01-22 PROCEDURE — 1157F ADVNC CARE PLAN IN RCRD: CPT | Mod: S$GLB,,, | Performed by: PHYSICIAN ASSISTANT

## 2021-01-22 PROCEDURE — 73030 X-RAY EXAM OF SHOULDER: CPT | Mod: TC,RT

## 2021-01-22 PROCEDURE — 73030 XR SHOULDER COMPLETE 2 OR MORE VIEWS RIGHT: ICD-10-PCS | Mod: 26,RT,, | Performed by: RADIOLOGY

## 2021-01-22 PROCEDURE — 73030 X-RAY EXAM OF SHOULDER: CPT | Mod: 26,RT,, | Performed by: RADIOLOGY

## 2021-01-28 ENCOUNTER — TELEPHONE (OUTPATIENT)
Dept: ORTHOPEDICS | Facility: CLINIC | Age: 81
End: 2021-01-28

## 2021-01-28 ENCOUNTER — HOSPITAL ENCOUNTER (OUTPATIENT)
Dept: RADIOLOGY | Facility: HOSPITAL | Age: 81
Discharge: HOME | End: 2021-01-28
Attending: ORTHOPAEDIC SURGERY
Payer: MEDICARE

## 2021-01-28 ENCOUNTER — OFFICE VISIT (OUTPATIENT)
Dept: ORTHOPEDICS | Facility: CLINIC | Age: 81
End: 2021-01-28
Payer: MEDICARE

## 2021-01-28 VITALS — TEMPERATURE: 98 F | WEIGHT: 240.94 LBS | HEIGHT: 71 IN | BODY MASS INDEX: 33.73 KG/M2

## 2021-01-28 DIAGNOSIS — M25.511 ACUTE PAIN OF RIGHT SHOULDER: Primary | ICD-10-CM

## 2021-01-28 DIAGNOSIS — M25.511 ACUTE PAIN OF RIGHT SHOULDER: ICD-10-CM

## 2021-01-28 DIAGNOSIS — S42.91XA CLOSED FRACTURE OF RIGHT SHOULDER, INITIAL ENCOUNTER: ICD-10-CM

## 2021-01-28 PROCEDURE — 1125F AMNT PAIN NOTED PAIN PRSNT: CPT | Mod: S$GLB,,, | Performed by: ORTHOPAEDIC SURGERY

## 2021-01-28 PROCEDURE — 1101F PR PT FALLS ASSESS DOC 0-1 FALLS W/OUT INJ PAST YR: ICD-10-PCS | Mod: CPTII,S$GLB,, | Performed by: ORTHOPAEDIC SURGERY

## 2021-01-28 PROCEDURE — 3288F PR FALLS RISK ASSESSMENT DOCUMENTED: ICD-10-PCS | Mod: CPTII,S$GLB,, | Performed by: ORTHOPAEDIC SURGERY

## 2021-01-28 PROCEDURE — 1159F PR MEDICATION LIST DOCUMENTED IN MEDICAL RECORD: ICD-10-PCS | Mod: S$GLB,,, | Performed by: ORTHOPAEDIC SURGERY

## 2021-01-28 PROCEDURE — 1157F PR ADVANCE CARE PLAN OR EQUIV PRESENT IN MEDICAL RECORD: ICD-10-PCS | Mod: S$GLB,,, | Performed by: ORTHOPAEDIC SURGERY

## 2021-01-28 PROCEDURE — 99999 PR PBB SHADOW E&M-EST. PATIENT-LVL III: CPT | Mod: PBBFAC,,, | Performed by: ORTHOPAEDIC SURGERY

## 2021-01-28 PROCEDURE — 99213 OFFICE O/P EST LOW 20 MIN: CPT | Mod: S$GLB,,, | Performed by: ORTHOPAEDIC SURGERY

## 2021-01-28 PROCEDURE — 73030 X-RAY EXAM OF SHOULDER: CPT | Mod: TC,PN,RT

## 2021-01-28 PROCEDURE — 73030 X-RAY EXAM OF SHOULDER: CPT | Mod: 26,RT,, | Performed by: RADIOLOGY

## 2021-01-28 PROCEDURE — 1125F PR PAIN SEVERITY QUANTIFIED, PAIN PRESENT: ICD-10-PCS | Mod: S$GLB,,, | Performed by: ORTHOPAEDIC SURGERY

## 2021-01-28 PROCEDURE — 1157F ADVNC CARE PLAN IN RCRD: CPT | Mod: S$GLB,,, | Performed by: ORTHOPAEDIC SURGERY

## 2021-01-28 PROCEDURE — 1101F PT FALLS ASSESS-DOCD LE1/YR: CPT | Mod: CPTII,S$GLB,, | Performed by: ORTHOPAEDIC SURGERY

## 2021-01-28 PROCEDURE — 99213 PR OFFICE/OUTPT VISIT, EST, LEVL III, 20-29 MIN: ICD-10-PCS | Mod: S$GLB,,, | Performed by: ORTHOPAEDIC SURGERY

## 2021-01-28 PROCEDURE — 73030 XR SHOULDER COMPLETE 2 OR MORE VIEWS RIGHT: ICD-10-PCS | Mod: 26,RT,, | Performed by: RADIOLOGY

## 2021-01-28 PROCEDURE — 3288F FALL RISK ASSESSMENT DOCD: CPT | Mod: CPTII,S$GLB,, | Performed by: ORTHOPAEDIC SURGERY

## 2021-01-28 PROCEDURE — 99999 PR PBB SHADOW E&M-EST. PATIENT-LVL III: ICD-10-PCS | Mod: PBBFAC,,, | Performed by: ORTHOPAEDIC SURGERY

## 2021-01-28 PROCEDURE — 1159F MED LIST DOCD IN RCRD: CPT | Mod: S$GLB,,, | Performed by: ORTHOPAEDIC SURGERY

## 2021-01-28 RX ORDER — HYDROCODONE BITARTRATE AND ACETAMINOPHEN 5; 325 MG/1; MG/1
1 TABLET ORAL EVERY 8 HOURS PRN
Qty: 40 TABLET | Refills: 0 | Status: SHIPPED | OUTPATIENT
Start: 2021-01-28 | End: 2021-01-28 | Stop reason: CLARIF

## 2021-01-28 RX ORDER — HYDROCODONE BITARTRATE AND ACETAMINOPHEN 5; 325 MG/1; MG/1
1 TABLET ORAL EVERY 4 HOURS PRN
Qty: 40 TABLET | Refills: 0 | Status: SHIPPED | OUTPATIENT
Start: 2021-01-28 | End: 2021-02-07

## 2021-01-28 RX ORDER — HYDROCODONE BITARTRATE AND ACETAMINOPHEN 5; 325 MG/1; MG/1
1 TABLET ORAL EVERY 8 HOURS PRN
Qty: 40 TABLET | Refills: 0 | Status: CANCELLED | OUTPATIENT
Start: 2021-01-28 | End: 2021-02-07

## 2021-02-05 ENCOUNTER — LAB VISIT (OUTPATIENT)
Dept: LAB | Facility: HOSPITAL | Age: 81
End: 2021-02-05
Attending: INTERNAL MEDICINE
Payer: MEDICARE

## 2021-02-05 DIAGNOSIS — E78.5 HYPERLIPIDEMIA, UNSPECIFIED HYPERLIPIDEMIA TYPE: ICD-10-CM

## 2021-02-05 LAB
ALBUMIN SERPL BCP-MCNC: 4.2 G/DL (ref 3.5–5.2)
ALP SERPL-CCNC: 71 U/L (ref 38–126)
ALT SERPL W/O P-5'-P-CCNC: 20 U/L (ref 10–44)
ANION GAP SERPL CALC-SCNC: 5 MMOL/L (ref 8–16)
AST SERPL-CCNC: 29 U/L (ref 15–46)
BILIRUB SERPL-MCNC: 0.7 MG/DL (ref 0.1–1)
CALCIUM SERPL-MCNC: 9.2 MG/DL (ref 8.7–10.5)
CHLORIDE SERPL-SCNC: 106 MMOL/L (ref 95–110)
CHOLEST SERPL-MCNC: 193 MG/DL (ref 120–199)
CHOLEST/HDLC SERPL: 4.4 {RATIO} (ref 2–5)
CO2 SERPL-SCNC: 31 MMOL/L (ref 23–29)
CREAT SERPL-MCNC: 0.81 MG/DL (ref 0.5–1.4)
EST. GFR  (AFRICAN AMERICAN): >60 ML/MIN/1.73 M^2
EST. GFR  (NON AFRICAN AMERICAN): >60 ML/MIN/1.73 M^2
GLUCOSE SERPL-MCNC: 97 MG/DL (ref 70–110)
HDLC SERPL-MCNC: 44 MG/DL (ref 40–75)
HDLC SERPL: 22.8 % (ref 20–50)
LDLC SERPL CALC-MCNC: 130.2 MG/DL (ref 63–159)
NONHDLC SERPL-MCNC: 149 MG/DL
POTASSIUM SERPL-SCNC: 5 MMOL/L (ref 3.5–5.1)
PROT SERPL-MCNC: 7.4 G/DL (ref 6–8.4)
SODIUM SERPL-SCNC: 142 MMOL/L (ref 136–145)
TRIGL SERPL-MCNC: 94 MG/DL (ref 30–150)
UUN UR-MCNC: 13 MG/DL (ref 2–20)

## 2021-02-05 PROCEDURE — 80053 COMPREHEN METABOLIC PANEL: CPT | Mod: PO

## 2021-02-05 PROCEDURE — 36415 COLL VENOUS BLD VENIPUNCTURE: CPT | Mod: PO

## 2021-02-05 PROCEDURE — 80061 LIPID PANEL: CPT

## 2021-02-12 ENCOUNTER — OFFICE VISIT (OUTPATIENT)
Dept: INTERNAL MEDICINE | Facility: CLINIC | Age: 81
End: 2021-02-12
Payer: MEDICARE

## 2021-02-12 VITALS
BODY MASS INDEX: 33.61 KG/M2 | TEMPERATURE: 98 F | WEIGHT: 240.06 LBS | OXYGEN SATURATION: 95 % | HEART RATE: 67 BPM | SYSTOLIC BLOOD PRESSURE: 126 MMHG | DIASTOLIC BLOOD PRESSURE: 76 MMHG | HEIGHT: 71 IN

## 2021-02-12 DIAGNOSIS — Z00.00 ROUTINE GENERAL MEDICAL EXAMINATION AT A HEALTH CARE FACILITY: Primary | ICD-10-CM

## 2021-02-12 DIAGNOSIS — I10 HYPERTENSION, ESSENTIAL: ICD-10-CM

## 2021-02-12 DIAGNOSIS — E78.5 HYPERLIPIDEMIA, UNSPECIFIED HYPERLIPIDEMIA TYPE: ICD-10-CM

## 2021-02-12 DIAGNOSIS — J30.9 ALLERGIC RHINITIS, UNSPECIFIED SEASONALITY, UNSPECIFIED TRIGGER: ICD-10-CM

## 2021-02-12 DIAGNOSIS — N13.8 BPH WITH URINARY OBSTRUCTION: ICD-10-CM

## 2021-02-12 DIAGNOSIS — N40.1 BPH WITH URINARY OBSTRUCTION: ICD-10-CM

## 2021-02-12 PROCEDURE — 1101F PT FALLS ASSESS-DOCD LE1/YR: CPT | Mod: CPTII,S$GLB,, | Performed by: INTERNAL MEDICINE

## 2021-02-12 PROCEDURE — 1101F PR PT FALLS ASSESS DOC 0-1 FALLS W/OUT INJ PAST YR: ICD-10-PCS | Mod: CPTII,S$GLB,, | Performed by: INTERNAL MEDICINE

## 2021-02-12 PROCEDURE — 3078F PR MOST RECENT DIASTOLIC BLOOD PRESSURE < 80 MM HG: ICD-10-PCS | Mod: CPTII,S$GLB,, | Performed by: INTERNAL MEDICINE

## 2021-02-12 PROCEDURE — 1157F ADVNC CARE PLAN IN RCRD: CPT | Mod: S$GLB,,, | Performed by: INTERNAL MEDICINE

## 2021-02-12 PROCEDURE — 99999 PR PBB SHADOW E&M-EST. PATIENT-LVL III: ICD-10-PCS | Mod: PBBFAC,,, | Performed by: INTERNAL MEDICINE

## 2021-02-12 PROCEDURE — 3074F PR MOST RECENT SYSTOLIC BLOOD PRESSURE < 130 MM HG: ICD-10-PCS | Mod: CPTII,S$GLB,, | Performed by: INTERNAL MEDICINE

## 2021-02-12 PROCEDURE — 99397 PR PREVENTIVE VISIT,EST,65 & OVER: ICD-10-PCS | Mod: S$GLB,,, | Performed by: INTERNAL MEDICINE

## 2021-02-12 PROCEDURE — 3288F PR FALLS RISK ASSESSMENT DOCUMENTED: ICD-10-PCS | Mod: CPTII,S$GLB,, | Performed by: INTERNAL MEDICINE

## 2021-02-12 PROCEDURE — 1126F AMNT PAIN NOTED NONE PRSNT: CPT | Mod: S$GLB,,, | Performed by: INTERNAL MEDICINE

## 2021-02-12 PROCEDURE — 99397 PER PM REEVAL EST PAT 65+ YR: CPT | Mod: S$GLB,,, | Performed by: INTERNAL MEDICINE

## 2021-02-12 PROCEDURE — 1126F PR PAIN SEVERITY QUANTIFIED, NO PAIN PRESENT: ICD-10-PCS | Mod: S$GLB,,, | Performed by: INTERNAL MEDICINE

## 2021-02-12 PROCEDURE — 3074F SYST BP LT 130 MM HG: CPT | Mod: CPTII,S$GLB,, | Performed by: INTERNAL MEDICINE

## 2021-02-12 PROCEDURE — 99999 PR PBB SHADOW E&M-EST. PATIENT-LVL III: CPT | Mod: PBBFAC,,, | Performed by: INTERNAL MEDICINE

## 2021-02-12 PROCEDURE — 3078F DIAST BP <80 MM HG: CPT | Mod: CPTII,S$GLB,, | Performed by: INTERNAL MEDICINE

## 2021-02-12 PROCEDURE — 1157F PR ADVANCE CARE PLAN OR EQUIV PRESENT IN MEDICAL RECORD: ICD-10-PCS | Mod: S$GLB,,, | Performed by: INTERNAL MEDICINE

## 2021-02-12 PROCEDURE — 3288F FALL RISK ASSESSMENT DOCD: CPT | Mod: CPTII,S$GLB,, | Performed by: INTERNAL MEDICINE

## 2021-02-12 RX ORDER — FLUTICASONE PROPIONATE 50 MCG
1 SPRAY, SUSPENSION (ML) NASAL DAILY
Qty: 16 G | Refills: 6 | Status: SHIPPED | OUTPATIENT
Start: 2021-02-12 | End: 2023-07-19 | Stop reason: SDUPTHER

## 2021-02-22 ENCOUNTER — HOSPITAL ENCOUNTER (OUTPATIENT)
Dept: RADIOLOGY | Facility: HOSPITAL | Age: 81
Discharge: HOME | End: 2021-02-22
Attending: PHYSICIAN ASSISTANT
Payer: MEDICARE

## 2021-02-22 ENCOUNTER — OFFICE VISIT (OUTPATIENT)
Dept: SPORTS MEDICINE | Facility: CLINIC | Age: 81
End: 2021-02-22
Payer: MEDICARE

## 2021-02-22 VITALS
SYSTOLIC BLOOD PRESSURE: 154 MMHG | BODY MASS INDEX: 33.6 KG/M2 | WEIGHT: 240 LBS | HEART RATE: 62 BPM | DIASTOLIC BLOOD PRESSURE: 87 MMHG | HEIGHT: 71 IN

## 2021-02-22 DIAGNOSIS — S40.011A CONTUSION OF RIGHT SHOULDER, INITIAL ENCOUNTER: ICD-10-CM

## 2021-02-22 DIAGNOSIS — S42.254A CLOSED NONDISPLACED FRACTURE OF GREATER TUBEROSITY OF RIGHT HUMERUS, INITIAL ENCOUNTER: Primary | ICD-10-CM

## 2021-02-22 DIAGNOSIS — M25.511 ACUTE PAIN OF RIGHT SHOULDER: ICD-10-CM

## 2021-02-22 DIAGNOSIS — M25.511 RIGHT SHOULDER PAIN: ICD-10-CM

## 2021-02-22 DIAGNOSIS — M25.512 LEFT SHOULDER PAIN, UNSPECIFIED CHRONICITY: ICD-10-CM

## 2021-02-22 PROCEDURE — 73030 X-RAY EXAM OF SHOULDER: CPT | Mod: TC,RT

## 2021-02-22 PROCEDURE — 1125F PR PAIN SEVERITY QUANTIFIED, PAIN PRESENT: ICD-10-PCS | Mod: S$GLB,,, | Performed by: PHYSICIAN ASSISTANT

## 2021-02-22 PROCEDURE — 1157F PR ADVANCE CARE PLAN OR EQUIV PRESENT IN MEDICAL RECORD: ICD-10-PCS | Mod: S$GLB,,, | Performed by: PHYSICIAN ASSISTANT

## 2021-02-22 PROCEDURE — 3079F PR MOST RECENT DIASTOLIC BLOOD PRESSURE 80-89 MM HG: ICD-10-PCS | Mod: CPTII,S$GLB,, | Performed by: PHYSICIAN ASSISTANT

## 2021-02-22 PROCEDURE — 99999 PR PBB SHADOW E&M-EST. PATIENT-LVL IV: ICD-10-PCS | Mod: PBBFAC,,, | Performed by: PHYSICIAN ASSISTANT

## 2021-02-22 PROCEDURE — 99214 PR OFFICE/OUTPT VISIT, EST, LEVL IV, 30-39 MIN: ICD-10-PCS | Mod: S$GLB,,, | Performed by: PHYSICIAN ASSISTANT

## 2021-02-22 PROCEDURE — 73030 X-RAY EXAM OF SHOULDER: CPT | Mod: 26,RT,, | Performed by: RADIOLOGY

## 2021-02-22 PROCEDURE — 3077F PR MOST RECENT SYSTOLIC BLOOD PRESSURE >= 140 MM HG: ICD-10-PCS | Mod: CPTII,S$GLB,, | Performed by: PHYSICIAN ASSISTANT

## 2021-02-22 PROCEDURE — 73030 XR SHOULDER COMPLETE 2 OR MORE VIEWS RIGHT: ICD-10-PCS | Mod: 26,RT,, | Performed by: RADIOLOGY

## 2021-02-22 PROCEDURE — 1101F PT FALLS ASSESS-DOCD LE1/YR: CPT | Mod: CPTII,S$GLB,, | Performed by: PHYSICIAN ASSISTANT

## 2021-02-22 PROCEDURE — 3288F PR FALLS RISK ASSESSMENT DOCUMENTED: ICD-10-PCS | Mod: CPTII,S$GLB,, | Performed by: PHYSICIAN ASSISTANT

## 2021-02-22 PROCEDURE — 3079F DIAST BP 80-89 MM HG: CPT | Mod: CPTII,S$GLB,, | Performed by: PHYSICIAN ASSISTANT

## 2021-02-22 PROCEDURE — 1157F ADVNC CARE PLAN IN RCRD: CPT | Mod: S$GLB,,, | Performed by: PHYSICIAN ASSISTANT

## 2021-02-22 PROCEDURE — 99214 OFFICE O/P EST MOD 30 MIN: CPT | Mod: S$GLB,,, | Performed by: PHYSICIAN ASSISTANT

## 2021-02-22 PROCEDURE — 1159F PR MEDICATION LIST DOCUMENTED IN MEDICAL RECORD: ICD-10-PCS | Mod: S$GLB,,, | Performed by: PHYSICIAN ASSISTANT

## 2021-02-22 PROCEDURE — 3077F SYST BP >= 140 MM HG: CPT | Mod: CPTII,S$GLB,, | Performed by: PHYSICIAN ASSISTANT

## 2021-02-22 PROCEDURE — 1101F PR PT FALLS ASSESS DOC 0-1 FALLS W/OUT INJ PAST YR: ICD-10-PCS | Mod: CPTII,S$GLB,, | Performed by: PHYSICIAN ASSISTANT

## 2021-02-22 PROCEDURE — 1159F MED LIST DOCD IN RCRD: CPT | Mod: S$GLB,,, | Performed by: PHYSICIAN ASSISTANT

## 2021-02-22 PROCEDURE — 3288F FALL RISK ASSESSMENT DOCD: CPT | Mod: CPTII,S$GLB,, | Performed by: PHYSICIAN ASSISTANT

## 2021-02-22 PROCEDURE — 1125F AMNT PAIN NOTED PAIN PRSNT: CPT | Mod: S$GLB,,, | Performed by: PHYSICIAN ASSISTANT

## 2021-02-22 PROCEDURE — 99999 PR PBB SHADOW E&M-EST. PATIENT-LVL IV: CPT | Mod: PBBFAC,,, | Performed by: PHYSICIAN ASSISTANT

## 2021-02-24 ENCOUNTER — CLINICAL SUPPORT (OUTPATIENT)
Dept: REHABILITATION | Facility: HOSPITAL | Age: 81
End: 2021-02-24
Payer: MEDICARE

## 2021-02-24 DIAGNOSIS — M25.511 ACUTE PAIN OF RIGHT SHOULDER: ICD-10-CM

## 2021-02-24 DIAGNOSIS — R29.898 WEAKNESS OF RIGHT UPPER EXTREMITY: ICD-10-CM

## 2021-02-24 DIAGNOSIS — M25.611 DECREASED RANGE OF MOTION OF RIGHT SHOULDER: ICD-10-CM

## 2021-02-24 DIAGNOSIS — S42.91XD CLOSED FRACTURE OF RIGHT SHOULDER WITH ROUTINE HEALING, SUBSEQUENT ENCOUNTER: ICD-10-CM

## 2021-02-24 DIAGNOSIS — S42.254A CLOSED NONDISPLACED FRACTURE OF GREATER TUBEROSITY OF RIGHT HUMERUS, INITIAL ENCOUNTER: ICD-10-CM

## 2021-02-24 PROCEDURE — 97161 PT EVAL LOW COMPLEX 20 MIN: CPT

## 2021-02-24 PROCEDURE — 97110 THERAPEUTIC EXERCISES: CPT

## 2021-03-02 RX ORDER — TAMSULOSIN HYDROCHLORIDE 0.4 MG/1
1 CAPSULE ORAL DAILY
Qty: 90 CAPSULE | Refills: 4 | Status: SHIPPED | OUTPATIENT
Start: 2021-03-02 | End: 2022-05-26 | Stop reason: SDUPTHER

## 2021-03-04 ENCOUNTER — CLINICAL SUPPORT (OUTPATIENT)
Dept: REHABILITATION | Facility: HOSPITAL | Age: 81
End: 2021-03-04
Payer: MEDICARE

## 2021-03-04 DIAGNOSIS — M25.611 DECREASED RANGE OF MOTION OF RIGHT SHOULDER: ICD-10-CM

## 2021-03-04 DIAGNOSIS — R29.898 WEAKNESS OF RIGHT UPPER EXTREMITY: ICD-10-CM

## 2021-03-04 DIAGNOSIS — M25.511 ACUTE PAIN OF RIGHT SHOULDER: ICD-10-CM

## 2021-03-04 PROCEDURE — 97110 THERAPEUTIC EXERCISES: CPT

## 2021-03-09 ENCOUNTER — CLINICAL SUPPORT (OUTPATIENT)
Dept: REHABILITATION | Facility: HOSPITAL | Age: 81
End: 2021-03-09
Payer: MEDICARE

## 2021-03-09 DIAGNOSIS — M25.511 ACUTE PAIN OF RIGHT SHOULDER: ICD-10-CM

## 2021-03-09 DIAGNOSIS — R29.898 WEAKNESS OF RIGHT UPPER EXTREMITY: ICD-10-CM

## 2021-03-09 DIAGNOSIS — M25.611 DECREASED RANGE OF MOTION OF RIGHT SHOULDER: ICD-10-CM

## 2021-03-09 PROCEDURE — 97110 THERAPEUTIC EXERCISES: CPT

## 2021-03-12 ENCOUNTER — PES CALL (OUTPATIENT)
Dept: ADMINISTRATIVE | Facility: CLINIC | Age: 81
End: 2021-03-12

## 2021-03-17 ENCOUNTER — CLINICAL SUPPORT (OUTPATIENT)
Dept: REHABILITATION | Facility: HOSPITAL | Age: 81
End: 2021-03-17
Payer: MEDICARE

## 2021-03-17 DIAGNOSIS — M25.611 DECREASED RANGE OF MOTION OF RIGHT SHOULDER: ICD-10-CM

## 2021-03-17 DIAGNOSIS — R29.898 WEAKNESS OF RIGHT UPPER EXTREMITY: ICD-10-CM

## 2021-03-17 PROCEDURE — 97110 THERAPEUTIC EXERCISES: CPT

## 2021-03-24 ENCOUNTER — CLINICAL SUPPORT (OUTPATIENT)
Dept: REHABILITATION | Facility: HOSPITAL | Age: 81
End: 2021-03-24
Payer: MEDICARE

## 2021-03-24 DIAGNOSIS — M25.611 DECREASED RANGE OF MOTION OF RIGHT SHOULDER: ICD-10-CM

## 2021-03-24 DIAGNOSIS — R29.898 WEAKNESS OF RIGHT UPPER EXTREMITY: ICD-10-CM

## 2021-03-24 PROCEDURE — 97110 THERAPEUTIC EXERCISES: CPT

## 2021-03-31 ENCOUNTER — CLINICAL SUPPORT (OUTPATIENT)
Dept: REHABILITATION | Facility: HOSPITAL | Age: 81
End: 2021-03-31
Payer: MEDICARE

## 2021-03-31 DIAGNOSIS — R29.898 WEAKNESS OF RIGHT UPPER EXTREMITY: ICD-10-CM

## 2021-03-31 DIAGNOSIS — M25.611 DECREASED RANGE OF MOTION OF RIGHT SHOULDER: ICD-10-CM

## 2021-03-31 PROCEDURE — 97110 THERAPEUTIC EXERCISES: CPT

## 2021-04-05 ENCOUNTER — OFFICE VISIT (OUTPATIENT)
Dept: SPORTS MEDICINE | Facility: CLINIC | Age: 81
End: 2021-04-05
Payer: MEDICARE

## 2021-04-05 VITALS
WEIGHT: 237 LBS | BODY MASS INDEX: 33.18 KG/M2 | DIASTOLIC BLOOD PRESSURE: 74 MMHG | SYSTOLIC BLOOD PRESSURE: 131 MMHG | HEIGHT: 71 IN | HEART RATE: 69 BPM

## 2021-04-05 DIAGNOSIS — M25.511 CHRONIC RIGHT SHOULDER PAIN: Primary | ICD-10-CM

## 2021-04-05 DIAGNOSIS — G89.29 CHRONIC RIGHT SHOULDER PAIN: Primary | ICD-10-CM

## 2021-04-05 DIAGNOSIS — S42.254D CLOSED NONDISPLACED FRACTURE OF GREATER TUBEROSITY OF RIGHT HUMERUS WITH ROUTINE HEALING, SUBSEQUENT ENCOUNTER: ICD-10-CM

## 2021-04-05 DIAGNOSIS — M25.811 SHOULDER IMPINGEMENT, RIGHT: ICD-10-CM

## 2021-04-05 DIAGNOSIS — S40.011D CONTUSION OF RIGHT SHOULDER, SUBSEQUENT ENCOUNTER: ICD-10-CM

## 2021-04-05 PROCEDURE — 20610 PR DRAIN/INJECT LARGE JOINT/BURSA: ICD-10-PCS | Mod: RT,S$GLB,, | Performed by: PHYSICIAN ASSISTANT

## 2021-04-05 PROCEDURE — 99213 OFFICE O/P EST LOW 20 MIN: CPT | Mod: 25,S$GLB,, | Performed by: PHYSICIAN ASSISTANT

## 2021-04-05 PROCEDURE — 1126F AMNT PAIN NOTED NONE PRSNT: CPT | Mod: S$GLB,,, | Performed by: PHYSICIAN ASSISTANT

## 2021-04-05 PROCEDURE — 1157F ADVNC CARE PLAN IN RCRD: CPT | Mod: S$GLB,,, | Performed by: PHYSICIAN ASSISTANT

## 2021-04-05 PROCEDURE — 99999 PR PBB SHADOW E&M-EST. PATIENT-LVL III: CPT | Mod: PBBFAC,,, | Performed by: PHYSICIAN ASSISTANT

## 2021-04-05 PROCEDURE — 3288F FALL RISK ASSESSMENT DOCD: CPT | Mod: CPTII,S$GLB,, | Performed by: PHYSICIAN ASSISTANT

## 2021-04-05 PROCEDURE — 1101F PR PT FALLS ASSESS DOC 0-1 FALLS W/OUT INJ PAST YR: ICD-10-PCS | Mod: CPTII,S$GLB,, | Performed by: PHYSICIAN ASSISTANT

## 2021-04-05 PROCEDURE — 1101F PT FALLS ASSESS-DOCD LE1/YR: CPT | Mod: CPTII,S$GLB,, | Performed by: PHYSICIAN ASSISTANT

## 2021-04-05 PROCEDURE — 99999 PR PBB SHADOW E&M-EST. PATIENT-LVL III: ICD-10-PCS | Mod: PBBFAC,,, | Performed by: PHYSICIAN ASSISTANT

## 2021-04-05 PROCEDURE — 1126F PR PAIN SEVERITY QUANTIFIED, NO PAIN PRESENT: ICD-10-PCS | Mod: S$GLB,,, | Performed by: PHYSICIAN ASSISTANT

## 2021-04-05 PROCEDURE — 20610 DRAIN/INJ JOINT/BURSA W/O US: CPT | Mod: RT,S$GLB,, | Performed by: PHYSICIAN ASSISTANT

## 2021-04-05 PROCEDURE — 1157F PR ADVANCE CARE PLAN OR EQUIV PRESENT IN MEDICAL RECORD: ICD-10-PCS | Mod: S$GLB,,, | Performed by: PHYSICIAN ASSISTANT

## 2021-04-05 PROCEDURE — 3288F PR FALLS RISK ASSESSMENT DOCUMENTED: ICD-10-PCS | Mod: CPTII,S$GLB,, | Performed by: PHYSICIAN ASSISTANT

## 2021-04-05 PROCEDURE — 1159F PR MEDICATION LIST DOCUMENTED IN MEDICAL RECORD: ICD-10-PCS | Mod: S$GLB,,, | Performed by: PHYSICIAN ASSISTANT

## 2021-04-05 PROCEDURE — 99213 PR OFFICE/OUTPT VISIT, EST, LEVL III, 20-29 MIN: ICD-10-PCS | Mod: 25,S$GLB,, | Performed by: PHYSICIAN ASSISTANT

## 2021-04-05 PROCEDURE — 1159F MED LIST DOCD IN RCRD: CPT | Mod: S$GLB,,, | Performed by: PHYSICIAN ASSISTANT

## 2021-04-05 RX ADMIN — TRIAMCINOLONE ACETONIDE 40 MG: 40 INJECTION, SUSPENSION INTRA-ARTICULAR; INTRAMUSCULAR at 11:04

## 2021-04-08 RX ORDER — TRIAMCINOLONE ACETONIDE 40 MG/ML
40 INJECTION, SUSPENSION INTRA-ARTICULAR; INTRAMUSCULAR
Status: COMPLETED | OUTPATIENT
Start: 2021-04-05 | End: 2021-04-05

## 2021-04-22 ENCOUNTER — PES CALL (OUTPATIENT)
Dept: ADMINISTRATIVE | Facility: CLINIC | Age: 81
End: 2021-04-22

## 2021-07-02 ENCOUNTER — PES CALL (OUTPATIENT)
Dept: ADMINISTRATIVE | Facility: CLINIC | Age: 81
End: 2021-07-02

## 2021-07-30 ENCOUNTER — PES CALL (OUTPATIENT)
Dept: ADMINISTRATIVE | Facility: CLINIC | Age: 81
End: 2021-07-30

## 2021-08-11 ENCOUNTER — TELEPHONE (OUTPATIENT)
Dept: ADMINISTRATIVE | Facility: HOSPITAL | Age: 81
End: 2021-08-11

## 2021-12-02 ENCOUNTER — TELEPHONE (OUTPATIENT)
Dept: SPORTS MEDICINE | Facility: CLINIC | Age: 81
End: 2021-12-02
Payer: MEDICARE

## 2021-12-03 ENCOUNTER — TELEPHONE (OUTPATIENT)
Dept: SPORTS MEDICINE | Facility: CLINIC | Age: 81
End: 2021-12-03
Payer: MEDICARE

## 2021-12-03 DIAGNOSIS — M17.11 OSTEOARTHRITIS OF RIGHT KNEE: Primary | ICD-10-CM

## 2021-12-17 ENCOUNTER — OFFICE VISIT (OUTPATIENT)
Dept: SPORTS MEDICINE | Facility: CLINIC | Age: 81
End: 2021-12-17
Payer: MEDICARE

## 2021-12-17 VITALS
HEART RATE: 57 BPM | WEIGHT: 237 LBS | HEIGHT: 71 IN | DIASTOLIC BLOOD PRESSURE: 76 MMHG | SYSTOLIC BLOOD PRESSURE: 170 MMHG | BODY MASS INDEX: 33.18 KG/M2

## 2021-12-17 DIAGNOSIS — R03.0 ELEVATED BLOOD PRESSURE READING: ICD-10-CM

## 2021-12-17 DIAGNOSIS — M17.11 PRIMARY OSTEOARTHRITIS OF RIGHT KNEE: Primary | ICD-10-CM

## 2021-12-17 PROCEDURE — 99499 NO LOS: ICD-10-PCS | Mod: HCNC,S$GLB,, | Performed by: STUDENT IN AN ORGANIZED HEALTH CARE EDUCATION/TRAINING PROGRAM

## 2021-12-17 PROCEDURE — 20611 LARGE JOINT ASPIRATION/INJECTION: R KNEE: ICD-10-PCS | Mod: HCNC,RT,S$GLB, | Performed by: STUDENT IN AN ORGANIZED HEALTH CARE EDUCATION/TRAINING PROGRAM

## 2021-12-17 PROCEDURE — 20611 DRAIN/INJ JOINT/BURSA W/US: CPT | Mod: HCNC,RT,S$GLB, | Performed by: STUDENT IN AN ORGANIZED HEALTH CARE EDUCATION/TRAINING PROGRAM

## 2021-12-17 PROCEDURE — 99999 PR PBB SHADOW E&M-EST. PATIENT-LVL III: ICD-10-PCS | Mod: PBBFAC,HCNC,, | Performed by: STUDENT IN AN ORGANIZED HEALTH CARE EDUCATION/TRAINING PROGRAM

## 2021-12-17 PROCEDURE — 99999 PR PBB SHADOW E&M-EST. PATIENT-LVL III: CPT | Mod: PBBFAC,HCNC,, | Performed by: STUDENT IN AN ORGANIZED HEALTH CARE EDUCATION/TRAINING PROGRAM

## 2021-12-17 PROCEDURE — 1157F PR ADVANCE CARE PLAN OR EQUIV PRESENT IN MEDICAL RECORD: ICD-10-PCS | Mod: HCNC,CPTII,S$GLB, | Performed by: STUDENT IN AN ORGANIZED HEALTH CARE EDUCATION/TRAINING PROGRAM

## 2021-12-17 PROCEDURE — 99499 UNLISTED E&M SERVICE: CPT | Mod: HCNC,S$GLB,, | Performed by: STUDENT IN AN ORGANIZED HEALTH CARE EDUCATION/TRAINING PROGRAM

## 2021-12-17 PROCEDURE — 1157F ADVNC CARE PLAN IN RCRD: CPT | Mod: HCNC,CPTII,S$GLB, | Performed by: STUDENT IN AN ORGANIZED HEALTH CARE EDUCATION/TRAINING PROGRAM

## 2022-04-05 ENCOUNTER — TELEPHONE (OUTPATIENT)
Dept: INTERNAL MEDICINE | Facility: CLINIC | Age: 82
End: 2022-04-05
Payer: MEDICARE

## 2022-04-05 NOTE — TELEPHONE ENCOUNTER
Pt stated he had no clue of a new PCP pt stated he want to stay with Dr. Bustamante as his PCP and he would like all his meds refilled by Dr. Bustamante

## 2022-04-05 NOTE — TELEPHONE ENCOUNTER
----- Message from Shemar Smith sent at 4/5/2022 10:35 AM CDT -----  Contact: pt  Who Called: PT  Regarding: The pt is calling to request that his losartan tablet 50 mg  be called into the pharmacy in [place], [place]. Please contact the pt.   Would the patient rather a call back or a response via [facility]? Call back  Best Call Back Number: [phone]  Additional Information:

## 2022-04-05 NOTE — TELEPHONE ENCOUNTER
Losartan appears to have been stopped in 2019. I'll not fill it at this time. wwe may need a virtual visit sometime soon to go over meds. I suspect he will not have the teechnology - can you set up a virtual audio sometime soon?    Thank you

## 2022-04-14 ENCOUNTER — OFFICE VISIT (OUTPATIENT)
Dept: INTERNAL MEDICINE | Facility: CLINIC | Age: 82
End: 2022-04-14
Attending: FAMILY MEDICINE
Payer: MEDICARE

## 2022-04-14 ENCOUNTER — TELEPHONE (OUTPATIENT)
Dept: INTERNAL MEDICINE | Facility: CLINIC | Age: 82
End: 2022-04-14

## 2022-04-14 DIAGNOSIS — M79.89 SOFT TISSUE MASS: ICD-10-CM

## 2022-04-14 DIAGNOSIS — I10 HYPERTENSION, ESSENTIAL: Primary | ICD-10-CM

## 2022-04-14 DIAGNOSIS — E78.5 HYPERLIPIDEMIA, UNSPECIFIED HYPERLIPIDEMIA TYPE: ICD-10-CM

## 2022-04-14 DIAGNOSIS — R97.20 ELEVATED PSA: ICD-10-CM

## 2022-04-14 PROCEDURE — 1159F PR MEDICATION LIST DOCUMENTED IN MEDICAL RECORD: ICD-10-PCS | Mod: CPTII,95,, | Performed by: FAMILY MEDICINE

## 2022-04-14 PROCEDURE — 1157F ADVNC CARE PLAN IN RCRD: CPT | Mod: CPTII,95,, | Performed by: FAMILY MEDICINE

## 2022-04-14 PROCEDURE — 99442 PR PHYSICIAN TELEPHONE EVALUATION 11-20 MIN: CPT | Mod: 95,,, | Performed by: FAMILY MEDICINE

## 2022-04-14 PROCEDURE — 1160F PR REVIEW ALL MEDS BY PRESCRIBER/CLIN PHARMACIST DOCUMENTED: ICD-10-PCS | Mod: CPTII,95,, | Performed by: FAMILY MEDICINE

## 2022-04-14 PROCEDURE — 99442 PR PHYSICIAN TELEPHONE EVALUATION 11-20 MIN: ICD-10-PCS | Mod: 95,,, | Performed by: FAMILY MEDICINE

## 2022-04-14 PROCEDURE — 1157F PR ADVANCE CARE PLAN OR EQUIV PRESENT IN MEDICAL RECORD: ICD-10-PCS | Mod: CPTII,95,, | Performed by: FAMILY MEDICINE

## 2022-04-14 PROCEDURE — 1159F MED LIST DOCD IN RCRD: CPT | Mod: CPTII,95,, | Performed by: FAMILY MEDICINE

## 2022-04-14 PROCEDURE — 1160F RVW MEDS BY RX/DR IN RCRD: CPT | Mod: CPTII,95,, | Performed by: FAMILY MEDICINE

## 2022-04-14 RX ORDER — LOSARTAN POTASSIUM 100 MG/1
100 TABLET ORAL DAILY
Qty: 90 TABLET | Refills: 0 | Status: SHIPPED | OUTPATIENT
Start: 2022-04-14 | End: 2022-08-08
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
## 2022-04-14 NOTE — Clinical Note
Schedule lab orders for soon.  Please see referral orders and please call patient to schedule a consult with urology and general surgery. Thank you.  Please call patient and schedule patient for an appointment with me. Thank you.

## 2022-04-14 NOTE — PATIENT INSTRUCTIONS
Schedule lab orders for soon.    Please see referral orders and please call patient to schedule a consult with urology and general surgery. Thank you.

## 2022-04-19 ENCOUNTER — PATIENT OUTREACH (OUTPATIENT)
Dept: ADMINISTRATIVE | Facility: OTHER | Age: 82
End: 2022-04-19
Payer: MEDICARE

## 2022-04-19 NOTE — PROGRESS NOTES
Health Maintenance Due   Topic Date Due    Shingles Vaccine (1 of 2) Never done    Pneumococcal Vaccines (Age 65+) (1 - PCV) Never done    Colonoscopy  02/14/2016    Influenza Vaccine (1) 09/01/2021    COVID-19 Vaccine (3 - Booster) 12/03/2021     Updates were requested from care everywhere.  Chart was reviewed for overdue Proactive [facility] Encounters (BRENDAN) topics (CRS, Breast Cancer Screening, Eye exam)  Health Maintenance has been updated.  LINKS immunization registry triggered.  Immunizations were reconciled.

## 2022-04-21 ENCOUNTER — OFFICE VISIT (OUTPATIENT)
Dept: UROLOGY | Facility: CLINIC | Age: 82
End: 2022-04-21
Payer: MEDICARE

## 2022-04-21 VITALS
SYSTOLIC BLOOD PRESSURE: 144 MMHG | BODY MASS INDEX: 31.56 KG/M2 | HEIGHT: 71 IN | HEART RATE: 59 BPM | DIASTOLIC BLOOD PRESSURE: 77 MMHG | WEIGHT: 225.44 LBS

## 2022-04-21 DIAGNOSIS — R97.20 ELEVATED PSA: ICD-10-CM

## 2022-04-21 DIAGNOSIS — R35.0 BENIGN PROSTATIC HYPERPLASIA WITH URINARY FREQUENCY: Primary | ICD-10-CM

## 2022-04-21 DIAGNOSIS — N40.1 BENIGN PROSTATIC HYPERPLASIA WITH URINARY FREQUENCY: Primary | ICD-10-CM

## 2022-04-21 LAB
BILIRUB SERPL-MCNC: NORMAL MG/DL
BLOOD URINE, POC: NORMAL
CLARITY, POC UA: CLEAR
COLOR, POC UA: YELLOW
GLUCOSE UR QL STRIP: NORMAL
KETONES UR QL STRIP: NORMAL
LEUKOCYTE ESTERASE URINE, POC: NORMAL
NITRITE, POC UA: NORMAL
PH, POC UA: 8
POC RESIDUAL URINE VOLUME: 12 ML (ref 0–100)
PROTEIN, POC: NORMAL
SPECIFIC GRAVITY, POC UA: 1.01
UROBILINOGEN, POC UA: NORMAL

## 2022-04-21 PROCEDURE — 1157F PR ADVANCE CARE PLAN OR EQUIV PRESENT IN MEDICAL RECORD: ICD-10-PCS | Mod: CPTII,S$GLB,, | Performed by: UROLOGY

## 2022-04-21 PROCEDURE — 3077F SYST BP >= 140 MM HG: CPT | Mod: CPTII,S$GLB,, | Performed by: UROLOGY

## 2022-04-21 PROCEDURE — 1160F PR REVIEW ALL MEDS BY PRESCRIBER/CLIN PHARMACIST DOCUMENTED: ICD-10-PCS | Mod: CPTII,S$GLB,, | Performed by: UROLOGY

## 2022-04-21 PROCEDURE — 1157F ADVNC CARE PLAN IN RCRD: CPT | Mod: CPTII,S$GLB,, | Performed by: UROLOGY

## 2022-04-21 PROCEDURE — 1160F RVW MEDS BY RX/DR IN RCRD: CPT | Mod: CPTII,S$GLB,, | Performed by: UROLOGY

## 2022-04-21 PROCEDURE — 3077F PR MOST RECENT SYSTOLIC BLOOD PRESSURE >= 140 MM HG: ICD-10-PCS | Mod: CPTII,S$GLB,, | Performed by: UROLOGY

## 2022-04-21 PROCEDURE — 1126F PR PAIN SEVERITY QUANTIFIED, NO PAIN PRESENT: ICD-10-PCS | Mod: CPTII,S$GLB,, | Performed by: UROLOGY

## 2022-04-21 PROCEDURE — 3288F PR FALLS RISK ASSESSMENT DOCUMENTED: ICD-10-PCS | Mod: CPTII,S$GLB,, | Performed by: UROLOGY

## 2022-04-21 PROCEDURE — 1126F AMNT PAIN NOTED NONE PRSNT: CPT | Mod: CPTII,S$GLB,, | Performed by: UROLOGY

## 2022-04-21 PROCEDURE — 99999 PR PBB SHADOW E&M-EST. PATIENT-LVL IV: CPT | Mod: PBBFAC,,, | Performed by: UROLOGY

## 2022-04-21 PROCEDURE — 3078F PR MOST RECENT DIASTOLIC BLOOD PRESSURE < 80 MM HG: ICD-10-PCS | Mod: CPTII,S$GLB,, | Performed by: UROLOGY

## 2022-04-21 PROCEDURE — 99999 PR PBB SHADOW E&M-EST. PATIENT-LVL IV: ICD-10-PCS | Mod: PBBFAC,,, | Performed by: UROLOGY

## 2022-04-21 PROCEDURE — 51798 US URINE CAPACITY MEASURE: CPT | Mod: S$GLB,,, | Performed by: UROLOGY

## 2022-04-21 PROCEDURE — 1159F MED LIST DOCD IN RCRD: CPT | Mod: CPTII,S$GLB,, | Performed by: UROLOGY

## 2022-04-21 PROCEDURE — 3078F DIAST BP <80 MM HG: CPT | Mod: CPTII,S$GLB,, | Performed by: UROLOGY

## 2022-04-21 PROCEDURE — 81002 URINALYSIS NONAUTO W/O SCOPE: CPT | Mod: S$GLB,,, | Performed by: UROLOGY

## 2022-04-21 PROCEDURE — 99204 PR OFFICE/OUTPT VISIT, NEW, LEVL IV, 45-59 MIN: ICD-10-PCS | Mod: S$GLB,,, | Performed by: UROLOGY

## 2022-04-21 PROCEDURE — 51798 POCT BLADDER SCAN: ICD-10-PCS | Mod: S$GLB,,, | Performed by: UROLOGY

## 2022-04-21 PROCEDURE — 1159F PR MEDICATION LIST DOCUMENTED IN MEDICAL RECORD: ICD-10-PCS | Mod: CPTII,S$GLB,, | Performed by: UROLOGY

## 2022-04-21 PROCEDURE — 1101F PT FALLS ASSESS-DOCD LE1/YR: CPT | Mod: CPTII,S$GLB,, | Performed by: UROLOGY

## 2022-04-21 PROCEDURE — 1101F PR PT FALLS ASSESS DOC 0-1 FALLS W/OUT INJ PAST YR: ICD-10-PCS | Mod: CPTII,S$GLB,, | Performed by: UROLOGY

## 2022-04-21 PROCEDURE — 3288F FALL RISK ASSESSMENT DOCD: CPT | Mod: CPTII,S$GLB,, | Performed by: UROLOGY

## 2022-04-21 PROCEDURE — 81002 POCT URINE DIPSTICK WITHOUT MICROSCOPE: ICD-10-PCS | Mod: S$GLB,,, | Performed by: UROLOGY

## 2022-04-21 PROCEDURE — 99204 OFFICE O/P NEW MOD 45 MIN: CPT | Mod: S$GLB,,, | Performed by: UROLOGY

## 2022-04-21 NOTE — PROGRESS NOTES
Subjective:      Patient ID: Korey Santos is a 81 y.o. male.    Chief Complaint: BPH  Patient is a 81 y.o. male who is new to our clinic and referred by their PCP for BPH.    Patient complains of lower urinary tract symptoms. He reports frequency, incomplete emptying, intermittency, nocturia two times a night, straining, urgency and weak stream. He denies dysuria. Patient states symptoms are of severe severity. Onset of symptoms was several years ago and was gradual in onset. His AUA Symptom Score is, 20/3. He has no personal history and no family history of prostate cancer. He reports a history of no complicating symptoms. He denies flank pain, gross hematuria, kidney stones and recurrent UTI.  On tamsulosin and finasteride for years.  H/o elevated PSA in 207 but normal for age at that time.      Lab Results   Component Value Date    PSA 4.7 (H) 07/08/2017    PSA 4.7 (H) 10/07/2014    PSA 3.2 12/18/2013    PSA 2.72 03/05/2012    PSA 2.1 01/21/2011    PSA 2.3 10/02/2008    PSA 1.6 04/05/2007    PSADIAG 2.9 10/22/2014       IPSS Questionnaire (AUA-SS):  Over the past month    1)  Incomplete Emptying - How often have you had a sensation of not emptying your bladder completely after you finish urinating?  3 - About half the time   2)  Frequency - How often have you had to urinate again less than two hours after you finished urinating? 3 - About half the time   3)  Intermittency - How often have you found you stopped and started again several times when you urinated?  3 - About half the time   4) Urgency - How difficult have you found it to postpone urination?  3 - About half the time   5) Weak Stream - How often have you had a weak urinary stream?  3 - About half the time   6) Straining  - How often have you had to push or strain to begin urination?  3 - About half the time   7) Nocturia - How many times did you most typically get up to urinate from the time you went to bed until the time you got up in the  morning?  2 - 2 times   Total score:  0-7 mild, 8-19 moderate, 20-35 severe 20   Quality of Life:  3 - Mixed      Review of Systems   Constitutional: Negative for activity change, chills and fever.   HENT: Negative for congestion.    Respiratory: Negative for cough, chest tightness and shortness of breath.    Cardiovascular: Negative for chest pain and palpitations.   Gastrointestinal: Negative for abdominal distention, abdominal pain, nausea and vomiting.   Genitourinary: Positive for difficulty urinating, frequency and urgency. Negative for flank pain, hematuria, penile pain, scrotal swelling and testicular pain.   Musculoskeletal: Negative for gait problem.     All other systems reviewed and negative except pertinent positives noted in HPI.      Objective:     Physical Exam  Vitals reviewed.   Constitutional:       Appearance: Normal appearance.   HENT:      Head: Atraumatic.   Cardiovascular:      Pulses: Normal pulses.   Pulmonary:      Effort: Pulmonary effort is normal.      Breath sounds: Normal breath sounds.   Abdominal:      General: There is no distension.      Palpations: Abdomen is soft.      Tenderness: There is no abdominal tenderness. There is no right CVA tenderness, left CVA tenderness or guarding.   Genitourinary:     Penis: Normal.       Testes: Normal.      Prostate: Normal.      Comments: Enlarged prostate  Musculoskeletal:      Cervical back: Normal range of motion.   Neurological:      Mental Status: He is alert.         Assessment:     Problem Noted   Benign Prostatic Hyperplasia With Urinary Frequency 4/23/2014    BPH w LUTs, frequency  Rx/Tx: tamsulosin/finasteride for years  AUA SS initial: 20/3       Elevated Psa 8/16/2017       Plan:   1. Avoid bladder irritants including but not limited to caffeine, alcohol, smoking, spicy foods, acidic foods, tomato-based products, citrus, artificial sweeteners, chocolate, coffee or tea.  2.  Would not check PSA given normal for age at 4.7 in 2017  and age  3.  Discussed surgical evaluation for BPH with cysto/trus/uroflow.  Wishes to proceed    Juan Ward MD

## 2022-04-29 ENCOUNTER — OFFICE VISIT (OUTPATIENT)
Dept: SURGERY | Facility: CLINIC | Age: 82
End: 2022-04-29
Payer: MEDICARE

## 2022-04-29 VITALS
HEIGHT: 71 IN | DIASTOLIC BLOOD PRESSURE: 77 MMHG | WEIGHT: 224.88 LBS | HEART RATE: 58 BPM | SYSTOLIC BLOOD PRESSURE: 143 MMHG | BODY MASS INDEX: 31.48 KG/M2

## 2022-04-29 DIAGNOSIS — M79.89 SOFT TISSUE MASS: Primary | ICD-10-CM

## 2022-04-29 PROCEDURE — 88304 TISSUE EXAM BY PATHOLOGIST: CPT | Performed by: PATHOLOGY

## 2022-04-29 PROCEDURE — 99213 PR OFFICE/OUTPT VISIT, EST, LEVL III, 20-29 MIN: ICD-10-PCS | Mod: 25,S$GLB,, | Performed by: STUDENT IN AN ORGANIZED HEALTH CARE EDUCATION/TRAINING PROGRAM

## 2022-04-29 PROCEDURE — 21555 EXC NECK LES SC < 3 CM: CPT | Mod: S$GLB,,, | Performed by: STUDENT IN AN ORGANIZED HEALTH CARE EDUCATION/TRAINING PROGRAM

## 2022-04-29 PROCEDURE — 99999 PR PBB SHADOW E&M-EST. PATIENT-LVL IV: CPT | Mod: PBBFAC,,, | Performed by: STUDENT IN AN ORGANIZED HEALTH CARE EDUCATION/TRAINING PROGRAM

## 2022-04-29 PROCEDURE — 3077F SYST BP >= 140 MM HG: CPT | Mod: CPTII,S$GLB,, | Performed by: STUDENT IN AN ORGANIZED HEALTH CARE EDUCATION/TRAINING PROGRAM

## 2022-04-29 PROCEDURE — 1101F PT FALLS ASSESS-DOCD LE1/YR: CPT | Mod: CPTII,S$GLB,, | Performed by: STUDENT IN AN ORGANIZED HEALTH CARE EDUCATION/TRAINING PROGRAM

## 2022-04-29 PROCEDURE — 1160F RVW MEDS BY RX/DR IN RCRD: CPT | Mod: CPTII,S$GLB,, | Performed by: STUDENT IN AN ORGANIZED HEALTH CARE EDUCATION/TRAINING PROGRAM

## 2022-04-29 PROCEDURE — 99213 OFFICE O/P EST LOW 20 MIN: CPT | Mod: 25,S$GLB,, | Performed by: STUDENT IN AN ORGANIZED HEALTH CARE EDUCATION/TRAINING PROGRAM

## 2022-04-29 PROCEDURE — 3288F PR FALLS RISK ASSESSMENT DOCUMENTED: ICD-10-PCS | Mod: CPTII,S$GLB,, | Performed by: STUDENT IN AN ORGANIZED HEALTH CARE EDUCATION/TRAINING PROGRAM

## 2022-04-29 PROCEDURE — 1126F PR PAIN SEVERITY QUANTIFIED, NO PAIN PRESENT: ICD-10-PCS | Mod: CPTII,S$GLB,, | Performed by: STUDENT IN AN ORGANIZED HEALTH CARE EDUCATION/TRAINING PROGRAM

## 2022-04-29 PROCEDURE — 3288F FALL RISK ASSESSMENT DOCD: CPT | Mod: CPTII,S$GLB,, | Performed by: STUDENT IN AN ORGANIZED HEALTH CARE EDUCATION/TRAINING PROGRAM

## 2022-04-29 PROCEDURE — 88307 PR  SURG PATH,LEVEL V: ICD-10-PCS | Mod: 26,,, | Performed by: PATHOLOGY

## 2022-04-29 PROCEDURE — 3077F PR MOST RECENT SYSTOLIC BLOOD PRESSURE >= 140 MM HG: ICD-10-PCS | Mod: CPTII,S$GLB,, | Performed by: STUDENT IN AN ORGANIZED HEALTH CARE EDUCATION/TRAINING PROGRAM

## 2022-04-29 PROCEDURE — 1160F PR REVIEW ALL MEDS BY PRESCRIBER/CLIN PHARMACIST DOCUMENTED: ICD-10-PCS | Mod: CPTII,S$GLB,, | Performed by: STUDENT IN AN ORGANIZED HEALTH CARE EDUCATION/TRAINING PROGRAM

## 2022-04-29 PROCEDURE — 1157F PR ADVANCE CARE PLAN OR EQUIV PRESENT IN MEDICAL RECORD: ICD-10-PCS | Mod: CPTII,S$GLB,, | Performed by: STUDENT IN AN ORGANIZED HEALTH CARE EDUCATION/TRAINING PROGRAM

## 2022-04-29 PROCEDURE — 1101F PR PT FALLS ASSESS DOC 0-1 FALLS W/OUT INJ PAST YR: ICD-10-PCS | Mod: CPTII,S$GLB,, | Performed by: STUDENT IN AN ORGANIZED HEALTH CARE EDUCATION/TRAINING PROGRAM

## 2022-04-29 PROCEDURE — 1159F PR MEDICATION LIST DOCUMENTED IN MEDICAL RECORD: ICD-10-PCS | Mod: CPTII,S$GLB,, | Performed by: STUDENT IN AN ORGANIZED HEALTH CARE EDUCATION/TRAINING PROGRAM

## 2022-04-29 PROCEDURE — 3078F PR MOST RECENT DIASTOLIC BLOOD PRESSURE < 80 MM HG: ICD-10-PCS | Mod: CPTII,S$GLB,, | Performed by: STUDENT IN AN ORGANIZED HEALTH CARE EDUCATION/TRAINING PROGRAM

## 2022-04-29 PROCEDURE — 1126F AMNT PAIN NOTED NONE PRSNT: CPT | Mod: CPTII,S$GLB,, | Performed by: STUDENT IN AN ORGANIZED HEALTH CARE EDUCATION/TRAINING PROGRAM

## 2022-04-29 PROCEDURE — 1157F ADVNC CARE PLAN IN RCRD: CPT | Mod: CPTII,S$GLB,, | Performed by: STUDENT IN AN ORGANIZED HEALTH CARE EDUCATION/TRAINING PROGRAM

## 2022-04-29 PROCEDURE — 1159F MED LIST DOCD IN RCRD: CPT | Mod: CPTII,S$GLB,, | Performed by: STUDENT IN AN ORGANIZED HEALTH CARE EDUCATION/TRAINING PROGRAM

## 2022-04-29 PROCEDURE — 3078F DIAST BP <80 MM HG: CPT | Mod: CPTII,S$GLB,, | Performed by: STUDENT IN AN ORGANIZED HEALTH CARE EDUCATION/TRAINING PROGRAM

## 2022-04-29 PROCEDURE — 21555 PR EXC TUMOR SOFT TISSUE NECK/ANT THORAX SUBQ <3CM: ICD-10-PCS | Mod: S$GLB,,, | Performed by: STUDENT IN AN ORGANIZED HEALTH CARE EDUCATION/TRAINING PROGRAM

## 2022-04-29 PROCEDURE — 88307 TISSUE EXAM BY PATHOLOGIST: CPT | Mod: 26,,, | Performed by: PATHOLOGY

## 2022-04-29 PROCEDURE — 99999 PR PBB SHADOW E&M-EST. PATIENT-LVL IV: ICD-10-PCS | Mod: PBBFAC,,, | Performed by: STUDENT IN AN ORGANIZED HEALTH CARE EDUCATION/TRAINING PROGRAM

## 2022-04-29 NOTE — PROGRESS NOTES
Patient ID: Korey Santos is a 81 y.o. male.    Chief Complaint: No chief complaint on file.      HPI:  HPI   81 m with posterior midline neck mass for several years. Has slowly grown. Some worsening discomfort. Desire removal. Never any drainage. Never had before.     Review of Systems   Constitutional: Negative for chills, diaphoresis and fever.   HENT: Negative for trouble swallowing.    Respiratory: Negative for cough, shortness of breath, wheezing and stridor.    Cardiovascular: Negative for chest pain and palpitations.   Gastrointestinal: Negative for abdominal distention, abdominal pain, blood in stool, diarrhea, nausea and vomiting.   Endocrine: Negative for cold intolerance and heat intolerance.   Genitourinary: Negative for difficulty urinating.   Musculoskeletal: Negative for back pain.   Skin: Negative for rash.   Allergic/Immunologic: Negative for immunocompromised state.   Neurological: Negative for dizziness, syncope and numbness.   Hematological: Negative for adenopathy.   Psychiatric/Behavioral: Negative for agitation.       Current Outpatient Medications   Medication Sig Dispense Refill    azelastine (ASTELIN) 137 mcg (0.1 %) nasal spray 1 spray (137 mcg total) by Nasal route 2 (two) times daily as needed for Rhinitis. 30 mL 0    fluticasone propionate (FLONASE) 50 mcg/actuation nasal spray 1 spray (50 mcg total) by Each Nostril route once daily. 16 g 6    linaclotide (LINZESS) 145 mcg Cap capsule Take 1 capsule (145 mcg total) by mouth daily as needed. 30 capsule 6    losartan (COZAAR) 100 MG tablet Take 1 tablet (100 mg total) by mouth once daily. 90 tablet 0    meloxicam (MOBIC) 7.5 MG tablet Take 1 tablet (7.5 mg total) by mouth once daily. 30 tablet 3    multivitamin capsule Take 1 capsule by mouth once daily.      oxyCODONE-acetaminophen (PERCOCET) 5-325 mg per tablet Take 1 tablet by mouth every 4 (four) hours as needed for Pain. 30 tablet 0    polyethylene glycol (GLYCOLAX) 17  gram/dose powder Take 17 g by mouth daily as needed. 510 g 1    pravastatin (PRAVACHOL) 40 MG tablet Take 1 tablet (40 mg total) by mouth once daily. 30 tablet 11    tamsulosin (FLOMAX) 0.4 mg Cap Take 1 capsule (0.4 mg total) by mouth once daily. 90 capsule 4    traZODone (DESYREL) 50 MG tablet Take 1 tablet (50 mg total) by mouth every evening. 30 tablet 1    aspirin (ECOTRIN) 325 MG EC tablet Take 1 tablet (325 mg total) by mouth once daily. (Patient taking differently: Take 325 mg by mouth every other day. ) 42 tablet 0    cycloSPORINE (RESTASIS) 0.05 % ophthalmic emulsion Place 1 drop into both eyes 2 (two) times daily. 60 vial 11    finasteride (PROSCAR) 5 mg tablet Take 1 tablet (5 mg total) by mouth once daily. 90 tablet 3     No current facility-administered medications for this visit.       Review of patient's allergies indicates:   Allergen Reactions    Clindamycin Swelling     Throat swells    Lisinopril Swelling and Other (See Comments)     Throat swelling       Past Medical History:   Diagnosis Date    Arthritis     Back pain, chronic     BPH with urinary obstruction     Chronic constipation     Colon polyp     DJD (degenerative joint disease)     Hip    Hyperlipidemia     Hypertension     Laryngopharyngeal reflux     Left inguinal hernia     Lumbar herniated disc     Lumbar stenosis     OA (osteoarthritis) of knee     Bilateral    Paradoxical insomnia     Sinus trouble        Past Surgical History:   Procedure Laterality Date    BACK SURGERY  2014    COLONOSCOPY      JOINT REPLACEMENT Left 05/04/2018    KNEE SURGERY      left hand      LEG SURGERY      SPINE SURGERY      UPPER GASTROINTESTINAL ENDOSCOPY         Family History   Problem Relation Age of Onset    Cancer Father         lung or smoking    No Known Problems Mother     No Known Problems Sister     No Known Problems Brother     No Known Problems Maternal Aunt     No Known Problems Maternal Uncle     No  Known Problems Paternal Aunt     No Known Problems Paternal Uncle     No Known Problems Maternal Grandmother     No Known Problems Maternal Grandfather     No Known Problems Paternal Grandmother     No Known Problems Paternal Grandfather     No Known Problems Daughter     No Known Problems Son     Glaucoma Neg Hx     Diabetes Neg Hx     Amblyopia Neg Hx     Blindness Neg Hx     Cataracts Neg Hx     Hypertension Neg Hx     Macular degeneration Neg Hx     Retinal detachment Neg Hx     Strabismus Neg Hx     Stroke Neg Hx     Thyroid disease Neg Hx     Colon cancer Neg Hx     Esophageal cancer Neg Hx        Social History     Socioeconomic History    Marital status: Single   Tobacco Use    Smoking status: Never Smoker    Smokeless tobacco: Never Used   Substance and Sexual Activity    Alcohol use: Yes     Alcohol/week: 0.0 standard drinks     Comment: approximately 2 beers weekly    Drug use: No    Sexual activity: Yes     Partners: Female     Birth control/protection: None   Social History Narrative    Ret. 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       Vitals:    04/29/22 1009   BP: (!) 143/77   Pulse: (!) 58       Physical Exam  Constitutional:       General: He is not in acute distress.  HENT:      Head: Normocephalic and atraumatic.     Eyes:      General: No scleral icterus.  Cardiovascular:      Rate and Rhythm: Normal rate.   Pulmonary:      Effort: Pulmonary effort is normal. No respiratory distress.      Breath sounds: No stridor.   Abdominal:      Palpations: Abdomen is soft.      Tenderness: There is no abdominal tenderness.   Lymphadenopathy:      Cervical: No cervical adenopathy.   Skin:     General: Skin is warm.      Findings: No erythema.   Neurological:      Mental Status: He is alert and oriented to person, place, and time.   Psychiatric:         Behavior: Behavior normal.         Assessment & Plan:   81M with neck mass for years  Prefers excision in office. Firm yellow nodule  in deep [facility]  RTC in 2 weeks  Sent for path

## 2022-04-29 NOTE — PROGRESS NOTES
"Korey Santos is a 81 y.o. male patient.    Pulse: (!) 58 (04/29/22 1009)  BP: (!) 143/77 (04/29/22 1009)  Weight: 102 kg (224 lb 13.9 oz) (04/29/22 1009)  Height: 5' 11" (180.3 cm) (04/29/22 1009)       Exc, Tumor Soft Tissue    Date/Time: 4/29/2022 10:00 AM  Performed by: Jack Lai MD  Authorized by: Jack Lai MD     Consent Done?:  Yes (Written)  Timeout: prior to procedure the correct patient, procedure, and site was verified    Prep: patient was prepped and draped in usual sterile fashion    Local anesthesia used?: Yes    Anesthesia:  Local infiltration  Local anesthetic:  Lidocaine 1% with epinephrine  Body area:  Neck / anterior thorax  Anesthesia:  Local infiltration  Local anesthetic:  Lidocaine 1% with epinephrine  Excision type:  Tumor  Excision size (cm):  1.5  Incision type:  Single straight  Specimens?: Yes     Specimens submitted to pathology.   Hemostasis was obtained.  Wound closure:  Simple  Sutures:  4-0 Monocryl  Comments:      Firm solid yellow nodule 1.5cm, sent for path        4/29/2022    "

## 2022-05-05 LAB
FINAL PATHOLOGIC DIAGNOSIS: NORMAL
GROSS: NORMAL
Lab: NORMAL

## 2022-05-13 ENCOUNTER — OFFICE VISIT (OUTPATIENT)
Dept: SURGERY | Facility: CLINIC | Age: 82
End: 2022-05-13
Payer: MEDICARE

## 2022-05-13 VITALS
HEART RATE: 55 BPM | BODY MASS INDEX: 31.54 KG/M2 | HEIGHT: 71 IN | SYSTOLIC BLOOD PRESSURE: 144 MMHG | WEIGHT: 225.31 LBS | DIASTOLIC BLOOD PRESSURE: 78 MMHG

## 2022-05-13 DIAGNOSIS — M79.89 SOFT TISSUE MASS: Primary | ICD-10-CM

## 2022-05-13 PROCEDURE — 1157F ADVNC CARE PLAN IN RCRD: CPT | Mod: CPTII,S$GLB,, | Performed by: STUDENT IN AN ORGANIZED HEALTH CARE EDUCATION/TRAINING PROGRAM

## 2022-05-13 PROCEDURE — 1157F PR ADVANCE CARE PLAN OR EQUIV PRESENT IN MEDICAL RECORD: ICD-10-PCS | Mod: CPTII,S$GLB,, | Performed by: STUDENT IN AN ORGANIZED HEALTH CARE EDUCATION/TRAINING PROGRAM

## 2022-05-13 PROCEDURE — 1159F PR MEDICATION LIST DOCUMENTED IN MEDICAL RECORD: ICD-10-PCS | Mod: CPTII,S$GLB,, | Performed by: STUDENT IN AN ORGANIZED HEALTH CARE EDUCATION/TRAINING PROGRAM

## 2022-05-13 PROCEDURE — 1101F PT FALLS ASSESS-DOCD LE1/YR: CPT | Mod: CPTII,S$GLB,, | Performed by: STUDENT IN AN ORGANIZED HEALTH CARE EDUCATION/TRAINING PROGRAM

## 2022-05-13 PROCEDURE — 1159F MED LIST DOCD IN RCRD: CPT | Mod: CPTII,S$GLB,, | Performed by: STUDENT IN AN ORGANIZED HEALTH CARE EDUCATION/TRAINING PROGRAM

## 2022-05-13 PROCEDURE — 3077F SYST BP >= 140 MM HG: CPT | Mod: CPTII,S$GLB,, | Performed by: STUDENT IN AN ORGANIZED HEALTH CARE EDUCATION/TRAINING PROGRAM

## 2022-05-13 PROCEDURE — 99999 PR PBB SHADOW E&M-EST. PATIENT-LVL III: CPT | Mod: PBBFAC,,, | Performed by: STUDENT IN AN ORGANIZED HEALTH CARE EDUCATION/TRAINING PROGRAM

## 2022-05-13 PROCEDURE — 3078F PR MOST RECENT DIASTOLIC BLOOD PRESSURE < 80 MM HG: ICD-10-PCS | Mod: CPTII,S$GLB,, | Performed by: STUDENT IN AN ORGANIZED HEALTH CARE EDUCATION/TRAINING PROGRAM

## 2022-05-13 PROCEDURE — 3288F FALL RISK ASSESSMENT DOCD: CPT | Mod: CPTII,S$GLB,, | Performed by: STUDENT IN AN ORGANIZED HEALTH CARE EDUCATION/TRAINING PROGRAM

## 2022-05-13 PROCEDURE — 3078F DIAST BP <80 MM HG: CPT | Mod: CPTII,S$GLB,, | Performed by: STUDENT IN AN ORGANIZED HEALTH CARE EDUCATION/TRAINING PROGRAM

## 2022-05-13 PROCEDURE — 99024 POSTOP FOLLOW-UP VISIT: CPT | Mod: S$GLB,,, | Performed by: STUDENT IN AN ORGANIZED HEALTH CARE EDUCATION/TRAINING PROGRAM

## 2022-05-13 PROCEDURE — 1126F PR PAIN SEVERITY QUANTIFIED, NO PAIN PRESENT: ICD-10-PCS | Mod: CPTII,S$GLB,, | Performed by: STUDENT IN AN ORGANIZED HEALTH CARE EDUCATION/TRAINING PROGRAM

## 2022-05-13 PROCEDURE — 3288F PR FALLS RISK ASSESSMENT DOCUMENTED: ICD-10-PCS | Mod: CPTII,S$GLB,, | Performed by: STUDENT IN AN ORGANIZED HEALTH CARE EDUCATION/TRAINING PROGRAM

## 2022-05-13 PROCEDURE — 99999 PR PBB SHADOW E&M-EST. PATIENT-LVL III: ICD-10-PCS | Mod: PBBFAC,,, | Performed by: STUDENT IN AN ORGANIZED HEALTH CARE EDUCATION/TRAINING PROGRAM

## 2022-05-13 PROCEDURE — 99024 PR POST-OP FOLLOW-UP VISIT: ICD-10-PCS | Mod: S$GLB,,, | Performed by: STUDENT IN AN ORGANIZED HEALTH CARE EDUCATION/TRAINING PROGRAM

## 2022-05-13 PROCEDURE — 1126F AMNT PAIN NOTED NONE PRSNT: CPT | Mod: CPTII,S$GLB,, | Performed by: STUDENT IN AN ORGANIZED HEALTH CARE EDUCATION/TRAINING PROGRAM

## 2022-05-13 PROCEDURE — 3077F PR MOST RECENT SYSTOLIC BLOOD PRESSURE >= 140 MM HG: ICD-10-PCS | Mod: CPTII,S$GLB,, | Performed by: STUDENT IN AN ORGANIZED HEALTH CARE EDUCATION/TRAINING PROGRAM

## 2022-05-13 PROCEDURE — 1101F PR PT FALLS ASSESS DOC 0-1 FALLS W/OUT INJ PAST YR: ICD-10-PCS | Mod: CPTII,S$GLB,, | Performed by: STUDENT IN AN ORGANIZED HEALTH CARE EDUCATION/TRAINING PROGRAM

## 2022-05-13 NOTE — PROGRESS NOTES
GENERAL SURGERY  Clinic Note        SUBJECTIVE:     HISTORY OF PRESENT ILLNESS:  Korey Santos is a 81 y.o. male returning to surgery clinic s/p lipoma excision 2 weeks ago. His incision is well healed and he has no pain.         OBJECTIVE:     Most Recent Vitals:  Pulse: (!) 55 (05/13/22 0953)  BP: (!) 144/78 (05/13/22 0953)      PHYSICAL EXAM:  Skin: posterior neck incision c/d/i with some glue still remaining. No erythema, nontender.     DIAGNOSTIC STUDIES:  Final Pathologic Diagnosis 1. Soft tissue, posterior neck, mass, excision:   - Lipoma        ASSESSMENT:     Korey Santos is a 81 y.o. male post op from lipoma excision, doing well. Path discussed      PLAN:  · Follow up PRN    Irais Landrum MD  PGY-2, General Surgery  [facility]

## 2022-05-25 ENCOUNTER — TELEPHONE (OUTPATIENT)
Dept: UROLOGY | Facility: CLINIC | Age: 82
End: 2022-05-25
Payer: MEDICARE

## 2022-05-25 NOTE — TELEPHONE ENCOUNTER
----- Message from Shawna Alvarado sent at 5/25/2022 12:31 PM CDT -----      Patient calls requesting to see if his script had been sent, stated he called and left message and still hasn't been done  He said was for the Flomax that didn't show it had been sent for refill  I offered patient for me to contact the nurse and see if they can tell why it hadn't been sent  Patient was semi hostile also said he was calling our office and we should be able to answer  I advised patient there was no note in the computer regarding the refill request  Verified medication and pharmacy  Again offer to contact provider, he declined offer  Said we were making it too hard and confusing  I apologized to patient and again offered to find out why the script hadn't been sent and patient disconnected the call    shows was prescribed by Flaco Worthington MD who is no longer at [facility]  Was unsure who to send it to sent to both urology doctors on patients file    Patient can be contacted @# [phone]

## 2022-05-25 NOTE — TELEPHONE ENCOUNTER
"Patient recently evaluated by Dr Ward.  Attempted to clarify if he need flomax and or finasteride.  Patient informed me he only takes flomax.  Questioned how long has he only been on flomax, "I don't know-for years"  Please confirm if flomax and finasteride is needed.  If so, please send to [facility] in [place].  Updated.  Thanks  "

## 2022-05-26 DIAGNOSIS — N40.1 BENIGN PROSTATIC HYPERPLASIA WITH URINARY FREQUENCY: ICD-10-CM

## 2022-05-26 DIAGNOSIS — R35.0 BENIGN PROSTATIC HYPERPLASIA WITH URINARY FREQUENCY: ICD-10-CM

## 2022-05-26 DIAGNOSIS — R97.20 ELEVATED PSA: Primary | ICD-10-CM

## 2022-05-26 RX ORDER — TAMSULOSIN HYDROCHLORIDE 0.4 MG/1
1 CAPSULE ORAL DAILY
Qty: 90 CAPSULE | Refills: 4 | Status: SHIPPED | OUTPATIENT
Start: 2022-05-26 | End: 2023-01-24 | Stop reason: SDUPTHER

## 2022-06-13 ENCOUNTER — TELEPHONE (OUTPATIENT)
Dept: INTERNAL MEDICINE | Facility: CLINIC | Age: 82
End: 2022-06-13
Payer: MEDICARE

## 2022-06-13 NOTE — TELEPHONE ENCOUNTER
Called and got pt scheduled for tomorrow as early as possible. Pt refused 2pm appointment today that was open.  Had to explain multiple times how appointments work to pt.

## 2022-06-14 ENCOUNTER — TELEPHONE (OUTPATIENT)
Dept: SPINE | Facility: CLINIC | Age: 82
End: 2022-06-14
Payer: MEDICARE

## 2022-06-14 ENCOUNTER — OFFICE VISIT (OUTPATIENT)
Dept: INTERNAL MEDICINE | Facility: CLINIC | Age: 82
End: 2022-06-14
Payer: MEDICARE

## 2022-06-14 VITALS
WEIGHT: 227.5 LBS | OXYGEN SATURATION: 98 % | BODY MASS INDEX: 31.85 KG/M2 | HEIGHT: 71 IN | HEART RATE: 66 BPM | DIASTOLIC BLOOD PRESSURE: 80 MMHG | SYSTOLIC BLOOD PRESSURE: 148 MMHG

## 2022-06-14 DIAGNOSIS — M54.31 SCIATICA OF RIGHT SIDE: Primary | ICD-10-CM

## 2022-06-14 DIAGNOSIS — N40.0 PROSTATISM: ICD-10-CM

## 2022-06-14 DIAGNOSIS — K59.04 CHRONIC IDIOPATHIC CONSTIPATION: ICD-10-CM

## 2022-06-14 DIAGNOSIS — M54.9 DORSALGIA, UNSPECIFIED: ICD-10-CM

## 2022-06-14 PROCEDURE — 99999 PR PBB SHADOW E&M-EST. PATIENT-LVL V: CPT | Mod: PBBFAC,,, | Performed by: INTERNAL MEDICINE

## 2022-06-14 PROCEDURE — 1101F PT FALLS ASSESS-DOCD LE1/YR: CPT | Mod: CPTII,S$GLB,, | Performed by: INTERNAL MEDICINE

## 2022-06-14 PROCEDURE — 1125F AMNT PAIN NOTED PAIN PRSNT: CPT | Mod: CPTII,S$GLB,, | Performed by: INTERNAL MEDICINE

## 2022-06-14 PROCEDURE — 1159F PR MEDICATION LIST DOCUMENTED IN MEDICAL RECORD: ICD-10-PCS | Mod: CPTII,S$GLB,, | Performed by: INTERNAL MEDICINE

## 2022-06-14 PROCEDURE — 3079F PR MOST RECENT DIASTOLIC BLOOD PRESSURE 80-89 MM HG: ICD-10-PCS | Mod: CPTII,S$GLB,, | Performed by: INTERNAL MEDICINE

## 2022-06-14 PROCEDURE — 99214 PR OFFICE/OUTPT VISIT, EST, LEVL IV, 30-39 MIN: ICD-10-PCS | Mod: S$GLB,,, | Performed by: INTERNAL MEDICINE

## 2022-06-14 PROCEDURE — 1160F RVW MEDS BY RX/DR IN RCRD: CPT | Mod: CPTII,S$GLB,, | Performed by: INTERNAL MEDICINE

## 2022-06-14 PROCEDURE — 1157F ADVNC CARE PLAN IN RCRD: CPT | Mod: CPTII,S$GLB,, | Performed by: INTERNAL MEDICINE

## 2022-06-14 PROCEDURE — 3077F PR MOST RECENT SYSTOLIC BLOOD PRESSURE >= 140 MM HG: ICD-10-PCS | Mod: CPTII,S$GLB,, | Performed by: INTERNAL MEDICINE

## 2022-06-14 PROCEDURE — 3288F FALL RISK ASSESSMENT DOCD: CPT | Mod: CPTII,S$GLB,, | Performed by: INTERNAL MEDICINE

## 2022-06-14 PROCEDURE — 1101F PR PT FALLS ASSESS DOC 0-1 FALLS W/OUT INJ PAST YR: ICD-10-PCS | Mod: CPTII,S$GLB,, | Performed by: INTERNAL MEDICINE

## 2022-06-14 PROCEDURE — 3288F PR FALLS RISK ASSESSMENT DOCUMENTED: ICD-10-PCS | Mod: CPTII,S$GLB,, | Performed by: INTERNAL MEDICINE

## 2022-06-14 PROCEDURE — 1157F PR ADVANCE CARE PLAN OR EQUIV PRESENT IN MEDICAL RECORD: ICD-10-PCS | Mod: CPTII,S$GLB,, | Performed by: INTERNAL MEDICINE

## 2022-06-14 PROCEDURE — 3077F SYST BP >= 140 MM HG: CPT | Mod: CPTII,S$GLB,, | Performed by: INTERNAL MEDICINE

## 2022-06-14 PROCEDURE — 1125F PR PAIN SEVERITY QUANTIFIED, PAIN PRESENT: ICD-10-PCS | Mod: CPTII,S$GLB,, | Performed by: INTERNAL MEDICINE

## 2022-06-14 PROCEDURE — 99214 OFFICE O/P EST MOD 30 MIN: CPT | Mod: S$GLB,,, | Performed by: INTERNAL MEDICINE

## 2022-06-14 PROCEDURE — 1159F MED LIST DOCD IN RCRD: CPT | Mod: CPTII,S$GLB,, | Performed by: INTERNAL MEDICINE

## 2022-06-14 PROCEDURE — 99999 PR PBB SHADOW E&M-EST. PATIENT-LVL V: ICD-10-PCS | Mod: PBBFAC,,, | Performed by: INTERNAL MEDICINE

## 2022-06-14 PROCEDURE — 3079F DIAST BP 80-89 MM HG: CPT | Mod: CPTII,S$GLB,, | Performed by: INTERNAL MEDICINE

## 2022-06-14 PROCEDURE — 1160F PR REVIEW ALL MEDS BY PRESCRIBER/CLIN PHARMACIST DOCUMENTED: ICD-10-PCS | Mod: CPTII,S$GLB,, | Performed by: INTERNAL MEDICINE

## 2022-06-14 RX ORDER — FINASTERIDE 5 MG/1
5 TABLET, FILM COATED ORAL DAILY
Qty: 90 TABLET | Refills: 3 | Status: SHIPPED | OUTPATIENT
Start: 2022-06-14 | End: 2023-01-24 | Stop reason: SDUPTHER

## 2022-06-14 RX ORDER — MELOXICAM 7.5 MG/1
7.5 TABLET ORAL DAILY
Qty: 90 TABLET | Refills: 3 | Status: SHIPPED | OUTPATIENT
Start: 2022-06-14 | End: 2022-06-20

## 2022-06-14 NOTE — PROGRESS NOTES
Subjective:       Patient ID: Korey Santos is a 81 y.o. male.    Chief Complaint:   Hip Pain (Right hip pain)    HPI - came to me for urgent care, asking for an MRI.  He has pain from his right buttock area to his toes, with numbness and tingling.  He has had this before and ended up with an intervention - foraminotomy?.  Injections did not work last time.  Needs some refills, so I provided that.  Not interested in the covid-19 booster shot.      Pmh/meds:  Reviewed and reconciled in EPIC with patient during visit today.    Review of Systems   Constitutional: Negative for fever.   HENT: Negative for congestion.    Respiratory: Negative for shortness of breath.    Cardiovascular: Negative for chest pain.   Gastrointestinal: Positive for abdominal distention.   Genitourinary: Positive for difficulty urinating.   Musculoskeletal: Positive for arthralgias.   Skin: Negative for rash.   Neurological: Negative for headaches.   Psychiatric/Behavioral: Negative for sleep disturbance.       Objective:      Physical Exam  Constitutional:       General: He is not in acute distress.     Appearance: He is well-developed. He is obese. He is not diaphoretic.   HENT:      Head: Normocephalic and atraumatic.   Cardiovascular:      Rate and Rhythm: Normal rate and regular rhythm.      Heart sounds: Normal heart sounds. No murmur heard.    No friction rub. No gallop.   Pulmonary:      Effort: No respiratory distress.      Breath sounds: No wheezing or rales.   Chest:      Chest wall: No tenderness.   Musculoskeletal:      Comments: (+) SLR on right.  No ttp along spine or hip joint   Skin:     General: Skin is warm.      Findings: No erythema.   Neurological:      Mental Status: He is alert and oriented to person, place, and time.   Psychiatric:         Thought Content: Thought content normal.         Assessment:       1. Sciatica of right side    2. Chronic idiopathic constipation    3. Prostatism    4. Dorsalgia, unspecified         Plan:       Korey was seen today for hip pain.    Diagnoses and all orders for this visit:    Sciatica of right side - will refill mobic and send to back and spine.  Patient requested MRI, so I'll do that for him  -     meloxicam (MOBIC) 7.5 MG tablet; Take 1 tablet (7.5 mg total) by mouth once daily.  -     Ambulatory referral/consult to [facility]; Future    Chronic idiopathic constipation - refills  -     linaCLOtide (LINZESS) 145 mcg Cap capsule; Take 1 capsule (145 mcg total) by mouth daily as needed (constipation).    Prostatism - refills  -     finasteride (PROSCAR) 5 mg tablet; Take 1 tablet (5 mg total) by mouth once daily.    Dorsalgia, unspecified  -     MRI Lumbar Spine Without Contrast; Future    rtc prn    VICTOR HUGO Justice MD MPH  Staff Internist

## 2022-06-17 ENCOUNTER — HOSPITAL ENCOUNTER (OUTPATIENT)
Dept: RADIOLOGY | Facility: HOSPITAL | Age: 82
Discharge: HOME | End: 2022-06-17
Attending: INTERNAL MEDICINE
Payer: MEDICARE

## 2022-06-17 ENCOUNTER — TELEPHONE (OUTPATIENT)
Dept: INTERNAL MEDICINE | Facility: CLINIC | Age: 82
End: 2022-06-17
Payer: MEDICARE

## 2022-06-17 ENCOUNTER — HOSPITAL ENCOUNTER (OUTPATIENT)
Dept: RADIOLOGY | Facility: HOSPITAL | Age: 82
Discharge: HOME | End: 2022-06-17
Attending: ORTHOPAEDIC SURGERY
Payer: MEDICARE

## 2022-06-17 DIAGNOSIS — M51.36 DDD (DEGENERATIVE DISC DISEASE), LUMBAR: ICD-10-CM

## 2022-06-17 DIAGNOSIS — M51.36 DDD (DEGENERATIVE DISC DISEASE), LUMBAR: Primary | ICD-10-CM

## 2022-06-17 DIAGNOSIS — M54.9 DORSALGIA, UNSPECIFIED: ICD-10-CM

## 2022-06-17 PROCEDURE — 72110 X-RAY EXAM L-2 SPINE 4/>VWS: CPT | Mod: TC,FY,PO

## 2022-06-17 PROCEDURE — 72148 MRI LUMBAR SPINE W/O DYE: CPT | Mod: TC,PO

## 2022-06-17 NOTE — TELEPHONE ENCOUNTER
Please call him.  MRI showed severe degenerative changes in the spine, with some stenosis.  He should follow up with the back and spine clinic to get appropriate treatment.  I referred him there during our recent clinic visit.    D

## 2022-06-20 ENCOUNTER — OFFICE VISIT (OUTPATIENT)
Dept: ORTHOPEDICS | Facility: CLINIC | Age: 82
End: 2022-06-20
Payer: MEDICARE

## 2022-06-20 VITALS — WEIGHT: 227.5 LBS | BODY MASS INDEX: 31.73 KG/M2

## 2022-06-20 DIAGNOSIS — M51.16 LUMBAR DISC HERNIATION WITH RADICULOPATHY: Primary | ICD-10-CM

## 2022-06-20 DIAGNOSIS — M48.062 LUMBAR STENOSIS WITH NEUROGENIC CLAUDICATION: ICD-10-CM

## 2022-06-20 DIAGNOSIS — M54.31 SCIATICA OF RIGHT SIDE: ICD-10-CM

## 2022-06-20 PROCEDURE — 3288F PR FALLS RISK ASSESSMENT DOCUMENTED: ICD-10-PCS | Mod: CPTII,S$GLB,, | Performed by: ORTHOPAEDIC SURGERY

## 2022-06-20 PROCEDURE — 1125F PR PAIN SEVERITY QUANTIFIED, PAIN PRESENT: ICD-10-PCS | Mod: CPTII,S$GLB,, | Performed by: ORTHOPAEDIC SURGERY

## 2022-06-20 PROCEDURE — 1157F ADVNC CARE PLAN IN RCRD: CPT | Mod: CPTII,S$GLB,, | Performed by: ORTHOPAEDIC SURGERY

## 2022-06-20 PROCEDURE — 1159F MED LIST DOCD IN RCRD: CPT | Mod: CPTII,S$GLB,, | Performed by: ORTHOPAEDIC SURGERY

## 2022-06-20 PROCEDURE — 1159F PR MEDICATION LIST DOCUMENTED IN MEDICAL RECORD: ICD-10-PCS | Mod: CPTII,S$GLB,, | Performed by: ORTHOPAEDIC SURGERY

## 2022-06-20 PROCEDURE — 1157F PR ADVANCE CARE PLAN OR EQUIV PRESENT IN MEDICAL RECORD: ICD-10-PCS | Mod: CPTII,S$GLB,, | Performed by: ORTHOPAEDIC SURGERY

## 2022-06-20 PROCEDURE — 1160F PR REVIEW ALL MEDS BY PRESCRIBER/CLIN PHARMACIST DOCUMENTED: ICD-10-PCS | Mod: CPTII,S$GLB,, | Performed by: ORTHOPAEDIC SURGERY

## 2022-06-20 PROCEDURE — 1125F AMNT PAIN NOTED PAIN PRSNT: CPT | Mod: CPTII,S$GLB,, | Performed by: ORTHOPAEDIC SURGERY

## 2022-06-20 PROCEDURE — 3288F FALL RISK ASSESSMENT DOCD: CPT | Mod: CPTII,S$GLB,, | Performed by: ORTHOPAEDIC SURGERY

## 2022-06-20 PROCEDURE — 99999 PR PBB SHADOW E&M-EST. PATIENT-LVL III: ICD-10-PCS | Mod: PBBFAC,,, | Performed by: ORTHOPAEDIC SURGERY

## 2022-06-20 PROCEDURE — 99204 PR OFFICE/OUTPT VISIT, NEW, LEVL IV, 45-59 MIN: ICD-10-PCS | Mod: S$GLB,,, | Performed by: ORTHOPAEDIC SURGERY

## 2022-06-20 PROCEDURE — 1160F RVW MEDS BY RX/DR IN RCRD: CPT | Mod: CPTII,S$GLB,, | Performed by: ORTHOPAEDIC SURGERY

## 2022-06-20 PROCEDURE — 99204 OFFICE O/P NEW MOD 45 MIN: CPT | Mod: S$GLB,,, | Performed by: ORTHOPAEDIC SURGERY

## 2022-06-20 PROCEDURE — 1101F PR PT FALLS ASSESS DOC 0-1 FALLS W/OUT INJ PAST YR: ICD-10-PCS | Mod: CPTII,S$GLB,, | Performed by: ORTHOPAEDIC SURGERY

## 2022-06-20 PROCEDURE — 1101F PT FALLS ASSESS-DOCD LE1/YR: CPT | Mod: CPTII,S$GLB,, | Performed by: ORTHOPAEDIC SURGERY

## 2022-06-20 PROCEDURE — 99999 PR PBB SHADOW E&M-EST. PATIENT-LVL III: CPT | Mod: PBBFAC,,, | Performed by: ORTHOPAEDIC SURGERY

## 2022-06-20 RX ORDER — METHOCARBAMOL 500 MG/1
500 TABLET, FILM COATED ORAL 3 TIMES DAILY
Qty: 60 TABLET | Refills: 1 | Status: SHIPPED | OUTPATIENT
Start: 2022-06-20 | End: 2023-04-06

## 2022-06-20 RX ORDER — MELOXICAM 15 MG/1
15 TABLET ORAL DAILY
Qty: 60 TABLET | Refills: 1 | Status: SHIPPED | OUTPATIENT
Start: 2022-06-20 | End: 2023-01-26

## 2022-06-20 RX ORDER — GABAPENTIN 300 MG/1
300 CAPSULE ORAL 3 TIMES DAILY
Qty: 60 CAPSULE | Refills: 1 | Status: SHIPPED | OUTPATIENT
Start: 2022-06-20 | End: 2022-08-08

## 2022-06-20 RX ORDER — METHYLPREDNISOLONE 4 MG/1
TABLET ORAL
Qty: 21 TABLET | Refills: 0 | Status: SHIPPED | OUTPATIENT
Start: 2022-06-20 | End: 2022-08-17 | Stop reason: CLARIF

## 2022-06-20 NOTE — PROGRESS NOTES
DATE: 6/20/2022  PATIENT: Korey Santos    Attending Physician: Luis William M.D.    CHIEF COMPLAINT: LBP and BLE (R>L) pain    HISTORY:  Korey Santos is a 81 y.o. male w/ a hx of L L4-5 microdisc by Dr. Redding in 2014, urinary retention (15% retaining, takes Flomax), and chronic constipation presents for initial evaluation of low back and b/l (R>L) leg pain (Back - 10, Leg - 10). The pain has been present for 1 month after doing some lifting. The patient describes the pain as sharp and it radiates posterolaterally down RLE to the toes. He also has pain that radiates posteriorly down both buttocks. Pain is worse with standing/walking and gets better by sitting and leaning forward on a shopping cart. He walks with a cane, and he cannot walk far. There is  associated numbness and tingling. There is no subjective weakness. Prior treatments have included mobic and PT, but no KELLI or surgery.    The Patient denies myelopathic symptoms such as handwriting changes or difficulty with buttons/coins/keys. Denies perineal paresthesias, bowel/bladder dysfunction.    The patient does not smoke, have DM or endorse IVDU. The patient is not on any blood thinners and does not take chronic narcotics. He is a retired .    PAST MEDICAL/SURGICAL HISTORY:  Past Medical History:   Diagnosis Date    Arthritis     Back pain, chronic     BPH with urinary obstruction     Chronic constipation     Colon polyp     DJD (degenerative joint disease)     Hip    Hyperlipidemia     Hypertension     Laryngopharyngeal reflux     Left inguinal hernia     Lumbar herniated disc     Lumbar stenosis     OA (osteoarthritis) of knee     Bilateral    Paradoxical insomnia     Sinus trouble      Past Surgical History:   Procedure Laterality Date    BACK SURGERY  2014    COLONOSCOPY      JOINT REPLACEMENT Left 05/04/2018    KNEE SURGERY      left hand      LEG SURGERY      SPINE SURGERY      UPPER GASTROINTESTINAL  ENDOSCOPY         Current Medications:   Current Outpatient Medications:     azelastine (ASTELIN) 137 mcg (0.1 %) nasal spray, 1 spray (137 mcg total) by Nasal route 2 (two) times daily as needed for Rhinitis., Disp: 30 mL, Rfl: 0    finasteride (PROSCAR) 5 mg tablet, Take 1 tablet (5 mg total) by mouth once daily., Disp: 90 tablet, Rfl: 3    fluticasone propionate (FLONASE) 50 mcg/actuation nasal spray, 1 spray (50 mcg total) by Each Nostril route once daily., Disp: 16 g, Rfl: 6    linaCLOtide (LINZESS) 145 mcg Cap capsule, Take 1 capsule (145 mcg total) by mouth daily as needed (constipation)., Disp: 90 capsule, Rfl: 3    losartan (COZAAR) 100 MG tablet, Take 1 tablet (100 mg total) by mouth once daily., Disp: 90 tablet, Rfl: 0    multivitamin capsule, Take 1 capsule by mouth once daily., Disp: , Rfl:     polyethylene glycol (GLYCOLAX) 17 gram/dose powder, Take 17 g by mouth daily as needed., Disp: 510 g, Rfl: 1    pravastatin (PRAVACHOL) 40 MG tablet, Take 1 tablet (40 mg total) by mouth once daily., Disp: 30 tablet, Rfl: 11    tamsulosin (FLOMAX) 0.4 mg Cap, Take 1 capsule (0.4 mg total) by mouth once daily., Disp: 90 capsule, Rfl: 4    aspirin (ECOTRIN) 325 MG EC tablet, Take 1 tablet (325 mg total) by mouth once daily. (Patient taking differently: Take 325 mg by mouth every other day.), Disp: 42 tablet, Rfl: 0    cycloSPORINE (RESTASIS) 0.05 % ophthalmic emulsion, Place 1 drop into both eyes 2 (two) times daily., Disp: 60 vial, Rfl: 11    gabapentin (NEURONTIN) 300 MG capsule, Take 1 capsule (300 mg total) by mouth 3 (three) times daily., Disp: 60 capsule, Rfl: 1    meloxicam (MOBIC) 15 MG tablet, Take 1 tablet (15 mg total) by mouth once daily., Disp: 60 tablet, Rfl: 1    methocarbamoL (ROBAXIN) 500 MG Tab, Take 1 tablet (500 mg total) by mouth 3 (three) times daily., Disp: 60 tablet, Rfl: 1    methylPREDNISolone (MEDROL DOSEPACK) 4 mg tablet, use as directed, Disp: 21 tablet, Rfl: 0    Social  History:   Social History     Socioeconomic History    Marital status: Single   Tobacco Use    Smoking status: Never Smoker    Smokeless tobacco: Never Used   Substance and Sexual Activity    Alcohol use: Yes     Alcohol/week: 0.0 standard drinks     Comment: approximately 2 beers weekly    Drug use: No    Sexual activity: Yes     Partners: Female     Birth control/protection: None   Social History Narrative    Ret. 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       REVIEW OF SYSTEMS:  Constitution: Negative. Negative for chills, fever and night sweats.   Cardiovascular: Negative for chest pain and syncope.   Respiratory: Negative for cough and shortness of breath.   Gastrointestinal: See HPI. Negative for nausea/vomiting. Negative for abdominal pain.  Genitourinary: See HPI. Negative for discoloration or dysuria.  Hematologic/Lymphatic: negative for bleeding/clotting disorders.   Musculoskeletal: Negative for falls and muscle weakness.   Neurological: See HPI. no history of seizures. no history of cranial surgery or shunts.  Neurological: See HPI. No seizures.   Endocrine: Negative for polydipsia, polyphagia and polyuria.   Allergic/Immunologic: Negative for hives and persistent infections.     EXAM:  Wt 103.2 kg (227 lb 8.2 oz)   BMI 31.73 kg/m²     PHYSICAL EXAMINATION:    General: The patient is a 81 y.o. male in no apparent distress, the patient is orientatied to person, place and time.  Psych: Normal mood and affect  HEENT: Vision grossly intact, hearing intact to the spoken word.  Lungs: Respirations unlabored.  Gait: Normal station and gait, no difficulty with toe or heel walk.   Skin: Dorsal lumbar skin negative for rashes, lesions, hairy patches and surgical scars. There is lumbar tenderness to palpation.  Range of motion: Lumbar range of motion is acceptable.  Spinal Balance: Global saggital and coronal spinal balance acceptable, no significant for scoliosis and kyphosis.  Musculoskeletal: No pain with  the range of motion of the bilateral hips. No trochanteric tenderness to palpation.  Vascular: Bilateral lower extremities warm and well perfused, Dorsalis pedis pulses 2+ bilaterally.  Neurological: Normal strength and tone in all major motor groups in the bilateral lower extremities. Normal sensation to light touch in the L2-S1 dermatomes bilaterally.  Deep tendon reflexes symmetric 2+ in the bilateral lower extremities.  + R SLR    IMAGING:   Today I independently reviewed the following images and my interpretations are as follows:    AP, Lat and Flex/Ex  upright L-spine demonstrate lumbar spondylosis and DDD. L4-5 anterolisthesis.    MRI lumbar showed L2-3 HNP with severe central and bilateral (R>L) foraminal stenosis. Previous L L4-5 toni-laminectomy defect.    Body mass index is 31.73 kg/m².  Hemoglobin A1C   Date Value Ref Range Status   09/16/2017 5.8 (H) 4.0 - 5.6 % Final     Comment:     According to ADA guidelines, hemoglobin A1c <7.0% represents  optimal control in non-pregnant diabetic patients. Different  metrics may apply to specific patient populations.   Standards of Medical Care in Diabetes-2016.  For the purpose of screening for the presence of diabetes:  <5.7%     Consistent with the absence of diabetes  5.7-6.4%  Consistent with increasing risk for diabetes   (prediabetes)  >or=6.5%  Consistent with diabetes  Currently, no consensus exists for use of hemoglobin A1c  for diagnosis of diabetes for children.  This Hemoglobin A1c assay has significant interference with fetal   hemoglobin   (HbF). The results are invalid for patients with abnormal amounts of   HbF,   including those with known Hereditary Persistence   of Fetal Hemoglobin. Heterozygous hemoglobin variants (HbAS, HbAC,   HbAD, HbAE, HbA2) do not significantly interfere with this assay;   however, presence of multiple variants in a sample may impact the %   interference.     07/08/2017 5.8 (H) 4.0 - 5.6 % Final     Comment:      According to ADA guidelines, hemoglobin A1c <7.0% represents  optimal control in non-pregnant diabetic patients. Different  metrics may apply to specific patient populations.   Standards of Medical Care in Diabetes-2016.  For the purpose of screening for the presence of diabetes:  <5.7%     Consistent with the absence of diabetes  5.7-6.4%  Consistent with increasing risk for diabetes   (prediabetes)  >or=6.5%  Consistent with diabetes  Currently, no consensus exists for use of hemoglobin A1c  for diagnosis of diabetes for children.  This Hemoglobin A1c assay has significant interference with fetal   hemoglobin   (HbF). The results are invalid for patients with abnormal amounts of   HbF,   including those with known Hereditary Persistence   of Fetal Hemoglobin. Heterozygous hemoglobin variants (HbAS, HbAC,   HbAD, HbAE, HbA2) do not significantly interfere with this assay;   however, presence of multiple variants in a sample may impact the %   interference.         ASSESSMENT/PLAN:    Diagnoses and all orders for this visit:    Lumbar disc herniation with radiculopathy  -     Procedure Request Order for Pain Management; Future  -     methylPREDNISolone (MEDROL DOSEPACK) 4 mg tablet; use as directed  -     gabapentin (NEURONTIN) 300 MG capsule; Take 1 capsule (300 mg total) by mouth 3 (three) times daily.  -     meloxicam (MOBIC) 15 MG tablet; Take 1 tablet (15 mg total) by mouth once daily.  -     methocarbamoL (ROBAXIN) 500 MG Tab; Take 1 tablet (500 mg total) by mouth 3 (three) times daily.    Sciatica of right side  -     [facility]ulatory referral[facility]    Lumbar stenosis with neurogenic claudication      Follow up in about 3 weeks (around 7/11/2022).    Patient has L2-3 HNP with RLE radiculopathy and neurogenic claudication. I discussed the natural history of their diagnoses as well as surgical and nonsurgical treatment options. I educated the patient on the importance of core/back  strengthening, correct posture, bending/lifting ergonomics, and low-impact aerobic exercises (walking, elliptical, and aquatherapy). I prescribed medrol dose pack, mobic, gabapentin and robaxin. I ordered L2-3 KELLI. Patient will follow up in 3 weeks for re-evaluation.    Luis William MD  Orthopaedic Spine Surgeon  Department of Orthopaedic Surgery  [phone]

## 2022-06-23 ENCOUNTER — TELEPHONE (OUTPATIENT)
Dept: PAIN MEDICINE | Facility: CLINIC | Age: 82
End: 2022-06-23
Payer: MEDICARE

## 2022-06-23 ENCOUNTER — TELEPHONE (OUTPATIENT)
Dept: INTERNAL MEDICINE | Facility: CLINIC | Age: 82
End: 2022-06-23
Payer: MEDICARE

## 2022-06-23 ENCOUNTER — TELEPHONE (OUTPATIENT)
Dept: ORTHOPEDICS | Facility: CLINIC | Age: 82
End: 2022-06-23
Payer: MEDICARE

## 2022-06-23 DIAGNOSIS — M54.16 LUMBAR RADICULOPATHY: Primary | ICD-10-CM

## 2022-06-23 DIAGNOSIS — Z01.818 PREOPERATIVE CLEARANCE: Primary | ICD-10-CM

## 2022-06-23 NOTE — TELEPHONE ENCOUNTER
----- Message from Alessandra Belle MA sent at 6/23/2022  1:05 PM CDT -----  Regarding: med clearance  Pt is scheduled to have a Lumbar KELLI with Dr. Crum on 7/5 and pt needs med clearance to hold asa 5 days prior to her procedure. Please advise.       AR

## 2022-06-23 NOTE — TELEPHONE ENCOUNTER
Spoke to patient, states he is in a lot of pain and it is not getting any better with the medications. I told him it will take a little more time for them to work. Suggested ice/heat. Also, he is not currently taking Tylenol so he is going to add it to his medication regime. I told him not to take more than directed. Patient verbalized understanding.

## 2022-06-23 NOTE — TELEPHONE ENCOUNTER
----- Message from Alessandra Belle MA sent at 6/23/2022  3:19 PM CDT -----  Regarding: med clearance  Pt is scheduled to have a Lumbar KELLI with Dr. Crum on 7/5 and pt needs med clearance to hold asa 5 days prior to her procedure. Please advise.       AR

## 2022-06-23 NOTE — TELEPHONE ENCOUNTER
Patient has been scheduled for procedure on 7/5/22. Time of arrival 10 am.   Confirms blood thinners  Denies pacemaker/ICD      · Check in at the registration desk on the first floor of the hospital. [address] [place] [address]  · Please DO NOT eat or drink 8 hours prior to your arrival time for the procedure, if diabetic 6 hours prior. If you are taking medications for blood pressure, heart medications, thyroid, cholesterol; you can take them with a small sip of water.  · Refrain from drinking alcohol within 24 hours prior to your procedure  · You will need someone to drive you home after procedure. You cannot take taxi, Uber, or Lyft.  · If you start feeling sick (fever, chills, or coughing) or start on any antibiotics please contact us at [phone] to reschedule.       Pt voiced understanding and confirmed procedure date and time.

## 2022-06-24 NOTE — TELEPHONE ENCOUNTER
Current indication for aspirin is unclear on chart.  No diagnosis of CAD noted in his problem list.  Listed medication is aspirin 325 every other day.  Prescribed by another provider.  No recent cardiology notes or testing.  No history of CVA.    May hold aspirin for 5 days pre procedure.      Resume asap after procedure, once safe, thank you    Patient also needs to schedule a follow-up face-to-face visit with me sometime soon, thank you

## 2022-06-30 NOTE — DISCHARGE INSTRUCTIONS
Home Care Instructions Pain Management:    1.  DIET:    You may resume your normal diet today.    2.  BATHING:    You may shower with luke warm water.    3.  DRESSING:    You may remove your bandage today.    4.  ACTIVITY LEVEL:      You may resume your normal activities 24 hours after your procedure.    5.  MEDICATIONS:    You may resume your normal medications today.    6.  SPECIAL INSTRUCTIONS:    No heat to the injection site for 24 hours including bath or shower, heating pad, moist heat or hot tubs.    Use an ice pack to the injection site for any pain or discomfort.  Apply ice packs for 20 minute intervals as needed.    If you have received any sedatives by mouth today, you can not drive for 12 hours.    If you have received sedation through an IV, you can not drive for 24 hours.    PLEASE CALL YOUR DOCTOR FOR THE FOLLOWIN.  Redness or swelling around the injection site.  2.  Fever of 101 degrees.  3.  Drainage (pus) from the injection site.  4.  For any continuous bleeding (some dried blood over the incision is normal.)    FOR EMERGENCIES:    If any unusual problems or difficulties occur during clinic hours, call [phone] or dial [phone].    Follow up with with your physician in 2-3 weeks.

## 2022-07-05 ENCOUNTER — HOSPITAL ENCOUNTER (OUTPATIENT)
Facility: HOSPITAL | Age: 82
Discharge: HOME | End: 2022-07-05
Attending: PAIN MEDICINE | Admitting: PAIN MEDICINE
Payer: MEDICARE

## 2022-07-05 VITALS
HEIGHT: 71 IN | BODY MASS INDEX: 31.5 KG/M2 | RESPIRATION RATE: 16 BRPM | TEMPERATURE: 97 F | HEART RATE: 62 BPM | WEIGHT: 225 LBS | SYSTOLIC BLOOD PRESSURE: 180 MMHG | OXYGEN SATURATION: 100 % | DIASTOLIC BLOOD PRESSURE: 89 MMHG

## 2022-07-05 DIAGNOSIS — M48.062 SPINAL STENOSIS OF LUMBAR REGION WITH NEUROGENIC CLAUDICATION: Primary | ICD-10-CM

## 2022-07-05 DIAGNOSIS — G89.29 CHRONIC PAIN: ICD-10-CM

## 2022-07-05 PROCEDURE — 25000003 PHARM REV CODE 250: Performed by: PAIN MEDICINE

## 2022-07-05 PROCEDURE — 63600175 PHARM REV CODE 636 W HCPCS: Performed by: PAIN MEDICINE

## 2022-07-05 PROCEDURE — 25500020 PHARM REV CODE 255: Performed by: PAIN MEDICINE

## 2022-07-05 PROCEDURE — 62323 NJX INTERLAMINAR LMBR/SAC: CPT | Performed by: PAIN MEDICINE

## 2022-07-05 PROCEDURE — 62323 PR INJ LUMBAR/SACRAL, W/IMAGING GUIDANCE: ICD-10-PCS | Mod: ,,, | Performed by: PAIN MEDICINE

## 2022-07-05 PROCEDURE — 62323 NJX INTERLAMINAR LMBR/SAC: CPT | Mod: ,,, | Performed by: PAIN MEDICINE

## 2022-07-05 RX ORDER — LIDOCAINE HYDROCHLORIDE 20 MG/ML
INJECTION, SOLUTION EPIDURAL; INFILTRATION; INTRACAUDAL; PERINEURAL
Status: DISCONTINUED | OUTPATIENT
Start: 2022-07-05 | End: 2022-07-05 | Stop reason: HOSPADM

## 2022-07-05 RX ORDER — LIDOCAINE HYDROCHLORIDE 10 MG/ML
INJECTION, SOLUTION EPIDURAL; INFILTRATION; INTRACAUDAL; PERINEURAL
Status: DISCONTINUED | OUTPATIENT
Start: 2022-07-05 | End: 2022-07-05 | Stop reason: HOSPADM

## 2022-07-05 RX ORDER — METHYLPREDNISOLONE ACETATE 40 MG/ML
INJECTION, SUSPENSION INTRA-ARTICULAR; INTRALESIONAL; INTRAMUSCULAR; SOFT TISSUE
Status: DISCONTINUED | OUTPATIENT
Start: 2022-07-05 | End: 2022-07-05 | Stop reason: HOSPADM

## 2022-07-05 RX ORDER — INDOMETHACIN 25 MG/1
CAPSULE ORAL
Status: DISCONTINUED | OUTPATIENT
Start: 2022-07-05 | End: 2022-07-05 | Stop reason: HOSPADM

## 2022-07-05 RX ORDER — ALPRAZOLAM 0.5 MG/1
0.5 TABLET, ORALLY DISINTEGRATING ORAL ONCE AS NEEDED
Status: DISCONTINUED | OUTPATIENT
Start: 2022-07-05 | End: 2022-07-05 | Stop reason: HOSPADM

## 2022-07-05 NOTE — H&P
[facility] - [facility] (Hospital)  History & Physical - Short Stay  Pain Management           SUBJECTIVE:     Procedure: Procedure(s) (LRB):  Injection-steroid-epidural-lumbar L3-4 (N/A)    Chief Complaint/Reason for Admission:  Lumbar radiculopathy [M54.16]    PTA Medications   Medication Sig    finasteride (PROSCAR) 5 mg tablet Take 1 tablet (5 mg total) by mouth once daily.    gabapentin (NEURONTIN) 300 MG capsule Take 1 capsule (300 mg total) by mouth 3 (three) times daily.    linaCLOtide (LINZESS) 145 mcg Cap capsule Take 1 capsule (145 mcg total) by mouth daily as needed (constipation).    losartan (COZAAR) 100 MG tablet Take 1 tablet (100 mg total) by mouth once daily.    meloxicam (MOBIC) 15 MG tablet Take 1 tablet (15 mg total) by mouth once daily.    methocarbamoL (ROBAXIN) 500 MG Tab Take 1 tablet (500 mg total) by mouth 3 (three) times daily.    aspirin (ECOTRIN) 325 MG EC tablet Take 1 tablet (325 mg total) by mouth once daily. (Patient taking differently: Take 325 mg by mouth every other day.)    azelastine (ASTELIN) 137 mcg (0.1 %) nasal spray 1 spray (137 mcg total) by Nasal route 2 (two) times daily as needed for Rhinitis.    cycloSPORINE (RESTASIS) 0.05 % ophthalmic emulsion Place 1 drop into both eyes 2 (two) times daily.    fluticasone propionate (FLONASE) 50 mcg/actuation nasal spray 1 spray (50 mcg total) by Each Nostril route once daily.    methylPREDNISolone (MEDROL DOSEPACK) 4 mg tablet use as directed    multivitamin capsule Take 1 capsule by mouth once daily.    polyethylene glycol (GLYCOLAX) 17 gram/dose powder Take 17 g by mouth daily as needed.    pravastatin (PRAVACHOL) 40 MG tablet Take 1 tablet (40 mg total) by mouth once daily.    tamsulosin (FLOMAX) 0.4 mg Cap Take 1 capsule (0.4 mg total) by mouth once daily.       Review of patient's allergies indicates:   Allergen Reactions    Clindamycin Swelling     Throat swells    Lisinopril Swelling and Other (See  Comments)     Throat swelling       Past Medical History:   Diagnosis Date    Arthritis     Back pain, chronic     BPH with urinary obstruction     Chronic constipation     Colon polyp     DJD (degenerative joint disease)     Hip    Hyperlipidemia     Hypertension     Laryngopharyngeal reflux     Left inguinal hernia     Lumbar herniated disc     Lumbar stenosis     OA (osteoarthritis) of knee     Bilateral    Paradoxical insomnia     Sinus trouble      Past Surgical History:   Procedure Laterality Date    BACK SURGERY  2014    COLONOSCOPY      JOINT REPLACEMENT Left 05/04/2018    KNEE SURGERY      left hand      LEG SURGERY      SPINE SURGERY      UPPER GASTROINTESTINAL ENDOSCOPY       Family History   Problem Relation Age of Onset    Cancer Father         lung or smoking    No Known Problems Mother     No Known Problems Sister     No Known Problems Brother     No Known Problems Maternal Aunt     No Known Problems Maternal Uncle     No Known Problems Paternal Aunt     No Known Problems Paternal Uncle     No Known Problems Maternal Grandmother     No Known Problems Maternal Grandfather     No Known Problems Paternal Grandmother     No Known Problems Paternal Grandfather     No Known Problems Daughter     No Known Problems Son     Glaucoma Neg Hx     Diabetes Neg Hx     Amblyopia Neg Hx     Blindness Neg Hx     Cataracts Neg Hx     Hypertension Neg Hx     Macular degeneration Neg Hx     Retinal detachment Neg Hx     Strabismus Neg Hx     Stroke Neg Hx     Thyroid disease Neg Hx     Colon cancer Neg Hx     Esophageal cancer Neg Hx      Social History     Tobacco Use    Smoking status: Never Smoker    Smokeless tobacco: Never Used   Substance Use Topics    Alcohol use: Yes     Alcohol/week: 0.0 standard drinks     Comment: approximately 2 beers weekly    Drug use: No        Review of Systems:  ROS      OBJECTIVE:     Vital Signs (Most Recent):  Temp: 97.1 °F (36.2  °C) (07/05/22 0931)  Pulse: 62 (07/05/22 0931)  Resp: 18 (07/05/22 0931)  BP: (!) 169/76 (07/05/22 0931)  SpO2: 98 % (07/05/22 0931)    Physical Exam:  General appearance - alert, well appearing, and in no distress  Mental status - AOx3  Eyes - pupils equal and reactive, extraocular eye movements intact  Heart - normal rate, regular rhythm, normal S1, S2, no murmurs, rubs, clicks or gallops  Chest - clear to auscultation, no wheezes, rales or rhonchi, symmetric air entry  Abdomen - soft, nontender, nondistended, no masses or organomegaly  Neurological - alert, oriented, normal speech, no focal findings or movement disorder noted  Extremities - peripheral pulses normal, no pedal edema, no clubbing or cyanosis      ASSESSMENT/PLAN:     There are no hospital problems to display for this patient.       The risks and benefits of this intervention, and alternative therapies were discussed with the patient.  The discussion of risks included infection, bleeding, need for additional procedures or surgery, nerve damage, paralysis, adverse medication reaction(s), stroke, and/or death.  Questions regarding the procedure, risks, expected outcome, and possible side effects were solicited and answered to the patient's satisfaction.  Korey wishes to proceed with the injection.  Verbal and written consent were verified.      Proceed with intervention as scheduled.      Yury Crum Jr, MD  Interventional Pain Medicine / Anesthesiology

## 2022-07-05 NOTE — DISCHARGE SUMMARY
[facility]  Discharge Note  Short Stay     Admit Date: 7/5/2022    Discharge Date: 7/5/2022     Attending Physician: Yury Crum Jr, MD    Diagnoses:  There are no hospital problems to display for this patient.    Discharged Condition: Good     Hospital Course: Patient was admitted for an outpatient interventional pain management procedure and tolerated the procedure well with no complications.     Final Diagnoses: Same as principal problem.     Disposition: Home or Self Care     Follow up/Patient Instructions:        Reconciled Medications:     Medication List      CHANGE how you take these medications    aspirin 325 MG EC tablet  Commonly known as: ECOTRIN  Take 1 tablet (325 mg total) by mouth once daily.  What changed: when to take this        CONTINUE taking these medications    azelastine 137 mcg (0.1 %) nasal spray  Commonly known as: ASTELIN  1 spray (137 mcg total) by Nasal route 2 (two) times daily as needed for Rhinitis.     cycloSPORINE 0.05 % ophthalmic emulsion  Commonly known as: RESTASIS  Place 1 drop into both eyes 2 (two) times daily.     finasteride 5 mg tablet  Commonly known as: PROSCAR  Take 1 tablet (5 mg total) by mouth once daily.     fluticasone propionate 50 mcg/actuation nasal spray  Commonly known as: FLONASE  1 spray (50 mcg total) by Each Nostril route once daily.     gabapentin 300 MG capsule  Commonly known as: NEURONTIN  Take 1 capsule (300 mg total) by mouth 3 (three) times daily.     linaCLOtide 145 mcg Cap capsule  Commonly known as: LINZESS  Take 1 capsule (145 mcg total) by mouth daily as needed (constipation).     losartan 100 MG tablet  Commonly known as: COZAAR  Take 1 tablet (100 mg total) by mouth once daily.     meloxicam 15 MG tablet  Commonly known as: MOBIC  Take 1 tablet (15 mg total) by mouth once daily.     methocarbamoL 500 MG Tab  Commonly known as: ROBAXIN  Take 1 tablet (500 mg total) by mouth 3 (three) times daily.     methylPREDNISolone 4 mg  tablet  Commonly known as: MEDROL DOSEPACK  use as directed     multivitamin capsule  Take 1 capsule by mouth once daily.     polyethylene glycol 17 gram/dose powder  Commonly known as: GLYCOLAX  Take 17 g by mouth daily as needed.     tamsulosin 0.4 mg Cap  Commonly known as: FLOMAX  Take 1 capsule (0.4 mg total) by mouth once daily.        ASK your doctor about these medications    pravastatin 40 MG tablet  Commonly known as: PRAVACHOL  Take 1 tablet (40 mg total) by mouth once daily.           Discharge Procedure Orders (must include Diet, Follow-up, Activity)   Diet Adult Regular     No driving until:   Order Comments: If you received sedation, no driving for 12 hrs     Remove dressing in 24 hours     Notify your health care provider if you experience any of the following:  temperature >100.4     Notify your health care provider if you experience any of the following:  severe uncontrolled pain     Notify your health care provider if you experience any of the following:  redness, tenderness, or signs of infection (pain, swelling, redness, odor or green/yellow discharge around incision site)     Notify your health care provider if you experience any of the following:  difficulty breathing or increased cough     Notify your health care provider if you experience any of the following:  severe persistent headache     Notify your health care provider if you experience any of the following:  increased confusion or weakness     Activity as tolerated       Yury Crum Jr, MD  Interventional Pain Medicine / Anesthesiology

## 2022-07-05 NOTE — OP NOTE
"Procedure Note    Pre-operative Diagnosis: Lumbar Radiculopathy  Post-operative Diagnosis: Lumbar Radiculopathy  Procedure Date: 07/05/2022  Procedure:  (1) Lumbar Epidural Steroid Injection at L3-4    (2) Intraoperative fluoroscopy          Anesthesia: Local    Indications: To alleviate pain and suffering, and reduce functional impairment.    Procedure in Detail:   The patients history and physical exam were reviewed. The risks, benefits and alternatives to the procedure were discussed, and all questions were answered to the patients satisfaction. The patient agreed to proceed, and written informed consent was verified.    The patient was brought into the procedure room and placed in the prone position on the fluoroscopy table. The area of the lumbar spine was prepped with Chloraprep and draped in a sterile manner. The targeted interspace was identified and marked under AP fluoroscopy. The skin and subcutaneous tissues overlying the targeted interspace were anesthetized with 3-5 mL of 1% lidocaine using a 25G, 1.5" needle. A 20G, 3.5" Tuohy epidural needle was directed toward the interspace under fluoroscopic guidance until the ligamentum flavum was engaged. From this point, a loss of resistance technique with a glass syringe and saline was used to identify entrance of the needle into the epidural space. Once loss of resistance was observed, up to 1 mL of contrast solution was injected. An appropriate epidurogram was noted.    A 5 mL mixture consisting of PF Lidocaine 1% (4 mL) and Depomedrol 80 mg (1 mL) was injected slowly and without resistance.  The needle was removed and a bandage applied to puncture site.    Blood Loss: nil    Disposition: The patient tolerated the procedure well, and there were no apparent complications. Vital signs remained stable throughout the procedure. The patient was taken to the recovery area where written discharge instructions for the procedure were given.     Follow-up: RTC as " scheduled      Yury Crum Jr, MD  Interventional Pain Medicine / Anesthesiology

## 2022-07-08 ENCOUNTER — TELEPHONE (OUTPATIENT)
Dept: ORTHOPEDICS | Facility: CLINIC | Age: 82
End: 2022-07-08
Payer: MEDICARE

## 2022-07-08 NOTE — TELEPHONE ENCOUNTER
----- Message from Ana Lilia Varner sent at 7/8/2022 12:39 PM CDT -----  Contact: [phone] Patient  Pt is requesting to r/s his 07/11 appt for later if the day. Please call and advise.

## 2022-07-11 ENCOUNTER — OFFICE VISIT (OUTPATIENT)
Dept: ORTHOPEDICS | Facility: CLINIC | Age: 82
End: 2022-07-11
Payer: MEDICARE

## 2022-07-11 VITALS — WEIGHT: 228.81 LBS | BODY MASS INDEX: 31.92 KG/M2

## 2022-07-11 DIAGNOSIS — M48.062 LUMBAR STENOSIS WITH NEUROGENIC CLAUDICATION: ICD-10-CM

## 2022-07-11 DIAGNOSIS — M51.16 LUMBAR DISC HERNIATION WITH RADICULOPATHY: Primary | ICD-10-CM

## 2022-07-11 PROCEDURE — 1159F MED LIST DOCD IN RCRD: CPT | Mod: CPTII,S$GLB,, | Performed by: ORTHOPAEDIC SURGERY

## 2022-07-11 PROCEDURE — 1157F ADVNC CARE PLAN IN RCRD: CPT | Mod: CPTII,S$GLB,, | Performed by: ORTHOPAEDIC SURGERY

## 2022-07-11 PROCEDURE — 1160F PR REVIEW ALL MEDS BY PRESCRIBER/CLIN PHARMACIST DOCUMENTED: ICD-10-PCS | Mod: CPTII,S$GLB,, | Performed by: ORTHOPAEDIC SURGERY

## 2022-07-11 PROCEDURE — 1125F PR PAIN SEVERITY QUANTIFIED, PAIN PRESENT: ICD-10-PCS | Mod: CPTII,S$GLB,, | Performed by: ORTHOPAEDIC SURGERY

## 2022-07-11 PROCEDURE — 1159F PR MEDICATION LIST DOCUMENTED IN MEDICAL RECORD: ICD-10-PCS | Mod: CPTII,S$GLB,, | Performed by: ORTHOPAEDIC SURGERY

## 2022-07-11 PROCEDURE — 99214 PR OFFICE/OUTPT VISIT, EST, LEVL IV, 30-39 MIN: ICD-10-PCS | Mod: S$GLB,,, | Performed by: ORTHOPAEDIC SURGERY

## 2022-07-11 PROCEDURE — 1160F RVW MEDS BY RX/DR IN RCRD: CPT | Mod: CPTII,S$GLB,, | Performed by: ORTHOPAEDIC SURGERY

## 2022-07-11 PROCEDURE — 99999 PR PBB SHADOW E&M-EST. PATIENT-LVL III: ICD-10-PCS | Mod: PBBFAC,,, | Performed by: ORTHOPAEDIC SURGERY

## 2022-07-11 PROCEDURE — 1101F PR PT FALLS ASSESS DOC 0-1 FALLS W/OUT INJ PAST YR: ICD-10-PCS | Mod: CPTII,S$GLB,, | Performed by: ORTHOPAEDIC SURGERY

## 2022-07-11 PROCEDURE — 1101F PT FALLS ASSESS-DOCD LE1/YR: CPT | Mod: CPTII,S$GLB,, | Performed by: ORTHOPAEDIC SURGERY

## 2022-07-11 PROCEDURE — 1125F AMNT PAIN NOTED PAIN PRSNT: CPT | Mod: CPTII,S$GLB,, | Performed by: ORTHOPAEDIC SURGERY

## 2022-07-11 PROCEDURE — 1157F PR ADVANCE CARE PLAN OR EQUIV PRESENT IN MEDICAL RECORD: ICD-10-PCS | Mod: CPTII,S$GLB,, | Performed by: ORTHOPAEDIC SURGERY

## 2022-07-11 PROCEDURE — 3288F FALL RISK ASSESSMENT DOCD: CPT | Mod: CPTII,S$GLB,, | Performed by: ORTHOPAEDIC SURGERY

## 2022-07-11 PROCEDURE — 99214 OFFICE O/P EST MOD 30 MIN: CPT | Mod: S$GLB,,, | Performed by: ORTHOPAEDIC SURGERY

## 2022-07-11 PROCEDURE — 99999 PR PBB SHADOW E&M-EST. PATIENT-LVL III: CPT | Mod: PBBFAC,,, | Performed by: ORTHOPAEDIC SURGERY

## 2022-07-11 PROCEDURE — 3288F PR FALLS RISK ASSESSMENT DOCUMENTED: ICD-10-PCS | Mod: CPTII,S$GLB,, | Performed by: ORTHOPAEDIC SURGERY

## 2022-07-11 NOTE — PROGRESS NOTES
DATE: 7/11/2022  PATIENT: Korey Santos    Attending Physician: Luis William M.D.    HISTORY:  Korey Santos is a 81 y.o. male w/ a hx of L L4-5 microdisc by Dr. Redding in 2014, urinary retention (15% retaining, takes Flomax), and chronic constipation who returns to me today for FU after L2-3 KELLI, which helped with his leg pain (from a 10 to a 6-7 today). Patient thinks he is still getting relief from the injection. The still has LBP that radiates posterolaterally down RLE to the toes. He also has pain that radiates posteriorly down both buttocks.     PMH/PSH/FamHx/SocHx:  Unchanged from prior visit    ROS:  Positive for LBP and BLE pain  Denies perineal paresthesias, bowel or bladder incontinence    EXAM:  Wt 103.8 kg (228 lb 13.4 oz)   BMI 31.92 kg/m²     My physical examination was notable for the following findings: motor intact BLE; SILT. + R SLR    IMAGING:  Today I independently reviewed the following images and my interpretations are as follows:    Previous L-spine XRs showed lumbar spondylosis and DDD. L4-5 anterolisthesis.    MRI lumbar showed L2-3 HNP with severe central and bilateral (R>L) foraminal stenosis. Previous L L4-5 toni-laminectomy defect.     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 I discussed the natural history of their diagnoses as well as surgical and nonsurgical treatment options. I educated the patient on the importance of core/back strengthening, correct posture, bending/lifting ergonomics, and low-impact aerobic exercises (walking, elliptical, and aquatherapy). Continue medications.Patient will follow up in 4 weeks for re-evaluation. Patient is a candidate for L2-3 decompression if he fails conservative management; he will need a preop CT scan.    Luis William MD  Orthopaedic Spine Surgeon  Department of Orthopaedic Surgery  [phone]

## 2022-07-20 ENCOUNTER — OFFICE VISIT (OUTPATIENT)
Dept: PAIN MEDICINE | Facility: CLINIC | Age: 82
End: 2022-07-20
Payer: MEDICARE

## 2022-07-20 DIAGNOSIS — M48.062 SPINAL STENOSIS OF LUMBAR REGION WITH NEUROGENIC CLAUDICATION: Primary | ICD-10-CM

## 2022-07-20 DIAGNOSIS — M54.16 LUMBAR RADICULOPATHY: ICD-10-CM

## 2022-07-20 DIAGNOSIS — G89.4 CHRONIC PAIN SYNDROME: ICD-10-CM

## 2022-07-20 PROCEDURE — 1160F PR REVIEW ALL MEDS BY PRESCRIBER/CLIN PHARMACIST DOCUMENTED: ICD-10-PCS | Mod: CPTII,S$GLB,, | Performed by: NURSE PRACTITIONER

## 2022-07-20 PROCEDURE — 1159F PR MEDICATION LIST DOCUMENTED IN MEDICAL RECORD: ICD-10-PCS | Mod: CPTII,S$GLB,, | Performed by: NURSE PRACTITIONER

## 2022-07-20 PROCEDURE — 1157F ADVNC CARE PLAN IN RCRD: CPT | Mod: CPTII,S$GLB,, | Performed by: NURSE PRACTITIONER

## 2022-07-20 PROCEDURE — 1160F RVW MEDS BY RX/DR IN RCRD: CPT | Mod: CPTII,S$GLB,, | Performed by: NURSE PRACTITIONER

## 2022-07-20 PROCEDURE — 1157F PR ADVANCE CARE PLAN OR EQUIV PRESENT IN MEDICAL RECORD: ICD-10-PCS | Mod: CPTII,S$GLB,, | Performed by: NURSE PRACTITIONER

## 2022-07-20 PROCEDURE — 99214 PR OFFICE/OUTPT VISIT, EST, LEVL IV, 30-39 MIN: ICD-10-PCS | Mod: S$GLB,,, | Performed by: NURSE PRACTITIONER

## 2022-07-20 PROCEDURE — 99999 PR PBB SHADOW E&M-EST. PATIENT-LVL III: ICD-10-PCS | Mod: PBBFAC,,, | Performed by: NURSE PRACTITIONER

## 2022-07-20 PROCEDURE — 1159F MED LIST DOCD IN RCRD: CPT | Mod: CPTII,S$GLB,, | Performed by: NURSE PRACTITIONER

## 2022-07-20 PROCEDURE — 99999 PR PBB SHADOW E&M-EST. PATIENT-LVL III: CPT | Mod: PBBFAC,,, | Performed by: NURSE PRACTITIONER

## 2022-07-20 PROCEDURE — 99214 OFFICE O/P EST MOD 30 MIN: CPT | Mod: S$GLB,,, | Performed by: NURSE PRACTITIONER

## 2022-07-20 NOTE — PROGRESS NOTES
[facility] Established Patient Clinic Visit    Chief Complaint  Chief Complaint   Patient presents with    Low-back Pain    Leg Pain       Last 3 PDI Scores 8/27/2014 6/17/2014   Pain Disability Index (PDI) 56 66       Interval Updates:-7/20/2022  Korey Santos presents today s/p Lumbar KELLI on 7/5 with 40% relief for one day. Pt's pain score is 8/10. Patient continues to report pain that is unlivable and disrupting his QOL.   He is requesting something for pain or to optimize his current medications to help with his pain. He denies B/B dysfucntion, denies profound weakness, denies any recent incident or trauma.          Background from Chart Review  HISTORY: per Dr. William's last clinic note on 7/11/2022  Korey Santos is a 81 y.o. male w/ a hx of L L4-5 microdisc by Dr. Redding in 2014, urinary retention (15% retaining, takes Flomax), and chronic constipation who returns to me today for FU after L2-3 KELLI, which helped with his leg pain (from a 10 to a 6-7 today). Patient thinks he is still getting relief from the injection. The still has LBP that radiates posterolaterally down RLE to the toes. He also has pain that radiates posteriorly down both buttocks.     Treatment History  PT/OT: defer to nsgy   HEP: n/a   TENS:  Injections:   -07/05/2022 Lumbar Epidural Steroid Injection at L3-4-40% one day   Surgery:  Medications:   - NSAIDS:   - MSK Relaxants:   - TCAs:   - SNRIs:   - Topicals:   - Opioids:   - Anticonvulsants:     Current Pain Medications  1. Gabapentin 300 mg t.i.d.  2. Mobic 15 mg daily    Full Medication List    Current Outpatient Medications:     aspirin (ECOTRIN) 325 MG EC tablet, Take 1 tablet (325 mg total) by mouth once daily. (Patient taking differently: Take 325 mg by mouth every other day.), Disp: 42 tablet, Rfl: 0    azelastine (ASTELIN) 137 mcg (0.1 %) nasal spray, 1 spray (137 mcg total) by Nasal route 2 (two) times daily as needed for Rhinitis., Disp: 30 mL, Rfl: 0     cycloSPORINE (RESTASIS) 0.05 % ophthalmic emulsion, Place 1 drop into both eyes 2 (two) times daily., Disp: 60 vial, Rfl: 11    finasteride (PROSCAR) 5 mg tablet, Take 1 tablet (5 mg total) by mouth once daily., Disp: 90 tablet, Rfl: 3    fluticasone propionate (FLONASE) 50 mcg/actuation nasal spray, 1 spray (50 mcg total) by Each Nostril route once daily., Disp: 16 g, Rfl: 6    gabapentin (NEURONTIN) 300 MG capsule, Take 1 capsule (300 mg total) by mouth 3 (three) times daily., Disp: 60 capsule, Rfl: 1    linaCLOtide (LINZESS) 145 mcg Cap capsule, Take 1 capsule (145 mcg total) by mouth daily as needed (constipation)., Disp: 90 capsule, Rfl: 3    losartan (COZAAR) 100 MG tablet, Take 1 tablet (100 mg total) by mouth once daily., Disp: 90 tablet, Rfl: 0    meloxicam (MOBIC) 15 MG tablet, Take 1 tablet (15 mg total) by mouth once daily., Disp: 60 tablet, Rfl: 1    methocarbamoL (ROBAXIN) 500 MG Tab, Take 1 tablet (500 mg total) by mouth 3 (three) times daily., Disp: 60 tablet, Rfl: 1    methylPREDNISolone (MEDROL DOSEPACK) 4 mg tablet, use as directed, Disp: 21 tablet, Rfl: 0    multivitamin capsule, Take 1 capsule by mouth once daily., Disp: , Rfl:     polyethylene glycol (GLYCOLAX) 17 gram/dose powder, Take 17 g by mouth daily as needed., Disp: 510 g, Rfl: 1    pravastatin (PRAVACHOL) 40 MG tablet, Take 1 tablet (40 mg total) by mouth once daily., Disp: 30 tablet, Rfl: 11    tamsulosin (FLOMAX) 0.4 mg Cap, Take 1 capsule (0.4 mg total) by mouth once daily., Disp: 90 capsule, Rfl: 4     Review of Systems  ROS    Medical History  Past Medical History:   Diagnosis Date    Arthritis     Back pain, chronic     BPH with urinary obstruction     Chronic constipation     Colon polyp     DJD (degenerative joint disease)     Hip    Hyperlipidemia     Hypertension     Laryngopharyngeal reflux     Left inguinal hernia     Lumbar herniated disc     Lumbar stenosis     OA (osteoarthritis) of knee      Bilateral    Paradoxical insomnia     Sinus trouble         Surgical History  Past Surgical History:   Procedure Laterality Date    BACK SURGERY  2014    COLONOSCOPY      EPIDURAL STEROID INJECTION INTO LUMBAR SPINE N/A 7/5/2022    Procedure: Injection-steroid-epidural-lumbar L3-4;  Surgeon: Yury Crum Jr., MD;  Location: [facility];  Service: Pain Management;  Laterality: N/A;  oral sedation   asa      JOINT REPLACEMENT Left 05/04/2018    KNEE SURGERY      left hand      LEG SURGERY      SPINE SURGERY      UPPER GASTROINTESTINAL ENDOSCOPY          Physical Exam  There were no vitals filed for this visit.      General Musculoskeletal Exam   Gait: antalgic (using a cane)     Back (L-Spine & T-Spine) / Neck (C-Spine) Exam     Tenderness Posterior midline palpation reveals tenderness of the Lower L-Spine and Sacrum. Right paramedian tenderness of the Lower L-Spine, Upper L-Spine and Sacrum. Left paramedian tenderness of the Lower L-Spine, Upper L-Spine and Sacrum.     Back (L-Spine & T-Spine) Range of Motion   Lateral bend right: abnormal   Lateral bend left: abnormal   Rotation right: abnormal   Rotation left: abnormal       Muscle Strength   Right Lower Extremity   Hip Abduction: 5/5   Quadriceps:  5/5   Anterior tibial:  5/5   Left Lower Extremity   Hip Abduction: 5/5   Quadriceps:  5/5   Anterior tibial:  5/5       Imaging  MR Lumbar spine without contrast. Sagittal T1, T2, STIR. Axial T1, T2. Coronal T1.     COMPARISON:  None.     FINDINGS:  Prior left-sided laminotomy at L4-L5.  Exaggeration of the lumbar lordosis with grade 1 degenerative spondylolisthesis at L4-L5.  Vertebral body height is normal.  Marrow signal is within normal limits. The conus medullaris terminates at the level of L1-L2.  No abnormal signal within the conus. Intervertebral disc levels are as follows:     T12-L1 disc: Disc space height loss with Schmorl's nodes the demonstrate mild adjacent edematous Modic endplate  change.  Anterior osteophytes.  Broad-based posterior disc bulge.  Slight buckling of ligamentum flavum.  The thecal sac measures 11 mm AP.  Mild bilateral foraminal stenosis.     L1-L2 disc : Broad-based posterior disc bulge.  Anterior osteophytes.  Disc encroaches slightly into the floors of the exit foramina, left greater than right.  Mild degenerative facet hypertrophy bilaterally.  The thecal sac measures 9 mm in AP dimension.  Mild bilateral foraminal stenosis.     L2-L3 disc: Disc space height loss.  Left paracentral disc protrusion.  Moderate degenerative facet hypertrophy with severe buckling of ligamentum flavum.  The thecal sac measures 5 mm in AP dimension.  The thecal sac is roughly 20% of normal area with redundancy of nerve roots superiorly.  Buckling of right ligamentum flavum causes severe right lateral recess stenosis.  Disc protrudes into the exit foramina, right greater than left.  There is moderate/severe right and mild/moderate left foraminal stenosis.     L3-L4 disc: Disc space height loss with Schmorl's nodes and mild adjacent fatty/edematous Modic endplate change.  Circumferential disc bulge that encroaches into the floors of the exit foramina.  Moderate degenerative facet hypertrophy.  Buckling of ligamentum flavum.  The thecal sac measures 10 mm.  Mild to moderate lateral recess stenosis demonstrated on axial T2 series 6, image 10.  Mild to moderate bilateral neural foraminal stenosis.     L4-L5 disc: Prior left-sided laminotomy at this level.  No definite granulation/scar tissue compresses the thecal sac or neural structures.  Grade 1 spondylolisthesis with severe degenerative facet hypertrophy bilaterally.  Marrow edema pattern within the bilateral facet joints and pedicles.  Disc space height loss with a large, Schmorl's node involving the inferior endplate of L4 with edematous Modic endplate change.  Central disc protrusion.  Buckling of ligamentum flavum.  The thecal sac measures 9 mm  AP dimension.  Mild to moderate lateral recess stenosis bilaterally.  Mild foraminal stenosis bilaterally.     L5-S1 disc: Central disc protrusion.  Severe degenerative facet hypertrophy bilaterally.  Superimposed right lateral recess disc protrusion demonstrated axial T2 series 6, image 19.  This slightly posteriorly displaces the descending right S1 spinal nerve roots.  No significant foraminal stenosis.  The thecal sac measures 12 mm.     Impression:     1. Severe spinal stenosis at L2-L3 where the thecal sac measures 5 mm and is 20% of normal area.  Redundancy of nerve roots adjacent to this interspace.  Severe right lateral recess stenosis at this level could affect the descending right L3 spinal nerve roots.  This could also account for neurogenic claudication.  2. Prior left-sided laminotomy at L4-L5 without definite granulation/scar tissue compressing the thecal sac or neural structures.  3. Mild spinal stenosis at L1-L2, L3-L4, and L4-L5.  There is lateral recess stenosis at L3-L4 and on the right at L4-L5 that could possibly affect the descending nerve roots at these levels.  4. Somewhat focal right lateral recess protrusion at L5-S1 could affect the descending right S1 spinal nerve roots which are slightly posteriorly displaced.  5. Moderate/severe right foraminal stenosis at L2-L3.  This could result in radiculopathy.  Additional foraminal stenoses of lesser degree as described.  6. Edematous Modic endplate change and marrow edema pattern within the facet joints at L4-L5 may correlate to focal symptoms.    Labs:  BMP  Lab Results   Component Value Date     02/05/2021    K 5.0 02/05/2021     02/05/2021    CO2 31 (H) 02/05/2021    BUN 13 02/05/2021    CREATININE 0.81 02/05/2021    CALCIUM 9.2 02/05/2021    ANIONGAP 5 (L) 02/05/2021    ESTGFRAFRICA >60.0 02/05/2021    EGFRNONAA >60.0 02/05/2021     Lab Results   Component Value Date    ALT 20 02/05/2021    AST 29 02/05/2021    ALKPHOS 71  02/05/2021    BILITOT 0.7 02/05/2021       Assessment:  Problem List Items Addressed This Visit    None         7/20/2022  Korey Santos is a 81 y.o. male who  has a past medical history of Arthritis, Back pain, chronic, BPH with urinary obstruction, Chronic constipation, Colon polyp, DJD (degenerative joint disease), Hyperlipidemia, Hypertension, Laryngopharyngeal reflux, Left inguinal hernia, Lumbar herniated disc, Lumbar stenosis, OA (osteoarthritis) of knee, Paradoxical insomnia, and Sinus trouble.  By history and examination this patient has chronic low back pain with radiculopathy.  The underlying cause cause is facet arthritis, degenerative disc disease, foraminal stenosis and central canal stenosis.  Pathology is confirmed by imaging.  We discussed the underlying diagnoses and multiple treatment options including non-opioid medications and interventional procedures.  The risks and benefits of each treatment option were discussed and all questions were answered.    The patient I discussed that he should follow-up with his neurosurgeon as he would like to consider surgical options at this time.  The recent lumbar KELLI L3-4 did provide him with mild relief but it was not sustainable.  He did request something for pain until he f/u's w/nsgy,  discussed with him that we could  consider some options for example increasing his gabapentin as well as trying Tylenol Extra Strength a 1000 mg t.i.d..  Considering this is his 1st appointment with our office and that I do not regularly prescribed opioids I will consult Dr. Crum to see if he would like to prescribe this patient something for pain until he can see his surgeon.      Plan & Recommendations  Procedures: none at this time.   Medications: discussed increasing Gabapentin to 300 mg TID to QID                           Start Tylenol 1000 mg TID not to exceed 3000 mg in 24hrs                          Consider a short Rx of Tramadol 50 mg BID for 14  days.  Imaging: reviewed   PT/OT: defer to nsgy  HEP: I encouraged the patient to maintain a home exercise regimen that includes daily, moderate cardiovascular exercise lasting at least 30 minutes.  This may include yoga, johnathon chi, walking, swimming, aqua aerobics, or other exercises that maintain a heart rate of 50-70% of the calculated maximum heart rate.  I also encouraged light, daily stretching focused on the target area.    Patient to f/u w/Orthospine surgery -Dr. William    Follow Up: RTC PRN     Wale Alexander, NP-C  Interventional Pain Management    Disclaimer: This note was partly generated using dictation software which may occasionally result in transcription errors.

## 2022-08-01 ENCOUNTER — PES CALL (OUTPATIENT)
Dept: ADMINISTRATIVE | Facility: OTHER | Age: 82
End: 2022-08-01
Payer: MEDICARE

## 2022-08-08 ENCOUNTER — OFFICE VISIT (OUTPATIENT)
Dept: ORTHOPEDICS | Facility: CLINIC | Age: 82
End: 2022-08-08
Payer: MEDICARE

## 2022-08-08 VITALS — BODY MASS INDEX: 31.4 KG/M2 | WEIGHT: 224.31 LBS | HEIGHT: 71 IN

## 2022-08-08 DIAGNOSIS — M51.16 LUMBAR DISC HERNIATION WITH RADICULOPATHY: Primary | ICD-10-CM

## 2022-08-08 DIAGNOSIS — M48.07 SPINAL STENOSIS, LUMBOSACRAL REGION: ICD-10-CM

## 2022-08-08 DIAGNOSIS — M51.36 DDD (DEGENERATIVE DISC DISEASE), LUMBAR: ICD-10-CM

## 2022-08-08 DIAGNOSIS — M47.816 LUMBAR SPONDYLOSIS: ICD-10-CM

## 2022-08-08 PROCEDURE — 1160F PR REVIEW ALL MEDS BY PRESCRIBER/CLIN PHARMACIST DOCUMENTED: ICD-10-PCS | Mod: CPTII,S$GLB,, | Performed by: ORTHOPAEDIC SURGERY

## 2022-08-08 PROCEDURE — 1101F PT FALLS ASSESS-DOCD LE1/YR: CPT | Mod: CPTII,S$GLB,, | Performed by: ORTHOPAEDIC SURGERY

## 2022-08-08 PROCEDURE — 1157F ADVNC CARE PLAN IN RCRD: CPT | Mod: CPTII,S$GLB,, | Performed by: ORTHOPAEDIC SURGERY

## 2022-08-08 PROCEDURE — 99999 PR PBB SHADOW E&M-EST. PATIENT-LVL III: ICD-10-PCS | Mod: PBBFAC,,, | Performed by: ORTHOPAEDIC SURGERY

## 2022-08-08 PROCEDURE — 99999 PR PBB SHADOW E&M-EST. PATIENT-LVL III: CPT | Mod: PBBFAC,,, | Performed by: ORTHOPAEDIC SURGERY

## 2022-08-08 PROCEDURE — 1159F PR MEDICATION LIST DOCUMENTED IN MEDICAL RECORD: ICD-10-PCS | Mod: CPTII,S$GLB,, | Performed by: ORTHOPAEDIC SURGERY

## 2022-08-08 PROCEDURE — 3288F PR FALLS RISK ASSESSMENT DOCUMENTED: ICD-10-PCS | Mod: CPTII,S$GLB,, | Performed by: ORTHOPAEDIC SURGERY

## 2022-08-08 PROCEDURE — 1125F AMNT PAIN NOTED PAIN PRSNT: CPT | Mod: CPTII,S$GLB,, | Performed by: ORTHOPAEDIC SURGERY

## 2022-08-08 PROCEDURE — 1125F PR PAIN SEVERITY QUANTIFIED, PAIN PRESENT: ICD-10-PCS | Mod: CPTII,S$GLB,, | Performed by: ORTHOPAEDIC SURGERY

## 2022-08-08 PROCEDURE — 99214 PR OFFICE/OUTPT VISIT, EST, LEVL IV, 30-39 MIN: ICD-10-PCS | Mod: S$GLB,,, | Performed by: ORTHOPAEDIC SURGERY

## 2022-08-08 PROCEDURE — 1160F RVW MEDS BY RX/DR IN RCRD: CPT | Mod: CPTII,S$GLB,, | Performed by: ORTHOPAEDIC SURGERY

## 2022-08-08 PROCEDURE — 1157F PR ADVANCE CARE PLAN OR EQUIV PRESENT IN MEDICAL RECORD: ICD-10-PCS | Mod: CPTII,S$GLB,, | Performed by: ORTHOPAEDIC SURGERY

## 2022-08-08 PROCEDURE — 3288F FALL RISK ASSESSMENT DOCD: CPT | Mod: CPTII,S$GLB,, | Performed by: ORTHOPAEDIC SURGERY

## 2022-08-08 PROCEDURE — 1101F PR PT FALLS ASSESS DOC 0-1 FALLS W/OUT INJ PAST YR: ICD-10-PCS | Mod: CPTII,S$GLB,, | Performed by: ORTHOPAEDIC SURGERY

## 2022-08-08 PROCEDURE — 99214 OFFICE O/P EST MOD 30 MIN: CPT | Mod: S$GLB,,, | Performed by: ORTHOPAEDIC SURGERY

## 2022-08-08 PROCEDURE — 1159F MED LIST DOCD IN RCRD: CPT | Mod: CPTII,S$GLB,, | Performed by: ORTHOPAEDIC SURGERY

## 2022-08-08 RX ORDER — PREGABALIN 100 MG/1
100 CAPSULE ORAL 3 TIMES DAILY
Qty: 60 CAPSULE | Refills: 1 | Status: SHIPPED | OUTPATIENT
Start: 2022-08-08 | End: 2022-08-09 | Stop reason: SDUPTHER

## 2022-08-08 NOTE — PROGRESS NOTES
"DATE: 8/8/2022  PATIENT: Korey Santos    Attending Physician: Luis William M.D.    HISTORY:  Korey Santos is a 81 y.o. male w/ a hx of L L4-5 microdisc by Dr. Redding in 2014, urinary retention (15% retaining, takes Flomax), and chronic constipation who returns to me today for FU after L3-4 KELLI, which helped with his leg pain (from a 10 to a 6-7 today), but it has worn off. He has LBP that radiates anteromedially along the right thigh, and the pain does not go down lower leg much anymore. He is miserable and he wants surgical intervention.    PMH/PSH/FamHx/SocHx:  Unchanged from prior visit    ROS:  Positive for LBP and BLE pain  Denies perineal paresthesias, bowel or bladder incontinence    EXAM:  Ht 5' 11" (1.803 m)   Wt 101.8 kg (224 lb 5.1 oz)   BMI 31.29 kg/m²     My physical examination was notable for the following findings: motor intact BLE; SILT. + R SLR    IMAGING:  Today I independently reviewed the following images and my interpretations are as follows:    Previous L-spine XRs showed lumbar spondylosis and DDD. L4-5 anterolisthesis.    MRI lumbar showed L2-3 HNP with severe central and bilateral (R>L) foraminal stenosis. Previous L L4-5 toni-laminectomy defect.     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 I discussed the natural history of their diagnoses as well as surgical and nonsurgical treatment options. I educated the patient on the importance of core/back strengthening, correct posture, bending/lifting ergonomics, and low-impact aerobic exercises (walking, elliptical, and aquatherapy). Continue medications. I ordered lumbar CT for preop planning. Patient will follow up in 2 weeks for preop. Patient is a candidate for L2-3 decompression vs. L2-3 PLDF/TLIF (R).     Luis William MD  Orthopaedic Spine Surgeon  Department of Orthopaedic Surgery  [phone]          "

## 2022-08-09 ENCOUNTER — TELEPHONE (OUTPATIENT)
Dept: ORTHOPEDICS | Facility: CLINIC | Age: 82
End: 2022-08-09
Payer: MEDICARE

## 2022-08-09 DIAGNOSIS — M51.16 LUMBAR DISC HERNIATION WITH RADICULOPATHY: ICD-10-CM

## 2022-08-09 RX ORDER — PREGABALIN 100 MG/1
100 CAPSULE ORAL 3 TIMES DAILY
Qty: 60 CAPSULE | Refills: 1 | Status: SHIPPED | OUTPATIENT
Start: 2022-08-09 | End: 2023-01-26 | Stop reason: SINTOL

## 2022-08-09 NOTE — TELEPHONE ENCOUNTER
Spoke to patient, instructed him to stop taking the gabapentin when he starts the Lyrica. Told patient the Lyrica was sent to pharmacy.

## 2022-08-09 NOTE — TELEPHONE ENCOUNTER
----- Message from Sandra Song sent at 8/9/2022 12:01 PM CDT -----  Regarding: Prescription interactions  Contact: Imani caceres/Polly [phone]  Calling to speak with provider or nurse regarding prescription for Rx Lyrica. Should patient stop Rx Gabapentin prior to starting Lyrica. Please call to confirm.    [facility] [address] [place] [address] [address] [facility]  [address] [facility] [address]  Phone: [phone] Fax: [phone]

## 2022-08-10 ENCOUNTER — TELEPHONE (OUTPATIENT)
Dept: ORTHOPEDICS | Facility: CLINIC | Age: 82
End: 2022-08-10
Payer: MEDICARE

## 2022-08-10 NOTE — TELEPHONE ENCOUNTER
----- Message from Paul Villareal sent at 8/10/2022  9:03 AM CDT -----  Type:  RX Refill Request    Who Called: Korey  Refill or New Rx:refill  RX Name and Strength: pregabalin (LYRICA) 100 MG capsule  How is the patient currently taking it? (ex. 1XDay): Take 1 capsule (100 mg total) by mouth 3 (three) times daily  Is this a 30 day or 90 day RX: 30 day  Preferred Pharmacy with phone number: [facility] [phone]  Local or Mail Order: local  Ordering Provider: Luis William  Would the patient rather a call back or a response via [facility]? Call back   Best Call Back Number: [phone]  Additional Information: patient would like a call back before the end of the day

## 2022-08-10 NOTE — TELEPHONE ENCOUNTER
Patient stated the pharmacy said his insurance would not approve the Lyrica because he was already Rx gabapentin. I instructed him to call his insurance company to tell them Lyrica was Rx in place of gabapentin.

## 2022-08-11 ENCOUNTER — HOSPITAL ENCOUNTER (OUTPATIENT)
Dept: RADIOLOGY | Facility: HOSPITAL | Age: 82
Discharge: HOME | End: 2022-08-11
Attending: ORTHOPAEDIC SURGERY
Payer: MEDICARE

## 2022-08-11 DIAGNOSIS — I10 HYPERTENSION, ESSENTIAL: ICD-10-CM

## 2022-08-11 DIAGNOSIS — M48.07 SPINAL STENOSIS, LUMBOSACRAL REGION: ICD-10-CM

## 2022-08-11 PROCEDURE — 72131 CT LUMBAR SPINE W/O DYE: CPT | Mod: 26,,, | Performed by: RADIOLOGY

## 2022-08-11 PROCEDURE — 72131 CT LUMBAR SPINE W/O DYE: CPT | Mod: TC

## 2022-08-11 PROCEDURE — 72131 CT LUMBAR SPINE WITHOUT CONTRAST: ICD-10-PCS | Mod: 26,,, | Performed by: RADIOLOGY

## 2022-08-11 NOTE — TELEPHONE ENCOUNTER
Refill Routing Note   Medication(s) are not appropriate for processing by [facility] for the following reason(s):      - Required vitals are abnormal    [facility] action(s):  Defer          Medication reconciliation completed: No     Appointments  past 12m or future 3m with PCP    Date Provider   Last Visit   4/14/2022 Juan Bustamante MD   Next Visit   Visit date not found Juan Bustamante MD   ED visits in past 90 days: 0        Note composed:5:08 PM 08/11/2022

## 2022-08-11 NOTE — TELEPHONE ENCOUNTER
No new care gaps identified.  [facility] Embedded Care Gaps. Reference number: [id number]. 8/11/2022   4:13:10 PM CDT

## 2022-08-12 RX ORDER — LOSARTAN POTASSIUM 100 MG/1
TABLET ORAL
Qty: 90 TABLET | Refills: 0 | Status: SHIPPED | OUTPATIENT
Start: 2022-08-12 | End: 2023-01-26 | Stop reason: SDUPTHER

## 2022-08-15 ENCOUNTER — OFFICE VISIT (OUTPATIENT)
Dept: ORTHOPEDICS | Facility: CLINIC | Age: 82
End: 2022-08-15
Payer: MEDICARE

## 2022-08-15 VITALS — BODY MASS INDEX: 30.79 KG/M2 | HEIGHT: 71 IN | WEIGHT: 219.94 LBS

## 2022-08-15 DIAGNOSIS — M48.062 LUMBAR STENOSIS WITH NEUROGENIC CLAUDICATION: ICD-10-CM

## 2022-08-15 DIAGNOSIS — M47.816 LUMBAR SPONDYLOSIS: Primary | ICD-10-CM

## 2022-08-15 DIAGNOSIS — M51.36 DDD (DEGENERATIVE DISC DISEASE), LUMBAR: ICD-10-CM

## 2022-08-15 DIAGNOSIS — M51.16 LUMBAR DISC HERNIATION WITH RADICULOPATHY: ICD-10-CM

## 2022-08-15 PROCEDURE — 1160F PR REVIEW ALL MEDS BY PRESCRIBER/CLIN PHARMACIST DOCUMENTED: ICD-10-PCS | Mod: CPTII,S$GLB,, | Performed by: ORTHOPAEDIC SURGERY

## 2022-08-15 PROCEDURE — 3288F FALL RISK ASSESSMENT DOCD: CPT | Mod: CPTII,S$GLB,, | Performed by: ORTHOPAEDIC SURGERY

## 2022-08-15 PROCEDURE — 3288F PR FALLS RISK ASSESSMENT DOCUMENTED: ICD-10-PCS | Mod: CPTII,S$GLB,, | Performed by: ORTHOPAEDIC SURGERY

## 2022-08-15 PROCEDURE — 1125F PR PAIN SEVERITY QUANTIFIED, PAIN PRESENT: ICD-10-PCS | Mod: CPTII,S$GLB,, | Performed by: ORTHOPAEDIC SURGERY

## 2022-08-15 PROCEDURE — 1157F ADVNC CARE PLAN IN RCRD: CPT | Mod: CPTII,S$GLB,, | Performed by: ORTHOPAEDIC SURGERY

## 2022-08-15 PROCEDURE — 1125F AMNT PAIN NOTED PAIN PRSNT: CPT | Mod: CPTII,S$GLB,, | Performed by: ORTHOPAEDIC SURGERY

## 2022-08-15 PROCEDURE — 1157F PR ADVANCE CARE PLAN OR EQUIV PRESENT IN MEDICAL RECORD: ICD-10-PCS | Mod: CPTII,S$GLB,, | Performed by: ORTHOPAEDIC SURGERY

## 2022-08-15 PROCEDURE — 1101F PR PT FALLS ASSESS DOC 0-1 FALLS W/OUT INJ PAST YR: ICD-10-PCS | Mod: CPTII,S$GLB,, | Performed by: ORTHOPAEDIC SURGERY

## 2022-08-15 PROCEDURE — 99999 PR PBB SHADOW E&M-EST. PATIENT-LVL IV: ICD-10-PCS | Mod: PBBFAC,,, | Performed by: ORTHOPAEDIC SURGERY

## 2022-08-15 PROCEDURE — 99214 OFFICE O/P EST MOD 30 MIN: CPT | Mod: S$GLB,,, | Performed by: ORTHOPAEDIC SURGERY

## 2022-08-15 PROCEDURE — 1159F PR MEDICATION LIST DOCUMENTED IN MEDICAL RECORD: ICD-10-PCS | Mod: CPTII,S$GLB,, | Performed by: ORTHOPAEDIC SURGERY

## 2022-08-15 PROCEDURE — 1160F RVW MEDS BY RX/DR IN RCRD: CPT | Mod: CPTII,S$GLB,, | Performed by: ORTHOPAEDIC SURGERY

## 2022-08-15 PROCEDURE — 1159F MED LIST DOCD IN RCRD: CPT | Mod: CPTII,S$GLB,, | Performed by: ORTHOPAEDIC SURGERY

## 2022-08-15 PROCEDURE — 99999 PR PBB SHADOW E&M-EST. PATIENT-LVL IV: CPT | Mod: PBBFAC,,, | Performed by: ORTHOPAEDIC SURGERY

## 2022-08-15 PROCEDURE — 1101F PT FALLS ASSESS-DOCD LE1/YR: CPT | Mod: CPTII,S$GLB,, | Performed by: ORTHOPAEDIC SURGERY

## 2022-08-15 PROCEDURE — 99214 PR OFFICE/OUTPT VISIT, EST, LEVL IV, 30-39 MIN: ICD-10-PCS | Mod: S$GLB,,, | Performed by: ORTHOPAEDIC SURGERY

## 2022-08-15 NOTE — PROGRESS NOTES
"DATE: 8/15/2022  PATIENT: Korey Santos    Attending Physician: Luis William M.D.    HISTORY:  Korey Santos is a 81 y.o. male w/ a hx of L L4-5 microdisc by Dr. 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 He has LBP that radiates anteromedially along the right thigh. He is miserable and he wants surgical intervention.    L3-4 KELLI (7/5/22): helped with his leg pain (from a 10 to a 6-7 today)     PMH/PSH/FamHx/SocHx:  Unchanged from prior visit    ROS:  Positive for LBP and BLE pain  Denies perineal paresthesias, bowel or bladder incontinence    EXAM:  Ht 5' 11" (1.803 m)   Wt 99.7 kg (219 lb 14.5 oz)   BMI 30.67 kg/m²     My physical examination was notable for the following findings: motor intact BLE; SILT. + R SLR    IMAGING:  Today I independently reviewed the following images and my interpretations are as follows:    Previous L-spine XRs showed lumbar spondylosis and DDD. L4-5 anterolisthesis.    MRI lumbar showed L2-3 HNP with severe central and bilateral (R>L) foraminal stenosis. Previous L L4-5 toni-laminectomy defect.     CT lumbar showed averaged JONY in lumbar spine in the 100-110s.    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 I discussed the natural history of their diagnoses as well as surgical and nonsurgical treatment options. I educated the patient on the importance of core/back strengthening, correct posture, bending/lifting ergonomics, and low-impact aerobic exercises (walking, elliptical, and aquatherapy). Patient is a candidate for L2-3 decompression vs. L2-3 PLDF. His foraminal stenosis on the right is severe, and I cannot adequately decompress that with just a central decompression without destroying the facet and sacrificing stability. I talked to him about the risks and benefits of both options, and we agreed on a fusion.     I had a sit down discussion with the patient regarding " L2-3 PLDF. We specifically discussed the risks, benefits, and alternatives to surgery. We discussed the surgical procedure including the skin incision, nerve decompression, bone fusion, allograft, iliac crest bone graft, and surgical implants including pedicle screws and interbody devices as indicated: they understand the risks include but are not limited to death, paralysis, blindness, bleeding, infection, damage to arteries, veins and nerves, spinal fluid leak, continued or worsening pain, no improvement in symptoms, non-union, and the possible need for more surgery in the future, the possible need for perioperative blood transfusion, as well as the possibility other unforseen and unknown complications. We talked about expected hospital stay and recovery period. All questions were answered; they understand and wish to proceed.     Luis William MD  Orthopaedic Spine Surgeon  Department of Orthopaedic Surgery  [phone]

## 2022-08-15 NOTE — H&P (VIEW-ONLY)
"DATE: 8/15/2022  PATIENT: Korey Santos    Attending Physician: Luis William M.D.    HISTORY:  Korey Santos is a 81 y.o. male w/ a hx of L L4-5 microdisc by Dr. 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 He has LBP that radiates anteromedially along the right thigh. He is miserable and he wants surgical intervention.    L3-4 KELLI (7/5/22): helped with his leg pain (from a 10 to a 6-7 today)     PMH/PSH/FamHx/SocHx:  Unchanged from prior visit    ROS:  Positive for LBP and BLE pain  Denies perineal paresthesias, bowel or bladder incontinence    EXAM:  Ht 5' 11" (1.803 m)   Wt 99.7 kg (219 lb 14.5 oz)   BMI 30.67 kg/m²     My physical examination was notable for the following findings: motor intact BLE; SILT. + R SLR    IMAGING:  Today I independently reviewed the following images and my interpretations are as follows:    Previous L-spine XRs showed lumbar spondylosis and DDD. L4-5 anterolisthesis.    MRI lumbar showed L2-3 HNP with severe central and bilateral (R>L) foraminal stenosis. Previous L L4-5 toni-laminectomy defect.     CT lumbar showed averaged JONY in lumbar spine in the 100-110s.    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 I discussed the natural history of their diagnoses as well as surgical and nonsurgical treatment options. I educated the patient on the importance of core/back strengthening, correct posture, bending/lifting ergonomics, and low-impact aerobic exercises (walking, elliptical, and aquatherapy). Patient is a candidate for L2-3 decompression vs. L2-3 PLDF. His foraminal stenosis on the right is severe, and I cannot adequately decompress that with just a central decompression without destroying the facet and sacrificing stability. I talked to him about the risks and benefits of both options, and we agreed on a fusion.     I had a sit down discussion with the patient regarding " L2-3 PLDF. We specifically discussed the risks, benefits, and alternatives to surgery. We discussed the surgical procedure including the skin incision, nerve decompression, bone fusion, allograft, iliac crest bone graft, and surgical implants including pedicle screws and interbody devices as indicated: they understand the risks include but are not limited to death, paralysis, blindness, bleeding, infection, damage to arteries, veins and nerves, spinal fluid leak, continued or worsening pain, no improvement in symptoms, non-union, and the possible need for more surgery in the future, the possible need for perioperative blood transfusion, as well as the possibility other unforseen and unknown complications. We talked about expected hospital stay and recovery period. All questions were answered; they understand and wish to proceed.     Luis William MD  Orthopaedic Spine Surgeon  Department of Orthopaedic Surgery  [phone]

## 2022-08-16 ENCOUNTER — TELEPHONE (OUTPATIENT)
Dept: ORTHOPEDICS | Facility: CLINIC | Age: 82
End: 2022-08-16
Payer: MEDICARE

## 2022-08-16 NOTE — TELEPHONE ENCOUNTER
Spoke to patient, he said he can not do his surgery on 8/26/22 and will call back Monday or Tuesday to let us know alternative dates.

## 2022-08-16 NOTE — TELEPHONE ENCOUNTER
----- Message from Mayra Altamirano sent at 8/16/2022  8:15 AM CDT -----  Name of Who is Calling: KYRA PEREZ          What is the request in detail: The patient is calling to speak to the nurse in regards to a few questions about rescheduling his procedure. Please advise          Can the clinic reply by [facility]: No         [facility]: [phone]

## 2022-08-17 ENCOUNTER — TELEPHONE (OUTPATIENT)
Dept: INTERNAL MEDICINE | Facility: CLINIC | Age: 82
End: 2022-08-17
Payer: MEDICARE

## 2022-08-17 ENCOUNTER — TELEPHONE (OUTPATIENT)
Dept: PREADMISSION TESTING | Facility: HOSPITAL | Age: 82
End: 2022-08-17
Payer: MEDICARE

## 2022-08-17 DIAGNOSIS — M79.604 PAIN OF RIGHT LOWER EXTREMITY: ICD-10-CM

## 2022-08-17 DIAGNOSIS — Z01.818 PREOPERATIVE TESTING: Primary | ICD-10-CM

## 2022-08-17 NOTE — TELEPHONE ENCOUNTER
----- Message from Vanessa Eller RN sent at 8/17/2022 10:03 AM CDT -----  Patient is scheduled for TLIF L2-3 on 8/26 with Dr. William. ( Approximately 250 minutes of general anesthesia) He will need medical clearance. Please schedule a preop clearance appt.  Thanks!

## 2022-08-17 NOTE — PRE-PROCEDURE INSTRUCTIONS
Patient stated has not had any problem with anesthesia in the past. Will need medical clearance from your PCP,Dr. Juan Bustamante.  He will make an appt. Will need poc appt, labs, ua, and ekg.  Our  will call to set up these appts. He said he may push his surgery back a couple of weeks.  Preop instructions given. Hold aspirin, aspirin containing products, nsaids(aleve, advil, motrin, ibuprofen, naprosyn, naproxen, voltaren, diclofenac, Mobic, Meloxicam), vitamins ( Multivitamin)aand supplements one week prior to surgery.  May take Tylenol.    Shower the night before surgery and the morning of surgery with an antibacterial soap( hibiclens or dial antibacterial soap).  Nothing on the skin once shower. Do not apply any deodorant, lotion, powder, perfume,or aftershave.  No makeup or moisturizer.No fingernail polish or jewelry going to surgery.  Call your surgeon for any changes in your medical condition.    Nothing to eat or drink after midnight , the night before surgery.   Nothing to drink 2 hours before arrival time for surgery/procedure. If you are told to take medication in the morning of surgery, it may be taken with a sip of water. If your doctor tells you something different pertaining to when to stop eating or drinking, follow your doctor's instructions.  Directions given to the surgery center. Verbalizes understanding. Mailed hold one week prior to surgery med instructions, preop and med instructions to patient.

## 2022-08-17 NOTE — ANESTHESIA PAT ROS NOTE
08/17/2022  Korey Santos is a 81 y.o., male.      Pre-op Assessment          Review of Systems         Anesthesia Assessment: Preoperative EQUATION    Planned Procedure: Procedure(s) (LRB):  FUSION, SPINE, LUMBAR, TLIF, POSTERIOR APPROACH, USING PEDICLE SCREW L2-3 GLOBUS ROBOT SNS: SSEP/EMG (N/A)  Requested Anesthesia Type:General  Surgeon: Luis William MD  Service: Neurosurgery  Known or anticipated Date of Surgery:8/26/2022, Date change 9/2/2022    Surgeon notes: reviewed    Electronic QUestionnaire Assessment completed via nurse interview with patient.        Triage considerations:       Previous anesthesia records:Spinal Anesthesia  and No problems    5/4/2018 REPLACEMENT-KNEE-TOTAL (Left Knee)  Airway/Jaw/Neck:  Airway Findings: Mouth Opening: Normal Tongue: Normal  General Airway Assessment: Adult  Mallampati: III  TM Distance: Normal, at least 6 cm  Jaw/Neck Findings:  Neck ROM: Extension Decreased, Mild     Last PCP note: 3-6 months ago , within [facility]   Subspecialty notes: Ortho, Pain Management, Urology, GENERAL SURGERY,  SPORTS MEDICINE    Other important co-morbidities:PER Epic:  HLD, HTN, Obesity and LUMBAR STENOSIS, BPH, H/O COLON POLYP      Tests already available:  Available tests,  within 1 month , within 3 months , within [facility] .8/11/2022 CRT LUMBAR SPINE W/O CONTRAST, 6/17/2022 XRAY LUMBAR SPINE AP/LAT W F/E, MRI LUMBAR SPINE W/O CONTRAST             Instructions given. (See in Nurse's note)    Optimization:  Anesthesia Preop Clinic Assessment  Indicated    Medical Opinion Indicated             Plan:    Testing:  BMP, EKG, Hematology Profile, PT/INR, PTT, T&S and UA   Pre-anesthesia  visit       Visit focus: concerns in complex and/or prolonged anesthesia, COMORBIDITIES     Consultation:Patient's PCP for re-evaluation     Patient  has previously scheduled Medical  Appointment:NONE    Navigation: Tests Scheduled. TBD             Consults scheduled.TBD             Results will be tracked by Preop Clinic.  8/23 Labs resulted and noted by Dr. Elliott Jacob. Dr. Rloanda Solano reviewed UA and EKG.  Vanessa Eller RN BSN

## 2022-08-17 NOTE — TELEPHONE ENCOUNTER
----- Message from Juan Bustamante MD sent at 8/17/2022  2:12 PM CDT -----  Please call patient and schedule patient for an appointment with me (not APRN) soon. Thank you.    ----- Message -----  From: Vanessa Eller RN  Sent: 8/17/2022  10:04 AM CDT  To: Juan Bustamante MD, Vanessa Eller, RN, #    Patient is scheduled for TLIF L2-3 on 8/26 with Dr. William. ( Approximately 250 minutes of general anesthesia) He will need medical clearance. Please schedule a preop clearance appt.  Thanks!

## 2022-08-19 ENCOUNTER — TELEPHONE (OUTPATIENT)
Dept: PREADMISSION TESTING | Facility: HOSPITAL | Age: 82
End: 2022-08-19
Payer: MEDICARE

## 2022-08-22 ENCOUNTER — LAB VISIT (OUTPATIENT)
Dept: LAB | Facility: HOSPITAL | Age: 82
End: 2022-08-22
Attending: ANESTHESIOLOGY
Payer: MEDICARE

## 2022-08-22 ENCOUNTER — HOSPITAL ENCOUNTER (OUTPATIENT)
Dept: CARDIOLOGY | Facility: CLINIC | Age: 82
Discharge: HOME | End: 2022-08-22
Payer: MEDICARE

## 2022-08-22 ENCOUNTER — TELEPHONE (OUTPATIENT)
Dept: INTERNAL MEDICINE | Facility: CLINIC | Age: 82
End: 2022-08-22
Payer: MEDICARE

## 2022-08-22 DIAGNOSIS — M79.604 PAIN OF RIGHT LOWER EXTREMITY: ICD-10-CM

## 2022-08-22 DIAGNOSIS — Z01.818 PREOPERATIVE TESTING: ICD-10-CM

## 2022-08-22 LAB
ABO + RH BLD: NORMAL
ANION GAP SERPL CALC-SCNC: 8 MMOL/L (ref 8–16)
APTT BLDCRRT: 25.9 SEC (ref 21–32)
BLD GP AB SCN CELLS X3 SERPL QL: NORMAL
BUN SERPL-MCNC: 17 MG/DL (ref 8–23)
CALCIUM SERPL-MCNC: 9.5 MG/DL (ref 8.7–10.5)
CHLORIDE SERPL-SCNC: 106 MMOL/L (ref 95–110)
CO2 SERPL-SCNC: 28 MMOL/L (ref 23–29)
CREAT SERPL-MCNC: 0.8 MG/DL (ref 0.5–1.4)
ERYTHROCYTE [DISTWIDTH] IN BLOOD BY AUTOMATED COUNT: 13.6 % (ref 11.5–14.5)
EST. GFR  (NO RACE VARIABLE): >60 ML/MIN/1.73 M^2
GLUCOSE SERPL-MCNC: 99 MG/DL (ref 70–110)
HCT VFR BLD AUTO: 43.8 % (ref 40–54)
HGB BLD-MCNC: 13.7 G/DL (ref 14–18)
INR PPP: 1 (ref 0.8–1.2)
MCH RBC QN AUTO: 29.6 PG (ref 27–31)
MCHC RBC AUTO-ENTMCNC: 31.3 G/DL (ref 32–36)
MCV RBC AUTO: 95 FL (ref 82–98)
PLATELET # BLD AUTO: 238 K/UL (ref 150–450)
PMV BLD AUTO: 11.3 FL (ref 9.2–12.9)
POTASSIUM SERPL-SCNC: 4.5 MMOL/L (ref 3.5–5.1)
PROTHROMBIN TIME: 9.9 SEC (ref 9–12.5)
RBC # BLD AUTO: 4.63 M/UL (ref 4.6–6.2)
SODIUM SERPL-SCNC: 142 MMOL/L (ref 136–145)
WBC # BLD AUTO: 7.12 K/UL (ref 3.9–12.7)

## 2022-08-22 PROCEDURE — 93010 ELECTROCARDIOGRAM REPORT: CPT | Mod: S$GLB,,, | Performed by: INTERNAL MEDICINE

## 2022-08-22 PROCEDURE — 85027 COMPLETE CBC AUTOMATED: CPT | Performed by: ANESTHESIOLOGY

## 2022-08-22 PROCEDURE — 86850 RBC ANTIBODY SCREEN: CPT | Performed by: ANESTHESIOLOGY

## 2022-08-22 PROCEDURE — 93005 EKG 12-LEAD: ICD-10-PCS | Mod: S$GLB,,, | Performed by: ANESTHESIOLOGY

## 2022-08-22 PROCEDURE — 80048 BASIC METABOLIC PNL TOTAL CA: CPT | Performed by: ANESTHESIOLOGY

## 2022-08-22 PROCEDURE — 36415 COLL VENOUS BLD VENIPUNCTURE: CPT | Performed by: ANESTHESIOLOGY

## 2022-08-22 PROCEDURE — 93005 ELECTROCARDIOGRAM TRACING: CPT | Mod: S$GLB,,, | Performed by: ANESTHESIOLOGY

## 2022-08-22 PROCEDURE — 85730 THROMBOPLASTIN TIME PARTIAL: CPT | Performed by: ANESTHESIOLOGY

## 2022-08-22 PROCEDURE — 93010 EKG 12-LEAD: ICD-10-PCS | Mod: S$GLB,,, | Performed by: INTERNAL MEDICINE

## 2022-08-22 PROCEDURE — 85610 PROTHROMBIN TIME: CPT | Performed by: ANESTHESIOLOGY

## 2022-08-22 NOTE — TELEPHONE ENCOUNTER
Called pt to make aware that Dr Bustamante does not have anything available early tomorrow. I advise for pt to keep his appointment with , but he want to take the 9am appointment to see my. Canceled appointment with Dr Bustamante and put on justin schedule because he want to be seen early in the morning.     He still would like for justin to get together with Dianna to make sure everything check off just fine and he is cleared for surgery.

## 2022-08-22 NOTE — TELEPHONE ENCOUNTER
----- Message from Kylie Borden sent at 8/22/2022 12:03 PM CDT -----  Regarding: Pt called to be seen today for his pre op visit due to being in town for his other appts and pt states he was told that the appt is today and pt want to be seen today and would like a call back  Same Day Appointment Access Request:    Pt called to be seen today for his pre op visit due to being in town for his other appts and pt states he was told that the appt is today and pt want to be seen today and would like a call back    Pt can be reached at [phone]

## 2022-08-22 NOTE — TELEPHONE ENCOUNTER
----- Message from [facility] sent at 8/22/2022 12:20 PM CDT -----  Type:  Needs Medical Advice    Who Called: PT   Would the patient rather a call back or a response via [facility]? Callback   Best Call Back Number: [phone]  Additional Information: Pt requesting callback from office to discuss rescheduling appt on 08/23/2022 to today 08/22/20222.

## 2022-08-23 ENCOUNTER — TELEPHONE (OUTPATIENT)
Dept: ORTHOPEDICS | Facility: CLINIC | Age: 82
End: 2022-08-23
Payer: MEDICARE

## 2022-08-23 ENCOUNTER — HOSPITAL ENCOUNTER (OUTPATIENT)
Dept: PREADMISSION TESTING | Facility: HOSPITAL | Age: 82
Discharge: HOME | End: 2022-08-23
Attending: ORTHOPAEDIC SURGERY
Payer: MEDICARE

## 2022-08-23 ENCOUNTER — OFFICE VISIT (OUTPATIENT)
Dept: INTERNAL MEDICINE | Facility: CLINIC | Age: 82
End: 2022-08-23
Payer: MEDICARE

## 2022-08-23 ENCOUNTER — ANESTHESIA EVENT (OUTPATIENT)
Dept: SURGERY | Facility: HOSPITAL | Age: 82
DRG: 460 | End: 2022-08-23
Payer: MEDICARE

## 2022-08-23 VITALS
WEIGHT: 225 LBS | OXYGEN SATURATION: 96 % | SYSTOLIC BLOOD PRESSURE: 142 MMHG | HEIGHT: 71 IN | DIASTOLIC BLOOD PRESSURE: 70 MMHG | RESPIRATION RATE: 16 BRPM | BODY MASS INDEX: 31.5 KG/M2 | HEART RATE: 70 BPM | TEMPERATURE: 98 F

## 2022-08-23 VITALS
HEIGHT: 71 IN | DIASTOLIC BLOOD PRESSURE: 68 MMHG | OXYGEN SATURATION: 98 % | SYSTOLIC BLOOD PRESSURE: 132 MMHG | HEART RATE: 60 BPM | WEIGHT: 225.06 LBS | BODY MASS INDEX: 31.51 KG/M2

## 2022-08-23 DIAGNOSIS — Z01.818 PREOPERATIVE CLEARANCE: Primary | ICD-10-CM

## 2022-08-23 DIAGNOSIS — I10 HYPERTENSION, ESSENTIAL: ICD-10-CM

## 2022-08-23 DIAGNOSIS — R35.0 BENIGN PROSTATIC HYPERPLASIA WITH URINARY FREQUENCY: ICD-10-CM

## 2022-08-23 DIAGNOSIS — N40.1 BENIGN PROSTATIC HYPERPLASIA WITH URINARY FREQUENCY: ICD-10-CM

## 2022-08-23 PROCEDURE — 1157F PR ADVANCE CARE PLAN OR EQUIV PRESENT IN MEDICAL RECORD: ICD-10-PCS | Mod: CPTII,S$GLB,, | Performed by: NURSE PRACTITIONER

## 2022-08-23 PROCEDURE — 1159F PR MEDICATION LIST DOCUMENTED IN MEDICAL RECORD: ICD-10-PCS | Mod: CPTII,S$GLB,, | Performed by: NURSE PRACTITIONER

## 2022-08-23 PROCEDURE — 99999 PR PBB SHADOW E&M-EST. PATIENT-LVL V: CPT | Mod: PBBFAC,,, | Performed by: NURSE PRACTITIONER

## 2022-08-23 PROCEDURE — 3078F PR MOST RECENT DIASTOLIC BLOOD PRESSURE < 80 MM HG: ICD-10-PCS | Mod: CPTII,S$GLB,, | Performed by: NURSE PRACTITIONER

## 2022-08-23 PROCEDURE — 1101F PT FALLS ASSESS-DOCD LE1/YR: CPT | Mod: CPTII,S$GLB,, | Performed by: NURSE PRACTITIONER

## 2022-08-23 PROCEDURE — 3288F FALL RISK ASSESSMENT DOCD: CPT | Mod: CPTII,S$GLB,, | Performed by: NURSE PRACTITIONER

## 2022-08-23 PROCEDURE — 1159F MED LIST DOCD IN RCRD: CPT | Mod: CPTII,S$GLB,, | Performed by: NURSE PRACTITIONER

## 2022-08-23 PROCEDURE — 1101F PR PT FALLS ASSESS DOC 0-1 FALLS W/OUT INJ PAST YR: ICD-10-PCS | Mod: CPTII,S$GLB,, | Performed by: NURSE PRACTITIONER

## 2022-08-23 PROCEDURE — 99214 OFFICE O/P EST MOD 30 MIN: CPT | Mod: S$GLB,,, | Performed by: NURSE PRACTITIONER

## 2022-08-23 PROCEDURE — 1160F PR REVIEW ALL MEDS BY PRESCRIBER/CLIN PHARMACIST DOCUMENTED: ICD-10-PCS | Mod: CPTII,S$GLB,, | Performed by: NURSE PRACTITIONER

## 2022-08-23 PROCEDURE — 3288F PR FALLS RISK ASSESSMENT DOCUMENTED: ICD-10-PCS | Mod: CPTII,S$GLB,, | Performed by: NURSE PRACTITIONER

## 2022-08-23 PROCEDURE — 1125F PR PAIN SEVERITY QUANTIFIED, PAIN PRESENT: ICD-10-PCS | Mod: CPTII,S$GLB,, | Performed by: NURSE PRACTITIONER

## 2022-08-23 PROCEDURE — 1160F RVW MEDS BY RX/DR IN RCRD: CPT | Mod: CPTII,S$GLB,, | Performed by: NURSE PRACTITIONER

## 2022-08-23 PROCEDURE — 3075F PR MOST RECENT SYSTOLIC BLOOD PRESS GE 130-139MM HG: ICD-10-PCS | Mod: CPTII,S$GLB,, | Performed by: NURSE PRACTITIONER

## 2022-08-23 PROCEDURE — 99214 PR OFFICE/OUTPT VISIT, EST, LEVL IV, 30-39 MIN: ICD-10-PCS | Mod: S$GLB,,, | Performed by: NURSE PRACTITIONER

## 2022-08-23 PROCEDURE — 1157F ADVNC CARE PLAN IN RCRD: CPT | Mod: CPTII,S$GLB,, | Performed by: NURSE PRACTITIONER

## 2022-08-23 PROCEDURE — 3075F SYST BP GE 130 - 139MM HG: CPT | Mod: CPTII,S$GLB,, | Performed by: NURSE PRACTITIONER

## 2022-08-23 PROCEDURE — 99999 PR PBB SHADOW E&M-EST. PATIENT-LVL V: ICD-10-PCS | Mod: PBBFAC,,, | Performed by: NURSE PRACTITIONER

## 2022-08-23 PROCEDURE — 1125F AMNT PAIN NOTED PAIN PRSNT: CPT | Mod: CPTII,S$GLB,, | Performed by: NURSE PRACTITIONER

## 2022-08-23 PROCEDURE — 3078F DIAST BP <80 MM HG: CPT | Mod: CPTII,S$GLB,, | Performed by: NURSE PRACTITIONER

## 2022-08-23 NOTE — ANESTHESIA PREPROCEDURE EVALUATION
[facility]  Anesthesia Pre-Operative Evaluation         Patient Name: Korey Santos  YOB: 1940  MRN: [id number]    SUBJECTIVE:     Pre-operative evaluation for Procedure(s) (LRB):  FUSION, SPINE, LUMBAR, TLIF, POSTERIOR APPROACH, USING PEDICLE SCREW L2-3 GLOBUS ROBOT SNS: SSEP/EMG (N/A)     08/23/2022    Korey Santos is a 81 y.o. male w/ a significant PMHx of HTN, HLD, Chronic rhinitis, Obesity, Arthritis and Lumbar disc disease.    Patient now presents for the above procedure(s).    Stress Echo 12/4/2019:  · The stress echo portion of this study is negative for myocardial ischemia.  · The EKG portion of this study is negative for myocardial ischemia.  · The test was stopped because the patient experienced fatigue.  · The patient's exercise capacity was normal.  · Arrhythmias during stress: occasional PVCs,  · Normal left ventricular systolic function. The estimated ejection fraction is 65%  · Indeterminate left ventricular diastolic function.  · No wall motion abnormalities.  · Mild left atrial enlargement.  · Normal right ventricular systolic function.  · Normal central venous pressure (3 mm Hg).     Prev airway: None documented.    Patient Active Problem List   Diagnosis    Paradoxical insomnia    Benign prostatic hyperplasia with urinary frequency    Erectile dysfunction    DJD (degenerative joint disease) of hip    Lumbar spondylosis    Lumbar stenosis    Lumbar herniated disc    Neck mass    Hyperlipidemia    Left inguinal hernia    Primary osteoarthritis of left knee    Hypertension, essential    Medial meniscus tear    Elevated PSA    Dysphagia, pharyngoesophageal    Laryngopharyngeal reflux    Chronic rhinitis    Genu varum of both lower extremities    Status post total left knee replacement    Polyp of colon    Lumbar facet arthropathy    Class 1 obesity due to excess calories with serious comorbidity in adult    Trigger middle finger of  right hand    Noncompliance    Closed fracture of right shoulder    Decreased range of motion of right shoulder    Weakness of right upper extremity    Closed nondisplaced fracture of greater tuberosity of right humerus    Acute pain of right shoulder       Review of patient's allergies indicates:   Allergen Reactions    Clindamycin Swelling     Throat swells    Lisinopril Swelling and Other (See Comments)     Throat swelling       Current Inpatient Medications:      Current Outpatient Medications on File Prior to Encounter   Medication Sig Dispense Refill    aspirin (ECOTRIN) 325 MG EC tablet Take 1 tablet (325 mg total) by mouth once daily. (Patient taking differently: Take 325 mg by mouth every other day.) 42 tablet 0    azelastine (ASTELIN) 137 mcg (0.1 %) nasal spray 1 spray (137 mcg total) by Nasal route 2 (two) times daily as needed for Rhinitis. 30 mL 0    cycloSPORINE (RESTASIS) 0.05 % ophthalmic emulsion Place 1 drop into both eyes 2 (two) times daily. 60 vial 11    finasteride (PROSCAR) 5 mg tablet Take 1 tablet (5 mg total) by mouth once daily. 90 tablet 3    fluticasone propionate (FLONASE) 50 mcg/actuation nasal spray 1 spray (50 mcg total) by Each Nostril route once daily. 16 g 6    linaCLOtide (LINZESS) 145 mcg Cap capsule Take 1 capsule (145 mcg total) by mouth daily as needed (constipation). 90 capsule 3    losartan (COZAAR) 100 MG tablet Take 1 tablet by mouth once daily 90 tablet 0    meloxicam (MOBIC) 15 MG tablet Take 1 tablet (15 mg total) by mouth once daily. 60 tablet 1    methocarbamoL (ROBAXIN) 500 MG Tab Take 1 tablet (500 mg total) by mouth 3 (three) times daily. 60 tablet 1    multivitamin capsule Take 1 capsule by mouth once daily.      polyethylene glycol (GLYCOLAX) 17 gram/dose powder Take 17 g by mouth daily as needed. 510 g 1    pravastatin (PRAVACHOL) 40 MG tablet Take 1 tablet (40 mg total) by mouth once daily. 30 tablet 11    pregabalin (LYRICA) 100 MG  capsule Take 1 capsule (100 mg total) by mouth 3 (three) times daily. 60 capsule 1    tamsulosin (FLOMAX) 0.4 mg Cap Take 1 capsule (0.4 mg total) by mouth once daily. 90 capsule 4     No current facility-administered medications on file prior to encounter.       Past Surgical History:   Procedure Laterality Date    BACK SURGERY  2014    COLONOSCOPY      EPIDURAL STEROID INJECTION INTO LUMBAR SPINE N/A 7/5/2022    Procedure: Injection-steroid-epidural-lumbar L3-4;  Surgeon: Yury Crum Jr., MD;  Location: [facility];  Service: Pain Management;  Laterality: N/A;  oral sedation   asa      JOINT REPLACEMENT Left 05/04/2018    KNEE SURGERY      left hand      LEG SURGERY      SPINE SURGERY      UPPER GASTROINTESTINAL ENDOSCOPY         Social History     Socioeconomic History    Marital status: Single   Tobacco Use    Smoking status: Never Smoker    Smokeless tobacco: Never Used   Substance and Sexual Activity    Alcohol use: Yes     Alcohol/week: 0.0 standard drinks     Comment: approximately 2 beers weekly    Drug use: No    Sexual activity: Yes     Partners: Female     Birth control/protection: None   Social History Narrative    Ret. 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       OBJECTIVE:     Vital Signs Range (Last 24H):  Pulse:  [60]   BP: (132)/(68)   SpO2:  [98 %]       Significant Labs:  Lab Results   Component Value Date    WBC 7.12 08/22/2022    HGB 13.7 (L) 08/22/2022    HCT 43.8 08/22/2022     08/22/2022    CHOL 193 02/05/2021    TRIG 94 02/05/2021    HDL 44 02/05/2021    ALT 20 02/05/2021    AST 29 02/05/2021     08/22/2022    K 4.5 08/22/2022     08/22/2022    CREATININE 0.8 08/22/2022    BUN 17 08/22/2022    CO2 28 08/22/2022    TSH 0.642 07/08/2017    PSA 4.7 (H) 07/08/2017    INR 1.0 08/22/2022    GLUF 92 09/16/2017    HGBA1C 5.8 (H) 09/16/2017       Diagnostic Studies: No relevant studies.    EKG:   Results for orders placed or performed during the hospital  encounter of 08/22/22   EKG 12-lead    Collection Time: 08/22/22 12:01 PM    Narrative    Test Reason : Z01.818,    Vent. Rate : 065 BPM     Atrial Rate : 065 BPM     P-R Int : 160 ms          QRS Dur : 094 ms      QT Int : 386 ms       P-R-T Axes : 022 043 061 degrees     QTc Int : 401 ms    Normal sinus rhythm  Normal ECG  When compared with ECG of 10-DEC-2019 11:20,  PVCs no longer present  Confirmed by Jacobo HELMS MD ([id number]) on 8/22/2022 5:19:34 PM    Referred By: TEJINDER LOBO           Confirmed By:Jacobo HELMS MD       2D ECHO:  TTE:  No results found for this or any previous visit.    CANDIDO:  No results found for this or any previous visit.    ASSESSMENT/PLAN:                                                                                                                  08/23/2022  Korey Santos is a 81 y.o., male.      Pre-op Assessment    I have reviewed the Patient Summary Reports.     I have reviewed the Nursing Notes. I have reviewed the NPO Status.   I have reviewed the Medications.     Review of Systems  Anesthesia Hx:  No previous Anesthesia Denies Hx of Anesthetic complications  History of prior surgery of interest to airway management or planning: Denies Family Hx of Anesthesia complications.   Denies Personal Hx of Anesthesia complications.   EENT/Dental:   chronic allergic rhinitis   Cardiovascular:   Exercise tolerance: good Hypertension Denies CAD.    Denies CABG/stent.   Denies CHF. hyperlipidemia    Pulmonary:   Denies COPD.  Denies Asthma.  Denies Sleep Apnea.    Renal/:   Denies Chronic Renal Disease.     Hepatic/GI:   Denies GERD.    Musculoskeletal:   Arthritis   Spine Disorders: lumbar Degenerative disease, Disc disease and Chronic Pain    Neurological:   Denies CVA.  Denies Headaches. Denies Seizures.    Endocrine:   Denies Diabetes.  Obesity / BMI > 30  Psych:   Denies Psychiatric History.          Physical Exam  General: Well nourished, Cooperative, Alert and  Oriented    Airway:  Mallampati: II   Mouth Opening: Normal  TM Distance: Normal  Tongue: Normal  Neck ROM: Normal ROM    Dental:  Intact        Anesthesia Plan  Type of Anesthesia, risks & benefits discussed:    Anesthesia Type: Gen ETT  Intra-op Monitoring Plan: Standard ASA Monitors and Art Line  Post Op Pain Control Plan: multimodal analgesia and IV/PO Opioids PRN  Induction:  IV  Airway Plan: Direct and Video, Post-Induction  Informed Consent: Informed consent signed with the Patient and all parties understand the risks and agree with anesthesia plan.  All questions answered. Patient consented to blood products? Yes  ASA Score: 3  Day of Surgery Review of History & Physical: H&P Update referred to the surgeon/provider.    Ready For Surgery From Anesthesia Perspective.     .

## 2022-08-23 NOTE — PROGRESS NOTES
HPI: Presents for preoperative history and physical. Patient is scheduled to have posterior lumbar spinal fusion surgery on 08/26/2022 with Dr.Charles William. The procedure will take place at [facility] under general sedation. Patient has been feeling well. Denies personal or family history of reaction to anesthesia. Denies fever or recent illness.    He states he has tried to reschedule his surgery to August 31st or September 2nd, as his son is only available to help him post surgical on those days.     METS Preop Assessment:    Can walk up a flight of steps or a hill or walk on level ground at 3 to 4 mph (4 METs)      Past Medical History:   Diagnosis Date    Arthritis     Back pain, chronic     BPH with urinary obstruction     Chronic constipation     Colon polyp     DJD (degenerative joint disease)     Hip    Hyperlipidemia     Hypertension     Laryngopharyngeal reflux     Left inguinal hernia     Lumbar herniated disc     Lumbar stenosis     OA (osteoarthritis) of knee     Bilateral    Paradoxical insomnia     Sinus trouble         Past Surgical History:  2014: BACK SURGERY  No date: COLONOSCOPY  7/5/2022: EPIDURAL STEROID INJECTION INTO LUMBAR SPINE; N/A      Comment:  Procedure: Injection-steroid-epidural-lumbar L3-4;                 Surgeon: Yury Crum Jr., MD;  Location: [facility]                [facility];  Service: Pain Management;  Laterality: N/A;  oral                sedation   asa    05/04/2018: JOINT REPLACEMENT; Left  No date: KNEE SURGERY  No date: left hand  No date: LEG SURGERY  No date: SPINE SURGERY  No date: UPPER GASTROINTESTINAL ENDOSCOPY      ROS:   CONSTITUTIONAL: No fever or chills. No weight loss or weight gain.  NEUROLOGICAL: Denies tremor, paralysis, head injury.  EYES: No blurred or double vision. No redness or discharge.  ENT: Denies hearing loss or tinnitus. No ear pain or discharge. Denies nasal congestion or sore throat.  CARDIOVASCULAR: No chest  "pain, shortness of breath, or palpitations. No peripheral edema.  RESPIRATORY: Denies shortness of breath or difficulty breathing. Denies cough or wheezes.  GASTROINTESTINAL: Denies abdominal pain. No nausea or vomiting. Denies diarrhea, constipation, or bloody stools. Denies bowel incontinence.  GENITOURINARY: Denies bladder incontinence  MUSCULOSKELETAL: Notes lower back pain. Denies swelling or redness of joints. Denies weakness.  INTEGUMENTARY: No rash or itching. Denies wounds or lesions.  HEMATOLOGIC: Denies excessive bruising.      Physical Exam:    /68 (BP Location: Left arm, Patient Position: Sitting, BP Method: Large (Manual))   Pulse 60   Ht 5' 11" (1.803 m)   Wt 102.1 kg (225 lb 1.4 oz)   SpO2 98%   BMI 31.39 kg/m²     CONSTITUTIONAL: Well-developed, well nourished. No acute distress.  NEUROLOGICAL: Patient alert, orientated, memory intact. Gait steady with cane.  HEENT: Head is normocephalic. Eyes- symmetrical, no erythema ordischarge.   Neck- Supple, no lymphadenopathy.  LUNGS: Respirations even and unlabored, chest expansion symmetrical. Lung sounds clear in all lobes, no wheezing, crackles, or adventitious breath sounds.  HEART: Rate and rhythm regular. No cardiac murmur, click, or rub noted.  ABDOMEN: Soft, nontender. Bowel sounds active in all four quadrants.  MUSCULOSKELETAL/EXTREMITIES: Extremities are intact, no redness or edema noted of upper or lower extremity.  INTEGUMENTARY: Skin pink, dry, warm to touch. No rash, wounds,lesions noted on visible skin.  PSYCHOSOCIAL: Calm and cooperative, interacts appropriately with staff.    Plan: Diagnostic test results reviewed.  EKG: I independently reviewed and interpreted EKG which shows sinus rhythm  at 65 beats per minute, no STEMI, no ischemic changes, normal intervals.        Patient is at medium risk forintraoperative complications. Hold ASA, NSAIDs 7 days prior to procedure. Notify provider of any questions or concerns. Patient " verbalizes understanding regarding plan of care and all questions answered.    Annette Perez MSN, APRN, FNP-BC  9:08 AM

## 2022-08-26 ENCOUNTER — ANESTHESIA (OUTPATIENT)
Dept: SURGERY | Facility: HOSPITAL | Age: 82
DRG: 460 | End: 2022-08-26
Payer: MEDICARE

## 2022-08-29 NOTE — PROGRESS NOTES
I have reviewed the chart and APRN's history and physical, assessment and plan. I have personally discussed the case with APRN and agree with the findings, assessment and plan.    Surgical Risk Assessment     Active cardiac issues:  Active decompensated heart failure? no   Unstable angina?  no   Significant uncontrolled arrhythmias? no   Severe valvular heart disease-Aortic or Mitral Stenosis? no   Recent MI or coronary revascularization < 30 days? no     Clinical risk factors predicting perioperative major adverse cardiac events per RCRI  High risk surgery (suprainguinal vascular, intraperitoneal, or intrathoracic surgery)? no   History of CAD/ischemic heart disease? no   History of cerebrovascular disease (CVA or TIA)? no   History of compensated heart failure? no   Type 2 diabetes requiring insulin? no   Serum Creatinine > 2? no   Total cardiac risk factors 0     0 predictors = 3.9%, 1 predictor = 6.0%, 2 predictors = 10.1%, ?3 predictors = >15%    According to the revised cardiac risk index, the risk of aung-procedural major cardiac complications (cardiac death, nonfatal MI, nonfatal cardiac arrest, postoperative cardiogenic pulmonary edema, complete heart block) is: 3.9 .     From Duceppe 2017, based on pooled data from 5 high quality external validations (4 prospective). These numbers are higher than those often quoted from the now-outdated original study (Paul 1999).    If patient has a low risk of MACE (<1%), proceed to surgery. If the patient is at elevated risk of MACE, then determine functional capacity (pt reported activity or DASI model).     If the patient has moderate, good, or excellent functional capacity (?4 METs), then proceed to surgery without further evaluation. If patient has poor or unknown functional capacity, will further testing impact decision making or perioperative care? If yes, then pharmacological stress testing is appropriate. In those patients with unknown functional capacity,  exercise stress testing may be reasonable to perform.     Patient's functional mets: 4+    < 4 METs -unable to walk > 2 blocks on level ground without stopping due to symptoms  - eating, dressing, toileting, walking indoors, light housework. POOR   > 4 METs -climbing > 1 flight of stairs without stopping  -walking up hill > 1-2 blocks  -scrubbing floors  -moving furniture  - golf, bowling, dancing or tennis  -running short distance MODERATE to EXCELLENT     OR   [email or web address]

## 2022-08-31 ENCOUNTER — TELEPHONE (OUTPATIENT)
Dept: ORTHOPEDICS | Facility: CLINIC | Age: 82
End: 2022-08-31
Payer: MEDICARE

## 2022-08-31 NOTE — TELEPHONE ENCOUNTER
Spoke with patient and gave him the information about his   Procedure date and time for 9/2/2022 at 5 am, asked him to repeat back to me and he did and stated an verbal understanding. No eating or drinking after 12 am.

## 2022-08-31 NOTE — TELEPHONE ENCOUNTER
"Patient was originally scheduled for L2-3 TLIF  8/26/22. Pt requested the case be moved to 9/2/22. Was notified by pre service and [facility] medicare today 8/31/22 that surgery was denied due to "not meeting medical necessity". Called for expedited appeal, given Case # [id number]. Will fax all clinical info and imaging to . Pt presents with chronic low back pain and radiculopathy secondary to severe spinal stenosis at L2-3. Pt has tried medication, PT, and ESIs with no relief. Given failure of conservative rx, next step is surgical intervention.          "

## 2022-09-01 ENCOUNTER — TELEPHONE (OUTPATIENT)
Dept: ORTHOPEDICS | Facility: CLINIC | Age: 82
End: 2022-09-01
Payer: MEDICARE

## 2022-09-01 NOTE — TELEPHONE ENCOUNTER
Spoke with [employer] representative, was told we will hopefully have an answer about expedited appeal today.

## 2022-09-01 NOTE — TELEPHONE ENCOUNTER
Spoke with patient and informed him of ARGELIA Slaughter's message about his surgery and he stated an verbal understanding and will call also to see if he can help with the process.

## 2022-09-01 NOTE — TELEPHONE ENCOUNTER
----- Message from Corinne Slaughter PA-C sent at 8/31/2022  3:34 PM CDT -----  Ok can you let him know that his insurance just notified us that surgery was denied so let him know we filed an expedited appeal and are hoping to roll Friday but I would recommend the patient calls his insurance as well because usually they rush for patients complaining

## 2022-09-01 NOTE — TELEPHONE ENCOUNTER
Spoke with patient and he stated an verbal understanding of the information given about the surgery. He repeated it back to me and stated an verbal agreement.

## 2022-09-01 NOTE — TELEPHONE ENCOUNTER
Warm transfer recvd from PCP's office. Pt called very upset about denial of surgery tomorrow and remained defensive throughout conversation. Asked pt for his cooperation while I try to assist in approval.   Pt is irate and insistent that he speak with someone higher up. Instructed pt I could possibly assist with getting  sx approved and I would work on resolution. Unsure whether deliberate, call disconnected. LVM

## 2022-09-02 ENCOUNTER — HOSPITAL ENCOUNTER (INPATIENT)
Facility: HOSPITAL | Age: 82
LOS: 4 days | Discharge: HOME HEALTH | DRG: 460 | End: 2022-09-06
Attending: ORTHOPAEDIC SURGERY | Admitting: ORTHOPAEDIC SURGERY
Payer: MEDICARE

## 2022-09-02 DIAGNOSIS — M48.061 SPINAL STENOSIS OF LUMBAR REGION, UNSPECIFIED WHETHER NEUROGENIC CLAUDICATION PRESENT: Primary | ICD-10-CM

## 2022-09-02 DIAGNOSIS — M54.16 LUMBAR RADICULOPATHY: ICD-10-CM

## 2022-09-02 LAB
ALBUMIN SERPL BCP-MCNC: 3.4 G/DL (ref 3.5–5.2)
ALP SERPL-CCNC: 48 U/L (ref 55–135)
ALT SERPL W/O P-5'-P-CCNC: 17 U/L (ref 10–44)
ANION GAP SERPL CALC-SCNC: 8 MMOL/L (ref 8–16)
AST SERPL-CCNC: 18 U/L (ref 10–40)
BASOPHILS # BLD AUTO: 0.06 K/UL (ref 0–0.2)
BASOPHILS NFR BLD: 0.7 % (ref 0–1.9)
BILIRUB SERPL-MCNC: 0.5 MG/DL (ref 0.1–1)
BUN SERPL-MCNC: 15 MG/DL (ref 8–23)
CALCIUM SERPL-MCNC: 8.5 MG/DL (ref 8.7–10.5)
CHLORIDE SERPL-SCNC: 110 MMOL/L (ref 95–110)
CO2 SERPL-SCNC: 24 MMOL/L (ref 23–29)
CREAT SERPL-MCNC: 0.8 MG/DL (ref 0.5–1.4)
DIFFERENTIAL METHOD: ABNORMAL
EOSINOPHIL # BLD AUTO: 0.1 K/UL (ref 0–0.5)
EOSINOPHIL NFR BLD: 0.7 % (ref 0–8)
ERYTHROCYTE [DISTWIDTH] IN BLOOD BY AUTOMATED COUNT: 13.4 % (ref 11.5–14.5)
EST. GFR  (NO RACE VARIABLE): >60 ML/MIN/1.73 M^2
GLUCOSE SERPL-MCNC: 113 MG/DL (ref 70–110)
HCT VFR BLD AUTO: 36.6 % (ref 40–54)
HGB BLD-MCNC: 12.5 G/DL (ref 14–18)
IMM GRANULOCYTES # BLD AUTO: 0.05 K/UL (ref 0–0.04)
IMM GRANULOCYTES NFR BLD AUTO: 0.6 % (ref 0–0.5)
LYMPHOCYTES # BLD AUTO: 1.4 K/UL (ref 1–4.8)
LYMPHOCYTES NFR BLD: 16.9 % (ref 18–48)
MCH RBC QN AUTO: 30.1 PG (ref 27–31)
MCHC RBC AUTO-ENTMCNC: 34.2 G/DL (ref 32–36)
MCV RBC AUTO: 88 FL (ref 82–98)
MONOCYTES # BLD AUTO: 0.2 K/UL (ref 0.3–1)
MONOCYTES NFR BLD: 2.7 % (ref 4–15)
NEUTROPHILS # BLD AUTO: 6.4 K/UL (ref 1.8–7.7)
NEUTROPHILS NFR BLD: 78.4 % (ref 38–73)
NRBC BLD-RTO: 0 /100 WBC
PLATELET # BLD AUTO: 170 K/UL (ref 150–450)
PMV BLD AUTO: 10 FL (ref 9.2–12.9)
POTASSIUM SERPL-SCNC: 3.8 MMOL/L (ref 3.5–5.1)
PROT SERPL-MCNC: 5.9 G/DL (ref 6–8.4)
RBC # BLD AUTO: 4.15 M/UL (ref 4.6–6.2)
SODIUM SERPL-SCNC: 142 MMOL/L (ref 136–145)
WBC # BLD AUTO: 8.21 K/UL (ref 3.9–12.7)

## 2022-09-02 PROCEDURE — 80053 COMPREHEN METABOLIC PANEL: CPT | Performed by: STUDENT IN AN ORGANIZED HEALTH CARE EDUCATION/TRAINING PROGRAM

## 2022-09-02 PROCEDURE — 94761 N-INVAS EAR/PLS OXIMETRY MLT: CPT

## 2022-09-02 PROCEDURE — 85025 COMPLETE CBC W/AUTO DIFF WBC: CPT | Performed by: STUDENT IN AN ORGANIZED HEALTH CARE EDUCATION/TRAINING PROGRAM

## 2022-09-02 PROCEDURE — 99900035 HC TECH TIME PER 15 MIN (STAT)

## 2022-09-02 PROCEDURE — 20936 SP BONE AGRFT LOCAL ADD-ON: CPT | Mod: ,,, | Performed by: ORTHOPAEDIC SURGERY

## 2022-09-02 PROCEDURE — 63600175 PHARM REV CODE 636 W HCPCS

## 2022-09-02 PROCEDURE — 25000003 PHARM REV CODE 250: Performed by: NURSE ANESTHETIST, CERTIFIED REGISTERED

## 2022-09-02 PROCEDURE — 63600175 PHARM REV CODE 636 W HCPCS: Performed by: ORTHOPAEDIC SURGERY

## 2022-09-02 PROCEDURE — D9220A PRA ANESTHESIA: ICD-10-PCS | Mod: CRNA,,, | Performed by: NURSE ANESTHETIST, CERTIFIED REGISTERED

## 2022-09-02 PROCEDURE — 63600175 PHARM REV CODE 636 W HCPCS: Performed by: STUDENT IN AN ORGANIZED HEALTH CARE EDUCATION/TRAINING PROGRAM

## 2022-09-02 PROCEDURE — 25000003 PHARM REV CODE 250

## 2022-09-02 PROCEDURE — 71000015 HC POSTOP RECOV 1ST HR: Performed by: ORTHOPAEDIC SURGERY

## 2022-09-02 PROCEDURE — 37000008 HC ANESTHESIA 1ST 15 MINUTES: Performed by: ORTHOPAEDIC SURGERY

## 2022-09-02 PROCEDURE — 22612 ARTHRD PST TQ 1NTRSPC LUMBAR: CPT | Mod: ,,, | Performed by: ORTHOPAEDIC SURGERY

## 2022-09-02 PROCEDURE — D9220A PRA ANESTHESIA: ICD-10-PCS | Mod: ANES,,, | Performed by: ANESTHESIOLOGY

## 2022-09-02 PROCEDURE — 36000711: Performed by: ORTHOPAEDIC SURGERY

## 2022-09-02 PROCEDURE — 63600175 PHARM REV CODE 636 W HCPCS: Performed by: ANESTHESIOLOGY

## 2022-09-02 PROCEDURE — 20936 PR AUTOGRAFT SPINE SURGERY LOCAL FROM SAME INCISION: ICD-10-PCS | Mod: ,,, | Performed by: ORTHOPAEDIC SURGERY

## 2022-09-02 PROCEDURE — 37000009 HC ANESTHESIA EA ADD 15 MINS: Performed by: ORTHOPAEDIC SURGERY

## 2022-09-02 PROCEDURE — D9220A PRA ANESTHESIA: Mod: CRNA,,, | Performed by: NURSE ANESTHETIST, CERTIFIED REGISTERED

## 2022-09-02 PROCEDURE — 27201037 HC PRESSURE MONITORING SET UP

## 2022-09-02 PROCEDURE — 27201423 OPTIME MED/SURG SUP & DEVICES STERILE SUPPLY: Performed by: ORTHOPAEDIC SURGERY

## 2022-09-02 PROCEDURE — 22840 INSERT SPINE FIXATION DEVICE: CPT | Mod: ,,, | Performed by: ORTHOPAEDIC SURGERY

## 2022-09-02 PROCEDURE — 22612 PR ARTHRODESIS, POST/POSTLAT, SNGL INTERSPACE, LUMBAR: ICD-10-PCS | Mod: ,,, | Performed by: ORTHOPAEDIC SURGERY

## 2022-09-02 PROCEDURE — 11000001 HC ACUTE MED/SURG PRIVATE ROOM

## 2022-09-02 PROCEDURE — 71000033 HC RECOVERY, INTIAL HOUR: Performed by: ORTHOPAEDIC SURGERY

## 2022-09-02 PROCEDURE — D9220A PRA ANESTHESIA: Mod: ANES,,, | Performed by: ANESTHESIOLOGY

## 2022-09-02 PROCEDURE — 25000003 PHARM REV CODE 250: Performed by: STUDENT IN AN ORGANIZED HEALTH CARE EDUCATION/TRAINING PROGRAM

## 2022-09-02 PROCEDURE — 71000016 HC POSTOP RECOV ADDL HR: Performed by: ORTHOPAEDIC SURGERY

## 2022-09-02 PROCEDURE — 63047 PR LAMINEC/FACETECT/FORAMIN,LUMBAR 1 SEG: ICD-10-PCS | Mod: 51,,, | Performed by: ORTHOPAEDIC SURGERY

## 2022-09-02 PROCEDURE — 36000710: Performed by: ORTHOPAEDIC SURGERY

## 2022-09-02 PROCEDURE — C1713 ANCHOR/SCREW BN/BN,TIS/BN: HCPCS | Performed by: ORTHOPAEDIC SURGERY

## 2022-09-02 PROCEDURE — 63047 LAM FACETEC & FORAMOT LUMBAR: CPT | Mod: 51,,, | Performed by: ORTHOPAEDIC SURGERY

## 2022-09-02 PROCEDURE — 63600175 PHARM REV CODE 636 W HCPCS: Performed by: NURSE ANESTHETIST, CERTIFIED REGISTERED

## 2022-09-02 PROCEDURE — 25000003 PHARM REV CODE 250: Performed by: ORTHOPAEDIC SURGERY

## 2022-09-02 PROCEDURE — 22840 PR POSTERIOR NON-SEGMENTAL INSTRUMENTATION: ICD-10-PCS | Mod: ,,, | Performed by: ORTHOPAEDIC SURGERY

## 2022-09-02 DEVICE — CAP SPINAL LOCK THRD CREO 5.5: Type: IMPLANTABLE DEVICE | Site: BACK | Status: FUNCTIONAL

## 2022-09-02 DEVICE — SCREW BONE SPINAL CREO 6.5X45: Type: IMPLANTABLE DEVICE | Site: BACK | Status: FUNCTIONAL

## 2022-09-02 DEVICE — IMPLANTABLE DEVICE: Type: IMPLANTABLE DEVICE | Site: BACK | Status: FUNCTIONAL

## 2022-09-02 DEVICE — SCREW BONE SPINAL CREO 6.5X50: Type: IMPLANTABLE DEVICE | Site: BACK | Status: FUNCTIONAL

## 2022-09-02 RX ORDER — FENTANYL CITRATE 50 UG/ML
INJECTION, SOLUTION INTRAMUSCULAR; INTRAVENOUS
Status: DISCONTINUED | OUTPATIENT
Start: 2022-09-02 | End: 2022-09-02

## 2022-09-02 RX ORDER — POLYETHYLENE GLYCOL 3350 17 G/17G
17 POWDER, FOR SOLUTION ORAL DAILY
Status: DISCONTINUED | OUTPATIENT
Start: 2022-09-02 | End: 2022-09-06 | Stop reason: HOSPADM

## 2022-09-02 RX ORDER — OXYCODONE HYDROCHLORIDE 5 MG/1
5 TABLET ORAL EVERY 4 HOURS PRN
Status: DISCONTINUED | OUTPATIENT
Start: 2022-09-02 | End: 2022-09-06 | Stop reason: HOSPADM

## 2022-09-02 RX ORDER — AMOXICILLIN 250 MG
1 CAPSULE ORAL 2 TIMES DAILY
Status: DISCONTINUED | OUTPATIENT
Start: 2022-09-02 | End: 2022-09-06 | Stop reason: HOSPADM

## 2022-09-02 RX ORDER — ONDANSETRON 2 MG/ML
4 INJECTION INTRAMUSCULAR; INTRAVENOUS EVERY 12 HOURS PRN
Status: DISCONTINUED | OUTPATIENT
Start: 2022-09-02 | End: 2022-09-06 | Stop reason: HOSPADM

## 2022-09-02 RX ORDER — EPHEDRINE SULFATE 50 MG/ML
INJECTION, SOLUTION INTRAVENOUS
Status: DISCONTINUED | OUTPATIENT
Start: 2022-09-02 | End: 2022-09-02

## 2022-09-02 RX ORDER — CELECOXIB 200 MG/1
200 CAPSULE ORAL DAILY
Status: DISCONTINUED | OUTPATIENT
Start: 2022-09-02 | End: 2022-09-06 | Stop reason: HOSPADM

## 2022-09-02 RX ORDER — ACETAMINOPHEN 500 MG
1000 TABLET ORAL EVERY 8 HOURS
Status: DISCONTINUED | OUTPATIENT
Start: 2022-09-02 | End: 2022-09-06 | Stop reason: HOSPADM

## 2022-09-02 RX ORDER — CEFAZOLIN SODIUM/WATER 2 G/20 ML
2 SYRINGE (ML) INTRAVENOUS
Status: COMPLETED | OUTPATIENT
Start: 2022-09-02 | End: 2022-09-02

## 2022-09-02 RX ORDER — HYDROMORPHONE HYDROCHLORIDE 1 MG/ML
INJECTION, SOLUTION INTRAMUSCULAR; INTRAVENOUS; SUBCUTANEOUS
Status: COMPLETED
Start: 2022-09-02 | End: 2022-09-02

## 2022-09-02 RX ORDER — SODIUM CHLORIDE 0.9 % (FLUSH) 0.9 %
10 SYRINGE (ML) INJECTION
Status: DISCONTINUED | OUTPATIENT
Start: 2022-09-02 | End: 2022-09-06 | Stop reason: HOSPADM

## 2022-09-02 RX ORDER — METHOCARBAMOL 750 MG/1
750 TABLET, FILM COATED ORAL 3 TIMES DAILY
Status: DISCONTINUED | OUTPATIENT
Start: 2022-09-02 | End: 2022-09-06 | Stop reason: HOSPADM

## 2022-09-02 RX ORDER — MUPIROCIN 20 MG/G
OINTMENT TOPICAL 2 TIMES DAILY
Status: DISCONTINUED | OUTPATIENT
Start: 2022-09-02 | End: 2022-09-02

## 2022-09-02 RX ORDER — HYDROMORPHONE HYDROCHLORIDE 1 MG/ML
0.2 INJECTION, SOLUTION INTRAMUSCULAR; INTRAVENOUS; SUBCUTANEOUS EVERY 5 MIN PRN
Status: DISCONTINUED | OUTPATIENT
Start: 2022-09-02 | End: 2022-09-02 | Stop reason: HOSPADM

## 2022-09-02 RX ORDER — CEFAZOLIN SODIUM/D5W 2 G/100 ML
2 PLASTIC BAG, INJECTION (ML) INTRAVENOUS
Status: COMPLETED | OUTPATIENT
Start: 2022-09-02 | End: 2022-09-03

## 2022-09-02 RX ORDER — LIDOCAINE HYDROCHLORIDE AND EPINEPHRINE 20; 10 MG/ML; UG/ML
INJECTION, SOLUTION INFILTRATION; PERINEURAL
Status: DISCONTINUED | OUTPATIENT
Start: 2022-09-02 | End: 2022-09-02 | Stop reason: HOSPADM

## 2022-09-02 RX ORDER — DOCUSATE SODIUM 100 MG/1
100 CAPSULE, LIQUID FILLED ORAL 2 TIMES DAILY
Status: DISCONTINUED | OUTPATIENT
Start: 2022-09-02 | End: 2022-09-02

## 2022-09-02 RX ORDER — OXYCODONE HYDROCHLORIDE 10 MG/1
10 TABLET ORAL EVERY 4 HOURS PRN
Status: DISCONTINUED | OUTPATIENT
Start: 2022-09-02 | End: 2022-09-06 | Stop reason: HOSPADM

## 2022-09-02 RX ORDER — TAMSULOSIN HYDROCHLORIDE 0.4 MG/1
0.4 CAPSULE ORAL DAILY
Status: DISCONTINUED | OUTPATIENT
Start: 2022-09-03 | End: 2022-09-06 | Stop reason: HOSPADM

## 2022-09-02 RX ORDER — FENTANYL CITRATE 50 UG/ML
INJECTION, SOLUTION INTRAMUSCULAR; INTRAVENOUS
Status: COMPLETED
Start: 2022-09-02 | End: 2022-09-02

## 2022-09-02 RX ORDER — PROPOFOL 10 MG/ML
VIAL (ML) INTRAVENOUS CONTINUOUS PRN
Status: DISCONTINUED | OUTPATIENT
Start: 2022-09-02 | End: 2022-09-02

## 2022-09-02 RX ORDER — PREGABALIN 150 MG/1
150 CAPSULE ORAL NIGHTLY
Status: DISCONTINUED | OUTPATIENT
Start: 2022-09-02 | End: 2022-09-06 | Stop reason: HOSPADM

## 2022-09-02 RX ORDER — MORPHINE SULFATE 2 MG/ML
3 INJECTION, SOLUTION INTRAMUSCULAR; INTRAVENOUS
Status: DISCONTINUED | OUTPATIENT
Start: 2022-09-02 | End: 2022-09-06 | Stop reason: HOSPADM

## 2022-09-02 RX ORDER — FINASTERIDE 5 MG/1
5 TABLET, FILM COATED ORAL DAILY
Status: DISCONTINUED | OUTPATIENT
Start: 2022-09-02 | End: 2022-09-06 | Stop reason: HOSPADM

## 2022-09-02 RX ORDER — HEPARIN SODIUM 5000 [USP'U]/ML
5000 INJECTION, SOLUTION INTRAVENOUS; SUBCUTANEOUS EVERY 8 HOURS
Status: DISCONTINUED | OUTPATIENT
Start: 2022-09-02 | End: 2022-09-06 | Stop reason: HOSPADM

## 2022-09-02 RX ORDER — DEXTROMETHORPHAN HYDROBROMIDE, GUAIFENESIN 5; 100 MG/5ML; MG/5ML
650 LIQUID ORAL EVERY 8 HOURS
Qty: 120 TABLET | Refills: 0 | Status: SHIPPED | OUTPATIENT
Start: 2022-09-02 | End: 2023-01-26

## 2022-09-02 RX ORDER — KETAMINE HCL IN 0.9 % NACL 50 MG/5 ML
SYRINGE (ML) INTRAVENOUS
Status: DISCONTINUED | OUTPATIENT
Start: 2022-09-02 | End: 2022-09-02

## 2022-09-02 RX ORDER — LIDOCAINE HYDROCHLORIDE 10 MG/ML
INJECTION, SOLUTION INTRAVENOUS
Status: DISCONTINUED | OUTPATIENT
Start: 2022-09-02 | End: 2022-09-02

## 2022-09-02 RX ORDER — SODIUM CHLORIDE 9 MG/ML
INJECTION, SOLUTION INTRAVENOUS CONTINUOUS
Status: DISCONTINUED | OUTPATIENT
Start: 2022-09-02 | End: 2022-09-06 | Stop reason: HOSPADM

## 2022-09-02 RX ORDER — SUCCINYLCHOLINE CHLORIDE 20 MG/ML
INJECTION INTRAMUSCULAR; INTRAVENOUS
Status: DISCONTINUED | OUTPATIENT
Start: 2022-09-02 | End: 2022-09-02

## 2022-09-02 RX ORDER — OXYCODONE HYDROCHLORIDE 5 MG/1
5 TABLET ORAL EVERY 4 HOURS PRN
Qty: 30 TABLET | Refills: 0 | Status: SHIPPED | OUTPATIENT
Start: 2022-09-02 | End: 2022-09-11

## 2022-09-02 RX ORDER — SODIUM CHLORIDE 0.9 % (FLUSH) 0.9 %
3 SYRINGE (ML) INJECTION
Status: DISCONTINUED | OUTPATIENT
Start: 2022-09-02 | End: 2022-09-02 | Stop reason: HOSPADM

## 2022-09-02 RX ORDER — METOCLOPRAMIDE HYDROCHLORIDE 5 MG/ML
5 INJECTION INTRAMUSCULAR; INTRAVENOUS EVERY 6 HOURS PRN
Status: DISCONTINUED | OUTPATIENT
Start: 2022-09-02 | End: 2022-09-06 | Stop reason: HOSPADM

## 2022-09-02 RX ORDER — PHENYLEPHRINE HYDROCHLORIDE 10 MG/ML
INJECTION INTRAVENOUS
Status: DISCONTINUED | OUTPATIENT
Start: 2022-09-02 | End: 2022-09-02

## 2022-09-02 RX ORDER — CELECOXIB 200 MG/1
200 CAPSULE ORAL DAILY
Qty: 14 CAPSULE | Refills: 0 | Status: SHIPPED | OUTPATIENT
Start: 2022-09-02 | End: 2022-09-02 | Stop reason: HOSPADM

## 2022-09-02 RX ORDER — FAMOTIDINE 20 MG/1
20 TABLET, FILM COATED ORAL 2 TIMES DAILY
Status: CANCELLED | OUTPATIENT
Start: 2022-09-02

## 2022-09-02 RX ORDER — VANCOMYCIN HYDROCHLORIDE 1 G/20ML
INJECTION, POWDER, LYOPHILIZED, FOR SOLUTION INTRAVENOUS
Status: DISCONTINUED | OUTPATIENT
Start: 2022-09-02 | End: 2022-09-02 | Stop reason: HOSPADM

## 2022-09-02 RX ORDER — PROPOFOL 10 MG/ML
VIAL (ML) INTRAVENOUS
Status: DISCONTINUED | OUTPATIENT
Start: 2022-09-02 | End: 2022-09-02

## 2022-09-02 RX ORDER — AMOXICILLIN 250 MG
1 CAPSULE ORAL 2 TIMES DAILY
Qty: 14 TABLET | Refills: 0 | Status: SHIPPED | OUTPATIENT
Start: 2022-09-02 | End: 2022-09-13

## 2022-09-02 RX ORDER — ONDANSETRON 2 MG/ML
INJECTION INTRAMUSCULAR; INTRAVENOUS
Status: DISCONTINUED | OUTPATIENT
Start: 2022-09-02 | End: 2022-09-02

## 2022-09-02 RX ORDER — DEXAMETHASONE SODIUM PHOSPHATE 4 MG/ML
INJECTION, SOLUTION INTRA-ARTICULAR; INTRALESIONAL; INTRAMUSCULAR; INTRAVENOUS; SOFT TISSUE
Status: DISCONTINUED | OUTPATIENT
Start: 2022-09-02 | End: 2022-09-02

## 2022-09-02 RX ORDER — ROCURONIUM BROMIDE 10 MG/ML
INJECTION, SOLUTION INTRAVENOUS
Status: DISCONTINUED | OUTPATIENT
Start: 2022-09-02 | End: 2022-09-02

## 2022-09-02 RX ORDER — TALC
6 POWDER (GRAM) TOPICAL NIGHTLY PRN
Status: DISCONTINUED | OUTPATIENT
Start: 2022-09-02 | End: 2022-09-06 | Stop reason: HOSPADM

## 2022-09-02 RX ORDER — FENTANYL CITRATE 50 UG/ML
25 INJECTION, SOLUTION INTRAMUSCULAR; INTRAVENOUS EVERY 5 MIN PRN
Status: COMPLETED | OUTPATIENT
Start: 2022-09-02 | End: 2022-09-02

## 2022-09-02 RX ORDER — BISACODYL 10 MG
10 SUPPOSITORY, RECTAL RECTAL DAILY PRN
Status: DISCONTINUED | OUTPATIENT
Start: 2022-09-02 | End: 2022-09-06 | Stop reason: HOSPADM

## 2022-09-02 RX ADMIN — FENTANYL CITRATE 50 MCG: 50 INJECTION, SOLUTION INTRAMUSCULAR; INTRAVENOUS at 07:09

## 2022-09-02 RX ADMIN — FENTANYL CITRATE 75 MCG: 50 INJECTION, SOLUTION INTRAMUSCULAR; INTRAVENOUS at 07:09

## 2022-09-02 RX ADMIN — EPHEDRINE SULFATE 5 MG: 50 INJECTION INTRAVENOUS at 09:09

## 2022-09-02 RX ADMIN — HEPARIN SODIUM 5000 UNITS: 5000 INJECTION INTRAVENOUS; SUBCUTANEOUS at 01:09

## 2022-09-02 RX ADMIN — PHENYLEPHRINE HYDROCHLORIDE 100 MCG: 10 INJECTION INTRAVENOUS at 08:09

## 2022-09-02 RX ADMIN — CELECOXIB 200 MG: 200 CAPSULE ORAL at 10:09

## 2022-09-02 RX ADMIN — DEXTROSE 2 G: 50 INJECTION, SOLUTION INTRAVENOUS at 11:09

## 2022-09-02 RX ADMIN — PREGABALIN 150 MG: 150 CAPSULE ORAL at 09:09

## 2022-09-02 RX ADMIN — HYDROMORPHONE HYDROCHLORIDE 0.2 MG: 1 INJECTION, SOLUTION INTRAMUSCULAR; INTRAVENOUS; SUBCUTANEOUS at 11:09

## 2022-09-02 RX ADMIN — ACETAMINOPHEN 1000 MG: 500 TABLET ORAL at 09:09

## 2022-09-02 RX ADMIN — FENTANYL CITRATE 25 MCG: 50 INJECTION, SOLUTION INTRAMUSCULAR; INTRAVENOUS at 07:09

## 2022-09-02 RX ADMIN — Medication 25 MG: at 07:09

## 2022-09-02 RX ADMIN — SUCCINYLCHOLINE CHLORIDE 100 MG: 20 INJECTION, SOLUTION INTRAMUSCULAR; INTRAVENOUS at 07:09

## 2022-09-02 RX ADMIN — Medication 100 MCG/KG/MIN: at 07:09

## 2022-09-02 RX ADMIN — ROCURONIUM BROMIDE 30 MG: 10 INJECTION, SOLUTION INTRAVENOUS at 07:09

## 2022-09-02 RX ADMIN — EPHEDRINE SULFATE 5 MG: 50 INJECTION INTRAVENOUS at 08:09

## 2022-09-02 RX ADMIN — LIDOCAINE HYDROCHLORIDE 50 MG: 10 INJECTION, SOLUTION INTRAVENOUS at 07:09

## 2022-09-02 RX ADMIN — SODIUM CHLORIDE, SODIUM GLUCONATE, SODIUM ACETATE, POTASSIUM CHLORIDE, MAGNESIUM CHLORIDE, SODIUM PHOSPHATE, DIBASIC, AND POTASSIUM PHOSPHATE: .53; .5; .37; .037; .03; .012; .00082 INJECTION, SOLUTION INTRAVENOUS at 07:09

## 2022-09-02 RX ADMIN — FENTANYL CITRATE 25 MCG: 0.05 INJECTION, SOLUTION INTRAMUSCULAR; INTRAVENOUS at 11:09

## 2022-09-02 RX ADMIN — ONDANSETRON 4 MG: 2 INJECTION INTRAMUSCULAR; INTRAVENOUS at 09:09

## 2022-09-02 RX ADMIN — METHOCARBAMOL 750 MG: 750 TABLET ORAL at 02:09

## 2022-09-02 RX ADMIN — HEPARIN SODIUM 5000 UNITS: 5000 INJECTION INTRAVENOUS; SUBCUTANEOUS at 09:09

## 2022-09-02 RX ADMIN — POLYETHYLENE GLYCOL 3350 17 G: 17 POWDER, FOR SOLUTION ORAL at 10:09

## 2022-09-02 RX ADMIN — FINASTERIDE 5 MG: 5 TABLET, FILM COATED ORAL at 07:09

## 2022-09-02 RX ADMIN — SODIUM CHLORIDE: 0.9 INJECTION, SOLUTION INTRAVENOUS at 07:09

## 2022-09-02 RX ADMIN — SENNOSIDES AND DOCUSATE SODIUM 1 TABLET: 50; 8.6 TABLET ORAL at 09:09

## 2022-09-02 RX ADMIN — FENTANYL CITRATE 25 MCG: 0.05 INJECTION, SOLUTION INTRAMUSCULAR; INTRAVENOUS at 10:09

## 2022-09-02 RX ADMIN — FENTANYL CITRATE 25 MCG: 50 INJECTION, SOLUTION INTRAMUSCULAR; INTRAVENOUS at 09:09

## 2022-09-02 RX ADMIN — Medication 2 G: at 07:09

## 2022-09-02 RX ADMIN — DEXAMETHASONE SODIUM PHOSPHATE 8 MG: 4 INJECTION, SOLUTION INTRAMUSCULAR; INTRAVENOUS at 08:09

## 2022-09-02 RX ADMIN — METHOCARBAMOL 750 MG: 750 TABLET ORAL at 09:09

## 2022-09-02 RX ADMIN — SENNOSIDES AND DOCUSATE SODIUM 1 TABLET: 50; 8.6 TABLET ORAL at 10:09

## 2022-09-02 RX ADMIN — OXYCODONE HYDROCHLORIDE 10 MG: 10 TABLET ORAL at 10:09

## 2022-09-02 RX ADMIN — Medication 6 MG: at 11:09

## 2022-09-02 RX ADMIN — SUGAMMADEX 200 MG: 100 INJECTION, SOLUTION INTRAVENOUS at 08:09

## 2022-09-02 RX ADMIN — PROPOFOL 200 MG: 10 INJECTION, EMULSION INTRAVENOUS at 07:09

## 2022-09-02 RX ADMIN — DEXTROSE 2 G: 50 INJECTION, SOLUTION INTRAVENOUS at 04:09

## 2022-09-02 RX ADMIN — ROCURONIUM BROMIDE 10 MG: 10 INJECTION, SOLUTION INTRAVENOUS at 07:09

## 2022-09-02 RX ADMIN — ACETAMINOPHEN 1000 MG: 500 TABLET ORAL at 01:09

## 2022-09-02 NOTE — ANESTHESIA PROCEDURE NOTES
Intubation    Date/Time: 9/2/2022 7:16 AM  Performed by: Micheal Patel CRNA  Authorized by: Rolanda Parsons MD     Intubation:     Induction:  Intravenous    Intubated:  Postinduction    Mask Ventilation:  Easy mask    Attempts:  1    Attempted By:  Student    Method of Intubation:  Direct    Blade:  Adele 3    Laryngeal View Grade: Grade III - only epiglottis visible      Difficult Airway Encountered?: No      Complications:  None    Airway Device:  Oral endotracheal tube    Airway Device Size:  8.0    Style/Cuff Inflation:  Cuffed (inflated to minimal occlusive pressure)    Tube secured:  23    Secured at:  The lips    Placement Verified By:  Capnometry    Complicating Factors:  None    Findings Post-Intubation:  BS equal bilateral and atraumatic/condition of teeth unchanged

## 2022-09-02 NOTE — NURSING TRANSFER
Nursing Transfer Note      9/2/2022     Reason patient is being transferred: post procedure    Transfer To: [id number]    Transfer via stretcher    Transfer with 2L to O2    Transported by pct    Medicines sent: na    Any special needs or follow-up needed: routine    Chart send with patient: Yes    Notified: spouse    Patient reassessed at: 1230 9/2/2022

## 2022-09-02 NOTE — OP NOTE
DATE OF PROCEDURE: 9/2/2022.     SURGEON: Luis William M.D.     FIRST ASSISTANT: Freedom Carlos MD, PGY-4 Orthopedics     PREOPERATIVE DIAGNOSIS:   1.   L2-3 foraminal stenosis with radiculopathy  2.   Symptomatic lumbar spinal stenosis with neurogenic claudication     POSTOPERATIVE DIAGNOSIS:   Same     PROCEDURES PERFORMED:  Posterior lateral fusion L2-3  2.   Posterior nonsegmental instrumentation L2-3  3.   L2 laminectomy, and bilateral foraminotomy for decompression of central and bilateral foraminal stenosis.  4.   Robotic assistance for placement of the pedicle screws (requiring greater than 30 minutes of preoperative planning time)  5.   Autograft     ANESTHESIA: General endotracheal anesthesia.     ESTIMATED BLOOD LOSS: 50 mL.     IMPLANTS: Globus screws and rods     SPECIMENS: None.     FINDINGS: None.     DRAINS: One deep and one superficial HV drains     COMPLICATIONS: None.     SPONGE AND NEEDLE COUNT: Correct x2.     NEUROLOGICAL MONITORING: Somatosensory potentials, free run EMG, and EMG stimulation lumbar pedicle screws. There were no changes to SSEP baselines. There no significant EMG activity. All screws stimulated at or greater than than 20 milliamps except for R L3 screw. It was removed and pedicle tract was felt to have no breach, so the screw was re-inserted.     REASON FOR OPERATION AND BRIEF HISTORY AND PHYSICAL: Korey Santos is a 81 y.o. male with an L2-3 central and severe foraminal stenosis and refractory lumbar radiculopathy and neurogenic claudication. His foraminal stenosis on the right is severe, and I cannot adequately decompress that with just a central decompression without destroying the facet and sacrificing stability. He has failed extensive conservative management and is here for surgery today.     DESCRIPTION OF INFORMED CONSENT: Please see my last clinic note for a full description of the informed consent I had with the patient.     DESCRIPTION OF PROCEDURE: The  patient was met in the preoperative area where all final questions were answered. Their lumbar spine was marked as the operative site. The patient was then brought to the operating room where anesthesia was induced. The patient was then flipped prone onto the Gigi spine table. All bony prominences were padded, paying special attention to the eyes, nose and mouth, axillae, ulnar nerve and hands, genitalia, knees and feet, this was confirmed to be adequate with the anesthesia team. Sequential compression devices were run throughout the case on the bilateral calves. The surgical field was prepped and draped in normal sterile manner after using fluoroscopy to localize our incisoin. A full time-out was then called, verifying patient's identity, surgery, site, and that they had been administered a weight appropriate dose of Ancef, no specific nursing, anesthetic or surgical concerns were identified and it was decided that it was safe to proceed with surgery. I informed all members of the operative team that they were empowered to speak up regarding concerns at any time during the procedure.     I then made a midline incision and carried dissection down through the skin and subcutaneous tissues using combination of sharp dissection and electrocautery where necessary. The dorsal fascia of the lumbar spine was identified and incised in the midline and I performed a preliminary subperiosteal exposure of the bilateral L1-2 and L2-3 facet joints, and what I took to be the L2 and L3 transverse processes bilaterally. At the conclusion my preliminary exposure, I placed a marker on what I took to be the left L3 pedicle, took a lateral radiograph, and this confirmed my levels.  Next I finalized my exposure. Subsequently I performed bilateral L2-3 facetectomies with an osteotome.     We placed a pin in the right posterior superior iliac spine for the robotic array. AP and lateral fluoroscopy images were taken to register to the  preoperative CT scan and plan that we had performed prior to the incision.  Once that was done, the robotic arm was used to cannulate pedicle screw tracts bilaterally at L2 and L3. Xrays confirmed adequate positioning of the screws on AP and lateral images.     I then began my neurological decompression. High-speed drill was used to thin the L2 lamina at the level of the pars. This was then removed on block, and I released the ligamentum flavum centrally with a forward angle curette and resected it with a Kerrison punch. I subsequently performed a central as well as bilateral foraminal decompression with a Kerrison punch. Working on the right of midline I used a osteotome to remove the superior articular process of L3. I did not perform a TLIF due to his age and sub-optimal bone quality. At the end of the decompression, the bilateral L2-3 foramen were felt to be adequately decompressed.     The wound was then copiously irrigated. Rods were measured, cut, contoured, and locked into place with torque wrench. Morselized bone graft, mixed with 1 g vancomycin powder, was laid dorsally along the decorticated transverse processes from L2-3.     500 mg of vancomycin powder was placed in the deep space, and a deep drain was then placed. The deep fascia was then closed with #1 Vicryl sutures in an interrupted, figure-of-eight fashion. A superficial drain was then placed. The superficial layer was then irrigated and closed over an additional 500 mg of vancomycin powder with 2-0 Vicryl, 3-0 Monocryl, Dermabond and Steri-Strips. A soft dressing was then placed. The drains were secured in place with silk sutures. The patient was then flipped supine, extubated and transferred to the Recovery Room in stable condition.    Luis William MD  Orthopaedic Spine Surgeon  Department of Orthopaedic Surgery  [phone]

## 2022-09-02 NOTE — DISCHARGE INSTRUCTIONS
DR. JESSICA STERN'S POSTOPERATIVE INSTRUCTIONS -   LUMBAR FUSION       Antibiotics: You do not need additional antibiotics at home.    NSAIDs: Please refrain from taking ibuprofen (Advil), naproxen (Aleve), and other non steroidal anti-inflammatory medications other than the Celebrex that will be prescribed to you after surgery.    Wound Care: You may remove your dressing and shower 7 days after surgery. Until then please keep your wound clean and dry. Sponge baths are acceptable. Do not go in a pool or hot tub until seen in clinic. Please leave the small steri-strips covering your wound in place until they fall off naturally (2 weeks). You may notice clear suture ends hanging from the sides of your incision after the steri-strips are removed, it is ok to clip these with scissors.    Brace: You may be prescribed a brace, please wear this when up and walking, it is not necessary to wear at night when sleeping.    Pain: We will use a multimodal approach for pain management after your surgery.  You will be given a prescription for pain medicine when you are discharged from the hospital.  You will also be given prescriptions for Robaxin (a muscle relaxer), Gabapentin, Celebrex and Tylenol.  Please note: you will only be given ONE prescription for narcotics when you are discharged from the hospital.  This medication is for breakthrough pain only. This medication will not be refilled.  The other medications given to you may be refilled if needed.      Infection: Signs of infection include increasing wound drainage and redness around the wound, as well as a temperature over 101.5 degrees. It is unnecessary to take your temperature on a routine basis. Please call the below number if you are concerned about an infection.    Driving and Work: It is ok to return to driving and work as long as you are not taking narcotic pain medications and can walk greater than 100 feet. Please do not lift over 10 pounds or participate in  exercise or sports until cleared by Dr. William.    Deep Venous Thrombosis (Blood Clots): Symptoms include swelling in the legs and shortness of breath. Please call the office or proceed to the nearest emergency room if you have any of these symptoms.    Physical Therapy: The best physical therapy immediately after surgery is walking. Please try to walk as much as possible.    Follow-up: You will be scheduled for a follow-up appointment in 4 weeks with either Dr. William or his physician assistant, Corinne Slaughter PA-C.    Questions: During business hours please call [phone] for routine questions. For after hours questions please call [phone] and ask to speak with the Orthopaedic resident on call.    Disability: If you submitted short term disability paperwork for us to complete and would like to check the status, please call the Disability Department at [phone].  You may fax any necessary paperwork to [phone].

## 2022-09-02 NOTE — TRANSFER OF CARE
"Anesthesia Transfer of Care Note    Patient: Korey Santos    Procedure(s) Performed: Procedure(s) (LRB):  FUSION, SPINE, LUMBAR, POSTERIOR APPROACH, USING PEDICLE SCREW L2-3 GLOBUS ROBOT SNS: SSEP/EMG (N/A)    Patient location: PACU    Anesthesia Type: general    Transport from OR: Transported from OR on 6-10 L/min O2 by face mask with adequate spontaneous ventilation    Post pain: adequate analgesia    Post assessment: no apparent anesthetic complications and tolerated procedure well    Post vital signs: stable    Level of consciousness: awake    Nausea/Vomiting: no nausea/vomiting    Complications: none    Transfer of care protocol was followed      Last vitals:   Visit Vitals  BP (!) 156/78   Pulse 84   Temp 36.2 °C (97.2 °F) (Temporal)   Resp 10   Ht 5' 11" (1.803 m)   Wt 102.1 kg (225 lb)   SpO2 99%   BMI 31.38 kg/m²     "

## 2022-09-02 NOTE — PATIENT INSTRUCTIONS
DR. JESSICA STERN'S POSTOPERATIVE INSTRUCTIONS -   LUMBAR FUSION       Antibiotics: You do not need additional antibiotics at home.    NSAIDs: Please refrain from taking ibuprofen (Advil), naproxen (Aleve), and other non steroidal anti-inflammatory medications other than the Celebrex that will be prescribed to you after surgery.    Wound Care: You may remove your dressing and shower 7 days after surgery. Until then please keep your wound clean and dry. Sponge baths are acceptable. Do not go in a pool or hot tub until seen in clinic. Please leave the small steri-strips covering your wound in place until they fall off naturally (2 weeks). You may notice clear suture ends hanging from the sides of your incision after the steri-strips are removed, it is ok to clip these with scissors.    Brace: You may be prescribed a brace, please wear this when up and walking, it is not necessary to wear at night when sleeping.    Pain: We will use a multimodal approach for pain management after your surgery.  You will be given a prescription for pain medicine when you are discharged from the hospital.  You will also be given prescriptions for Robaxin (a muscle relaxer), Gabapentin, Celebrex and Tylenol.  Please note: you will only be given ONE prescription for narcotics when you are discharged from the hospital.  This medication is for breakthrough pain only. This medication will not be refilled.  The other medications given to you may be refilled if needed.      Infection: Signs of infection include increasing wound drainage and redness around the wound, as well as a temperature over 101.5 degrees. It is unnecessary to take your temperature on a routine basis. Please call the below number if you are concerned about an infection.    Driving and Work: It is ok to return to driving and work as long as you are not taking narcotic pain medications and can walk greater than 100 feet. Please do not lift over 10 pounds or participate in  exercise or sports until cleared by Dr. Granger.    Deep Venous Thrombosis (Blood Clots): Symptoms include swelling in the legs and shortness of breath. Please call the office or proceed to the nearest emergency room if you have any of these symptoms.    Physical Therapy: The best physical therapy immediately after surgery is walking. Please try to walk as much as possible.    Follow-up: You will be scheduled for a follow-up appointment in 4 weeks with either Dr. Willaim or his physician assistant.    Questions: During business hours please call [phone] for routine questions. For after hours questions please call [phone] and ask to speak with the Orthopaedic resident on call.    Disability: If you submitted short term disability paperwork for us to complete and would like to check the status, please call the Disability Department at [phone].  You may fax any necessary paperwork to [phone].

## 2022-09-02 NOTE — ANESTHESIA POSTPROCEDURE EVALUATION
Anesthesia Post Evaluation    Patient: Korey Santos    Procedure(s) Performed: Procedure(s) (LRB):  FUSION, SPINE, LUMBAR, POSTERIOR APPROACH, USING PEDICLE SCREW L2-3 GLOBUS ROBOT SNS: SSEP/EMG (N/A)    Final Anesthesia Type: general      Patient location during evaluation: PACU  Patient participation: Yes- Able to Participate  Level of consciousness: awake and alert and oriented  Post-procedure vital signs: reviewed and stable  Pain management: adequate  Airway patency: patent    PONV status at discharge: No PONV  Anesthetic complications: no      Cardiovascular status: hemodynamically stable  Respiratory status: unassisted and spontaneous ventilation  Hydration status: euvolemic  Follow-up not needed.          Vitals Value Taken Time   /68 09/02/22 1046   Temp 36.2 °C (97.2 °F) 09/02/22 1025   Pulse 64 09/02/22 1056   Resp 12 09/02/22 1056   SpO2 98 % 09/02/22 1056   Vitals shown include unvalidated device data.      No case tracking events are documented in the log.      Pain/Meredith Score: Pain Rating Prior to Med Admin: 9 (9/2/2022 10:56 AM)

## 2022-09-02 NOTE — ANESTHESIA PROCEDURE NOTES
Peripheral IV Insertion    Diagnosis: I99.8 Other disorder of circulatory system    Patient location during procedure: OR    Staffing  Authorizing Provider: Rolanda Parsons MD  Performing Provider: Micheal Patel CRNA    Anesthesiologist was present at the time of the procedure.    Preanesthetic Checklist  Completed: patient identified, IV checked, site marked, risks and benefits discussed, surgical consent, monitors and equipment checked, pre-op evaluation, timeout performed and anesthesia consent givenPeripheral IV Insertion  Skin Prep: chlorhexidine gluconate  Local Infiltration: none  Orientation: right  Location: forearm  Catheter Type: peripheral IV (single lumen)  Catheter Size: 18 G  Catheter placement by Anatomical landmarks. Heme positive aspiration all ports. Insertion Attempts: 1  Assessment  Dressing: secured with tape and tegaderm  Patient: Tolerated well  Line flushed easily.

## 2022-09-03 LAB
ANION GAP SERPL CALC-SCNC: 8 MMOL/L (ref 8–16)
BASOPHILS # BLD AUTO: 0.03 K/UL (ref 0–0.2)
BASOPHILS NFR BLD: 0.2 % (ref 0–1.9)
BUN SERPL-MCNC: 14 MG/DL (ref 8–23)
CALCIUM SERPL-MCNC: 8.8 MG/DL (ref 8.7–10.5)
CHLORIDE SERPL-SCNC: 108 MMOL/L (ref 95–110)
CO2 SERPL-SCNC: 24 MMOL/L (ref 23–29)
CREAT SERPL-MCNC: 0.8 MG/DL (ref 0.5–1.4)
DIFFERENTIAL METHOD: ABNORMAL
EOSINOPHIL # BLD AUTO: 0 K/UL (ref 0–0.5)
EOSINOPHIL NFR BLD: 0.3 % (ref 0–8)
ERYTHROCYTE [DISTWIDTH] IN BLOOD BY AUTOMATED COUNT: 13.7 % (ref 11.5–14.5)
EST. GFR  (NO RACE VARIABLE): >60 ML/MIN/1.73 M^2
GLUCOSE SERPL-MCNC: 119 MG/DL (ref 70–110)
HCT VFR BLD AUTO: 39.4 % (ref 40–54)
HGB BLD-MCNC: 12.9 G/DL (ref 14–18)
IMM GRANULOCYTES # BLD AUTO: 0.06 K/UL (ref 0–0.04)
IMM GRANULOCYTES NFR BLD AUTO: 0.4 % (ref 0–0.5)
LYMPHOCYTES # BLD AUTO: 1.9 K/UL (ref 1–4.8)
LYMPHOCYTES NFR BLD: 13.7 % (ref 18–48)
MCH RBC QN AUTO: 30.1 PG (ref 27–31)
MCHC RBC AUTO-ENTMCNC: 32.7 G/DL (ref 32–36)
MCV RBC AUTO: 92 FL (ref 82–98)
MONOCYTES # BLD AUTO: 1.2 K/UL (ref 0.3–1)
MONOCYTES NFR BLD: 8.5 % (ref 4–15)
NEUTROPHILS # BLD AUTO: 10.5 K/UL (ref 1.8–7.7)
NEUTROPHILS NFR BLD: 76.9 % (ref 38–73)
NRBC BLD-RTO: 0 /100 WBC
PLATELET # BLD AUTO: 178 K/UL (ref 150–450)
PMV BLD AUTO: 10.7 FL (ref 9.2–12.9)
POTASSIUM SERPL-SCNC: 4.1 MMOL/L (ref 3.5–5.1)
RBC # BLD AUTO: 4.28 M/UL (ref 4.6–6.2)
SODIUM SERPL-SCNC: 140 MMOL/L (ref 136–145)
WBC # BLD AUTO: 13.65 K/UL (ref 3.9–12.7)

## 2022-09-03 PROCEDURE — 97116 GAIT TRAINING THERAPY: CPT

## 2022-09-03 PROCEDURE — 85025 COMPLETE CBC W/AUTO DIFF WBC: CPT | Performed by: STUDENT IN AN ORGANIZED HEALTH CARE EDUCATION/TRAINING PROGRAM

## 2022-09-03 PROCEDURE — 36415 COLL VENOUS BLD VENIPUNCTURE: CPT | Performed by: STUDENT IN AN ORGANIZED HEALTH CARE EDUCATION/TRAINING PROGRAM

## 2022-09-03 PROCEDURE — 97165 OT EVAL LOW COMPLEX 30 MIN: CPT

## 2022-09-03 PROCEDURE — 97535 SELF CARE MNGMENT TRAINING: CPT

## 2022-09-03 PROCEDURE — 25000003 PHARM REV CODE 250

## 2022-09-03 PROCEDURE — 97530 THERAPEUTIC ACTIVITIES: CPT

## 2022-09-03 PROCEDURE — 63600175 PHARM REV CODE 636 W HCPCS: Performed by: STUDENT IN AN ORGANIZED HEALTH CARE EDUCATION/TRAINING PROGRAM

## 2022-09-03 PROCEDURE — 97161 PT EVAL LOW COMPLEX 20 MIN: CPT

## 2022-09-03 PROCEDURE — 94761 N-INVAS EAR/PLS OXIMETRY MLT: CPT

## 2022-09-03 PROCEDURE — 25000003 PHARM REV CODE 250: Performed by: STUDENT IN AN ORGANIZED HEALTH CARE EDUCATION/TRAINING PROGRAM

## 2022-09-03 PROCEDURE — 11000001 HC ACUTE MED/SURG PRIVATE ROOM

## 2022-09-03 PROCEDURE — 80048 BASIC METABOLIC PNL TOTAL CA: CPT | Performed by: STUDENT IN AN ORGANIZED HEALTH CARE EDUCATION/TRAINING PROGRAM

## 2022-09-03 RX ADMIN — METHOCARBAMOL 750 MG: 750 TABLET ORAL at 02:09

## 2022-09-03 RX ADMIN — ACETAMINOPHEN 1000 MG: 500 TABLET ORAL at 05:09

## 2022-09-03 RX ADMIN — SENNOSIDES AND DOCUSATE SODIUM 1 TABLET: 50; 8.6 TABLET ORAL at 09:09

## 2022-09-03 RX ADMIN — HEPARIN SODIUM 5000 UNITS: 5000 INJECTION INTRAVENOUS; SUBCUTANEOUS at 09:09

## 2022-09-03 RX ADMIN — HEPARIN SODIUM 5000 UNITS: 5000 INJECTION INTRAVENOUS; SUBCUTANEOUS at 05:09

## 2022-09-03 RX ADMIN — METHOCARBAMOL 750 MG: 750 TABLET ORAL at 09:09

## 2022-09-03 RX ADMIN — TAMSULOSIN HYDROCHLORIDE 0.4 MG: 0.4 CAPSULE ORAL at 09:09

## 2022-09-03 RX ADMIN — ACETAMINOPHEN 1000 MG: 500 TABLET ORAL at 02:09

## 2022-09-03 RX ADMIN — POLYETHYLENE GLYCOL 3350 17 G: 17 POWDER, FOR SOLUTION ORAL at 09:09

## 2022-09-03 RX ADMIN — PREGABALIN 150 MG: 150 CAPSULE ORAL at 09:09

## 2022-09-03 RX ADMIN — FINASTERIDE 5 MG: 5 TABLET, FILM COATED ORAL at 09:09

## 2022-09-03 RX ADMIN — CELECOXIB 200 MG: 200 CAPSULE ORAL at 09:09

## 2022-09-03 RX ADMIN — HEPARIN SODIUM 5000 UNITS: 5000 INJECTION INTRAVENOUS; SUBCUTANEOUS at 02:09

## 2022-09-03 NOTE — ASSESSMENT & PLAN NOTE
Korey Santos is a 81 y.o. male s/p L2-3 TLIF on 9/2 with Dr. William.     - Antibiotics: Postop ancef  - Weight bearing status: WBAT. Spine precautions.  - Labs: pending this AM.  - DVT Prophylaxis: Hep, SCDs at all times while in bed  - Lines/Drains:  PIV/Geronimo x2/del valle  - Pain control: multimodal  - PT/OT     Dispo: Dc del valle, continue drains, f/u PT/OT  '

## 2022-09-03 NOTE — PLAN OF CARE
Problem: Occupational Therapy  Goal: Occupational Therapy Goal  Description: Goals to be met by: 9/17/2022     Patient will increase functional independence with ADLs by performing:    UE Dressing with Modified [place].  LE Dressing with Stand-by Assistance for standing balance only when donning pants while using appropriate AE.  Grooming while standing at sink with Supervision.  Toileting from toilet with Stand-by Assistance for hygiene and clothing management.   Supine to sit with Stand-by Assistance.  Sit to stand transfer with Stand-by Assistance with RW.  Toilet transfer to toilet with Stand-by Assistance with RW.    Outcome: Ongoing, Progressing     Pt evaluated and OT goals established.

## 2022-09-03 NOTE — PLAN OF CARE
PT evaluation complete - see note for details. POC and goals established.    Problem: Physical Therapy  Goal: Physical Therapy Goal  Description: Goals to be met by: 22     Patient will increase functional independence with mobility by performin. Supine to sit with [place] via log roll  2. Sit to stand transfer with Stand-by Assistance  3. Gait  x 150 feet with Stand-by Assistance using LRAD.  4. Ascend/descend 8 stairs with right Handrails Contact Guard Assistance using No Assistive Device.   5. Stand for 8 minutes with Stand-by Assistance using LRAD while performing dynamic activities.    Outcome: Ongoing, Progressing     9/3/2022

## 2022-09-03 NOTE — PT/OT/SLP EVAL
Occupational Therapy   Evaluation and Treatment    Name: Korey Santos  MRN: [id number]  Admitting Diagnosis:  Lumbar spondylosis  Recent Surgery: Procedure(s) (LRB):  FUSION, SPINE, LUMBAR, POSTERIOR APPROACH, USING PEDICLE SCREW L2-3 GLOBUS ROBOT SNS: SSEP/EMG (N/A) 1 Day Post-Op    Co-eval with PT to have 2 skilled therapists present to safely assess pt's functional mobility.     Recommendations:     Discharge Recommendations: nursing facility, skilled  Discharge Equipment Recommendations:  hip kit, bedside commode, walker, rolling  Barriers to discharge:  Decreased caregiver support, Inaccessible home environment    Assessment:     Korey Santos is a 81 y.o. male with a medical diagnosis of Lumbar spondylosis.   Pt tolerated session well and without incident, but he's performing below his functional baseline and requires assistance for ADLs and mobility.  Due to his current level of function compared to his PLOF, SNF recommended at d/c for maximal pt gains in functional independence and to ensure his safety upon returning home.  He presents with the following.  Performance deficits affecting function: weakness, impaired endurance, impaired self care skills, impaired functional mobility, gait instability, impaired balance, orthopedic precautions, decreased coordination, pain, decreased safety awareness.      Rehab Prognosis: Good; patient would benefit from acute skilled OT services to address these deficits and reach maximum level of function.       Plan:     Patient to be seen 4 x/week to address the above listed problems via self-care/home management, therapeutic activities, therapeutic exercises  Plan of Care Expires: 10/02/22  Plan of Care Reviewed with: patient    Subjective     Chief Complaint: back pain during activity  Patient/Family Comments/goals: to get stronger     Occupational Profile:  Living Environment: Pt lives alone in a 2  with a threshold to enter and a full flight of stairs with RHR  "to the 2nd floor.  His bedroom and bathroom are on the 2nd floor, but he's getting a bed for the 1st floor.  Pt has a half bathroom on the 1st floor.  On the 2nd floor he has a tub/shower combo and a walk-in shower with no DME.    Previous level of function: Mod I for ADLs and mobility.  Pt uses a shoe horn to margarito his shoes and uses "a belt" to reach and put on his pants.  He ambulates with a SPC.   Pt drives.  Roles and Routines: retired  Equipment Used at Home:  cane, straight (pt has a shoe horn)  Assistance upon Discharge: [place]      Pain/Comfort:  Pain Rating 1: 0/10 (at rest, pain during activity not rated)  Location - Orientation 1: generalized  Location 1: back  Pain Addressed 1: Distraction, Reposition  Pain Rating Post-Intervention 1: other (see comments) (not rated)    Patients cultural, spiritual, Orthodoxy conflicts given the current situation: no    Objective:     Communicated with: nursing and PT prior to session.  Patient found sitting EOB with PT present with SCD, hemovac upon OT entry to room.    General Precautions: Standard, fall   Orthopedic Precautions:spinal precautions   Braces: N/A  Respiratory Status: Room air    Occupational Performance:    Bed Mobility:    N/a due to pt sitting EOB with PT upon OT entrance    Functional Mobility/Transfers:  Patient completed Sit <> Stand Transfer from EOB x 1 trial with minimum assistance with rolling walker   Patient completed Bed <> Chair Transfer using Step Transfer technique with contact guard assistance with rolling walker  Functional Mobility: Pt ambulated ~25 ft from EOB to right outside his door and then to bedside chair with CGA-Min A with RW.  He had one episode of LOB due to knee buckling, which pt attributed to dizziness.      Activities of Daily Living:  Feeding:  setup assistance of tray table to eat his  breakfast while seated in chair   Lower Body Dressing: Pt was unable to reach his socks while seated in " chair.  Therapist brought hip kit equipment into room.  Stand by assistance to doff his non-skid socks with shoe horn, to grasp socks from the floor with reacher, and to margarito socks with sock aid.      Cognitive/Visual Perceptual:  Cognitive/Psychosocial Skills:     -       Oriented to: Person, Place, Time, and Situation   -       Follows Commands/attention:Follows multistep  commands  -       Communication: clear/fluent    Physical Exam:  Upper Extremity Range of Motion:     -       Right Upper Extremity: WFL  -       Left Upper Extremity: WFL  Upper Extremity Strength:    -       Right Upper Extremity: WFL  -       Left Upper Extremity: WFL   Strength:    -       Right Upper Extremity: WFL  -       Left Upper Extremity: WFL  Fine Motor Coordination:    -       Intact    AMPAC 6 Click ADL:  AMPAC Total Score: 20    Treatment & Education:  Pt edu on role of OT, POC, his spinal precautions, safety when performing self care tasks, benefit of performing OOB activity, and safety when performing functional transfers and mobility.  - White board updated  - Self care tasks completed-- as noted above      Patient left up in chair with all lines intact, call button in reach, and nursing notified    GOALS:   Multidisciplinary Problems       Occupational Therapy Goals          Problem: Occupational Therapy    Goal Priority Disciplines Outcome Interventions   Occupational Therapy Goal     OT, PT/OT Ongoing, Progressing    Description: Goals to be met by: 9/17/2022     Patient will increase functional independence with ADLs by performing:    UE Dressing with Modified [place].  LE Dressing with Stand-by Assistance for standing balance only when donning pants while using appropriate AE.  Grooming while standing at sink with Supervision.  Toileting from toilet with Stand-by Assistance for hygiene and clothing management.   Supine to sit with Stand-by Assistance.  Sit to stand transfer with Stand-by Assistance with  RW.  Toilet transfer to toilet with Stand-by Assistance with RW.                         History:     Past Medical History:   Diagnosis Date    Arthritis     Back pain, chronic     BPH with urinary obstruction     Chronic constipation     Colon polyp     DJD (degenerative joint disease)     Hip    Hyperlipidemia     Hypertension     Laryngopharyngeal reflux     Left inguinal hernia     Lumbar herniated disc     Lumbar stenosis     OA (osteoarthritis) of knee     Bilateral    Paradoxical insomnia     Sinus trouble          Past Surgical History:   Procedure Laterality Date    BACK SURGERY  2014    COLONOSCOPY      EPIDURAL STEROID INJECTION INTO LUMBAR SPINE N/A 7/5/2022    Procedure: Injection-steroid-epidural-lumbar L3-4;  Surgeon: Yury Crum Jr., MD;  Location: [facility];  Service: Pain Management;  Laterality: N/A;  oral sedation   asa      JOINT REPLACEMENT Left 05/04/2018    KNEE SURGERY      left hand      LEG SURGERY      SPINE SURGERY      UPPER GASTROINTESTINAL ENDOSCOPY         Time Tracking:     OT Date of Treatment: 09/03/22  OT Start Time: 0810  OT Stop Time: 0837  OT Total Time (min): 27 min    Billable Minutes:Evaluation 10 min  Self Care/Home Management 17 min    9/3/2022

## 2022-09-03 NOTE — PROGRESS NOTES
[facility] - Surgery  Orthopedics  Progress Note    Patient Name: Korey Santos  MRN: [id number]  Admission Date: 9/2/2022  Hospital Length of Stay: 1 days  Attending Provider: Luis William MD  Primary Care Provider: Juan Solorzano MD  Follow-up For: Procedure(s) (LRB):  FUSION, SPINE, LUMBAR, POSTERIOR APPROACH, USING PEDICLE SCREW L2-3 GLOBUS ROBOT SNS: SSEP/EMG (N/A)    Post-Operative Day: 1 Day Post-Op  Subjective:     Principal Problem:Lumbar spondylosis    Principal Orthopedic Problem: same    Interval History: Patient examined at the bedside. NAEON. Pain controlled. Tolerating PO w/out N/V.    Hasn't worked w PT yet, but has ambulated around the room a few times w nursing.    Review of patient's allergies indicates:   Allergen Reactions    Clindamycin Swelling     Throat swells    Lisinopril Swelling and Other (See Comments)     Throat swelling       Current Facility-Administered Medications   Medication    0.9%  NaCl infusion    acetaminophen tablet 1,000 mg    bisacodyL suppository 10 mg    celecoxib capsule 200 mg    finasteride tablet 5 mg    heparin (porcine) injection 5,000 Units    melatonin tablet 6 mg    methocarbamoL tablet 750 mg    metoclopramide HCl injection 5 mg    morphine injection 3 mg    ondansetron injection 4 mg    oxyCODONE immediate release tablet 5 mg    oxyCODONE immediate release tablet Tab 10 mg    polyethylene glycol packet 17 g    pregabalin capsule 150 mg    senna-docusate 8.6-50 mg per tablet 1 tablet    sodium chloride 0.9% flush 10 mL    tamsulosin 24 hr capsule 0.4 mg     Objective:     Vital Signs (Most Recent):  Temp: 97.6 °F (36.4 °C) (09/03/22 0512)  Pulse: (!) 50 (09/03/22 0512)  Resp: 16 (09/03/22 0512)  BP: (!) 128/59 (09/03/22 0512)  SpO2: 99 % (09/03/22 0512)   Vital Signs (24h Range):  Temp:  [97.2 °F (36.2 °C)-98.1 °F (36.7 °C)] 97.6 °F (36.4 °C)  Pulse:  [50-93] 50  Resp:  [10-18] 16  SpO2:  [86 %-100 %] 99 %  BP: (119-156)/(54-78) 128/59  "    Weight: 102.1 kg (225 lb)  Height: 5' 11" (180.3 cm)  Body mass index is 31.38 kg/m².      Intake/Output Summary (Last 24 hours) at 9/3/2022 0633  Last data filed at 9/3/2022 0600  Gross per 24 hour   Intake 1500 ml   Output 1590 ml   Net -90 ml       Ortho/SPM Exam  NAD, A/O x 3.  Wound c/d/i with clean dressing.  Drains intact  No focal motor or sensory deficits noted.    Significant Labs: CBC:   Recent Labs   Lab 09/02/22  1051   WBC 8.21   HGB 12.5*   HCT 36.6*        CMP:   Recent Labs   Lab 09/02/22  1051      K 3.8      CO2 24   *   BUN 15   CREATININE 0.8   CALCIUM 8.5*   PROT 5.9*   ALBUMIN 3.4*   BILITOT 0.5   ALKPHOS 48*   AST 18   ALT 17   ANIONGAP 8     All pertinent labs within the past 24 hours have been reviewed.    Significant Imaging: I have reviewed all pertinent imaging results/findings.    Assessment/Plan:     * Lumbar spondylosis  Korey Santos is a 81 y.o. male s/p L2-3 TLIF on 9/2 with Dr. William.     - Antibiotics: Postop ancef  - Weight bearing status: WBAT. Spine precautions.  - Labs: pending this AM.  - DVT Prophylaxis: Hep, SCDs at all times while in bed  - Lines/Drains:  PIV/Geronimo x2/del valle  - Pain control: multimodal  - PT/OT     Dispo: [place] del valle, continue drains, f/u PT/OT  '          Freedom Carlos MD  Orthopedics  [facility] - Surgery  "

## 2022-09-03 NOTE — PT/OT/SLP EVAL
Physical Therapy Co-Evaluation with OT and Treatment     Patient Name:  Korey Santos  MRN: [id number]    Admit Date: 9/2/2022  Admitting Diagnosis:  Lumbar spondylosis  Length of Stay: 1 days  Recent Surgery: Procedure(s) (LRB):  FUSION, SPINE, LUMBAR, POSTERIOR APPROACH, USING PEDICLE SCREW L2-3 GLOBUS ROBOT SNS: SSEP/EMG (N/A) 1 Day Post-Op    Recommendations:     Discharge Recommendations: Skilled Nursing Facility (may progress to HHPT)  Equipment recommendations: rolling walker, bedside commode, and hip kit  Barriers to discharge: Decreased caregiver support available (lives alone) and Increased level of skilled assistance required    Assessment:     Korey Santos is a 81 y.o. male admitted to [facility] on 9/2/2022 with medical diagnosis of Lumbar spondylosis. Pt presents with weakness, impaired endurance, impaired functional mobility, gait instability, decreased safety awareness, decreased coordination, orthopedic precautions. These deficits effect their roles and responsibilities in which they were able to complete prior to admit. PTA, pt reports (I) ADLs and mod(I) for ambulation, using SPC. Pt states concerns about d/c home as he lives alone. Discussion with pt regarding d/c options after oob mobility. Pt agrees he would benefit from additional skilled therapy before returning home to address functional mobility deficits. Pt educated on spinal precautions. Korey Santos would benefit from acute PT intervention to improve quality of life, focus on recovery of impairments, provide patient/caregiver education, reduce fall risk, and maximize (I) and safety with functional mobility. Once medically stable, recommending pt discharge to Skilled Nursing Facility .      Rehab Prognosis: Good    Plan:     During this hospitalization, patient to be seen 4 x/week to address the identified rehab impairments via gait training, therapeutic activities, therapeutic exercises, neuromuscular re-education and progress  "towards stated goals.     Plan of Care Expires:  10/03/22  Plan of Care reviewed with: patient    This plan of care has been discussed with the patient/caregiver, who was included in its development and is in agreement with the identified goals and treatment plan.     Subjective     Communicated with RN prior to session.  Patient found sitting edge of bed upon PT entry to room, agreeable to evaluation. Pt alone during session.    Chief Complaint: Pain when lying on back    Patient/Family Comments/goals: "How long are they going to keep me here? I am worried they will just kick me out?"    Pain/Comfort:  Pain Rating 1: 0/10  Location - Orientation 1: generalized  Location 1: back  Pain Addressed 1: Reposition, Distraction  Pain Rating Post-Intervention 1:  (pt did not rate)    Patients cultural, spiritual, Sabianism conflicts given the current situation: None identified     Patient History: information obtained from pt     Living Environment: Pt lives alone in 2 story home  ([facility]) with threshold ANGELES (full flight of stairs with R HR). Bathroom set-up: walk-in shower & tub/shower; able to sleep on 1st floor; only 1/2 bath on 1st floor  Prior Level of Function: modified (I) for mobility and ADLs using straight cane as AD ; drives; retired  DME owned: single point cane  Support Available/Caregiver Assistance:  [place]; states neighbor can assist if needed    Objective:     Patient found with: hemovac, SCD    Recent Surgery: Procedure(s) (LRB):  FUSION, SPINE, LUMBAR, POSTERIOR APPROACH, USING PEDICLE SCREW L2-3 GLOBUS ROBOT SNS: SSEP/EMG (N/A) 1 Day Post-Op  General Precautions: Standard, fall   Orthopedic Precautions:spinal precautions   Braces: N/A   Oxygen Device: room air      Exams:    Cognition:  Alert  Command following: Follows multistep verbal commands  Communication: clear/fluent    Sensation:   Light touch sensation: Pt reported generalized numbness/tingling of uma feet    Gross Motor " "Coordination: No deficits noted during functional mobility tasks     Edema/Skin Integrity: None noted; Visible skin intact    Postural examination/scapula alignment: Rounded shoulder and Head forward    Lower Extremity Range of Motion:  Right Lower Extremity: WFL  Left Lower Extremity: WFL    Lower Extremity Strength:  Right Lower Extremity:  grossly 4/5 (decreased strength with prolonged MMT)  Left Lower Extremity:  grossly 5/5    Functional Mobility:    Bed Mobility:  Supine > Sit with contact guard assistance via log roll technique    Transfers:   Sit <> Stand Transfer: Minimal Assistance x 1 trials from eob with RW AD   Cueing to safe t/f and hand placement  Bed <> Chair: Contact Guard Assistance with RW AD              Gait:  Distance: 25'  Assistance level: Contact Guard Assistance - Minimal Assistance  Assistive Device: rolling walker  Gait Assessment: decreased step length , decreased gait speed, and unsteady gait   1 episode of RLE knee buckling; pt states he got dizzy and "it happens"    Balance:  Dynamic Sitting: GOOD: Maintains balance through MODERATE excursions of active trunk movement  Standing:  Static: FAIR: Maintains without assist but unable to take challenges   Dynamic: POOR+: Needs MIN (minimal ) assist during gait    Outcome Measure: AM-PAC 6 CLICK MOBILITY  Total Score:17     Patient/Caregiver Education:     Therapist educated pt/caregiver regarding:   PT POC and goals for therapy   Safety with mobility and fall risk   Instruction on use of call button and importance of calling nursing staff for assistance with mobility   Time provided for therapeutic counseling and discussion of current health disposition. All questions answered to satisfaction, within scope of PT practice   Pt safe to t/f with RN/PCT with RW x1 person assist    Patient/caregiver able to verbalize understanding and expressed no further questions this visit; will follow-up with pt/caregiver during current admit for additional " questions/concerns within scope of practice.     White board updated.     Patient left up in chair with all lines intact, call button in reach, and RN notified.    GOALS:   Multidisciplinary Problems       Physical Therapy Goals          Problem: Physical Therapy    Goal Priority Disciplines Outcome Goal Variances Interventions   Physical Therapy Goal     PT, PT/OT      Description: Goals to be met by: 22     Patient will increase functional independence with mobility by performin. Supine to sit with [place] via log roll  2. Sit to stand transfer with Stand-by Assistance  3. Gait  x 150 feet with Stand-by Assistance using LRAD.  4. Ascend/descend 8 stairs with right Handrails Contact Guard Assistance using No Assistive Device.   5. Stand for 8 minutes with Stand-by Assistance using LRAD while performing dynamic activities.                           History:     Past Medical History:   Diagnosis Date    Arthritis     Back pain, chronic     BPH with urinary obstruction     Chronic constipation     Colon polyp     DJD (degenerative joint disease)     Hip    Hyperlipidemia     Hypertension     Laryngopharyngeal reflux     Left inguinal hernia     Lumbar herniated disc     Lumbar stenosis     OA (osteoarthritis) of knee     Bilateral    Paradoxical insomnia     Sinus trouble        Past Surgical History:   Procedure Laterality Date    BACK SURGERY      COLONOSCOPY      EPIDURAL STEROID INJECTION INTO LUMBAR SPINE N/A 2022    Procedure: Injection-steroid-epidural-lumbar L3-4;  Surgeon: Yury Crum Jr., MD;  Location: [facility];  Service: Pain Management;  Laterality: N/A;  oral sedation   asa      JOINT REPLACEMENT Left 2018    KNEE SURGERY      left hand      LEG SURGERY      SPINE SURGERY      UPPER GASTROINTESTINAL ENDOSCOPY         Time Tracking:     PT Received On: 22  PT Start Time: 0756     PT Stop Time: 0836  PT Total Time (min): 40 min     Billable Minutes:  Evaluation 8, Gait Training 16, and Therapeutic Activity 16    09/03/2022

## 2022-09-03 NOTE — NURSING
ASENCIO DISCONTINUED. PT TOLERATED TONIGHT. ENCOURAGE TO DRINK PLENTY OF FLUID AND REPORT TO STAFF IF UNABLE TO VOID WITH 4-6 HOURS.  URINAL PROVIDED.  MP MAJOR

## 2022-09-03 NOTE — SUBJECTIVE & OBJECTIVE
"Principal Problem:Lumbar spondylosis    Principal Orthopedic Problem: same    Interval History: Patient examined at the bedside. NAEON. Pain controlled. Tolerating PO w/out N/V.    Hasn't worked w PT yet, but has ambulated around the room a few times w nursing.    Review of patient's allergies indicates:   Allergen Reactions    Clindamycin Swelling     Throat swells    Lisinopril Swelling and Other (See Comments)     Throat swelling       Current Facility-Administered Medications   Medication    0.9%  NaCl infusion    acetaminophen tablet 1,000 mg    bisacodyL suppository 10 mg    celecoxib capsule 200 mg    finasteride tablet 5 mg    heparin (porcine) injection 5,000 Units    melatonin tablet 6 mg    methocarbamoL tablet 750 mg    metoclopramide HCl injection 5 mg    morphine injection 3 mg    ondansetron injection 4 mg    oxyCODONE immediate release tablet 5 mg    oxyCODONE immediate release tablet Tab 10 mg    polyethylene glycol packet 17 g    pregabalin capsule 150 mg    senna-docusate 8.6-50 mg per tablet 1 tablet    sodium chloride 0.9% flush 10 mL    tamsulosin 24 hr capsule 0.4 mg     Objective:     Vital Signs (Most Recent):  Temp: 97.6 °F (36.4 °C) (09/03/22 0512)  Pulse: (!) 50 (09/03/22 0512)  Resp: 16 (09/03/22 0512)  BP: (!) 128/59 (09/03/22 0512)  SpO2: 99 % (09/03/22 0512)   Vital Signs (24h Range):  Temp:  [97.2 °F (36.2 °C)-98.1 °F (36.7 °C)] 97.6 °F (36.4 °C)  Pulse:  [50-93] 50  Resp:  [10-18] 16  SpO2:  [86 %-100 %] 99 %  BP: (119-156)/(54-78) 128/59     Weight: 102.1 kg (225 lb)  Height: 5' 11" (180.3 cm)  Body mass index is 31.38 kg/m².      Intake/Output Summary (Last 24 hours) at 9/3/2022 0633  Last data filed at 9/3/2022 0600  Gross per 24 hour   Intake 1500 ml   Output 1590 ml   Net -90 ml       Ortho/SPM Exam  NAD, A/O x 3.  Wound c/d/i with clean dressing.  Drains intact  No focal motor or sensory deficits noted.    Significant Labs: CBC:   Recent Labs   Lab 09/02/22  1051   WBC 8.21 "   HGB 12.5*   HCT 36.6*        CMP:   Recent Labs   Lab 09/02/22  1051      K 3.8      CO2 24   *   BUN 15   CREATININE 0.8   CALCIUM 8.5*   PROT 5.9*   ALBUMIN 3.4*   BILITOT 0.5   ALKPHOS 48*   AST 18   ALT 17   ANIONGAP 8     All pertinent labs within the past 24 hours have been reviewed.    Significant Imaging: I have reviewed all pertinent imaging results/findings.

## 2022-09-04 PROCEDURE — 11000001 HC ACUTE MED/SURG PRIVATE ROOM

## 2022-09-04 PROCEDURE — 25000003 PHARM REV CODE 250

## 2022-09-04 PROCEDURE — 25000003 PHARM REV CODE 250: Performed by: STUDENT IN AN ORGANIZED HEALTH CARE EDUCATION/TRAINING PROGRAM

## 2022-09-04 PROCEDURE — 63600175 PHARM REV CODE 636 W HCPCS: Performed by: STUDENT IN AN ORGANIZED HEALTH CARE EDUCATION/TRAINING PROGRAM

## 2022-09-04 RX ORDER — PRAVASTATIN SODIUM 40 MG/1
40 TABLET ORAL DAILY
Status: DISCONTINUED | OUTPATIENT
Start: 2022-09-04 | End: 2022-09-06 | Stop reason: HOSPADM

## 2022-09-04 RX ORDER — LOSARTAN POTASSIUM 50 MG/1
100 TABLET ORAL DAILY
Status: DISCONTINUED | OUTPATIENT
Start: 2022-09-04 | End: 2022-09-06 | Stop reason: HOSPADM

## 2022-09-04 RX ADMIN — HEPARIN SODIUM 5000 UNITS: 5000 INJECTION INTRAVENOUS; SUBCUTANEOUS at 03:09

## 2022-09-04 RX ADMIN — METHOCARBAMOL 750 MG: 750 TABLET ORAL at 10:09

## 2022-09-04 RX ADMIN — PREGABALIN 150 MG: 150 CAPSULE ORAL at 10:09

## 2022-09-04 RX ADMIN — HEPARIN SODIUM 5000 UNITS: 5000 INJECTION INTRAVENOUS; SUBCUTANEOUS at 10:09

## 2022-09-04 RX ADMIN — METHOCARBAMOL 750 MG: 750 TABLET ORAL at 03:09

## 2022-09-04 RX ADMIN — CELECOXIB 200 MG: 200 CAPSULE ORAL at 08:09

## 2022-09-04 RX ADMIN — OXYCODONE HYDROCHLORIDE 10 MG: 10 TABLET ORAL at 08:09

## 2022-09-04 RX ADMIN — HEPARIN SODIUM 5000 UNITS: 5000 INJECTION INTRAVENOUS; SUBCUTANEOUS at 05:09

## 2022-09-04 RX ADMIN — FINASTERIDE 5 MG: 5 TABLET, FILM COATED ORAL at 08:09

## 2022-09-04 RX ADMIN — POLYETHYLENE GLYCOL 3350 17 G: 17 POWDER, FOR SOLUTION ORAL at 08:09

## 2022-09-04 RX ADMIN — ACETAMINOPHEN 1000 MG: 500 TABLET ORAL at 05:09

## 2022-09-04 RX ADMIN — SENNOSIDES AND DOCUSATE SODIUM 1 TABLET: 50; 8.6 TABLET ORAL at 08:09

## 2022-09-04 RX ADMIN — ACETAMINOPHEN 1000 MG: 500 TABLET ORAL at 10:09

## 2022-09-04 RX ADMIN — TAMSULOSIN HYDROCHLORIDE 0.4 MG: 0.4 CAPSULE ORAL at 08:09

## 2022-09-04 RX ADMIN — PRAVASTATIN SODIUM 40 MG: 40 TABLET ORAL at 08:09

## 2022-09-04 RX ADMIN — LOSARTAN POTASSIUM 100 MG: 50 TABLET, FILM COATED ORAL at 08:09

## 2022-09-04 RX ADMIN — SENNOSIDES AND DOCUSATE SODIUM 1 TABLET: 50; 8.6 TABLET ORAL at 10:09

## 2022-09-04 RX ADMIN — Medication 6 MG: at 10:09

## 2022-09-04 RX ADMIN — METHOCARBAMOL 750 MG: 750 TABLET ORAL at 08:09

## 2022-09-04 NOTE — SUBJECTIVE & OBJECTIVE
"Principal Problem:Lumbar spondylosis    Principal Orthopedic Problem: same    Interval History: Patient examined at the bedside. NAEON. Pain controlled. Tolerating PO w/out N/V and voiding appropriately.   Ambulated 25 ft with PT yesterday. Voiding well after dc'ing del valle. Drains with 150 deep and 5 superficial over past 24hrs.     Review of patient's allergies indicates:   Allergen Reactions    Clindamycin Swelling     Throat swells    Lisinopril Swelling and Other (See Comments)     Throat swelling       Current Facility-Administered Medications   Medication    0.9%  NaCl infusion    acetaminophen tablet 1,000 mg    bisacodyL suppository 10 mg    celecoxib capsule 200 mg    finasteride tablet 5 mg    heparin (porcine) injection 5,000 Units    losartan tablet 100 mg    melatonin tablet 6 mg    methocarbamoL tablet 750 mg    metoclopramide HCl injection 5 mg    morphine injection 3 mg    [START ON 9/5/2022] NON FORMULARY MEDICATION 145 mcg    ondansetron injection 4 mg    oxyCODONE immediate release tablet 5 mg    oxyCODONE immediate release tablet Tab 10 mg    polyethylene glycol packet 17 g    pravastatin tablet 40 mg    pregabalin capsule 150 mg    senna-docusate 8.6-50 mg per tablet 1 tablet    sodium chloride 0.9% flush 10 mL    tamsulosin 24 hr capsule 0.4 mg     Objective:     Vital Signs (Most Recent):  Temp: 97.3 °F (36.3 °C) (09/04/22 0452)  Pulse: (!) 56 (09/04/22 0452)  Resp: 20 (09/04/22 0452)  BP: (!) 146/61 (09/04/22 0452)  SpO2: 96 % (09/04/22 0452)   Vital Signs (24h Range):  Temp:  [97.3 °F (36.3 °C)-98.3 °F (36.8 °C)] 97.3 °F (36.3 °C)  Pulse:  [] 56  Resp:  [16-20] 20  SpO2:  [93 %-100 %] 96 %  BP: (118-158)/(56-71) 146/61     Weight: 102.1 kg (225 lb)  Height: 5' 11" (180.3 cm)  Body mass index is 31.38 kg/m².      Intake/Output Summary (Last 24 hours) at 9/4/2022 0711  Last data filed at 9/4/2022 0558  Gross per 24 hour   Intake --   Output 555 ml   Net -555 ml       Ortho/SPM Exam  NAD, " A/O x 3.  Wound c/d/i with clean dressing.  Drains intact  No focal motor or sensory deficits noted.    Significant Labs: All pertinent labs within the past 24 hours have been reviewed.    Significant Imaging: I have reviewed all pertinent imaging results/findings.

## 2022-09-04 NOTE — ASSESSMENT & PLAN NOTE
Korey Santos is a 81 y.o. male s/p L2-3 TLIF on 9/2 with Dr. William.     - Antibiotics: Postop ancef  - Weight bearing status: WBAT. Spine precautions.  - Labs: none  - DVT Prophylaxis: Hep, SCDs at all times while in bed  - Lines/Drains:  PIV/Geronimo x2  - Pain control: multimodal  - PT/OT     Dispo:  continue drains, f/u PT/OT (at the moment recc SNF)  '

## 2022-09-04 NOTE — PROGRESS NOTES
[facility] - [facility]  Orthopedics  Progress Note    Patient Name: Korey Santos  MRN: [id number]  Admission Date: 9/2/2022  Hospital Length of Stay: 2 days  Attending Provider: Luis William MD  Primary Care Provider: Juan Solorzano MD  Follow-up For: Procedure(s) (LRB):  FUSION, SPINE, LUMBAR, POSTERIOR APPROACH, USING PEDICLE SCREW L2-3 GLOBUS ROBOT SNS: SSEP/EMG (N/A)    Post-Operative Day: 2 Days Post-Op  Subjective:     Principal Problem:Lumbar spondylosis    Principal Orthopedic Problem: same    Interval History: Patient examined at the bedside. NAEON. Pain controlled. Tolerating PO w/out N/V and voiding appropriately.   Ambulated 25 ft with PT yesterday. Voiding well after dc'ing [place]. Drains with 150 deep and 5 superficial over past 24hrs.     Review of patient's allergies indicates:   Allergen Reactions    Clindamycin Swelling     Throat swells    Lisinopril Swelling and Other (See Comments)     Throat swelling       Current Facility-Administered Medications   Medication    0.9%  NaCl infusion    acetaminophen tablet 1,000 mg    bisacodyL suppository 10 mg    celecoxib capsule 200 mg    finasteride tablet 5 mg    heparin (porcine) injection 5,000 Units    losartan tablet 100 mg    melatonin tablet 6 mg    methocarbamoL tablet 750 mg    metoclopramide HCl injection 5 mg    morphine injection 3 mg    [START ON 9/5/2022] NON FORMULARY MEDICATION 145 mcg    ondansetron injection 4 mg    oxyCODONE immediate release tablet 5 mg    oxyCODONE immediate release tablet Tab 10 mg    polyethylene glycol packet 17 g    pravastatin tablet 40 mg    pregabalin capsule 150 mg    senna-docusate 8.6-50 mg per tablet 1 tablet    sodium chloride 0.9% flush 10 mL    tamsulosin 24 hr capsule 0.4 mg     Objective:     Vital Signs (Most Recent):  Temp: 97.3 °F (36.3 °C) (09/04/22 0452)  Pulse: (!) 56 (09/04/22 0452)  Resp: 20 (09/04/22 0452)  BP: (!) 146/61 (09/04/22 0452)  SpO2: 96 % (09/04/22 0452)    "Vital Signs (24h Range):  Temp:  [97.3 °F (36.3 °C)-98.3 °F (36.8 °C)] 97.3 °F (36.3 °C)  Pulse:  [] 56  Resp:  [16-20] 20  SpO2:  [93 %-100 %] 96 %  BP: (118-158)/(56-71) 146/61     Weight: 102.1 kg (225 lb)  Height: 5' 11" (180.3 cm)  Body mass index is 31.38 kg/m².      Intake/Output Summary (Last 24 hours) at 9/4/2022 0711  Last data filed at 9/4/2022 0558  Gross per 24 hour   Intake --   Output 555 ml   Net -555 ml       Ortho/SPM Exam  NAD, A/O x 3.  Wound c/d/i with clean dressing.  Drains intact  No focal motor or sensory deficits noted.    Significant Labs: All pertinent labs within the past 24 hours have been reviewed.    Significant Imaging: I have reviewed all pertinent imaging results/findings.    Assessment/Plan:     * Lumbar spondylosis  Korey Santos is a 81 y.o. male s/p L2-3 TLIF on 9/2 with Dr. William.     - Antibiotics: Postop ancef  - Weight bearing status: WBAT. Spine precautions.  - Labs: none  - DVT Prophylaxis: Hep, SCDs at all times while in bed  - Lines/Drains:  PIV/Geronimo x2  - Pain control: multimodal  - PT/OT     Dispo:  continue drains, [facility]  Ismael Carlos MD  Orthopedics  [facility] - Surgery  "

## 2022-09-05 PROCEDURE — 94761 N-INVAS EAR/PLS OXIMETRY MLT: CPT

## 2022-09-05 PROCEDURE — 25000003 PHARM REV CODE 250

## 2022-09-05 PROCEDURE — 97530 THERAPEUTIC ACTIVITIES: CPT

## 2022-09-05 PROCEDURE — 25000003 PHARM REV CODE 250: Performed by: ORTHOPAEDIC SURGERY

## 2022-09-05 PROCEDURE — 99900035 HC TECH TIME PER 15 MIN (STAT)

## 2022-09-05 PROCEDURE — 25000003 PHARM REV CODE 250: Performed by: STUDENT IN AN ORGANIZED HEALTH CARE EDUCATION/TRAINING PROGRAM

## 2022-09-05 PROCEDURE — 63600175 PHARM REV CODE 636 W HCPCS: Performed by: STUDENT IN AN ORGANIZED HEALTH CARE EDUCATION/TRAINING PROGRAM

## 2022-09-05 PROCEDURE — 11000001 HC ACUTE MED/SURG PRIVATE ROOM

## 2022-09-05 RX ORDER — GUAIFENESIN 600 MG/1
600 TABLET, EXTENDED RELEASE ORAL 2 TIMES DAILY
Status: DISCONTINUED | OUTPATIENT
Start: 2022-09-05 | End: 2022-09-06 | Stop reason: HOSPADM

## 2022-09-05 RX ADMIN — PRAVASTATIN SODIUM 40 MG: 40 TABLET ORAL at 08:09

## 2022-09-05 RX ADMIN — ACETAMINOPHEN 1000 MG: 500 TABLET ORAL at 05:09

## 2022-09-05 RX ADMIN — POLYETHYLENE GLYCOL 3350 17 G: 17 POWDER, FOR SOLUTION ORAL at 08:09

## 2022-09-05 RX ADMIN — FINASTERIDE 5 MG: 5 TABLET, FILM COATED ORAL at 08:09

## 2022-09-05 RX ADMIN — CELECOXIB 200 MG: 200 CAPSULE ORAL at 09:09

## 2022-09-05 RX ADMIN — LOSARTAN POTASSIUM 100 MG: 50 TABLET, FILM COATED ORAL at 08:09

## 2022-09-05 RX ADMIN — TAMSULOSIN HYDROCHLORIDE 0.4 MG: 0.4 CAPSULE ORAL at 08:09

## 2022-09-05 RX ADMIN — METHOCARBAMOL 750 MG: 750 TABLET ORAL at 09:09

## 2022-09-05 RX ADMIN — GUAIFENESIN 600 MG: 600 TABLET, EXTENDED RELEASE ORAL at 09:09

## 2022-09-05 RX ADMIN — GUAIFENESIN 600 MG: 600 TABLET, EXTENDED RELEASE ORAL at 10:09

## 2022-09-05 RX ADMIN — LINACLOTIDE 145 MCG: 145 CAPSULE, GELATIN COATED ORAL at 05:09

## 2022-09-05 RX ADMIN — HEPARIN SODIUM 5000 UNITS: 5000 INJECTION INTRAVENOUS; SUBCUTANEOUS at 05:09

## 2022-09-05 RX ADMIN — SENNOSIDES AND DOCUSATE SODIUM 1 TABLET: 50; 8.6 TABLET ORAL at 09:09

## 2022-09-05 RX ADMIN — SENNOSIDES AND DOCUSATE SODIUM 1 TABLET: 50; 8.6 TABLET ORAL at 08:09

## 2022-09-05 RX ADMIN — HEPARIN SODIUM 5000 UNITS: 5000 INJECTION INTRAVENOUS; SUBCUTANEOUS at 02:09

## 2022-09-05 RX ADMIN — PREGABALIN 150 MG: 150 CAPSULE ORAL at 09:09

## 2022-09-05 RX ADMIN — HEPARIN SODIUM 5000 UNITS: 5000 INJECTION INTRAVENOUS; SUBCUTANEOUS at 09:09

## 2022-09-05 RX ADMIN — ACETAMINOPHEN 500 MG: 500 TABLET ORAL at 02:09

## 2022-09-05 NOTE — SUBJECTIVE & OBJECTIVE
"Principal Problem:Lumbar spondylosis    Principal Orthopedic Problem: same    Interval History: Patient examined at the bedside. NAEON. Pain controlled. Tolerating PO w/out N/V and voiding appropriately. Deep drain with 20 and superficial 0 over past 24hrs.     Review of patient's allergies indicates:   Allergen Reactions    Clindamycin Swelling     Throat swells    Lisinopril Swelling and Other (See Comments)     Throat swelling       Current Facility-Administered Medications   Medication    0.9%  NaCl infusion    acetaminophen tablet 1,000 mg    bisacodyL suppository 10 mg    celecoxib capsule 200 mg    finasteride tablet 5 mg    heparin (porcine) injection 5,000 Units    linaCLOtide capsule 145 mcg    losartan tablet 100 mg    melatonin tablet 6 mg    methocarbamoL tablet 750 mg    metoclopramide HCl injection 5 mg    morphine injection 3 mg    ondansetron injection 4 mg    oxyCODONE immediate release tablet 5 mg    oxyCODONE immediate release tablet Tab 10 mg    polyethylene glycol packet 17 g    pravastatin tablet 40 mg    pregabalin capsule 150 mg    senna-docusate 8.6-50 mg per tablet 1 tablet    sodium chloride 0.9% flush 10 mL    tamsulosin 24 hr capsule 0.4 mg     Objective:     Vital Signs (Most Recent):  Temp: 97.9 °F (36.6 °C) (09/05/22 0354)  Pulse: (!) 58 (09/05/22 0354)  Resp: 18 (09/05/22 0354)  BP: (!) 102/56 (09/05/22 0354)  SpO2: 97 % (09/05/22 0354)   Vital Signs (24h Range):  Temp:  [97.9 °F (36.6 °C)-100 °F (37.8 °C)] 97.9 °F (36.6 °C)  Pulse:  [58-83] 58  Resp:  [18-20] 18  SpO2:  [96 %-98 %] 97 %  BP: (101-125)/(51-64) 102/56     Weight: 102.1 kg (225 lb)  Height: 5' 11" (180.3 cm)  Body mass index is 31.38 kg/m².      Intake/Output Summary (Last 24 hours) at 9/5/2022 0745  Last data filed at 9/5/2022 0535  Gross per 24 hour   Intake --   Output 620 ml   Net -620 ml       Ortho/SPM Exam  NAD, A/O x 3.  Wound c/d/i with clean dressing.  Drains intact  No focal motor or sensory deficits " noted.    Significant Labs: CBC: No results for input(s): WBC, HGB, HCT, PLT in the last 48 hours.  CMP: No results for input(s): NA, K, CL, CO2, GLU, BUN, CREATININE, CALCIUM, PROT, ALBUMIN, BILITOT, ALKPHOS, AST, ALT, ANIONGAP, EGFRNONAA in the last 48 hours.    Invalid input(s): ESTGFAFR[place]  All pertinent labs within the past 24 hours have been reviewed.    Significant Imaging: I have reviewed all pertinent imaging results/findings.

## 2022-09-05 NOTE — PT/OT/SLP PROGRESS
"Occupational Therapy   Treatment    Name: Korey Santos  MRN: [id number]  Admitting Diagnosis:  Lumbar spondylosis  3 Days Post-Op    Recommendations:     Discharge Recommendations: nursing facility, skilled  Discharge Equipment Recommendations:  hip kit, bedside commode, walker, rolling  Barriers to discharge:  Inaccessible home environment, Decreased caregiver support    Assessment:     Korey Santos is a 81 y.o. male with a medical diagnosis of Lumbar spondylosis.   Pt tolerated session well and without incident, but he has decreased safety awareness and requires cueing to adhere to his spinal precautions.  He presents with the following. Performance deficits affecting function are weakness, impaired endurance, impaired self care skills, impaired functional mobility, gait instability, impaired balance, decreased safety awareness, orthopedic precautions, pain, decreased lower extremity function, decreased coordination.     Rehab Prognosis:  Good; patient would benefit from acute skilled OT services to address these deficits and reach maximum level of function.       Plan:     Patient to be seen 4 x/week to address the above listed problems via self-care/home management, therapeutic exercises, therapeutic activities  Plan of Care Expires: 10/02/22  Plan of Care Reviewed with: patient    Subjective   "I would move better without all of these medications."  Pain/Comfort:  Pain Rating 1: 6/10 ("sore")  Location - Orientation 1: lower  Location 1: back (and intermittent R groin discomfort when performing sit to stand transfers)  Pain Addressed 1: Reposition, Distraction  Pain Rating Post-Intervention 1: 6/10    Objective:     Communicated with: nursing prior to session.  Patient found exiting the bathroom alone with  (no active lines) upon OT entry to room.    General Precautions: Standard, fall   Orthopedic Precautions:spinal precautions   Braces: N/A  Respiratory Status: Room air     Occupational Performance: "     Bed Mobility:    N/A due to pt exiting bathroom at the beginning of the session and sitting in bedside chair at end of session    Functional Mobility/Transfers:  Patient completed Sit <> Stand Transfer from bedside chair x multiple trials with stand by assistance with rolling walker   Functional Mobility: Pt ambulated a functional household distance in his room and in the hallway with CGA with RW.      Activities of Daily Living:  Pt had already performed prior to OT entry.      [facility] 6 Click ADL: 20    Treatment & Education:  - Pt educated on the importance of adhering to his spinal precautions.      - Pt educated on managing the RW safely as he was attempting to tip the walker forward to decrease the noise.      - Pt educated on the importance of calling nursing before ambulating in his room due to his fall risk.     Pt edu on role of OT, POC.    Patient left up in chair with all lines intact, call button in reach, and nursing notified    GOALS:   Multidisciplinary Problems       Occupational Therapy Goals          Problem: Occupational Therapy    Goal Priority Disciplines Outcome Interventions   Occupational Therapy Goal     OT, PT/OT Ongoing, Progressing    Description: Goals to be met by: 9/17/2022     Patient will increase functional independence with ADLs by performing:    UE Dressing with Modified [place].  LE Dressing with Stand-by Assistance for standing balance only when donning pants while using appropriate AE.  Grooming while standing at sink with Supervision.  Toileting from toilet with Stand-by Assistance for hygiene and clothing management.   Supine to sit with Stand-by Assistance.  Sit to stand transfer with Stand-by Assistance with RW. - Met  Toilet transfer to toilet with Stand-by Assistance with RW.                         Time Tracking:     OT Date of Treatment: 09/05/22  OT Start Time: 1307  OT Stop Time: 1331  OT Total Time (min): 24 min    Billable Minutes:Therapeutic Activity 24  min    OT/MINNIE: OT          9/5/2022

## 2022-09-05 NOTE — ASSESSMENT & PLAN NOTE
Korey Santos is a 81 y.o. male s/p L2-3 TLIF on 9/2 with Dr. William.     - Antibiotics: Postop ancef  - Weight bearing status: WBAT. Spine precautions.  - Labs: none  - DVT Prophylaxis: Hep, SCDs at all times while in bed  - Lines/Drains:  PIV/Geronimo x2 - removed 9/5  - Pain control: multimodal  - PT/OT     Dispo: dc drains, f/u Xray, f/u PT/OT (at the moment [facility])  '

## 2022-09-05 NOTE — PLAN OF CARE
Problem: Occupational Therapy  Goal: Occupational Therapy Goal  Description: Goals to be met by: 9/17/2022     Patient will increase functional independence with ADLs by performing:    UE Dressing with Modified [place].  LE Dressing with Stand-by Assistance for standing balance only when donning pants while using appropriate AE.  Grooming while standing at sink with Supervision.  Toileting from toilet with Stand-by Assistance for hygiene and clothing management.   Supine to sit with Stand-by Assistance.  Sit to stand transfer with Stand-by Assistance with RW. - Met  Toilet transfer to toilet with Stand-by Assistance with RW.    Outcome: Ongoing, Progressing     Continue OT POC.

## 2022-09-05 NOTE — PLAN OF CARE
Pt resting in bed comfortably. Ambulated with walker without difficulty. PIV line intact and free of infection and irritation. Fall precautions maintained, no falls noted. Call light within reach, bed locked and in lowest position. Non-skid socks on while out of bed. Patient instructed to call for assistance. Skin integrity maintained as patient is independent with frequent positioning. C/o mild pain, managed with scheduled meds, no other complaints or concerns. Progressing towards goals. Will continue to monitor and follow plan of care.

## 2022-09-05 NOTE — PROGRESS NOTES
[facility] - [facility]  Orthopedics  Progress Note    Patient Name: Korey Santos  MRN: [id number]  Admission Date: 9/2/2022  Hospital Length of Stay: 3 days  Attending Provider: Luis William MD  Primary Care Provider: Juan Solorzano MD  Follow-up For: Procedure(s) (LRB):  FUSION, SPINE, LUMBAR, POSTERIOR APPROACH, USING PEDICLE SCREW L2-3 GLOBUS ROBOT SNS: SSEP/EMG (N/A)    Post-Operative Day: 3 Days Post-Op  Subjective:     Principal Problem:Lumbar spondylosis    Principal Orthopedic Problem: same    Interval History: Patient examined at the bedside. NAEON. Pain controlled. Tolerating PO w/out N/V and voiding appropriately. Deep drain with 20 and superficial 0 over past 24hrs.     Review of patient's allergies indicates:   Allergen Reactions    Clindamycin Swelling     Throat swells    Lisinopril Swelling and Other (See Comments)     Throat swelling       Current Facility-Administered Medications   Medication    0.9%  NaCl infusion    acetaminophen tablet 1,000 mg    bisacodyL suppository 10 mg    celecoxib capsule 200 mg    finasteride tablet 5 mg    heparin (porcine) injection 5,000 Units    linaCLOtide capsule 145 mcg    losartan tablet 100 mg    melatonin tablet 6 mg    methocarbamoL tablet 750 mg    metoclopramide HCl injection 5 mg    morphine injection 3 mg    ondansetron injection 4 mg    oxyCODONE immediate release tablet 5 mg    oxyCODONE immediate release tablet Tab 10 mg    polyethylene glycol packet 17 g    pravastatin tablet 40 mg    pregabalin capsule 150 mg    senna-docusate 8.6-50 mg per tablet 1 tablet    sodium chloride 0.9% flush 10 mL    tamsulosin 24 hr capsule 0.4 mg     Objective:     Vital Signs (Most Recent):  Temp: 97.9 °F (36.6 °C) (09/05/22 0354)  Pulse: (!) 58 (09/05/22 0354)  Resp: 18 (09/05/22 0354)  BP: (!) 102/56 (09/05/22 0354)  SpO2: 97 % (09/05/22 0354)   Vital Signs (24h Range):  Temp:  [97.9 °F (36.6 °C)-100 °F (37.8 °C)] 97.9 °F (36.6 °C)  Pulse:   "[58-83] 58  Resp:  [18-20] 18  SpO2:  [96 %-98 %] 97 %  BP: (101-125)/(51-64) 102/56     Weight: 102.1 kg (225 lb)  Height: 5' 11" (180.3 cm)  Body mass index is 31.38 kg/m².      Intake/Output Summary (Last 24 hours) at 9/5/2022 0746  Last data filed at 9/5/2022 0535  Gross per 24 hour   Intake --   Output 620 ml   Net -620 ml       Ortho/SPM Exam  NAD, A/O x 3.  Wound c/d/i with clean dressing.  Drains intact  No focal motor or sensory deficits noted.    Significant Labs: CBC: No results for input(s): WBC, HGB, HCT, PLT in the last 48 hours.  CMP: No results for input(s): NA, K, CL, CO2, GLU, BUN, CREATININE, CALCIUM, PROT, ALBUMIN, BILITOT, ALKPHOS, AST, ALT, ANIONGAP, EGFRNONAA in the last 48 hours.    Invalid input(s): ESTGFA[place]  All pertinent labs within the past 24 hours have been reviewed.    Significant Imaging: I have reviewed all pertinent imaging results/findings.    Assessment/Plan:     * Lumbar spondylosis  Korey Santos is a 81 y.o. male s/p L2-3 TLIF on 9/2 with Dr. William.     - Antibiotics: Postop ancef  - Weight bearing status: WBAT. Spine precautions.  - Labs: none  - DVT Prophylaxis: Hep, SCDs at all times while in bed  - Lines/Drains:  PIV/Geronimo x2 - removed 9/5  - Pain control: multimodal  - PT/OT     Dispo: dc drains, f/u Xray, f/u PT/OT (at the moment [facility])  Ismael Carlos MD  Orthopedics  [facility] - Surgery  "

## 2022-09-06 VITALS
HEART RATE: 73 BPM | HEIGHT: 71 IN | OXYGEN SATURATION: 99 % | BODY MASS INDEX: 31.5 KG/M2 | SYSTOLIC BLOOD PRESSURE: 187 MMHG | TEMPERATURE: 98 F | DIASTOLIC BLOOD PRESSURE: 83 MMHG | RESPIRATION RATE: 18 BRPM | WEIGHT: 225 LBS

## 2022-09-06 PROCEDURE — 25000003 PHARM REV CODE 250: Performed by: STUDENT IN AN ORGANIZED HEALTH CARE EDUCATION/TRAINING PROGRAM

## 2022-09-06 PROCEDURE — 25000003 PHARM REV CODE 250

## 2022-09-06 PROCEDURE — 97110 THERAPEUTIC EXERCISES: CPT | Mod: CQ

## 2022-09-06 PROCEDURE — 25000003 PHARM REV CODE 250: Performed by: ORTHOPAEDIC SURGERY

## 2022-09-06 PROCEDURE — 97530 THERAPEUTIC ACTIVITIES: CPT | Mod: CO

## 2022-09-06 PROCEDURE — 63600175 PHARM REV CODE 636 W HCPCS: Performed by: STUDENT IN AN ORGANIZED HEALTH CARE EDUCATION/TRAINING PROGRAM

## 2022-09-06 PROCEDURE — 97116 GAIT TRAINING THERAPY: CPT | Mod: CQ

## 2022-09-06 PROCEDURE — 97535 SELF CARE MNGMENT TRAINING: CPT | Mod: CO

## 2022-09-06 RX ADMIN — HEPARIN SODIUM 5000 UNITS: 5000 INJECTION INTRAVENOUS; SUBCUTANEOUS at 05:09

## 2022-09-06 RX ADMIN — HEPARIN SODIUM 5000 UNITS: 5000 INJECTION INTRAVENOUS; SUBCUTANEOUS at 01:09

## 2022-09-06 RX ADMIN — LOSARTAN POTASSIUM 100 MG: 50 TABLET, FILM COATED ORAL at 08:09

## 2022-09-06 RX ADMIN — PRAVASTATIN SODIUM 40 MG: 40 TABLET ORAL at 08:09

## 2022-09-06 RX ADMIN — ACETAMINOPHEN 1000 MG: 500 TABLET ORAL at 01:09

## 2022-09-06 RX ADMIN — METHOCARBAMOL 750 MG: 750 TABLET ORAL at 08:09

## 2022-09-06 RX ADMIN — FINASTERIDE 5 MG: 5 TABLET, FILM COATED ORAL at 08:09

## 2022-09-06 RX ADMIN — SENNOSIDES AND DOCUSATE SODIUM 1 TABLET: 50; 8.6 TABLET ORAL at 08:09

## 2022-09-06 RX ADMIN — LINACLOTIDE 145 MCG: 145 CAPSULE, GELATIN COATED ORAL at 05:09

## 2022-09-06 RX ADMIN — CELECOXIB 200 MG: 200 CAPSULE ORAL at 08:09

## 2022-09-06 RX ADMIN — POLYETHYLENE GLYCOL 3350 17 G: 17 POWDER, FOR SOLUTION ORAL at 08:09

## 2022-09-06 RX ADMIN — GUAIFENESIN 600 MG: 600 TABLET, EXTENDED RELEASE ORAL at 08:09

## 2022-09-06 RX ADMIN — ACETAMINOPHEN 1000 MG: 500 TABLET ORAL at 05:09

## 2022-09-06 RX ADMIN — TAMSULOSIN HYDROCHLORIDE 0.4 MG: 0.4 CAPSULE ORAL at 08:09

## 2022-09-06 NOTE — PT/OT/SLP PROGRESS
Physical Therapy Treatment    Patient Name:  Korey Santos   MRN:  [id number]    Recommendations:     Discharge Recommendations:  home health PT   Discharge Equipment Recommendations: hip kit, bedside commode, walker, rolling   Barriers to discharge: Decreased caregiver support    Assessment:     Korey Santos is a 81 y.o. male admitted with a medical diagnosis of Lumbar spondylosis.  He presents with the following impairments/functional limitations:   (weakness; impaired endurance; impaired self care skills; impaired functional mobility; gait instability; decreased safety awareness; pain; decreased ROM; orthopedic precautions; impaired balance) Pt Progressing with PT Intervention. Pt Progressing with improving gait distance. Pt requires cueing to adhere to his spinal precautions. Pt would continue to benefit from skilled PT to address overall functional mobility, goals , and to return to functional baseline.  Goals remain appropriate.    Rehab Prognosis: Good; patient would benefit from acute skilled PT services to address these deficits and reach maximum level of function.    Recent Surgery: Procedure(s) (LRB):  FUSION, SPINE, LUMBAR, POSTERIOR APPROACH, USING PEDICLE SCREW L2-3 GLOBUS ROBOT SNS: SSEP/EMG (N/A) 4 Days Post-Op    Plan:     During this hospitalization, patient to be seen 4 x/week to address the identified rehab impairments via gait training, therapeutic activities, therapeutic exercises, neuromuscular re-education and progress toward the following goals:    Plan of Care Expires:  10/03/22    Subjective       Patient/Family Comments/goals: I want to go home  Pain/Comfort:  Pain Rating 1: 0/10  Pain Rating Post-Intervention 1: 0/10      Objective:     Communicated with RN prior to session.  Patient found ambulatory in room/chávez with SCD upon PT entry to room.     General Precautions: Standard, fall   Orthopedic Precautions:spinal precautions   Braces: N/A  Respiratory Status: Room air      Functional Mobility:  Transfers:     Sit to Stand:  independence with no AD and rolling walker  Gait: pt amb with  ft with (S) with vcs for safe technique  Stairs:  Pt ascended/descended 2 flight(s) with No Assistive Device and HHA with left handrail with Contact Guard Assistance.       AM-PAC 6 CLICK MOBILITY  Turning over in bed (including adjusting bedclothes, sheets and blankets)?: 3  Sitting down on and standing up from a chair with arms (e.g., wheelchair, bedside commode, etc.): 3  Moving from lying on back to sitting on the side of the bed?: 3  Moving to and from a bed to a chair (including a wheelchair)?: 3  Need to walk in hospital room?: 3  Climbing 3-5 steps with a railing?: 3  Basic Mobility Total Score: 18       Therapeutic Activities and Exercises:   Therapist provided instruction and educated of  patient on progress, safety,d/c,PT POC,   proper body mechanics, energy conservation, and fall prevention strategies during tasks listed above, on the effects of prolonged immobility and the importance of performing OOB activity and exercises to promote healing and reduce recovery time    Pt issued and instructed to perform  supine and seated HEP 2-3 times daily, with verabal understanding  Patient  facilitated therex  seated in bedside chair B LE AROM AP, LAQ, Hip Flexion, Hip Abd/Add. Patient required skilled PTA for instruction of exercises and appropriate cues to perform exercises safely, sequencing and appropriately.   Exercises performed to develop and maintain pt's strength, endurance and flexibility.  Updated white board with appropriate PT mobility information for medical team notification    Pt safe to ambulate in hallway with RN or PCT assistance    Spoke with PT of record regarding pt progress and in agreement with discharge recommendation change to HHPT  Call nursing/pct to transfer to chair/use bathroom. Pt stated understanding      Bedside table in front of patient and area set up for  function, convenience, and safety. RN aware of patient's mobility needs and status. Questions/concerns addressed within PTA scope of practice; patient  with no further questions. Time was provided for active listening, discussion of health disposition, and discussion of safe discharge. Pt?verbalized?agreement .    Patient left up in chair with all lines intact, call button in reach, and nsg notified..    GOALS:   Multidisciplinary Problems       Physical Therapy Goals          Problem: Physical Therapy    Goal Priority Disciplines Outcome Goal Variances Interventions   Physical Therapy Goal     PT, PT/OT Ongoing, Progressing     Description: Goals to be met by: 22     Patient will increase functional independence with mobility by performin. Supine to sit with [place] via log roll  2. Sit to stand transfer with Stand-by Assistance  3. Gait  x 150 feet with Stand-by Assistance using LRAD.  4. Ascend/descend 8 stairs with right Handrails Contact Guard Assistance using No Assistive Device.   5. Stand for 8 minutes with Stand-by Assistance using LRAD while performing dynamic activities.                         Time Tracking:     PT Received On: 22  PT Start Time: 851     PT Stop Time: 930  PT Total Time (min): 39 min     Billable Minutes: Gait Training 30 and Therapeutic Exercise 9    Treatment Type: Treatment  PT/PTA: PTA     PTA Visit Number: 1     2022

## 2022-09-06 NOTE — PLAN OF CARE
09/06/22 1139   Post-Acute Status   Post-Acute Authorization Home Health   Home Health Status Referrals Sent     SW faxed referral to [facility]  [facility] for review. SW will follow up as needed.    1:58 PM  [facility]     Laura Arriaga LMSW  Case Management   [facility]   Ext. [phone]

## 2022-09-06 NOTE — PLAN OF CARE
Problem: Occupational Therapy  Goal: Occupational Therapy Goal  Description: Goals to be met by: 9/17/2022     Patient will increase functional independence with ADLs by performing:    UE Dressing with Modified [place]. MET 9/6  LE Dressing with Stand-by Assistance for standing balance only when donning pants while using appropriate AE. MET 9/6  Grooming while standing at sink with Supervision. MET 9/6  Toileting from toilet with Stand-by Assistance for hygiene and clothing management. MET 9/6  Supine to sit with Stand-by Assistance.  Sit to stand transfer with Stand-by Assistance with RW. - Met  Toilet transfer to toilet with Stand-by Assistance with RW. MET 9/6    Outcome: Ongoing, Progressing

## 2022-09-06 NOTE — ASSESSMENT & PLAN NOTE
Korey Santos is a 81 y.o. male s/p L2-3 TLIF on 9/2 with Dr. William.     - Antibiotics: Postop ancef  - Weight bearing status: WBAT  - Labs: none  - DVT Prophylaxis: Hep, SCDs at all times while in bed  - Lines/Drains:  PIV/Geronimo x2 - removed 9/5  - Pain control: multimodal  - PT/OT     Dispo: f/u PT/OT (at the moment [facility], but patient would like to work again today to see if he is safe to go home with  PT)  '

## 2022-09-06 NOTE — DISCHARGE SUMMARY
[facility]  Orthopedics  Discharge Summary      Patient Name: Korey Santos  MRN: [id number]  Admission Date: 9/2/2022  Hospital Length of Stay: 4 days  Discharge Date and Time: 09/06/2022  Attending Physician: Luis William MD   Discharging Provider: Freedom Carlos MD  Primary Care Provider: Juan Solorzano MD    HPI:   Korey Santos is a 81 y.o. male w/ a hx of L L4-5 microdisc by Dr. 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 He has LBP that radiates anteromedially along the right thigh. He is miserable and he wants surgical intervention.     L3-4 KELLI (7/5/22): helped with his leg pain (from a 10 to a 6-7 today)     Procedure(s) (LRB):  FUSION, SPINE, LUMBAR, POSTERIOR APPROACH, USING PEDICLE SCREW L2-3 GLOBUS ROBOT SNS: SSEP/EMG (N/A)      Hospital Course:  On 9/2/22, the patient arrived to the [facility] for proper pre-operative management.  Upon completion of pre-operative preparation, the patient was taken back to the operative theatre. L2-3 Lami/PSF was performed without complication and the patient was transported to the post anesthesia care unit in stable condition.  After appropriate recovery from the anaesthetic agents used during the surgery, the patient was then transported to the hospital inpatient floor.  The interim of the hospital stay from arrival on the floor up to discharge has been uncomplicated. The patient has tolerated regular diet.  The patient's pain has been controlled using a multimodal approach. Currently, the patient's pain is well controlled on an oral regimen.  The patient has been voiding without difficulty.  The patient began participation in physical therapy after surgery and has progressed throughout the entire hospital stay.  Currently, the patient's progress is sufficient to allow the them to be discharged to home with home health safely.  The patient agrees  with this assessment and desires a discharge today.    Goals of Care Treatment Preferences:  Code Status: Full Code    Living Will: Yes              Consults (From admission, onward)        Status Ordering Provider     IP consult case management/social work  Once        Provider:  (Not yet assigned)    Acknowledged AUREA HAMMOND            Pending Diagnostic Studies:     None        Final Active Diagnoses:    Diagnosis Date Noted POA    PRINCIPAL PROBLEM:  Lumbar spondylosis [M47.816] 06/11/2014 Yes      Problems Resolved During this Admission:      Discharged Condition: good    [place] [facility] [place]:    Patient Instructions:      Diet general     Call MD for:  temperature >100.4     Call MD for:  persistent nausea and vomiting     Call MD for:  severe uncontrolled pain     Call MD for:  difficulty breathing, headache or visual disturbances     Call MD for:  redness, tenderness, or signs of infection (pain, swelling, redness, odor or green/yellow discharge around incision site)     Call MD for:  hives     Call MD for:  persistent dizziness or light-headedness     Call MD for:  extreme fatigue     Leave dressing on - Keep it clean, dry, and intact until clinic visit     Medications:  Reconciled Home Medications:      Medication List      START taking these medications    acetaminophen 650 MG Tbsr  Commonly known as: TYLENOL  Take 1 tablet (650 mg total) by mouth every 8 (eight) hours.     oxyCODONE 5 MG immediate release tablet  Commonly known as: ROXICODONE  Take 1 tablet (5 mg total) by mouth every 4 (four) hours as needed for Pain. Bedside delivery     senna-docusate 8.6-50 mg 8.6-50 mg per tablet  Commonly known as: PERICOLACE  Take 1 tablet by mouth 2 (two) times daily. Bedside delivery for 7 days        CHANGE how you take these medications    aspirin 325 MG EC tablet  Commonly known as: ECOTRIN  Take 1 tablet (325 mg total) by mouth once daily.  What changed: when to take this         CONTINUE taking these medications    azelastine 137 mcg (0.1 %) nasal spray  Commonly known as: ASTELIN  1 spray (137 mcg total) by Nasal route 2 (two) times daily as needed for Rhinitis.     cycloSPORINE 0.05 % ophthalmic emulsion  Commonly known as: RESTASIS  Place 1 drop into both eyes 2 (two) times daily.     finasteride 5 mg tablet  Commonly known as: PROSCAR  Take 1 tablet (5 mg total) by mouth once daily.     fluticasone propionate 50 mcg/actuation nasal spray  Commonly known as: FLONASE  1 spray (50 mcg total) by Each Nostril route once daily.     linaCLOtide 145 mcg Cap capsule  Commonly known as: LINZESS  Take 1 capsule (145 mcg total) by mouth daily as needed (constipation).     losartan 100 MG tablet  Commonly known as: COZAAR  Take 1 tablet by mouth once daily     meloxicam 15 MG tablet  Commonly known as: MOBIC  Take 1 tablet (15 mg total) by mouth once daily.     methocarbamoL 500 MG Tab  Commonly known as: ROBAXIN  Take 1 tablet (500 mg total) by mouth 3 (three) times daily.     multivitamin capsule  Take 1 capsule by mouth once daily.     polyethylene glycol 17 gram/dose powder  Commonly known as: GLYCOLAX  Take 17 g by mouth daily as needed.     pravastatin 40 MG tablet  Commonly known as: PRAVACHOL  Take 1 tablet (40 mg total) by mouth once daily.     pregabalin 100 MG capsule  Commonly known as: LYRICA  Take 1 capsule (100 mg total) by mouth 3 (three) times daily.     tamsulosin 0.4 mg Cap  Commonly known as: FLOMAX  Take 1 capsule (0.4 mg total) by mouth once daily.            Freedom Carlos MD  Orthopedics  [facility] - Surgery

## 2022-09-06 NOTE — PLAN OF CARE
[facility] - Surgery  Initial Discharge Assessment       Primary Care Provider: Juan Solorzano MD    Admission Diagnosis: Lumbar spondylosis [M47.816]  Lumbar radiculopathy [M54.16]    Admission Date: 9/2/2022  Expected Discharge Date: 9/6/2022         Payor: HU[employer] MANAGED MEDICARE / Plan: HU[employer] MEDICARE HMO / Product Type: Capitation /     Extended Emergency Contact Information  Primary Emergency Contact: Henok Santos  Address: [address]           [place] [address] [facility]  Home Phone: [phone]  Work Phone: [phone]  Mobile Phone: [phone]  Relation: Son  Secondary Emergency Contact: YasmeenmilidemarcusMelisa murray  Address: [address]           [place] [address] [address]  Home Phone: [phone]  Work Phone: [phone]  Mobile Phone: [phone]  Relation: Daughter    Discharge Plan A: Home, Home Health  Discharge Plan B: Home, Home Health      [facility] [address] [place] [address] [address] [facility]  [address] [facility] [address]  Phone: [phone] Fax: [phone]      Initial Assessment (most recent)       Adult Discharge Assessment - 09/06/22 1133          Discharge Assessment    Assessment Type Discharge Planning Assessment     Confirmed/corrected address, phone number and insurance Yes     Confirmed Demographics Correct on Facesheet     Source of Information patient     If unable to respond/provide information was family/caregiver contacted? Yes     Does patient/caregiver understand observation status Yes     Communicated GONZALO with patient/caregiver Yes     Lives With alone     Do you expect to return to your current living situation? Yes     Do you have help at home or someone to help you manage your care at home? Yes     Who are your caregiver(s) and their phone number(s)? Henok Santos (Son) [phone]     Prior to hospitilization cognitive status: Alert/Oriented     Current cognitive status: Alert/Oriented     Walking or Climbing Stairs  Difficulty ambulation difficulty, requires equipment     Mobility Management cane     Dressing/Bathing Difficulty none     Home Layout Able to live on 1st floor     Equipment Currently Used at Home cane, straight     Readmission within 30 days? No     Patient currently being followed by outpatient case management? No     Do you currently have service(s) that help you manage your care at home? No     Do you take prescription medications? Yes     Do you have prescription coverage? Yes     Do you have any problems affording any of your prescribed medications? No     Is the patient taking medications as prescribed? yes     Who is going to help you get home at discharge? Son, daughter, neighbors     How do you get to doctors appointments? family or friend will provide     Are you on dialysis? No     Do you take coumadin? No     Discharge Plan A Home;Home Health     Discharge Plan B Home;Home Health                   Spoke with patient at bedside to complete d/c planning assessment. Patient lives alone in a two-story duplex with a full flight of steps to enter. Independent at home with cane as needed. Patient can sleep on first floor of home at d/c and will have help from children and neighbors as needed. Verified PCP, Pharmacy and health insurance. Initial PT/OT recs were for SNF placement. Patient insists he can care for himself at home with PT/OT, RW and BSC. Patient ambulating alone in hallway with RW. SW to set up HH with [place] and Provide information for meals on wheels as requested by patient. RW and BSC ordered for delivery to bedside prior to d/c. Patient reports his son will pick him up around 6PM tonight. Will continue to follow.

## 2022-09-06 NOTE — PT/OT/SLP PROGRESS
"Occupational Therapy   Treatment    Name: Korey Santos  MRN: [id number]  Admitting Diagnosis:  Lumbar spondylosis  4 Days Post-Op  Procedure(s):  FUSION, SPINE, LUMBAR, POSTERIOR APPROACH, USING PEDICLE SCREW L2-3 GLOBUS ROBOT SNS: SSEP/EMG     Recommendations:     Discharge Recommendations: home health OT  Discharge Equipment Recommendations:  hip kit, bedside commode, walker, rolling  Barriers to discharge:  Decreased caregiver support, Inaccessible home environment    Assessment:     Korey Santos is a 81 y.o. male with a medical diagnosis of Lumbar spondylosis.  He presents with the following performance deficits affecting function are weakness, impaired endurance, impaired self care skills, impaired functional mobility, gait instability, decreased safety awareness, pain, decreased ROM, orthopedic precautions, impaired balance.     Patient agreeable to OT session and tolerated well. Patient participated in ADL re-training with focus on adaptive equipment/DME education and training, positioning techniques for adherence to spinal precautions, and improving safety insight to maximize (I) in basic ADLs. Patient is progressing well having achieved 5 of 7 goals in OT POC today. Continue OT POC.    Rehab Prognosis:  Good; patient would benefit from acute skilled OT services to address these deficits and reach maximum level of function.       Plan:     Patient to be seen 4 x/week to address the above listed problems via self-care/home management, therapeutic exercises, therapeutic activities  Plan of Care Expires: 10/02/22  Plan of Care Reviewed with: patient    Subjective   "Well I already ordered the hip kit on [employer] so I want to practice it. Sometimes I get stiff in the mornings."    Pain/Comfort:  Pain Rating 1: 0/10  Pain Rating Post-Intervention 1: 0/10    Objective:     Communicated with: RN and OTR prior to session.  Patient found up in chair with SCD upon OT entry to room.  A client care conference was " "completed by the OTR and the MONROY prior to treatment by the MONROY to discuss the patient's POC and current status. Collaboration between the evaluating OTR and treating MONROY completed to change discharge recommendation from SNF to HHOT secondary to patient progress.     General Precautions: Standard, fall   Orthopedic Precautions:spinal precautions   Braces: N/A  Respiratory Status: Room air     Occupational Performance:     Bed Mobility:    Did not assess. Patient received OOB    Functional Mobility/Transfers:  Patient completed Sit <> Stand Transfer with stand by assistance  with  no assistive device   Patient completed Chair Transfer using Step Transfer technique with stand by assistance with no assistive device  Patient completed Toilet Transfer Step Transfer technique with stand by assistance with  no AD  Functional Mobility: to/from bathroom (~12' x2) using no AD with SBA intermittent CGA    Activities of Daily Living:  Grooming: stand by assistance for safety to complete hand hygiene standing sink side  Upper Body Dressing: stand by assistance for decreased balance and cues for technique to don/doff button down shirt in standing  Lower Body Dressing: stand by assistance for decreased dynamics standing balance to pull underwear up to waist; Patient demo'd don/doff B socks seated using fig 4 technique with SBA and min cues for maintaining spinal prec; instruction provided on use of reacher and sock aid for LB dressing upon pt request due to having purchased and "feeling a little stiff in the mornings so I might need it." Patient encouraged to use AE as needed but has demo'd ability to do without with good compliance to spinal prec.  Toileting: supervision for cues and instruction on proper body mechanics/technique to complete perirectal hygiene simulation; pt demo'd good carry over with demonstration      [facility] 6 Click ADL: 22    Treatment & Education:  Education on OT POC, goals, and current " progress  Re-educated on spinal precautions and use of hip kit  ADL re-training as indicated above  Addressed all patient questions/concerns within MINNIE scope of practice.     Patient left up in chair with call button in reach    GOALS:   Multidisciplinary Problems       Occupational Therapy Goals          Problem: Occupational Therapy    Goal Priority Disciplines Outcome Interventions   Occupational Therapy Goal     OT, PT/OT Ongoing, Progressing    Description: Goals to be met by: 9/17/2022     Patient will increase functional independence with ADLs by performing:    UE Dressing with Modified [place].  LE Dressing with Stand-by Assistance for standing balance only when donning pants while using appropriate AE. MET 9/6  Grooming while standing at sink with Supervision. MET 9/6  Toileting from toilet with Stand-by Assistance for hygiene and clothing management. MET 9/6  Supine to sit with Stand-by Assistance.  Sit to stand transfer with Stand-by Assistance with RW. - Met  Toilet transfer to toilet with Stand-by Assistance with RW.                         Time Tracking:     OT Date of Treatment: 09/06/22  OT Start Time: 1219  OT Stop Time: 1249  OT Total Time (min): 30 min    Billable Minutes:Self Care/Home Management 19  Therapeutic Activity 11    OT/MINNIE: MINNIE     MINNIE Visit Number: 1    9/6/2022

## 2022-09-06 NOTE — SUBJECTIVE & OBJECTIVE
"Principal Problem:Lumbar spondylosis    Principal Orthopedic Problem: same    Interval History: Patient examined at the bedside. NAEON. Pain controlled. Tolerating PO w/out N/V and voiding appropriately. Drains dc'd yesterday. Endorsed a large BM yesterday. Walking well around the room yesterday.     Review of patient's allergies indicates:   Allergen Reactions    Clindamycin Swelling     Throat swells    Lisinopril Swelling and Other (See Comments)     Throat swelling       Current Facility-Administered Medications   Medication    0.9%  NaCl infusion    acetaminophen tablet 1,000 mg    bisacodyL suppository 10 mg    celecoxib capsule 200 mg    finasteride tablet 5 mg    guaiFENesin 12 hr tablet 600 mg    heparin (porcine) injection 5,000 Units    linaCLOtide capsule 145 mcg    losartan tablet 100 mg    melatonin tablet 6 mg    methocarbamoL tablet 750 mg    metoclopramide HCl injection 5 mg    morphine injection 3 mg    ondansetron injection 4 mg    oxyCODONE immediate release tablet 5 mg    oxyCODONE immediate release tablet Tab 10 mg    polyethylene glycol packet 17 g    pravastatin tablet 40 mg    pregabalin capsule 150 mg    senna-docusate 8.6-50 mg per tablet 1 tablet    sodium chloride 0.9% flush 10 mL    tamsulosin 24 hr capsule 0.4 mg     Objective:     Vital Signs (Most Recent):  Temp: 98.3 °F (36.8 °C) (09/06/22 0449)  Pulse: 64 (09/06/22 0449)  Resp: 18 (09/06/22 0449)  BP: (!) 127/58 (09/06/22 0449)  SpO2: (!) 93 % (09/06/22 0449)   Vital Signs (24h Range):  Temp:  [96.1 °F (35.6 °C)-98.3 °F (36.8 °C)] 98.3 °F (36.8 °C)  Pulse:  [61-70] 64  Resp:  [15-18] 18  SpO2:  [93 %-99 %] 93 %  BP: (126-166)/(58-66) 127/58     Weight: 102.1 kg (225 lb)  Height: 5' 11" (180.3 cm)  Body mass index is 31.38 kg/m².      Intake/Output Summary (Last 24 hours) at 9/6/2022 0635  Last data filed at 9/6/2022 0500  Gross per 24 hour   Intake 1150 ml   Output --   Net 1150 ml         Ortho/SPM Exam  NAD, A/O x 3.  Wound c/d/i " with clean dressing.  No focal motor or sensory deficits noted.    Significant Labs: CBC: No results for input(s): WBC, HGB, HCT, PLT in the last 48 hours.  CMP: No results for input(s): NA, K, CL, CO2, GLU, BUN, CREATININE, CALCIUM, PROT, ALBUMIN, BILITOT, ALKPHOS, AST, ALT, ANIONGAP, EGFRNONAA in the last 48 hours.    Invalid input(s): ESTGFAFRICA  All pertinent labs within the past 24 hours have been reviewed.    Significant Imaging: I have reviewed all pertinent imaging results/findings.

## 2022-09-06 NOTE — PROGRESS NOTES
[facility]  Orthopedics  Progress Note    Patient Name: Korey Santos  MRN: [id number]  Admission Date: 9/2/2022  Hospital Length of Stay: 4 days  Attending Provider: Luis William MD  Primary Care Provider: Juan Solorzano MD  Follow-up For: Procedure(s) (LRB):  FUSION, SPINE, LUMBAR, POSTERIOR APPROACH, USING PEDICLE SCREW L2-3 GLOBUS ROBOT SNS: SSEP/EMG (N/A)    Post-Operative Day: 4 Days Post-Op  Subjective:     Principal Problem:Lumbar spondylosis    Principal Orthopedic Problem: same    Interval History: Patient examined at the bedside. NAEON. Pain controlled. Tolerating PO w/out N/V and voiding appropriately. Drains dc'd yesterday. Endorsed a large BM yesterday. Walking well around the room yesterday.     Review of patient's allergies indicates:   Allergen Reactions    Clindamycin Swelling     Throat swells    Lisinopril Swelling and Other (See Comments)     Throat swelling       Current Facility-Administered Medications   Medication    0.9%  NaCl infusion    acetaminophen tablet 1,000 mg    bisacodyL suppository 10 mg    celecoxib capsule 200 mg    finasteride tablet 5 mg    guaiFENesin 12 hr tablet 600 mg    heparin (porcine) injection 5,000 Units    linaCLOtide capsule 145 mcg    losartan tablet 100 mg    melatonin tablet 6 mg    methocarbamoL tablet 750 mg    metoclopramide HCl injection 5 mg    morphine injection 3 mg    ondansetron injection 4 mg    oxyCODONE immediate release tablet 5 mg    oxyCODONE immediate release tablet Tab 10 mg    polyethylene glycol packet 17 g    pravastatin tablet 40 mg    pregabalin capsule 150 mg    senna-docusate 8.6-50 mg per tablet 1 tablet    sodium chloride 0.9% flush 10 mL    tamsulosin 24 hr capsule 0.4 mg     Objective:     Vital Signs (Most Recent):  Temp: 98.3 °F (36.8 °C) (09/06/22 0449)  Pulse: 64 (09/06/22 0449)  Resp: 18 (09/06/22 0449)  BP: (!) 127/58 (09/06/22 0449)  SpO2: (!) 93 % (09/06/22 0449)   Vital Signs (24h  "Range):  Temp:  [96.1 °F (35.6 °C)-98.3 °F (36.8 °C)] 98.3 °F (36.8 °C)  Pulse:  [61-70] 64  Resp:  [15-18] 18  SpO2:  [93 %-99 %] 93 %  BP: (126-166)/(58-66) 127/58     Weight: 102.1 kg (225 lb)  Height: 5' 11" (180.3 cm)  Body mass index is 31.38 kg/m².      Intake/Output Summary (Last 24 hours) at 9/6/2022 0635  Last data filed at 9/6/2022 0500  Gross per 24 hour   Intake 1150 ml   Output --   Net 1150 ml         Ortho/SPM Exam  NAD, A/O x 3.  Wound c/d/i with clean dressing.  No focal motor or sensory deficits noted.    Significant Labs: CBC: No results for input(s): WBC, HGB, HCT, PLT in the last 48 hours.  CMP: No results for input(s): NA, K, CL, CO2, GLU, BUN, CREATININE, CALCIUM, PROT, ALBUMIN, BILITOT, ALKPHOS, AST, ALT, ANIONGAP, EGFRNONAA in the last 48 hours.    Invalid input(s): ESTGFAFRICA  All pertinent labs within the past 24 hours have been reviewed.    Significant Imaging: I have reviewed all pertinent imaging results/findings.    Assessment/Plan:     * Lumbar spondylosis  Korey Santos is a 81 y.o. male s/p L2-3 TLIF on 9/2 with Dr. William.     - Antibiotics: Postop ancef  - Weight bearing status: WBAT  - Labs: none  - DVT Prophylaxis: Hep, SCDs at all times while in bed  - Lines/Drains:  PIV/Geronimo x2 - removed 9/5  - Pain control: multimodal  - PT/OT     Dispo: f/u PT/OT (at the moment [facility] SN[facility], but patient would like to work again today to see if he is safe to go home with  PT)  '          Freedom Carlos MD  Orthopedics  [facility] - Surgery  "

## 2022-09-06 NOTE — PLAN OF CARE
[facility]    HOME HEALTH ORDERS  FACE TO FACE ENCOUNTER    Patient Name: Korey Santos  YOB: 1940    PCP: Juan Solorzano MD   PCP Address: [address] / [place] [address]  PCP Phone Number: [phone]  PCP Fax: [phone]    Encounter Date: 09/06/2022    Admit to Home Health    Diagnoses:  Active Hospital Problems    Diagnosis  POA    *Lumbar spondylosis [M47.816]  Yes      Resolved Hospital Problems   No resolved problems to display.       No future appointments.        I have seen and examined this patient face to face today. My clinical findings that support the need for the home health skilled services and home bound status are the following:  Weakness/numbness causing balance and gait disturbance due to Surgery making it taxing to leave home.    Allergies:  Review of patient's allergies indicates:   Allergen Reactions    Clindamycin Swelling     Throat swells    Lisinopril Swelling and Other (See Comments)     Throat swelling       Diet: regular diet    Activities: activity as tolerated    Nursing:   SN to complete comprehensive assessment including routine vital signs. Instruct on disease process and s/s of complications to report to MD. Follow specific home health arthoplasty protocol. Review/verify medication list sent home with the patient at time of discharge  and instruct patient/caregiver as needed. If coumadin ordered, coumadin clinic to manage INR with INR draws 2x per week with a goal to maintain INR between 1.8 and 2.2. Frequency may be adjusted depending on start of care date.    Notify MD if SBP > 160 or < 90; DBP > 90 or < 50; HR > 120 or < 50; Temp > 101    Home Medical Equipment:  Walker, 3-1 bedside commode, transfer tub bench    CONSULTS:    Physical Therapy to evaluate and treat. Evaluate for home safety and equipment needs; Establish/upgrade home exercise program. Perform / instruct on therapeutic exercises, gait training, transfer training,  and Range of Motion.    OTHER:  Occupational Therapy to evaluate and treat. Evaluate home environment for safety and equipment needs. Perform/Instruct on transfers, ADL training, ROM, and therapeutic exercises.   to evaluate for community resources/long-range planning.  Aide to provide assistance with personal care, ADLs, and vital signs.    MISCELLANEOUS CARE:  Routine Skin for Bedridden Patients: Instruct patient/caregiver to apply moisture barrier cream to all skin folds and wet areas in perineal area daily and after baths and all bowel movements.    WOUND CARE ORDERS  Follow Home Health specific protocol.      Medications: Review discharge medications with patient and family and provide education.      Current Discharge Medication List        START taking these medications    Details   acetaminophen (TYLENOL) 650 MG TbSR Take 1 tablet (650 mg total) by mouth every 8 (eight) hours.  Qty: 120 tablet, Refills: 0    Comments: Bedside delivery      oxyCODONE (ROXICODONE) 5 MG immediate release tablet Take 1 tablet (5 mg total) by mouth every 4 (four) hours as needed for Pain. Bedside delivery  Qty: 30 tablet, Refills: 0    Comments: Bedside delivery.  Quantity prescribed more than 7 day supply? No      senna-docusate 8.6-50 mg (PERICOLACE) 8.6-50 mg per tablet Take 1 tablet by mouth 2 (two) times daily. Bedside delivery for 7 days  Qty: 14 tablet, Refills: 0           CONTINUE these medications which have NOT CHANGED    Details   azelastine (ASTELIN) 137 mcg (0.1 %) nasal spray 1 spray (137 mcg total) by Nasal route 2 (two) times daily as needed for Rhinitis.  Qty: 30 mL, Refills: 0      finasteride (PROSCAR) 5 mg tablet Take 1 tablet (5 mg total) by mouth once daily.  Qty: 90 tablet, Refills: 3    Associated Diagnoses: Prostatism      fluticasone propionate (FLONASE) 50 mcg/actuation nasal spray 1 spray (50 mcg total) by Each Nostril route once daily.  Qty: 16 g, Refills: 6    Associated Diagnoses:  Allergic rhinitis, unspecified seasonality, unspecified trigger      linaCLOtide (LINZESS) 145 mcg Cap capsule Take 1 capsule (145 mcg total) by mouth daily as needed (constipation).  Qty: 90 capsule, Refills: 3    Associated Diagnoses: Chronic idiopathic constipation      losartan (COZAAR) 100 MG tablet Take 1 tablet by mouth once daily  Qty: 90 tablet, Refills: 0    Associated Diagnoses: Hypertension, essential      meloxicam (MOBIC) 15 MG tablet Take 1 tablet (15 mg total) by mouth once daily.  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      polyethylene glycol (GLYCOLAX) 17 gram/dose powder Take 17 g by mouth daily as needed.  Qty: 510 g, Refills: 1      pravastatin (PRAVACHOL) 40 MG tablet Take 1 tablet (40 mg total) by mouth once daily.  Qty: 30 tablet, Refills: 11      pregabalin (LYRICA) 100 MG capsule Take 1 capsule (100 mg total) by mouth 3 (three) times daily.  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  Qty: 90 capsule, Refills: 4    Associated Diagnoses: Benign prostatic hyperplasia with urinary frequency      aspirin (ECOTRIN) 325 MG EC tablet Take 1 tablet (325 mg total) by mouth once daily.  Qty: 42 tablet, Refills: 0      cycloSPORINE (RESTASIS) 0.05 % ophthalmic emulsion Place 1 drop into both eyes 2 (two) times daily.  Qty: 60 vial, Refills: 11             I certify that this patient is confined to his home and needs physical therapy.

## 2022-09-07 ENCOUNTER — TELEPHONE (OUTPATIENT)
Dept: ORTHOPEDICS | Facility: CLINIC | Age: 82
End: 2022-09-07
Payer: MEDICARE

## 2022-09-07 DIAGNOSIS — M51.36 DDD (DEGENERATIVE DISC DISEASE), LUMBAR: Primary | ICD-10-CM

## 2022-09-07 PROCEDURE — G0180 PR HOME HEALTH MD CERTIFICATION: ICD-10-PCS | Mod: ,,, | Performed by: ORTHOPAEDIC SURGERY

## 2022-09-07 PROCEDURE — G0180 MD CERTIFICATION HHA PATIENT: HCPCS | Mod: ,,, | Performed by: ORTHOPAEDIC SURGERY

## 2022-09-07 NOTE — PLAN OF CARE
[facility]  Discharge Final Note    Primary Care Provider: Juan Solorzano MD    Expected Discharge Date: 9/6/2022    Final Discharge Note (most recent)       Final Note - 09/07/22 1133          Final Note    Assessment Type Final Discharge Note     [place]tic[place] [facility] [place] Resources/Appts/Education Provided Appointments scheduled and added to AVS        Post-Acute Status    Post-Acute Authorization Home Health                     Important Message from Medicare  Important Message from Medicare regarding Discharge Appeal Rights: Given to patient/caregiver, Explained to patient/caregiver, Signed/date by patient/caregiver     Date IMM was signed: 09/06/22  Time IMM was signed: 1010    Patient discharged home to care of Beny  on 9/6/22.

## 2022-09-07 NOTE — TELEPHONE ENCOUNTER
----- Message from Elaine Noe RN sent at 9/7/2022 11:35 AM CDT -----  Patient discharged on 9/6/22 and has no Post-op appt arranged. Can you please schedule this. Thanks, Lesli [phone]

## 2022-09-08 ENCOUNTER — PATIENT OUTREACH (OUTPATIENT)
Dept: ADMINISTRATIVE | Facility: CLINIC | Age: 82
End: 2022-09-08
Payer: MEDICARE

## 2022-09-12 ENCOUNTER — TELEPHONE (OUTPATIENT)
Dept: ORTHOPEDICS | Facility: CLINIC | Age: 82
End: 2022-09-12
Payer: MEDICARE

## 2022-09-12 NOTE — TELEPHONE ENCOUNTER
Spoke with patient and HH nurse and she stated that his derma bound is coming off and would like to know should he come in before 10/6/22 to see about incision. She did put 2 Island dressings on the sides.     Also patient is complaining of gas and stomach pain, was given something for constipation but not for gas, please advise!

## 2022-09-12 NOTE — TELEPHONE ENCOUNTER
----- Message from Catherine Hogan sent at 9/12/2022 10:18 AM CDT -----  Needs advice from nurse:      Who Called:[facility]--PT assist  Regarding:needs to know if they can fix the dressing as it is coming off//PT is reinforce dressing  Would the patient rather a call back or VIA [facility]?  Best Call Back number:  Additional Info:patient is being gassy/stomach/back pain [phone]

## 2022-09-14 ENCOUNTER — PATIENT MESSAGE (OUTPATIENT)
Dept: ORTHOPEDICS | Facility: CLINIC | Age: 82
End: 2022-09-14
Payer: MEDICARE

## 2022-09-14 NOTE — TELEPHONE ENCOUNTER
Spoke with patient an scheduled appointment for Friday per ARGELIA Slaughter and patient agreed verbally.

## 2022-09-15 ENCOUNTER — EXTERNAL HOME HEALTH (OUTPATIENT)
Dept: HOME HEALTH SERVICES | Facility: HOSPITAL | Age: 82
End: 2022-09-15
Payer: MEDICARE

## 2022-09-19 ENCOUNTER — OFFICE VISIT (OUTPATIENT)
Dept: ORTHOPEDICS | Facility: CLINIC | Age: 82
End: 2022-09-19
Payer: MEDICARE

## 2022-09-19 VITALS — BODY MASS INDEX: 32.39 KG/M2 | HEIGHT: 71 IN | WEIGHT: 231.38 LBS

## 2022-09-19 DIAGNOSIS — Z98.1 S/P LUMBAR FUSION: Primary | ICD-10-CM

## 2022-09-19 PROCEDURE — 99024 POSTOP FOLLOW-UP VISIT: CPT | Mod: S$GLB,,, | Performed by: ORTHOPAEDIC SURGERY

## 2022-09-19 PROCEDURE — 1125F AMNT PAIN NOTED PAIN PRSNT: CPT | Mod: CPTII,S$GLB,, | Performed by: ORTHOPAEDIC SURGERY

## 2022-09-19 PROCEDURE — 1157F ADVNC CARE PLAN IN RCRD: CPT | Mod: CPTII,S$GLB,, | Performed by: ORTHOPAEDIC SURGERY

## 2022-09-19 PROCEDURE — 99024 PR POST-OP FOLLOW-UP VISIT: ICD-10-PCS | Mod: S$GLB,,, | Performed by: ORTHOPAEDIC SURGERY

## 2022-09-19 PROCEDURE — 1157F PR ADVANCE CARE PLAN OR EQUIV PRESENT IN MEDICAL RECORD: ICD-10-PCS | Mod: CPTII,S$GLB,, | Performed by: ORTHOPAEDIC SURGERY

## 2022-09-19 PROCEDURE — 99999 PR PBB SHADOW E&M-EST. PATIENT-LVL III: ICD-10-PCS | Mod: PBBFAC,,, | Performed by: ORTHOPAEDIC SURGERY

## 2022-09-19 PROCEDURE — 1125F PR PAIN SEVERITY QUANTIFIED, PAIN PRESENT: ICD-10-PCS | Mod: CPTII,S$GLB,, | Performed by: ORTHOPAEDIC SURGERY

## 2022-09-19 PROCEDURE — 1159F MED LIST DOCD IN RCRD: CPT | Mod: CPTII,S$GLB,, | Performed by: ORTHOPAEDIC SURGERY

## 2022-09-19 PROCEDURE — 1159F PR MEDICATION LIST DOCUMENTED IN MEDICAL RECORD: ICD-10-PCS | Mod: CPTII,S$GLB,, | Performed by: ORTHOPAEDIC SURGERY

## 2022-09-19 PROCEDURE — 99999 PR PBB SHADOW E&M-EST. PATIENT-LVL III: CPT | Mod: PBBFAC,,, | Performed by: ORTHOPAEDIC SURGERY

## 2022-09-20 NOTE — PROGRESS NOTES
Date: 09/20/2022    Supervising Physician: Luis William M.D.    Date of Surgery: 9/2/22    Procedure: L2-3 TLIF    History: Korey Santos is seen today for follow-up following the above listed procedure. Overall the patient is doing well but today notes his pain is 6/10 but tolerable with medication. He wanted to have his wound checked today because the bandage is falling off. He has been working with  and would like to continue since he lives home alone.  he denies fever, chills, and sweats since the time of the surgery.       Exam: Post op dressing taken down.  Incision is healing well, clean, dry and intact.   There is no sign of infection. Neuro exam is stable. No signs of DVT.    Radiographs: n/a    Assessment/Plan: 2 weeks post op.    Doing well postoperatively.  reviewed.  orders renewed by Dr. William on 9/15/22.    I will plan to see the patient back for the next postop visit in 2 weeks.     Thank you for the opportunity to participate in this patient's care. Please give me a call if there are any concerns or questions.

## 2022-09-27 ENCOUNTER — DOCUMENT SCAN (OUTPATIENT)
Dept: HOME HEALTH SERVICES | Facility: HOSPITAL | Age: 82
End: 2022-09-27
Payer: MEDICARE

## 2022-10-06 ENCOUNTER — HOSPITAL ENCOUNTER (OUTPATIENT)
Dept: RADIOLOGY | Facility: HOSPITAL | Age: 82
Discharge: HOME | End: 2022-10-06
Attending: ORTHOPAEDIC SURGERY
Payer: MEDICARE

## 2022-10-06 ENCOUNTER — CLINICAL SUPPORT (OUTPATIENT)
Dept: REHABILITATION | Facility: HOSPITAL | Age: 82
End: 2022-10-06
Attending: ORTHOPAEDIC SURGERY
Payer: MEDICARE

## 2022-10-06 ENCOUNTER — OFFICE VISIT (OUTPATIENT)
Dept: ORTHOPEDICS | Facility: CLINIC | Age: 82
End: 2022-10-06
Payer: MEDICARE

## 2022-10-06 VITALS — WEIGHT: 231.25 LBS | BODY MASS INDEX: 32.37 KG/M2 | HEIGHT: 71 IN

## 2022-10-06 DIAGNOSIS — M54.2 NECK PAIN: ICD-10-CM

## 2022-10-06 DIAGNOSIS — Z98.1 S/P LUMBAR FUSION: Primary | ICD-10-CM

## 2022-10-06 DIAGNOSIS — M53.86 DECREASED RANGE OF MOTION OF LUMBAR SPINE: ICD-10-CM

## 2022-10-06 DIAGNOSIS — R29.898 WEAKNESS OF BOTH LOWER EXTREMITIES: ICD-10-CM

## 2022-10-06 DIAGNOSIS — Z98.1 S/P LUMBAR FUSION: ICD-10-CM

## 2022-10-06 DIAGNOSIS — M51.36 DDD (DEGENERATIVE DISC DISEASE), LUMBAR: ICD-10-CM

## 2022-10-06 DIAGNOSIS — M54.50 LUMBAR PAIN: ICD-10-CM

## 2022-10-06 PROCEDURE — 97161 PT EVAL LOW COMPLEX 20 MIN: CPT | Mod: PO

## 2022-10-06 PROCEDURE — 1125F AMNT PAIN NOTED PAIN PRSNT: CPT | Mod: CPTII,S$GLB,, | Performed by: ORTHOPAEDIC SURGERY

## 2022-10-06 PROCEDURE — 1125F PR PAIN SEVERITY QUANTIFIED, PAIN PRESENT: ICD-10-PCS | Mod: CPTII,S$GLB,, | Performed by: ORTHOPAEDIC SURGERY

## 2022-10-06 PROCEDURE — 3288F PR FALLS RISK ASSESSMENT DOCUMENTED: ICD-10-PCS | Mod: CPTII,S$GLB,, | Performed by: ORTHOPAEDIC SURGERY

## 2022-10-06 PROCEDURE — 3288F FALL RISK ASSESSMENT DOCD: CPT | Mod: CPTII,S$GLB,, | Performed by: ORTHOPAEDIC SURGERY

## 2022-10-06 PROCEDURE — 1101F PR PT FALLS ASSESS DOC 0-1 FALLS W/OUT INJ PAST YR: ICD-10-PCS | Mod: CPTII,S$GLB,, | Performed by: ORTHOPAEDIC SURGERY

## 2022-10-06 PROCEDURE — 1159F PR MEDICATION LIST DOCUMENTED IN MEDICAL RECORD: ICD-10-PCS | Mod: CPTII,S$GLB,, | Performed by: ORTHOPAEDIC SURGERY

## 2022-10-06 PROCEDURE — 72100 X-RAY EXAM L-S SPINE 2/3 VWS: CPT | Mod: 26,,, | Performed by: RADIOLOGY

## 2022-10-06 PROCEDURE — 1101F PT FALLS ASSESS-DOCD LE1/YR: CPT | Mod: CPTII,S$GLB,, | Performed by: ORTHOPAEDIC SURGERY

## 2022-10-06 PROCEDURE — 72100 X-RAY EXAM L-S SPINE 2/3 VWS: CPT | Mod: TC

## 2022-10-06 PROCEDURE — 1157F PR ADVANCE CARE PLAN OR EQUIV PRESENT IN MEDICAL RECORD: ICD-10-PCS | Mod: CPTII,S$GLB,, | Performed by: ORTHOPAEDIC SURGERY

## 2022-10-06 PROCEDURE — 99024 POSTOP FOLLOW-UP VISIT: CPT | Mod: S$GLB,,, | Performed by: ORTHOPAEDIC SURGERY

## 2022-10-06 PROCEDURE — 99024 PR POST-OP FOLLOW-UP VISIT: ICD-10-PCS | Mod: S$GLB,,, | Performed by: ORTHOPAEDIC SURGERY

## 2022-10-06 PROCEDURE — 1160F RVW MEDS BY RX/DR IN RCRD: CPT | Mod: CPTII,S$GLB,, | Performed by: ORTHOPAEDIC SURGERY

## 2022-10-06 PROCEDURE — 1160F PR REVIEW ALL MEDS BY PRESCRIBER/CLIN PHARMACIST DOCUMENTED: ICD-10-PCS | Mod: CPTII,S$GLB,, | Performed by: ORTHOPAEDIC SURGERY

## 2022-10-06 PROCEDURE — 99999 PR PBB SHADOW E&M-EST. PATIENT-LVL IV: ICD-10-PCS | Mod: PBBFAC,,, | Performed by: ORTHOPAEDIC SURGERY

## 2022-10-06 PROCEDURE — 1159F MED LIST DOCD IN RCRD: CPT | Mod: CPTII,S$GLB,, | Performed by: ORTHOPAEDIC SURGERY

## 2022-10-06 PROCEDURE — 72100 XR LUMBAR SPINE AP AND LATERAL: ICD-10-PCS | Mod: 26,,, | Performed by: RADIOLOGY

## 2022-10-06 PROCEDURE — 1157F ADVNC CARE PLAN IN RCRD: CPT | Mod: CPTII,S$GLB,, | Performed by: ORTHOPAEDIC SURGERY

## 2022-10-06 PROCEDURE — 99999 PR PBB SHADOW E&M-EST. PATIENT-LVL IV: CPT | Mod: PBBFAC,,, | Performed by: ORTHOPAEDIC SURGERY

## 2022-10-06 NOTE — PROGRESS NOTES
Date of Surgery: 9/2/22    Procedure: L2-3 PLDF    History: Korey Santos is seen today for follow-up following the above listed procedure. Overall the patient is doing well but today notes he has more neck pain and b/l shoulder pain. He stated that the preop leg pain has improved but he has n/t in his toes. He finished HH and he wants to continue outpatient PT.    Exam: Incision is healed. There is no sign of infection. Neuro exam is stable. No signs of DVT.    Radiographs: L2-3 PLDF with no evidence of instrumentation failure.    Assessment/Plan: Doing well postoperatively. I prescribed PT. I will plan to see the patient back for the next postop visit in 2 months with repeat lumbar Xrs. He will also get cervical Xrs at next visit. Thank you for the opportunity to participate in this patient's care. Please give me a call if there are any concerns or questions.    Luis William MD  Orthopaedic Spine Surgeon  Department of Orthopaedic Surgery  [phone]

## 2022-10-07 PROBLEM — M53.86 DECREASED RANGE OF MOTION OF LUMBAR SPINE: Status: ACTIVE | Noted: 2022-10-07

## 2022-10-07 PROBLEM — M54.50 LUMBAR PAIN: Status: ACTIVE | Noted: 2022-10-07

## 2022-10-07 PROBLEM — R29.898 WEAKNESS OF BOTH LOWER EXTREMITIES: Status: ACTIVE | Noted: 2022-10-07

## 2022-10-07 NOTE — PLAN OF CARE
[facility]   Physical Therapy Initial Evaluation     Date: 10/6/2022   Name: Korey Santos  Clinic Number: [id number]    Therapy Diagnosis:   Encounter Diagnoses   Name Primary?    S/P lumbar fusion     Lumbar pain     Weakness of both lower extremities     Decreased range of motion of lumbar spine      Physician: Luis William MD    Physician Orders: PT Eval and Treat   Medical Diagnosis from Referral: Z98.1 (ICD-10-CM) - S/P lumbar fusion  Evaluation Date: 10/6/2022  Authorization Period Expiration: 10/06/2023  Plan of Care Expiration: 12/6/2022  Progress Note Due: 11/6/2022  Visit # / Visits authorized: 1/ 1   FOTO: 0/5    Precautions: Standard and No bending, lifting, or twisting of lumbar spine    Time In: 2:10 pm  Time Out: 3:00 pm  Total Appointment Time (timed & untimed codes): 50 minutes    DOS: 9/2/2022 s/p lumbar fusion    SUBJECTIVE     Date of onset: 9/2/2022 s/p lumbar fusion    History of current condition - Korey reports: I went to the doctor today, and this was my first visit following surgery. I got an X-ray done today, and the doctor said that it looked good. I have been doing pretty well. I got home health and they helped me a lot. Sometimes I have a shooting pain down the legs that is also a numb feeling. I get tired after walking for a while and I get a pain around the R side of my groin. My back is always kind of sore.    Falls: None reported    Imaging,     X-ray Lumbar Spine:  FINDINGS:  Lumbar spine two views: L2-L3 demonstrates pedicle screws and fixation rods.  There is grade 1 anterolisthesis of L4 on L5.  There is mild-moderate DJD at lumbar levels and moderate DJD at lower thoracic levels.  No acute fracture dislocation bone destruction seen.  No acute trauma seen.  There is possible transverse process fusion material at L2-L3.    Prior Therapy: Yes, home health following lumbar fusion  Social History: lives alone  Occupation: retired  Prior Level of  Function: Modified Independent (SPC - increased pain after cleaning up from [place])  Current Level of Function: Modified Independent (SPC - increased pain in neck following surgery, nerve irritation in low back down BLEs)    Pain:  Current 6/10, worst 7/10, best 5/10   Location: Lumbar Spine  Description: Tingling, Numb and Soreness  Aggravating Factors: Bending, Walking, Lifting, and Twisting  Easing Factors: muscle relaxer and ice    Patients goals: being able to move around without pain, walk for longer periods of time     Medical History:   Past Medical History:   Diagnosis Date    Arthritis     Back pain, chronic     BPH with urinary obstruction     Chronic constipation     Colon polyp     DJD (degenerative joint disease)     Hip    Hyperlipidemia     Hypertension     Laryngopharyngeal reflux     Left inguinal hernia     Lumbar herniated disc     Lumbar stenosis     OA (osteoarthritis) of knee     Bilateral    Paradoxical insomnia     Sinus trouble        Surgical History:   Korey Santos  has a past surgical history that includes left hand; Back surgery (2014); Leg Surgery; Knee surgery; Colonoscopy; Upper gastrointestinal endoscopy; Joint replacement (Left, 05/04/2018); Spine surgery; and Epidural steroid injection into lumbar spine (N/A, 7/5/2022).    Medications:   Korey has a current medication list which includes the following prescription(s): acetaminophen, aspirin, azelastine, cyclosporine, finasteride, fluticasone propionate, linaclotide, losartan, meloxicam, methocarbamol, multivitamin, polyethylene glycol, pravastatin, pregabalin, and tamsulosin.    Allergies:   Review of patient's allergies indicates:   Allergen Reactions    Clindamycin Swelling     Throat swells    Lisinopril Swelling and Other (See Comments)     Throat swelling          OBJECTIVE     Observation: Pt is an 82 yo presenting to therapy in no apparent distress    Transfers:  Requires 1UE support for sit to stand    Gait: Decreased  balance and slow cecilia    Lumbar Range of Motion:    Degrees Pain   Flexion NT (Unable due to post op precautions)   Yes   Extension NT(Unable due to post op precautions)   Yes   Left Side Bending NT(Unable due to post op precautions) Yes   Right Side Bending NT(Unable due to post op precautions) Yes   Left rotation   NT(Unable due to post op precautions) Yes   Right Rotation   NT(Unable due to post op precautions) Yes        Lower Extremity Strength  Right LE  Left LE    Knee extension: 4+/5 Knee extension: 5/5   Knee flexion: 4-/5 Knee flexion: 5/5   Hip flexion: At least 3/5 Hip flexion: At least 3/5    Hip extension:  NT Hip extension: NT   Hip abduction: NT Hip abduction: NT   Hip adduction: 4/5* Hip adduction 4/5   Ankle dorsiflexion: 5/5 Ankle dorsiflexion: 5/5   Ankle plantarflexion: 5/5 Ankle plantarflexion: 5/5       Palpation: TTP with moderate pressure lumbar paraspinals      Limitation/Restriction for FOTO Lumbar Spine Survey    Therapist reviewed FOTO scores for Korey Santos on 10/6/2022.   FOTO documents entered into [employer] - see Media section.    Limitation Score: 36%         TREATMENT     Total Treatment time (time-based codes) separate from Evaluation: 0 minutes      Korey received the treatments listed below:      therapeutic exercises to develop strength, endurance, ROM, and flexibility for 0 minutes including:    Date 10/6/2022   Visit 1/1   POC exp 12/6/2022   PN 11/6/2022   FTF 11/6/2022   FOTO 0/5       NuStep    Supine:    TA w iso abs    Glute sets    Bug    QS    SLR        Seated:    LAQ    HS curls    Hip ADD    Hip ABD    Standing:    Hip 3-way              Initials EG        manual therapy techniques: Myofacial release and Soft tissue Mobilization were applied to the: low back for 0 minutes, including:  - STM for lumbar paraspinals    neuromuscular re-education activities to improve: Balance, Coordination, and Proprioception for 0 minutes. The following activities were  included:  - SLS  - Tandem stance  - NBOS on stable surface EC  - NBOS on unstable surface EO  - NBOS on unstable surface EC      PATIENT EDUCATION AND HOME EXERCISES     Education provided:   - importance of consistent performance of HEP  - role of PT/PTA    Written Home Exercises Provided: yes. Exercises were reviewed and Korey was able to demonstrate them prior to the end of the session.  Korey demonstrated good  understanding of the education provided. See EMR under Patient Instructions for exercises provided during therapy sessions.    ASSESSMENT     Korey is a 82 y.o. male referred to outpatient Physical Therapy with a medical diagnosis of s/p lumbar fusion. Patient presents to therapy using a SPC, with moderate levels of pain and decreased lumbar ROM due to precautions of no bending, twisting, or lifting of the lumbar spine following lumbar fusion on 9/2/2022. Additionally, pt displays some decreased BLE strength, and gait abnormalities including decreased gait speed. Pt inquires about performance of activities that are contraindicated at this time, and is reminded about the importance of maintaining lumbar fusion precautions.    Patient prognosis is Good.   Patient will benefit from skilled outpatient Physical Therapy to address the deficits stated above and in the chart below, provide patient /family education, and to maximize patient's level of independence.     Plan of care discussed with patient: Yes  Patient's spiritual, cultural and educational needs considered and patient is agreeable to the plan of care and goals as stated below:     Anticipated Barriers for therapy: None    Medical Necessity is demonstrated by the following  History  Co-morbidities and personal factors that may impact the plan of care Co-morbidities:   HTN    Personal Factors:   no deficits     low   Examination  Body Structures and Functions, activity limitations and participation restrictions that may impact the plan of care  Body Regions:   back  lower extremities    Body Systems:    ROM  Strength  Gait  Transfers  Transitions  Balance    Participation Restrictions:   None    Activity limitations:   Learning and applying knowledge  no deficits    General Tasks and Commands  no deficits    Communication  no deficits    Mobility  lifting and carrying objects  moving around using equipment (WC)  using transportation (bus, train, plane, car)  driving (bike, car, motorcycle)    Self care  no deficits    Domestic Life  shopping  cooking  doing house work (cleaning house, washing dishes, laundry)    Interactions/Relationships  no deficits    Life Areas  no deficits    Community and Social Life  community life  recreation and leisure         moderate   Clinical Presentation stable and uncomplicated low   Decision Making/ Complexity Score: low     Goals:  Short Term Goals: 4 weeks   1. This patient will be independent with a basic HEP. In progress, not met  2. This patient will increase B LE strength to at least 4+/5 in order to be able to perform ambulation with least restrictive AD for gait. In progress, not met  3. This patient will have a pain rating of 5/10 at worst with ADLs. In progress, not met  4. Patient able to score greater than or equal to 45% on the FOTO Lumbar Spine Survey indicating patient is 55% impaired, limited, or restricted and demonstrating overall decreased low back pain with functional activities. In progress, not met     Long Term Goals 8 weeks   1. This patient will be independent with an updated HEP. In progress, not met  2. This patient will increase B LE strength to 5/5 in order to be able to perform usual household duties with no complaints of pain. In progress, not met  3. Patient able to score greater than or equal to 60% on the FOTO Lumbar Spine indicating patient is 40% impaired, limited, or restricted and demonstrating overall decreased low back pain with functional activities. In progress, not met     PLAN    Perform 2MWT, TUG, and 30s chair raise    Plan of care Certification: 10/6/2022 to 12/6/2022.    Outpatient Physical Therapy 2 times weekly for 8 weeks to include the following interventions: Gait Training, Manual Therapy, Moist Heat/ Ice, Neuromuscular Re-ed, Patient Education, Therapeutic Activities, and Therapeutic Exercise.     Alejandra Lopez, PT      I CERTIFY THE NEED FOR THESE SERVICES FURNISHED UNDER THIS PLAN OF TREATMENT AND WHILE UNDER MY CARE   Physician's comments:     Physician's Signature: ___________________________________________________

## 2022-10-10 ENCOUNTER — CLINICAL SUPPORT (OUTPATIENT)
Dept: REHABILITATION | Facility: HOSPITAL | Age: 82
End: 2022-10-10
Payer: MEDICARE

## 2022-10-10 DIAGNOSIS — R29.898 WEAKNESS OF BOTH LOWER EXTREMITIES: ICD-10-CM

## 2022-10-10 DIAGNOSIS — M54.50 LUMBAR PAIN: Primary | ICD-10-CM

## 2022-10-10 DIAGNOSIS — M53.86 DECREASED RANGE OF MOTION OF LUMBAR SPINE: ICD-10-CM

## 2022-10-10 PROCEDURE — 97110 THERAPEUTIC EXERCISES: CPT | Mod: PO

## 2022-10-10 NOTE — PROGRESS NOTES
"  Physical Therapy Treatment Note     Name: Korey Santos  Clinic Number: [id number]    Therapy Diagnosis:   Encounter Diagnoses   Name Primary?    Lumbar pain Yes    Weakness of both lower extremities     Decreased range of motion of lumbar spine      Physician: Luis William MD    Visit Date: 10/10/2022    Physician Orders: PT Eval and Treat   Medical Diagnosis from Referral: Z98.1 (ICD-10-CM) - S/P lumbar fusion  Evaluation Date: 10/6/2022  Authorization Period Expiration: 10/06/2023  Plan of Care Expiration: 12/6/2022  Progress Note Due: 11/6/2022  Visit # / Visits authorized: 1/ 12  FOTO: 1/5     Time In: 1:02 pm  Time Out: 2:00 pm  Total Billable Time: 58 minutes    Precautions: Standard and No bending, lifting, or twisting of lumbar spine    DOS: 9/2/2022 s/p lumbar fusion    Subjective     Pt reports: I am feeling fine. I went to a car show this weekend and I walked about 4 miles.  He was compliant with home exercise program.  Response to previous treatment: none  Functional change: none    Pain: 5/10  Location: Low back and RLE    Objective     Korey received therapeutic exercises to develop strength, endurance, ROM, and flexibility for 58 minutes including:     Date 10/10/2022 10/6/2022   Visit 1/12 1/1   POC exp 12/6/2022 12/6/2022   PN 11/6/2022 11/6/2022   FTF 11/6/2022 11/6/2022   FOTO 1/5 0/5          NuStep 8' at L4     Supine:      TA w iso abs 2' w 10" holds     Glute sets 2' w 5" holds     Bug      QS      SLR 3 x 10 w 3" holds w 2#            Seated:      LAQ 3 x 10 B w 3#     HS curls      Hip ADD 20x w 5" holds     Hip ABD 2 x 10 w GTB     Standing:      Hip 3-way      Step ups       Step downs         Initials EG EG         manual therapy techniques: Myofacial release and Soft tissue Mobilization were applied to the: low back for 0 minutes, including:  - STM for lumbar paraspinals     neuromuscular re-education activities to improve: Balance, Coordination, and Proprioception for 0 " minutes. The following activities were included:  - SLS  - Tandem stance  - NBOS on stable surface EC  - NBOS on unstable surface EO  - NBOS on unstable surface EC      Home Exercises Provided and Patient Education Provided     Education provided:   - importance of consistent performance of HEP    Written Home Exercises Provided: Patient instructed to cont prior HEP.  Exercises were reviewed and Korey was able to demonstrate them prior to the end of the session.  Korey demonstrated good  understanding of the education provided.     Assessment     Korey presents to therapy with a moderate level of pain in his low back, after performing increased activity over the weekend, stating that he ambulated for 4 miles over the course of a day out. PT expresses the importance of walking speed with community ambulation as pt displays significantly decreased speed when ambulating into the clinic and throughout his session. He tolerates today's session well without symptom provocation. Korey requires moderate VC and TC for proper performance of prescribed activities with appropriate body mechanics and for postural awareness in order to decrease compensatory movements. Additionally, Korey is reminded of the importance of maintaining low back precautions following his lumbar fusion even if he does not experience pain with performance of these motions (bending, lifting, and twisting).    Korey Is progressing well towards his goals.   Pt prognosis is Fair.     Pt will continue to benefit from skilled outpatient physical therapy to address the deficits listed in the problem list box on initial evaluation, provide pt/family education and to maximize pt's level of independence in the home and community environment.     Pt's spiritual, cultural and educational needs considered and pt agreeable to plan of care and goals.     Anticipated barriers to physical therapy: None    Goals:  Short Term Goals: 4 weeks   1. This patient will be  independent with a basic HEP. In progress, not met  2. This patient will increase B LE strength to at least 4+/5 in order to be able to perform ambulation with least restrictive AD for gait. In progress, not met  3. This patient will have a pain rating of 5/10 at worst with ADLs. In progress, not met  4. Patient able to score greater than or equal to 45% on the FOTO Lumbar Spine Survey indicating patient is 55% impaired, limited, or restricted and demonstrating overall decreased low back pain with functional activities. In progress, not met     Long Term Goals 8 weeks   1. This patient will be independent with an updated HEP. In progress, not met  2. This patient will increase B LE strength to 5/5 in order to be able to perform usual household duties with no complaints of pain. In progress, not met  3. Patient able to score greater than or equal to 60% on the FOTO Lumbar Spine indicating patient is 40% impaired, limited, or restricted and demonstrating overall decreased low back pain with functional activities. In progress, not met     Plan     Continue with PT POC and progress as tolerated.     Alejandra Lopez, PT

## 2022-10-13 ENCOUNTER — CLINICAL SUPPORT (OUTPATIENT)
Dept: REHABILITATION | Facility: HOSPITAL | Age: 82
End: 2022-10-13
Payer: MEDICARE

## 2022-10-13 DIAGNOSIS — M53.86 DECREASED RANGE OF MOTION OF LUMBAR SPINE: ICD-10-CM

## 2022-10-13 DIAGNOSIS — R29.898 WEAKNESS OF BOTH LOWER EXTREMITIES: ICD-10-CM

## 2022-10-13 DIAGNOSIS — M54.50 LUMBAR PAIN: Primary | ICD-10-CM

## 2022-10-13 PROCEDURE — 97110 THERAPEUTIC EXERCISES: CPT | Mod: PO,CQ

## 2022-10-13 NOTE — PROGRESS NOTES
"  Physical Therapy Treatment Note     Name: Korey Santos  Clinic Number: [id number]    Therapy Diagnosis:   Encounter Diagnoses   Name Primary?    Lumbar pain Yes    Weakness of both lower extremities     Decreased range of motion of lumbar spine      Physician: Luis William MD    Visit Date: 10/13/2022    Physician Orders: PT Eval and Treat   Medical Diagnosis from Referral: Z98.1 (ICD-10-CM) - S/P lumbar fusion  Evaluation Date: 10/6/2022  Authorization Period Expiration: 10/06/2023  Plan of Care Expiration: 12/6/2022  Progress Note Due: 11/6/2022  Visit # / Visits authorized: 1/ 12  FOTO: 1/5     Time In: 2:04 pm  Time Out: 3:00 pm  Total Billable Time: 56 minutes    Precautions: Standard and No bending, lifting, or twisting of lumbar spine    DOS: 9/2/2022 s/p lumbar fusion    Subjective     Pt reports: I am okay but I am kind of having an off day for my balance.  He was compliant with home exercise program.  Response to previous treatment: none  Functional change: none    Pain: 5/10  Location: Low back and RLE    Objective     Korey received therapeutic exercises to develop strength, endurance, ROM, and flexibility for 53 minutes including:     Date 10/13/2022 10/10/2022 10/6/2022   Visit 2/12 1/12 1/1   POC exp 12/6/2022 12/6/2022 12/6/2022   PN 11/6/2022 11/6/2022 11/6/2022   FTF 11/6/2022 11/6/2022 11/6/2022   FOTO 2/5 1/5 0/5           NuStep 10' L4 8' at L4     Supine:       TA w iso abs 2' w 10" holds 2' w 10" holds     Glute sets 2'x10" holds 2' w 5" holds     Bug       QS       SLR 3x10 w 3" holds w #2 3 x 10 w 3" holds w 2#             Seated:       LAQ 3x10 B #3 3 x 10 B w 3#     HS curls 3 x 10 RTB B      Hip ADD 20x w 5" holds 20x w 5" holds     Hip ABD 2x10 GTB * 2 x 10 w GTB     Standing:       Hip 3-way       Step ups  2x10  *      Step downs          Initials MO EG EG         manual therapy techniques: Myofacial release and Soft tissue Mobilization were applied to the: low back for 0 " "minutes, including:  - STM for lumbar paraspinals       neuromuscular re-education activities to improve: Balance, Coordination, and Proprioception for 3 minutes. The following activities were included:  - SLS 2x15" B  - Tandem stance 2x15" B  - NBOS on stable surface EC  - NBOS on unstable surface EO  - NBOS on unstable surface EC      Home Exercises Provided and Patient Education Provided     Education provided:   - importance of consistent performance of HEP    Written Home Exercises Provided: Patient instructed to cont prior HEP.  Exercises were reviewed and Korey was able to demonstrate them prior to the end of the session.  Korey demonstrated good  understanding of the education provided.     Assessment     Korey presents to this session with SPC held in his hand stating that his "balance has been off today and that is why I brought this with me." PTA instructs pt to utilize cane to assist with balance disturbances instead carrying it, as it will be more beneficial in case of LOB. Pt tolerates most exercises well, however reports pain increase with TA iso after performing exercise. PTA instructs pt to always report pain increases, as these exercises should not be performed in a painful zone. Pt reports some dizziness at the end of this session which is alleviated with a seated rest break for several minutes.       Korey Is progressing well towards his goals.   Pt prognosis is Fair.     Pt will continue to benefit from skilled outpatient physical therapy to address the deficits listed in the problem list box on initial evaluation, provide pt/family education and to maximize pt's level of independence in the home and community environment.     Pt's spiritual, cultural and educational needs considered and pt agreeable to plan of care and goals.     Anticipated barriers to physical therapy: None    Goals:  Short Term Goals: 4 weeks   1. This patient will be independent with a basic HEP. In progress, not " met  2. This patient will increase B LE strength to at least 4+/5 in order to be able to perform ambulation with least restrictive AD for gait. In progress, not met  3. This patient will have a pain rating of 5/10 at worst with ADLs. In progress, not met  4. Patient able to score greater than or equal to 45% on the FOTO Lumbar Spine Survey indicating patient is 55% impaired, limited, or restricted and demonstrating overall decreased low back pain with functional activities. In progress, not met     Long Term Goals 8 weeks   1. This patient will be independent with an updated HEP. In progress, not met  2. This patient will increase B LE strength to 5/5 in order to be able to perform usual household duties with no complaints of pain. In progress, not met  3. Patient able to score greater than or equal to 60% on the FOTO Lumbar Spine indicating patient is 40% impaired, limited, or restricted and demonstrating overall decreased low back pain with functional activities. In progress, not met     Plan     Continue with PT POC and progress as tolerated.     Robyn Singh (Becca), PTA

## 2022-10-18 ENCOUNTER — CLINICAL SUPPORT (OUTPATIENT)
Dept: REHABILITATION | Facility: HOSPITAL | Age: 82
End: 2022-10-18
Payer: MEDICARE

## 2022-10-18 DIAGNOSIS — R29.898 WEAKNESS OF BOTH LOWER EXTREMITIES: ICD-10-CM

## 2022-10-18 DIAGNOSIS — M53.86 DECREASED RANGE OF MOTION OF LUMBAR SPINE: ICD-10-CM

## 2022-10-18 DIAGNOSIS — M54.50 LUMBAR PAIN: Primary | ICD-10-CM

## 2022-10-18 PROCEDURE — 97110 THERAPEUTIC EXERCISES: CPT | Mod: PO

## 2022-10-18 NOTE — PROGRESS NOTES
"  Physical Therapy Treatment Note     Name: Korey Santos  Clinic Number: [id number]    Therapy Diagnosis:   Encounter Diagnoses   Name Primary?    Lumbar pain Yes    Weakness of both lower extremities     Decreased range of motion of lumbar spine        Physician: Luis William MD    Visit Date: 10/18/2022    Physician Orders: PT Eval and Treat   Medical Diagnosis from Referral: Z98.1 (ICD-10-CM) - S/P lumbar fusion  Evaluation Date: 10/6/2022  Authorization Period Expiration: 10/06/2023  Plan of Care Expiration: 12/6/2022  Progress Note Due: 11/6/2022  Visit # / Visits authorized: 3/ 12  FOTO: 3/5     Time In: 1:03 pm  Time Out: 1:59 pm  Total Billable Time: 56 minutes    Precautions: Standard and No bending, lifting, or twisting of lumbar spine    DOS: 9/2/2022 s/p lumbar fusion    Subjective     Pt reports: I am okay. I am still having some soreness in my back. It is worse first thing in the mornings, but I feel that dull pain during the day. I am having a lot of dis  He was compliant with home exercise program.  Response to previous treatment: good  Functional change: none    Pain: 5/10  Location: Low back and RLE    Objective     Korey received therapeutic exercises to develop strength, endurance, ROM, and flexibility for 56 minutes including:     Date 10/18/2022 10/13/2022 10/10/2022 10/6/2022   Visit 3/12 2/12 1/12 1/1   POC exp 12/6/2022 12/6/2022 12/6/2022 12/6/2022   PN 11/6/2022 11/6/2022 11/6/2022 11/6/2022   FTF 11/6/2022 11/6/2022 11/6/2022 11/6/2022   FOTO 3/5 2/5 1/5 0/5            NuStep 10' at L5 10' L4 8' at L4     Supine:        HS str       TA w iso abs 2' w 5" holds (seated) 2' w 10" holds 2' w 10" holds     Glute sets 2' w 10" holds 2'x10" holds 2' w 5" holds     Bug        QS        SLR 2 x 10 w 3" holds  2 x 10 w 2# and 3" holds 3x10 w 3" holds w #2 3 x 10 w 3" holds w 2#              Seated:        LAQ 3 x 10 B w 3# 3x10 B #3 3 x 10 B w 3#     HS curls  3 x 10 RTB B      Hip ADD  20x " "w 5" holds 20x w 5" holds     Hip ABD  2x10 GTB * 2 x 10 w GTB     Standing:        Hip 3-way        Step ups   2x10  *      Step downs           Initials EG MO EG EG         manual therapy techniques: Myofacial release and Soft tissue Mobilization were applied to the: low back for 0 minutes, including:  - STM for lumbar paraspinals       neuromuscular re-education activities to improve: Balance, Coordination, and Proprioception for 0 minutes. The following activities were included:  - SLS 2x15" B  - Tandem stance 2x15" B  - NBOS on stable surface EC  - NBOS on unstable surface EO  - NBOS on unstable surface EC      Home Exercises Provided and Patient Education Provided     Education provided:   - importance of consistent performance of HEP    Written Home Exercises Provided: Patient instructed to cont prior HEP.  Exercises were reviewed and Korey was able to demonstrate them prior to the end of the session.  Korey demonstrated good  understanding of the education provided.     Assessment     Korey presents to this session with SPC expressing that he is having a moderate level of pain in his low back. He tolerates today's session well without symptom provocation. He inquires about the safety with usage of the treadmill as well as other machines that can be used when he is at the gym. PT explains that he needs to be ensure he maintains low back precautions. Korey displays increased challenge with SLR and states that he feels like his LLE is weaker than the RLE. He expresses that he has been having challenges with balancing. Focus of this session continues to be on strengthening the low back and BLEs. He requires moderate to maximal VC and TC in order to perform prescribed activities with proper body mechanics and for postural awareness in order to avoid compensatory movements.     Korey Is progressing well towards his goals.   Pt prognosis is Fair.     Pt will continue to benefit from skilled outpatient physical " therapy to address the deficits listed in the problem list box on initial evaluation, provide pt/family education and to maximize pt's level of independence in the home and community environment.     Pt's spiritual, cultural and educational needs considered and pt agreeable to plan of care and goals.     Anticipated barriers to physical therapy: None    Goals:  Short Term Goals: 4 weeks   1. This patient will be independent with a basic HEP. In progress, not met  2. This patient will increase B LE strength to at least 4+/5 in order to be able to perform ambulation with least restrictive AD for gait. In progress, not met  3. This patient will have a pain rating of 5/10 at worst with ADLs. In progress, not met  4. Patient able to score greater than or equal to 45% on the FOTO Lumbar Spine Survey indicating patient is 55% impaired, limited, or restricted and demonstrating overall decreased low back pain with functional activities. In progress, not met     Long Term Goals 8 weeks   1. This patient will be independent with an updated HEP. In progress, not met  2. This patient will increase B LE strength to 5/5 in order to be able to perform usual household duties with no complaints of pain. In progress, not met  3. Patient able to score greater than or equal to 60% on the FOTO Lumbar Spine indicating patient is 40% impaired, limited, or restricted and demonstrating overall decreased low back pain with functional activities. In progress, not met     Plan     Continue with PT POC and progress as tolerated. Increase performance of balancing activities.    Alejandra Lopez, PT

## 2022-10-20 ENCOUNTER — CLINICAL SUPPORT (OUTPATIENT)
Dept: REHABILITATION | Facility: HOSPITAL | Age: 82
End: 2022-10-20
Payer: MEDICARE

## 2022-10-20 DIAGNOSIS — M54.50 LUMBAR PAIN: Primary | ICD-10-CM

## 2022-10-20 DIAGNOSIS — M53.86 DECREASED RANGE OF MOTION OF LUMBAR SPINE: ICD-10-CM

## 2022-10-20 DIAGNOSIS — R29.898 WEAKNESS OF BOTH LOWER EXTREMITIES: ICD-10-CM

## 2022-10-20 PROCEDURE — 97110 THERAPEUTIC EXERCISES: CPT | Mod: PO

## 2022-10-20 NOTE — PROGRESS NOTES
"  Physical Therapy Treatment Note     Name: Korey Santos  Clinic Number: [id number]    Therapy Diagnosis:   Encounter Diagnoses   Name Primary?    Lumbar pain Yes    Weakness of both lower extremities     Decreased range of motion of lumbar spine        Physician: Luis William MD    Visit Date: 10/20/2022    Physician Orders: PT Eval and Treat   Medical Diagnosis from Referral: Z98.1 (ICD-10-CM) - S/P lumbar fusion  Evaluation Date: 10/6/2022  Authorization Period Expiration: 10/06/2023  Plan of Care Expiration: 12/6/2022  Progress Note Due: 11/6/2022  Visit # / Visits authorized: 4/ 12  FOTO: 4/5     Time In: 1:03 pm  Time Out: 1:54 pm  Total Billable Time: 30 minutes (*21 minutes not billed due to all care not being 1:1)    Precautions: Standard and No bending, lifting, or twisting of lumbar spine    DOS: 9/2/2022 s/p lumbar fusion    Subjective     Pt reports: I am having more pain since coming here. I have changed beds since coming here. I sometimes take a muscle relaxer at night. It is like before the operation sometimes.  He was compliant with home exercise program.  Response to previous treatment: tired and sore and it does not seem to go away  Functional change: none    Pain: 6/10  Location: Low back and RLE    Objective     Korey received therapeutic exercises to develop strength, endurance, ROM, and flexibility for 15 minutes including:    *21 minutes not billed due to all care not being 1:1     Date 10/20/2022 10/18/2022 10/13/2022 10/10/2022 10/6/2022   Visit 4/12 3/12 2/12 1/12 1/1   POC exp 12/6/2022 12/6/2022 12/6/2022 12/6/2022 12/6/2022   PN 11/6/2022 11/6/2022 11/6/2022 11/6/2022 11/6/2022   FTF 11/6/2022 11/6/2022 11/6/2022 11/6/2022 11/6/2022   FOTO 4/5 3/5 2/5 1/5 0/5             NuStep  10' at L5 10' L4 8' at L4     Supine:         HS str        TA w iso abs 2' w 5" holds (seated) 2' w 5" holds (seated) 2' w 10" holds 2' w 10" holds     Glute sets 2' w 10" holds 2' w 10" holds 2'x10" " "holds 2' w 5" holds     Bug         QS         SLR  2 x 10 w 3" holds  2 x 10 w 2# and 3" holds 3x10 w 3" holds w #2 3 x 10 w 3" holds w 2#               Seated:         Bilateral shoulder ER 2 x 15 GTB       Thoracic ext str 2' w 10" holds       LAQ  3 x 10 B w 3# 3x10 B #3 3 x 10 B w 3#     HS curls   3 x 10 RTB B      Hip ADD   20x w 5" holds 20x w 5" holds     Hip ABD   2x10 GTB * 2 x 10 w GTB     Standing:         TB I's 3 x 10       Hip 3-way         Step ups    2x10  *      Step downs            Initials EG EG MO EG EG         manual therapy techniques: Myofacial release and Soft tissue Mobilization were applied to the: low back for 0 minutes, including:  - STM for lumbar paraspinals       neuromuscular re-education activities to improve: Balance, Coordination, and Proprioception for 15 minutes. The following activities were included:  - SLS 2x20" B  - Tandem stance 2x20" B  - NBOS on stable surface EC - 3 x 30"  - NBOS on unstable surface EO  - NBOS on unstable surface EC - 3 x 30"      Home Exercises Provided and Patient Education Provided     Education provided:   - importance of consistent performance of HEP    Written Home Exercises Provided: Patient instructed to cont prior HEP.  Exercises were reviewed and Korey was able to demonstrate them prior to the end of the session.  Korey demonstrated good  understanding of the education provided.     Assessment     Korey presents to this session with SPC expressing that he is having a moderate level of pain in his low back, slightly greater than his previous session. He tolerates today's session well without symptom provocation, however he states that he has been having increased pain since starting therapy. Focus of this session is back strengthening and BLE strengthening as well as balance and prioprioceptive awareness within the back. He requires moderate to maximal VC and TC in order to perform prescribed activities with proper body mechanics and for " postural awareness in order to avoid compensatory movements.     Korey Is progressing well towards his goals.   Pt prognosis is Fair.     Pt will continue to benefit from skilled outpatient physical therapy to address the deficits listed in the problem list box on initial evaluation, provide pt/family education and to maximize pt's level of independence in the home and community environment.     Pt's spiritual, cultural and educational needs considered and pt agreeable to plan of care and goals.     Anticipated barriers to physical therapy: None    Goals:  Short Term Goals: 4 weeks   1. This patient will be independent with a basic HEP. In progress, not met  2. This patient will increase B LE strength to at least 4+/5 in order to be able to perform ambulation with least restrictive AD for gait. In progress, not met  3. This patient will have a pain rating of 5/10 at worst with ADLs. In progress, not met  4. Patient able to score greater than or equal to 45% on the FOTO Lumbar Spine Survey indicating patient is 55% impaired, limited, or restricted and demonstrating overall decreased low back pain with functional activities. In progress, not met     Long Term Goals 8 weeks   1. This patient will be independent with an updated HEP. In progress, not met  2. This patient will increase B LE strength to 5/5 in order to be able to perform usual household duties with no complaints of pain. In progress, not met  3. Patient able to score greater than or equal to 60% on the FOTO Lumbar Spine indicating patient is 40% impaired, limited, or restricted and demonstrating overall decreased low back pain with functional activities. In progress, not met     Plan     Continue with PT POC and progress as tolerated. Increase performance of balancing activities.    Alejandra Lopez, PT

## 2022-10-26 ENCOUNTER — CLINICAL SUPPORT (OUTPATIENT)
Dept: REHABILITATION | Facility: HOSPITAL | Age: 82
End: 2022-10-26
Payer: MEDICARE

## 2022-10-26 DIAGNOSIS — R29.898 WEAKNESS OF BOTH LOWER EXTREMITIES: ICD-10-CM

## 2022-10-26 DIAGNOSIS — M53.86 DECREASED RANGE OF MOTION OF LUMBAR SPINE: ICD-10-CM

## 2022-10-26 DIAGNOSIS — M54.50 LUMBAR PAIN: Primary | ICD-10-CM

## 2022-10-26 PROCEDURE — 97110 THERAPEUTIC EXERCISES: CPT | Mod: PO,CQ

## 2022-10-26 NOTE — PROGRESS NOTES
"    Physical Therapy Treatment Note     Name: Korey Santos  Clinic Number: [id number]    Therapy Diagnosis:   Encounter Diagnoses   Name Primary?    Lumbar pain Yes    Weakness of both lower extremities     Decreased range of motion of lumbar spine          Physician: Luis William MD    Visit Date: 10/26/2022    Physician Orders: PT Eval and Treat   Medical Diagnosis from Referral: Z98.1 (ICD-10-CM) - S/P lumbar fusion  Evaluation Date: 10/6/2022  Authorization Period Expiration: 10/06/2023  Plan of Care Expiration: 12/6/2022  Progress Note Due: 11/6/2022  Visit # / Visits authorized: 5/ 12  FOTO: 5/5     Time In: 1:05 pm  Time Out: 1:55 pm  Total Billable Time: 50 minutes   Precautions: Standard and No bending, lifting, or twisting of lumbar spine    DOS: 9/2/2022 s/p lumbar fusion    Subjective     Pt reports: I am hurting pretty bad.  He was compliant with home exercise program.  Response to previous treatment: tired and sore and it does not seem to go away  Functional change: none    Pain: 6/10  Location: Low back and RLE    Objective     Korey received therapeutic exercises to develop strength, endurance, ROM, and flexibility for 50 minutes including:    *0 minutes not billed due to all care not being 1:1     Date 10/26/2022 10/20/2022 10/18/2022 10/13/2022 10/10/2022 10/6/2022   Visit 5/12 4/12 3/12 2/12 1/12 1/1   POC exp 12/6/2022 12/6/2022 12/6/2022 12/6/2022 12/6/2022 12/6/2022   PN 11/6/2022 11/6/2022 11/6/2022 11/6/2022 11/6/2022 11/6/2022   FTF 11/6/2022 11/6/2022 11/6/2022 11/6/2022 11/6/2022 11/6/2022   FOTO 5/5 4/5 3/5 2/5 1/5 0/5              NuStep 10' L2  10' at L5 10' L4 8' at L4     Supine:          HS str         TA w iso abs  2' w 5" holds (seated) 2' w 5" holds (seated) 2' w 10" holds 2' w 10" holds     Glute sets  2' w 10" holds 2' w 10" holds 2'x10" holds 2' w 5" holds     Bug          QS          SLR   2 x 10 w 3" holds  2 x 10 w 2# and 3" holds 3x10 w 3" holds w #2 3 x 10 w 3" " "holds w 2#                Seated:          Bilateral shoulder ER 2x15 GTB  2 x 15 GTB       Thoracic ext str  2' w 10" holds       LAQ   3 x 10 B w 3# 3x10 B #3 3 x 10 B w 3#     HS curls    3 x 10 RTB B      Hip ADD    20x w 5" holds 20x w 5" holds     Hip ABD    2x10 GTB * 2 x 10 w GTB     Standing:          TB I's 3x10 RTB 3 x 10       Hip 3-way 2x10 abd         Step ups  x10 B   2x10  *      Step downs             Initials MO EG EG MO EG EG         manual therapy techniques: Myofacial release and Soft tissue Mobilization were applied to the: low back for 0 minutes, including:  - STM for lumbar paraspinals       neuromuscular re-education activities to improve: Balance, Coordination, and Proprioception for 10 minutes. The following activities were included:  - SLS 2x20" B  - Tandem stance 2x20" B  - NBOS on stable surface EC - 3 x 30"  - NBOS on unstable surface EO  - NBOS on unstable surface EC - 3 x 30"      Home Exercises Provided and Patient Education Provided     Education provided:   - importance of consistent performance of HEP    Written Home Exercises Provided: Patient instructed to cont prior HEP.  Exercises were reviewed and Korey was able to demonstrate them prior to the end of the session.  Korey demonstrated good  understanding of the education provided.     Assessment     Korey presents to this session with SPC and reporting moderate levels of pain. Pt requires frequent verbal cues for postural awareness per pt spinal precautions. Pt questions PTA as to when he will be allowed to begin higher levels of exercises such as TM and "deep knee bends" and PTA educated pt on spinal precautions and safety awareness. Pt tolerates all exercises well with no reports of increasing pain or other symptoms.     FOTO Score: 53.2%    Korey Is progressing well towards his goals.   Pt prognosis is Fair.     Pt will continue to benefit from skilled outpatient physical therapy to address the deficits listed in the " problem list box on initial evaluation, provide pt/family education and to maximize pt's level of independence in the home and community environment.     Pt's spiritual, cultural and educational needs considered and pt agreeable to plan of care and goals.     Anticipated barriers to physical therapy: None    Goals:  Short Term Goals: 4 weeks   1. This patient will be independent with a basic HEP. In progress, not met  2. This patient will increase B LE strength to at least 4+/5 in order to be able to perform ambulation with least restrictive AD for gait. In progress, not met  3. This patient will have a pain rating of 5/10 at worst with ADLs. In progress, not met  4. Patient able to score greater than or equal to 45% on the FOTO Lumbar Spine Survey indicating patient is 55% impaired, limited, or restricted and demonstrating overall decreased low back pain with functional activities. In progress, not met     Long Term Goals 8 weeks   1. This patient will be independent with an updated HEP. In progress, not met  2. This patient will increase B LE strength to 5/5 in order to be able to perform usual household duties with no complaints of pain. In progress, not met  3. Patient able to score greater than or equal to 60% on the FOTO Lumbar Spine indicating patient is 40% impaired, limited, or restricted and demonstrating overall decreased low back pain with functional activities. In progress, not met     Plan     Continue with PT POC and progress as tolerated. Increase performance of balancing activities.    Robyn Singh ([place]cca), PTA

## 2022-10-31 ENCOUNTER — CLINICAL SUPPORT (OUTPATIENT)
Dept: REHABILITATION | Facility: HOSPITAL | Age: 82
End: 2022-10-31
Payer: MEDICARE

## 2022-10-31 DIAGNOSIS — R29.898 WEAKNESS OF BOTH LOWER EXTREMITIES: ICD-10-CM

## 2022-10-31 DIAGNOSIS — M54.50 LUMBAR PAIN: Primary | ICD-10-CM

## 2022-10-31 DIAGNOSIS — M53.86 DECREASED RANGE OF MOTION OF LUMBAR SPINE: ICD-10-CM

## 2022-10-31 PROCEDURE — 97110 THERAPEUTIC EXERCISES: CPT | Mod: PO

## 2022-10-31 NOTE — PROGRESS NOTES
Physical Therapy Treatment Note     Name: Korey Santos  [facility] Number: [id number]    Therapy Diagnosis:   Encounter Diagnoses   Name Primary?    Lumbar pain Yes    Weakness of both lower extremities     Decreased range of motion of lumbar spine        Physician: Luis William MD    Visit Date: 10/31/2022    Physician Orders: PT Eval and Treat   Medical Diagnosis from Referral: Z98.1 (ICD-10-CM) - S/P lumbar fusion  Evaluation Date: 10/6/2022  Authorization Period Expiration: 10/06/2023  Plan of Care Expiration: 12/6/2022  Progress Note Due: 11/6/2022  Visit # / Visits authorized: 6/ 12  FOTO: 5/5, DONE     Time In: 2:04 pm  Time Out: 3:00 pm  Total Billable Time: 56 minutes   Precautions: Standard and No bending, lifting, or twisting of lumbar spine    DOS: 9/2/2022 s/p lumbar fusion    Subjective     Pt reports: I am okay today, but have some pain in my low back. Sometimes I feel as bad as I did before the surgery.  He was compliant with home exercise program.  Response to previous treatment: tired and sore and it does not seem to go away  Functional change: none    Pain: 4-5/10  Location: Low back and RLE    Objective     Korey received therapeutic exercises to develop strength, endurance, ROM, and flexibility for 56 minutes including:    Edema measurements:  R knee:  Infrapatellar - 41 cm  Mid patellar - 47.5 cm  Suprapatellar - 52 cm    L knee:  Infrapatellar - 41.5 cm  Mid patellar - 47 cm  Suprapatellar - 51.5 cm     Date 10/31/2022 10/26/2022 10/20/2022 10/18/2022 10/13/2022 10/10/2022 10/6/2022   Visit 6/12 5/12 4/12 3/12 2/12 1/12 1/1   POC exp 12/6/2022 12/6/2022 12/6/2022 12/6/2022 12/6/2022 12/6/2022 12/6/2022   PN 11/6/2022 11/6/2022 11/6/2022 11/6/2022 11/6/2022 11/6/2022 11/6/2022   FTF 11/6/2022 11/6/2022 11/6/2022 11/6/2022 11/6/2022 11/6/2022 11/6/2022   FOTO  5/5 4/5 3/5 2/5 1/5 0/5               NuStep 10' at L5 10' L2  10' at L5 10' L4 8' at L4     Supine:           HS str          TA  "w iso abs 2' w 5" holds   2' w 5" holds (seated) 2' w 5" holds (seated) 2' w 10" holds 2' w 10" holds     Glute sets   2' w 10" holds 2' w 10" holds 2'x10" holds 2' w 5" holds     Bug           QS           SLR    2 x 10 w 3" holds  2 x 10 w 2# and 3" holds 3x10 w 3" holds w #2 3 x 10 w 3" holds w 2#                 Seated:           Gatroc str on stool 3 x 30" R         Bilateral shoulder ER 2 x 15 GTB 2x15 GTB  2 x 15 GTB       Thoracic ext str DC   2' w 10" holds       LAQ    3 x 10 B w 3# 3x10 B #3 3 x 10 B w 3#     HS curls     3 x 10 RTB B      Hip ADD     20x w 5" holds 20x w 5" holds     Hip ABD     2x10 GTB * 2 x 10 w GTB     Standing:           TB I's 2 x 15 GTB 3x10 RTB 3 x 10       TB rows 2 x 15 GTB         Hip 3-way  2x10 abd         Step ups   x10 B   2x10  *      Step downs              Initials EG MO EG EG MO EG EG         manual therapy techniques: Myofacial release and Soft tissue Mobilization were applied to the: low back for 0 minutes, including:  - STM for lumbar paraspinals       neuromuscular re-education activities to improve: Balance, Coordination, and Proprioception for 3 minutes. The following activities were included: * billed as therex  - SLS 2x20" B (multiple LOB with minimal to moderate sway)  - Tandem stance 2x20" B  - NBOS on stable surface EC - 3 x 30"  - NBOS on unstable surface EO  - NBOS on unstable surface EC - 3 x 30"      Home Exercises Provided and Patient Education Provided     Education provided:   - importance of consistent performance of HEP    Written Home Exercises Provided: Patient instructed to cont prior HEP.  Exercises were reviewed and Korey was able to demonstrate them prior to the end of the session.  Korey demonstrated good  understanding of the education provided.     Assessment     Korey presents to this session with SPC and reporting moderate levels of pain. He ambulates without achieving full hip extension bilaterally. Korey tolerates today's session " with minimal pain provocation. He displays some discomfort with thoracic extensions with this activity being stopped to avoid excessive movement which could irritate surgical site. PT continues to educate pt on spinal precautions and safety awareness with recommendation to not use TM at this time or perform deep squats or lunges. He requires moderate VC and TC for proper performance of prescribed activities for appropriate body mechanics and for postural awareness in order to decrease compensatory movements.      FOTO Score: 53.2%    Korey Is progressing well towards his goals.   Pt prognosis is Fair.     Pt will continue to benefit from skilled outpatient physical therapy to address the deficits listed in the problem list box on initial evaluation, provide pt/family education and to maximize pt's level of independence in the home and community environment.     Pt's spiritual, cultural and educational needs considered and pt agreeable to plan of care and goals.     Anticipated barriers to physical therapy: None    Goals:  Short Term Goals: 4 weeks   1. This patient will be independent with a basic HEP. In progress, not met  2. This patient will increase B LE strength to at least 4+/5 in order to be able to perform ambulation with least restrictive AD for gait. In progress, not met  3. This patient will have a pain rating of 5/10 at worst with ADLs. In progress, not met  4. Patient able to score greater than or equal to 45% on the FOTO Lumbar Spine Survey indicating patient is 55% impaired, limited, or restricted and demonstrating overall decreased low back pain with functional activities. In progress, not met     Long Term Goals 8 weeks   1. This patient will be independent with an updated HEP. In progress, not met  2. This patient will increase B LE strength to 5/5 in order to be able to perform usual household duties with no complaints of pain. In progress, not met  3. Patient able to score greater than or equal  to 60% on the FOTO Lumbar Spine indicating patient is 40% impaired, limited, or restricted and demonstrating overall decreased low back pain with functional activities. In progress, not met     Plan     Continue with PT POC and progress as tolerated. Increase performance of balancing activities.    Alejandra Lopez, PT

## 2022-11-02 ENCOUNTER — TELEPHONE (OUTPATIENT)
Dept: ORTHOPEDICS | Facility: CLINIC | Age: 82
End: 2022-11-02
Payer: MEDICARE

## 2022-11-02 ENCOUNTER — CLINICAL SUPPORT (OUTPATIENT)
Dept: REHABILITATION | Facility: HOSPITAL | Age: 82
End: 2022-11-02
Payer: MEDICARE

## 2022-11-02 DIAGNOSIS — R29.898 WEAKNESS OF BOTH LOWER EXTREMITIES: ICD-10-CM

## 2022-11-02 DIAGNOSIS — M53.86 DECREASED RANGE OF MOTION OF LUMBAR SPINE: ICD-10-CM

## 2022-11-02 DIAGNOSIS — M54.50 LUMBAR PAIN: Primary | ICD-10-CM

## 2022-11-02 PROCEDURE — 97110 THERAPEUTIC EXERCISES: CPT | Mod: PO

## 2022-11-02 NOTE — TELEPHONE ENCOUNTER
----- Message from Charisse Terrazas sent at 11/2/2022  9:36 AM CDT -----  Regarding: Sooner appt  Contact: KOREY SANTOS [[id number]]  Type:  Sooner Apoointment Request    Caller is requesting a sooner appointment.  Caller declined first available appointment listed below.  Caller will not accept being placed on the waitlist and is requesting a message be sent to doctor.  Name of Caller:Korey Santos    When is the first available appointment?N/A   Symptoms:F/u appt and/or Sooner Post opp appt .   Would the patient rather a call back or a response via [facility]? Call back   Best Call Back Number:[phone]  Additional Information:

## 2022-11-02 NOTE — TELEPHONE ENCOUNTER
Spoke with patient and informed him that he has been rescheduled per . he stated an verbal understanding.

## 2022-11-02 NOTE — PROGRESS NOTES
"    Physical Therapy Treatment Note     Name: Korey Santos  Clinic Number: [id number]    Therapy Diagnosis:   Encounter Diagnoses   Name Primary?    Lumbar pain Yes    Weakness of both lower extremities     Decreased range of motion of lumbar spine        Physician: Luis William MD    Visit Date: 11/2/2022    Physician Orders: PT Eval and Treat   Medical Diagnosis from Referral: Z98.1 (ICD-10-CM) - S/P lumbar fusion  Evaluation Date: 10/6/2022  Authorization Period Expiration: 10/06/2023  Plan of Care Expiration: 12/6/2022  Progress Note Due: 11/6/2022  Visit # / Visits authorized: 7/ 12  FOTO: 5/5, DONE     Time In: 1:00 pm  Time Out: 2:00 pm  Total Billable Time: 30 minutes (* 30 minutes not billed due to all care not being 1:1)  Precautions: Standard and No bending, lifting, or twisting of lumbar spine    DOS: 9/2/2022 s/p lumbar fusion    Subjective     Pt reports: I am okay today. I am not wearing my back brace, which causes me a little more pain.  He was compliant with home exercise program.  Response to previous treatment: felt good  Functional change: none    Pain: 6/10  Location: Low back and RLE    Objective     Korey received therapeutic exercises to develop strength, endurance, ROM, and flexibility for 30 minutes including:    * 30 minutes not billed due to all care not being 1:1     Date 11/2/2022 10/31/2022 10/26/2022 10/20/2022 10/18/2022 10/13/2022 10/10/2022 10/6/2022   Visit 7/12 6/12 5/12 4/12 3/12 2/12 1/12 1/1   POC exp 12/6/2022 12/6/2022 12/6/2022 12/6/2022 12/6/2022 12/6/2022 12/6/2022 12/6/2022   PN 12/3/2022 11/6/2022 11/6/2022 11/6/2022 11/6/2022 11/6/2022 11/6/2022 11/6/2022   FTF 12/3/2022 11/6/2022 11/6/20226/2022 11/6/2022 11/6/2022 11/6/2022 11/6/2022 11/6/2022   FOTO   5/5 4/5 3/5 2/5 1/5 0/5                NuStep 10' at L5 10' at L5 10' L2  10' at L5 10' L4 8' at L4     Supine:            HS str           TA w iso abs 2' w 5" holds 2' w 5" holds   2' w 5" holds (seated) 2' w 5" " "holds (seated) 2' w 10" holds 2' w 10" holds     Glute sets    2' w 10" holds 2' w 10" holds 2'x10" holds 2' w 5" holds     Bug            QS            SLR     2 x 10 w 3" holds  2 x 10 w 2# and 3" holds 3x10 w 3" holds w #2 3 x 10 w 3" holds w 2#                  Seated:            Gatroc str on stool 3 x 30" B 3 x 30" R         Bilateral shoulder ER 2 x 15 GTB 2 x 15 GTB 2x15 GTB  2 x 15 GTB       Thoracic ext str  DC   2' w 10" holds       LAQ     3 x 10 B w 3# 3x10 B #3 3 x 10 B w 3#     HS curls      3 x 10 RTB B      Hip ADD      20x w 5" holds 20x w 5" holds     Hip ABD      2x10 GTB * 2 x 10 w GTB     Standing:            TB I's 2 x 15 GTB 2 x 15 GTB 3x10 RTB 3 x 10       TB rows 2 x 15 GTB 2 x 15 GTB         Hip 3-way 2 x 10 ea RTB  2x10 abd         Step ups    x10 B   2x10  *      Step downs               Initials EG EG MO EG EG MO EG EG         manual therapy techniques: Myofacial release and Soft tissue Mobilization were applied to the: low back for 0 minutes, including:  - STM for lumbar paraspinals       neuromuscular re-education activities to improve: Balance, Coordination, and Proprioception for 0 minutes. The following activities were included: * billed as therex  - SLS 2x20" B (multiple LOB with minimal to moderate sway)  - Tandem stance 2x20" B  - NBOS on stable surface EC - 3 x 30"  - NBOS on unstable surface EO  - NBOS on unstable surface EC - 3 x 30"      Home Exercises Provided and Patient Education Provided     Education provided:   - importance of consistent performance of HEP    Written Home Exercises Provided: Patient instructed to cont prior HEP.  Exercises were reviewed and Korey was able to demonstrate them prior to the end of the session.  Korey demonstrated good  understanding of the education provided.     Assessment     Korey presents to this session not wearing his back brace, using a SPC, and reporting moderate levels of pain. He continues to display gait abnormalities. " Korey tolerates today's session well, with no pain provocation. He requires moderate VC and TC for proper performance of prescribed activities for appropriate body mechanics and for postural awareness in order to decrease compensatory movements.      FOTO Score: 53.2%    Korey Is progressing well towards his goals.   Pt prognosis is Fair.     Pt will continue to benefit from skilled outpatient physical therapy to address the deficits listed in the problem list box on initial evaluation, provide pt/family education and to maximize pt's level of independence in the home and community environment.     Pt's spiritual, cultural and educational needs considered and pt agreeable to plan of care and goals.     Anticipated barriers to physical therapy: None    Goals:  Short Term Goals: 4 weeks   1. This patient will be independent with a basic HEP. In progress, not met  2. This patient will increase B LE strength to at least 4+/5 in order to be able to perform ambulation with least restrictive AD for gait. In progress, not met  3. This patient will have a pain rating of 5/10 at worst with ADLs. In progress, not met  4. Patient able to score greater than or equal to 45% on the FOTO Lumbar Spine Survey indicating patient is 55% impaired, limited, or restricted and demonstrating overall decreased low back pain with functional activities. In progress, not met     Long Term Goals 8 weeks   1. This patient will be independent with an updated HEP. In progress, not met  2. This patient will increase B LE strength to 5/5 in order to be able to perform usual household duties with no complaints of pain. In progress, not met  3. Patient able to score greater than or equal to 60% on the FOTO Lumbar Spine indicating patient is 40% impaired, limited, or restricted and demonstrating overall decreased low back pain with functional activities. In progress, not met     Plan     Continue with PT POC and progress as tolerated. Increase  performance of balancing activities.    Alejandra Lopez, PT

## 2022-11-02 NOTE — TELEPHONE ENCOUNTER
Spoke with patient and he stated that the OT told him that if he was in pain he should contact us and get seen sooner than his post-op appointment in Dec. Patient stated that his back is feeling worse than what it was before surgery since he did OT. He would like to know can he come in sooner with xray's beforehand.     Please advise!

## 2022-11-03 ENCOUNTER — DOCUMENTATION ONLY (OUTPATIENT)
Dept: REHABILITATION | Facility: HOSPITAL | Age: 82
End: 2022-11-03
Payer: MEDICARE

## 2022-11-03 NOTE — PROGRESS NOTES
PT/PTA met face to face to discuss pt's treatment plan and progress towards established goals. Pt will be seen by a physical therapist minimally every 6th visit or every 30 days.      Alejandra Lopez, PT     Robyn Singh (Becca)

## 2022-11-04 ENCOUNTER — PES CALL (OUTPATIENT)
Dept: ADMINISTRATIVE | Facility: CLINIC | Age: 82
End: 2022-11-04
Payer: MEDICARE

## 2022-11-07 ENCOUNTER — CLINICAL SUPPORT (OUTPATIENT)
Dept: REHABILITATION | Facility: HOSPITAL | Age: 82
End: 2022-11-07
Payer: MEDICARE

## 2022-11-07 DIAGNOSIS — R29.898 WEAKNESS OF BOTH LOWER EXTREMITIES: ICD-10-CM

## 2022-11-07 DIAGNOSIS — M53.86 DECREASED RANGE OF MOTION OF LUMBAR SPINE: ICD-10-CM

## 2022-11-07 DIAGNOSIS — M54.50 LUMBAR PAIN: Primary | ICD-10-CM

## 2022-11-07 PROCEDURE — 97110 THERAPEUTIC EXERCISES: CPT | Mod: PO

## 2022-11-07 NOTE — PROGRESS NOTES
Physical Therapy Treatment Note     Name: Korey Santos  Clinic Number: [id number]    Therapy Diagnosis:   Encounter Diagnoses   Name Primary?    Lumbar pain Yes    Weakness of both lower extremities     Decreased range of motion of lumbar spine        Physician: Luis William MD    Visit Date: 11/7/2022    Physician Orders: PT Eval and Treat   Medical Diagnosis from Referral: Z98.1 (ICD-10-CM) - S/P lumbar fusion  Evaluation Date: 10/6/2022  Authorization Period Expiration: 10/06/2023  Plan of Care Expiration: 12/6/2022  Progress Note Due: 11/6/2022  Visit # / Visits authorized: 8/ 12  FOTO: 5/5, DONE     Time In: 2:59 pm  Time Out: 3:45 pm  Total Billable Time: 30 minutes (* 16 minutes not billed due to all care not being 1:1)  Precautions: Standard and No bending, lifting, or twisting of lumbar spine    DOS: 9/2/2022 s/p lumbar fusion    Subjective     Pt reports: I am still feeling pain in my low back. I have a doctor's appointment tomorrow to get an X-ray done. My R knee gives me a lot of problems, and I believe that it affects my balance.    He was compliant with home exercise program.  Response to previous treatment: felt okay with some soreness  Functional change: none    Pain: 6/10  Location: Low back and RLE    Objective     Korey received therapeutic exercises to develop strength, endurance, ROM, and flexibility for 30 minutes including:    * 30 minutes not billed due to all care not being 1:1     Date 11/7/2022 11/2/2022 10/31/2022 10/26/2022 10/20/2022 10/18/2022 10/13/2022 10/10/2022 10/6/2022   Visit 8/12 7/12 6/12 5/12 4/12 3/12 2/12 1/12 1/1   POC exp 12/6/2022 12/6/2022 12/6/2022 12/6/2022 12/6/2022 12/6/2022 12/6/2022 12/6/2022 12/6/2022   PN 12/3/2022 12/3/2022 11/6/2022 11/6/2022/2022 11/6/2022 11/6/2022 11/6/2022 11/6/2022 11/6/2022   FTF 12/3/2022 12/3/2022 11/6/2022 11/6/2022 11/6/2022 11/6/2022 11/6/2022 11/6/2022 11/6/2022   FOTO    5/5 4/5 3/5 2/5 1/5 0/5                 NuStep 10' at  "L5 10' at L5 10' at L5 10' L2  10' at L5 10' L4 8' at L4     Supine:             HS str            TA w iso abs 2' w 5" holds 2' w 5" holds 2' w 5" holds   2' w 5" holds (seated) 2' w 5" holds (seated) 2' w 10" holds 2' w 10" holds     Glute sets     2' w 10" holds 2' w 10" holds 2'x10" holds 2' w 5" holds     Bug             QS             SLR      2 x 10 w 3" holds  2 x 10 w 2# and 3" holds 3x10 w 3" holds w #2 3 x 10 w 3" holds w 2#                   Seated:             Gatroc str on stool 3 x 30" B 3 x 30" B 3 x 30" R         Bilateral shoulder ER 2 x 15 GTB 2 x 15 GTB 2 x 15 GTB 2x15 GTB  2 x 15 GTB       Thoracic ext str   DC   2' w 10" holds       LAQ      3 x 10 B w 3# 3x10 B #3 3 x 10 B w 3#     HS curls       3 x 10 RTB B      Hip ADD       20x w 5" holds 20x w 5" holds     Hip ABD       2x10 GTB * 2 x 10 w GTB     Standing:             Marching 3 x 1' (neuro re-ed)           TB I's 2 x 15 GTB 2 x 15 GTB 2 x 15 GTB 3x10 RTB 3 x 10       TB rows 2 x 15 GTB 2 x 15 GTB 2 x 15 GTB         Hip 3-way 2 x 10 abd RTB 2 x 10 ea RTB  2x10 abd         Step ups     x10 B   2x10  *      Step downs                Initials EG EG EG MO EG EG MO EG EG         manual therapy techniques: Myofacial release and Soft tissue Mobilization were applied to the: low back for 0 minutes, including:  - STM for lumbar paraspinals       neuromuscular re-education activities to improve: Balance, Coordination, and Proprioception for 3 minutes. The following activities were included: * billed as therex  - SLS 2x20" B (multiple LOB with minimal to moderate sway)  - Tandem stance 2x20" B  - NBOS on stable surface EC - 3 x 30"  - NBOS on unstable surface EO  - NBOS on unstable surface EC - 3 x 30"      Home Exercises Provided and Patient Education Provided     Education provided:   - importance of consistent performance of HEP    Written Home Exercises Provided: Patient instructed to cont prior HEP.  Exercises were reviewed and Korey was able " to demonstrate them prior to the end of the session.  Korey demonstrated good  understanding of the education provided.     Assessment     Korey presents to this session continuing to not wear his back brace with his SPC although he does not use it throughout this session. He continues to report moderate levels of pain and display gait abnormalities. Korey tolerates today's session well, with no pain provocation. He requires moderate VC and TC for proper performance of prescribed activities for appropriate body mechanics and for postural awareness in order to decrease compensatory movements. He has an upcoming doctor's appointment where X-ray's will be taken this week.    FOTO Score: 53.2%    Korey Is progressing well towards his goals.   Pt prognosis is Fair.     Pt will continue to benefit from skilled outpatient physical therapy to address the deficits listed in the problem list box on initial evaluation, provide pt/family education and to maximize pt's level of independence in the home and community environment.     Pt's spiritual, cultural and educational needs considered and pt agreeable to plan of care and goals.     Anticipated barriers to physical therapy: None    Goals:  Short Term Goals: 4 weeks   1. This patient will be independent with a basic HEP. In progress, not met  2. This patient will increase B LE strength to at least 4+/5 in order to be able to perform ambulation with least restrictive AD for gait. In progress, not met  3. This patient will have a pain rating of 5/10 at worst with ADLs. In progress, not met  4. Patient able to score greater than or equal to 45% on the FOTO Lumbar Spine Survey indicating patient is 55% impaired, limited, or restricted and demonstrating overall decreased low back pain with functional activities. In progress, not met     Long Term Goals 8 weeks   1. This patient will be independent with an updated HEP. In progress, not met  2. This patient will increase B LE  strength to 5/5 in order to be able to perform usual household duties with no complaints of pain. In progress, not met  3. Patient able to score greater than or equal to 60% on the FOTO Lumbar Spine indicating patient is 40% impaired, limited, or restricted and demonstrating overall decreased low back pain with functional activities. In progress, not met     Plan     Continue with PT POC and progress as tolerated. Increase performance of balancing activities.    Alejandra Lopez, PT

## 2022-11-08 ENCOUNTER — OFFICE VISIT (OUTPATIENT)
Dept: ORTHOPEDICS | Facility: CLINIC | Age: 82
End: 2022-11-08
Payer: MEDICARE

## 2022-11-08 ENCOUNTER — HOSPITAL ENCOUNTER (OUTPATIENT)
Dept: RADIOLOGY | Facility: HOSPITAL | Age: 82
Discharge: HOME | End: 2022-11-08
Attending: ORTHOPAEDIC SURGERY
Payer: MEDICARE

## 2022-11-08 DIAGNOSIS — M54.9 DORSALGIA, UNSPECIFIED: ICD-10-CM

## 2022-11-08 DIAGNOSIS — M54.2 NECK PAIN: ICD-10-CM

## 2022-11-08 DIAGNOSIS — M51.36 DDD (DEGENERATIVE DISC DISEASE), LUMBAR: ICD-10-CM

## 2022-11-08 DIAGNOSIS — M54.16 LUMBAR RADICULOPATHY: ICD-10-CM

## 2022-11-08 DIAGNOSIS — M47.816 LUMBAR SPONDYLOSIS: ICD-10-CM

## 2022-11-08 DIAGNOSIS — Z98.1 S/P LUMBAR FUSION: ICD-10-CM

## 2022-11-08 DIAGNOSIS — Z98.1 S/P LUMBAR FUSION: Primary | ICD-10-CM

## 2022-11-08 PROCEDURE — 99024 PR POST-OP FOLLOW-UP VISIT: ICD-10-PCS | Mod: S$GLB,,, | Performed by: ORTHOPAEDIC SURGERY

## 2022-11-08 PROCEDURE — 1159F PR MEDICATION LIST DOCUMENTED IN MEDICAL RECORD: ICD-10-PCS | Mod: CPTII,S$GLB,, | Performed by: ORTHOPAEDIC SURGERY

## 2022-11-08 PROCEDURE — 72050 X-RAY EXAM NECK SPINE 4/5VWS: CPT | Mod: 26,,, | Performed by: RADIOLOGY

## 2022-11-08 PROCEDURE — 72100 X-RAY EXAM L-S SPINE 2/3 VWS: CPT | Mod: 26,,, | Performed by: RADIOLOGY

## 2022-11-08 PROCEDURE — 72100 X-RAY EXAM L-S SPINE 2/3 VWS: CPT | Mod: TC

## 2022-11-08 PROCEDURE — 1157F ADVNC CARE PLAN IN RCRD: CPT | Mod: CPTII,S$GLB,, | Performed by: ORTHOPAEDIC SURGERY

## 2022-11-08 PROCEDURE — 1159F MED LIST DOCD IN RCRD: CPT | Mod: CPTII,S$GLB,, | Performed by: ORTHOPAEDIC SURGERY

## 2022-11-08 PROCEDURE — 99999 PR PBB SHADOW E&M-EST. PATIENT-LVL III: ICD-10-PCS | Mod: PBBFAC,,, | Performed by: ORTHOPAEDIC SURGERY

## 2022-11-08 PROCEDURE — 1157F PR ADVANCE CARE PLAN OR EQUIV PRESENT IN MEDICAL RECORD: ICD-10-PCS | Mod: CPTII,S$GLB,, | Performed by: ORTHOPAEDIC SURGERY

## 2022-11-08 PROCEDURE — 1125F AMNT PAIN NOTED PAIN PRSNT: CPT | Mod: CPTII,S$GLB,, | Performed by: ORTHOPAEDIC SURGERY

## 2022-11-08 PROCEDURE — 72050 X-RAY EXAM NECK SPINE 4/5VWS: CPT | Mod: TC

## 2022-11-08 PROCEDURE — 99024 POSTOP FOLLOW-UP VISIT: CPT | Mod: S$GLB,,, | Performed by: ORTHOPAEDIC SURGERY

## 2022-11-08 PROCEDURE — 1160F RVW MEDS BY RX/DR IN RCRD: CPT | Mod: CPTII,S$GLB,, | Performed by: ORTHOPAEDIC SURGERY

## 2022-11-08 PROCEDURE — 72100 XR LUMBAR SPINE AP AND LATERAL: ICD-10-PCS | Mod: 26,,, | Performed by: RADIOLOGY

## 2022-11-08 PROCEDURE — 3288F FALL RISK ASSESSMENT DOCD: CPT | Mod: CPTII,S$GLB,, | Performed by: ORTHOPAEDIC SURGERY

## 2022-11-08 PROCEDURE — 1101F PT FALLS ASSESS-DOCD LE1/YR: CPT | Mod: CPTII,S$GLB,, | Performed by: ORTHOPAEDIC SURGERY

## 2022-11-08 PROCEDURE — 1125F PR PAIN SEVERITY QUANTIFIED, PAIN PRESENT: ICD-10-PCS | Mod: CPTII,S$GLB,, | Performed by: ORTHOPAEDIC SURGERY

## 2022-11-08 PROCEDURE — 3288F PR FALLS RISK ASSESSMENT DOCUMENTED: ICD-10-PCS | Mod: CPTII,S$GLB,, | Performed by: ORTHOPAEDIC SURGERY

## 2022-11-08 PROCEDURE — 1160F PR REVIEW ALL MEDS BY PRESCRIBER/CLIN PHARMACIST DOCUMENTED: ICD-10-PCS | Mod: CPTII,S$GLB,, | Performed by: ORTHOPAEDIC SURGERY

## 2022-11-08 PROCEDURE — 99999 PR PBB SHADOW E&M-EST. PATIENT-LVL III: CPT | Mod: PBBFAC,,, | Performed by: ORTHOPAEDIC SURGERY

## 2022-11-08 PROCEDURE — 1101F PR PT FALLS ASSESS DOC 0-1 FALLS W/OUT INJ PAST YR: ICD-10-PCS | Mod: CPTII,S$GLB,, | Performed by: ORTHOPAEDIC SURGERY

## 2022-11-08 PROCEDURE — 72050 XR CERVICAL SPINE AP LAT WITH FLEX EXTEN: ICD-10-PCS | Mod: 26,,, | Performed by: RADIOLOGY

## 2022-11-08 NOTE — PROGRESS NOTES
Date of Surgery: 9/2/22    Procedure: L2-3 PLDF    History: Korey Santos is seen today for follow-up following the above listed procedure. Overall the patient is doing well but today notes he has LBP that is similar to preop. He stated that the preop leg pain has improved but he has n/t in his toes. He has been doing outpatient therapy, and this is when he started having more pain.    He still has neck pain without arm pain.    Exam: Incision is healed. There is no sign of infection. Neuro exam is stable. No signs of DVT.    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    C spine Xrs showed cervical spondylosis and DDD. C4-5 anterolisthesis.    Assessment/Plan: 2 months s/p L2-3 PLDF, now with increasing LBP. I ordered lumbar MRI to evaluate stenosis. I educated the patient on the importance of core/back strengthening, correct posture, bending/lifting ergonomics, and low-impact aerobic exercises (walking, elliptical, and aquatherapy). Stop outpatient PT; continue medications and home exercises. He likes to hold off on cervical PT for now. RTC in 2 weeks for MRI review.    Thank you for the opportunity to participate in this patient's care. Please give me a call if there are any concerns or questions.    Luis William MD  Orthopaedic Spine Surgeon  Department of Orthopaedic Surgery  [phone]

## 2022-11-28 ENCOUNTER — HOSPITAL ENCOUNTER (OUTPATIENT)
Dept: RADIOLOGY | Facility: HOSPITAL | Age: 82
Discharge: HOME | End: 2022-11-28
Attending: ORTHOPAEDIC SURGERY
Payer: MEDICARE

## 2022-11-28 DIAGNOSIS — M54.9 DORSALGIA, UNSPECIFIED: ICD-10-CM

## 2022-11-28 PROCEDURE — 72148 MRI LUMBAR SPINE W/O DYE: CPT | Mod: TC,PO

## 2022-12-06 ENCOUNTER — OFFICE VISIT (OUTPATIENT)
Dept: ORTHOPEDICS | Facility: CLINIC | Age: 82
End: 2022-12-06
Payer: MEDICARE

## 2022-12-06 DIAGNOSIS — Z98.1 S/P LUMBAR FUSION: Primary | ICD-10-CM

## 2022-12-06 DIAGNOSIS — M47.816 LUMBAR SPONDYLOSIS: ICD-10-CM

## 2022-12-06 PROCEDURE — 1125F AMNT PAIN NOTED PAIN PRSNT: CPT | Mod: CPTII,S$GLB,, | Performed by: ORTHOPAEDIC SURGERY

## 2022-12-06 PROCEDURE — 3288F FALL RISK ASSESSMENT DOCD: CPT | Mod: CPTII,S$GLB,, | Performed by: ORTHOPAEDIC SURGERY

## 2022-12-06 PROCEDURE — 1101F PT FALLS ASSESS-DOCD LE1/YR: CPT | Mod: CPTII,S$GLB,, | Performed by: ORTHOPAEDIC SURGERY

## 2022-12-06 PROCEDURE — 1159F PR MEDICATION LIST DOCUMENTED IN MEDICAL RECORD: ICD-10-PCS | Mod: CPTII,S$GLB,, | Performed by: ORTHOPAEDIC SURGERY

## 2022-12-06 PROCEDURE — 99999 PR PBB SHADOW E&M-EST. PATIENT-LVL III: CPT | Mod: PBBFAC,,, | Performed by: ORTHOPAEDIC SURGERY

## 2022-12-06 PROCEDURE — 99214 PR OFFICE/OUTPT VISIT, EST, LEVL IV, 30-39 MIN: ICD-10-PCS | Mod: S$GLB,,, | Performed by: ORTHOPAEDIC SURGERY

## 2022-12-06 PROCEDURE — 3288F PR FALLS RISK ASSESSMENT DOCUMENTED: ICD-10-PCS | Mod: CPTII,S$GLB,, | Performed by: ORTHOPAEDIC SURGERY

## 2022-12-06 PROCEDURE — 1159F MED LIST DOCD IN RCRD: CPT | Mod: CPTII,S$GLB,, | Performed by: ORTHOPAEDIC SURGERY

## 2022-12-06 PROCEDURE — 1160F RVW MEDS BY RX/DR IN RCRD: CPT | Mod: CPTII,S$GLB,, | Performed by: ORTHOPAEDIC SURGERY

## 2022-12-06 PROCEDURE — 1125F PR PAIN SEVERITY QUANTIFIED, PAIN PRESENT: ICD-10-PCS | Mod: CPTII,S$GLB,, | Performed by: ORTHOPAEDIC SURGERY

## 2022-12-06 PROCEDURE — 1101F PR PT FALLS ASSESS DOC 0-1 FALLS W/OUT INJ PAST YR: ICD-10-PCS | Mod: CPTII,S$GLB,, | Performed by: ORTHOPAEDIC SURGERY

## 2022-12-06 PROCEDURE — 1157F ADVNC CARE PLAN IN RCRD: CPT | Mod: CPTII,S$GLB,, | Performed by: ORTHOPAEDIC SURGERY

## 2022-12-06 PROCEDURE — 99214 OFFICE O/P EST MOD 30 MIN: CPT | Mod: S$GLB,,, | Performed by: ORTHOPAEDIC SURGERY

## 2022-12-06 PROCEDURE — 1160F PR REVIEW ALL MEDS BY PRESCRIBER/CLIN PHARMACIST DOCUMENTED: ICD-10-PCS | Mod: CPTII,S$GLB,, | Performed by: ORTHOPAEDIC SURGERY

## 2022-12-06 PROCEDURE — 1157F PR ADVANCE CARE PLAN OR EQUIV PRESENT IN MEDICAL RECORD: ICD-10-PCS | Mod: CPTII,S$GLB,, | Performed by: ORTHOPAEDIC SURGERY

## 2022-12-06 PROCEDURE — 99999 PR PBB SHADOW E&M-EST. PATIENT-LVL III: ICD-10-PCS | Mod: PBBFAC,,, | Performed by: ORTHOPAEDIC SURGERY

## 2022-12-06 NOTE — PROGRESS NOTES
Date of Surgery: 9/2/22    Procedure: L2-3 PLDF    History: Korey Santos is seen today for MRI review. Overall the patient is doing well but today notes he still has LBP, but it has been getting better with gym exercises. He stated that the preop leg pain has improved but he has n/t in his toes. He stopped doing PT, because it made his pain worse    He still has neck pain without arm pain.    Exam: Incision is healed. There is no sign of infection. Neuro exam is stable. No signs of DVT.    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    C spine Xrs showed cervical spondylosis and DDD. C4-5 anterolisthesis.    Lumbar MRI showed no significant L2-3 stenosis.    Assessment/Plan: 3 months s/p L2-3 PLDF, doing well. I educated the patient on the importance of core/back strengthening, correct posture, bending/lifting ergonomics, and low-impact aerobic exercises (walking, elliptical, and aquatherapy). Continue medications and home exercises. RTC in 3 months for repeat lumbar XRs.    I provided the patient with a home exercise program. It is the [employer] spine conditioning program. Exercises include head rolls, kneeling back extension, sitting rotation stretch, modified seated side straddle, knee to chest, bird dog, plank, modified seated plank, hip bridges, abdominal bracing, and abdominal crunch. Pt will complete each exercise 5 times daily for 6-8 weeks.    Thank you for the opportunity to participate in this patient's care. Please give me a call if there are any concerns or questions.    Luis William MD  Orthopaedic Spine Surgeon  Department of Orthopaedic Surgery  [phone]

## 2022-12-20 ENCOUNTER — TELEPHONE (OUTPATIENT)
Dept: INTERNAL MEDICINE | Facility: CLINIC | Age: 82
End: 2022-12-20
Payer: MEDICARE

## 2022-12-20 NOTE — TELEPHONE ENCOUNTER
----- Message from Krysten Min MA sent at 12/20/2022 11:02 AM CST -----  Contact: Patient    ----- Message -----  From: Catherine Parish  Sent: 12/20/2022  10:13 AM CST  To: [facility] Clinical Staff    Type:  SAME DAY  Appointment Request      Name of Caller:Patient   When is the first available appointment?12/27/2022  Symptoms:Neck Pain  Would the patient rather a call back or a response via [employer]? Call back  Best Call Back Number:[phone]  Additional Information: Please assist

## 2022-12-20 NOTE — TELEPHONE ENCOUNTER
Called and spoke to pt. Pt c/o neck pain that increases when he swallow. Pt states he recently had a neck Xray and Dr William told him he had a bone spur. Pt states pain is 6-7/10. Explained to pt that we didn't have any available appt in clinic at this time this week, so I would recommend  appt to treat pain.

## 2022-12-20 NOTE — TELEPHONE ENCOUNTER
Please schedule patient - same day - with any available provider (MD, ARGELIA, APRN).     Thank you.

## 2022-12-21 NOTE — TELEPHONE ENCOUNTER
Called and spoke to pt. Relayed message from PCP. Offered pt 2 appointments today and 2 appointments tomorrow. Pt states he would rather stick with his appointment on the 27th. Explained to pt that PCP wanted him seen same day, but pt refused.

## 2022-12-27 ENCOUNTER — OFFICE VISIT (OUTPATIENT)
Dept: INTERNAL MEDICINE | Facility: CLINIC | Age: 82
End: 2022-12-27
Payer: MEDICARE

## 2022-12-27 VITALS
SYSTOLIC BLOOD PRESSURE: 140 MMHG | DIASTOLIC BLOOD PRESSURE: 76 MMHG | HEART RATE: 65 BPM | OXYGEN SATURATION: 97 % | WEIGHT: 238.75 LBS | BODY MASS INDEX: 33.43 KG/M2 | HEIGHT: 71 IN

## 2022-12-27 DIAGNOSIS — R79.9 ABNORMAL FINDING OF BLOOD CHEMISTRY, UNSPECIFIED: ICD-10-CM

## 2022-12-27 DIAGNOSIS — M48.061 SPINAL STENOSIS OF LUMBAR REGION, UNSPECIFIED WHETHER NEUROGENIC CLAUDICATION PRESENT: ICD-10-CM

## 2022-12-27 DIAGNOSIS — N52.01 ERECTILE DYSFUNCTION DUE TO ARTERIAL INSUFFICIENCY: ICD-10-CM

## 2022-12-27 DIAGNOSIS — I10 HYPERTENSION, ESSENTIAL: Primary | ICD-10-CM

## 2022-12-27 DIAGNOSIS — M47.816 LUMBAR FACET ARTHROPATHY: ICD-10-CM

## 2022-12-27 DIAGNOSIS — R29.898 WEAKNESS OF RIGHT UPPER EXTREMITY: ICD-10-CM

## 2022-12-27 DIAGNOSIS — M54.2 CERVICAL PAIN (NECK): ICD-10-CM

## 2022-12-27 DIAGNOSIS — E78.5 HYPERLIPIDEMIA, UNSPECIFIED HYPERLIPIDEMIA TYPE: ICD-10-CM

## 2022-12-27 DIAGNOSIS — N40.1 BENIGN PROSTATIC HYPERPLASIA WITH URINARY FREQUENCY: ICD-10-CM

## 2022-12-27 DIAGNOSIS — M51.26 LUMBAR HERNIATED DISC: ICD-10-CM

## 2022-12-27 DIAGNOSIS — R35.0 BENIGN PROSTATIC HYPERPLASIA WITH URINARY FREQUENCY: ICD-10-CM

## 2022-12-27 PROCEDURE — 99214 PR OFFICE/OUTPT VISIT, EST, LEVL IV, 30-39 MIN: ICD-10-PCS | Mod: HCNC,S$GLB,, | Performed by: NURSE PRACTITIONER

## 2022-12-27 PROCEDURE — 1157F ADVNC CARE PLAN IN RCRD: CPT | Mod: HCNC,CPTII,S$GLB, | Performed by: NURSE PRACTITIONER

## 2022-12-27 PROCEDURE — 1160F RVW MEDS BY RX/DR IN RCRD: CPT | Mod: HCNC,CPTII,S$GLB, | Performed by: NURSE PRACTITIONER

## 2022-12-27 PROCEDURE — 99214 OFFICE O/P EST MOD 30 MIN: CPT | Mod: HCNC,S$GLB,, | Performed by: NURSE PRACTITIONER

## 2022-12-27 PROCEDURE — 3288F FALL RISK ASSESSMENT DOCD: CPT | Mod: HCNC,CPTII,S$GLB, | Performed by: NURSE PRACTITIONER

## 2022-12-27 PROCEDURE — 3077F PR MOST RECENT SYSTOLIC BLOOD PRESSURE >= 140 MM HG: ICD-10-PCS | Mod: HCNC,CPTII,S$GLB, | Performed by: NURSE PRACTITIONER

## 2022-12-27 PROCEDURE — 1101F PT FALLS ASSESS-DOCD LE1/YR: CPT | Mod: HCNC,CPTII,S$GLB, | Performed by: NURSE PRACTITIONER

## 2022-12-27 PROCEDURE — 3078F PR MOST RECENT DIASTOLIC BLOOD PRESSURE < 80 MM HG: ICD-10-PCS | Mod: HCNC,CPTII,S$GLB, | Performed by: NURSE PRACTITIONER

## 2022-12-27 PROCEDURE — 1125F AMNT PAIN NOTED PAIN PRSNT: CPT | Mod: HCNC,CPTII,S$GLB, | Performed by: NURSE PRACTITIONER

## 2022-12-27 PROCEDURE — 3288F PR FALLS RISK ASSESSMENT DOCUMENTED: ICD-10-PCS | Mod: HCNC,CPTII,S$GLB, | Performed by: NURSE PRACTITIONER

## 2022-12-27 PROCEDURE — 1157F PR ADVANCE CARE PLAN OR EQUIV PRESENT IN MEDICAL RECORD: ICD-10-PCS | Mod: HCNC,CPTII,S$GLB, | Performed by: NURSE PRACTITIONER

## 2022-12-27 PROCEDURE — 1101F PR PT FALLS ASSESS DOC 0-1 FALLS W/OUT INJ PAST YR: ICD-10-PCS | Mod: HCNC,CPTII,S$GLB, | Performed by: NURSE PRACTITIONER

## 2022-12-27 PROCEDURE — 3077F SYST BP >= 140 MM HG: CPT | Mod: HCNC,CPTII,S$GLB, | Performed by: NURSE PRACTITIONER

## 2022-12-27 PROCEDURE — 1125F PR PAIN SEVERITY QUANTIFIED, PAIN PRESENT: ICD-10-PCS | Mod: HCNC,CPTII,S$GLB, | Performed by: NURSE PRACTITIONER

## 2022-12-27 PROCEDURE — 99999 PR PBB SHADOW E&M-EST. PATIENT-LVL V: ICD-10-PCS | Mod: PBBFAC,HCNC,, | Performed by: NURSE PRACTITIONER

## 2022-12-27 PROCEDURE — 1159F PR MEDICATION LIST DOCUMENTED IN MEDICAL RECORD: ICD-10-PCS | Mod: HCNC,CPTII,S$GLB, | Performed by: NURSE PRACTITIONER

## 2022-12-27 PROCEDURE — 3078F DIAST BP <80 MM HG: CPT | Mod: HCNC,CPTII,S$GLB, | Performed by: NURSE PRACTITIONER

## 2022-12-27 PROCEDURE — 1160F PR REVIEW ALL MEDS BY PRESCRIBER/CLIN PHARMACIST DOCUMENTED: ICD-10-PCS | Mod: HCNC,CPTII,S$GLB, | Performed by: NURSE PRACTITIONER

## 2022-12-27 PROCEDURE — 99999 PR PBB SHADOW E&M-EST. PATIENT-LVL V: CPT | Mod: PBBFAC,HCNC,, | Performed by: NURSE PRACTITIONER

## 2022-12-27 PROCEDURE — 1159F MED LIST DOCD IN RCRD: CPT | Mod: HCNC,CPTII,S$GLB, | Performed by: NURSE PRACTITIONER

## 2022-12-27 NOTE — PATIENT INSTRUCTIONS
Lyrica- for nerve pain   Gabapentin- made you sick   Meloxicam - for arthritis and pain - decreases inflammation   Methocarbamol - makes you drowsy but used as muscle relaxer   Losartan - for blood pressure   Pravastatin - for cholesterol   Flomax and finasteride- for prostate

## 2022-12-27 NOTE — TELEPHONE ENCOUNTER
Called pt to relay message from PCP to keep appt with PCP for early January, mo answer. Left detailed message.

## 2022-12-27 NOTE — PROGRESS NOTES
INTERNAL MEDICINE PROGRESS NOTE    CHIEF COMPLAINT     Chief Complaint   Patient presents with    Annual Exam       HPI     Korey Santos is a 82 y.o. male with HTN, HLD, BPH, ED, obesity, LPR, left inguinal pain, OA of the knee, chronic lower back pain and neck pain who presents for an annual  visit today.    Pt states he is here for annual and phone staff incorrectly scheduled as urgent care for neck pain   He is an established pt of Dr Bustamante last seen for VV 4/2022    S/p PLDF L2-3 Dr William - had f/u with Dr William   Lumbar Xrs showed L2-3 PLDF with no evidence of instrumentation failure.  C spine Xrs showed cervical spondylosis and DDD. C4-5 anterolisthesis.  Lumbar MRI showed no significant L2-3 stenosis  Pt continued on lyrica, mobic and robaxin   S/p ESL L3-4    Pt here with ongoing neck pain worse when swallowing - ROM has increased and pain has improved. Some numbness to the right trap area  Xray 11/8/2022- DJD with significant narrowing of the disc spaces between C5 and C7 vertebral segments.  There is a 2-3 mm C4/C5 anterolisthesis.  No acute fracture or dislocation.  No bone destruction identified    Osteoarthritis of the left knee - seen by Dr Bowles 12/2021 for this   Ultrasound-guided injection of the right knee with Monovisc at that visit     HTN- taking losartan 100mg    HLD- not taking pravastatin     BPH- would like to see urology - taking finasteride and Flomax and still having symptoms     HM-  Tdap-8/13/2019  Flu- needs   C-scope- aged out     Past Medical History:  Past Medical History:   Diagnosis Date    Arthritis     Back pain, chronic     BPH with urinary obstruction     Chronic constipation     Colon polyp     DJD (degenerative joint disease)     Hip    Hyperlipidemia     Hypertension     Laryngopharyngeal reflux     Left inguinal hernia     Lumbar herniated disc     Lumbar stenosis     OA (osteoarthritis) of knee     Bilateral    Paradoxical insomnia     Sinus trouble        Home  Medications:  Prior to Admission medications    Medication Sig Start Date End Date Taking? Authorizing Provider   acetaminophen (TYLENOL) 650 MG TbSR Take 1 tablet (650 mg total) by mouth every 8 (eight) hours. 9/2/22   Freedom Carlos MD   aspirin (ECOTRIN) 325 MG EC tablet Take 1 tablet (325 mg total) by mouth once daily.  Patient taking differently: Take 325 mg by mouth every other day. 4/23/18 8/23/22  Ricardo Valdes MD   azelastine (ASTELIN) 137 mcg (0.1 %) nasal spray 1 spray (137 mcg total) by Nasal route 2 (two) times daily as needed for Rhinitis. 12/4/19   Juan Bustamante MD   cycloSPORINE (RESTASIS) 0.05 % ophthalmic emulsion Place 1 drop into both eyes 2 (two) times daily. 1/23/20 6/14/22  Marisela Wilde, YEIMY   finasteride (PROSCAR) 5 mg tablet Take 1 tablet (5 mg total) by mouth once daily. 6/14/22 6/14/23  Malik Justice II, MD   fluticasone propionate (FLONASE) 50 mcg/actuation nasal spray 1 spray (50 mcg total) by Each Nostril route once daily. 2/12/21   Flaco Worthington MD   linaCLOtide (LINZESS) 145 mcg Cap capsule Take 1 capsule (145 mcg total) by mouth daily as needed (constipation). 6/14/22   Malik Justice II, MD   losartan (COZAAR) 100 MG tablet Take 1 tablet by mouth once daily 8/12/22   Juan Bustamante MD   meloxicam (MOBIC) 15 MG tablet Take 1 tablet (15 mg total) by mouth once daily. 6/20/22   Luis William MD   methocarbamoL (ROBAXIN) 500 MG Tab Take 1 tablet (500 mg total) by mouth 3 (three) times daily. 6/20/22   Luis William MD   multivitamin capsule Take 1 capsule by mouth once daily.    Historical Provider   polyethylene glycol (GLYCOLAX) 17 gram/dose powder Take 17 g by mouth daily as needed. 12/12/19   Juan Bustamante MD   pravastatin (PRAVACHOL) 40 MG tablet Take 1 tablet (40 mg total) by mouth once daily. 8/16/17   Juan Bustamante MD   pregabalin (LYRICA) 100 MG capsule Take 1 capsule (100 mg total) by mouth 3 (three) times daily. 8/9/22   Luis William,  "MD   tamsulosin (FLOMAX) 0.4 mg Cap Take 1 capsule (0.4 mg total) by mouth once daily. 5/26/22   Juan Ward MD       Review of Systems:  Review of Systems   Constitutional:  Negative for chills, fatigue and fever.   HENT:  Negative for congestion, postnasal drip and rhinorrhea.    Eyes:  Negative for visual disturbance.   Respiratory:  Negative for cough, shortness of breath and wheezing.    Cardiovascular:  Negative for chest pain and palpitations.   Gastrointestinal:  Positive for constipation (using linzess). Negative for abdominal pain, anal bleeding, diarrhea, nausea and vomiting.   Genitourinary:  Positive for difficulty urinating and urgency.   Musculoskeletal:  Positive for arthralgias, back pain and neck pain.   Skin:  Negative for rash.   Neurological:  Negative for dizziness, light-headedness and headaches.     Health Maintainence:   Immunizations:  Health Maintenance         Date Due Completion Date    Shingles Vaccine (1 of 2) Never done ---    Pneumococcal Vaccines (Age 65+) (1 - PCV) Never done ---    Colonoscopy 02/14/2016 2/14/2011    COVID-19 Vaccine (3 - Booster) 08/28/2021 7/3/2021    Influenza Vaccine (1) 09/01/2022 1/13/2021    Lipid Panel 02/05/2026 2/5/2021    TETANUS VACCINE 08/13/2029 8/13/2019             PHYSICAL EXAM     BP (!) 140/76 (BP Location: Left arm, Patient Position: Sitting, BP Method: Large (Manual))   Pulse 65   Ht 5' 11" (1.803 m)   Wt 108.3 kg (238 lb 12.1 oz)   SpO2 97%   BMI 33.30 kg/m²     Physical Exam  Vitals reviewed.   Constitutional:       Appearance: He is well-developed.   HENT:      Head: Normocephalic.      Right Ear: External ear normal.      Left Ear: External ear normal.      Nose: Nose normal.      Mouth/Throat:      Pharynx: No oropharyngeal exudate.   Eyes:      Pupils: Pupils are equal, round, and reactive to light.   Neck:      Thyroid: No thyromegaly.      Vascular: No JVD.      Trachea: No tracheal deviation.   Cardiovascular:      Rate " and Rhythm: Normal rate and regular rhythm.      Heart sounds: No murmur heard.    No friction rub. No gallop.   Pulmonary:      Effort: No respiratory distress.      Breath sounds: Normal breath sounds. No wheezing or rales.   Chest:      Chest wall: No tenderness.   Abdominal:      General: Bowel sounds are normal. There is no distension.      Palpations: Abdomen is soft.      Tenderness: There is no abdominal tenderness.   Musculoskeletal:         General: Normal range of motion.      Cervical back: Tenderness present. Pain with movement present.        Back:    Lymphadenopathy:      Cervical: No cervical adenopathy.   Skin:     General: Skin is warm and dry.      Findings: No rash.   Neurological:      Mental Status: He is alert and oriented to person, place, and time.   Psychiatric:         Behavior: Behavior normal.       LABS     Lab Results   Component Value Date    HGBA1C 5.8 (H) 09/16/2017     CMP  Sodium   Date Value Ref Range Status   09/03/2022 140 136 - 145 mmol/L Final     Potassium   Date Value Ref Range Status   09/03/2022 4.1 3.5 - 5.1 mmol/L Final     Chloride   Date Value Ref Range Status   09/03/2022 108 95 - 110 mmol/L Final     CO2   Date Value Ref Range Status   09/03/2022 24 23 - 29 mmol/L Final     Glucose   Date Value Ref Range Status   09/03/2022 119 (H) 70 - 110 mg/dL Final     BUN   Date Value Ref Range Status   09/03/2022 14 8 - 23 mg/dL Final     Creatinine   Date Value Ref Range Status   09/03/2022 0.8 0.5 - 1.4 mg/dL Final     Calcium   Date Value Ref Range Status   09/03/2022 8.8 8.7 - 10.5 mg/dL Final     Total Protein   Date Value Ref Range Status   09/02/2022 5.9 (L) 6.0 - 8.4 g/dL Final     Albumin   Date Value Ref Range Status   09/02/2022 3.4 (L) 3.5 - 5.2 g/dL Final   09/26/2012 4.3 3.6 - 5.1 g/dL Final     Comment:     Albumin:  THIS TEST WAS PERFORMED AT:  [facility] [address] [address]  ECTOR Garcia MD, PhD      Total Bilirubin   Date Value Ref Range Status   09/02/2022 0.5 0.1 - 1.0 mg/dL Final     Comment:     For infants and newborns, interpretation of results should be based  on gestational age, weight and in agreement with clinical  observations.    Premature Infant recommended reference ranges:  Up to 24 hours.............<8.0 mg/dL  Up to 48 hours............<12.0 mg/dL  3-5 days..................<15.0 mg/dL  6-29 days.................<15.0 mg/dL       Alkaline Phosphatase   Date Value Ref Range Status   09/02/2022 48 (L) 55 - 135 U/L Final     AST   Date Value Ref Range Status   09/02/2022 18 10 - 40 U/L Final     ALT   Date Value Ref Range Status   09/02/2022 17 10 - 44 U/L Final     Anion Gap   Date Value Ref Range Status   09/03/2022 8 8 - 16 mmol/L Final     eGFR if    Date Value Ref Range Status   02/05/2021 >60.0 >60 mL/min/1.73 m^2 Final     eGFR if non    Date Value Ref Range Status   02/05/2021 >60.0 >60 mL/min/1.73 m^2 Final     Comment:     Calculation used to obtain the estimated glomerular filtration  rate (eGFR) is the CKD-EPI equation.        Lab Results   Component Value Date    WBC 13.65 (H) 09/03/2022    HGB 12.9 (L) 09/03/2022    HCT 39.4 (L) 09/03/2022    MCV 92 09/03/2022     09/03/2022     Lab Results   Component Value Date    CHOL 193 02/05/2021    CHOL 206 (H) 08/29/2019    CHOL 186 11/15/2017     Lab Results   Component Value Date    HDL 44 02/05/2021    HDL 58 08/29/2019    HDL 46 11/15/2017     Lab Results   Component Value Date    LDLCALC 130.2 02/05/2021    LDLCALC 129.0 08/29/2019    LDLCALC 117.2 11/15/2017     Lab Results   Component Value Date    TRIG 94 02/05/2021    TRIG 95 08/29/2019    TRIG 114 11/15/2017     Lab Results   Component Value Date    CHOLHDL 22.8 02/05/2021    CHOLHDL 28.2 08/29/2019    CHOLHDL 24.7 11/15/2017     Lab Results   Component Value Date    TSH 0.642 07/08/2017       ASSESSMENT/PLAN     Korey Santos is a 82  y.o. male     Hypertension, essential- stable. Will cont current meds. Low na diet.   -     CBC Auto Differential; Future; Expected date: 12/27/2022  -     Comprehensive Metabolic Panel; Future; Expected date: 12/27/2022  -     Hemoglobin A1C; Future; Expected date: 12/27/2022  -     Lipid Panel; Future; Expected date: 12/27/2022  -     TSH; Future; Expected date: 12/27/2022    Hyperlipidemia, unspecified hyperlipidemia type- stable. Will repeat FLP and cont statin   -     CBC Auto Differential; Future; Expected date: 12/27/2022  -     Comprehensive Metabolic Panel; Future; Expected date: 12/27/2022  -     Hemoglobin A1C; Future; Expected date: 12/27/2022  -     Lipid Panel; Future; Expected date: 12/27/2022  -     TSH; Future; Expected date: 12/27/2022    Lumbar herniated disc- stable. Will f/u with Dr William and shakir current meds   -     CBC Auto Differential; Future; Expected date: 12/27/2022  -     Comprehensive Metabolic Panel; Future; Expected date: 12/27/2022  -     Hemoglobin A1C; Future; Expected date: 12/27/2022  -     Lipid Panel; Future; Expected date: 12/27/2022  -     TSH; Future; Expected date: 12/27/2022    Lumbar facet arthropathy- stable. Will f/u with Dr William and shakir current meds   -     CBC Auto Differential; Future; Expected date: 12/27/2022  -     Comprehensive Metabolic Panel; Future; Expected date: 12/27/2022  -     Hemoglobin A1C; Future; Expected date: 12/27/2022  -     Lipid Panel; Future; Expected date: 12/27/2022  -     TSH; Future; Expected date: 12/27/2022    Spinal stenosis of lumbar region, unspecified whether neurogenic claudication present- stable. Will f/u with Dr William and shakir current meds   -     CBC Auto Differential; Future; Expected date: 12/27/2022  -     Comprehensive Metabolic Panel; Future; Expected date: 12/27/2022  -     Hemoglobin A1C; Future; Expected date: 12/27/2022  -     Lipid Panel; Future; Expected date: 12/27/2022  -     TSH; Future; Expected date:  12/27/2022    Weakness of right upper extremity- will refer to PT and f/u with ortho Dr William if not improved with PT and meds.   -     CBC Auto Differential; Future; Expected date: 12/27/2022  -     Comprehensive Metabolic Panel; Future; Expected date: 12/27/2022  -     Hemoglobin A1C; Future; Expected date: 12/27/2022  -     Lipid Panel; Future; Expected date: 12/27/2022  -     TSH; Future; Expected date: 12/27/2022    Benign prostatic hyperplasia with urinary frequency- not controlled with meds. Will refer to urology   -     CBC Auto Differential; Future; Expected date: 12/27/2022  -     Comprehensive Metabolic Panel; Future; Expected date: 12/27/2022  -     Hemoglobin A1C; Future; Expected date: 12/27/2022  -     Lipid Panel; Future; Expected date: 12/27/2022  -     TSH; Future; Expected date: 12/27/2022  -     Ambulatory referral/consult to Urology; Future; Expected date: 01/03/2023    Erectile dysfunction due to arterial insufficiency  -     CBC Auto Differential; Future; Expected date: 12/27/2022  -     Comprehensive Metabolic Panel; Future; Expected date: 12/27/2022  -     Hemoglobin A1C; Future; Expected date: 12/27/2022  -     Lipid Panel; Future; Expected date: 12/27/2022  -     TSH; Future; Expected date: 12/27/2022    Cervical pain (neck)  -     CBC Auto Differential; Future; Expected date: 12/27/2022  -     Comprehensive Metabolic Panel; Future; Expected date: 12/27/2022  -     Hemoglobin A1C; Future; Expected date: 12/27/2022  -     Lipid Panel; Future; Expected date: 12/27/2022  -     TSH; Future; Expected date: 12/27/2022  -     Ambulatory referral/consult to Physical/Occupational Therapy; Future; Expected date: 01/03/2023    Abnormal finding of blood chemistry, unspecified  -     Hemoglobin A1C; Future; Expected date: 12/27/2022           Follow up with PCP     Patient education provided from Shira. Patient was counseled on when and how to seek emergent care.       Rolanda PETERSON, APRN,  FNP-c   Department of Internal Medicine - [facility]  8:31 AM

## 2022-12-28 ENCOUNTER — LAB VISIT (OUTPATIENT)
Dept: LAB | Facility: HOSPITAL | Age: 82
End: 2022-12-28
Attending: NURSE PRACTITIONER
Payer: MEDICARE

## 2022-12-28 DIAGNOSIS — R29.898 WEAKNESS OF RIGHT UPPER EXTREMITY: ICD-10-CM

## 2022-12-28 DIAGNOSIS — M51.26 LUMBAR HERNIATED DISC: ICD-10-CM

## 2022-12-28 DIAGNOSIS — I10 HYPERTENSION, ESSENTIAL: ICD-10-CM

## 2022-12-28 DIAGNOSIS — R79.9 ABNORMAL FINDING OF BLOOD CHEMISTRY, UNSPECIFIED: ICD-10-CM

## 2022-12-28 DIAGNOSIS — M47.816 LUMBAR FACET ARTHROPATHY: ICD-10-CM

## 2022-12-28 DIAGNOSIS — R35.0 BENIGN PROSTATIC HYPERPLASIA WITH URINARY FREQUENCY: ICD-10-CM

## 2022-12-28 DIAGNOSIS — E78.5 HYPERLIPIDEMIA, UNSPECIFIED HYPERLIPIDEMIA TYPE: ICD-10-CM

## 2022-12-28 DIAGNOSIS — M48.061 SPINAL STENOSIS OF LUMBAR REGION, UNSPECIFIED WHETHER NEUROGENIC CLAUDICATION PRESENT: ICD-10-CM

## 2022-12-28 DIAGNOSIS — N40.1 BENIGN PROSTATIC HYPERPLASIA WITH URINARY FREQUENCY: ICD-10-CM

## 2022-12-28 DIAGNOSIS — N52.01 ERECTILE DYSFUNCTION DUE TO ARTERIAL INSUFFICIENCY: ICD-10-CM

## 2022-12-28 DIAGNOSIS — M54.2 CERVICAL PAIN (NECK): ICD-10-CM

## 2022-12-28 LAB
ALBUMIN SERPL BCP-MCNC: 4.1 G/DL (ref 3.5–5.2)
ALP SERPL-CCNC: 78 U/L (ref 38–126)
ALT SERPL W/O P-5'-P-CCNC: 21 U/L (ref 10–44)
ANION GAP SERPL CALC-SCNC: 5 MMOL/L (ref 8–16)
AST SERPL-CCNC: 32 U/L (ref 15–46)
BASOPHILS # BLD AUTO: 0.1 K/UL (ref 0–0.2)
BASOPHILS NFR BLD: 1.4 % (ref 0–1.9)
BILIRUB SERPL-MCNC: 0.5 MG/DL (ref 0.1–1)
CALCIUM SERPL-MCNC: 8.8 MG/DL (ref 8.7–10.5)
CHLORIDE SERPL-SCNC: 108 MMOL/L (ref 95–110)
CHOLEST SERPL-MCNC: 190 MG/DL (ref 120–199)
CHOLEST/HDLC SERPL: 4.2 {RATIO} (ref 2–5)
CO2 SERPL-SCNC: 31 MMOL/L (ref 23–29)
CREAT SERPL-MCNC: 0.72 MG/DL (ref 0.5–1.4)
DIFFERENTIAL METHOD: NORMAL
EOSINOPHIL # BLD AUTO: 0.2 K/UL (ref 0–0.5)
EOSINOPHIL NFR BLD: 3.4 % (ref 0–8)
ERYTHROCYTE [DISTWIDTH] IN BLOOD BY AUTOMATED COUNT: 13 % (ref 11.5–14.5)
EST. GFR  (NO RACE VARIABLE): >60 ML/MIN/1.73 M^2
ESTIMATED AVG GLUCOSE: 123 MG/DL (ref 68–131)
GLUCOSE SERPL-MCNC: 100 MG/DL (ref 70–110)
HBA1C MFR BLD: 5.9 % (ref 4–5.6)
HCT VFR BLD AUTO: 44 % (ref 40–54)
HDLC SERPL-MCNC: 45 MG/DL (ref 40–75)
HDLC SERPL: 23.7 % (ref 20–50)
HGB BLD-MCNC: 14.1 G/DL (ref 14–18)
IMM GRANULOCYTES # BLD AUTO: 0.02 K/UL (ref 0–0.04)
IMM GRANULOCYTES NFR BLD AUTO: 0.3 % (ref 0–0.5)
LDLC SERPL CALC-MCNC: 132.6 MG/DL (ref 63–159)
LYMPHOCYTES # BLD AUTO: 2.6 K/UL (ref 1–4.8)
LYMPHOCYTES NFR BLD: 36.3 % (ref 18–48)
MCH RBC QN AUTO: 28.9 PG (ref 27–31)
MCHC RBC AUTO-ENTMCNC: 32 G/DL (ref 32–36)
MCV RBC AUTO: 90 FL (ref 82–98)
MONOCYTES # BLD AUTO: 0.6 K/UL (ref 0.3–1)
MONOCYTES NFR BLD: 8.1 % (ref 4–15)
NEUTROPHILS # BLD AUTO: 3.6 K/UL (ref 1.8–7.7)
NEUTROPHILS NFR BLD: 50.5 % (ref 38–73)
NONHDLC SERPL-MCNC: 145 MG/DL
NRBC BLD-RTO: 0 /100 WBC
PLATELET # BLD AUTO: 254 K/UL (ref 150–450)
PMV BLD AUTO: 10.2 FL (ref 9.2–12.9)
POTASSIUM SERPL-SCNC: 4.7 MMOL/L (ref 3.5–5.1)
PROT SERPL-MCNC: 6.9 G/DL (ref 6–8.4)
RBC # BLD AUTO: 4.88 M/UL (ref 4.6–6.2)
SODIUM SERPL-SCNC: 144 MMOL/L (ref 136–145)
TRIGL SERPL-MCNC: 62 MG/DL (ref 30–150)
TSH SERPL DL<=0.005 MIU/L-ACNC: 1.78 UIU/ML (ref 0.4–4)
UUN UR-MCNC: 16 MG/DL (ref 2–20)
WBC # BLD AUTO: 7.03 K/UL (ref 3.9–12.7)

## 2022-12-28 PROCEDURE — 36415 COLL VENOUS BLD VENIPUNCTURE: CPT | Mod: HCNC,PO | Performed by: NURSE PRACTITIONER

## 2022-12-28 PROCEDURE — 85025 COMPLETE CBC W/AUTO DIFF WBC: CPT | Mod: HCNC,PO | Performed by: NURSE PRACTITIONER

## 2022-12-28 PROCEDURE — 80053 COMPREHEN METABOLIC PANEL: CPT | Mod: HCNC,PO | Performed by: NURSE PRACTITIONER

## 2022-12-28 PROCEDURE — 84443 ASSAY THYROID STIM HORMONE: CPT | Mod: HCNC,PO | Performed by: NURSE PRACTITIONER

## 2022-12-28 PROCEDURE — 80061 LIPID PANEL: CPT | Mod: HCNC | Performed by: NURSE PRACTITIONER

## 2022-12-28 PROCEDURE — 83036 HEMOGLOBIN GLYCOSYLATED A1C: CPT | Mod: HCNC | Performed by: NURSE PRACTITIONER

## 2023-01-03 ENCOUNTER — TELEPHONE (OUTPATIENT)
Dept: SPORTS MEDICINE | Facility: CLINIC | Age: 83
End: 2023-01-03
Payer: MEDICARE

## 2023-01-03 ENCOUNTER — TELEPHONE (OUTPATIENT)
Dept: INTERNAL MEDICINE | Facility: CLINIC | Age: 83
End: 2023-01-03
Payer: MEDICARE

## 2023-01-03 DIAGNOSIS — M17.11 PRIMARY OSTEOARTHRITIS OF RIGHT KNEE: Primary | ICD-10-CM

## 2023-01-03 NOTE — TELEPHONE ENCOUNTER
Returned call. Patient requested Monovisc injection for appointment on 1/13/23. Will put in referral for injection but told patient that it might not get approved in time.     Funmilayo Hernandez  Sports Medicine Assistant Resident

## 2023-01-03 NOTE — TELEPHONE ENCOUNTER
----- Message from Suzi Chamberlain sent at 1/3/2023 10:07 AM CST -----  Contact: self [phone]  Pt requesting a call in regards to cancel appt.    Please call and advise

## 2023-01-03 NOTE — TELEPHONE ENCOUNTER
I called pt back and explained the cancel appointment was for an Annual. It was done at his urgent appointment on 12/27/2022. Which was the reason the 01/10/2022 appointment was cancelled.

## 2023-01-04 ENCOUNTER — TELEPHONE (OUTPATIENT)
Dept: INTERNAL MEDICINE | Facility: CLINIC | Age: 83
End: 2023-01-04
Payer: MEDICARE

## 2023-01-04 ENCOUNTER — CLINICAL SUPPORT (OUTPATIENT)
Dept: REHABILITATION | Facility: HOSPITAL | Age: 83
End: 2023-01-04
Payer: MEDICARE

## 2023-01-04 DIAGNOSIS — R29.3 POSTURE ABNORMALITY: ICD-10-CM

## 2023-01-04 DIAGNOSIS — M54.2 CERVICAL PAIN (NECK): ICD-10-CM

## 2023-01-04 DIAGNOSIS — M54.2 NECK PAIN: ICD-10-CM

## 2023-01-04 PROCEDURE — 97161 PT EVAL LOW COMPLEX 20 MIN: CPT | Mod: HCNC

## 2023-01-04 PROCEDURE — 97110 THERAPEUTIC EXERCISES: CPT | Mod: HCNC

## 2023-01-04 NOTE — TELEPHONE ENCOUNTER
----- Message from [facility] sent at 1/4/2023  2:55 PM CST -----  Type:  Needs Medical Advice    Who Called: Pt   Would the patient rather a call back or a response via [employer]? Callback   Best Call Back Number: [phone]  Additional Information: Pt requesting callback from office to discuss medical questions

## 2023-01-04 NOTE — PLAN OF CARE
[facility]   Physical Therapy Initial Evaluation     Date: 1/4/2023   Name: Korey Santos  Clinic Number: [id number]    Therapy Diagnosis:   Encounter Diagnoses   Name Primary?    Cervical pain (neck)     Posture abnormality     Neck pain      Physician: Rolanda Anderson,*    Physician Orders: PT Eval and Treat   Medical Diagnosis from Referral: M54.2 (ICD-10-CM) - Cervical pain (neck)  Evaluation Date: 1/4/2023  Authorization Period Expiration: 12/31/23  Plan of Care Expiration: 4/4/23  Progress Note Due: 2/4/23  Visit # / Visits authorized: 1/ 20   FOTO: 1/5    Precautions: Standard     Time In: 145pm  Time Out: 240pm  Total Appointment Time (timed & untimed codes): 55 minutes      SUBJECTIVE   Date of onset: 12/27/22    History of current condition - Korey reports: he's had low back pain for years and prior surgery, his neck has been hurting recently however. He doesn't think physical therapy is going to help. He reports he doesn't sleep well partially due to neck pain, partially due to sinus problems. He thinks he needs surgery on his neck, but was not told by an MD that is was necessary. He reports difficulty with driving & sleep.     Falls: none    Imaging, Xray: DJD with significant narrowing of the disc spaces between C5 and C7 vertebral segments.  There is a 2-3 mm C4/C5 anterolisthesis.  No acute fracture or dislocation.  No bone destruction identified    Prior Therapy: PT after lumbar surgery 6 months ago without good results   Social History:  lives alone  Occupation: retired   Prior Level of Function: (I)  Current Level of Function: difficulty sleeping and driving due to pain     Pain:  Current 6/10, worst 8/10, best 6/10   Location: central cervical spine into B upper trap   Description: Aching and Tight  Aggravating Factors: Lifting and turning head in any direction  Easing Factors: pain medication, muscle relaxers    Patients goals: return to [employer] for  exercise, be able to sleep and drive      Medical History:   Past Medical History:   Diagnosis Date    Arthritis     Back pain, chronic     BPH with urinary obstruction     Chronic constipation     Colon polyp     DJD (degenerative joint disease)     Hip    Hyperlipidemia     Hypertension     Laryngopharyngeal reflux     Left inguinal hernia     Lumbar herniated disc     Lumbar stenosis     OA (osteoarthritis) of knee     Bilateral    Paradoxical insomnia     Sinus trouble        Surgical History:   Korey Santos  has a past surgical history that includes left hand; Back surgery (2014); Leg Surgery; Knee surgery; Colonoscopy; Upper gastrointestinal endoscopy; Joint replacement (Left, 05/04/2018); Spine surgery; and Epidural steroid injection into lumbar spine (N/A, 7/5/2022).    Medications:   Korey has a current medication list which includes the following prescription(s): acetaminophen, aspirin, azelastine, cyclosporine, finasteride, fluticasone propionate, linaclotide, losartan, meloxicam, methocarbamol, multivitamin, polyethylene glycol, pravastatin, pregabalin, and tamsulosin.    Allergies:   Review of patient's allergies indicates:   Allergen Reactions    Clindamycin Swelling     Throat swells    Lisinopril Swelling and Other (See Comments)     Throat swelling        Objective     POSTURE    Postural examination/scapula alignment: Rounded shoulder, Head forward, and Slouched posture      NEUROLOGICAL SCREENING     Sensory deficit: n/a     Palpation: TTP B cervical paraspinals     Range of Motion/Strength:     CERVICAL AROM Pain/Dysfunction with Movement   Flexion 30 deg    Extension 10 deg Suboccipital pain   Right side bending 10 deg R neck pain    Left side bending 10 deg  L neck pain    Right rotation 25 deg    Left rotation 25 deg         Shoulder Right MMT Left MMT Pain/Dysfunction with Movement (pain=!)   AROM        flexion  WFL 3+/5  WFL 3+/5            abduction  WFL 3+/5  WFL 3+/5    Internal  rotation  WFL 3+/5  WFL 3+/5    ER at 0° abd  WFL 3+/5  WFL 3+/5    rhomboids  3/5  3/5    Mid trap  3/5  3/5    Lower trap   3/5  3/5      Elbow Right MMT Left MMT Pain/Dysfunction with Movement (pain=!)   AROM        flexion  WFL 4-/5  WFL 4-/5    extension  WFL 4-/5  WFL 4-/5          CMS Impairment/Limitation/Restriction for FOTO Neck Survey    Therapist reviewed FOTO scores for Korey Santos on 1/4/2023.   FOTO documents entered into "[employer] - see Media section.    Limitation Score: 97%  Category: Mobility         TREATMENT     Total Treatment time (time-based codes) separate from Evaluation: 10  minutes      Korey received the treatments listed below:      therapeutic exercises to develop strength, endurance, ROM, flexibility, and posture for 10 minutes including:  HEP instruction & return demonstration:  Cervical retractions  Scap sets  B shoulder external rotation with BTB     PATIENT EDUCATION AND HOME EXERCISES     Education provided:   - HEP  -purpose of PT  -postural corrections     Written Home Exercises Provided: yes. Exercises were reviewed and Korey was able to demonstrate them prior to the end of the session.  Korey demonstrated good  understanding of the education provided. See EMR under Patient Instructions for exercises provided during therapy sessions.    ASSESSMENT     Korey is a 82 y.o. male referred to outpatient Physical Therapy with a medical diagnosis of cervical pain. Patient presents with impaired resting posture with signs & symptoms consistent with upper crossed syndrome, and limited and painful cervical range of motion.     Patient prognosis is Fair.   Patientt will benefit from skilled outpatient Physical Therapy to address the deficits stated above and in the chart below, provide patient /family education, and to maximize patientt's level of independence.     Plan of care discussed with patient: Yes  Patient's spiritual, cultural and educational needs considered and patient is  agreeable to the plan of care and goals as stated below:     Anticipated Barriers for therapy: chronicity of pain, age    Medical Necessity is demonstrated by the following  History  Co-morbidities and personal factors that may impact the plan of care Co-morbidities:   advanced age, difficulty sleeping, excessive commute time/distance, level of undertstanding of current condition, and prior lumbar surgery    Personal Factors:   age  coping style  lifestyle  attitudes     moderate   Examination  Body Structures and Functions, activity limitations and participation restrictions that may impact the plan of care Body Regions:   neck  upper extremities  trunk    Body Systems:    gross symmetry  ROM  strength  gross coordinated movement  motor control  motor learning    Participation Restrictions:   none    Activity limitations:   Learning and applying knowledge  no deficits    General Tasks and Commands  no deficits    Communication  no deficits    Mobility  lifting and carrying objects  driving (bike, car, motorcycle)    Self care  washing oneself (bathing, drying, washing hands)  caring for body parts (brushing teeth, shaving, grooming)  looking after one's health    Domestic Life  shopping  cooking  doing house work (cleaning house, washing dishes, laundry)    Interactions/Relationships  no deficits    Life Areas  no deficits    Community and Social Life  recreation and leisure         moderate   Clinical Presentation stable and uncomplicated low   Decision Making/ Complexity Score: low     Goals:  Short Term Goals: 6 visits  1. Pt will be compliant /c HEP to supplement PT in order to improve functional tasks  2. Pt will improve B cervical rotation range of motion to >/= 40 deg for safety with driving     Long Term Goals: 12 visits   1. Pt will improve B aung-scaps MMTs to 3+/5 grossly to improve strength for functional activities  2. Pt will improve FOTO score to </= 70% limitation to reduce perceived pain with  mobility   3. Pt will improve cervical Ext AROM to >/= 25deg for ability to perform household chores      PLAN   Plan of care Certification: 1/4/2023 to 4/4/23.    Outpatient Physical Therapy 2 times weekly for 12 visits to include the following interventions: Manual Therapy, Moist Heat/ Ice, Neuromuscular Re-ed, Patient Education, Therapeutic Activities, and Therapeutic Exercise, E-stim & Dry Needling as needed.     Robyn Boone, PT      I CERTIFY THE NEED FOR THESE SERVICES FURNISHED UNDER THIS PLAN OF TREATMENT AND WHILE UNDER MY CARE   Physician's comments:     Physician's Signature: ___________________________________________________

## 2023-01-04 NOTE — TELEPHONE ENCOUNTER
Called and informed pt that if he is feeling bad he needs to be seen. Pt stated he has tested positive for COVID. He would like to know if he can receive the covid booster.

## 2023-01-05 ENCOUNTER — OFFICE VISIT (OUTPATIENT)
Dept: UROLOGY | Facility: CLINIC | Age: 83
End: 2023-01-05
Payer: MEDICARE

## 2023-01-05 ENCOUNTER — TELEPHONE (OUTPATIENT)
Dept: REHABILITATION | Facility: HOSPITAL | Age: 83
End: 2023-01-05
Payer: MEDICARE

## 2023-01-05 ENCOUNTER — TELEPHONE (OUTPATIENT)
Dept: INTERNAL MEDICINE | Facility: CLINIC | Age: 83
End: 2023-01-05
Payer: MEDICARE

## 2023-01-05 DIAGNOSIS — R35.0 BENIGN PROSTATIC HYPERPLASIA WITH URINARY FREQUENCY: ICD-10-CM

## 2023-01-05 DIAGNOSIS — N40.1 BENIGN PROSTATIC HYPERPLASIA WITH URINARY FREQUENCY: ICD-10-CM

## 2023-01-05 PROCEDURE — 1157F PR ADVANCE CARE PLAN OR EQUIV PRESENT IN MEDICAL RECORD: ICD-10-PCS | Mod: HCNC,CPTII,S$GLB, | Performed by: NURSE PRACTITIONER

## 2023-01-05 PROCEDURE — 99499 UNLISTED E&M SERVICE: CPT | Mod: HCNC,S$GLB,, | Performed by: NURSE PRACTITIONER

## 2023-01-05 PROCEDURE — 99499 NO LOS: ICD-10-PCS | Mod: HCNC,S$GLB,, | Performed by: NURSE PRACTITIONER

## 2023-01-05 PROCEDURE — 1157F ADVNC CARE PLAN IN RCRD: CPT | Mod: HCNC,CPTII,S$GLB, | Performed by: NURSE PRACTITIONER

## 2023-01-05 NOTE — TELEPHONE ENCOUNTER
Patient tested for COvid today in [facility]. Tested positive for COvid, advised to isolate for at least 5 days, no large/family functions, He lives alone. Please consider rescheduling his appt with your departments at least 1 week. Patient is asking that you reach out and give him another appointment. Thank you.

## 2023-01-05 NOTE — TELEPHONE ENCOUNTER
Spoke with patient, he reports he has tested positive with covid home test 3 days ago  Reports having cough, sore throat, chills, on and off fever    Denies sob, nausea, vomiting    Reports taking otc cough syrup and prescription nasal spray with little relief    Requesting plan of care/medication from PCP

## 2023-01-06 NOTE — TELEPHONE ENCOUNTER
Documentation of evening telephone call 01/05/2023.  Called patient to discuss possible positive COVID testing and potential treatment.  We had reviewed his chart early in the day noting that he had several drug interactions including statin medication, Linzess?  I explained I was calling him to discuss symptoms and time frame.  He stated that 'a lady' at my office took care of things and that I should talk to her.  He was not pleasant in his discourse.  I explained that I was calling to try to get the whole story, he refused to provide any additional details, mentioned that he had several appointments canceled.  He hung up the phone.  I called back to try to clarify and he did not answer.    Background - I received message concerning patient on January 5th concerning COVID.  Before we could act on that message, he came directly to the office and gained access to the private clinic space, unclear how he got in.  He approached us, with a piece of paper stating he need a prescription for something.  Given that we were at the moment occupied between 2 patients I told him to talk to the nurse.    He apparently did speak with Amie Yu, had an in-house positive COVID test.  Unclear why his other appointments were canceled.  In the discourse presented to me, I understood he was asking for prescription for testing and not treatment.  There are clinic messages to the latter.  Therefore made the call this evening to see what we could do for him.  Unfortunately, he became aggressive and refused to answer further questions.

## 2023-01-10 ENCOUNTER — TELEPHONE (OUTPATIENT)
Dept: INTERNAL MEDICINE | Facility: CLINIC | Age: 83
End: 2023-01-10
Payer: MEDICARE

## 2023-01-10 NOTE — TELEPHONE ENCOUNTER
Called and spoke to the pt. He expressed his dissatisfaction with not understanding the COVID/CDC Guidelines. I explained them to the pt to the best of my ability. The pt expressed understanding. I offered the pt to come in for a Nurse Visit to be retested. The pt declined because he did not want to drive down to [place]. I informed him that he can go to an  to be tested. The pt also asked about a home test.

## 2023-01-10 NOTE — TELEPHONE ENCOUNTER
----- Message from Colleen Mosquera LPN sent at 1/10/2023 11:46 AM CST -----  Contact: Korey   He is requesting a call from you and Dr. Solorzano. Dr. Solorzano will not be calling him. If he wants to be tested again, Dr. Solorzano is fine with him coming in for a Nurse Visit to do so.    Let me know if you need my assistance.  ----- Message -----  From: Gogo Encarnacion  Sent: 1/10/2023  11:25 AM CST  To: Dainna Martinez A Staff    Korey tested positive for covid last week. He was waiting for a call back about  to discuss medication for this. He also wants to know when he can come to appts he has scheduled. He also  wants to see how to be tested again for the covid  He wants a call from Dr Solorzano & Ángela

## 2023-01-10 NOTE — TELEPHONE ENCOUNTER
----- Message from Colleen Mosquera LPN sent at 1/10/2023 11:46 AM CST -----  Contact: Korey   He is requesting a call from you and Dr. Solorzano. Dr. Solorzano will not be calling him. If he wants to be tested again, Dr. Solorzano is fine with him coming in for a Nurse Visit to do so.    Let me know if you need my assistance.  ----- Message -----  From: Gogo Encarnacion  Sent: 1/10/2023  11:25 AM CST  To: Dianna Martinez A Staff    Korey tested positive for covid last week. He was waiting for a call back about  to discuss medication for this. He also wants to know when he can come to appts he has scheduled. He also  wants to see how to be tested again for the covid  He wants a call from Dr Solorzano & Ángela

## 2023-01-10 NOTE — TELEPHONE ENCOUNTER
Please see telephone note dated 01/04/2023.  I called patient back concerning his test last week and possible medications,  on 01/05/2023.  His discourse was angry and he hung the phone up on me, abruptly.  I called again to give him another chance to speak and he did not answer at that time.  Chart was reviewed then and he had medication interactions with antiviral noted.  Additionally, since he hung the phone up and would not discussed his symptoms at the time, further triage could not be made.    CDC recommends 5 days of isolation after being diagnosed with COVID or from the onset of symptoms, and then an additional 5 days of wearing a mask in public.    He should not need retesting, this is not recommended.  The test can remain positive after the clinical syndrome.  If he still requests testing then we can put an order in for POCT COVID testing.      Since he is now 5 days out of his testing, he should be able to go to any appointments, should wear a mask, he will have to call those individual department to reschedule.  I see that he has already seen the urologist.    If he is still having significant symptoms today then Please schedule patient - same day - with any available provider (MD, PAROMÁN, APRN).     Thank you.

## 2023-01-12 ENCOUNTER — TELEPHONE (OUTPATIENT)
Dept: SPORTS MEDICINE | Facility: CLINIC | Age: 83
End: 2023-01-12
Payer: MEDICARE

## 2023-01-12 NOTE — TELEPHONE ENCOUNTER
Returned call to patient to inform him that his Monovisc injection for 1/13/23 with Dr. Gilliam has been authorized by his insurance.

## 2023-01-12 NOTE — TELEPHONE ENCOUNTER
----- Message from Sirisha Quick sent at 1/12/2023  9:03 AM CST -----  Contact: pt  Pt calling in regards to seeing if he injection was approved by insurance         Confirmed patient's contact info below:  Contact Name: Korey Santos  Phone Number: [phone]

## 2023-01-13 ENCOUNTER — OFFICE VISIT (OUTPATIENT)
Dept: SPORTS MEDICINE | Facility: CLINIC | Age: 83
End: 2023-01-13
Payer: MEDICARE

## 2023-01-13 VITALS — WEIGHT: 235 LBS | BODY MASS INDEX: 32.9 KG/M2 | HEIGHT: 71 IN

## 2023-01-13 DIAGNOSIS — M17.11 PRIMARY OSTEOARTHRITIS OF RIGHT KNEE: Primary | ICD-10-CM

## 2023-01-13 PROCEDURE — 3288F PR FALLS RISK ASSESSMENT DOCUMENTED: ICD-10-PCS | Mod: HCNC,CPTII,S$GLB, | Performed by: STUDENT IN AN ORGANIZED HEALTH CARE EDUCATION/TRAINING PROGRAM

## 2023-01-13 PROCEDURE — 76882 US LMTD JT/FCL EVL NVASC XTR: CPT | Mod: HCNC,59,S$GLB, | Performed by: STUDENT IN AN ORGANIZED HEALTH CARE EDUCATION/TRAINING PROGRAM

## 2023-01-13 PROCEDURE — 1101F PT FALLS ASSESS-DOCD LE1/YR: CPT | Mod: HCNC,CPTII,S$GLB, | Performed by: STUDENT IN AN ORGANIZED HEALTH CARE EDUCATION/TRAINING PROGRAM

## 2023-01-13 PROCEDURE — 1157F PR ADVANCE CARE PLAN OR EQUIV PRESENT IN MEDICAL RECORD: ICD-10-PCS | Mod: HCNC,CPTII,S$GLB, | Performed by: STUDENT IN AN ORGANIZED HEALTH CARE EDUCATION/TRAINING PROGRAM

## 2023-01-13 PROCEDURE — 99499 NO LOS: ICD-10-PCS | Mod: HCNC,S$GLB,, | Performed by: STUDENT IN AN ORGANIZED HEALTH CARE EDUCATION/TRAINING PROGRAM

## 2023-01-13 PROCEDURE — 1160F RVW MEDS BY RX/DR IN RCRD: CPT | Mod: HCNC,CPTII,S$GLB, | Performed by: STUDENT IN AN ORGANIZED HEALTH CARE EDUCATION/TRAINING PROGRAM

## 2023-01-13 PROCEDURE — 3288F FALL RISK ASSESSMENT DOCD: CPT | Mod: HCNC,CPTII,S$GLB, | Performed by: STUDENT IN AN ORGANIZED HEALTH CARE EDUCATION/TRAINING PROGRAM

## 2023-01-13 PROCEDURE — 1157F ADVNC CARE PLAN IN RCRD: CPT | Mod: HCNC,CPTII,S$GLB, | Performed by: STUDENT IN AN ORGANIZED HEALTH CARE EDUCATION/TRAINING PROGRAM

## 2023-01-13 PROCEDURE — 20611 LARGE JOINT ASPIRATION/INJECTION: R KNEE: ICD-10-PCS | Mod: HCNC,RT,S$GLB, | Performed by: STUDENT IN AN ORGANIZED HEALTH CARE EDUCATION/TRAINING PROGRAM

## 2023-01-13 PROCEDURE — 1159F PR MEDICATION LIST DOCUMENTED IN MEDICAL RECORD: ICD-10-PCS | Mod: HCNC,CPTII,S$GLB, | Performed by: STUDENT IN AN ORGANIZED HEALTH CARE EDUCATION/TRAINING PROGRAM

## 2023-01-13 PROCEDURE — 1159F MED LIST DOCD IN RCRD: CPT | Mod: HCNC,CPTII,S$GLB, | Performed by: STUDENT IN AN ORGANIZED HEALTH CARE EDUCATION/TRAINING PROGRAM

## 2023-01-13 PROCEDURE — 99999 PR PBB SHADOW E&M-EST. PATIENT-LVL III: ICD-10-PCS | Mod: PBBFAC,HCNC,, | Performed by: STUDENT IN AN ORGANIZED HEALTH CARE EDUCATION/TRAINING PROGRAM

## 2023-01-13 PROCEDURE — 1160F PR REVIEW ALL MEDS BY PRESCRIBER/CLIN PHARMACIST DOCUMENTED: ICD-10-PCS | Mod: HCNC,CPTII,S$GLB, | Performed by: STUDENT IN AN ORGANIZED HEALTH CARE EDUCATION/TRAINING PROGRAM

## 2023-01-13 PROCEDURE — 1125F PR PAIN SEVERITY QUANTIFIED, PAIN PRESENT: ICD-10-PCS | Mod: HCNC,CPTII,S$GLB, | Performed by: STUDENT IN AN ORGANIZED HEALTH CARE EDUCATION/TRAINING PROGRAM

## 2023-01-13 PROCEDURE — 99499 UNLISTED E&M SERVICE: CPT | Mod: HCNC,S$GLB,, | Performed by: STUDENT IN AN ORGANIZED HEALTH CARE EDUCATION/TRAINING PROGRAM

## 2023-01-13 PROCEDURE — 1101F PR PT FALLS ASSESS DOC 0-1 FALLS W/OUT INJ PAST YR: ICD-10-PCS | Mod: HCNC,CPTII,S$GLB, | Performed by: STUDENT IN AN ORGANIZED HEALTH CARE EDUCATION/TRAINING PROGRAM

## 2023-01-13 PROCEDURE — 1125F AMNT PAIN NOTED PAIN PRSNT: CPT | Mod: HCNC,CPTII,S$GLB, | Performed by: STUDENT IN AN ORGANIZED HEALTH CARE EDUCATION/TRAINING PROGRAM

## 2023-01-13 PROCEDURE — 76882 PR  US,EXTREMITY,NONVASCULAR,REAL-TIME IMAGE,LIMITED: ICD-10-PCS | Mod: HCNC,59,S$GLB, | Performed by: STUDENT IN AN ORGANIZED HEALTH CARE EDUCATION/TRAINING PROGRAM

## 2023-01-13 PROCEDURE — 20611 DRAIN/INJ JOINT/BURSA W/US: CPT | Mod: HCNC,RT,S$GLB, | Performed by: STUDENT IN AN ORGANIZED HEALTH CARE EDUCATION/TRAINING PROGRAM

## 2023-01-13 PROCEDURE — 99999 PR PBB SHADOW E&M-EST. PATIENT-LVL III: CPT | Mod: PBBFAC,HCNC,, | Performed by: STUDENT IN AN ORGANIZED HEALTH CARE EDUCATION/TRAINING PROGRAM

## 2023-01-13 NOTE — PROCEDURES
Large Joint Aspiration/Injection: R knee    Date/Time: 1/13/2023 10:45 AM  Performed by: Chela Gilliam MD  Authorized by: Chela Gilliam MD     Consent Done?:  Yes (Verbal)  Indications:  Arthritis and pain  Site marked: the procedure site was marked    Timeout: prior to procedure the correct patient, procedure, and site was verified    Prep: patient was prepped and draped in usual sterile fashion      Local anesthesia used?: Yes    Anesthesia:  Local infiltration  Local anesthetic:  Co-phenylcaine spray    Details:  Needle Size:  22 G  Ultrasonic Guidance for needle placement?: Yes (Ultrasound guidance used to avoid neurovascular injury and/or to improve accuracy given body habitus.)    Images are saved and documented.  Approach: Superolateral.  Location:  Knee  Site:  R knee  Medications:  88 mg hyaluronate sodium, stabilized 88 mg/4 mL  Medications comment:  Ropivacaine 0.2% 2mL  Patient tolerance:  Patient tolerated the procedure well with no immediate complications     TECHNIQUE: Real time ultrasound examination of the right knee was performed with SonMadison Vaccineste Edge 2, 9-L MHz linear probe(s). Ultrasound guidance was used for needle localization. Images were saved and stored for documentation. Dynamic visualization of the needle was continuous throughout the procedures and maintained in good position.

## 2023-01-13 NOTE — PROGRESS NOTES
CC: right knee pain    82 y.o. Male presents today for his Monovisc injection of his right knee.     Attempted treatments: none since last visit  Pain score: 7/10  History of trauma/injury: none since last visit  Affecting ADLs: yes      REVIEW OF SYSTEMS:   Constitution: Patient denies fever or chills.  Eyes: Patient denies eye pain or vision changes.  HEENT: Patient denies ear pain, sore throat, or nasal discharge.  CVS: Patient denies chest pain.  Lungs: Patient denies shortness of breath or cough.  Skin: Patient denies skin rash or itching.    Musculoskeletal: Patient denies recent falls. See HPI.  Psych: Patient reports nervousness.     PAST MEDICAL HISTORY:   Past Medical History:   Diagnosis Date    Arthritis     Back pain, chronic     BPH with urinary obstruction     Chronic constipation     Colon polyp     DJD (degenerative joint disease)     Hip    Hyperlipidemia     Hypertension     Laryngopharyngeal reflux     Left inguinal hernia     Lumbar herniated disc     Lumbar stenosis     OA (osteoarthritis) of knee     Bilateral    Paradoxical insomnia     Sinus trouble        MEDICATIONS:     Current Outpatient Medications:     acetaminophen (TYLENOL) 650 MG TbSR, Take 1 tablet (650 mg total) by mouth every 8 (eight) hours., Disp: 120 tablet, Rfl: 0    finasteride (PROSCAR) 5 mg tablet, Take 1 tablet (5 mg total) by mouth once daily., Disp: 90 tablet, Rfl: 3    linaCLOtide (LINZESS) 145 mcg Cap capsule, Take 1 capsule (145 mcg total) by mouth daily as needed (constipation)., Disp: 90 capsule, Rfl: 3    losartan (COZAAR) 100 MG tablet, Take 1 tablet by mouth once daily, Disp: 90 tablet, Rfl: 0    meloxicam (MOBIC) 15 MG tablet, Take 1 tablet (15 mg total) by mouth once daily., Disp: 60 tablet, Rfl: 1    multivitamin capsule, Take 1 capsule by mouth once daily., Disp: , Rfl:     pregabalin (LYRICA) 100 MG capsule, Take 1 capsule (100 mg total) by mouth 3 (three) times daily., Disp: 60 capsule, Rfl: 1     "tamsulosin (FLOMAX) 0.4 mg Cap, Take 1 capsule (0.4 mg total) by mouth once daily., Disp: 90 capsule, Rfl: 4    aspirin (ECOTRIN) 325 MG EC tablet, Take 1 tablet (325 mg total) by mouth once daily. (Patient taking differently: Take 325 mg by mouth every other day.), Disp: 42 tablet, Rfl: 0    azelastine (ASTELIN) 137 mcg (0.1 %) nasal spray, 1 spray (137 mcg total) by Nasal route 2 (two) times daily as needed for Rhinitis. (Patient not taking: Reported on 12/27/2022), Disp: 30 mL, Rfl: 0    cycloSPORINE (RESTASIS) 0.05 % ophthalmic emulsion, Place 1 drop into both eyes 2 (two) times daily., Disp: 60 vial, Rfl: 11    fluticasone propionate (FLONASE) 50 mcg/actuation nasal spray, 1 spray (50 mcg total) by Each Nostril route once daily. (Patient not taking: Reported on 12/27/2022), Disp: 16 g, Rfl: 6    methocarbamoL (ROBAXIN) 500 MG Tab, Take 1 tablet (500 mg total) by mouth 3 (three) times daily. (Patient not taking: Reported on 12/27/2022), Disp: 60 tablet, Rfl: 1    polyethylene glycol (GLYCOLAX) 17 gram/dose powder, Take 17 g by mouth daily as needed. (Patient not taking: Reported on 12/27/2022), Disp: 510 g, Rfl: 1    pravastatin (PRAVACHOL) 40 MG tablet, Take 1 tablet (40 mg total) by mouth once daily. (Patient not taking: Reported on 12/27/2022), Disp: 30 tablet, Rfl: 11    ALLERGIES:   Review of patient's allergies indicates:   Allergen Reactions    Clindamycin Swelling     Throat swells    Lisinopril Swelling and Other (See Comments)     Throat swelling        PHYSICAL EXAMINATION:  Ht 5' 11" (1.803 m)   Wt 106.6 kg (235 lb)   BMI 32.78 kg/m²   There are no signs of infection at the injection site, including no rubor, calor, or skin lesions.  Gen: NAD.  Psych: Affect & judgment nl.  Neuro: Grossly CNI. CARRINGTON.  HEENT: -Trach dev. -Eye d/c. -Rhinorrhea.  CV: Color nl. -E/C/C. WWPx4.  Pulm: -Dyspnea. -Cough.  Lymph: -Edema.  Int: -Rash/lesion noted. Skin is warm and dry    Diagnostic Extremity - [facility]Sports " Ultrasound was recommended due to right knee pain and evaluation.    FOCUSED: examination.     TECHNIQUE: Real time ultrasound examination of the right knee was performed with SonoSite Edge 2, 9-L MHz linear probe(s).     FINDINGS: The images are of adequate diagnostic quality with identification of all echogenic structures made except for the vascular structures unless otherwise noted. There is no sonographic evidence of periosteal abnormalities, soft tissue edema, musculotendinous or nerve irregularities. No effusion. The rest of the sonographic examination was unremarkable.    Ultrasound images were directly reviewed with the patient and then a treatment plan was discussed.     Images were stored in the patients medical record.     IMPRESSION: No effusion      ASSESSMENT:      ICD-10-CM ICD-9-CM   1. Primary osteoarthritis of right knee  M17.11 715.16         PLAN:  Ultrasound-guided injection of the right knee with Monovisc performed at visit today.    Future planning includes - Continue exercise program    Risks and benefits were discussed with patient prior to receiving injection.  Depending on injection type, risks include the possibility of infection, pain, disruptions in blood pressure and blood sugar, and cosmetic deformity at site of injection.    All questions were answered to the best of my ability and all concerns were addressed at this time.    Follow up in 6 week(s) for above, or sooner if needed.      This note is dictated using the M*Modal Fluency Direct word recognition program. There are word recognition mistakes that are occasionally missed on review.

## 2023-01-18 ENCOUNTER — NURSE TRIAGE (OUTPATIENT)
Dept: ADMINISTRATIVE | Facility: CLINIC | Age: 83
End: 2023-01-18
Payer: MEDICARE

## 2023-01-18 ENCOUNTER — HOSPITAL ENCOUNTER (EMERGENCY)
Facility: HOSPITAL | Age: 83
Discharge: HOME | End: 2023-01-18
Attending: EMERGENCY MEDICINE
Payer: MEDICARE

## 2023-01-18 VITALS
HEART RATE: 60 BPM | BODY MASS INDEX: 32.08 KG/M2 | WEIGHT: 230 LBS | SYSTOLIC BLOOD PRESSURE: 161 MMHG | DIASTOLIC BLOOD PRESSURE: 75 MMHG | OXYGEN SATURATION: 97 % | RESPIRATION RATE: 18 BRPM | TEMPERATURE: 98 F

## 2023-01-18 DIAGNOSIS — I16.0 HYPERTENSIVE URGENCY: Primary | ICD-10-CM

## 2023-01-18 DIAGNOSIS — R42 DIZZINESS: ICD-10-CM

## 2023-01-18 LAB
ALBUMIN SERPL BCP-MCNC: 4.4 G/DL (ref 3.5–5.2)
ALP SERPL-CCNC: 76 U/L (ref 38–126)
ALT SERPL W/O P-5'-P-CCNC: 19 U/L (ref 10–44)
ANION GAP SERPL CALC-SCNC: 7 MMOL/L (ref 8–16)
AST SERPL-CCNC: 33 U/L (ref 15–46)
BASOPHILS # BLD AUTO: 0.1 K/UL (ref 0–0.2)
BASOPHILS NFR BLD: 1.4 % (ref 0–1.9)
BILIRUB SERPL-MCNC: 1.1 MG/DL (ref 0.1–1)
CALCIUM SERPL-MCNC: 8.9 MG/DL (ref 8.7–10.5)
CHLORIDE SERPL-SCNC: 107 MMOL/L (ref 95–110)
CO2 SERPL-SCNC: 26 MMOL/L (ref 23–29)
CREAT SERPL-MCNC: 0.71 MG/DL (ref 0.5–1.4)
DIFFERENTIAL METHOD: NORMAL
EOSINOPHIL # BLD AUTO: 0.1 K/UL (ref 0–0.5)
EOSINOPHIL NFR BLD: 1.9 % (ref 0–8)
ERYTHROCYTE [DISTWIDTH] IN BLOOD BY AUTOMATED COUNT: 13.1 % (ref 11.5–14.5)
EST. GFR  (NO RACE VARIABLE): >60 ML/MIN/1.73 M^2
GLUCOSE SERPL-MCNC: 113 MG/DL (ref 70–110)
HCT VFR BLD AUTO: 45.8 % (ref 40–54)
HGB BLD-MCNC: 14.9 G/DL (ref 14–18)
IMM GRANULOCYTES # BLD AUTO: 0.02 K/UL (ref 0–0.04)
IMM GRANULOCYTES NFR BLD AUTO: 0.3 % (ref 0–0.5)
LYMPHOCYTES # BLD AUTO: 2.8 K/UL (ref 1–4.8)
LYMPHOCYTES NFR BLD: 39.1 % (ref 18–48)
MAGNESIUM SERPL-MCNC: 2.1 MG/DL (ref 1.6–2.6)
MCH RBC QN AUTO: 28.9 PG (ref 27–31)
MCHC RBC AUTO-ENTMCNC: 32.5 G/DL (ref 32–36)
MCV RBC AUTO: 89 FL (ref 82–98)
MONOCYTES # BLD AUTO: 0.5 K/UL (ref 0.3–1)
MONOCYTES NFR BLD: 6.5 % (ref 4–15)
NEUTROPHILS # BLD AUTO: 3.7 K/UL (ref 1.8–7.7)
NEUTROPHILS NFR BLD: 50.8 % (ref 38–73)
NRBC BLD-RTO: 0 /100 WBC
PLATELET # BLD AUTO: 217 K/UL (ref 150–450)
PMV BLD AUTO: 10.7 FL (ref 9.2–12.9)
POTASSIUM SERPL-SCNC: 4.3 MMOL/L (ref 3.5–5.1)
PROT SERPL-MCNC: 7.4 G/DL (ref 6–8.4)
RBC # BLD AUTO: 5.15 M/UL (ref 4.6–6.2)
SODIUM SERPL-SCNC: 140 MMOL/L (ref 136–145)
TROPONIN I SERPL-MCNC: 0.02 NG/ML (ref 0.01–0.03)
UUN UR-MCNC: 11 MG/DL (ref 2–20)
WBC # BLD AUTO: 7.22 K/UL (ref 3.9–12.7)

## 2023-01-18 PROCEDURE — 80053 COMPREHEN METABOLIC PANEL: CPT | Mod: HCNC,ER | Performed by: EMERGENCY MEDICINE

## 2023-01-18 PROCEDURE — 99284 EMERGENCY DEPT VISIT MOD MDM: CPT | Mod: HCNC,ER

## 2023-01-18 PROCEDURE — 85025 COMPLETE CBC W/AUTO DIFF WBC: CPT | Mod: HCNC,ER | Performed by: EMERGENCY MEDICINE

## 2023-01-18 PROCEDURE — 93005 ELECTROCARDIOGRAM TRACING: CPT | Mod: HCNC,ER

## 2023-01-18 PROCEDURE — 84484 ASSAY OF TROPONIN QUANT: CPT | Mod: HCNC,ER | Performed by: EMERGENCY MEDICINE

## 2023-01-18 PROCEDURE — 83735 ASSAY OF MAGNESIUM: CPT | Mod: HCNC,ER | Performed by: EMERGENCY MEDICINE

## 2023-01-18 PROCEDURE — 93010 EKG 12-LEAD: ICD-10-PCS | Mod: HCNC,,, | Performed by: INTERNAL MEDICINE

## 2023-01-18 PROCEDURE — 93010 ELECTROCARDIOGRAM REPORT: CPT | Mod: HCNC,,, | Performed by: INTERNAL MEDICINE

## 2023-01-18 RX ORDER — HYDROXYZINE HYDROCHLORIDE 25 MG/1
25 TABLET, FILM COATED ORAL 3 TIMES DAILY PRN
Qty: 30 TABLET | Refills: 0 | Status: SHIPPED | OUTPATIENT
Start: 2023-01-18

## 2023-01-18 NOTE — TELEPHONE ENCOUNTER
Pt states High BP x a couple of days, BP this AM before med was 178/87. Current BP during call 196/96. Pt states has been monitoring his BP due to feeling very anxious, nervous. Per protocol, go to ED now. Advised pt of worsening symptoms to monitor for and to call back for any further questions or concerns. Pt verbalizes understanding.   Reason for Disposition   Systolic BP >= 160 OR Diastolic >= 100, and any cardiac or neurologic symptoms (e.g., chest pain, difficulty breathing, unsteady gait, blurred vision)    Additional Information   Negative: Sounds like a life-threatening emergency to the triager    Protocols used: Blood Pressure - High-A-OH

## 2023-01-18 NOTE — ED PROVIDER NOTES
Encounter Date: 1/18/2023       History     Chief Complaint   Patient presents with    Dizziness    Hypertension     I woke up and felt nervous and dizzy this morning. I checked my pressure and it was 190/81. Lat week I had a steroid shot in my right knee.      82-year-old male with history of hypertension, BPH, chronic back pain presents with elevated blood pressures that he is noticed over the past 2 weeks.  Patient says that his pressure normally is between [address], occasionally up to 170 but lately it has been significantly higher.  He says he is had increased dizziness.  Patient says that he is also had increased hand shaking that he described as feeling nervous.  Denies alcohol use.  Patient does say that he is not compliant with his blood pressure medications because when he sees that his blood pressure is normal, he does not take his medications.  He says he has taken his losartan consistently over the past few days when he noticed his blood pressure was high.  Denies chest pain, shortness of breath, abdominal pain, numbness or weakness.    Review of patient's allergies indicates:   Allergen Reactions    Clindamycin Swelling     Throat swells    Lisinopril Swelling and Other (See Comments)     Throat swelling     Past Medical History:   Diagnosis Date    Arthritis     Back pain, chronic     BPH with urinary obstruction     Chronic constipation     Colon polyp     DJD (degenerative joint disease)     Hip    Hyperlipidemia     Hypertension     Laryngopharyngeal reflux     Left inguinal hernia     Lumbar herniated disc     Lumbar stenosis     OA (osteoarthritis) of knee     Bilateral    Paradoxical insomnia     Sinus trouble      Past Surgical History:   Procedure Laterality Date    BACK SURGERY  2014    COLONOSCOPY      EPIDURAL STEROID INJECTION INTO LUMBAR SPINE N/A 7/5/2022    Procedure: Injection-steroid-epidural-lumbar L3-4;  Surgeon: Yury Crum Jr., MD;  Location: [facility];  Service: Pain  Management;  Laterality: N/A;  oral sedation   asa      JOINT REPLACEMENT Left 05/04/2018    KNEE SURGERY      left hand      LEG SURGERY      SPINE SURGERY      UPPER GASTROINTESTINAL ENDOSCOPY       Family History   Problem Relation Age of Onset    Cancer Father         lung or smoking    No Known Problems Mother     No Known Problems Sister     No Known Problems Brother     No Known Problems Maternal Aunt     No Known Problems Maternal Uncle     No Known Problems Paternal Aunt     No Known Problems Paternal Uncle     No Known Problems Maternal Grandmother     No Known Problems Maternal Grandfather     No Known Problems Paternal Grandmother     No Known Problems Paternal Grandfather     No Known Problems Daughter     No Known Problems Son     Glaucoma Neg Hx     Diabetes Neg Hx     Amblyopia Neg Hx     Blindness Neg Hx     Cataracts Neg Hx     Hypertension Neg Hx     Macular degeneration Neg Hx     Retinal detachment Neg Hx     Strabismus Neg Hx     Stroke Neg Hx     Thyroid disease Neg Hx     Colon cancer Neg Hx     Esophageal cancer Neg Hx      Social History     Tobacco Use    Smoking status: Never    Smokeless tobacco: Never   Substance Use Topics    Alcohol use: Yes     Alcohol/week: 0.0 standard drinks     Comment: approximately 2 beers weekly    Drug use: No     Review of Systems   Constitutional:  Negative for appetite change.   Eyes:  Negative for pain.   Respiratory:  Negative for shortness of breath.    Cardiovascular:  Negative for chest pain.   Gastrointestinal:  Negative for abdominal pain, nausea and vomiting.   Genitourinary:  Negative for frequency.   Musculoskeletal:  Negative for arthralgias and neck pain.   Neurological:  Positive for dizziness. Negative for headaches.   Psychiatric/Behavioral:  Negative for confusion. The patient is nervous/anxious.      Physical Exam     Initial Vitals [01/18/23 0901]   BP Pulse Resp Temp SpO2   (!) 234/100 61 16 97.8 °F (36.6 °C) 99 %      MAP       --          Physical Exam    Nursing note and vitals reviewed.  HENT:   Head: Atraumatic.   Eyes: Conjunctivae and EOM are normal.   Neck:   Normal range of motion.  Cardiovascular:      Exam reveals no gallop and no friction rub.       No murmur heard.  Pulmonary/Chest: Breath sounds normal. No respiratory distress. He has no wheezes. He has no rales.   Abdominal: Abdomen is soft. Bowel sounds are normal. He exhibits no distension. There is no abdominal tenderness.   Musculoskeletal:         General: No edema. Normal range of motion.      Cervical back: Normal range of motion.     Neurological: He is alert and oriented to person, place, and time. He has normal strength. No cranial nerve deficit or sensory deficit.   Skin: Skin is warm and dry.   Psychiatric: He has a normal mood and affect.       ED Course   Procedures  Labs Reviewed   COMPREHENSIVE METABOLIC PANEL - Abnormal; Notable for the following components:       Result Value    Glucose 113 (*)     Total Bilirubin 1.1 (*)     Anion Gap 7 (*)     All other components within normal limits   CBC W/ AUTO DIFFERENTIAL   MAGNESIUM   TROPONIN I          Imaging Results    None          Medications - No data to display  Medical Decision Making:   Initial Assessment:   82-year-old male presenting with hypertension that has increased over the past 2 weeks with associated dizziness.  On exam the patient has no neurological findings.  Patient says when he walks he has to hold onto something which is worse than baseline.  His blood pressure is 234/100.  Patient's labs are unremarkable.  Blood pressure has improved without treatment.  Suspect hypertensive urgency.  Recommend patient continue compliance with blood pressure medications and follow up with primary care for re-evaluation.  No evidence of hypertensive emergency or acute neurologic insult at this time.  Return instructions given.  Patient verbalized understanding and agreement with the plan.                         Clinical Impression:   Final diagnoses:  [R42] Dizziness  [I16.0] Hypertensive urgency (Primary)        ED Disposition Condition    Discharge Stable          ED Prescriptions    None       Follow-up Information       Follow up With Specialties Details Why Contact Info    Juan Bustamante MD Family Medicine, Sports Medicine Schedule an appointment as soon as possible for a visit   [address]  [place] [address]  [phone]               Edgardo Blair MD  01/18/23 1036

## 2023-01-19 ENCOUNTER — HOSPITAL ENCOUNTER (EMERGENCY)
Facility: HOSPITAL | Age: 83
Discharge: HOME | End: 2023-01-19
Attending: EMERGENCY MEDICINE
Payer: MEDICARE

## 2023-01-19 VITALS
DIASTOLIC BLOOD PRESSURE: 95 MMHG | HEART RATE: 55 BPM | SYSTOLIC BLOOD PRESSURE: 143 MMHG | BODY MASS INDEX: 32.08 KG/M2 | WEIGHT: 230 LBS | OXYGEN SATURATION: 98 % | RESPIRATION RATE: 14 BRPM | TEMPERATURE: 98 F

## 2023-01-19 DIAGNOSIS — I10 HYPERTENSION, UNSPECIFIED TYPE: Primary | ICD-10-CM

## 2023-01-19 PROCEDURE — 99282 EMERGENCY DEPT VISIT SF MDM: CPT | Mod: HCNC,ER

## 2023-01-19 NOTE — ED PROVIDER NOTES
Encounter Date: 1/19/2023       History     Chief Complaint   Patient presents with    Hypertension     I was at therapy appointment for my neck and he saw I was here and had high blood pressure yesterday and checked it 3 times. It was 171/102 hr 78 at 11:00 and 170/95 60 hr at 11;15 and at 11:25 it was 184/99 hr 58.      82-year-old male presenting with hypertension that was noted at therapy appointment today.  Patient denies any symptoms.  Patient was seen here yesterday for high blood pressure and workup was unremarkable.  Patient's blood pressure at therapy was 170/100.  No chest pain, shortness of breath, numbness, weakness or slurred speech    Review of patient's allergies indicates:   Allergen Reactions    Clindamycin Swelling     Throat swells    Lisinopril Swelling and Other (See Comments)     Throat swelling     Past Medical History:   Diagnosis Date    Arthritis     Back pain, chronic     BPH with urinary obstruction     Chronic constipation     Colon polyp     DJD (degenerative joint disease)     Hip    Hyperlipidemia     Hypertension     Laryngopharyngeal reflux     Left inguinal hernia     Lumbar herniated disc     Lumbar stenosis     OA (osteoarthritis) of knee     Bilateral    Paradoxical insomnia     Sinus trouble      Past Surgical History:   Procedure Laterality Date    BACK SURGERY  2014    COLONOSCOPY      EPIDURAL STEROID INJECTION INTO LUMBAR SPINE N/A 7/5/2022    Procedure: Injection-steroid-epidural-lumbar L3-4;  Surgeon: Yury Crum Jr., MD;  Location: [facility];  Service: Pain Management;  Laterality: N/A;  oral sedation   asa      JOINT REPLACEMENT Left 05/04/2018    KNEE SURGERY      left hand      LEG SURGERY      SPINE SURGERY      UPPER GASTROINTESTINAL ENDOSCOPY       Family History   Problem Relation Age of Onset    Cancer Father         lung or smoking    No Known Problems Mother     No Known Problems Sister     No Known Problems Brother     No Known Problems Maternal  Aunt     No Known Problems Maternal Uncle     No Known Problems Paternal Aunt     No Known Problems Paternal Uncle     No Known Problems Maternal Grandmother     No Known Problems Maternal Grandfather     No Known Problems Paternal Grandmother     No Known Problems Paternal Grandfather     No Known Problems Daughter     No Known Problems Son     Glaucoma Neg Hx     Diabetes Neg Hx     Amblyopia Neg Hx     Blindness Neg Hx     Cataracts Neg Hx     Hypertension Neg Hx     Macular degeneration Neg Hx     Retinal detachment Neg Hx     Strabismus Neg Hx     Stroke Neg Hx     Thyroid disease Neg Hx     Colon cancer Neg Hx     Esophageal cancer Neg Hx      Social History     Tobacco Use    Smoking status: Never    Smokeless tobacco: Never   Substance Use Topics    Alcohol use: Yes     Alcohol/week: 0.0 standard drinks     Comment: approximately 2 beers weekly    Drug use: No     Review of Systems   Constitutional:  Negative for appetite change.   Eyes:  Negative for pain.   Respiratory:  Negative for shortness of breath.    Cardiovascular:  Negative for chest pain.   Gastrointestinal:  Negative for abdominal pain, nausea and vomiting.   Genitourinary:  Negative for frequency.   Musculoskeletal:  Negative for arthralgias and neck pain.   Neurological:  Negative for headaches.   Psychiatric/Behavioral:  Negative for confusion.      Physical Exam     Initial Vitals [01/19/23 1144]   BP Pulse Resp Temp SpO2   (!) 143/95 (!) 55 14 98.3 °F (36.8 °C) 98 %      MAP       --         Physical Exam    Nursing note and vitals reviewed.  HENT:   Head: Atraumatic.   Eyes: Conjunctivae and EOM are normal.   Neck:   Normal range of motion.  Cardiovascular:      Exam reveals no gallop and no friction rub.       No murmur heard.  Pulmonary/Chest: Breath sounds normal. No respiratory distress. He has no wheezes. He has no rales.   Abdominal: Abdomen is soft. Bowel sounds are normal. He exhibits no distension. There is no abdominal  tenderness.   Musculoskeletal:         General: No edema. Normal range of motion.      Cervical back: Normal range of motion.     Neurological: He is alert and oriented to person, place, and time. He has normal strength. No cranial nerve deficit or sensory deficit.   Skin: Skin is warm and dry.   Psychiatric: He has a normal mood and affect.       ED Course   Procedures  Labs Reviewed - No data to display       Imaging Results    None          Medications - No data to display  Medical Decision Making:   Initial Assessment:   82-year-old male with asymptomatic hypertension.  This morning blood pressure 170/100, now 143/95.  Had workup yesterday which was unremarkable.  No indication for labs or treatment at this time.  Discussed strategies of what to do when patient's blood pressure is elevated at home.  Patient follow up with primary care.  Return instructions given.                        Clinical Impression:   Final diagnoses:  [I10] Hypertension, unspecified type (Primary)        ED Disposition Condition    Discharge Stable          ED Prescriptions    None       Follow-up Information       Follow up With Specialties Details Why Contact Info    Juan Bustamante MD Family Medicine, Sports Medicine Schedule an appointment as soon as possible for a visit   [address]  [place] [address]  [phone]               Edgardo Bliar MD  01/19/23 4091

## 2023-01-19 NOTE — ED NOTES
"Pt denies any SOB, denies CP, denies any weakness.  States feels "nervous".  MD at  for full assessment.   "

## 2023-01-20 ENCOUNTER — TELEPHONE (OUTPATIENT)
Dept: INTERNAL MEDICINE | Facility: CLINIC | Age: 83
End: 2023-01-20
Payer: MEDICARE

## 2023-01-20 NOTE — TELEPHONE ENCOUNTER
ER follow up -     Please call patient and schedule patient for an appointment with me.     Please schedule at a 1:00 time slot.      Thank you.

## 2023-01-20 NOTE — TELEPHONE ENCOUNTER
Called and spoke to pt. Pt verbalized frustration because he said he was first seen in the ED over a week ago, pt was seen in ED on 1/18 and again on 1/19. Pt states he was told by ER to f/u with PCP after one week of regularly taking medications. Pt scheduled f/u.

## 2023-01-24 ENCOUNTER — OFFICE VISIT (OUTPATIENT)
Dept: UROLOGY | Facility: CLINIC | Age: 83
End: 2023-01-24
Payer: MEDICARE

## 2023-01-24 DIAGNOSIS — N40.0 PROSTATISM: ICD-10-CM

## 2023-01-24 DIAGNOSIS — N40.1 BENIGN PROSTATIC HYPERPLASIA WITH URINARY FREQUENCY: Primary | ICD-10-CM

## 2023-01-24 DIAGNOSIS — R35.0 BENIGN PROSTATIC HYPERPLASIA WITH URINARY FREQUENCY: Primary | ICD-10-CM

## 2023-01-24 PROCEDURE — 1159F MED LIST DOCD IN RCRD: CPT | Mod: HCNC,CPTII,S$GLB, | Performed by: UROLOGY

## 2023-01-24 PROCEDURE — 1160F RVW MEDS BY RX/DR IN RCRD: CPT | Mod: HCNC,CPTII,S$GLB, | Performed by: UROLOGY

## 2023-01-24 PROCEDURE — 1159F PR MEDICATION LIST DOCUMENTED IN MEDICAL RECORD: ICD-10-PCS | Mod: HCNC,CPTII,S$GLB, | Performed by: UROLOGY

## 2023-01-24 PROCEDURE — 99999 PR PBB SHADOW E&M-EST. PATIENT-LVL III: ICD-10-PCS | Mod: PBBFAC,HCNC,, | Performed by: UROLOGY

## 2023-01-24 PROCEDURE — 51798 US URINE CAPACITY MEASURE: CPT | Mod: HCNC,S$GLB,, | Performed by: UROLOGY

## 2023-01-24 PROCEDURE — 1157F ADVNC CARE PLAN IN RCRD: CPT | Mod: HCNC,CPTII,S$GLB, | Performed by: UROLOGY

## 2023-01-24 PROCEDURE — 99214 OFFICE O/P EST MOD 30 MIN: CPT | Mod: HCNC,S$GLB,, | Performed by: UROLOGY

## 2023-01-24 PROCEDURE — 1160F PR REVIEW ALL MEDS BY PRESCRIBER/CLIN PHARMACIST DOCUMENTED: ICD-10-PCS | Mod: HCNC,CPTII,S$GLB, | Performed by: UROLOGY

## 2023-01-24 PROCEDURE — 1101F PT FALLS ASSESS-DOCD LE1/YR: CPT | Mod: HCNC,CPTII,S$GLB, | Performed by: UROLOGY

## 2023-01-24 PROCEDURE — 1157F PR ADVANCE CARE PLAN OR EQUIV PRESENT IN MEDICAL RECORD: ICD-10-PCS | Mod: HCNC,CPTII,S$GLB, | Performed by: UROLOGY

## 2023-01-24 PROCEDURE — 99999 PR PBB SHADOW E&M-EST. PATIENT-LVL III: CPT | Mod: PBBFAC,HCNC,, | Performed by: UROLOGY

## 2023-01-24 PROCEDURE — 99214 PR OFFICE/OUTPT VISIT, EST, LEVL IV, 30-39 MIN: ICD-10-PCS | Mod: HCNC,S$GLB,, | Performed by: UROLOGY

## 2023-01-24 PROCEDURE — 51798 POCT BLADDER SCAN: ICD-10-PCS | Mod: HCNC,S$GLB,, | Performed by: UROLOGY

## 2023-01-24 PROCEDURE — 1101F PR PT FALLS ASSESS DOC 0-1 FALLS W/OUT INJ PAST YR: ICD-10-PCS | Mod: HCNC,CPTII,S$GLB, | Performed by: UROLOGY

## 2023-01-24 PROCEDURE — 3288F PR FALLS RISK ASSESSMENT DOCUMENTED: ICD-10-PCS | Mod: HCNC,CPTII,S$GLB, | Performed by: UROLOGY

## 2023-01-24 PROCEDURE — 3288F FALL RISK ASSESSMENT DOCD: CPT | Mod: HCNC,CPTII,S$GLB, | Performed by: UROLOGY

## 2023-01-24 PROCEDURE — 81002 POCT URINE DIPSTICK WITHOUT MICROSCOPE: ICD-10-PCS | Mod: HCNC,S$GLB,, | Performed by: UROLOGY

## 2023-01-24 PROCEDURE — 81002 URINALYSIS NONAUTO W/O SCOPE: CPT | Mod: HCNC,S$GLB,, | Performed by: UROLOGY

## 2023-01-24 RX ORDER — TAMSULOSIN HYDROCHLORIDE 0.4 MG/1
1 CAPSULE ORAL DAILY
Qty: 90 CAPSULE | Refills: 4 | Status: SHIPPED | OUTPATIENT
Start: 2023-01-24 | End: 2024-03-26 | Stop reason: SDUPTHER

## 2023-01-24 RX ORDER — FINASTERIDE 5 MG/1
5 TABLET, FILM COATED ORAL DAILY
Qty: 90 TABLET | Refills: 3 | Status: SHIPPED | OUTPATIENT
Start: 2023-01-24 | End: 2024-03-26 | Stop reason: SDUPTHER

## 2023-01-24 NOTE — PROGRESS NOTES
Subjective:      Patient ID: Korey Santos is a 82 y.o. male.    Chief Complaint: BPH  Patient is a 82 y.o. male with h/o BPH.    Patient complains of lower urinary tract symptoms. He reports frequency, incomplete emptying, intermittency, nocturia two times a night, straining, urgency and weak stream. He denies  dysuria . Patient states symptoms are of severe severity. Onset of symptoms was several years ago and was gradual in onset. His AUA Symptom Score is, 20/3. He has no personal history and no family history of prostate cancer. He reports a history of no complicating symptoms. He denies flank pain, gross hematuria, kidney stones and recurrent UTI.  On tamsulosin and finasteride for years.  H/o elevated PSA in 2017 but normal for age at that time.      Still LUTs, cysto/TRUS delayed.      Lab Results   Component Value Date    PSA 4.7 (H) 07/08/2017    PSA 4.7 (H) 10/07/2014    PSA 3.2 12/18/2013    PSA 2.72 03/05/2012    PSA 2.1 01/21/2011    PSA 2.3 10/02/2008    PSA 1.6 04/05/2007    PSADIAG 2.9 10/22/2014       IPSS Questionnaire (AUA-SS):  Over the past month    1)  Incomplete Emptying - How often have you had a sensation of not emptying your bladder completely after you finish urinating?  3 - About half the time   2)  Frequency - How often have you had to urinate again less than two hours after you finished urinating? 3 - About half the time   3)  Intermittency - How often have you found you stopped and started again several times when you urinated?  3 - About half the time   4) Urgency - How difficult have you found it to postpone urination?  3 - About half the time   5) Weak Stream - How often have you had a weak urinary stream?  3 - About half the time   6) Straining  - How often have you had to push or strain to begin urination?  3 - About half the time   7) Nocturia - How many times did you most typically get up to urinate from the time you went to bed until the time you got up in the morning?  2  - 2 times   Total score:  0-7 mild, 8-19 moderate, 20-35 severe 20   Quality of Life:  3 - Mixed      Review of Systems   Constitutional:  Negative for activity change, chills and fever.   HENT:  Negative for congestion.    Respiratory:  Negative for cough, chest tightness and shortness of breath.    Cardiovascular:  Negative for chest pain and palpitations.   Gastrointestinal:  Negative for abdominal distention, abdominal pain, nausea and vomiting.   Genitourinary:  Positive for difficulty urinating, frequency and urgency. Negative for flank pain, hematuria, penile pain, scrotal swelling and testicular pain.   Musculoskeletal:  Negative for gait problem.   All other systems reviewed and negative except pertinent positives noted in HPI.      Objective:     Physical Exam  Vitals reviewed.   Constitutional:       Appearance: Normal appearance.   HENT:      Head: Atraumatic.   Cardiovascular:      Pulses: Normal pulses.   Pulmonary:      Effort: Pulmonary effort is normal.      Breath sounds: Normal breath sounds.   Abdominal:      General: There is no distension.      Palpations: Abdomen is soft.      Tenderness: There is no abdominal tenderness. There is no right CVA tenderness, left CVA tenderness or guarding.   Genitourinary:     Penis: Normal.       Testes: Normal.      Prostate: Normal.      Comments: Enlarged prostate  Musculoskeletal:      Cervical back: Normal range of motion.   Neurological:      Mental Status: He is alert.       Assessment:     Problem Noted   Benign Prostatic Hyperplasia With Urinary Frequency 4/23/2014    BPH w LUTs, frequency  Rx/Tx: tamsulosin/finasteride for years  AUA SS initial: 20/3       Elevated Psa 8/16/2017       Plan:   1.  Discussed surgical evaluation for BPH with cysto/trus/uroflow.  Wishes to proceed  2.  Refill medications, tamsulosin and finasteride.    Juan Ward MD

## 2023-01-26 ENCOUNTER — OFFICE VISIT (OUTPATIENT)
Dept: INTERNAL MEDICINE | Facility: CLINIC | Age: 83
End: 2023-01-26
Attending: FAMILY MEDICINE
Payer: MEDICARE

## 2023-01-26 VITALS
WEIGHT: 231 LBS | SYSTOLIC BLOOD PRESSURE: 132 MMHG | HEIGHT: 71 IN | DIASTOLIC BLOOD PRESSURE: 73 MMHG | BODY MASS INDEX: 32.34 KG/M2

## 2023-01-26 DIAGNOSIS — Z12.11 COLON CANCER SCREENING: ICD-10-CM

## 2023-01-26 DIAGNOSIS — M51.26 LUMBAR HERNIATED DISC: ICD-10-CM

## 2023-01-26 DIAGNOSIS — N40.1 BENIGN PROSTATIC HYPERPLASIA WITH URINARY FREQUENCY: ICD-10-CM

## 2023-01-26 DIAGNOSIS — R25.1 TREMOR: ICD-10-CM

## 2023-01-26 DIAGNOSIS — E78.5 HYPERLIPIDEMIA, UNSPECIFIED HYPERLIPIDEMIA TYPE: ICD-10-CM

## 2023-01-26 DIAGNOSIS — Z96.652 STATUS POST TOTAL LEFT KNEE REPLACEMENT: ICD-10-CM

## 2023-01-26 DIAGNOSIS — R97.20 ELEVATED PSA: ICD-10-CM

## 2023-01-26 DIAGNOSIS — R35.0 BENIGN PROSTATIC HYPERPLASIA WITH URINARY FREQUENCY: ICD-10-CM

## 2023-01-26 DIAGNOSIS — M47.816 LUMBAR SPONDYLOSIS: ICD-10-CM

## 2023-01-26 DIAGNOSIS — K63.5 POLYP OF COLON, UNSPECIFIED PART OF COLON, UNSPECIFIED TYPE: ICD-10-CM

## 2023-01-26 DIAGNOSIS — M47.816 LUMBAR FACET ARTHROPATHY: ICD-10-CM

## 2023-01-26 DIAGNOSIS — K59.00 CONSTIPATION, UNSPECIFIED CONSTIPATION TYPE: ICD-10-CM

## 2023-01-26 DIAGNOSIS — R22.1 NECK MASS: ICD-10-CM

## 2023-01-26 DIAGNOSIS — I10 HYPERTENSION, ESSENTIAL: Primary | ICD-10-CM

## 2023-01-26 PROBLEM — M25.511 ACUTE PAIN OF RIGHT SHOULDER: Status: RESOLVED | Noted: 2021-02-24 | Resolved: 2023-01-26

## 2023-01-26 PROBLEM — R29.3 POSTURE ABNORMALITY: Status: RESOLVED | Noted: 2023-01-04 | Resolved: 2023-01-26

## 2023-01-26 PROBLEM — M25.611 DECREASED RANGE OF MOTION OF RIGHT SHOULDER: Status: RESOLVED | Noted: 2021-02-24 | Resolved: 2023-01-26

## 2023-01-26 PROBLEM — M53.86 DECREASED RANGE OF MOTION OF LUMBAR SPINE: Status: RESOLVED | Noted: 2022-10-07 | Resolved: 2023-01-26

## 2023-01-26 PROBLEM — M65.331 TRIGGER MIDDLE FINGER OF RIGHT HAND: Status: RESOLVED | Noted: 2018-11-15 | Resolved: 2023-01-26

## 2023-01-26 PROBLEM — S42.254A CLOSED NONDISPLACED FRACTURE OF GREATER TUBEROSITY OF RIGHT HUMERUS: Status: RESOLVED | Noted: 2021-02-24 | Resolved: 2023-01-26

## 2023-01-26 PROBLEM — R29.898 WEAKNESS OF BOTH LOWER EXTREMITIES: Status: RESOLVED | Noted: 2022-10-07 | Resolved: 2023-01-26

## 2023-01-26 PROBLEM — M54.50 LUMBAR PAIN: Status: RESOLVED | Noted: 2022-10-07 | Resolved: 2023-01-26

## 2023-01-26 PROBLEM — R29.898 WEAKNESS OF RIGHT UPPER EXTREMITY: Status: RESOLVED | Noted: 2021-02-24 | Resolved: 2023-01-26

## 2023-01-26 PROBLEM — S42.91XA CLOSED FRACTURE OF RIGHT SHOULDER: Status: RESOLVED | Noted: 2021-01-28 | Resolved: 2023-01-26

## 2023-01-26 LAB
BILIRUB SERPL-MCNC: NEGATIVE MG/DL
BLOOD URINE, POC: ABNORMAL
CLARITY, POC UA: CLEAR
COLOR, POC UA: ABNORMAL
GLUCOSE UR QL STRIP: NORMAL
KETONES UR QL STRIP: NEGATIVE
LEUKOCYTE ESTERASE URINE, POC: NEGATIVE
NITRITE, POC UA: NEGATIVE
PH, POC UA: 5
POC RESIDUAL URINE VOLUME: 0 ML (ref 0–100)
PROTEIN, POC: ABNORMAL
SPECIFIC GRAVITY, POC UA: 1.02
UROBILINOGEN, POC UA: NORMAL

## 2023-01-26 PROCEDURE — 1157F ADVNC CARE PLAN IN RCRD: CPT | Mod: HCNC,CPTII,S$GLB, | Performed by: FAMILY MEDICINE

## 2023-01-26 PROCEDURE — 1159F MED LIST DOCD IN RCRD: CPT | Mod: HCNC,CPTII,S$GLB, | Performed by: FAMILY MEDICINE

## 2023-01-26 PROCEDURE — 1101F PT FALLS ASSESS-DOCD LE1/YR: CPT | Mod: HCNC,CPTII,S$GLB, | Performed by: FAMILY MEDICINE

## 2023-01-26 PROCEDURE — 3078F DIAST BP <80 MM HG: CPT | Mod: HCNC,CPTII,S$GLB, | Performed by: FAMILY MEDICINE

## 2023-01-26 PROCEDURE — 1101F PR PT FALLS ASSESS DOC 0-1 FALLS W/OUT INJ PAST YR: ICD-10-PCS | Mod: HCNC,CPTII,S$GLB, | Performed by: FAMILY MEDICINE

## 2023-01-26 PROCEDURE — 1160F RVW MEDS BY RX/DR IN RCRD: CPT | Mod: HCNC,CPTII,S$GLB, | Performed by: FAMILY MEDICINE

## 2023-01-26 PROCEDURE — 99999 PR PBB SHADOW E&M-EST. PATIENT-LVL V: ICD-10-PCS | Mod: PBBFAC,HCNC,, | Performed by: FAMILY MEDICINE

## 2023-01-26 PROCEDURE — 3075F PR MOST RECENT SYSTOLIC BLOOD PRESS GE 130-139MM HG: ICD-10-PCS | Mod: HCNC,CPTII,S$GLB, | Performed by: FAMILY MEDICINE

## 2023-01-26 PROCEDURE — 1159F PR MEDICATION LIST DOCUMENTED IN MEDICAL RECORD: ICD-10-PCS | Mod: HCNC,CPTII,S$GLB, | Performed by: FAMILY MEDICINE

## 2023-01-26 PROCEDURE — 1125F PR PAIN SEVERITY QUANTIFIED, PAIN PRESENT: ICD-10-PCS | Mod: HCNC,CPTII,S$GLB, | Performed by: FAMILY MEDICINE

## 2023-01-26 PROCEDURE — 3075F SYST BP GE 130 - 139MM HG: CPT | Mod: HCNC,CPTII,S$GLB, | Performed by: FAMILY MEDICINE

## 2023-01-26 PROCEDURE — 99999 PR PBB SHADOW E&M-EST. PATIENT-LVL V: CPT | Mod: PBBFAC,HCNC,, | Performed by: FAMILY MEDICINE

## 2023-01-26 PROCEDURE — 3288F PR FALLS RISK ASSESSMENT DOCUMENTED: ICD-10-PCS | Mod: HCNC,CPTII,S$GLB, | Performed by: FAMILY MEDICINE

## 2023-01-26 PROCEDURE — 3078F PR MOST RECENT DIASTOLIC BLOOD PRESSURE < 80 MM HG: ICD-10-PCS | Mod: HCNC,CPTII,S$GLB, | Performed by: FAMILY MEDICINE

## 2023-01-26 PROCEDURE — 3288F FALL RISK ASSESSMENT DOCD: CPT | Mod: HCNC,CPTII,S$GLB, | Performed by: FAMILY MEDICINE

## 2023-01-26 PROCEDURE — 1125F AMNT PAIN NOTED PAIN PRSNT: CPT | Mod: HCNC,CPTII,S$GLB, | Performed by: FAMILY MEDICINE

## 2023-01-26 PROCEDURE — 1157F PR ADVANCE CARE PLAN OR EQUIV PRESENT IN MEDICAL RECORD: ICD-10-PCS | Mod: HCNC,CPTII,S$GLB, | Performed by: FAMILY MEDICINE

## 2023-01-26 PROCEDURE — 99214 PR OFFICE/OUTPT VISIT, EST, LEVL IV, 30-39 MIN: ICD-10-PCS | Mod: HCNC,S$GLB,, | Performed by: FAMILY MEDICINE

## 2023-01-26 PROCEDURE — 1160F PR REVIEW ALL MEDS BY PRESCRIBER/CLIN PHARMACIST DOCUMENTED: ICD-10-PCS | Mod: HCNC,CPTII,S$GLB, | Performed by: FAMILY MEDICINE

## 2023-01-26 PROCEDURE — 99214 OFFICE O/P EST MOD 30 MIN: CPT | Mod: HCNC,S$GLB,, | Performed by: FAMILY MEDICINE

## 2023-01-26 RX ORDER — LOSARTAN POTASSIUM 100 MG/1
100 TABLET ORAL DAILY
Qty: 90 TABLET | Refills: 3 | Status: SHIPPED | OUTPATIENT
Start: 2023-01-26 | End: 2024-03-05 | Stop reason: SDUPTHER

## 2023-01-26 RX ORDER — PRAVASTATIN SODIUM 40 MG/1
40 TABLET ORAL DAILY
Qty: 90 TABLET | Refills: 3 | Status: SHIPPED | OUTPATIENT
Start: 2023-01-26

## 2023-01-26 NOTE — PATIENT INSTRUCTIONS
If not contacted in a couple weeks by colonoscopy scheduling department - call Colonoscopy Scheduling Number - [phone].     Information about cholesterol, high blood pressure and healthy diet and activity recommendations can be found at the following links on the Internet:    http://www.nhlbi.nih.gov/health/health-topics/topics/hbc  http://www.nhlbi.nih.gov/health/educational/lose_wt/index.htm  Http://www.nhlbi.nih.gov/files/docs/public/heart/hbp_low.pdf  http://www.heart.org/HEARTORG/  http://diabetes.org/  https://www.cdc.gov/  Https://healthfinder.gov/  https://health.gov/dietaryguidelines/2015/guidelines/  https://health.gov/paguidelines/second-edition/pdf/Physical_Activity_Guidelines_2nd_edition.pdf

## 2023-01-26 NOTE — PROGRESS NOTES
Subjective:       Patient ID: Korey Santos is a 82 y.o. male.    Chief Complaint: Follow-up (High blood pressure)    Established patient follows up for management of chronic medical illnesses with complaints today. Please see dictation and ROS for interval problems, specific complaints and disease management discussion.    Past, Surgical, Family, Social, Histories; Medications, allergies reviewed and reconciled.  Health maintenance file reviewed and addressed items due. Recent applicable lab, imaging and cardiovascular results reviewed.  Problem list items reviewed and modified or added entries (in the overview section) may not be transcribed into this encounter note due to note writer format.      Patient had contacted us recently concerning COVID.  There was a communication difficulty, on his end, which was presented to me as he needed a COVID test.  He was outside of treatment window as things turned out, has no complaints with that at this time.    Had been taking his blood pressures at home, noticed elevated pressure.  We refer to the emergency room.  He went locally last week and was referred home.  Apparently he had not been taking his cholesterol or blood pressure medications.  His blood pressure is normal today and he is asymptomatic.  Systolic was as high as 120 or 200.    He describes himself as feeling nervous and anxious.  He had back surgery in September.  His back is better.  He was placed on muscle relaxer at that time which she states helps sleep.  There are several other medications listed in his chart, he additionally saw Urology at his on 2 medications for BPH.  According to last note they are not working up his PSA any further and considered to be normal for his age.  He discussed an antianxiety medication this may be hydroxyzine.  Also states chronic constipation and had been placed on Linzess at some point.  Has not seen GI, CRS and is overdue for colonoscopy.  Also states he drops  objects at times.  No other focal neurologic deficits.    Review of Systems   Constitutional:  Negative for appetite change, chills, diaphoresis, fatigue and fever.   HENT:  Negative for congestion, postnasal drip, rhinorrhea, sore throat and trouble swallowing.    Eyes:  Negative for visual disturbance.   Respiratory:  Negative for cough, choking, chest tightness, shortness of breath and wheezing.    Cardiovascular:  Negative for chest pain and leg swelling.   Gastrointestinal:  Negative for abdominal distention, abdominal pain, diarrhea, nausea and vomiting.   Genitourinary:  Positive for difficulty urinating. Negative for hematuria.   Musculoskeletal:  Positive for back pain. Negative for arthralgias and myalgias.        Better after surgery   Skin:  Negative for rash.   Neurological:  Positive for tremors. Negative for weakness, light-headedness and headaches.   Psychiatric/Behavioral:  Positive for sleep disturbance. Negative for confusion and dysphoric mood. The patient is nervous/anxious.      Objective:      Physical Exam  Vitals and nursing note reviewed.   Constitutional:       Appearance: He is well-developed. He is not diaphoretic.   HENT:      Head: Normocephalic and atraumatic.   Eyes:      General: No scleral icterus.     Conjunctiva/sclera: Conjunctivae normal.   Neck:      Vascular: No carotid bruit.   Cardiovascular:      Rate and Rhythm: Normal rate and regular rhythm.      Heart sounds: Heart sounds are distant. No murmur heard.    No friction rub. No gallop.   Pulmonary:      Effort: Pulmonary effort is normal. No respiratory distress.      Breath sounds: Normal breath sounds. No wheezing or rales.   Abdominal:      General: There is no distension.      Tenderness: There is no abdominal tenderness.   Musculoskeletal:         General: No deformity.      Cervical back: Normal range of motion and neck supple.   Skin:     General: Skin is warm and dry.      Findings: No erythema or rash.    Neurological:      Mental Status: He is alert and oriented to person, place, and time.      GCS: GCS eye subscore is 4. GCS verbal subscore is 5. GCS motor subscore is 6.      Cranial Nerves: No cranial nerve deficit.      Motor: Tremor present.      Coordination: Coordination normal.      Gait: Gait normal.      Comments: Fine tremor in the right greater than left hand noted.  His gait is normal and no other focal neurologic deficits.   Psychiatric:         Behavior: Behavior normal.         Thought Content: Thought content normal.         Judgment: Judgment normal.       Assessment:       1. Hypertension, essential    2. Hyperlipidemia, unspecified hyperlipidemia type    3. Lumbar spondylosis    4. Lumbar herniated disc    5. Lumbar facet arthropathy    6. Neck mass    7. Status post total left knee replacement    8. Elevated PSA    9. Benign prostatic hyperplasia with urinary frequency    10. Constipation, unspecified constipation type    11. Colon cancer screening    12. Polyp of colon, unspecified part of colon, unspecified type    13. Tremor          Plan:     Medication List with Changes/Refills   Current Medications    ASPIRIN (ECOTRIN) 325 MG EC TABLET    Take 1 tablet (325 mg total) by mouth once daily.    AZELASTINE (ASTELIN) 137 MCG (0.1 %) NASAL SPRAY    1 spray (137 mcg total) by Nasal route 2 (two) times daily as needed for Rhinitis.    FINASTERIDE (PROSCAR) 5 MG TABLET    Take 1 tablet (5 mg total) by mouth once daily.    FLUTICASONE PROPIONATE (FLONASE) 50 MCG/ACTUATION NASAL SPRAY    1 spray (50 mcg total) by Each Nostril route once daily.    HYDROXYZINE HCL (ATARAX) 25 MG TABLET    Take 1 tablet (25 mg total) by mouth 3 (three) times daily as needed for Anxiety.    LINACLOTIDE (LINZESS) 145 MCG CAP CAPSULE    Take 1 capsule (145 mcg total) by mouth daily as needed (constipation).    METHOCARBAMOL (ROBAXIN) 500 MG TAB    Take 1 tablet (500 mg total) by mouth 3 (three) times daily.    MULTIVITAMIN  CAPSULE    Take 1 capsule by mouth once daily.    POLYETHYLENE GLYCOL (GLYCOLAX) 17 GRAM/DOSE POWDER    Take 17 g by mouth daily as needed.    TAMSULOSIN (FLOMAX) 0.4 MG CAP    Take 1 capsule (0.4 mg total) by mouth once daily.   Changed and/or Refilled Medications    Modified Medication Previous Medication    LOSARTAN (COZAAR) 100 MG TABLET losartan (COZAAR) 100 MG tablet       Take 1 tablet (100 mg total) by mouth once daily.    Take 1 tablet by mouth once daily    PRAVASTATIN (PRAVACHOL) 40 MG TABLET pravastatin (PRAVACHOL) 40 MG tablet       Take 1 tablet (40 mg total) by mouth once daily.    Take 1 tablet (40 mg total) by mouth once daily.   Discontinued Medications    ACETAMINOPHEN (TYLENOL) 650 MG TBSR    Take 1 tablet (650 mg total) by mouth every 8 (eight) hours.    CYCLOSPORINE (RESTASIS) 0.05 % OPHTHALMIC EMULSION    Place 1 drop into both eyes 2 (two) times daily.    MELOXICAM (MOBIC) 15 MG TABLET    Take 1 tablet (15 mg total) by mouth once daily.    PREGABALIN (LYRICA) 100 MG CAPSULE    Take 1 capsule (100 mg total) by mouth 3 (three) times daily.     1. Hypertension, essential  -     losartan (COZAAR) 100 MG tablet; Take 1 tablet (100 mg total) by mouth once daily.  Dispense: 90 tablet; Refill: 3    2. Hyperlipidemia, unspecified hyperlipidemia type  -     pravastatin (PRAVACHOL) 40 MG tablet; Take 1 tablet (40 mg total) by mouth once daily.  Dispense: 90 tablet; Refill: 3    3. Lumbar spondylosis  Overview:  - 9/2/2022 - Posterior lateral fusion L2-3, Posterior nonsegmental instrumentation L2-3, L2 laminectomy, and bilateral foraminotomy for decompression of central and bilateral foraminal stenosis, Robotic assistance for placement of the pedicle screws (requiring greater than 30 minutes of preoperative planning time) - Dr. William      4. Lumbar herniated disc  Overview:  - 9/2/2022 - Posterior lateral fusion L2-3, Posterior nonsegmental instrumentation L2-3, L2 laminectomy, and bilateral foraminotomy  for decompression of central and bilateral foraminal stenosis, Robotic assistance for placement of the pedicle screws (requiring greater than 30 minutes of preoperative planning time) - Dr. William      5. Lumbar facet arthropathy  Overview:  - 9/2/2022 - Posterior lateral fusion L2-3, Posterior nonsegmental instrumentation L2-3, L2 laminectomy, and bilateral foraminotomy for decompression of central and bilateral foraminal stenosis, Robotic assistance for placement of the pedicle screws (requiring greater than 30 minutes of preoperative planning time) - Dr. William      6. Neck mass  Overview:  -4/29/2022 path - lipoma      7. Status post total left knee replacement  Overview:  Circa 2018      8. Elevated PSA  Overview:  -see urology note 1/24/2023      9. Benign prostatic hyperplasia with urinary frequency  Overview:  -see urology note 1/24/2023 -   BPH w LUTs, frequency  Rx/Tx: tamsulosin/finasteride for years  AUA SS initial: 20/3        10. Constipation, unspecified constipation type  -     Ambulatory referral/consult to Endo Procedure ; Future; Expected date: 01/27/2023    11. Colon cancer screening  -     Ambulatory referral/consult to Endo Procedure ; Future; Expected date: 01/27/2023    12. Polyp of colon, unspecified part of colon, unspecified type  -     Ambulatory referral/consult to Endo Procedure ; Future; Expected date: 01/27/2023    13. Tremor  -     Ambulatory referral/consult to Neurology; Future; Expected date: 02/02/2023      See meds, orders, follow up, routing and instructions sections of encounter and AVS. Discussed with patient and provided on AVS.    Discussed diet and exercise and links provided on AVS for detailed information.    Lab Results   Component Value Date     01/18/2023    K 4.3 01/18/2023     01/18/2023    BUN 11 01/18/2023    CREATININE 0.71 01/18/2023     (H) 01/18/2023    HGBA1C 5.9 (H) 12/28/2022    MG 2.1 01/18/2023    AST 33 01/18/2023     ALT 19 01/18/2023    ALBUMIN 4.4 01/18/2023    ALBUMIN 4.3 09/26/2012    PROT 7.4 01/18/2023    BILITOT 1.1 (H) 01/18/2023    CHOL 190 12/28/2022    HDL 45 12/28/2022    LDLCALC 132.6 12/28/2022    TRIG 62 12/28/2022    WBC 7.22 01/18/2023    HGB 14.9 01/18/2023    HCT 45.8 01/18/2023     01/18/2023    PSA 4.7 (H) 07/08/2017    PSADIAG 2.9 10/22/2014    TSH 1.780 12/28/2022           Blood pressure control today, encouraged to take medications.  Follow-up with Urology.  May discuss SSRI in the future, patient states he does not want to take any additional medications at this time.  Follow-up with me in 3 months.  Continue home blood pressure measurements.  Patient had also stopped taking his pravastatin, I discussed the logic of taking a medication for primary stroke and heart attack prevention.

## 2023-02-07 DIAGNOSIS — Z00.00 ENCOUNTER FOR MEDICARE ANNUAL WELLNESS EXAM: ICD-10-CM

## 2023-02-09 DIAGNOSIS — Z00.00 ENCOUNTER FOR MEDICARE ANNUAL WELLNESS EXAM: ICD-10-CM

## 2023-02-10 ENCOUNTER — PATIENT OUTREACH (OUTPATIENT)
Dept: ADMINISTRATIVE | Facility: HOSPITAL | Age: 83
End: 2023-02-10
Payer: MEDICARE

## 2023-02-10 NOTE — PROGRESS NOTES
Health Maintenance Due   Topic Date Due    Shingles Vaccine (1 of 2) Never done    Pneumococcal Vaccines (Age 65+) (1 - PCV) Never done    Colonoscopy  02/14/2016    COVID-19 Vaccine (3 - Booster) 08/28/2021    Influenza Vaccine (1) 09/01/2022        updated. Triggered LINKS and Care Everywhere. Chart reviewed for [employer] gap report.    Eliza Box LPN   Clinical Care Coordinator  Primary Care and Wellness

## 2023-02-20 NOTE — PROGRESS NOTES
Subjective:      Patient ID: Korey Santos is a 82 y.o. male.    Chief Complaint: BPH  Patient is a 82 y.o. male with h/o BPH.    Patient complains of lower urinary tract symptoms. He reports frequency, incomplete emptying, intermittency, nocturia two times a night, straining, urgency and weak stream. He denies  dysuria . Patient states symptoms are of severe severity. Onset of symptoms was several years ago and was gradual in onset. His AUA Symptom Score is, 20/3. He has no personal history and no family history of prostate cancer. He reports a history of no complicating symptoms. He denies flank pain, gross hematuria, kidney stones and recurrent UTI.  On tamsulosin and finasteride for years.  H/o elevated PSA in 2017 (does not think was on finasteride at that time) but normal for age at that time.      Cysto/TRUS 2/23: 49 gm prostate, lateral lobe hyperplasia.   Uroflow 2/23: voided 461 cc, Qmax 14.3, average 8.8 ml/s    Lab Results   Component Value Date    PSA 4.7 (H) 07/08/2017    PSA 4.7 (H) 10/07/2014    PSA 3.2 12/18/2013    PSA 2.72 03/05/2012    PSA 2.1 01/21/2011    PSA 2.3 10/02/2008    PSA 1.6 04/05/2007    PSADIAG 2.9 10/22/2014       IPSS Questionnaire (AUA-SS):  Over the past month    1)  Incomplete Emptying - How often have you had a sensation of not emptying your bladder completely after you finish urinating?  3 - About half the time   2)  Frequency - How often have you had to urinate again less than two hours after you finished urinating? 3 - About half the time   3)  Intermittency - How often have you found you stopped and started again several times when you urinated?  3 - About half the time   4) Urgency - How difficult have you found it to postpone urination?  3 - About half the time   5) Weak Stream - How often have you had a weak urinary stream?  3 - About half the time   6) Straining  - How often have you had to push or strain to begin urination?  3 - About half the time   7) Nocturia  - How many times did you most typically get up to urinate from the time you went to bed until the time you got up in the morning?  2 - 2 times   Total score:  0-7 mild, 8-19 moderate, 20-35 severe 20   Quality of Life:  3 - Mixed      Review of Systems   Constitutional:  Negative for activity change, chills and fever.   HENT:  Negative for congestion.    Respiratory:  Negative for cough, chest tightness and shortness of breath.    Cardiovascular:  Negative for chest pain and palpitations.   Gastrointestinal:  Negative for abdominal distention, abdominal pain, nausea and vomiting.   Genitourinary:  Positive for difficulty urinating, frequency and urgency. Negative for flank pain, hematuria, penile pain, scrotal swelling and testicular pain.   Musculoskeletal:  Negative for gait problem.   All other systems reviewed and negative except pertinent positives noted in HPI.      Objective:     Physical Exam  Vitals reviewed.   Constitutional:       Appearance: Normal appearance.   HENT:      Head: Atraumatic.   Cardiovascular:      Pulses: Normal pulses.   Pulmonary:      Effort: Pulmonary effort is normal.      Breath sounds: Normal breath sounds.   Abdominal:      General: There is no distension.      Palpations: Abdomen is soft.      Tenderness: There is no abdominal tenderness. There is no right CVA tenderness, left CVA tenderness or guarding.   Genitourinary:     Penis: Normal.       Testes: Normal.      Prostate: Normal.      Comments: Enlarged prostate  Musculoskeletal:      Cervical back: Normal range of motion.   Neurological:      Mental Status: He is alert.       Assessment:     Problem Noted   Benign Prostatic Hyperplasia With Urinary Frequency 4/23/2014    -see urology note 1/24/2023 -   BPH w LUTs, frequency  Rx/Tx: tamsulosin/finasteride for years  AUA SS initial: 20/3    Cysto/TRUS 2/23: 49 gm prostate, lateral lobe hyperplasia.   Uroflow 2/23: voided 461 cc, Qmax 14.3, average 8.8 ml/s     Elevated Psa  8/16/2017    4.7 in 2017         Plan:   1.  Discussed options for his enlarged prostate with borderline obstructive flow.  Discussed the options including observation with medications, Rezum, UroLift, holmium laser enucleation of prostate, Transurethral resection of the prostate.  2.  In my hands I recommended either less invasive Rezum versus holmium laser enucleation.  The risks, benefits and alternatives of each procedure were discussed.  3.  He is going to consider his options and let me know how wishes to proceed.  I did tell him that this would likely help with his flow as well as decreased nocturia but would have possible continued irritative symptoms that would need further treatment if persistent.    Juan Ward MD

## 2023-02-22 ENCOUNTER — PROCEDURE VISIT (OUTPATIENT)
Dept: UROLOGY | Facility: CLINIC | Age: 83
End: 2023-02-22
Payer: MEDICARE

## 2023-02-22 VITALS
BODY MASS INDEX: 32.35 KG/M2 | WEIGHT: 231.06 LBS | DIASTOLIC BLOOD PRESSURE: 75 MMHG | HEART RATE: 51 BPM | HEIGHT: 71 IN | SYSTOLIC BLOOD PRESSURE: 151 MMHG

## 2023-02-22 DIAGNOSIS — R35.0 BENIGN PROSTATIC HYPERPLASIA WITH URINARY FREQUENCY: ICD-10-CM

## 2023-02-22 DIAGNOSIS — N40.1 BENIGN PROSTATIC HYPERPLASIA WITH URINARY FREQUENCY: ICD-10-CM

## 2023-02-22 DIAGNOSIS — R97.20 ELEVATED PSA: ICD-10-CM

## 2023-02-22 PROCEDURE — 52000 PR CYSTOURETHROSCOPY: ICD-10-PCS | Mod: HCNC,S$GLB,, | Performed by: UROLOGY

## 2023-02-22 PROCEDURE — 99214 PR OFFICE/OUTPT VISIT, EST, LEVL IV, 30-39 MIN: ICD-10-PCS | Mod: 25,HCNC,S$GLB, | Performed by: UROLOGY

## 2023-02-22 PROCEDURE — 99214 OFFICE O/P EST MOD 30 MIN: CPT | Mod: 25,HCNC,S$GLB, | Performed by: UROLOGY

## 2023-02-22 PROCEDURE — 76872 TRANSRECTAL ULTRASOUND: ICD-10-PCS | Mod: HCNC,S$GLB,, | Performed by: UROLOGY

## 2023-02-22 PROCEDURE — 52000 CYSTOURETHROSCOPY: CPT | Mod: HCNC,S$GLB,, | Performed by: UROLOGY

## 2023-02-22 PROCEDURE — 76872 US TRANSRECTAL: CPT | Mod: HCNC,S$GLB,, | Performed by: UROLOGY

## 2023-02-22 NOTE — PROCEDURES
"Transrectal Ultrasound    Date/Time: 2/22/2023 8:30 AM  Performed by: Juan Ward MD  Authorized by: Juan Ward MD     Consent Done?:  Yes (Written)  Time out: Immediately prior to procedure a "time out" was called to verify the correct patient, procedure, equipment, support staff and site/side marked as required.    Preparation: Patient was prepped and draped in usual sterile fashion    Anesthesia:  Lidocaine jelly  Patient sedated: No    Prostate Size:  49  Lesions:: No    Total Biopsies:  0    Patient tolerance:  Patient tolerated the procedure well with no immediate complications  CYSTOSCOPY W/ UROFLOW    Date/Time: 2/22/2023 8:30 AM  Performed by: Juan Ward MD  Authorized by: Juan Ward MD   Preparation: Patient was prepped and draped in the usual sterile fashion.  Local anesthesia used: no    Anesthesia:  Local anesthesia used: no    Sedation:  Patient sedated: no    Patient tolerance: patient tolerated the procedure well with no immediate complications  Comments: CYSTOSCOPY PROCEDURE     This procedure has been fully reviewed with the patient, and written informed consent has been obtained.     Preop dx: BPH  Post op dx: BPH  Procedure: cystoscopy    Cystoscopic Exam:  Lidocaine Jelly: yes            Scope: 17F  Meatus: normal  Urethra: normal  Prostate:  lateral lobe hyperplasia  Bladder neck: normal  Trigone: normal  Trabeculation:  moderate  Diverticuli: none  Lesion: none    Description: 17 F cystoscope placed with the above findings.          Juan Ward MD          "

## 2023-02-24 ENCOUNTER — PATIENT MESSAGE (OUTPATIENT)
Dept: RESEARCH | Facility: HOSPITAL | Age: 83
End: 2023-02-24
Payer: MEDICARE

## 2023-04-06 ENCOUNTER — OFFICE VISIT (OUTPATIENT)
Dept: ORTHOPEDICS | Facility: CLINIC | Age: 83
End: 2023-04-06
Payer: MEDICARE

## 2023-04-06 ENCOUNTER — TELEPHONE (OUTPATIENT)
Dept: ORTHOPEDICS | Facility: CLINIC | Age: 83
End: 2023-04-06
Payer: MEDICARE

## 2023-04-06 ENCOUNTER — HOSPITAL ENCOUNTER (OUTPATIENT)
Dept: RADIOLOGY | Facility: HOSPITAL | Age: 83
Discharge: HOME | End: 2023-04-06
Attending: ORTHOPAEDIC SURGERY
Payer: MEDICARE

## 2023-04-06 VITALS — BODY MASS INDEX: 31.68 KG/M2 | HEIGHT: 71 IN | WEIGHT: 226.31 LBS

## 2023-04-06 DIAGNOSIS — Z98.890 S/P SPINAL SURGERY: Primary | ICD-10-CM

## 2023-04-06 DIAGNOSIS — M54.9 DORSALGIA, UNSPECIFIED: Primary | ICD-10-CM

## 2023-04-06 DIAGNOSIS — Z98.890 S/P SPINAL SURGERY: ICD-10-CM

## 2023-04-06 PROCEDURE — 99999 PR PBB SHADOW E&M-EST. PATIENT-LVL III: CPT | Mod: PBBFAC,HCNC,, | Performed by: ORTHOPAEDIC SURGERY

## 2023-04-06 PROCEDURE — 72100 XR LUMBAR SPINE AP AND LATERAL: ICD-10-PCS | Mod: 26,HCNC,, | Performed by: RADIOLOGY

## 2023-04-06 PROCEDURE — 99999 PR PBB SHADOW E&M-EST. PATIENT-LVL III: ICD-10-PCS | Mod: PBBFAC,HCNC,, | Performed by: ORTHOPAEDIC SURGERY

## 2023-04-06 PROCEDURE — 99024 PR POST-OP FOLLOW-UP VISIT: ICD-10-PCS | Mod: HCNC,S$GLB,, | Performed by: ORTHOPAEDIC SURGERY

## 2023-04-06 PROCEDURE — 1159F MED LIST DOCD IN RCRD: CPT | Mod: HCNC,CPTII,S$GLB, | Performed by: ORTHOPAEDIC SURGERY

## 2023-04-06 PROCEDURE — 1125F AMNT PAIN NOTED PAIN PRSNT: CPT | Mod: HCNC,CPTII,S$GLB, | Performed by: ORTHOPAEDIC SURGERY

## 2023-04-06 PROCEDURE — 1157F ADVNC CARE PLAN IN RCRD: CPT | Mod: HCNC,CPTII,S$GLB, | Performed by: ORTHOPAEDIC SURGERY

## 2023-04-06 PROCEDURE — 3288F FALL RISK ASSESSMENT DOCD: CPT | Mod: HCNC,CPTII,S$GLB, | Performed by: ORTHOPAEDIC SURGERY

## 2023-04-06 PROCEDURE — 99024 POSTOP FOLLOW-UP VISIT: CPT | Mod: HCNC,S$GLB,, | Performed by: ORTHOPAEDIC SURGERY

## 2023-04-06 PROCEDURE — 1101F PT FALLS ASSESS-DOCD LE1/YR: CPT | Mod: HCNC,CPTII,S$GLB, | Performed by: ORTHOPAEDIC SURGERY

## 2023-04-06 PROCEDURE — 1157F PR ADVANCE CARE PLAN OR EQUIV PRESENT IN MEDICAL RECORD: ICD-10-PCS | Mod: HCNC,CPTII,S$GLB, | Performed by: ORTHOPAEDIC SURGERY

## 2023-04-06 PROCEDURE — 3288F PR FALLS RISK ASSESSMENT DOCUMENTED: ICD-10-PCS | Mod: HCNC,CPTII,S$GLB, | Performed by: ORTHOPAEDIC SURGERY

## 2023-04-06 PROCEDURE — 72100 X-RAY EXAM L-S SPINE 2/3 VWS: CPT | Mod: TC,HCNC

## 2023-04-06 PROCEDURE — 1101F PR PT FALLS ASSESS DOC 0-1 FALLS W/OUT INJ PAST YR: ICD-10-PCS | Mod: HCNC,CPTII,S$GLB, | Performed by: ORTHOPAEDIC SURGERY

## 2023-04-06 PROCEDURE — 72100 X-RAY EXAM L-S SPINE 2/3 VWS: CPT | Mod: 26,HCNC,, | Performed by: RADIOLOGY

## 2023-04-06 PROCEDURE — 1159F PR MEDICATION LIST DOCUMENTED IN MEDICAL RECORD: ICD-10-PCS | Mod: HCNC,CPTII,S$GLB, | Performed by: ORTHOPAEDIC SURGERY

## 2023-04-06 PROCEDURE — 1125F PR PAIN SEVERITY QUANTIFIED, PAIN PRESENT: ICD-10-PCS | Mod: HCNC,CPTII,S$GLB, | Performed by: ORTHOPAEDIC SURGERY

## 2023-04-06 RX ORDER — CYCLOBENZAPRINE HCL 5 MG
5 TABLET ORAL NIGHTLY
Qty: 90 TABLET | Refills: 0 | Status: SHIPPED | OUTPATIENT
Start: 2023-04-06 | End: 2023-07-05

## 2023-04-06 NOTE — PROGRESS NOTES
Date: 04/06/2023    Supervising Physician: Luis William M.D.    Date of Surgery: 9/2/22     Procedure: L2-3 PLDF    History: Korey Santos is seen today for follow-up following the above listed procedure. Overall the patient is doing well but today notes he feels like he did well for a few months after surgery but then the pain came back.   Pain is well controlled with current pain medication.  he denies fever, chills, and sweats since the time of the surgery.     Exam: Incision is healing well, clean, dry and intact.   There is no sign of infection. Neuro exam is stable. No signs of DVT.    Radiographs: hardware in place no failure    Assessment/Plan: 7 months post op.    Pt presents with continued low back pain. Will obtain lumbar CT to evaluate for pseudarthrosis and call with ersults. Will send flexeril to pharmacy.      Thank you for the opportunity to participate in this patient's care. Please give me a call if there are any concerns or questions.

## 2023-04-12 ENCOUNTER — HOSPITAL ENCOUNTER (OUTPATIENT)
Dept: RADIOLOGY | Facility: HOSPITAL | Age: 83
Discharge: HOME | End: 2023-04-12
Attending: ORTHOPAEDIC SURGERY
Payer: MEDICARE

## 2023-04-12 DIAGNOSIS — M54.9 DORSALGIA, UNSPECIFIED: ICD-10-CM

## 2023-04-12 PROCEDURE — 72131 CT LUMBAR SPINE WITHOUT CONTRAST: ICD-10-PCS | Mod: 26,HCNC,, | Performed by: RADIOLOGY

## 2023-04-12 PROCEDURE — 72131 CT LUMBAR SPINE W/O DYE: CPT | Mod: TC,HCNC,PO

## 2023-04-12 PROCEDURE — 72131 CT LUMBAR SPINE W/O DYE: CPT | Mod: 26,HCNC,, | Performed by: RADIOLOGY

## 2023-04-17 ENCOUNTER — PATIENT MESSAGE (OUTPATIENT)
Dept: ORTHOPEDICS | Facility: CLINIC | Age: 83
End: 2023-04-17

## 2023-04-17 ENCOUNTER — CLINICAL SUPPORT (OUTPATIENT)
Dept: ENDOSCOPY | Facility: HOSPITAL | Age: 83
End: 2023-04-17
Attending: FAMILY MEDICINE
Payer: MEDICARE

## 2023-04-17 DIAGNOSIS — Z12.11 COLON CANCER SCREENING: ICD-10-CM

## 2023-04-17 DIAGNOSIS — K59.00 CONSTIPATION, UNSPECIFIED CONSTIPATION TYPE: ICD-10-CM

## 2023-04-17 DIAGNOSIS — K63.5 POLYP OF COLON, UNSPECIFIED PART OF COLON, UNSPECIFIED TYPE: ICD-10-CM

## 2023-04-17 RX ORDER — POLYETHYLENE GLYCOL 3350, SODIUM SULFATE ANHYDROUS, SODIUM BICARBONATE, SODIUM CHLORIDE, POTASSIUM CHLORIDE 236; 22.74; 6.74; 5.86; 2.97 G/4L; G/4L; G/4L; G/4L; G/4L
4 POWDER, FOR SOLUTION ORAL ONCE
Qty: 4000 ML | Refills: 0 | Status: SHIPPED | OUTPATIENT
Start: 2023-04-17 | End: 2023-04-17

## 2023-04-19 ENCOUNTER — TELEPHONE (OUTPATIENT)
Dept: ORTHOPEDICS | Facility: CLINIC | Age: 83
End: 2023-04-19
Payer: MEDICARE

## 2023-04-25 ENCOUNTER — TELEPHONE (OUTPATIENT)
Dept: ORTHOPEDICS | Facility: CLINIC | Age: 83
End: 2023-04-25
Payer: MEDICARE

## 2023-04-25 DIAGNOSIS — Z98.890 S/P SPINAL SURGERY: Primary | ICD-10-CM

## 2023-04-25 NOTE — TELEPHONE ENCOUNTER
Spoke with patient and informed him of ARGELIA Slaughter message and he agreed verbally. Appointment and xray scheduled.

## 2023-04-25 NOTE — TELEPHONE ENCOUNTER
"----- Message from Corinne Slaughter PA-C sent at 4/24/2023  3:48 PM CDT -----  Can you let him know the bone stimulator will only be approved by his insurance after he is 9 months post op? And can you schedule him a follow up with dr rowland in a month to get it ordered?  ----- Message -----  From: Luis Rowland MD  Sent: 4/24/2023   3:45 PM CDT  To: Corinne Slaughter PA-C    ok  ----- Message -----  From: Corinne Slaughter PA-C  Sent: 4/24/2023   3:44 PM CDT  To: MD Ginger Farias,    So he really wants a bone stimulator and thinks it will help his pain. He is 8 months out and kristine said we can get it approved if he is 9 months out and you document that the bone growth is "slower than normal". Would you be ok seeing him in a month and we try to get it approved?        "

## 2023-05-03 ENCOUNTER — TELEPHONE (OUTPATIENT)
Dept: ORTHOPEDICS | Facility: CLINIC | Age: 83
End: 2023-05-03
Payer: MEDICARE

## 2023-05-03 NOTE — TELEPHONE ENCOUNTER
Left VM  ----- Message from Sirisha Quick sent at 5/3/2023 11:11 AM CDT -----  Human calling to give info on a peer to peer for a denial , done within 2 business days , if no answer can info on voice mail      [phone]  , Lauren

## 2023-05-15 ENCOUNTER — TELEPHONE (OUTPATIENT)
Dept: ENDOSCOPY | Facility: HOSPITAL | Age: 83
End: 2023-05-15
Payer: MEDICARE

## 2023-05-15 RX ORDER — POLYETHYLENE GLYCOL 3350, SODIUM SULFATE ANHYDROUS, SODIUM BICARBONATE, SODIUM CHLORIDE, POTASSIUM CHLORIDE 236; 22.74; 6.74; 5.86; 2.97 G/4L; G/4L; G/4L; G/4L; G/4L
4 POWDER, FOR SOLUTION ORAL ONCE
Qty: 4000 ML | Refills: 0 | Status: SHIPPED | OUTPATIENT
Start: 2023-05-15 | End: 2023-05-15

## 2023-05-15 NOTE — TELEPHONE ENCOUNTER
Spoke with patient about arrival time @ 0845.   Colon    Prep instructions reviewed: the day before the procedure, follow a clear liquid diet all day, then start the first 1/2 of prep at 5pm and take 2nd 1/2 of prep @ 0345.  Pt must be completely NPO when prep completed @ 0545.   Encouraged to read instructions in portal.    2 day Golytely prep ordered; patient denies taking ASA 325mg.           Medications: Do not take Insulin or oral diabetic medications the day of the procedure.  Take as prescribed: heart, seizure and blood pressure medication in the morning with a sip of water (less than an ounce).  Take any breathing medications and bring inhalers to hospital with you Leave all valuables and jewelry at home.     Wear comfortable clothes to procedure to change into hospital gown You cannot drive for 24 hours after your procedure because you will receive sedation for your procedure to make you comfortable.  A ride must be provided at discharge.

## 2023-05-16 ENCOUNTER — TELEPHONE (OUTPATIENT)
Dept: ORTHOPEDICS | Facility: CLINIC | Age: 83
End: 2023-05-16
Payer: MEDICARE

## 2023-05-16 NOTE — TELEPHONE ENCOUNTER
Left message for patient to call back to inform of appointment change due to surgery. Left message on portal also.

## 2023-05-17 ENCOUNTER — HOSPITAL ENCOUNTER (OUTPATIENT)
Facility: HOSPITAL | Age: 83
Discharge: HOME | End: 2023-05-17
Attending: INTERNAL MEDICINE | Admitting: INTERNAL MEDICINE
Payer: MEDICARE

## 2023-05-17 ENCOUNTER — ANESTHESIA (OUTPATIENT)
Dept: ENDOSCOPY | Facility: HOSPITAL | Age: 83
End: 2023-05-17
Payer: MEDICARE

## 2023-05-17 ENCOUNTER — ANESTHESIA EVENT (OUTPATIENT)
Dept: ENDOSCOPY | Facility: HOSPITAL | Age: 83
End: 2023-05-17
Payer: MEDICARE

## 2023-05-17 VITALS
SYSTOLIC BLOOD PRESSURE: 149 MMHG | OXYGEN SATURATION: 98 % | HEIGHT: 71 IN | RESPIRATION RATE: 13 BRPM | BODY MASS INDEX: 31.5 KG/M2 | TEMPERATURE: 98 F | HEART RATE: 2 BPM | DIASTOLIC BLOOD PRESSURE: 84 MMHG | WEIGHT: 225 LBS

## 2023-05-17 DIAGNOSIS — Z86.010 PERSONAL HISTORY OF COLONIC POLYPS: ICD-10-CM

## 2023-05-17 PROCEDURE — D9220A PRA ANESTHESIA: ICD-10-PCS | Mod: PT,CRNA,, | Performed by: NURSE ANESTHETIST, CERTIFIED REGISTERED

## 2023-05-17 PROCEDURE — D9220A PRA ANESTHESIA: ICD-10-PCS | Mod: PT,ANES,, | Performed by: ANESTHESIOLOGY

## 2023-05-17 PROCEDURE — D9220A PRA ANESTHESIA: Mod: PT,ANES,, | Performed by: ANESTHESIOLOGY

## 2023-05-17 PROCEDURE — 45390: ICD-10-PCS | Mod: PT,59,, | Performed by: INTERNAL MEDICINE

## 2023-05-17 PROCEDURE — 88305 TISSUE EXAM BY PATHOLOGIST: ICD-10-PCS | Mod: 26,,, | Performed by: STUDENT IN AN ORGANIZED HEALTH CARE EDUCATION/TRAINING PROGRAM

## 2023-05-17 PROCEDURE — 45385 PR COLONOSCOPY,REMV LESN,SNARE: ICD-10-PCS | Mod: PT,,, | Performed by: INTERNAL MEDICINE

## 2023-05-17 PROCEDURE — 27201028 HC NEEDLE, SCLERO: Performed by: INTERNAL MEDICINE

## 2023-05-17 PROCEDURE — 25000003 PHARM REV CODE 250: Performed by: INTERNAL MEDICINE

## 2023-05-17 PROCEDURE — 27201089 HC SNARE, DISP (ANY): Performed by: INTERNAL MEDICINE

## 2023-05-17 PROCEDURE — 37000008 HC ANESTHESIA 1ST 15 MINUTES: Performed by: INTERNAL MEDICINE

## 2023-05-17 PROCEDURE — 88305 TISSUE EXAM BY PATHOLOGIST: CPT | Mod: 26,,, | Performed by: STUDENT IN AN ORGANIZED HEALTH CARE EDUCATION/TRAINING PROGRAM

## 2023-05-17 PROCEDURE — 45385 COLONOSCOPY W/LESION REMOVAL: CPT | Mod: PT | Performed by: INTERNAL MEDICINE

## 2023-05-17 PROCEDURE — 25000003 PHARM REV CODE 250: Performed by: NURSE ANESTHETIST, CERTIFIED REGISTERED

## 2023-05-17 PROCEDURE — 63600175 PHARM REV CODE 636 W HCPCS: Performed by: NURSE ANESTHETIST, CERTIFIED REGISTERED

## 2023-05-17 PROCEDURE — 88305 TISSUE EXAM BY PATHOLOGIST: CPT | Mod: 59 | Performed by: STUDENT IN AN ORGANIZED HEALTH CARE EDUCATION/TRAINING PROGRAM

## 2023-05-17 PROCEDURE — D9220A PRA ANESTHESIA: Mod: PT,CRNA,, | Performed by: NURSE ANESTHETIST, CERTIFIED REGISTERED

## 2023-05-17 PROCEDURE — 27200997: Performed by: INTERNAL MEDICINE

## 2023-05-17 PROCEDURE — 45390 COLONOSCOPY W/RESECTION: CPT | Mod: PT,59,, | Performed by: INTERNAL MEDICINE

## 2023-05-17 PROCEDURE — 45385 COLONOSCOPY W/LESION REMOVAL: CPT | Mod: PT,,, | Performed by: INTERNAL MEDICINE

## 2023-05-17 PROCEDURE — 45390 COLONOSCOPY W/RESECTION: CPT | Mod: PT,59 | Performed by: INTERNAL MEDICINE

## 2023-05-17 PROCEDURE — 37000009 HC ANESTHESIA EA ADD 15 MINS: Performed by: INTERNAL MEDICINE

## 2023-05-17 PROCEDURE — 27202363 HC INJECTION AGENT, SUBMUCOSAL, ANY: Performed by: INTERNAL MEDICINE

## 2023-05-17 RX ORDER — PROPOFOL 10 MG/ML
VIAL (ML) INTRAVENOUS
Status: DISCONTINUED | OUTPATIENT
Start: 2023-05-17 | End: 2023-05-17

## 2023-05-17 RX ORDER — LIDOCAINE HYDROCHLORIDE 20 MG/ML
INJECTION, SOLUTION EPIDURAL; INFILTRATION; INTRACAUDAL; PERINEURAL
Status: DISCONTINUED | OUTPATIENT
Start: 2023-05-17 | End: 2023-05-17

## 2023-05-17 RX ORDER — SODIUM CHLORIDE 9 MG/ML
INJECTION, SOLUTION INTRAVENOUS CONTINUOUS
Status: DISCONTINUED | OUTPATIENT
Start: 2023-05-17 | End: 2023-05-17 | Stop reason: HOSPADM

## 2023-05-17 RX ORDER — SODIUM CHLORIDE 0.9 % (FLUSH) 0.9 %
10 SYRINGE (ML) INJECTION
Status: DISCONTINUED | OUTPATIENT
Start: 2023-05-17 | End: 2023-05-17 | Stop reason: HOSPADM

## 2023-05-17 RX ORDER — PROPOFOL 10 MG/ML
VIAL (ML) INTRAVENOUS CONTINUOUS PRN
Status: DISCONTINUED | OUTPATIENT
Start: 2023-05-17 | End: 2023-05-17

## 2023-05-17 RX ADMIN — PROPOFOL 150 MCG/KG/MIN: 10 INJECTION, EMULSION INTRAVENOUS at 10:05

## 2023-05-17 RX ADMIN — PROPOFOL 60 MG: 10 INJECTION, EMULSION INTRAVENOUS at 10:05

## 2023-05-17 RX ADMIN — SODIUM CHLORIDE: 0.9 INJECTION, SOLUTION INTRAVENOUS at 09:05

## 2023-05-17 RX ADMIN — LIDOCAINE HYDROCHLORIDE 80 MG: 20 INJECTION, SOLUTION EPIDURAL; INFILTRATION; INTRACAUDAL; PERINEURAL at 10:05

## 2023-05-17 RX ADMIN — GLYCOPYRROLATE 0.2 MG: 0.2 INJECTION, SOLUTION INTRAMUSCULAR; INTRAVITREAL at 10:05

## 2023-05-17 NOTE — H&P
Short Stay Endoscopy History and Physical    PCP - Juan Solorzano MD    Procedure - Colonoscopy  ASA - per anesthesia  Mallampati - per anesthesia  History of Anesthesia problems - no  Family history Anesthesia problems - no   Plan of anesthesia - General    HPI:  This is a 82 y.o. male here for evaluation of : personal history of colon polyps    Incidental constipation      ROS:  Constitutional: No fevers, chills, No weight loss  CV: No chest pain  Pulm: No cough, No shortness of breath  GI: see HPI  Derm: No rash    Medical History:  has a past medical history of Arthritis, Back pain, chronic, BPH with urinary obstruction, Chronic constipation, Closed fracture of right shoulder (1/28/2021), Closed nondisplaced fracture of greater tuberosity of right humerus (2/24/2021), Colon polyp, DJD (degenerative joint disease), Hyperlipidemia, Hypertension, Laryngopharyngeal reflux, Left inguinal hernia, Lumbar herniated disc, Lumbar stenosis, Neck mass (10/7/2014), OA (osteoarthritis) of knee, Paradoxical insomnia, and Sinus trouble.    Surgical History:  has a past surgical history that includes left hand; Back surgery (2014); Leg Surgery; Knee surgery; Colonoscopy; Upper gastrointestinal endoscopy; Joint replacement (Left, 05/04/2018); Spine surgery; and Epidural steroid injection into lumbar spine (N/A, 7/5/2022).    Family History: family history includes Cancer in his father; No Known Problems in his brother, daughter, maternal aunt, maternal grandfather, maternal grandmother, maternal uncle, mother, paternal aunt, paternal grandfather, paternal grandmother, paternal uncle, sister, and son.. Otherwise no colon cancer, inflammatory bowel disease, or GI malignancies.    Social History:  reports that he has never smoked. He has never used smokeless tobacco. He reports current alcohol use. He reports that he does not use drugs.    Review of patient's allergies indicates:   Allergen Reactions    Clindamycin Swelling      Throat swells    Lisinopril Swelling and Other (See Comments)     Throat swelling       Medications:   Medications Prior to Admission   Medication Sig Dispense Refill Last Dose    aspirin (ECOTRIN) 325 MG EC tablet Take 1 tablet (325 mg total) by mouth once daily. (Patient taking differently: Take 325 mg by mouth every other day.) 42 tablet 0 Past Month    cyclobenzaprine (FLEXERIL) 5 MG tablet Take 1 tablet (5 mg total) by mouth nightly. 90 tablet 0 Past Month    finasteride (PROSCAR) 5 mg tablet Take 1 tablet (5 mg total) by mouth once daily. 90 tablet 3 5/17/2023    hydrOXYzine HCL (ATARAX) 25 MG tablet Take 1 tablet (25 mg total) by mouth 3 (three) times daily as needed for Anxiety. 30 tablet 0 Past Week    linaCLOtide (LINZESS) 145 mcg Cap capsule Take 1 capsule (145 mcg total) by mouth daily as needed (constipation). 90 capsule 3 Past Week    losartan (COZAAR) 100 MG tablet Take 1 tablet (100 mg total) by mouth once daily. 90 tablet 3 5/17/2023    multivitamin capsule Take 1 capsule by mouth once daily.   Past Week    polyethylene glycol (GLYCOLAX) 17 gram/dose powder Take 17 g by mouth daily as needed. 510 g 1 Past Week    pravastatin (PRAVACHOL) 40 MG tablet Take 1 tablet (40 mg total) by mouth once daily. 90 tablet 3 5/17/2023    tamsulosin (FLOMAX) 0.4 mg Cap Take 1 capsule (0.4 mg total) by mouth once daily. 90 capsule 4 5/17/2023    azelastine (ASTELIN) 137 mcg (0.1 %) nasal spray 1 spray (137 mcg total) by Nasal route 2 (two) times daily as needed for Rhinitis. 30 mL 0 More than a month    fluticasone propionate (FLONASE) 50 mcg/actuation nasal spray 1 spray (50 mcg total) by Each Nostril route once daily. 16 g 6 More than a month         Physical Exam:    Vital Signs:   Vitals:    05/17/23 0903   BP: (!) 179/74   Pulse: 66   Resp: 18   Temp: 98.8 °F (37.1 °C)       General Appearance: Well appearing in no acute distress  Eyes:    No scleral icterus  ENT: Neck supple, Lips, mucosa, and tongue  normal; teeth and gums normal  Abdomen: Soft, non tender, non distended with positive bowel sounds. No hepatosplenomegaly, ascites, or mass.  Extremities: 2+ pulses, no clubbing, cyanosis or edema  Skin: No rash      Labs:  Lab Results   Component Value Date    WBC 7.22 01/18/2023    HGB 14.9 01/18/2023    HCT 45.8 01/18/2023     01/18/2023    CHOL 190 12/28/2022    TRIG 62 12/28/2022    HDL 45 12/28/2022    ALT 19 01/18/2023    AST 33 01/18/2023     01/18/2023    K 4.3 01/18/2023     01/18/2023    CREATININE 0.71 01/18/2023    BUN 11 01/18/2023    CO2 26 01/18/2023    TSH 1.780 12/28/2022    PSA 4.7 (H) 07/08/2017    INR 1.0 08/22/2022    GLUF 92 09/16/2017    HGBA1C 5.9 (H) 12/28/2022       I have explained the risks and benefits of endoscopy procedures to the patient including but not limited to bleeding, perforation, infection, and death.  The patient was asked if they understand and allowed to ask any further questions to their satisfaction.    Christiano Vazquez MD

## 2023-05-17 NOTE — ANESTHESIA POSTPROCEDURE EVALUATION
Anesthesia Post Evaluation    Patient: Korey Santos    Procedure(s) Performed: Procedure(s) (LRB):  COLONOSCOPY (N/A)    Final Anesthesia Type: general      Patient location during evaluation: PACU  Patient participation: Yes- Able to Participate  Level of consciousness: awake and alert  Post-procedure vital signs: reviewed and stable  Pain management: adequate  Airway patency: patent    PONV status at discharge: No PONV  Anesthetic complications: no      Cardiovascular status: blood pressure returned to baseline  Respiratory status: unassisted  Hydration status: euvolemic  Follow-up not needed.          Vitals Value Taken Time   /84 05/17/23 1130   Temp 36.8 °C (98.2 °F) 05/17/23 1057   Pulse 73 05/17/23 1130   Resp 13 05/17/23 1130   SpO2 98 % 05/17/23 1130         Event Time   Out of Recovery 11:28:32         Pain/Meredith Score: Meredith Score: 10 (5/17/2023 11:30 AM)

## 2023-05-17 NOTE — PROVATION PATIENT INSTRUCTIONS
Discharge Summary/Instructions after an Endoscopic Procedure  Patient Name: Korey Santos  Patient MRN: [id number]  Patient YOB: 1940  Wednesday, May 17, 2023  Christiano Vazquez MD  Dear patient,  As a result of recent federal legislation (The Federal Cures Act), you may   receive lab or pathology results from your procedure in your [facility]   account before your physician is able to contact you. Your physician or   their representative will relay the results to you with their   recommendations at their soonest availability.  Thank you,  Your health is very important to us during the Covid Crisis. Following your   procedure today, you will receive a daily text for 2 weeks asking about   signs or symptoms of Covid 19.  Please respond to this text when you   receive it so we can follow up and keep you as safe as possible.   RESTRICTIONS:  During your procedure today, you received medications for sedation.  These   medications may affect your judgment, balance and coordination.  Therefore,   for 24 hours, you have the following restrictions:   - DO NOT drive a car, operate machinery, make legal/financial decisions,   sign important papers or drink alcohol.    ACTIVITY:  Today: no heavy lifting, straining or running due to procedural   sedation/anesthesia.  The following day: return to full activity including work.  DIET:  Eat and drink normally unless instructed otherwise.     TREATMENT FOR COMMON SIDE EFFECTS:  - Mild abdominal pain, nausea, belching, bloating or excessive gas:  rest,   eat lightly and use a heating pad.  - Sore Throat: treat with throat lozenges and/or gargle with warm salt   water.  - Because air was used during the procedure, expelling large amounts of air   from your rectum or belching is normal.  - If a bowel prep was taken, you may not have a bowel movement for 1-3 days.    This is normal.  SYMPTOMS TO WATCH FOR AND REPORT TO YOUR PHYSICIAN:  1. Abdominal pain or bloating, other  than gas cramps.  2. Chest pain.  3. Back pain.  4. Signs of infection such as: chills or fever occurring within 24 hours   after the procedure.  5. Rectal bleeding, which would show as bright red, maroon, or black stools.   (A tablespoon of blood from the rectum is not serious, especially if   hemorrhoids are present.)  6. Vomiting.  7. Weakness or dizziness.  GO DIRECTLY TO THE NEAREST EMERGENCY ROOM IF YOU HAVE ANY OF THE FOLLOWING:      Difficulty breathing              Chills and/or fever over 101 F   Persistent vomiting and/or vomiting blood   Severe abdominal pain   Severe chest pain   Black, tarry stools   Bleeding- more than one tablespoon   Any other symptom or condition that you feel may need urgent attention  Your doctor recommends these additional instructions:  If any biopsies were taken, your doctors clinic will contact you in 1 to 2   weeks with any results.  - Discharge patient to home.   - Patient has a contact number available for emergencies.  The signs and   symptoms of potential delayed complications were discussed with the   patient.  Return to normal activities tomorrow.  Written discharge   instructions were provided to the patient.   - Resume previous diet.   - Continue present medications.   - Await pathology results.   - Repeat colonoscopy in 1 year for surveillance after piecemeal polypectomy.     - Avoid NSAIDs (ibuprofen, aleve, naproxen, BC / Goodys, aspirin etc) for 10   days; Aspirin is OK to continue if indicated for cardiovascular   protection.  - Would have risk / benefit discussion of continued surveillance in clinic   prior to planning next colonoscopy given age.  For questions, problems or results please call your physician - Christiano Vazquez MD.  EMERGENCY PHONE NUMBER: [phone],  LAB RESULTS: [phone]  IF A COMPLICATION OR EMERGENCY SITUATION ARISES AND YOU ARE UNABLE TO REACH   YOUR PHYSICIAN - GO DIRECTLY TO THE EMERGENCY ROOM.  Christiano Vazquez MD  5/17/2023  10:55:12 AM  This report has been verified and signed electronically.  Dear patient,  As a result of recent federal legislation (The Federal Cures Act), you may   receive lab or pathology results from your procedure in your [facility]   account before your physician is able to contact you. Your physician or   their representative will relay the results to you with their   recommendations at their soonest availability.  Thank you,  PROVATION

## 2023-05-17 NOTE — ANESTHESIA PREPROCEDURE EVALUATION
05/17/2023  Korey Santos is a 82 y.o., male.      Pre-op Assessment    I have reviewed the NPO Status.   I have reviewed the Medications.     Review of Systems  Anesthesia Hx:  No problems with previous Anesthesia  Denies Family Hx of Anesthesia complications.   Denies Personal Hx of Anesthesia complications.   Hematology/Oncology:  Hematology Normal   Oncology Normal     EENT/Dental:EENT/Dental Normal   Cardiovascular:   Hypertension hyperlipidemia    Pulmonary:  Pulmonary Normal    Renal/:   BPH    Hepatic/GI:   Dysphagia   Musculoskeletal:   Arthritis   Spine Disorders: lumbar Degenerative disease    Neurological:  Neurology Normal    Endocrine:  Endocrine Normal    Dermatological:  Skin Normal    Psych:  Psychiatric Normal           Physical Exam  General: Alert and Oriented    Airway:  Mallampati: II   Mouth Opening: Normal  TM Distance: Normal  Tongue: Normal  Neck ROM: Normal ROM    Chest/Lungs:  Clear to auscultation, Normal Respiratory Rate    Heart:  Rate: Normal  Rhythm: Regular Rhythm  Sounds: Normal        Anesthesia Plan  Type of Anesthesia, risks & benefits discussed:    Anesthesia Type: Gen Natural Airway  Intra-op Monitoring Plan: Standard ASA Monitors  Post Op Pain Control Plan: multimodal analgesia and IV/PO Opioids PRN  Induction:  IV  Informed Consent: Informed consent signed with the Patient and all parties understand the risks and agree with anesthesia plan.  All questions answered.   ASA Score: 3  Day of Surgery Review of History & Physical: H&P Update referred to the surgeon/provider.    Ready For Surgery From Anesthesia Perspective.     .

## 2023-05-17 NOTE — TRANSFER OF CARE
"Anesthesia Transfer of Care Note    Patient: Korey Santos    Procedure(s) Performed: Procedure(s) (LRB):  COLONOSCOPY (N/A)    Patient location: GI    Anesthesia Type: general    Transport from OR: Transported from OR on 6-10 L/min O2 by face mask with adequate spontaneous ventilation    Post pain: adequate analgesia    Post assessment: no apparent anesthetic complications and tolerated procedure well    Post vital signs: stable    Level of consciousness: awake and alert    Nausea/Vomiting: no nausea/vomiting    Complications: none    Transfer of care protocol was followed      Last vitals:   Visit Vitals  BP (!) 179/74 (BP Location: Left arm, Patient Position: Lying)   Pulse 66   Temp 37.1 °C (98.8 °F) (Temporal)   Resp 18   Ht 5' 11" (1.803 m)   Wt 102.1 kg (225 lb)   SpO2 95%   BMI 31.38 kg/m²     "

## 2023-05-22 LAB
FINAL PATHOLOGIC DIAGNOSIS: NORMAL
GROSS: NORMAL
Lab: NORMAL

## 2023-06-21 ENCOUNTER — HOSPITAL ENCOUNTER (OUTPATIENT)
Dept: RADIOLOGY | Facility: HOSPITAL | Age: 83
Discharge: HOME | End: 2023-06-21
Attending: PHYSICIAN ASSISTANT
Payer: MEDICARE

## 2023-06-21 ENCOUNTER — OFFICE VISIT (OUTPATIENT)
Dept: ORTHOPEDICS | Facility: CLINIC | Age: 83
End: 2023-06-21
Payer: MEDICARE

## 2023-06-21 VITALS — BODY MASS INDEX: 31.5 KG/M2 | HEIGHT: 71 IN | WEIGHT: 225 LBS

## 2023-06-21 DIAGNOSIS — M18.12 PRIMARY OSTEOARTHRITIS OF FIRST CARPOMETACARPAL JOINT OF LEFT HAND: ICD-10-CM

## 2023-06-21 DIAGNOSIS — M18.12 PRIMARY OSTEOARTHRITIS OF FIRST CARPOMETACARPAL JOINT OF LEFT HAND: Primary | ICD-10-CM

## 2023-06-21 PROCEDURE — 73130 X-RAY EXAM OF HAND: CPT | Mod: TC,PO,LT

## 2023-06-21 PROCEDURE — 1157F ADVNC CARE PLAN IN RCRD: CPT | Mod: CPTII,S$GLB,, | Performed by: PHYSICIAN ASSISTANT

## 2023-06-21 PROCEDURE — 3288F FALL RISK ASSESSMENT DOCD: CPT | Mod: CPTII,S$GLB,, | Performed by: PHYSICIAN ASSISTANT

## 2023-06-21 PROCEDURE — 3288F PR FALLS RISK ASSESSMENT DOCUMENTED: ICD-10-PCS | Mod: CPTII,S$GLB,, | Performed by: PHYSICIAN ASSISTANT

## 2023-06-21 PROCEDURE — 99214 PR OFFICE/OUTPT VISIT, EST, LEVL IV, 30-39 MIN: ICD-10-PCS | Mod: S$GLB,,, | Performed by: PHYSICIAN ASSISTANT

## 2023-06-21 PROCEDURE — 99999 PR PBB SHADOW E&M-EST. PATIENT-LVL III: CPT | Mod: PBBFAC,,, | Performed by: PHYSICIAN ASSISTANT

## 2023-06-21 PROCEDURE — 73130 X-RAY EXAM OF HAND: CPT | Mod: 26,LT,, | Performed by: RADIOLOGY

## 2023-06-21 PROCEDURE — 73130 XR HAND COMPLETE 3 VIEW LEFT: ICD-10-PCS | Mod: 26,LT,, | Performed by: RADIOLOGY

## 2023-06-21 PROCEDURE — 1101F PT FALLS ASSESS-DOCD LE1/YR: CPT | Mod: CPTII,S$GLB,, | Performed by: PHYSICIAN ASSISTANT

## 2023-06-21 PROCEDURE — 1160F RVW MEDS BY RX/DR IN RCRD: CPT | Mod: CPTII,S$GLB,, | Performed by: PHYSICIAN ASSISTANT

## 2023-06-21 PROCEDURE — 1125F PR PAIN SEVERITY QUANTIFIED, PAIN PRESENT: ICD-10-PCS | Mod: CPTII,S$GLB,, | Performed by: PHYSICIAN ASSISTANT

## 2023-06-21 PROCEDURE — 1125F AMNT PAIN NOTED PAIN PRSNT: CPT | Mod: CPTII,S$GLB,, | Performed by: PHYSICIAN ASSISTANT

## 2023-06-21 PROCEDURE — 1160F PR REVIEW ALL MEDS BY PRESCRIBER/CLIN PHARMACIST DOCUMENTED: ICD-10-PCS | Mod: CPTII,S$GLB,, | Performed by: PHYSICIAN ASSISTANT

## 2023-06-21 PROCEDURE — 99999 PR PBB SHADOW E&M-EST. PATIENT-LVL III: ICD-10-PCS | Mod: PBBFAC,,, | Performed by: PHYSICIAN ASSISTANT

## 2023-06-21 PROCEDURE — 1157F PR ADVANCE CARE PLAN OR EQUIV PRESENT IN MEDICAL RECORD: ICD-10-PCS | Mod: CPTII,S$GLB,, | Performed by: PHYSICIAN ASSISTANT

## 2023-06-21 PROCEDURE — 1159F PR MEDICATION LIST DOCUMENTED IN MEDICAL RECORD: ICD-10-PCS | Mod: CPTII,S$GLB,, | Performed by: PHYSICIAN ASSISTANT

## 2023-06-21 PROCEDURE — 1159F MED LIST DOCD IN RCRD: CPT | Mod: CPTII,S$GLB,, | Performed by: PHYSICIAN ASSISTANT

## 2023-06-21 PROCEDURE — 1101F PR PT FALLS ASSESS DOC 0-1 FALLS W/OUT INJ PAST YR: ICD-10-PCS | Mod: CPTII,S$GLB,, | Performed by: PHYSICIAN ASSISTANT

## 2023-06-21 PROCEDURE — 99214 OFFICE O/P EST MOD 30 MIN: CPT | Mod: S$GLB,,, | Performed by: PHYSICIAN ASSISTANT

## 2023-06-21 RX ORDER — MELOXICAM 15 MG/1
15 TABLET ORAL DAILY
Qty: 14 TABLET | Refills: 0 | Status: SHIPPED | OUTPATIENT
Start: 2023-06-21 | End: 2023-07-05

## 2023-06-21 NOTE — NURSING TRANSFER
Nursing Transfer Note      5/4/2018     Transfer To: 506 From PACU    Transfer via stretcher    Transfer with personal belongings, cpm machine and IV pump    Transported by PCT    Medicines sent: none    Chart send with patient: Yes    Notified: friend    Patient reassessed at: 5/4/18 @ 1630    Upon arrival to floor:    Oral Minoxidil Counseling- I discussed with the patient the risks of oral minoxidil including but not limited to shortness of breath, swelling of the feet or ankles, dizziness, lightheadedness, unwanted hair growth and allergic reaction.  The patient verbalized understanding of the proper use and possible adverse effects of oral minoxidil.  All of the patient's questions and concerns were addressed.

## 2023-06-21 NOTE — PROGRESS NOTES
6/21/2023    HPI:  Korey Santos is a 82 y.o. male, who presents to clinic today for evaluation of his left hand pain.  States the past month or so he is noticed pain at the base of the thumb that is worse with palpation and activity.  His pain is better with rest.  States pain is minimal at rest.  States he works out pretty regularly, and noticed it started to swell up and become painful after working at 1 time approximally a month ago.  Denies any acute/traumatic injuries he can recall.    PMHX:  Past Medical History:   Diagnosis Date    Arthritis     Back pain, chronic     BPH with urinary obstruction     Chronic constipation     Closed fracture of right shoulder 1/28/2021    Closed nondisplaced fracture of greater tuberosity of right humerus 2/24/2021    Colon polyp     DJD (degenerative joint disease)     Hip    Hyperlipidemia     Hypertension     Laryngopharyngeal reflux     Left inguinal hernia     Lumbar herniated disc     Lumbar stenosis     Neck mass 10/7/2014    -4/29/2022 path - lipoma    OA (osteoarthritis) of knee     Bilateral    Paradoxical insomnia     Sinus trouble        PSHX:  Past Surgical History:   Procedure Laterality Date    BACK SURGERY  2014    COLONOSCOPY      COLONOSCOPY N/A 5/17/2023    Procedure: COLONOSCOPY;  Surgeon: Christiano Vazquez MD;  Location: [facility] ENDO;  Service: Endoscopy;  Laterality: N/A;  Constipation 2 day prep.Instructions to portal.EC    EPIDURAL STEROID INJECTION INTO LUMBAR SPINE N/A 7/5/2022    Procedure: Injection-steroid-epidural-lumbar L3-4;  Surgeon: Yury Crum Jr., MD;  Location: [facility] PAIN MGT;  Service: Pain Management;  Laterality: N/A;  oral sedation   asa      JOINT REPLACEMENT Left 05/04/2018    KNEE SURGERY      left hand      LEG SURGERY      SPINE SURGERY      UPPER GASTROINTESTINAL ENDOSCOPY         FMHX:  Family History   Problem Relation Age of Onset    Cancer Father         lung or smoking    No Known Problems Mother     No Known Problems  Sister     No Known Problems Brother     No Known Problems Maternal Aunt     No Known Problems Maternal Uncle     No Known Problems Paternal Aunt     No Known Problems Paternal Uncle     No Known Problems Maternal Grandmother     No Known Problems Maternal Grandfather     No Known Problems Paternal Grandmother     No Known Problems Paternal Grandfather     No Known Problems Daughter     No Known Problems Son     Glaucoma Neg Hx     Diabetes Neg Hx     Amblyopia Neg Hx     Blindness Neg Hx     Cataracts Neg Hx     Hypertension Neg Hx     Macular degeneration Neg Hx     Retinal detachment Neg Hx     Strabismus Neg Hx     Stroke Neg Hx     Thyroid disease Neg Hx     Colon cancer Neg Hx     Esophageal cancer Neg Hx        SOCHX:  Social History     Tobacco Use    Smoking status: Never    Smokeless tobacco: Never   Substance Use Topics    Alcohol use: Yes     Alcohol/week: 0.0 standard drinks     Comment: approximately 2 beers weekly       ALLERGIES:  Clindamycin and Lisinopril    CURRENT MEDICATIONS:  Current Outpatient Medications on File Prior to Visit   Medication Sig Dispense Refill    aspirin (ECOTRIN) 325 MG EC tablet Take 1 tablet (325 mg total) by mouth once daily. (Patient taking differently: Take 325 mg by mouth every other day.) 42 tablet 0    azelastine (ASTELIN) 137 mcg (0.1 %) nasal spray 1 spray (137 mcg total) by Nasal route 2 (two) times daily as needed for Rhinitis. 30 mL 0    cyclobenzaprine (FLEXERIL) 5 MG tablet Take 1 tablet (5 mg total) by mouth nightly. 90 tablet 0    finasteride (PROSCAR) 5 mg tablet Take 1 tablet (5 mg total) by mouth once daily. 90 tablet 3    fluticasone propionate (FLONASE) 50 mcg/actuation nasal spray 1 spray (50 mcg total) by Each Nostril route once daily. 16 g 6    hydrOXYzine HCL (ATARAX) 25 MG tablet Take 1 tablet (25 mg total) by mouth 3 (three) times daily as needed for Anxiety. 30 tablet 0    linaCLOtide (LINZESS) 145 mcg Cap capsule Take 1 capsule (145 mcg total) by  "mouth daily as needed (constipation). 90 capsule 3    losartan (COZAAR) 100 MG tablet Take 1 tablet (100 mg total) by mouth once daily. 90 tablet 3    multivitamin capsule Take 1 capsule by mouth once daily.      polyethylene glycol (GLYCOLAX) 17 gram/dose powder Take 17 g by mouth daily as needed. 510 g 1    pravastatin (PRAVACHOL) 40 MG tablet Take 1 tablet (40 mg total) by mouth once daily. 90 tablet 3    tamsulosin (FLOMAX) 0.4 mg Cap Take 1 capsule (0.4 mg total) by mouth once daily. 90 capsule 4     No current facility-administered medications on file prior to visit.       REVIEW OF SYSTEMS:  Review of Systems Complete; Negative, unless noted above.    GENERAL PHYSICAL EXAM:   Ht 5' 11" (1.803 m)   Wt 102.1 kg (225 lb)   BMI 31.38 kg/m²    GEN: well developed, well nourished, no acute distress   PULM: No wheezing, no respiratory distress   CV: RRR    ORTHO EXAM:   Examination of the left hand reveals mild edema over the 1st CMC joint.  No erythema, ecchymosis, or skin breakdown noted.  Able make composite fist and fully extend all fingers.  Tenderness palpation of the 1st CMC joint.  Positive grind test of the 1st CMC joint.  5/5 /intrinsic strength.  Normal sensation in the radial, ulnar, median nerve distributions.  Capillary refill less than 2 seconds in all fingers.    RADIOLOGY:   X-rays of the left hand were taken today in clinic.  X-rays reviewed by myself.  Imaging showed presence of degenerative changes of the 1st CMC joint.  No acute fracture dislocation.  No subluxation.  No radiopaque foreign body or mass noted.  No osseous destructive/erosive processes noted.  No other significant bony abnormalities noted.    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 I discussed with Korey Santos the 1st CMC joint osteoarthritis pathology and treatment options in detail during today's visit.  After treatment options were discussed, we decided the best course of action " this time is to proceed with immobilization via a removable Velcro thumb spica splint for all activity and a short course of oral prescription NSAIDs.  We also discussed we would have him fitted for a meta  splint of the left thumb/hand and instructed on activity modification/avoidance via occupational therapy.  He verbally agreed with the treatment plan.    2.  He was referred occupational therapy in clinic today to be fitted for a meta  splint.    3.  He was prescribed Mobic 15 mg to be taken once daily for the next 2 weeks.  He was instructed to discontinue medication for any adverse effects.  He was instructed to not take any other type of NSAIDs while taking this medication.  He verbalized understanding.    4.  He was placed in a removable Velcro thumb spica splint of the left thumb/wrist in clinic today.      5. I would like him to follow-up in clinic in 6 weeks for repeat evaluation.  He was instructed to contact clinic for any problems or concerns in the interim.

## 2023-06-26 ENCOUNTER — TELEPHONE (OUTPATIENT)
Dept: ORTHOPEDICS | Facility: CLINIC | Age: 83
End: 2023-06-26
Payer: MEDICARE

## 2023-06-26 NOTE — TELEPHONE ENCOUNTER
----- Message from Britt Bellamy sent at 6/26/2023 10:17 AM CDT -----  Regarding: Appt  Contact: [phone]  Patient Korey is calling. Patient would like to be seen by a hand specialist in [place]. Informed patient next appt isnt until July [facility] and Sept in [place]. Patient is insistent on seeing someone in [place]. Please call patient at [phone]   No answer  I left a message that I saw that  he had seen a physician assistant last Wednesday for his hand problem , he was given medical advice along with medication & a brace fitting , if he is having a problem  he should call the Physician Assistant back  for further guidance   otherwise this call is completed - I left my name & number if he wants to speak with me

## 2023-06-27 ENCOUNTER — TELEPHONE (OUTPATIENT)
Dept: ORTHOPEDICS | Facility: CLINIC | Age: 83
End: 2023-06-27
Payer: MEDICARE

## 2023-06-27 NOTE — TELEPHONE ENCOUNTER
"----- Message from Maria Eugenia Prince sent at 6/27/2023 10:57 AM CDT -----  Regarding: Call back  "Type:  Patient Call Back    Who Called:Pt    What is the reqeust in detail:Requesting call back in regards to physical Therapy. Please advise    Can the clinic reply by [facility]?no     Best Call Back Number:[phone]      Additional Information:            "

## 2023-06-27 NOTE — TELEPHONE ENCOUNTER
Returned call to pt, he stated he is scheduled in [facility] for therapy and needed approval to go there. Informed pt I will let the therapy  know he is ok to go to [facility] for therapy.

## 2023-06-30 ENCOUNTER — OFFICE VISIT (OUTPATIENT)
Dept: ORTHOPEDICS | Facility: CLINIC | Age: 83
End: 2023-06-30
Payer: MEDICARE

## 2023-06-30 ENCOUNTER — CLINICAL SUPPORT (OUTPATIENT)
Dept: REHABILITATION | Facility: HOSPITAL | Age: 83
End: 2023-06-30
Payer: MEDICARE

## 2023-06-30 VITALS — WEIGHT: 225 LBS | HEIGHT: 71 IN | BODY MASS INDEX: 31.5 KG/M2

## 2023-06-30 DIAGNOSIS — M18.12 PRIMARY OSTEOARTHRITIS OF FIRST CARPOMETACARPAL JOINT OF LEFT HAND: ICD-10-CM

## 2023-06-30 DIAGNOSIS — M18.12 PRIMARY OSTEOARTHRITIS OF FIRST CARPOMETACARPAL JOINT OF LEFT HAND: Primary | ICD-10-CM

## 2023-06-30 DIAGNOSIS — R29.898 WEAKNESS OF LEFT HAND: ICD-10-CM

## 2023-06-30 DIAGNOSIS — M79.645 PAIN OF LEFT THUMB: ICD-10-CM

## 2023-06-30 PROCEDURE — 1160F PR REVIEW ALL MEDS BY PRESCRIBER/CLIN PHARMACIST DOCUMENTED: ICD-10-PCS | Mod: CPTII,S$GLB,, | Performed by: PHYSICIAN ASSISTANT

## 2023-06-30 PROCEDURE — 3288F FALL RISK ASSESSMENT DOCD: CPT | Mod: CPTII,S$GLB,, | Performed by: PHYSICIAN ASSISTANT

## 2023-06-30 PROCEDURE — 97110 THERAPEUTIC EXERCISES: CPT | Mod: PO

## 2023-06-30 PROCEDURE — 99999 PR PBB SHADOW E&M-EST. PATIENT-LVL III: CPT | Mod: PBBFAC,,, | Performed by: PHYSICIAN ASSISTANT

## 2023-06-30 PROCEDURE — 1125F PR PAIN SEVERITY QUANTIFIED, PAIN PRESENT: ICD-10-PCS | Mod: CPTII,S$GLB,, | Performed by: PHYSICIAN ASSISTANT

## 2023-06-30 PROCEDURE — 1125F AMNT PAIN NOTED PAIN PRSNT: CPT | Mod: CPTII,S$GLB,, | Performed by: PHYSICIAN ASSISTANT

## 2023-06-30 PROCEDURE — 99213 PR OFFICE/OUTPT VISIT, EST, LEVL III, 20-29 MIN: ICD-10-PCS | Mod: S$GLB,,, | Performed by: PHYSICIAN ASSISTANT

## 2023-06-30 PROCEDURE — 97165 OT EVAL LOW COMPLEX 30 MIN: CPT | Mod: PO

## 2023-06-30 PROCEDURE — 97760 ORTHOTIC MGMT&TRAING 1ST ENC: CPT | Mod: PO

## 2023-06-30 PROCEDURE — 99999 PR PBB SHADOW E&M-EST. PATIENT-LVL III: ICD-10-PCS | Mod: PBBFAC,,, | Performed by: PHYSICIAN ASSISTANT

## 2023-06-30 PROCEDURE — 1159F MED LIST DOCD IN RCRD: CPT | Mod: CPTII,S$GLB,, | Performed by: PHYSICIAN ASSISTANT

## 2023-06-30 PROCEDURE — 1160F RVW MEDS BY RX/DR IN RCRD: CPT | Mod: CPTII,S$GLB,, | Performed by: PHYSICIAN ASSISTANT

## 2023-06-30 PROCEDURE — 1101F PT FALLS ASSESS-DOCD LE1/YR: CPT | Mod: CPTII,S$GLB,, | Performed by: PHYSICIAN ASSISTANT

## 2023-06-30 PROCEDURE — 1159F PR MEDICATION LIST DOCUMENTED IN MEDICAL RECORD: ICD-10-PCS | Mod: CPTII,S$GLB,, | Performed by: PHYSICIAN ASSISTANT

## 2023-06-30 PROCEDURE — 3288F PR FALLS RISK ASSESSMENT DOCUMENTED: ICD-10-PCS | Mod: CPTII,S$GLB,, | Performed by: PHYSICIAN ASSISTANT

## 2023-06-30 PROCEDURE — 1101F PR PT FALLS ASSESS DOC 0-1 FALLS W/OUT INJ PAST YR: ICD-10-PCS | Mod: CPTII,S$GLB,, | Performed by: PHYSICIAN ASSISTANT

## 2023-06-30 PROCEDURE — 1157F ADVNC CARE PLAN IN RCRD: CPT | Mod: CPTII,S$GLB,, | Performed by: PHYSICIAN ASSISTANT

## 2023-06-30 PROCEDURE — 99213 OFFICE O/P EST LOW 20 MIN: CPT | Mod: S$GLB,,, | Performed by: PHYSICIAN ASSISTANT

## 2023-06-30 PROCEDURE — 1157F PR ADVANCE CARE PLAN OR EQUIV PRESENT IN MEDICAL RECORD: ICD-10-PCS | Mod: CPTII,S$GLB,, | Performed by: PHYSICIAN ASSISTANT

## 2023-06-30 NOTE — PROGRESS NOTES
Please see POC note for Eval results, tx performed this date, and D/C status    AMIE Garrison/ALISSON

## 2023-06-30 NOTE — PROGRESS NOTES
6/30/2023    HPI:  Korey Santos is a 82 y.o. male, who presents to clinic today for continued evaluation of his left hand pain, and states a mass has appeared of his hand.  States pain on average is 4/10.  States pain is 0/10 at rest.  States he continues to have swelling and pain of the 1st CMC joint.  States he recently noticed a small mass of the palm of the hand.  Denies any other complaints at this time.    PMHX:  Past Medical History:   Diagnosis Date    Arthritis     Back pain, chronic     BPH with urinary obstruction     Chronic constipation     Closed fracture of right shoulder 1/28/2021    Closed nondisplaced fracture of greater tuberosity of right humerus 2/24/2021    Colon polyp     DJD (degenerative joint disease)     Hip    Hyperlipidemia     Hypertension     Laryngopharyngeal reflux     Left inguinal hernia     Lumbar herniated disc     Lumbar stenosis     Neck mass 10/7/2014    -4/29/2022 path - lipoma    OA (osteoarthritis) of knee     Bilateral    Paradoxical insomnia     Sinus trouble        PSHX:  Past Surgical History:   Procedure Laterality Date    BACK SURGERY  2014    COLONOSCOPY      COLONOSCOPY N/A 5/17/2023    Procedure: COLONOSCOPY;  Surgeon: Christiano Vazquez MD;  Location: [facility] ENDO;  Service: Endoscopy;  Laterality: N/A;  Constipation 2 day prep.Instructions to portal.EC    EPIDURAL STEROID INJECTION INTO LUMBAR SPINE N/A 7/5/2022    Procedure: Injection-steroid-epidural-lumbar L3-4;  Surgeon: Yury Crum Jr., MD;  Location: [facility] PAIN MGT;  Service: Pain Management;  Laterality: N/A;  oral sedation   asa      JOINT REPLACEMENT Left 05/04/2018    KNEE SURGERY      left hand      LEG SURGERY      SPINE SURGERY      UPPER GASTROINTESTINAL ENDOSCOPY         FMHX:  Family History   Problem Relation Age of Onset    Cancer Father         lung or smoking    No Known Problems Mother     No Known Problems Sister     No Known Problems Brother     No Known Problems Maternal Aunt     No  Known Problems Maternal Uncle     No Known Problems Paternal Aunt     No Known Problems Paternal Uncle     No Known Problems Maternal Grandmother     No Known Problems Maternal Grandfather     No Known Problems Paternal Grandmother     No Known Problems Paternal Grandfather     No Known Problems Daughter     No Known Problems Son     Glaucoma Neg Hx     Diabetes Neg Hx     Amblyopia Neg Hx     Blindness Neg Hx     Cataracts Neg Hx     Hypertension Neg Hx     Macular degeneration Neg Hx     Retinal detachment Neg Hx     Strabismus Neg Hx     Stroke Neg Hx     Thyroid disease Neg Hx     Colon cancer Neg Hx     Esophageal cancer Neg Hx        SOCHX:  Social History     Tobacco Use    Smoking status: Never    Smokeless tobacco: Never   Substance Use Topics    Alcohol use: Yes     Alcohol/week: 0.0 standard drinks     Comment: approximately 2 beers weekly       ALLERGIES:  Clindamycin and Lisinopril    CURRENT MEDICATIONS:  Current Outpatient Medications on File Prior to Visit   Medication Sig Dispense Refill    azelastine (ASTELIN) 137 mcg (0.1 %) nasal spray 1 spray (137 mcg total) by Nasal route 2 (two) times daily as needed for Rhinitis. 30 mL 0    cyclobenzaprine (FLEXERIL) 5 MG tablet Take 1 tablet (5 mg total) by mouth nightly. 90 tablet 0    finasteride (PROSCAR) 5 mg tablet Take 1 tablet (5 mg total) by mouth once daily. 90 tablet 3    fluticasone propionate (FLONASE) 50 mcg/actuation nasal spray 1 spray (50 mcg total) by Each Nostril route once daily. 16 g 6    hydrOXYzine HCL (ATARAX) 25 MG tablet Take 1 tablet (25 mg total) by mouth 3 (three) times daily as needed for Anxiety. 30 tablet 0    linaCLOtide (LINZESS) 145 mcg Cap capsule Take 1 capsule (145 mcg total) by mouth daily as needed (constipation). 90 capsule 3    losartan (COZAAR) 100 MG tablet Take 1 tablet (100 mg total) by mouth once daily. 90 tablet 3    meloxicam (MOBIC) 15 MG tablet Take 1 tablet (15 mg total) by mouth once daily. for 14 days 14  "tablet 0    multivitamin capsule Take 1 capsule by mouth once daily.      polyethylene glycol (GLYCOLAX) 17 gram/dose powder Take 17 g by mouth daily as needed. 510 g 1    pravastatin (PRAVACHOL) 40 MG tablet Take 1 tablet (40 mg total) by mouth once daily. 90 tablet 3    tamsulosin (FLOMAX) 0.4 mg Cap Take 1 capsule (0.4 mg total) by mouth once daily. 90 capsule 4    aspirin (ECOTRIN) 325 MG EC tablet Take 1 tablet (325 mg total) by mouth once daily. (Patient taking differently: Take 325 mg by mouth every other day.) 42 tablet 0     No current facility-administered medications on file prior to visit.       REVIEW OF SYSTEMS:  Review of Systems Complete; Negative, unless noted above.    GENERAL PHYSICAL EXAM:   Ht 5' 11" (1.803 m)   Wt 102.1 kg (225 lb)   BMI 31.38 kg/m²    GEN: well developed, well nourished, no acute distress   PULM: No wheezing, no respiratory distress   CV: RRR    ORTHO EXAM:   Examination of the left hand reveals mild edema over the 1st CMC joint.  No erythema, ecchymosis, or skin breakdown noted.  Able make composite fist and fully extend all fingers.  Tenderness palpation of the 1st CMC joint.  Positive grind test of the 1st CMC joint.  No fluctuance, drainage, purulence, or any other signs of infection.  5/5 /intrinsic strength.  Presence of a small well-rounded 1 cm x 1 cm soft mass over the base of the 1st webspace over the palm.  Mild tenderness palpation of the mass.  Normal sensation in the radial, ulnar, median nerve distributions.  Capillary refill less than 2 seconds in all fingers.    RADIOLOGY:   None.    ASSESSMENT:   Left hand 1st CMC joint osteoarthritis, left hand mass    PLAN:  1. I discussed with Korey Santos the best course of action this time is to continue taking the Mobic as instructed.  We did discuss the importance of wearing the splint at all times only to remove for bathing for allowing the acute flare of arthritis to resolve.  We did discuss we would " monitor of the left hand mass.  We did discuss for any signs of redness, swelling, drainage, purulence, or any other signs of infection to report to nearest emergency department.  He verbally agreed with the treatment plan.      2. I would like him follow-up in clinic in 3 weeks for repeat evaluation.  He was instructed to contact clinic for any problems or concerns in the interim.

## 2023-06-30 NOTE — PLAN OF CARE
[facility]  Occupational Therapy Initial Evaluation and Discharge Note     Name: Korey FOX Yasmeenmala  Clinic Number: [id number]    Therapy Diagnosis:   Encounter Diagnosis   Name Primary?    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  Patient requires occupational therapy to fit him for a meta  splint of left thumb/hand and instruct him on activity modification/avoidance.       Duration:  3 visits     Frequency:  P.r.n.  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     Surgical Procedure and Date: N/A  Chronic  Evaluation Date: 6/30/2023  Insurance Authorization Period Expiration:  Calendar year  Plan of Care Certification Period: 6/30/3023 to 6/30/2023  Date of Return to Referring Provider:: TBD  Visit # / Visits authorized: 1/ 1  FOTO: N/A  Precautions:  standard      Time In:1300  Time Out: 1340  Total Appointment Time (timed & untimed codes): 40 minutes    Subjective     Date of Onset: Chronic    History of Current Condition/Mechanism of Injury: Korey reports: CMC OA L thumb    Involved Side: Left  Dominant Side:  Right      Mechanism of Injury: OA  Surgical Procedure: N/A  Imaging:  See Epic   Prior Therapy: None for hands, has had PT knee     Pain:  Functional Pain Scale Rating 0-10:   4/10 on average  4/10 at best  4/10 at worst  Location: L thumb  Description: Aching  Aggravating Factors: Griip/pinch  Easing Factors: rest     Occupation:  retried    Functional Limitations/Social History:    Previous functional status includes: Independent with all ADLs.     Current Functional Status   Home/Living environment:  family        Limitation of Functional Status as follows:   ADLs/IADLs:  Min to Mod,               Leisure:  weightlifting    Patient's Goals for Therapy: Get brace to help with L thumb pain.    Past Medical History/Physical Systems Review:   Korey Santos  has a past medical history of Arthritis, Back pain, chronic, BPH with urinary obstruction, Chronic constipation, Closed fracture of right shoulder, Closed nondisplaced fracture of greater tuberosity of right humerus, Colon polyp, DJD (degenerative joint disease), Hyperlipidemia, Hypertension, Laryngopharyngeal reflux, Left inguinal hernia, Lumbar herniated disc, Lumbar stenosis, Neck mass, OA (osteoarthritis) of knee, Paradoxical insomnia, and Sinus trouble.    Korey Santos  has a past surgical history that includes left hand; Back surgery (2014); Leg Surgery; Knee surgery; Colonoscopy; Upper gastrointestinal endoscopy; Joint replacement (Left, 05/04/2018); Spine surgery; Epidural steroid injection into lumbar spine (N/A, 7/5/2022); and Colonoscopy (N/A, 5/17/2023).    Korey has a current medication list which includes the following prescription(s): aspirin, azelastine, cyclobenzaprine, finasteride, fluticasone propionate, hydroxyzine hcl, linaclotide, losartan, meloxicam, multivitamin, polyethylene glycol, pravastatin, and tamsulosin.    Review of patient's allergies indicates:   Allergen Reactions    Clindamycin Swelling     Throat swells    Lisinopril Swelling and Other (See Comments)     Throat swelling      Observation/Inspection/Wound/Incision/Scar   Mild to moderate edema, skin intact LUE    Sensation: Grossly WNL,    Edema:          Circumferential (in cm) L R   Proximal Wrist Crease NT NT   MPs 22.5 NT   Mid P1 of  Long Finger NT NT      AROM Hand: Tip to palm/DPC digits 1-5 (in cm) Grossly WNL    AROM Wrist/Forearm: grossly WNL  Manual Muscle Test: NT                                       and Pinch Strength: NTat this time due to condition/Dx    .  Special and/or Standardized Tests:  NT      Treatment     Treatment Time In:  1315  Treatment  Time Out:  1340  Total Treatment time separate from Evaluation time:25 minutes.    Korey received therapeutic exercises for 8 minutes including: Initial Home Exercise Program Instruction.  Gentle thumb, wrist and hand AROM with handout    Ed/ADL: Joint protection, OA 2 min with handout    Fitment for Push Metagrip L3 Train on wear/care and precautions 15 min  with handout     Home Exercise Program/Education:  Issued HEP (see patient instructions in EMR) and educated on modality use for pain management . Exercises were reviewed and Korey was able to demonstrate them prior to the end of the session.   Pt received a written copy of exercises to perform at home. Korey demonstrated good  understanding of the education provided.  Pt was advised to perform these exercises free of pain, and to stop performing them if pain occurs.    Patient/Family Education: role of OT, goals for OT, scheduling/cancellations - pt verbalized understanding.     Additional Education provided: Dycem use, heat use am and cold use pm     Assessment     Korey Santos is a 82 y.o. male referred to outpatient occupational therapy and presents with a medical diagnosis of L CMC OA.    Following medical record review it is determined that pt will benefit from occupational therapy services in order to maximize pain free and/or functional use of left  bilateral  UE). The following goals were discussed with the patient and patient is in agreement with them as to be addressed in the treatment plan. The patient's rehab potential is Good good.     Anticipated barriers to occupational therapy:  None.    Plan of care discussed with patient:   Yes  Patient's spiritual, cultural and educational needs considered and patient is agreeable to the plan of care and goals as stated below:     Medical Necessity is demonstrated by the following  Occupational Profile/History  Co-morbidities and personal factors that may impact the plan of care [x] LOW: Brief chart  review  [] MODERATE: Expanded chart review   [] HIGH: Extensive chart review    Moderate / High Support Documentation:    Examination  Performance deficits relating to physical, cognitive or psychosocial skills that result in activity limitations and/or participation restrictions  [x] LOW: addressing 1-3 Performance deficits  [] MODERATE: 3-5 Performance deficits  [] HIGH: 5+ Performance deficits (please support below)    Moderate / High Support Documentation: ist    Physical:  Pain  Pain  Cognitive:  No Deficits    Psychosocial:    No Deficits     Treatment Options [x] LOW: Limited options  [] MODERATE: Several options  [] HIGH: Multiple options      Decision Making/ Complexity Score: low         The following goals were discussed with the patient and patient is in agreement with them as to be addressed in the treatment plan.     Goals:     Short Term Goals and Long Term Goals: (by discharge)  1. Pt will be ind with conservative mgmt recommendations and brace wear and care MET    Plan   Certification Period/Plan of care expiration:  6/30/2023 to  9/28/2023 with POC expiring on 6/30/2023    Outpatient Occupational Therapy 1 visit for::      orthotic fitting/fabrication/training  PRN, joint protections/energry conservation/adaptive equipment/activity modification HEP/education as well as any other treatments deemed necessary based on the patient's needs or progress.     I CERTIFY THE NEED FOR THESE SERVICES FURNISHED UNDER THIS PLAN OF TREATMENT AND WHILE UNDER MY CARE       Pt has met all goals, is pleased with progress, and is ready for D/C at this time.    Pt for D/C from OT services this date. To to cont HEP as tolerated and will follow up with referring provider for any further tx needs.

## 2023-07-19 ENCOUNTER — TELEPHONE (OUTPATIENT)
Dept: INTERNAL MEDICINE | Facility: CLINIC | Age: 83
End: 2023-07-19
Payer: MEDICARE

## 2023-07-19 ENCOUNTER — OFFICE VISIT (OUTPATIENT)
Dept: INTERNAL MEDICINE | Facility: CLINIC | Age: 83
End: 2023-07-19
Payer: MEDICARE

## 2023-07-19 ENCOUNTER — TELEPHONE (OUTPATIENT)
Dept: INTERNAL MEDICINE | Facility: CLINIC | Age: 83
End: 2023-07-19

## 2023-07-19 ENCOUNTER — HOSPITAL ENCOUNTER (OUTPATIENT)
Dept: RADIOLOGY | Facility: HOSPITAL | Age: 83
Discharge: HOME | End: 2023-07-19
Attending: INTERNAL MEDICINE
Payer: MEDICARE

## 2023-07-19 VITALS — DIASTOLIC BLOOD PRESSURE: 80 MMHG | HEART RATE: 76 BPM | SYSTOLIC BLOOD PRESSURE: 138 MMHG

## 2023-07-19 DIAGNOSIS — R13.10 DYSPHAGIA, UNSPECIFIED TYPE: Primary | ICD-10-CM

## 2023-07-19 DIAGNOSIS — M25.532 LEFT WRIST PAIN: ICD-10-CM

## 2023-07-19 DIAGNOSIS — I10 HYPERTENSION, ESSENTIAL: ICD-10-CM

## 2023-07-19 DIAGNOSIS — R68.89 THROAT SYMPTOM: ICD-10-CM

## 2023-07-19 DIAGNOSIS — J30.9 ALLERGIC RHINITIS, UNSPECIFIED SEASONALITY, UNSPECIFIED TRIGGER: ICD-10-CM

## 2023-07-19 PROBLEM — Z91.199 NONCOMPLIANCE: Status: RESOLVED | Noted: 2019-08-29 | Resolved: 2023-07-19

## 2023-07-19 PROBLEM — M54.2 NECK PAIN: Status: RESOLVED | Noted: 2023-01-04 | Resolved: 2023-07-19

## 2023-07-19 LAB
CTP QC/QA: YES
SARS-COV-2 RDRP RESP QL NAA+PROBE: NEGATIVE

## 2023-07-19 PROCEDURE — 1157F ADVNC CARE PLAN IN RCRD: CPT | Mod: HCNC,CPTII,S$GLB, | Performed by: INTERNAL MEDICINE

## 2023-07-19 PROCEDURE — 1157F PR ADVANCE CARE PLAN OR EQUIV PRESENT IN MEDICAL RECORD: ICD-10-PCS | Mod: HCNC,CPTII,S$GLB, | Performed by: INTERNAL MEDICINE

## 2023-07-19 PROCEDURE — 87635 SARS-COV-2 COVID-19 AMP PRB: CPT | Mod: QW,HCNC,S$GLB, | Performed by: INTERNAL MEDICINE

## 2023-07-19 PROCEDURE — 73100 X-RAY EXAM OF WRIST: CPT | Mod: TC,HCNC,LT

## 2023-07-19 PROCEDURE — 99214 OFFICE O/P EST MOD 30 MIN: CPT | Mod: HCNC,S$GLB,, | Performed by: INTERNAL MEDICINE

## 2023-07-19 PROCEDURE — 73130 X-RAY EXAM OF HAND: CPT | Mod: 26,HCNC,LT, | Performed by: RADIOLOGY

## 2023-07-19 PROCEDURE — 73100 XR WRIST 2 VIEW LEFT: ICD-10-PCS | Mod: 26,HCNC,LT, | Performed by: RADIOLOGY

## 2023-07-19 PROCEDURE — 99999 PR PBB SHADOW E&M-EST. PATIENT-LVL V: ICD-10-PCS | Mod: PBBFAC,HCNC,, | Performed by: INTERNAL MEDICINE

## 2023-07-19 PROCEDURE — 99999 PR PBB SHADOW E&M-EST. PATIENT-LVL V: CPT | Mod: PBBFAC,HCNC,, | Performed by: INTERNAL MEDICINE

## 2023-07-19 PROCEDURE — 1159F MED LIST DOCD IN RCRD: CPT | Mod: HCNC,CPTII,S$GLB, | Performed by: INTERNAL MEDICINE

## 2023-07-19 PROCEDURE — 1101F PR PT FALLS ASSESS DOC 0-1 FALLS W/OUT INJ PAST YR: ICD-10-PCS | Mod: HCNC,CPTII,S$GLB, | Performed by: INTERNAL MEDICINE

## 2023-07-19 PROCEDURE — 73130 X-RAY EXAM OF HAND: CPT | Mod: TC,HCNC,LT

## 2023-07-19 PROCEDURE — 1101F PT FALLS ASSESS-DOCD LE1/YR: CPT | Mod: HCNC,CPTII,S$GLB, | Performed by: INTERNAL MEDICINE

## 2023-07-19 PROCEDURE — 1159F PR MEDICATION LIST DOCUMENTED IN MEDICAL RECORD: ICD-10-PCS | Mod: HCNC,CPTII,S$GLB, | Performed by: INTERNAL MEDICINE

## 2023-07-19 PROCEDURE — 3079F PR MOST RECENT DIASTOLIC BLOOD PRESSURE 80-89 MM HG: ICD-10-PCS | Mod: HCNC,CPTII,S$GLB, | Performed by: INTERNAL MEDICINE

## 2023-07-19 PROCEDURE — 3075F PR MOST RECENT SYSTOLIC BLOOD PRESS GE 130-139MM HG: ICD-10-PCS | Mod: HCNC,CPTII,S$GLB, | Performed by: INTERNAL MEDICINE

## 2023-07-19 PROCEDURE — 73100 X-RAY EXAM OF WRIST: CPT | Mod: 26,HCNC,LT, | Performed by: RADIOLOGY

## 2023-07-19 PROCEDURE — 73130 XR HAND COMPLETE 3 VIEW LEFT: ICD-10-PCS | Mod: 26,HCNC,LT, | Performed by: RADIOLOGY

## 2023-07-19 PROCEDURE — 87635: ICD-10-PCS | Mod: QW,HCNC,S$GLB, | Performed by: INTERNAL MEDICINE

## 2023-07-19 PROCEDURE — 3288F PR FALLS RISK ASSESSMENT DOCUMENTED: ICD-10-PCS | Mod: HCNC,CPTII,S$GLB, | Performed by: INTERNAL MEDICINE

## 2023-07-19 PROCEDURE — 3075F SYST BP GE 130 - 139MM HG: CPT | Mod: HCNC,CPTII,S$GLB, | Performed by: INTERNAL MEDICINE

## 2023-07-19 PROCEDURE — 3288F FALL RISK ASSESSMENT DOCD: CPT | Mod: HCNC,CPTII,S$GLB, | Performed by: INTERNAL MEDICINE

## 2023-07-19 PROCEDURE — 3079F DIAST BP 80-89 MM HG: CPT | Mod: HCNC,CPTII,S$GLB, | Performed by: INTERNAL MEDICINE

## 2023-07-19 PROCEDURE — 99214 PR OFFICE/OUTPT VISIT, EST, LEVL IV, 30-39 MIN: ICD-10-PCS | Mod: HCNC,S$GLB,, | Performed by: INTERNAL MEDICINE

## 2023-07-19 RX ORDER — FLUTICASONE PROPIONATE 50 MCG
1 SPRAY, SUSPENSION (ML) NASAL DAILY
Qty: 16 G | Refills: 6 | Status: SHIPPED | OUTPATIENT
Start: 2023-07-19

## 2023-07-19 NOTE — TELEPHONE ENCOUNTER
----- Message from Yeny Hairston sent at 7/19/2023  9:42 AM CDT -----    Patient Returning Call        Who Called:pt  Does the patient know what this is regarding?:flat tire running late  Would the patient rather a call back or a response via [facility]? call  Best Call Back Number:[phone]  Additional Information: call back

## 2023-07-19 NOTE — TELEPHONE ENCOUNTER
----- Message from Kate Lo sent at 7/19/2023  2:06 PM CDT -----  Regarding: pt advise  Contact: pt  Type:  Pt Advice    Who Called: PT  Would the patient rather a call back or a response via [facility]?   Best Call Back Number: [phone]  Additional Information: pt would like to speak w/ you regarding visit w/ RUSLAN Burns,.. Please call to advise, Thank You

## 2023-07-19 NOTE — Clinical Note
FYI- he's a trip!  Did not look acutely ill, I did order a thyroid ultrasound and some x-rays of his wrist, he may need further GI assessment if the dysphagia continues.

## 2023-07-19 NOTE — TELEPHONE ENCOUNTER
Spoke to pt. Pt is upset about ortho apoferment being two months out. Informed pt that our first appointment for ortho is still showing October, pt stated that is insane and to long of a time to wait. Informed pt to reach out to ortho directly for scheduling.

## 2023-07-24 ENCOUNTER — HOSPITAL ENCOUNTER (OUTPATIENT)
Dept: RADIOLOGY | Facility: HOSPITAL | Age: 83
Discharge: HOME | End: 2023-07-24
Attending: INTERNAL MEDICINE
Payer: MEDICARE

## 2023-07-24 ENCOUNTER — TELEPHONE (OUTPATIENT)
Dept: ORTHOPEDICS | Facility: CLINIC | Age: 83
End: 2023-07-24
Payer: MEDICARE

## 2023-07-24 DIAGNOSIS — R13.10 DYSPHAGIA, UNSPECIFIED TYPE: ICD-10-CM

## 2023-07-24 PROCEDURE — 76536 US EXAM OF HEAD AND NECK: CPT | Mod: TC,HCNC,PO

## 2023-07-24 PROCEDURE — 76536 US SOFT TISSUE HEAD NECK THYROID: ICD-10-PCS | Mod: 26,HCNC,, | Performed by: RADIOLOGY

## 2023-07-24 PROCEDURE — 76536 US EXAM OF HEAD AND NECK: CPT | Mod: 26,HCNC,, | Performed by: RADIOLOGY

## 2023-07-24 NOTE — TELEPHONE ENCOUNTER
----- Message from Hazel Encarnacion sent at 7/24/2023  2:43 PM CDT -----  Name of Who is Calling:  KYRA PEREZ [[id number]]   What is the request in detail:pt stated that he would like to speak with the office directly in regards to his questions/concerns about waiting 2 months for an appt.Please contact to further discuss and advise.    Can the clinic reply by [facility]:[phone]     What Number to Call Back if not in [facility]:[phone]      LATE ENTRY:   we spoke yesterday  I offered him numerous appointments , none to his liking - he strictly wants Rick/Fay   I told him I'd check in the am for any cancels - that's his only bet at getting in sooner than 1 month or two , he finally agreed to that  I also told him how to book appointments via the portal, cancels come in everyday  we just have to be fast to get them

## 2023-07-24 NOTE — LETTER
August 28, 2023    Korey Santos  [address]  [place] [address]             [facility]  [address]  [facility] [address]  Phone: [phone] Dear Mr. Santos:    Your thyroid ultrasound does show a couple of thyroid nodules that are small and unlikely to be of any worrisome significance, but I would recommend having them be monitored periodically.  I have forwarded your results to your primary care physician.  In terms of the swallowing issues, some additional GI studies may be necessary.  Please follow-up with Dr. Bustamante.  If you have any questions, do not hesitate to reach out to the office.      This information is also available to you on your my [facility] patient portal.    Sincerely,         Meera Melton MD

## 2023-07-25 PROBLEM — E04.2 MULTIPLE THYROID NODULES: Status: ACTIVE | Noted: 2023-07-25

## 2023-07-28 ENCOUNTER — TELEPHONE (OUTPATIENT)
Dept: INTERNAL MEDICINE | Facility: CLINIC | Age: 83
End: 2023-07-28
Payer: MEDICARE

## 2023-07-28 DIAGNOSIS — R13.14 DYSPHAGIA, PHARYNGOESOPHAGEAL: Primary | ICD-10-CM

## 2023-07-28 NOTE — TELEPHONE ENCOUNTER
----- Message from Maria Eugenia Prince sent at 7/28/2023 10:55 AM CDT -----  Regarding: Test results  .Type:  Test Results    Who Called: PT    Name of Test (Lab/Mammo/Etc):     Date of Test: 7/24/2023    Ordering Provider: VALENTIN QUEVEDO     Where the test was performed[place] [address] [place] [address]    Would the patient rather a call back or a response via [facility]? Call back     Best Call Back Number: [phone]    Additional Information:  Pt requesting call back states he have called numerous of times for results

## 2023-07-28 NOTE — TELEPHONE ENCOUNTER
Called and discussed test results and recommendations with the pt. The pt expressed understanding.     Pt was not happy with the lack of follow up from the ordering physicians office. I apologized to the pt for the delay in care and I informed him that I would send the message over to Dr. Bustamante so that he could order the necessary test to try and determine when the pt's dysphagia is coming from.     Would you like to order an Upper GI or Swallow Study?

## 2023-07-31 ENCOUNTER — OFFICE VISIT (OUTPATIENT)
Dept: ORTHOPEDICS | Facility: CLINIC | Age: 83
End: 2023-07-31
Payer: MEDICARE

## 2023-07-31 VITALS — BODY MASS INDEX: 31.5 KG/M2 | WEIGHT: 225 LBS | HEIGHT: 71 IN

## 2023-07-31 DIAGNOSIS — M25.532 LEFT WRIST PAIN: ICD-10-CM

## 2023-07-31 DIAGNOSIS — M18.12 ARTHRITIS OF CARPOMETACARPAL (CMC) JOINT OF LEFT THUMB: Primary | ICD-10-CM

## 2023-07-31 PROCEDURE — 1157F PR ADVANCE CARE PLAN OR EQUIV PRESENT IN MEDICAL RECORD: ICD-10-PCS | Mod: HCNC,CPTII,S$GLB, | Performed by: ORTHOPAEDIC SURGERY

## 2023-07-31 PROCEDURE — 3288F FALL RISK ASSESSMENT DOCD: CPT | Mod: HCNC,CPTII,S$GLB, | Performed by: ORTHOPAEDIC SURGERY

## 2023-07-31 PROCEDURE — 1101F PR PT FALLS ASSESS DOC 0-1 FALLS W/OUT INJ PAST YR: ICD-10-PCS | Mod: HCNC,CPTII,S$GLB, | Performed by: ORTHOPAEDIC SURGERY

## 2023-07-31 PROCEDURE — 1159F PR MEDICATION LIST DOCUMENTED IN MEDICAL RECORD: ICD-10-PCS | Mod: HCNC,CPTII,S$GLB, | Performed by: ORTHOPAEDIC SURGERY

## 2023-07-31 PROCEDURE — 99999 PR PBB SHADOW E&M-EST. PATIENT-LVL III: CPT | Mod: PBBFAC,HCNC,, | Performed by: ORTHOPAEDIC SURGERY

## 2023-07-31 PROCEDURE — 1157F ADVNC CARE PLAN IN RCRD: CPT | Mod: HCNC,CPTII,S$GLB, | Performed by: ORTHOPAEDIC SURGERY

## 2023-07-31 PROCEDURE — 1101F PT FALLS ASSESS-DOCD LE1/YR: CPT | Mod: HCNC,CPTII,S$GLB, | Performed by: ORTHOPAEDIC SURGERY

## 2023-07-31 PROCEDURE — 99213 PR OFFICE/OUTPT VISIT, EST, LEVL III, 20-29 MIN: ICD-10-PCS | Mod: HCNC,25,S$GLB, | Performed by: ORTHOPAEDIC SURGERY

## 2023-07-31 PROCEDURE — 20600 SMALL JOINT ASPIRATION/INJECTION: L THUMB CMC: ICD-10-PCS | Mod: HCNC,LT,S$GLB, | Performed by: ORTHOPAEDIC SURGERY

## 2023-07-31 PROCEDURE — 1125F PR PAIN SEVERITY QUANTIFIED, PAIN PRESENT: ICD-10-PCS | Mod: HCNC,CPTII,S$GLB, | Performed by: ORTHOPAEDIC SURGERY

## 2023-07-31 PROCEDURE — 1159F MED LIST DOCD IN RCRD: CPT | Mod: HCNC,CPTII,S$GLB, | Performed by: ORTHOPAEDIC SURGERY

## 2023-07-31 PROCEDURE — 1125F AMNT PAIN NOTED PAIN PRSNT: CPT | Mod: HCNC,CPTII,S$GLB, | Performed by: ORTHOPAEDIC SURGERY

## 2023-07-31 PROCEDURE — 99213 OFFICE O/P EST LOW 20 MIN: CPT | Mod: HCNC,25,S$GLB, | Performed by: ORTHOPAEDIC SURGERY

## 2023-07-31 PROCEDURE — 3288F PR FALLS RISK ASSESSMENT DOCUMENTED: ICD-10-PCS | Mod: HCNC,CPTII,S$GLB, | Performed by: ORTHOPAEDIC SURGERY

## 2023-07-31 PROCEDURE — 99999 PR PBB SHADOW E&M-EST. PATIENT-LVL III: ICD-10-PCS | Mod: PBBFAC,HCNC,, | Performed by: ORTHOPAEDIC SURGERY

## 2023-07-31 PROCEDURE — 20600 DRAIN/INJ JOINT/BURSA W/O US: CPT | Mod: HCNC,LT,S$GLB, | Performed by: ORTHOPAEDIC SURGERY

## 2023-07-31 RX ADMIN — TRIAMCINOLONE ACETONIDE 40 MG: 40 INJECTION, SUSPENSION INTRA-ARTICULAR; INTRAMUSCULAR at 01:07

## 2023-07-31 NOTE — PROGRESS NOTES
Mr Witt returns to clinic today.  Has a history of left thumb base pain.  He is tried splinting and anti-inflammatory which only provided temporary relief.  He has been struggling to get back in to see us to have something further done.  He has had x-rays of the wrist in the hand.  He also states that recently he had ultrasound of his hand.  He is here today for further evaluation and treatment     Physical exam: Examination left hand and wrist reveals that there are no major skin changes.  He does have mild edema overlying the thumb CMC joint.  Palpation over that area does produce tenderness and crepitance.  He does have a positive grind test.  He does have sensation which is grossly intact and capillary refill less than 2 seconds     Radiology:  X-rays of the left hand have been reviewed.  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  Has mild degenerative changes throughout other joints as well.      Assessment: Left thumb CMC arthritis     Plan:    1.  After informed consent was obtained and injection was placed to the left thumb CMC joint.  The patient tolerated that well    2. Will follow up with me as needed

## 2023-07-31 NOTE — TELEPHONE ENCOUNTER
Please call patient and explained that I recommend a consult with Gastroenterology to look into his swallowing problem.  They would determine what the next best course of action would be, either upper scope or something else.  I believe he lives in [facility] location would be best for gastroenterology consult

## 2023-07-31 NOTE — TELEPHONE ENCOUNTER
Called the pt to explain the next steps. I scheduled the pt for an appt with Homer Silva. I left the appt information on the pt's VM. I will also place an appt reminder in the mail.

## 2023-08-05 RX ORDER — TRIAMCINOLONE ACETONIDE 40 MG/ML
40 INJECTION, SUSPENSION INTRA-ARTICULAR; INTRAMUSCULAR
Status: DISCONTINUED | OUTPATIENT
Start: 2023-07-31 | End: 2023-08-05 | Stop reason: HOSPADM

## 2023-08-05 NOTE — PROCEDURES
Small Joint Aspiration/Injection: L thumb CMC    Date/Time: 7/31/2023 1:00 PM    Performed by: Maciej Bergeron MD  Authorized by: Maciej Bergeron MD    Consent Done?:  Yes (Verbal)  Indications:  Pain  Site marked: the procedure site was marked    Timeout: prior to procedure the correct patient, procedure, and site was verified    Local anesthetic:  Topical anesthetic  Location:  Thumb  Site:  L thumb CMC (Left thumb CMC joint)  Ultrasonic guidance for needle placement?: No    Needle size:  25 G  Medications:  40 mg triamcinolone acetonide 40 mg/mL; 40 mg triamcinolone acetonide 40 mg/mL (20 mg injected)  Patient tolerance:  Patient tolerated the procedure well with no immediate complications

## 2023-08-07 ENCOUNTER — OFFICE VISIT (OUTPATIENT)
Dept: GASTROENTEROLOGY | Facility: CLINIC | Age: 83
End: 2023-08-07
Payer: MEDICARE

## 2023-08-07 VITALS — BODY MASS INDEX: 33.02 KG/M2 | WEIGHT: 235.88 LBS | HEIGHT: 71 IN

## 2023-08-07 DIAGNOSIS — Z86.010 PERSONAL HISTORY OF COLONIC POLYPS: ICD-10-CM

## 2023-08-07 DIAGNOSIS — R13.14 DYSPHAGIA, PHARYNGOESOPHAGEAL: Primary | ICD-10-CM

## 2023-08-07 PROCEDURE — 3288F FALL RISK ASSESSMENT DOCD: CPT | Mod: HCNC,CPTII,S$GLB, | Performed by: INTERNAL MEDICINE

## 2023-08-07 PROCEDURE — 99204 PR OFFICE/OUTPT VISIT, NEW, LEVL IV, 45-59 MIN: ICD-10-PCS | Mod: HCNC,S$GLB,, | Performed by: INTERNAL MEDICINE

## 2023-08-07 PROCEDURE — 99204 OFFICE O/P NEW MOD 45 MIN: CPT | Mod: HCNC,S$GLB,, | Performed by: INTERNAL MEDICINE

## 2023-08-07 PROCEDURE — 1126F PR PAIN SEVERITY QUANTIFIED, NO PAIN PRESENT: ICD-10-PCS | Mod: HCNC,CPTII,S$GLB, | Performed by: INTERNAL MEDICINE

## 2023-08-07 PROCEDURE — 1157F PR ADVANCE CARE PLAN OR EQUIV PRESENT IN MEDICAL RECORD: ICD-10-PCS | Mod: HCNC,CPTII,S$GLB, | Performed by: INTERNAL MEDICINE

## 2023-08-07 PROCEDURE — 1101F PT FALLS ASSESS-DOCD LE1/YR: CPT | Mod: HCNC,CPTII,S$GLB, | Performed by: INTERNAL MEDICINE

## 2023-08-07 PROCEDURE — 1157F ADVNC CARE PLAN IN RCRD: CPT | Mod: HCNC,CPTII,S$GLB, | Performed by: INTERNAL MEDICINE

## 2023-08-07 PROCEDURE — 99999 PR PBB SHADOW E&M-EST. PATIENT-LVL IV: CPT | Mod: PBBFAC,HCNC,, | Performed by: INTERNAL MEDICINE

## 2023-08-07 PROCEDURE — 99999 PR PBB SHADOW E&M-EST. PATIENT-LVL IV: ICD-10-PCS | Mod: PBBFAC,HCNC,, | Performed by: INTERNAL MEDICINE

## 2023-08-07 PROCEDURE — 1126F AMNT PAIN NOTED NONE PRSNT: CPT | Mod: HCNC,CPTII,S$GLB, | Performed by: INTERNAL MEDICINE

## 2023-08-07 PROCEDURE — 3288F PR FALLS RISK ASSESSMENT DOCUMENTED: ICD-10-PCS | Mod: HCNC,CPTII,S$GLB, | Performed by: INTERNAL MEDICINE

## 2023-08-07 PROCEDURE — 1159F PR MEDICATION LIST DOCUMENTED IN MEDICAL RECORD: ICD-10-PCS | Mod: HCNC,CPTII,S$GLB, | Performed by: INTERNAL MEDICINE

## 2023-08-07 PROCEDURE — 1101F PR PT FALLS ASSESS DOC 0-1 FALLS W/OUT INJ PAST YR: ICD-10-PCS | Mod: HCNC,CPTII,S$GLB, | Performed by: INTERNAL MEDICINE

## 2023-08-07 PROCEDURE — 1159F MED LIST DOCD IN RCRD: CPT | Mod: HCNC,CPTII,S$GLB, | Performed by: INTERNAL MEDICINE

## 2023-08-07 RX ORDER — PANTOPRAZOLE SODIUM 40 MG/1
40 TABLET, DELAYED RELEASE ORAL DAILY
Qty: 30 TABLET | Refills: 3 | Status: SHIPPED | OUTPATIENT
Start: 2023-08-07 | End: 2024-01-05

## 2023-08-07 NOTE — PROGRESS NOTES
GASTROENTEROLOGY CLINIC NOTE    Reason for visit: The encounter diagnosis was Dysphagia, pharyngoesophageal.  Referring provider/PCP: Juan Bustamante MD    HPI:  Korey Santos is a 82 y.o. male here today for dysphagia, new patient.    Dysphagia, it seems somewhat intermittent but ongoing.  He had an episode about 1 month ago where he was eating some crab claws and he notes immediately upon putting them into his mouth he felt like he was choking.  He is not sure if he was even trying to swallow them.  He was having coughing and regurgitation from his nose at that time.  He had some swelling it seems like.  Ultimately that episode resolved but he still feels he has a scratchiness in the throat.  He does have morning congestion and morning phlegm, he has been told by an ENT in the past that he has a blocked sinus.  It seems like this causes most of his throat clearing.  He also feels like food is catching when it goes down, he points to above the sternal notch, although he points to this is after the gulping action. Brings up phlegm in back of throat. No nasal regurg otherwise. No coughing on swallowing..  He mentions having recent neck u/s and wondering those results  I read to him the recommendations from the ordering MD off the portal comments, these are sub-cm and nonworrisome.    Prior Endoscopy:  EGD: he thinks many years ago, dont have in our records.  Colon:  5/2023 with me  Impression:            - One 20 mm polyp in the cecum, removed with                          mucosal resection. Resected and retrieved. Clips                          were placed.                          - Three 3 to 7 mm polyps in the ascending colon,                          removed with a cold snare. Resected and retrieved.                          - Diverticulosis in the recto-sigmoid colon, in                          the sigmoid colon and in the descending colon.                          - The examination was  otherwise normal on direct                          and retroflexion views.                          - Mucosal resection was performed. Resection and                          retrieval were complete.   Recommendation:                             - Resume previous diet.                          - Continue present medications.                          - Await pathology results.                          - Repeat colonoscopy in 1 year for surveillance                          after piecemeal polypectomy.                          - Avoid NSAIDs (ibuprofen, aleve, naproxen, BC /                          Goodys, aspirin etc) for 10 days; Aspirin is OK to                          continue if indicated for cardiovascular                          protection.                          - Would have risk / benefit discussion of                          continued surveillance in clinic prior to planning                          next colonoscopy given age.     PATH  TA      (Portions of this note were dictated using voice recognition software and may contain dictation related errors in spelling/grammar/syntax not found on text review)    Review of Systems   Constitutional:  Negative for fever and malaise/fatigue.   Respiratory:  Negative for cough and shortness of breath.    Cardiovascular:  Negative for chest pain and palpitations.   Gastrointestinal:  Negative for abdominal pain, blood in stool, nausea and vomiting.   Neurological:  Negative for dizziness and headaches.       Past Medical History: has a past medical history of Arthritis, Back pain, chronic, BPH with urinary obstruction, Chronic constipation, Closed fracture of right shoulder, Closed nondisplaced fracture of greater tuberosity of right humerus, Colon polyp, DJD (degenerative joint disease), Hyperlipidemia, Hypertension, Laryngopharyngeal reflux, Left inguinal hernia, Lumbar herniated disc, Lumbar stenosis, Neck mass, OA (osteoarthritis) of knee, Paradoxical  insomnia, and Sinus trouble.    Past Surgical History: has a past surgical history that includes left hand; Back surgery (2014); Leg Surgery; Knee surgery; Colonoscopy; Upper gastrointestinal endoscopy; Joint replacement (Left, 05/04/2018); Spine surgery; Epidural steroid injection into lumbar spine (N/A, 7/5/2022); and Colonoscopy (N/A, 5/17/2023).    Family History:family history includes Cancer in his father; No Known Problems in his brother, daughter, maternal aunt, maternal grandfather, maternal grandmother, maternal uncle, mother, paternal aunt, paternal grandfather, paternal grandmother, paternal uncle, sister, and son.    Allergies:   Review of patient's allergies indicates:   Allergen Reactions    Clindamycin Swelling     Throat swells    Lisinopril Swelling and Other (See Comments)     Throat swelling    Shellfish containing products        Social History: reports that he has never smoked. He has never used smokeless tobacco. He reports current alcohol use. He reports that he does not use drugs.    Home medications:   Current Outpatient Medications on File Prior to Visit   Medication Sig Dispense Refill    azelastine (ASTELIN) 137 mcg (0.1 %) nasal spray 1 spray (137 mcg total) by Nasal route 2 (two) times daily as needed for Rhinitis. 30 mL 0    finasteride (PROSCAR) 5 mg tablet Take 1 tablet (5 mg total) by mouth once daily. 90 tablet 3    fluticasone propionate (FLONASE) 50 mcg/actuation nasal spray 1 spray (50 mcg total) by Each Nostril route once daily. 16 g 6    hydrOXYzine HCL (ATARAX) 25 MG tablet Take 1 tablet (25 mg total) by mouth 3 (three) times daily as needed for Anxiety. 30 tablet 0    linaCLOtide (LINZESS) 145 mcg Cap capsule Take 1 capsule (145 mcg total) by mouth daily as needed (constipation). 90 capsule 3    losartan (COZAAR) 100 MG tablet Take 1 tablet (100 mg total) by mouth once daily. 90 tablet 3    multivitamin capsule Take 1 capsule by mouth once daily.      polyethylene glycol  "(GLYCOLAX) 17 gram/dose powder Take 17 g by mouth daily as needed. 510 g 1    pravastatin (PRAVACHOL) 40 MG tablet Take 1 tablet (40 mg total) by mouth once daily. 90 tablet 3    tamsulosin (FLOMAX) 0.4 mg Cap Take 1 capsule (0.4 mg total) by mouth once daily. 90 capsule 4    aspirin (ECOTRIN) 325 MG EC tablet Take 1 tablet (325 mg total) by mouth once daily. (Patient taking differently: Take 325 mg by mouth every other day.) 42 tablet 0     No current facility-administered medications on file prior to visit.       Vital signs:  Ht 5' 11" (1.803 m)   Wt 107 kg (235 lb 14.3 oz)   BMI 32.90 kg/m²     Physical Exam  Vitals reviewed.   Constitutional:       Appearance: He is not toxic-appearing.   HENT:      Head: Normocephalic and atraumatic.   Eyes:      General: No scleral icterus.     Conjunctiva/sclera: Conjunctivae normal.   Neck:      Comments: Normal neck exam, he points out two nodules on neck on either side, I dont appreciate a palpable mass or anything otherwise.  Neurological:      Mental Status: He is alert and oriented to person, place, and time.      Gait: Gait normal.   Psychiatric:         Mood and Affect: Mood normal.         Behavior: Behavior normal.         I have reviewed prior labs, imaging, notes from last month    Assessment:  1. Dysphagia, pharyngoesophageal      Unclear if oropharyngeal or not  Also has lots of ENT symptoms with drainage, I wonder if that is playing a role    Plan:  Orders Placed This Encounter    FL Esophagram With Barium Tablet    pantoprazole (PROTONIX) 40 MG tablet    Case Request Endoscopy: EGD (ESOPHAGOGASTRODUODENOSCOPY)     Empirically start PPI trial with protonix 40    Set up esophagram with tablet  Then EGD    Consider ENT referral or speech eval pending above.    Repeat colonoscopy 1 year , pending on clinical status at that time      RTC prn    Christiano Vazquez MD  [facility]          "

## 2023-08-07 NOTE — PATIENT INSTRUCTIONS
EGD Instructions    [facility] [address]  Clinic Office [phone]  Endoscopy Lab [phone]    You are scheduled for an EGD with Dr. Vazquez  on  08/22/2023 at [facility] in [place].    Check in at the Hospital -1st floor, Information desk.   Call [phone] to reschedule.    You cannot have anything to eat or drink after Midnight. You can brush your teeth with a sip of water.     An adult friend/family member must come with you to drive you home.  You cannot drive, take a taxi, Uber/Lyft or bus to leave the Endoscopy Center alone.  If you do not have someone to drive you home, your test will be cancelled.     Please follow the directions of your doctor if you take any pills that thin your blood. If you take these meds: Aggrenox, Brilinta, Effient, Eliquis, Lovenox, Plavix, Pletal, Pradaxa, Ticilid, Xarelto or Coumadin, let the doctor's office know.    DON'T: On the morning of the test do not take insulin or pills for diabetes.     DO: On the morning of the test, do take any pills for blood pressure, heart, anti-rejection and or seizures with a small sip of water. Bring any inhalers with you.    Leave all valuables and jewelry at home. You will be at the hospital for 2-4 hours.    Call the Endoscopy department at [phone] with any questions about your procedure.    Thank you for choosing [facility].

## 2023-08-08 ENCOUNTER — HOSPITAL ENCOUNTER (OUTPATIENT)
Dept: RADIOLOGY | Facility: HOSPITAL | Age: 83
Discharge: HOME | End: 2023-08-08
Attending: INTERNAL MEDICINE
Payer: MEDICARE

## 2023-08-08 DIAGNOSIS — R13.14 DYSPHAGIA, PHARYNGOESOPHAGEAL: ICD-10-CM

## 2023-08-08 PROCEDURE — 74220 X-RAY XM ESOPHAGUS 1CNTRST: CPT | Mod: TC,HCNC

## 2023-08-08 PROCEDURE — 74220 FL ESOPHAGRAM WITH BARIUM TABLET: ICD-10-PCS | Mod: 26,HCNC,, | Performed by: RADIOLOGY

## 2023-08-08 PROCEDURE — 25500020 PHARM REV CODE 255: Mod: HCNC | Performed by: INTERNAL MEDICINE

## 2023-08-08 PROCEDURE — 74220 X-RAY XM ESOPHAGUS 1CNTRST: CPT | Mod: 26,HCNC,, | Performed by: RADIOLOGY

## 2023-08-08 PROCEDURE — A9698 NON-RAD CONTRAST MATERIALNOC: HCPCS | Mod: HCNC | Performed by: INTERNAL MEDICINE

## 2023-08-08 RX ADMIN — BARIUM SULFATE 137 ML: 980 POWDER, FOR SUSPENSION ORAL at 09:08

## 2023-08-08 RX ADMIN — BARIUM SULFATE 355 ML: 0.6 SUSPENSION ORAL at 09:08

## 2023-08-17 ENCOUNTER — TELEPHONE (OUTPATIENT)
Dept: ENDOSCOPY | Facility: HOSPITAL | Age: 83
End: 2023-08-17
Payer: MEDICARE

## 2023-08-17 NOTE — TELEPHONE ENCOUNTER
Patient cancelled EGD on Tuesday 08/22/23; has no transportation available. Will call office to reschedule.

## 2023-08-25 ENCOUNTER — TELEPHONE (OUTPATIENT)
Dept: INTERNAL MEDICINE | Facility: CLINIC | Age: 83
End: 2023-08-25
Payer: MEDICARE

## 2023-08-25 NOTE — TELEPHONE ENCOUNTER
He has not read his [facility] message, however, his PCP has been in communication with him regarding his test results.  Letter sent today as well.

## 2023-09-05 ENCOUNTER — TELEPHONE (OUTPATIENT)
Dept: GASTROENTEROLOGY | Facility: CLINIC | Age: 83
End: 2023-09-05
Payer: MEDICARE

## 2023-09-05 NOTE — TELEPHONE ENCOUNTER
Spoke to patient and informed him of the next dates we have to schedule procedure. Patient states that he will call back once he has transportation. Verbal understanding

## 2023-09-05 NOTE — TELEPHONE ENCOUNTER
----- Message from Catherine Hogan sent at 9/5/2023  9:23 AM CDT -----  Needs advice from nurse:      Who Called:pt  Regarding:needs to schedule an EGD  Would the patient rather a call back or VIA [facility]?  Best Call Back number:[phone]  Additional Info:

## 2023-09-06 ENCOUNTER — TELEPHONE (OUTPATIENT)
Dept: GASTROENTEROLOGY | Facility: CLINIC | Age: 83
End: 2023-09-06
Payer: MEDICARE

## 2023-09-06 NOTE — TELEPHONE ENCOUNTER
Spoke to patient and scheduled procedure on 10/04 with . Instructions sent through the mail. Verbal understanding

## 2023-09-06 NOTE — TELEPHONE ENCOUNTER
----- Message from Justin Donald sent at 9/6/2023  8:41 AM CDT -----  Regarding: Advice  Contact: [phone]  Patient is calling to get procedure scheduled, no order in his chart. Please contact pt

## 2023-09-27 ENCOUNTER — TELEPHONE (OUTPATIENT)
Dept: GASTROENTEROLOGY | Facility: CLINIC | Age: 83
End: 2023-09-27
Payer: MEDICARE

## 2023-09-27 NOTE — PROGRESS NOTES
Continue venlafaxine   I have seen the patient in conjunction with the Advanced Practice Nurse Practitioner (APRN) or Physician Assistant (PA), reviewed history and physical, assessment and plan. I have personally interviewed and reexamined the patient at bedside and agree with the findings, assessment and plan.

## 2023-09-27 NOTE — TELEPHONE ENCOUNTER
----- Message from Marisela Saldana sent at 9/27/2023  4:21 PM CDT -----  Type:  Procedure Reschedule    Who Called: pt  Would the patient rather a call back or a response via [facility]? call  Best Call Back Number: [phone]  Additional Information: pt would like to cancel 10/4 procedure due to transportation cancelling would like to reschedule with office as well

## 2023-09-27 NOTE — TELEPHONE ENCOUNTER
Spoke to patient and canceled procedure scheduled on 10/04 due to transportation. Patient states that he will call once he has transportation figured out. Verbal understanding

## 2023-11-06 DIAGNOSIS — K59.04 CHRONIC IDIOPATHIC CONSTIPATION: ICD-10-CM

## 2023-11-06 NOTE — TELEPHONE ENCOUNTER
Pt requesting med refill, med pended. Last prescribed by Dr Vinh RAMIREZ with Juan Bustamante MD , 1/26/2023

## 2023-11-06 NOTE — TELEPHONE ENCOUNTER
----- Message from Kylie Borden sent at 11/6/2023  9:14 AM CST -----  Regarding: Pt called regarding an Rx Refill  Rx Refill Request:    Pt called to request an Rx Refill for linaCLOtide (LINZESS) 145 mcg Cap capsule, 1XDay, 90 Day Supply which was last ordered by Dr. Malik Justice.    Pt would like the Rx Refill sent to his local: [facility] [address] [place] [address] [address] [facility] phone: [phone].    Pt can be reached at [phone]

## 2023-11-06 NOTE — TELEPHONE ENCOUNTER
Care Due:                  Date            Visit Type   Department     Provider  --------------------------------------------------------------------------------                                EP -                              PRIMARY      [facility] INTERNAL  Last Visit: 07-      CARE ([facility])   MEDICINE       Meera Melton  Next Visit: None Scheduled  None         None Found                                                            Last  Test          Frequency    Reason                     Performed    Due Date  --------------------------------------------------------------------------------    CMP.........  12 months..  losartan, pravastatin....  01- 01-    Lipid Panel.  12 months..  pravastatin..............  12- 12-    Health [facility] Embedded Care Due Messages. Reference number: [id number].   11/06/2023 12:27:11 PM CST

## 2023-11-07 NOTE — TELEPHONE ENCOUNTER
Refill Routing Note   Medication(s) are not appropriate for processing by [facility] for the following reason(s):      Medication outside of protocol    [facility] action(s):  Route Care Due:  None identified            Appointments  past 12m or future 3m with PCP    Date Provider   Last Visit   1/26/2023 Juan Bustamante MD   Next Visit   Visit date not found Juan Bustamante MD   ED visits in past 90 days: 0        Note composed:11:12 AM 11/07/2023

## 2023-11-07 NOTE — TELEPHONE ENCOUNTER
----- Message from Yeny Hairston sent at 11/7/2023 10:46 AM CST -----  e:  RX Refill Request        Who Called: pt        RX Name and Strength: linaCLOtide (LINZESS) 145 mcg Cap capsule        How is the patient currently taking it? (ex. 1XDay): 1X Day        Is this a 30 day or 90 day RX: 30        Preferred Pharmacy with phone number: [facility] [address] [place] [address] [address] [facility] (Ph: [phone])        Local or Mail Order. local        Ordering Provider: Dianna        Would the patient rather a call back or a response via [facility]? call        Best Call Back Number: [phone]        Additional Information: call back please call pt

## 2023-11-08 DIAGNOSIS — K59.04 CHRONIC IDIOPATHIC CONSTIPATION: ICD-10-CM

## 2023-11-08 NOTE — TELEPHONE ENCOUNTER
Refill request received and reviewed,recommend patient request this from gastrenterology. Thank you.

## 2023-11-08 NOTE — TELEPHONE ENCOUNTER
Refill Routing Note   Medication(s) are not appropriate for processing by [facility] for the following reason(s):      Medication outside of protocol    [facility] action(s):  Route Care Due:  None identified              Appointments  past 12m or future 3m with PCP    Date Provider   Last Visit   1/26/2023 Juan Bustamante MD   Next Visit   Visit date not found Juan Bustamante MD   ED visits in past 90 days: 0        Note composed:3:06 PM 11/08/2023

## 2023-11-08 NOTE — TELEPHONE ENCOUNTER
Pt answered the phone, I acknowledged where I was calling from. Pt stated that this office never returns calls, phone was immediately disconnected by pt. Attempted to call pt back no answer.

## 2023-11-08 NOTE — TELEPHONE ENCOUNTER
No care due was identified.  Health [facility] Embedded Care Due Messages. Reference number: [id number].   11/08/2023 3:05:20 PM CST

## 2023-11-08 NOTE — TELEPHONE ENCOUNTER
----- Message from Breann Felder sent at 11/8/2023  2:08 PM CST -----  Contact: [phone]  Patient called would like to get a call from nurse in regards linaCLOtide (LINZESS) 145 mcg Cap capsule. Please call and advise. Thank you.

## 2023-11-23 NOTE — PROGRESS NOTES
Patient ID: Korey Santos is a 82 y.o. male.    Chief Complaint: Sore Throat and Hand Injury      Assessment:       1. Dysphagia, unspecified type    2. Throat symptom    3. Hypertension, essential    4. Left wrist pain    5. Allergic rhinitis, unspecified seasonality, unspecified trigger          Plan:         1. Dysphagia, unspecified type  -     US Soft Tissue Head Neck Thyroid; Future; Expected date: 07/19/2023    2. Throat symptom  -     POCT COVID-19 Rapid Screening    3. Hypertension, essential    4. Left wrist pain  -     X-Ray Hand 3 View Left; Future; Expected date: 07/19/2023  -     X-Ray Wrist 2 View Left; Future; Expected date: 07/19/2023  -     Ambulatory referral/consult to Orthopedics; Future; Expected date: 07/26/2023    5. Allergic rhinitis, unspecified seasonality, unspecified trigger  -     fluticasone propionate (FLONASE) 50 mcg/actuation nasal spray; 1 spray (50 mcg total) by Each Nostril route once daily.  Dispense: 16 g; Refill: 6       Minimal dysphagia without alarm symptoms, no weight loss, no history of smoking.  Food diary- keep scrupulous track of sx  GERD cautions reviewed  May need to consider upper GI x-ray, or EGD or GI follow-up, he will follow-up with his PCP  Flonase empirically  Ice to wrist, Tylenol alternating with low-dose NSAIDs; x-rays and ortho follow-up  Thyroid ultrasound and review  Alarm symptoms and cautions reviewed, he is having none  COVID test today negative (done empirically)    Subjective:   [facility] appt    Dr Bustamante patient      1 month ago ate some bad crab meat and he gagged a bit.  Since then he states some trouble swallowing.  However, it is not daily.  He is not able to identify any particular triggers.  Has no drooling, no swelling of the lips or tongue.  He has no weight loss or fevers.  He does not think that he scratched his throat, he thinks it was more of an allergic reaction to some bad crab meat.  He may have minimal GERD but not on a regular  CHIEF COMPLAINT:  The patient had concerns including Derm Problem.    REVIEW OF DETAILS OBTAINED BY SUPPORT STAFF: Cass Hyde MA  11/22/2023  7:02 PM  Signed  Annabel Wiggins presents to Urgent Care Patient arriving with: alone with complaint of lesion on left nasal alae, by nose piercing.  Patient stated she recently got her nose pierced, about 2 months ago, and recently patient noticed a red bump forming around piercing for the last 2 weeks. Patient denies any drainage, bleeding, or pain from the site. Patient reports to clean the area daily.     Onset about 2 weeks ago    May leave a detailed message at   mobile phone:    Mobile [phone]           HISTORY OF PRESENT ILLNESS:  Patient reports that she is noticed a red lump next to the piercing hole of the left nare.  Patient reports that she had gotten her nose pierced about two months ago and did not notice any evidence of infection afterwards.  Patient notes that she had a cold about 2-3 weeks ago and then about a week after the cold patient noticed red bump forming on the outside of the piercing hole on the left nostril.  Patient has not noticed any purulence or drainage.  No drainage from the inside of the nose.  No fever or chills.    MEDICATIONS:   Medications reviewed and updated in electronic medical records.     ALLERGIES:   Allergies reviewed and updated in electronic medical records.    PHYSICAL EXAMINATION:   height is 5' 5\" (1.651 m) and weight is 55.8 kg (123 lb 0.3 oz). Her oral temperature is 98.4 °F (36.9 °C). Her blood pressure is 108/74 and her pulse is 80. Her respiration is 16 and oxygen saturation is 99%.     GENERAL:  The patient is pleasant, well appearing with normal affect, and in no apparent distress.  HEENT:  Conjunctivae are normal in color and appearance bilaterally. Moist mucous membranes, No perioral lesions noted.  There is a piercing of the left nostril with a nose stud in place.  Near the superior aspect of the external  basis.  He had an endoscopy but many years ago and there is nothing in his chart.  He has never smoked.    Additionally, he has had some left wrist swelling since doing a lot of work at home, sanding.  There was no other trauma or injury, no fall.  He has tried icy hot, ice and heat, no improvement.    Patient Active Problem List   Diagnosis    Benign prostatic hyperplasia with urinary frequency    Erectile dysfunction    DJD (degenerative joint disease) of hip    Lumbar spondylosis    Lumbar herniated disc    Hyperlipidemia    Left inguinal hernia    Hypertension, essential    Medial meniscus tear    Elevated PSA    Dysphagia, pharyngoesophageal    Laryngopharyngeal reflux    Chronic rhinitis    Genu varum of both lower extremities    Status post total left knee replacement    Polyp of colon    Lumbar facet arthropathy    Class 1 obesity due to excess calories with serious comorbidity in adult    Constipation          Review of Systems   Constitutional:  Negative for chills, fatigue and fever.   HENT:  Negative for congestion, ear pain, postnasal drip and sore throat.    Eyes: Negative.    Respiratory:  Negative for cough, chest tightness, shortness of breath and wheezing.    Cardiovascular: Negative.    Gastrointestinal: Negative.         Sometimes trouble swallowing since last month  Liquids more than solids  Not daily  No vomiting  No weight loss  Denies GERD   Musculoskeletal:         L hand pain at wrist, for about a month  Thinks worse since sanding  Tried to use a splint, no real help  Slightly swollen  Icy hot OTC no help; heat ice no help  Mobic no help  He is R handed       Objective:      Physical Exam  Constitutional:       Appearance: He is well-developed.   HENT:      Head: Normocephalic and atraumatic.      Right Ear: External ear normal.      Left Ear: External ear normal.   Eyes:      Extraocular Movements: Extraocular movements intact.      Conjunctiva/sclera: Conjunctivae normal.   Neck:       piercing hole there is granulation tissue.  No purulence or discharge noted.  No generalized erythema or swelling of the area.  There is slight swelling of the internal surface of the nostril at the location of the piercing but no erythema or drainage.  NECK: supple, range of motion is full.  PULMONARY:  Normal respiratory effort. No wheezing.  SKIN: No pustules or vesicles noted.  EXTREMITIES:  Warm and well perfused.  No edema noted.  NEUROLOGICAL:  Alert and oriented x3.  Normal gait.      ASSESSMENT:   The patient presents to the clinic with:   1. Granulation tissue of skin        PLAN:    Patient Instructions   For the skin infection or lesion:    • Keep the area clean and dry, except for any antibiotic ointment or steroid cream.  • Apply over the counter topical steroid cream to the area twice daily.  • Take the antibiotics as prescribed  • Remove the nose piercing to allow for healing  • Call or return to clinic, or follow up with your doctor, if symptoms worsen or new symptoms develop.       EDUCATION:   I discussed the diagnoses and recommendations with the patient.  The patient expressed understanding and is in agreement with the plan.      Coding DM: Lower risk of complications or morbidity.     Thyroid: No thyromegaly.   Cardiovascular:      Rate and Rhythm: Normal rate and regular rhythm.      Heart sounds: No murmur heard.  Pulmonary:      Effort: Pulmonary effort is normal. No respiratory distress.      Breath sounds: Normal breath sounds. No wheezing.   Abdominal:      General: There is no distension.      Palpations: Abdomen is soft.      Tenderness: There is no abdominal tenderness.   Musculoskeletal:         General: No tenderness.      Cervical back: Normal range of motion and neck supple.      Right lower leg: No edema.      Left lower leg: No edema.      Comments: Slight swelling medial aspect left wrist, negative Finkelstein's test   Lymphadenopathy:      Cervical: No cervical adenopathy.   Skin:     General: Skin is warm and dry.   Neurological:      General: No focal deficit present.      Mental Status: He is alert and oriented to person, place, and time.      Cranial Nerves: No cranial nerve deficit.   Psychiatric:         Mood and Affect: Mood normal.         Behavior: Behavior normal.         Thought Content: Thought content normal.         Judgment: Judgment normal.           Health Maintenance Due   Topic Date Due    Shingles Vaccine (1 of 2) Never done    Pneumococcal Vaccines (Age 65+) (1 - PCV) Never done    COVID-19 Vaccine (3 - Mixed Product series) 08/28/2021

## 2023-12-18 ENCOUNTER — HOSPITAL ENCOUNTER (INPATIENT)
Facility: HOSPITAL | Age: 83
LOS: 5 days | Discharge: HOME | DRG: 243 | End: 2023-12-23
Attending: EMERGENCY MEDICINE | Admitting: FAMILY MEDICINE
Payer: MEDICARE

## 2023-12-18 ENCOUNTER — NURSE TRIAGE (OUTPATIENT)
Dept: ADMINISTRATIVE | Facility: CLINIC | Age: 83
End: 2023-12-18
Payer: MEDICARE

## 2023-12-18 DIAGNOSIS — R00.1 BRADYCARDIA: ICD-10-CM

## 2023-12-18 DIAGNOSIS — R42 DIZZINESS: ICD-10-CM

## 2023-12-18 DIAGNOSIS — R00.1 SYMPTOMATIC BRADYCARDIA: Primary | ICD-10-CM

## 2023-12-18 DIAGNOSIS — I44.2 COMPLETE HEART BLOCK: ICD-10-CM

## 2023-12-18 LAB
ALBUMIN SERPL BCP-MCNC: 4 G/DL (ref 3.5–5.2)
ALP SERPL-CCNC: 59 U/L (ref 38–126)
ALT SERPL W/O P-5'-P-CCNC: 26 U/L (ref 10–44)
ANION GAP SERPL CALC-SCNC: 12 MMOL/L (ref 8–16)
AST SERPL-CCNC: 29 U/L (ref 15–46)
BASOPHILS # BLD AUTO: 0.09 K/UL (ref 0–0.2)
BASOPHILS NFR BLD: 1 % (ref 0–1.9)
BILIRUB SERPL-MCNC: 0.8 MG/DL (ref 0.1–1)
CALCIUM SERPL-MCNC: 9.4 MG/DL (ref 8.7–10.5)
CHLORIDE SERPL-SCNC: 108 MMOL/L (ref 95–110)
CO2 SERPL-SCNC: 20 MMOL/L (ref 23–29)
CREAT SERPL-MCNC: 1.06 MG/DL (ref 0.5–1.4)
DIFFERENTIAL METHOD: ABNORMAL
EOSINOPHIL # BLD AUTO: 0.1 K/UL (ref 0–0.5)
EOSINOPHIL NFR BLD: 1.5 % (ref 0–8)
ERYTHROCYTE [DISTWIDTH] IN BLOOD BY AUTOMATED COUNT: 13.4 % (ref 11.5–14.5)
EST. GFR  (NO RACE VARIABLE): >60 ML/MIN/1.73 M^2
GLUCOSE SERPL-MCNC: 96 MG/DL (ref 70–110)
HCT VFR BLD AUTO: 41.9 % (ref 40–54)
HGB BLD-MCNC: 13.7 G/DL (ref 14–18)
IMM GRANULOCYTES # BLD AUTO: 0.03 K/UL (ref 0–0.04)
IMM GRANULOCYTES NFR BLD AUTO: 0.3 % (ref 0–0.5)
LYMPHOCYTES # BLD AUTO: 3.5 K/UL (ref 1–4.8)
LYMPHOCYTES NFR BLD: 38.3 % (ref 18–48)
MAGNESIUM SERPL-MCNC: 2.3 MG/DL (ref 1.6–2.6)
MCH RBC QN AUTO: 30.3 PG (ref 27–31)
MCHC RBC AUTO-ENTMCNC: 32.7 G/DL (ref 32–36)
MCV RBC AUTO: 93 FL (ref 82–98)
MONOCYTES # BLD AUTO: 0.8 K/UL (ref 0.3–1)
MONOCYTES NFR BLD: 8.9 % (ref 4–15)
NEUTROPHILS # BLD AUTO: 4.6 K/UL (ref 1.8–7.7)
NEUTROPHILS NFR BLD: 50 % (ref 38–73)
NRBC BLD-RTO: 0 /100 WBC
PLATELET # BLD AUTO: 150 K/UL (ref 150–450)
PMV BLD AUTO: 12.9 FL (ref 9.2–12.9)
POCT GLUCOSE: 88 MG/DL (ref 70–110)
POTASSIUM SERPL-SCNC: 4.4 MMOL/L (ref 3.5–5.1)
PROT SERPL-MCNC: 7.1 G/DL (ref 6–8.4)
RBC # BLD AUTO: 4.52 M/UL (ref 4.6–6.2)
SODIUM SERPL-SCNC: 140 MMOL/L (ref 136–145)
TROPONIN I SERPL DL<=0.01 NG/ML-MCNC: 0.28 NG/ML (ref 0–0.03)
TROPONIN I SERPL-MCNC: 0.04 NG/ML (ref 0.01–0.03)
TROPONIN I SERPL-MCNC: 0.06 NG/ML (ref 0.01–0.03)
UUN UR-MCNC: 20 MG/DL (ref 2–20)
WBC # BLD AUTO: 9.14 K/UL (ref 3.9–12.7)

## 2023-12-18 PROCEDURE — 25000003 PHARM REV CODE 250: Mod: HCNC | Performed by: STUDENT IN AN ORGANIZED HEALTH CARE EDUCATION/TRAINING PROGRAM

## 2023-12-18 PROCEDURE — 27201423 OPTIME MED/SURG SUP & DEVICES STERILE SUPPLY: Mod: HCNC | Performed by: STUDENT IN AN ORGANIZED HEALTH CARE EDUCATION/TRAINING PROGRAM

## 2023-12-18 PROCEDURE — 93005 ELECTROCARDIOGRAM TRACING: CPT | Mod: HCNC,ER

## 2023-12-18 PROCEDURE — 33210 INSERT ELECTRD/PM CATH SNGL: CPT | Mod: HCNC | Performed by: STUDENT IN AN ORGANIZED HEALTH CARE EDUCATION/TRAINING PROGRAM

## 2023-12-18 PROCEDURE — 63600175 PHARM REV CODE 636 W HCPCS: Mod: HCNC | Performed by: STUDENT IN AN ORGANIZED HEALTH CARE EDUCATION/TRAINING PROGRAM

## 2023-12-18 PROCEDURE — 99152 MOD SED SAME PHYS/QHP 5/>YRS: CPT | Mod: HCNC | Performed by: STUDENT IN AN ORGANIZED HEALTH CARE EDUCATION/TRAINING PROGRAM

## 2023-12-18 PROCEDURE — 25500020 PHARM REV CODE 255: Mod: HCNC | Performed by: STUDENT IN AN ORGANIZED HEALTH CARE EDUCATION/TRAINING PROGRAM

## 2023-12-18 PROCEDURE — 84484 ASSAY OF TROPONIN QUANT: CPT | Mod: 91,HCNC,ER | Performed by: NURSE PRACTITIONER

## 2023-12-18 PROCEDURE — 20000000 HC ICU ROOM: Mod: HCNC

## 2023-12-18 PROCEDURE — 25000003 PHARM REV CODE 250: Mod: HCNC | Performed by: FAMILY MEDICINE

## 2023-12-18 PROCEDURE — C1887 CATHETER, GUIDING: HCPCS | Mod: HCNC | Performed by: STUDENT IN AN ORGANIZED HEALTH CARE EDUCATION/TRAINING PROGRAM

## 2023-12-18 PROCEDURE — 84484 ASSAY OF TROPONIN QUANT: CPT | Mod: 91,HCNC | Performed by: INTERNAL MEDICINE

## 2023-12-18 PROCEDURE — 85025 COMPLETE CBC W/AUTO DIFF WBC: CPT | Mod: HCNC,ER | Performed by: EMERGENCY MEDICINE

## 2023-12-18 PROCEDURE — 99291 CRITICAL CARE FIRST HOUR: CPT | Mod: HCNC,ER

## 2023-12-18 PROCEDURE — 99900035 HC TECH TIME PER 15 MIN (STAT): Mod: HCNC,ER

## 2023-12-18 PROCEDURE — 84484 ASSAY OF TROPONIN QUANT: CPT | Mod: HCNC,ER | Performed by: EMERGENCY MEDICINE

## 2023-12-18 PROCEDURE — C1769 GUIDE WIRE: HCPCS | Mod: HCNC | Performed by: STUDENT IN AN ORGANIZED HEALTH CARE EDUCATION/TRAINING PROGRAM

## 2023-12-18 PROCEDURE — 80053 COMPREHEN METABOLIC PANEL: CPT | Mod: HCNC,ER | Performed by: EMERGENCY MEDICINE

## 2023-12-18 PROCEDURE — 99153 MOD SED SAME PHYS/QHP EA: CPT | Mod: HCNC | Performed by: STUDENT IN AN ORGANIZED HEALTH CARE EDUCATION/TRAINING PROGRAM

## 2023-12-18 PROCEDURE — 36415 COLL VENOUS BLD VENIPUNCTURE: CPT | Mod: HCNC | Performed by: INTERNAL MEDICINE

## 2023-12-18 PROCEDURE — 93005 ELECTROCARDIOGRAM TRACING: CPT | Mod: HCNC

## 2023-12-18 PROCEDURE — 93454 CORONARY ARTERY ANGIO S&I: CPT | Mod: HCNC | Performed by: STUDENT IN AN ORGANIZED HEALTH CARE EDUCATION/TRAINING PROGRAM

## 2023-12-18 PROCEDURE — 83735 ASSAY OF MAGNESIUM: CPT | Mod: HCNC,ER | Performed by: EMERGENCY MEDICINE

## 2023-12-18 PROCEDURE — C1894 INTRO/SHEATH, NON-LASER: HCPCS | Mod: HCNC | Performed by: STUDENT IN AN ORGANIZED HEALTH CARE EDUCATION/TRAINING PROGRAM

## 2023-12-18 RX ORDER — FLUTICASONE PROPIONATE 50 MCG
1 SPRAY, SUSPENSION (ML) NASAL DAILY
Status: DISCONTINUED | OUTPATIENT
Start: 2023-12-19 | End: 2023-12-23 | Stop reason: HOSPADM

## 2023-12-18 RX ORDER — NICARDIPINE HYDROCHLORIDE 0.2 MG/ML
0-15 INJECTION INTRAVENOUS CONTINUOUS
Status: DISCONTINUED | OUTPATIENT
Start: 2023-12-18 | End: 2023-12-18

## 2023-12-18 RX ORDER — ACETAMINOPHEN 325 MG/1
650 TABLET ORAL EVERY 4 HOURS PRN
Status: DISCONTINUED | OUTPATIENT
Start: 2023-12-18 | End: 2023-12-23 | Stop reason: HOSPADM

## 2023-12-18 RX ORDER — NICARDIPINE HYDROCHLORIDE 0.2 MG/ML
0-15 INJECTION INTRAVENOUS CONTINUOUS
Status: DISCONTINUED | OUTPATIENT
Start: 2023-12-18 | End: 2023-12-20

## 2023-12-18 RX ORDER — LOSARTAN POTASSIUM 50 MG/1
100 TABLET ORAL DAILY
Status: DISCONTINUED | OUTPATIENT
Start: 2023-12-18 | End: 2023-12-23 | Stop reason: HOSPADM

## 2023-12-18 RX ORDER — MUPIROCIN 20 MG/G
OINTMENT TOPICAL 2 TIMES DAILY
Status: DISCONTINUED | OUTPATIENT
Start: 2023-12-18 | End: 2023-12-23 | Stop reason: HOSPADM

## 2023-12-18 RX ORDER — HYDRALAZINE HYDROCHLORIDE 20 MG/ML
INJECTION INTRAMUSCULAR; INTRAVENOUS
Status: DISCONTINUED | OUTPATIENT
Start: 2023-12-18 | End: 2023-12-18

## 2023-12-18 RX ORDER — HYDRALAZINE HYDROCHLORIDE 20 MG/ML
INJECTION INTRAMUSCULAR; INTRAVENOUS
Status: DISCONTINUED
Start: 2023-12-18 | End: 2023-12-18 | Stop reason: WASHOUT

## 2023-12-18 RX ORDER — LORAZEPAM 0.5 MG/1
0.5 TABLET ORAL EVERY 6 HOURS PRN
Status: DISCONTINUED | OUTPATIENT
Start: 2023-12-18 | End: 2023-12-23 | Stop reason: HOSPADM

## 2023-12-18 RX ORDER — SODIUM CHLORIDE 0.9 % (FLUSH) 0.9 %
10 SYRINGE (ML) INJECTION EVERY 12 HOURS PRN
Status: DISCONTINUED | OUTPATIENT
Start: 2023-12-18 | End: 2023-12-23 | Stop reason: HOSPADM

## 2023-12-18 RX ORDER — ONDANSETRON 2 MG/ML
4 INJECTION INTRAMUSCULAR; INTRAVENOUS EVERY 8 HOURS PRN
Status: DISCONTINUED | OUTPATIENT
Start: 2023-12-18 | End: 2023-12-23 | Stop reason: HOSPADM

## 2023-12-18 RX ORDER — FENTANYL CITRATE 50 UG/ML
INJECTION, SOLUTION INTRAMUSCULAR; INTRAVENOUS
Status: DISCONTINUED | OUTPATIENT
Start: 2023-12-18 | End: 2023-12-18

## 2023-12-18 RX ORDER — HEPARIN SODIUM 1000 [USP'U]/ML
INJECTION, SOLUTION INTRAVENOUS; SUBCUTANEOUS
Status: DISCONTINUED | OUTPATIENT
Start: 2023-12-18 | End: 2023-12-18

## 2023-12-18 RX ORDER — ASPIRIN 325 MG
325 TABLET ORAL
Status: ACTIVE | OUTPATIENT
Start: 2023-12-18 | End: 2023-12-18

## 2023-12-18 RX ORDER — MIDAZOLAM HYDROCHLORIDE 1 MG/ML
INJECTION, SOLUTION INTRAMUSCULAR; INTRAVENOUS
Status: DISCONTINUED | OUTPATIENT
Start: 2023-12-18 | End: 2023-12-18

## 2023-12-18 RX ORDER — ONDANSETRON 8 MG/1
8 TABLET, ORALLY DISINTEGRATING ORAL EVERY 8 HOURS PRN
Status: DISCONTINUED | OUTPATIENT
Start: 2023-12-18 | End: 2023-12-18 | Stop reason: SDUPTHER

## 2023-12-18 RX ORDER — MAG HYDROX/ALUMINUM HYD/SIMETH 200-200-20
30 SUSPENSION, ORAL (FINAL DOSE FORM) ORAL
Status: DISCONTINUED | OUTPATIENT
Start: 2023-12-19 | End: 2023-12-23 | Stop reason: HOSPADM

## 2023-12-18 RX ORDER — TAMSULOSIN HYDROCHLORIDE 0.4 MG/1
1 CAPSULE ORAL DAILY
Status: DISCONTINUED | OUTPATIENT
Start: 2023-12-19 | End: 2023-12-23 | Stop reason: HOSPADM

## 2023-12-18 RX ORDER — VERAPAMIL HYDROCHLORIDE 2.5 MG/ML
INJECTION, SOLUTION INTRAVENOUS
Status: DISCONTINUED | OUTPATIENT
Start: 2023-12-18 | End: 2023-12-18

## 2023-12-18 RX ORDER — PRAVASTATIN SODIUM 40 MG/1
40 TABLET ORAL DAILY
Status: DISCONTINUED | OUTPATIENT
Start: 2023-12-19 | End: 2023-12-23 | Stop reason: HOSPADM

## 2023-12-18 RX ORDER — NALOXONE HCL 0.4 MG/ML
0.02 VIAL (ML) INJECTION
Status: DISCONTINUED | OUTPATIENT
Start: 2023-12-18 | End: 2023-12-23 | Stop reason: HOSPADM

## 2023-12-18 RX ORDER — HEPARIN SODIUM 200 [USP'U]/100ML
INJECTION, SOLUTION INTRAVENOUS
Status: DISCONTINUED | OUTPATIENT
Start: 2023-12-18 | End: 2023-12-18

## 2023-12-18 RX ORDER — HYDROXYZINE HYDROCHLORIDE 25 MG/1
25 TABLET, FILM COATED ORAL 3 TIMES DAILY PRN
Status: DISCONTINUED | OUTPATIENT
Start: 2023-12-18 | End: 2023-12-18

## 2023-12-18 RX ORDER — IODIXANOL 320 MG/ML
INJECTION, SOLUTION INTRAVASCULAR
Status: DISCONTINUED | OUTPATIENT
Start: 2023-12-18 | End: 2023-12-18

## 2023-12-18 RX ORDER — ONDANSETRON 8 MG/1
8 TABLET, ORALLY DISINTEGRATING ORAL EVERY 8 HOURS PRN
Status: DISCONTINUED | OUTPATIENT
Start: 2023-12-18 | End: 2023-12-23 | Stop reason: HOSPADM

## 2023-12-18 RX ORDER — SUCRALFATE 1 G/10ML
1 SUSPENSION ORAL EVERY 6 HOURS
Status: DISCONTINUED | OUTPATIENT
Start: 2023-12-19 | End: 2023-12-23 | Stop reason: HOSPADM

## 2023-12-18 RX ORDER — LIDOCAINE HYDROCHLORIDE 10 MG/ML
INJECTION, SOLUTION EPIDURAL; INFILTRATION; INTRACAUDAL; PERINEURAL
Status: DISCONTINUED | OUTPATIENT
Start: 2023-12-18 | End: 2023-12-18

## 2023-12-18 RX ORDER — FINASTERIDE 5 MG/1
5 TABLET, FILM COATED ORAL DAILY
Status: DISCONTINUED | OUTPATIENT
Start: 2023-12-19 | End: 2023-12-23 | Stop reason: HOSPADM

## 2023-12-18 RX ORDER — HYDROXYZINE HYDROCHLORIDE 25 MG/1
25 TABLET, FILM COATED ORAL 3 TIMES DAILY PRN
Status: DISCONTINUED | OUTPATIENT
Start: 2023-12-18 | End: 2023-12-23 | Stop reason: HOSPADM

## 2023-12-18 RX ADMIN — LOSARTAN POTASSIUM 100 MG: 50 TABLET, FILM COATED ORAL at 05:12

## 2023-12-18 RX ADMIN — LORAZEPAM 0.5 MG: 0.5 TABLET ORAL at 09:12

## 2023-12-18 RX ADMIN — HYDROXYZINE HYDROCHLORIDE 25 MG: 25 TABLET ORAL at 11:12

## 2023-12-18 RX ADMIN — MUPIROCIN: 20 OINTMENT TOPICAL at 09:12

## 2023-12-18 RX ADMIN — NICARDIPINE HYDROCHLORIDE 0.5 MG/HR: 0.2 INJECTION, SOLUTION INTRAVENOUS at 06:12

## 2023-12-18 RX ADMIN — SUCRALFATE 1 G: 1 SUSPENSION ORAL at 11:12

## 2023-12-18 RX ADMIN — NICARDIPINE HYDROCHLORIDE 5 MG/HR: 0.2 INJECTION, SOLUTION INTRAVENOUS at 07:12

## 2023-12-18 NOTE — Clinical Note
Diagnosis: Bradycardia [[id number]]   Future Attending Provider: ABHISHEK LONDON [[id number]]   Admitting Provider:: ABHISHEK LONDON [[id number]]   Reason for IP Medical Treatment  (Clinical interventions that can only be accomplished in the IP setting? ) :: bradycardia   I certify that Inpatient services for greater than or equal to 2 midnights are medically necessary:: Yes   Plans for Post-Acute care--if anticipated (pick the single best option):: A. No post acute care anticipated at this time   Special Needs:: No Special Needs [1]

## 2023-12-18 NOTE — Clinical Note
The lead was inserted under fluorscopic guidance and was sutured in place. A new lead was attached to the right atrium.

## 2023-12-18 NOTE — CONSULTS
[facility] - Emergency Dept  Cardiology  Consult Note    Patient Name: Korey Santos  MRN: [id number]  Admission Date: 12/18/2023  Hospital Length of Stay: 0 days  Code Status: Full Code   Attending Provider: Daisy Calvin*   Consulting Provider: VANESSA Beltrán, ANNIE  Primary Care Physician: Juan Bustamante MD  Principal Problem:Bradycardia         Inpatient consult to Cardiology  Consult performed by: Sherrie Kyle APRN, ANP  Consult ordered by: Narinder Molina MD  Reason for consult: bradycardia            See H&P by Dr. Mckoy dated 12/18/2023

## 2023-12-18 NOTE — Clinical Note
10 ml of contrast were injected throughout the case. 40 mL of contrast was the total wasted during the case. 50 mL was the total amount used during the case.

## 2023-12-18 NOTE — ED PROVIDER NOTES
Chief Complaint:  Lightheaded    History of Present Illness:    Korey Santos 83 y.o. with a  has a past medical history of Arthritis, Back pain, chronic, BPH with urinary obstruction, Chronic constipation, Closed fracture of right shoulder (1/28/2021), Closed nondisplaced fracture of greater tuberosity of right humerus (2/24/2021), Colon polyp, DJD (degenerative joint disease), Hyperlipidemia, Hypertension, Laryngopharyngeal reflux, Left inguinal hernia, Lumbar herniated disc, Lumbar stenosis, Neck mass (10/7/2014), OA (osteoarthritis) of knee, Paradoxical insomnia, and Sinus trouble. who presents to the emergency department today with a complaint of lightheadedness when he walks 20-40 feet.  Patient reports that he has had these symptoms for a couple weeks.  It has been getting worse over the weekend.  He reports that he goes to the gym daily and does his routine of cycling which does not bother him but he can not walk 20-40 feet without feeling lightheaded and woozy.  He denies any syncopal episodes.  He does report that he gets some chest pain when he is walking but only when walking.  He denies any shortness of breath.  He denies palpitations.  He reports that his blood pressure has been high, he keeps a daily record, it has been high because he forgets to take his medications.  He is on losartan, he has not on a beta or calcium channel blocker.  He did take his losartan yesterday and today.  He bought a new blood pressure cuff over the weekend and his pulse has been low for the last few days.  Running in the 30s to 40s.  He was advised to come to the emergency department today.        ROS    Constitutional: No fever, no chills.  ENT: No nasal drainage. No ear ache. No sore throat.  Gastrointestinal: No abdominal pain, no vomiting. No diarrhea.  Musculoskeletal: No back pain.   Neurological: No headache, no focal weakness.    Otherwise remaining ROS negative     The history is provided by the  patient      Reviewed and verified by myself:   PMH/PSH/SOC/FH REVIEWED :    Past Medical History:   Diagnosis Date    Arthritis     Back pain, chronic     BPH with urinary obstruction     Chronic constipation     Closed fracture of right shoulder 1/28/2021    Closed nondisplaced fracture of greater tuberosity of right humerus 2/24/2021    Colon polyp     DJD (degenerative joint disease)     Hip    Hyperlipidemia     Hypertension     Laryngopharyngeal reflux     Left inguinal hernia     Lumbar herniated disc     Lumbar stenosis     Neck mass 10/7/2014    -4/29/2022 path - lipoma    OA (osteoarthritis) of knee     Bilateral    Paradoxical insomnia     Sinus trouble        Past Surgical History:   Procedure Laterality Date    BACK SURGERY  2014    COLONOSCOPY      COLONOSCOPY N/A 5/17/2023    Procedure: COLONOSCOPY;  Surgeon: Christiano Vazquez MD;  Location: [facility] ENDO;  Service: Endoscopy;  Laterality: N/A;  Constipation 2 day prep.Instructions to portal.EC    EPIDURAL STEROID INJECTION INTO LUMBAR SPINE N/A 7/5/2022    Procedure: Injection-steroid-epidural-lumbar L3-4;  Surgeon: Yury Crum Jr., MD;  Location: [facility] PAIN MGT;  Service: Pain Management;  Laterality: N/A;  oral sedation   asa      JOINT REPLACEMENT Left 05/04/2018    KNEE SURGERY      left hand      LEG SURGERY      SPINE SURGERY      UPPER GASTROINTESTINAL ENDOSCOPY         Social History     Socioeconomic History    Marital status: Single   Tobacco Use    Smoking status: Never    Smokeless tobacco: Never   Substance and Sexual Activity    Alcohol use: Yes     Alcohol/week: 0.0 standard drinks of alcohol     Comment: approximately 2 beers weekly    Drug use: No    Sexual activity: Yes     Partners: Female     Birth control/protection: None   Social History Narrative    Ret. 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       Family History   Problem Relation Age of Onset    Cancer Father         lung or smoking    No Known Problems Mother     No Known  Problems Sister     No Known Problems Brother     No Known Problems Maternal Aunt     No Known Problems Maternal Uncle     No Known Problems Paternal Aunt     No Known Problems Paternal Uncle     No Known Problems Maternal Grandmother     No Known Problems Maternal Grandfather     No Known Problems Paternal Grandmother     No Known Problems Paternal Grandfather     No Known Problems Daughter     No Known Problems Son     Glaucoma Neg Hx     Diabetes Neg Hx     Amblyopia Neg Hx     Blindness Neg Hx     Cataracts Neg Hx     Hypertension Neg Hx     Macular degeneration Neg Hx     Retinal detachment Neg Hx     Strabismus Neg Hx     Stroke Neg Hx     Thyroid disease Neg Hx     Colon cancer Neg Hx     Esophageal cancer Neg Hx                ALLERGIES REVIEWED  Review of patient's allergies indicates:   Allergen Reactions    Clindamycin Swelling     Throat swells    Lisinopril Swelling and Other (See Comments)     Throat swelling    Shellfish containing products        MEDICATIONS REVIEWED  Medication List with Changes/Refills   Current Medications    ASPIRIN (ECOTRIN) 325 MG EC TABLET    Take 1 tablet (325 mg total) by mouth once daily.    AZELASTINE (ASTELIN) 137 MCG (0.1 %) NASAL SPRAY    1 spray (137 mcg total) by Nasal route 2 (two) times daily as needed for Rhinitis.    FINASTERIDE (PROSCAR) 5 MG TABLET    Take 1 tablet (5 mg total) by mouth once daily.    FLUTICASONE PROPIONATE (FLONASE) 50 MCG/ACTUATION NASAL SPRAY    1 spray (50 mcg total) by Each Nostril route once daily.    HYDROXYZINE HCL (ATARAX) 25 MG TABLET    Take 1 tablet (25 mg total) by mouth 3 (three) times daily as needed for Anxiety.    LINACLOTIDE (LINZESS) 145 MCG CAP CAPSULE    Take 1 capsule (145 mcg total) by mouth daily as needed (constipation).    LOSARTAN (COZAAR) 100 MG TABLET    Take 1 tablet (100 mg total) by mouth once daily.    MULTIVITAMIN CAPSULE    Take 1 capsule by mouth once daily.    PANTOPRAZOLE (PROTONIX) 40 MG TABLET    Take 1  tablet (40 mg total) by mouth once daily.    POLYETHYLENE GLYCOL (GLYCOLAX) 17 GRAM/DOSE POWDER    Take 17 g by mouth daily as needed.    PRAVASTATIN (PRAVACHOL) 40 MG TABLET    Take 1 tablet (40 mg total) by mouth once daily.    TAMSULOSIN (FLOMAX) 0.4 MG CAP    Take 1 capsule (0.4 mg total) by mouth once daily.           VS reviewed    Nursing/Ancillary staff note reviewed.       Physical Exam     ED Triage Vitals   BP 12/18/23 1012 (!) 151/103   Pulse 12/18/23 1008 (!) 34   Resp 12/18/23 1008 18   Temp 12/18/23 1008 98.2 °F (36.8 °C)   SpO2 12/18/23 1008 98 %       Physical Exam    Constitutional: The patient is alert, has no immediate need for airway protection and no signs of toxicity. No acute distress. Lying in bed but able to sit up without difficulty.  Hypertensive and bradycardic.  Eyes: Pupils equal and round no pallor or injection. Extra ocular movements intact. No drainage.   ENT: Mucous membranes are moist. Oropharynx clear.   Neck: Neck is supple non-tender. No lymphadenopathy. No stridor.   Respiratory: There are no retractions, lungs are clear to auscultation. No wheezing, no crackles.   Cardiovascular:  Bradycardic rate and regular rhythm. No murmurs, rubs or gallops.  Chest/Breast: Chest wall nontender to palpation.   Gastrointestinal:  Abdomen is soft and non-tender, no masses, bowel sounds normal. No guarding, no rebound.  No pulsatile mass.   Neurological: Alert and oriented x 4. CN II-XII grossly intact. No focal weakness. Strength intact 5/5 bilaterally in upper and lower extremities.   Skin: Warm and dry, no rashes.  Musculoskeletal: Extremities are non-tender, non-swollen and have full range of motion. Back NTTP along the midline.   Psyc: Normal behavior. Linear thought content.          ED Course         ED Course as of 12/18/23 1146   Mon Dec 18, 2023   1058 I spoke with Dr. Mckoy with Cardiology [JA]   1122 Potassium: 4.4  Appropriate [JA]   1122 Magnesium : 2.3  Appropriate [JA]    1126 CBC unremarkable.  CMP unremarkable. [JA]   1145 Troponin I(!): 0.036  Slightly elevated but without chest pain likely secondary to demand.  Will need to trend. [JA]      ED Course User Index  [JA] Narinder Molina MD          ED Management:            Medical Decision Making  Differential diagnosis included but not limited to :  Heart arrhythmia, ischemia, metabolic derangement, heart failure    Initial: This is a 83 y.o. male  with  has a past medical history of Arthritis, Back pain, chronic, BPH with urinary obstruction, Chronic constipation, Closed fracture of right shoulder (1/28/2021), Closed nondisplaced fracture of greater tuberosity of right humerus (2/24/2021), Colon polyp, DJD (degenerative joint disease), Hyperlipidemia, Hypertension, Laryngopharyngeal reflux, Left inguinal hernia, Lumbar herniated disc, Lumbar stenosis, Neck mass (10/7/2014), OA (osteoarthritis) of knee, Paradoxical insomnia, and Sinus trouble. who comes in for emergent evaluation of a new, acute, complicated and undiagnosed problem of lightheadedness and is found to be bradycardic. On examination vitals show bradycardia but he has not hypotensive. On physical exam notable for very bradycardic pulse.  Clear breath sounds.     Orders I ordered to further evaluate included:  EKG, CBC, CMP, magnesium, troponin, chest x-ray      Social determinants of health taken into consideration during development of our treatment plan include   Body mass index is 31.38 kg/m². - Cumulative social disadvantage, denoted by higher SDOH burden, was associated with increased odds of obesity, independent of clinical and demographic factors. PMID: 23968180 DOI: 10.1002/linwood.99177      Problems Addressed:  Symptomatic bradycardia: complicated acute illness or injury with systemic symptoms that poses a threat to life or bodily functions    Amount and/or Complexity of Data Reviewed  External Data Reviewed: notes.     Details:   Patient was seen  08/07/2023 by Gastroenterology for dysphagia.  Labs: ordered. Decision-making details documented in ED Course.  Radiology: ordered and independent interpretation performed.     Details: Chest x-ray without any signs of consolidation or widened mediastinum  ECG/medicine tests: ordered and independent interpretation performed.     Details: 29 beats per minute, sinus rhythm bradycardic with second-degree AV block, no ST elevations  Discussion of management or test interpretation with external provider(s): I spoke with Dr. Mckoy on-call for Cardiology regarding the patient's presentation, bradycardia, workup today, need for transfer and admission for symptomatic bradycardia.  He agrees and will follow along.    I spoke with Shavon Baeza NP on-call for the [facility] hospitalist service regarding the patient's presentation, workup, need for admission for symptomatic bradycardia likely need for pacemaker.  They accept for admission.      Risk  OTC drugs.  Prescription drug management.  Decision regarding hospitalization.           MDM continued:     Korey Santos  presents to the emergency Department today with symptomatic bradycardia.  Patient's heart rate is in the 20s to 30s, his blood pressure has remained appropriate he has not hypotension.  Unlikely to be medication induced as the patient was not on a calcium channel blocker or beta-blocker.  Patient's workup shows no acute metabolic derangement that might be causing his bradycardia.  EKG shows second-degree AV block, patient will be transferred to [place] for further management of his symptomatic anemia likely will need pacemaker.       Voice recognition software utilized in this note.      Critical Care    Date/Time: 12/18/2023 11:45 AM    Performed by: Narinder Molina MD  Authorized by: Narinder Molina MD  Total critical care time (exclusive of procedural time) : 59 minutes  Critical care was necessary to treat or prevent imminent or  life-threatening deterioration of the following conditions: cardiac failure.  Critical care was time spent personally by me on the following activities: development of treatment plan with patient or surrogate, discussions with consultants, discussions with primary provider, evaluation of patient's response to treatment, interpretation of cardiac output measurements, examination of patient, obtaining history from patient or surrogate, ordering and performing treatments and interventions, ordering and review of radiographic studies, ordering and review of laboratory studies, pulse oximetry, re-evaluation of patient's condition and review of old charts.            Impression      The primary encounter diagnosis was Symptomatic bradycardia. A diagnosis of Dizziness was also pertinent to this visit.            Narinder Molina MD  12/18/23 0312

## 2023-12-18 NOTE — HPI
Korey Santos 82 y/o. with BPH, HLD, DJD, HTN, , chronic constipation, GERD, presents to the emergency room with complaints of  few weeks' history of intermittent lightheadedness with activities is usually walking 20-40 feet.  Symptom has been worse since over the weekend.  He denies syncopal episode, chest pain, nausea, vomiting, weakness, tremor, diaphoresis, fever, chills. n report noncompliant with blood pressure medication recently because he forgets to take them, also concern that his heart rate has been between 30s to 40s with his new blood pressure machine.    In the ED HR 29, Bp 171/73, sodium 140 potassium 4.4, bicarb 20, BUN/creatinine 20/1.06, magnesium 2.3, H/H 13.7/41.9, troponin 0.036, CXR with no acute process.  Transferred to [facility] for Cardiology evaluation and pacemaker placement

## 2023-12-18 NOTE — OP NOTE
"POST CATH/TVP NOTE    HPI:   s/p TVP and coronary angiography secondary to: Complete heart block    Cath Results:  Access:  LM:  Patent PETER- III flow  LAD: Proximal 40-50% non-obstructive lesion. PETER- III flow  LCx: Patent. PETER- III flow  RCA: Patent. PETER- III low  LVgram: LVEDP, LVEF %  Intervention: TVP successfully positioned in the RV apex, set at 60bpm and 5mA output  Closure device: TR band  Patient tolerated procedure well, no complications    Post Cath Exam:  BP (!) 176/84 (BP Location: Left arm, Patient Position: Sitting)   Pulse (!) 32   Temp 97.9 °F (36.6 °C) (Oral)   Resp 17   Ht 5' 11" (1.803 m)   Wt 102.1 kg (225 lb)   SpO2 98%   BMI 31.38 kg/m²     Assessment:   Complete (3rd degree) AVB  Non-obstructive coronary artery disease    Plan:   Admit to ICU  Plan for PPM placement in AM  NPO after midnight    Terry Hitchcock MD  [employer] Cardiology Fellow, PGY-5  "

## 2023-12-18 NOTE — Clinical Note
The lead was inserted under fluorscopic guidance and was sutured in place. A new lead was attached to the right ventricle.

## 2023-12-18 NOTE — H&P
[facility]    Reason for Consult: [facility]    SUBJECTIVE:     History of Present Illness:  Patient is a 83 y.o. male presents with hx of HTN, HLD      Review of patient's allergies indicates:   Allergen Reactions    Clindamycin Swelling     Throat swells    Lisinopril Swelling and Other (See Comments)     Throat swelling    Shellfish containing products        Past Medical History:   Diagnosis Date    Arthritis     Back pain, chronic     BPH with urinary obstruction     Chronic constipation     Closed fracture of right shoulder 1/28/2021    Closed nondisplaced fracture of greater tuberosity of right humerus 2/24/2021    Colon polyp     DJD (degenerative joint disease)     Hip    Hyperlipidemia     Hypertension     Laryngopharyngeal reflux     Left inguinal hernia     Lumbar herniated disc     Lumbar stenosis     Neck mass 10/7/2014    -4/29/2022 path - lipoma    OA (osteoarthritis) of knee     Bilateral    Paradoxical insomnia     Sinus trouble      Past Surgical History:   Procedure Laterality Date    BACK SURGERY  2014    COLONOSCOPY      COLONOSCOPY N/A 5/17/2023    Procedure: COLONOSCOPY;  Surgeon: Christiano Vazquez MD;  Location: [facility] ENDO;  Service: Endoscopy;  Laterality: N/A;  Constipation 2 day prep.Instructions to portal.EC    EPIDURAL STEROID INJECTION INTO LUMBAR SPINE N/A 7/5/2022    Procedure: Injection-steroid-epidural-lumbar L3-4;  Surgeon: Yury Crum Jr., MD;  Location: [facility] PAIN MGT;  Service: Pain Management;  Laterality: N/A;  oral sedation   asa      JOINT REPLACEMENT Left 05/04/2018    KNEE SURGERY      left hand      LEG SURGERY      SPINE SURGERY      UPPER GASTROINTESTINAL ENDOSCOPY       Family History   Problem Relation Age of Onset    Cancer Father         lung or smoking    No Known Problems Mother     No Known Problems Sister     No Known Problems Brother     No Known Problems Maternal Aunt     No Known Problems Maternal Uncle     No Known Problems Paternal Aunt     No Known  Problems Paternal Uncle     No Known Problems Maternal Grandmother     No Known Problems Maternal Grandfather     No Known Problems Paternal Grandmother     No Known Problems Paternal Grandfather     No Known Problems Daughter     No Known Problems Son     Glaucoma Neg Hx     Diabetes Neg Hx     Amblyopia Neg Hx     Blindness Neg Hx     Cataracts Neg Hx     Hypertension Neg Hx     Macular degeneration Neg Hx     Retinal detachment Neg Hx     Strabismus Neg Hx     Stroke Neg Hx     Thyroid disease Neg Hx     Colon cancer Neg Hx     Esophageal cancer Neg Hx      Social History     Tobacco Use    Smoking status: Never    Smokeless tobacco: Never   Substance Use Topics    Alcohol use: Yes     Alcohol/week: 0.0 standard drinks of alcohol     Comment: approximately 2 beers weekly    Drug use: No        Home meds:  No current facility-administered medications on file prior to encounter.     Current Outpatient Medications on File Prior to Encounter   Medication Sig Dispense Refill    aspirin (ECOTRIN) 325 MG EC tablet Take 1 tablet (325 mg total) by mouth once daily. (Patient taking differently: Take 325 mg by mouth every other day.) 42 tablet 0    azelastine (ASTELIN) 137 mcg (0.1 %) nasal spray 1 spray (137 mcg total) by Nasal route 2 (two) times daily as needed for Rhinitis. 30 mL 0    finasteride (PROSCAR) 5 mg tablet Take 1 tablet (5 mg total) by mouth once daily. 90 tablet 3    fluticasone propionate (FLONASE) 50 mcg/actuation nasal spray 1 spray (50 mcg total) by Each Nostril route once daily. 16 g 6    hydrOXYzine HCL (ATARAX) 25 MG tablet Take 1 tablet (25 mg total) by mouth 3 (three) times daily as needed for Anxiety. 30 tablet 0    linaCLOtide (LINZESS) 145 mcg Cap capsule Take 1 capsule (145 mcg total) by mouth daily as needed (constipation). 90 capsule 3    losartan (COZAAR) 100 MG tablet Take 1 tablet (100 mg total) by mouth once daily. 90 tablet 3    multivitamin capsule Take 1 capsule by mouth once daily.    "   pantoprazole (PROTONIX) 40 MG tablet Take 1 tablet (40 mg total) by mouth once daily. 30 tablet 3    polyethylene glycol (GLYCOLAX) 17 gram/dose powder Take 17 g by mouth daily as needed. 510 g 1    pravastatin (PRAVACHOL) 40 MG tablet Take 1 tablet (40 mg total) by mouth once daily. 90 tablet 3    tamsulosin (FLOMAX) 0.4 mg Cap Take 1 capsule (0.4 mg total) by mouth once daily. 90 capsule 4       Current meds:  Scheduled Meds:   aspirin  325 mg Oral ED 1 Time     Continuous Infusions:  PRN Meds:.naloxone, ondansetron, ondansetron, sodium chloride 0.9%      OBJECTIVE:     Vital Signs (Most Recent)  Temp: 97.9 °F (36.6 °C) (12/18/23 1419)  Pulse: (!) 32 (12/18/23 1419)  Resp: 17 (12/18/23 1419)  BP: (!) 176/84 (12/18/23 1419)  SpO2: 98 % (12/18/23 1419)    Vital Signs Range (Last 24H):  Temp:  [97.9 °F (36.6 °C)-98.2 °F (36.8 °C)]   Pulse:  [29-34]   Resp:  [14-19]   BP: (151-176)/()   SpO2:  [95 %-98 %]     Physical Exam:  General: No acute stress  HENT: EOM intact, PERRL, oropharynx clear, mucous membranes clear and moist   Pulmonary: CTAB  Cardiovascular: no murmurs  Abdomen: soft, non-tender, no distended no splenomegaly or hepatomegaly   Skin: warm, dry, no erythema, no rashes    Extremities: periph pulses intact, no cyanosis or edema   Neuro: a/ox4, clear speech, follows commands, no weakness or focal neurologic  deficit   Psych: mood and behavior normal       Laboratory:  LABS  CBC  Recent Labs   Lab 12/18/23  1053   WBC 9.14   RBC 4.52*   HGB 13.7*   HCT 41.9      MCV 93   MCH 30.3   MCHC 32.7     BMP  Recent Labs   Lab 12/18/23  1053      K 4.4   CO2 20*      BUN 20   CREATININE 1.06   GLU 96       Recent Labs   Lab 12/18/23  1053   CALCIUM 9.4   MG 2.3       LFT  Recent Labs   Lab 12/18/23  1053   PROT 7.1   ALBUMIN 4.0   BILITOT 0.8   AST 29   ALKPHOS 59   ALT 26       COAGS  No results for input(s): "PT", "INR", "APTT" in the last 168 hours.  CE  Recent Labs   Lab " "12/18/23  1053 12/18/23  1343   TROPONINI 0.036* 0.060*     BNP  No results for input(s): "BNP" in the last 168 hours.  Lipid panel:  Lab Results   Component Value Date    CHOL 190 12/28/2022    CHOL 193 02/05/2021    CHOL 206 (H) 08/29/2019     Lab Results   Component Value Date    HDL 45 12/28/2022    HDL 44 02/05/2021    HDL 58 08/29/2019     Lab Results   Component Value Date    LDLCALC 132.6 12/28/2022    LDLCALC 130.2 02/05/2021    LDLCALC 129.0 08/29/2019     Lab Results   Component Value Date    TRIG 62 12/28/2022    TRIG 94 02/05/2021    TRIG 95 08/29/2019     Lab Results   Component Value Date    CHOLHDL 23.7 12/28/2022    CHOLHDL 22.8 02/05/2021    CHOLHDL 28.2 08/29/2019     TTE  The stress echo portion of this study is negative for myocardial ischemia.  The EKG portion of this study is negative for myocardial ischemia.  The test was stopped because the patient experienced fatigue.  The patient's exercise capacity was normal.  Arrhythmias during stress: occasional PVCs,  Normal left ventricular systolic function. The estimated ejection fraction is 65%  Indeterminate left ventricular diastolic function.  No wall motion abnormalities.  Mild left atrial enlargement.  Normal right ventricular systolic function.  Normal central venous pressure (3 mm Hg).       ASSESSMENT/PLAN:   [facility]- will take him emergently to cath lab for tvp and coronary angiogram.  - Plan for TVP/RHC/LHC/Cors +/- PCI +/- LV and aortic pressures +/- ventriculography   - Maintain NPO  - All risks and benefits regarding the procedure have been discussed with the patient in detail including but not limited to bleeding, infection, renal failure, worsening condition, myocardial infarction, stroke, and failure of intent of procedure  - The patient has expressed understanding of all risks and benefits for the procedure  - All questions and concerns have been answered  - Informed consent forms have been explained to the patient and all consents " have been signed       Flaco Mckoy MD

## 2023-12-18 NOTE — Clinical Note
The catheter was repositioned into the ostium   right coronary artery. An angiography was performed of the right coronary arteries. Multiple views were taken. The angiography was performed via hand injection with 8 mL of contrast.

## 2023-12-18 NOTE — ED NOTES
Patient lying in bed. No s/s of distress is noted. RR even and unlabored. Patient denies chest pains, SOB, or dizziness at this present time.

## 2023-12-18 NOTE — TELEPHONE ENCOUNTER
OOC Rn  Patient concerned about b/p for a few days, dizziness.  Wants to go to ED.  Asked about HR, started getting fatigue and winded when coming from the gym   Started feeling dizziness and b/p 170,   Last b/p 203/65  HR   38 on sitting.   No dizziness at rest but when walking. Wants to go to [facility].  Care advise is to go to ED.  For any new or worsening symptoms to callback.     Reason for Disposition   Heart beating very slowly (e.g., < 50 / minute)  (Exception: Athlete and heart rate normal for caller.)    Additional Information   Negative: Passed out (i.e., lost consciousness, collapsed and was not responding)   Negative: Shock suspected (e.g., cold/pale/clammy skin, too weak to stand, low BP, rapid pulse)   Negative: Difficult to awaken or acting confused (e.g., disoriented, slurred speech)   Negative: Visible sweat on face or sweat dripping down face   Negative: Unable to walk, or can only walk with assistance (e.g., requires support)   Negative: Received SHOCK from implantable cardiac defibrillator and has persisting symptoms (i.e., palpitations, lightheadedness)   Negative: Dizziness, lightheadedness, or weakness and heart beating very rapidly (e.g., > 140 / minute)   Negative: Dizziness, lightheadedness, or weakness and heart beating very slowly (e.g., < 50 / minute)   Negative: Sounds like a life-threatening emergency to the triager   Negative: Difficulty breathing   Negative: Heart beating very rapidly (e.g., > 140 / minute) and present now  (Exception: During exercise.)   Negative: Dizziness, lightheadedness, or weakness    Protocols used: Heart Rate and Heartbeat [id number]-[id number]

## 2023-12-18 NOTE — Clinical Note
The catheter was repositioned into the ostium   left main. An angiography was performed of the left coronary arteries. Multiple views were taken. The angiography was performed via hand injection with 24 mL of contrast.

## 2023-12-19 ENCOUNTER — TELEPHONE (OUTPATIENT)
Dept: CARDIOLOGY | Facility: CLINIC | Age: 83
End: 2023-12-19

## 2023-12-19 ENCOUNTER — TELEPHONE (OUTPATIENT)
Dept: INTERNAL MEDICINE | Facility: CLINIC | Age: 83
End: 2023-12-19
Payer: MEDICARE

## 2023-12-19 LAB
ANION GAP SERPL CALC-SCNC: 10 MMOL/L (ref 8–16)
ASCENDING AORTA: 3.63 CM
AV INDEX (PROSTH): 0.68
AV MEAN GRADIENT: 7 MMHG
AV PEAK GRADIENT: 12 MMHG
AV VALVE AREA BY VELOCITY RATIO: 2.25 CM²
AV VALVE AREA: 2.13 CM²
AV VELOCITY RATIO: 0.72
BASOPHILS # BLD AUTO: 0.06 K/UL (ref 0–0.2)
BASOPHILS NFR BLD: 0.6 % (ref 0–1.9)
BSA FOR ECHO PROCEDURE: 2.3 M2
BUN SERPL-MCNC: 20 MG/DL (ref 8–23)
CALCIUM SERPL-MCNC: 8.8 MG/DL (ref 8.7–10.5)
CHLORIDE SERPL-SCNC: 111 MMOL/L (ref 95–110)
CO2 SERPL-SCNC: 20 MMOL/L (ref 23–29)
CREAT SERPL-MCNC: 1.3 MG/DL (ref 0.5–1.4)
CV ECHO LV RWT: 0.78 CM
DIFFERENTIAL METHOD: ABNORMAL
DOP CALC AO PEAK VEL: 1.72 M/S
DOP CALC AO VTI: 37.8 CM
DOP CALC LVOT AREA: 3.1 CM2
DOP CALC LVOT DIAMETER: 2 CM
DOP CALC LVOT PEAK VEL: 1.23 M/S
DOP CALC LVOT STROKE VOLUME: 80.7 CM3
DOP CALC MV VTI: 45.1 CM
DOP CALCLVOT PEAK VEL VTI: 25.7 CM
E WAVE DECELERATION TIME: 214.54 MSEC
E/A RATIO: 0.67
E/E' RATIO: 11.14 M/S
ECHO LV POSTERIOR WALL: 1.46 CM (ref 0.6–1.1)
EOSINOPHIL # BLD AUTO: 0 K/UL (ref 0–0.5)
EOSINOPHIL NFR BLD: 0.4 % (ref 0–8)
ERYTHROCYTE [DISTWIDTH] IN BLOOD BY AUTOMATED COUNT: 13.7 % (ref 11.5–14.5)
EST. GFR  (NO RACE VARIABLE): 55 ML/MIN/1.73 M^2
FRACTIONAL SHORTENING: 29 % (ref 28–44)
GLUCOSE SERPL-MCNC: 107 MG/DL (ref 70–110)
HCT VFR BLD AUTO: 41.3 % (ref 40–54)
HGB BLD-MCNC: 13.9 G/DL (ref 14–18)
IMM GRANULOCYTES # BLD AUTO: 0.02 K/UL (ref 0–0.04)
IMM GRANULOCYTES NFR BLD AUTO: 0.2 % (ref 0–0.5)
INTERVENTRICULAR SEPTUM: 1.4 CM (ref 0.6–1.1)
LA MAJOR: 7.19 CM
LA MINOR: 6.47 CM
LA WIDTH: 4.9 CM
LEFT ATRIUM SIZE: 4.81 CM
LEFT ATRIUM VOLUME INDEX MOD: 47.9 ML/M2
LEFT ATRIUM VOLUME INDEX: 60.6 ML/M2
LEFT ATRIUM VOLUME MOD: 107.75 CM3
LEFT ATRIUM VOLUME: 136.45 CM3
LEFT INTERNAL DIMENSION IN SYSTOLE: 2.66 CM (ref 2.1–4)
LEFT VENTRICLE DIASTOLIC VOLUME INDEX: 26.36 ML/M2
LEFT VENTRICLE DIASTOLIC VOLUME: 59.31 ML
LEFT VENTRICLE MASS INDEX: 87 G/M2
LEFT VENTRICLE SYSTOLIC VOLUME INDEX: 11.5 ML/M2
LEFT VENTRICLE SYSTOLIC VOLUME: 25.95 ML
LEFT VENTRICULAR INTERNAL DIMENSION IN DIASTOLE: 3.73 CM (ref 3.5–6)
LEFT VENTRICULAR MASS: 195.53 G
LV LATERAL E/E' RATIO: 9.75 M/S
LV SEPTAL E/E' RATIO: 13 M/S
LVOT MG: 3.7 MMHG
LVOT MV: 0.92 CM/S
LYMPHOCYTES # BLD AUTO: 1.9 K/UL (ref 1–4.8)
LYMPHOCYTES NFR BLD: 18.4 % (ref 18–48)
MAGNESIUM SERPL-MCNC: 2.3 MG/DL (ref 1.6–2.6)
MCH RBC QN AUTO: 30.2 PG (ref 27–31)
MCHC RBC AUTO-ENTMCNC: 33.7 G/DL (ref 32–36)
MCV RBC AUTO: 90 FL (ref 82–98)
MONOCYTES # BLD AUTO: 1.1 K/UL (ref 0.3–1)
MONOCYTES NFR BLD: 10.4 % (ref 4–15)
MV MEAN GRADIENT: 2 MMHG
MV PEAK A VEL: 1.17 M/S
MV PEAK E VEL: 0.78 M/S
MV PEAK GRADIENT: 7 MMHG
MV STENOSIS PRESSURE HALF TIME: 62.22 MS
MV VALVE AREA BY CONTINUITY EQUATION: 1.79 CM2
MV VALVE AREA P 1/2 METHOD: 3.54 CM2
NEUTROPHILS # BLD AUTO: 7.1 K/UL (ref 1.8–7.7)
NEUTROPHILS NFR BLD: 70 % (ref 38–73)
NRBC BLD-RTO: 0 /100 WBC
OHS LV EJECTION FRACTION SIMPSONS BIPLANE MOD: 64 %
PISA MRMAX VEL: 4.64 M/S
PISA TR MAX VEL: 2.81 M/S
PLATELET # BLD AUTO: 175 K/UL (ref 150–450)
PMV BLD AUTO: 11.8 FL (ref 9.2–12.9)
POCT GLUCOSE: 113 MG/DL (ref 70–110)
POCT GLUCOSE: 82 MG/DL (ref 70–110)
POCT GLUCOSE: 94 MG/DL (ref 70–110)
POCT GLUCOSE: 95 MG/DL (ref 70–110)
POTASSIUM SERPL-SCNC: 4.2 MMOL/L (ref 3.5–5.1)
PULM VEIN S/D RATIO: 1.09
PV PEAK D VEL: 0.35 M/S
PV PEAK GRADIENT: 10 MMHG
PV PEAK S VEL: 0.38 M/S
PV PEAK VELOCITY: 1.56 M/S
RA MAJOR: 5.44 CM
RA PRESSURE ESTIMATED: 3 MMHG
RA WIDTH: 4.23 CM
RBC # BLD AUTO: 4.61 M/UL (ref 4.6–6.2)
RIGHT VENTRICULAR END-DIASTOLIC DIMENSION: 3.41 CM
RV TB RVSP: 6 MMHG
RV TISSUE DOPPLER FREE WALL SYSTOLIC VELOCITY 1 (APICAL 4 CHAMBER VIEW): 14.75 CM/S
SINUS: 4.03 CM
SODIUM SERPL-SCNC: 141 MMOL/L (ref 136–145)
STJ: 3.56 CM
TDI LATERAL: 0.08 M/S
TDI SEPTAL: 0.06 M/S
TDI: 0.07 M/S
TR MAX PG: 32 MMHG
TRICUSPID ANNULAR PLANE SYSTOLIC EXCURSION: 2.72 CM
TV REST PULMONARY ARTERY PRESSURE: 35 MMHG
WBC # BLD AUTO: 10.12 K/UL (ref 3.9–12.7)
Z-SCORE OF LEFT VENTRICULAR DIMENSION IN END DIASTOLE: -7.82
Z-SCORE OF LEFT VENTRICULAR DIMENSION IN END SYSTOLE: -4.89

## 2023-12-19 PROCEDURE — 25500020 PHARM REV CODE 255: Mod: HCNC | Performed by: INTERNAL MEDICINE

## 2023-12-19 PROCEDURE — 25000003 PHARM REV CODE 250: Mod: HCNC | Performed by: HOSPITALIST

## 2023-12-19 PROCEDURE — 25000242 PHARM REV CODE 250 ALT 637 W/ HCPCS: Mod: HCNC | Performed by: STUDENT IN AN ORGANIZED HEALTH CARE EDUCATION/TRAINING PROGRAM

## 2023-12-19 PROCEDURE — 25000003 PHARM REV CODE 250: Mod: HCNC | Performed by: NURSE PRACTITIONER

## 2023-12-19 PROCEDURE — 25000003 PHARM REV CODE 250: Mod: HCNC | Performed by: FAMILY MEDICINE

## 2023-12-19 PROCEDURE — C1785 PMKR, DUAL, RATE-RESP: HCPCS | Mod: HCNC | Performed by: INTERNAL MEDICINE

## 2023-12-19 PROCEDURE — 25000003 PHARM REV CODE 250: Mod: HCNC | Performed by: INTERNAL MEDICINE

## 2023-12-19 PROCEDURE — 85025 COMPLETE CBC W/AUTO DIFF WBC: CPT | Mod: HCNC | Performed by: INTERNAL MEDICINE

## 2023-12-19 PROCEDURE — 99221 PR INITIAL HOSPITAL CARE,LEVL I: ICD-10-PCS | Mod: HCNC,,, | Performed by: INTERNAL MEDICINE

## 2023-12-19 PROCEDURE — C1898 LEAD, PMKR, OTHER THAN TRANS: HCPCS | Mod: HCNC | Performed by: INTERNAL MEDICINE

## 2023-12-19 PROCEDURE — 25000003 PHARM REV CODE 250: Mod: HCNC | Performed by: STUDENT IN AN ORGANIZED HEALTH CARE EDUCATION/TRAINING PROGRAM

## 2023-12-19 PROCEDURE — 63600175 PHARM REV CODE 636 W HCPCS: Mod: HCNC | Performed by: INTERNAL MEDICINE

## 2023-12-19 PROCEDURE — 99152 MOD SED SAME PHYS/QHP 5/>YRS: CPT | Mod: HCNC | Performed by: INTERNAL MEDICINE

## 2023-12-19 PROCEDURE — 33208 INSRT HEART PM ATRIAL & VENT: CPT | Mod: HCNC | Performed by: INTERNAL MEDICINE

## 2023-12-19 PROCEDURE — 80048 BASIC METABOLIC PNL TOTAL CA: CPT | Mod: HCNC | Performed by: INTERNAL MEDICINE

## 2023-12-19 PROCEDURE — 83735 ASSAY OF MAGNESIUM: CPT | Mod: HCNC | Performed by: INTERNAL MEDICINE

## 2023-12-19 PROCEDURE — 99153 MOD SED SAME PHYS/QHP EA: CPT | Mod: HCNC | Performed by: INTERNAL MEDICINE

## 2023-12-19 PROCEDURE — 21400001 HC TELEMETRY ROOM: Mod: HCNC

## 2023-12-19 PROCEDURE — 36415 COLL VENOUS BLD VENIPUNCTURE: CPT | Mod: HCNC | Performed by: INTERNAL MEDICINE

## 2023-12-19 PROCEDURE — 99221 1ST HOSP IP/OBS SF/LOW 40: CPT | Mod: HCNC,,, | Performed by: INTERNAL MEDICINE

## 2023-12-19 PROCEDURE — 94761 N-INVAS EAR/PLS OXIMETRY MLT: CPT | Mod: HCNC

## 2023-12-19 PROCEDURE — C1894 INTRO/SHEATH, NON-LASER: HCPCS | Mod: HCNC | Performed by: INTERNAL MEDICINE

## 2023-12-19 DEVICE — PACING LEAD
Type: IMPLANTABLE DEVICE | Site: CHEST | Status: FUNCTIONAL
Brand: TENDRIL™

## 2023-12-19 DEVICE — PULSE GENERATOR
Type: IMPLANTABLE DEVICE | Site: CHEST | Status: FUNCTIONAL
Brand: ASSURITY MRI™

## 2023-12-19 RX ORDER — IODIXANOL 320 MG/ML
INJECTION, SOLUTION INTRAVASCULAR
Status: DISCONTINUED | OUTPATIENT
Start: 2023-12-19 | End: 2023-12-19 | Stop reason: HOSPADM

## 2023-12-19 RX ORDER — DIPHENHYDRAMINE HYDROCHLORIDE 50 MG/ML
INJECTION INTRAMUSCULAR; INTRAVENOUS
Status: DISCONTINUED | OUTPATIENT
Start: 2023-12-19 | End: 2023-12-19 | Stop reason: HOSPADM

## 2023-12-19 RX ORDER — VANCOMYCIN HYDROCHLORIDE 1 G/20ML
INJECTION, POWDER, LYOPHILIZED, FOR SOLUTION INTRAVENOUS
Status: DISCONTINUED | OUTPATIENT
Start: 2023-12-19 | End: 2023-12-19 | Stop reason: HOSPADM

## 2023-12-19 RX ORDER — CEFAZOLIN SODIUM 1 G/3ML
INJECTION, POWDER, FOR SOLUTION INTRAMUSCULAR; INTRAVENOUS
Status: DISCONTINUED | OUTPATIENT
Start: 2023-12-19 | End: 2023-12-19 | Stop reason: HOSPADM

## 2023-12-19 RX ORDER — MIDAZOLAM HYDROCHLORIDE 1 MG/ML
INJECTION, SOLUTION INTRAMUSCULAR; INTRAVENOUS
Status: DISCONTINUED | OUTPATIENT
Start: 2023-12-19 | End: 2023-12-19 | Stop reason: HOSPADM

## 2023-12-19 RX ORDER — LIDOCAINE HYDROCHLORIDE 10 MG/ML
INJECTION, SOLUTION EPIDURAL; INFILTRATION; INTRACAUDAL; PERINEURAL
Status: DISCONTINUED | OUTPATIENT
Start: 2023-12-19 | End: 2023-12-19 | Stop reason: HOSPADM

## 2023-12-19 RX ORDER — TALC
6 POWDER (GRAM) TOPICAL NIGHTLY PRN
Status: DISCONTINUED | OUTPATIENT
Start: 2023-12-19 | End: 2023-12-23 | Stop reason: HOSPADM

## 2023-12-19 RX ORDER — FENTANYL CITRATE 50 UG/ML
INJECTION, SOLUTION INTRAMUSCULAR; INTRAVENOUS
Status: DISCONTINUED | OUTPATIENT
Start: 2023-12-19 | End: 2023-12-19 | Stop reason: HOSPADM

## 2023-12-19 RX ORDER — CEFAZOLIN SODIUM 1 G/50ML
1 SOLUTION INTRAVENOUS
Status: COMPLETED | OUTPATIENT
Start: 2023-12-19 | End: 2023-12-20

## 2023-12-19 RX ADMIN — HYDROXYZINE HYDROCHLORIDE 25 MG: 25 TABLET ORAL at 09:12

## 2023-12-19 RX ADMIN — TAMSULOSIN HYDROCHLORIDE 0.4 MG: 0.4 CAPSULE ORAL at 09:12

## 2023-12-19 RX ADMIN — ALUMINUM HYDROXIDE, MAGNESIUM HYDROXIDE, AND SIMETHICONE 30 ML: 200; 200; 20 SUSPENSION ORAL at 09:12

## 2023-12-19 RX ADMIN — FINASTERIDE 5 MG: 5 TABLET, FILM COATED ORAL at 09:12

## 2023-12-19 RX ADMIN — MUPIROCIN: 20 OINTMENT TOPICAL at 09:12

## 2023-12-19 RX ADMIN — PRAVASTATIN SODIUM 40 MG: 40 TABLET ORAL at 09:12

## 2023-12-19 RX ADMIN — CEFAZOLIN SODIUM 1 G: 1 SOLUTION INTRAVENOUS at 04:12

## 2023-12-19 RX ADMIN — NICARDIPINE HYDROCHLORIDE 10 MG/HR: 0.2 INJECTION, SOLUTION INTRAVENOUS at 12:12

## 2023-12-19 RX ADMIN — SUCRALFATE 1 G: 1 SUSPENSION ORAL at 11:12

## 2023-12-19 RX ADMIN — LINACLOTIDE 145 MCG: 145 CAPSULE, GELATIN COATED ORAL at 05:12

## 2023-12-19 RX ADMIN — MELATONIN TAB 3 MG 6 MG: 3 TAB at 09:12

## 2023-12-19 RX ADMIN — NICARDIPINE HYDROCHLORIDE 7.5 MG/HR: 0.2 INJECTION, SOLUTION INTRAVENOUS at 05:12

## 2023-12-19 RX ADMIN — ALUMINUM HYDROXIDE, MAGNESIUM HYDROXIDE, AND SIMETHICONE 30 ML: 200; 200; 20 SUSPENSION ORAL at 11:12

## 2023-12-19 RX ADMIN — SUCRALFATE 1 G: 1 SUSPENSION ORAL at 05:12

## 2023-12-19 RX ADMIN — LOSARTAN POTASSIUM 100 MG: 50 TABLET, FILM COATED ORAL at 09:12

## 2023-12-19 RX ADMIN — FLUTICASONE PROPIONATE 50 MCG: 50 SPRAY, METERED NASAL at 09:12

## 2023-12-19 NOTE — BRIEF OP NOTE
Dual chamber pacemaker:   Complications: none  Blood loss<50 cc  Thresholds and impedance great  CXR now and in am

## 2023-12-19 NOTE — SUBJECTIVE & OBJECTIVE
Past Medical History:   Diagnosis Date    Arthritis     Back pain, chronic     BPH with urinary obstruction     Chronic constipation     Closed fracture of right shoulder 1/28/2021    Closed nondisplaced fracture of greater tuberosity of right humerus 2/24/2021    Colon polyp     DJD (degenerative joint disease)     Hip    Hyperlipidemia     Hypertension     Laryngopharyngeal reflux     Left inguinal hernia     Lumbar herniated disc     Lumbar stenosis     Neck mass 10/7/2014    -4/29/2022 path - lipoma    OA (osteoarthritis) of knee     Bilateral    Paradoxical insomnia     Sinus trouble        Past Surgical History:   Procedure Laterality Date    BACK SURGERY  2014    COLONOSCOPY      COLONOSCOPY N/A 5/17/2023    Procedure: COLONOSCOPY;  Surgeon: Christiano Vazquez MD;  Location: [facility] ENDO;  Service: Endoscopy;  Laterality: N/A;  Constipation 2 day prep.Instructions to portal.EC    CORONARY ANGIOGRAPHY N/A 12/18/2023    Procedure: ANGIOGRAM, CORONARY ARTERY;  Surgeon: Flaco Kelly MD;  Location: [facility] CATH LAB/EP;  Service: Cardiology;  Laterality: N/A;    EPIDURAL STEROID INJECTION INTO LUMBAR SPINE N/A 7/5/2022    Procedure: Injection-steroid-epidural-lumbar L3-4;  Surgeon: Yury Crum Jr., MD;  Location: [facility] PAIN MGT;  Service: Pain Management;  Laterality: N/A;  oral sedation   asa      INSERTION, PACEMAKER, TEMPORARY TRANSVENOUS  12/18/2023    Procedure: Insertion, Pacemaker, Temporary Transvenous;  Surgeon: Flaco Kelly MD;  Location: [facility] CATH LAB/EP;  Service: Cardiology;;    JOINT REPLACEMENT Left 05/04/2018    KNEE SURGERY      left hand      LEG SURGERY      SPINE SURGERY      UPPER GASTROINTESTINAL ENDOSCOPY         Review of patient's allergies indicates:   Allergen Reactions    Clindamycin Swelling     Throat swells    Lisinopril Swelling and Other (See Comments)     Throat swelling    Shellfish containing products        No current facility-administered medications on file  prior to encounter.     Current Outpatient Medications on File Prior to Encounter   Medication Sig    aspirin (ECOTRIN) 325 MG EC tablet Take 1 tablet (325 mg total) by mouth once daily. (Patient taking differently: Take 325 mg by mouth every other day.)    azelastine (ASTELIN) 137 mcg (0.1 %) nasal spray 1 spray (137 mcg total) by Nasal route 2 (two) times daily as needed for Rhinitis.    finasteride (PROSCAR) 5 mg tablet Take 1 tablet (5 mg total) by mouth once daily.    fluticasone propionate (FLONASE) 50 mcg/actuation nasal spray 1 spray (50 mcg total) by Each Nostril route once daily.    hydrOXYzine HCL (ATARAX) 25 MG tablet Take 1 tablet (25 mg total) by mouth 3 (three) times daily as needed for Anxiety.    linaCLOtide (LINZESS) 145 mcg Cap capsule Take 1 capsule (145 mcg total) by mouth daily as needed (constipation).    losartan (COZAAR) 100 MG tablet Take 1 tablet (100 mg total) by mouth once daily.    multivitamin capsule Take 1 capsule by mouth once daily.    pantoprazole (PROTONIX) 40 MG tablet Take 1 tablet (40 mg total) by mouth once daily.    polyethylene glycol (GLYCOLAX) 17 gram/dose powder Take 17 g by mouth daily as needed.    pravastatin (PRAVACHOL) 40 MG tablet Take 1 tablet (40 mg total) by mouth once daily.    tamsulosin (FLOMAX) 0.4 mg Cap Take 1 capsule (0.4 mg total) by mouth once daily.     Family History       Problem Relation (Age of Onset)    Cancer Father    No Known Problems Mother, Sister, Brother, Maternal Aunt, Maternal Uncle, Paternal Aunt, Paternal Uncle, Maternal Grandmother, Maternal Grandfather, Paternal Grandmother, Paternal Grandfather, Daughter, Son          Tobacco Use    Smoking status: Never    Smokeless tobacco: Never   Substance and Sexual Activity    Alcohol use: Yes     Alcohol/week: 0.0 standard drinks of alcohol     Comment: approximately 2 beers weekly    Drug use: No    Sexual activity: Yes     Partners: Female     Birth control/protection: None     Review of  Systems   Constitutional:  Positive for fatigue. Negative for chills and fever.   Respiratory:  Negative for shortness of breath.    Cardiovascular:  Negative for chest pain and palpitations.   Gastrointestinal:  Negative for abdominal pain, constipation, diarrhea, nausea and vomiting.   Neurological:  Negative for dizziness and headaches.     Objective:     Vital Signs (Most Recent):  Temp: 98.6 °F (37 °C) (12/19/23 0745)  Pulse: 65 (12/19/23 1245)  Resp: (!) 21 (12/19/23 1245)  BP: 136/62 (12/19/23 1245)  SpO2: (!) 92 % (12/19/23 1245) Vital Signs (24h Range):  Temp:  [97.8 °F (36.6 °C)-98.9 °F (37.2 °C)] 98.6 °F (37 °C)  Pulse:  [32-77] 65  Resp:  [9-58] 21  SpO2:  [90 %-98 %] 92 %  BP: (110-254)/() 136/62     Weight: 105.7 kg (233 lb)  Body mass index is 32.5 kg/m².     Physical Exam  Constitutional:       Appearance: Normal appearance.   HENT:      Mouth/Throat:      Mouth: Mucous membranes are moist.      Pharynx: Oropharynx is clear.   Eyes:      Extraocular Movements: Extraocular movements intact.      Pupils: Pupils are equal, round, and reactive to light.   Cardiovascular:      Rate and Rhythm: Normal rate.   Pulmonary:      Effort: Pulmonary effort is normal.      Breath sounds: Normal breath sounds.   Abdominal:      General: Bowel sounds are normal.      Palpations: Abdomen is soft.   Musculoskeletal:         General: Normal range of motion.   Skin:     General: Skin is warm and dry.   Neurological:      General: No focal deficit present.      Mental Status: He is alert and oriented to person, place, and time.   Psychiatric:         Mood and Affect: Mood normal.         Behavior: Behavior normal.         Thought Content: Thought content normal.         Judgment: Judgment normal.              CRANIAL NERVES     CN III, IV, VI   Pupils are equal, round, and reactive to light.       Significant Labs: All pertinent labs within the past 24 hours have been reviewed.    Significant Imaging: I have  reviewed all pertinent imaging results/findings within the past 24 hours.

## 2023-12-19 NOTE — PLAN OF CARE
The sw met with the pt to complete the assessment. The pt is a transfer from [facility] in [place]. The pt lives alone in [place] but has a lot of family and friends near that are very supportive. The pt list his dtr-in-law Melisa Santos [phone] and son Henok Santos [phone] as his emergency contacts. The pt still drives but is unsure who will transport him home at d/c. The pt states his car's in the parking lot at the hospital in [place]. The sw spoke to the staff at the hospital in [place] to notify them of this info b/c the pt was worried about his car in the parking lot. The pt's independent with his ADL's and uses the dme listed below. The sw completed the white board in the pt's room with her name and contact info. The sw encouraged him to call if he has any further questions or concerns. The sw will continue to follow the pt throughout his transitions of care and will assist with any d/c needs.     [facility] - Intensive Care  Initial Discharge Assessment       Primary Care Provider: Juan Bustamante MD    Admission Diagnosis: Complete heart block [I44.2]  Dizziness [R42]  Bradycardia [R00.1]  Symptomatic bradycardia [R00.1]    Admission Date: 12/18/2023  Expected Discharge Date: 12/19/2023    Transition of Care Barriers: (P) None    Payor: [employer] MANAGED MEDICARE / Plan: [employer] MEDICARE HMO / Product Type: Capitation /     Extended Emergency Contact Information  Primary Emergency Contact: Henok Santos  Address: [address]           [place] [address] [employer]  Home Phone: [phone]  Work Phone: [phone]  Mobile Phone: [phone]  Relation: Son  Secondary Emergency Contact: Melisa Santos  Address: [address]           [place] [address] [employer]  Home Phone: [phone]  Work Phone: [phone]  Mobile Phone: [phone]  Relation: Daughter    Discharge Plan A: (P) Home  Discharge Plan B: (P) Home Health      [facility] [address] [place] [address]  [facility] [place] - [address] [facility]  [address]  [facility] [address]  Phone: [phone] Fax: [phone]      Initial Assessment (most recent)       Adult Discharge Assessment - 12/19/23 0738          Discharge Assessment    Assessment Type Discharge Planning Assessment (P)      Confirmed/corrected address, phone number and insurance Yes (P)      Confirmed Demographics Correct on Facesheet (P)    [address] [place] [address]    Source of Information patient (P)      Communicated GONZALO with patient/caregiver Date not available/Unable to determine (P)      Reason For Admission Bradycardia (P)      People in Home alone (P)      Facility Arrived From: [facility] in [place] (P)      Do you expect to return to your current living situation? Yes (P)      Do you have help at home or someone to help you manage your care at home? Yes (P)      Who are your caregiver(s) and their phone number(s)? Melisa Santos(dtr-in-law)[phone] (P)      Prior to hospitilization cognitive status: Alert/Oriented (P)      Current cognitive status: Alert/Oriented (P)      Walking or Climbing Stairs Difficulty yes (P)      Walking or Climbing Stairs ambulation difficulty, requires equipment;stair climbing difficulty, requires equipment;transferring difficulty, requires equipment (P)      Dressing/Bathing Difficulty no (P)      Home Accessibility wheelchair accessible (P)      Home Layout Able to live on 1st floor (P)      Equipment Currently Used at Home cane, straight;rollator;bedside commode;shower chair (P)    the pt has the dme listed but states he doesn't use all of them,he had them from past surgeries    Readmission within 30 days? No (P)      Patient currently being followed by outpatient case management? No (P)      Do you currently have service(s) that help you manage your care at home? No (P)      Do you take prescription medications? Yes (P)      Do you have prescription coverage? Yes (P)      Coverage  [employer] Medicare (P)      Do you have any problems affording any of your prescribed medications? No (P)      Is the patient taking medications as prescribed? yes (P)      Who is going to help you get home at discharge? The pt states he has call an Uber or a friend to pick him up (P)      How do you get to doctors appointments? car, drives self (P)      Are you on dialysis? No (P)      Do you take coumadin? No (P)      Discharge Plan A Home (P)      Discharge Plan B Home Health (P)      DME Needed Upon Discharge  other (see comments) (P)    TBD    Discharge Plan discussed with: Patient (P)      Transition of Care Barriers None (P)         Physical Activity    On average, how many days per week do you engage in moderate to strenuous exercise (like a brisk walk)? 3 days (P)      On average, how many minutes do you engage in exercise at this level? 50 min (P)         Housing Stability    In the last 12 months, was there a time when you were not able to pay the mortgage or rent on time? No (P)      In the last 12 months, how many places have you lived? 1 (P)      In the last 12 months, was there a time when you did not have a steady place to sleep or slept in a shelter (including now)? No (P)         Transportation Needs    In the past 12 months, has lack of transportation kept you from medical appointments or from getting medications? No (P)      In the past 12 months, has lack of transportation kept you from meetings, work, or from getting things needed for daily living? No (P)         Social Connections    In a typical week, how many times do you talk on the phone with family, friends, or neighbors? More than three times a week (P)      How often do you get together with friends or relatives? More than three times a week (P)      Are you , , , , never , or living with a partner?  (P)         Alcohol Use    Q1: How often do you have a drink containing alcohol? 2-4 times  a month (P)      Q2: How many drinks containing alcohol do you have on a typical day when you are drinking? 1 or 2 (P)      Q3: How often do you have six or more drinks on one occasion? Never (P)

## 2023-12-19 NOTE — PLAN OF CARE
Problem: Adult Inpatient Plan of Care  Goal: Plan of Care Review  Outcome: Ongoing, Progressing  Goal: Absence of Hospital-Acquired Illness or Injury  Outcome: Ongoing, Progressing     Problem: Adult Inpatient Plan of Care  Goal: Optimal Comfort and Wellbeing  Outcome: Ongoing, Not Progressing  Goal: Readiness for Transition of Care  Outcome: Ongoing, Not Progressing     POC reviewed with patient.  Patient going for a permanent pacemaker today.  TVP in place overnight paced at 60 bpm.  Patient very uncomfortable overnight.

## 2023-12-19 NOTE — TELEPHONE ENCOUNTER
Please call patient and schedule patient for an appointment with me, or Nurse Practitioner or Physician Assistant ([facility]), soon (first available). Thank you.

## 2023-12-19 NOTE — TELEPHONE ENCOUNTER
----- Message from Mari Wooten sent at 12/19/2023 10:47 AM CST -----  Regarding: HFU  Patient will be discharging from [facility] and a HFU was requested with Dr Vazquez by DC provider.  Please schedule and message me back so DC can relay appointment information to patient prior to discharge.     Dr Vazquez will be performing a procedure today    DX: Bradycardia    Imani Wyatt  Physician Referral Specialist/Discharge

## 2023-12-19 NOTE — H&P
Interim H and P  Patient with symptoms of weakness and checked his pulse rate and noted to be int he 20's. 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   Admitted and had cath and temporary pacemaker placement yesterday. Cath with nonobstructive disease.   Presently consented for permanent pacemaker    Exam: unremarkable    EKG: ventricular paced rhythm    Risks and benefits explained to patient and will proceed this morning.

## 2023-12-19 NOTE — TELEPHONE ENCOUNTER
----- Message from Mari Wooten sent at 12/19/2023  3:57 PM CST -----  Regarding: HFU  Good afternoon.  I sent message earlier for schedule. I relayed Feb appointment information back to CM that was scheduled.    They wanted me to let you know that patient received a pacer today and will need a much sooner f/u with Dr Vazquez.  Please schedule sooner and message me back.     DX: Bradycardia    Thanks, Imani  Physician Referral Specialist/Discharge

## 2023-12-19 NOTE — TELEPHONE ENCOUNTER
----- Message from Juan Bustamante MD sent at 12/19/2023 11:55 AM CST -----  Regarding: FW: HFU    ----- Message -----  From: Mari Wooten  Sent: 12/19/2023  10:50 AM CST  To: Dianna PARADA Staff  Subject: HFU                                              Patient is being discharged from [facility] and is requiring a hospital follow up appointment with their Primary Care Provider in 7 days. Patient is established. I am unable to schedule an appointment in that time frame. Please schedule patient a sooner appointment and message me back so Discharge Nurse can advise patient prior to discharge.    DX:Bradycardia      Thank you, Imani  Physician Referral Specialist/Discharge

## 2023-12-19 NOTE — ASSESSMENT & PLAN NOTE
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   Now s/p dual chamber pacemaker by  on 12/19/2023  Currently normal rate, stable, and asymptomatic    Plan:  Keflex 500mg TID x5 days  Repeat CXR in the AM  Anticipate discharge tomorrow

## 2023-12-19 NOTE — H&P
"  [facility] Medicine  History & Physical    Patient Name: Korey Santos  MRN: [id number]  Patient Class: IP- Inpatient  Admission Date: 12/18/2023  Attending Physician: Darrell Constantino MD  Primary Care Provider: Juan Bustamante MD         Patient information was obtained from patient, past medical records, and ER records.     Subjective:     Principal Problem:Bradycardia    Chief Complaint:   Chief Complaint   Patient presents with    Shortness of Breath    Dizziness     X's 1 week, started to get worse over the weekend. Patient states that he feels "woozy" when he stands or walk 30-40 feet.         HPI: Korey Santos 84 y/o. with BPH, HLD, DJD, HTN, , chronic constipation, GERD, presents to the emergency room with complaints of  few weeks' history of intermittent lightheadedness with activities is usually walking 20-40 feet.  Symptom has been worse since over the weekend.  He denies syncopal episode, chest pain, nausea, vomiting, weakness, tremor, diaphoresis, fever, chills. n report noncompliant with blood pressure medication recently because he forgets to take them, also concern that his heart rate has been between 30s to 40s with his new blood pressure machine.    In the ED HR 29, Bp 171/73, sodium 140 potassium 4.4, bicarb 20, BUN/creatinine 20/1.06, magnesium 2.3, H/H 13.7/41.9, troponin 0.036, CXR with no acute process.  Transferred to [facility] for Cardiology evaluation and pacemaker placement      Past Medical History:   Diagnosis Date    Arthritis     Back pain, chronic     BPH with urinary obstruction     Chronic constipation     Closed fracture of right shoulder 1/28/2021    Closed nondisplaced fracture of greater tuberosity of right humerus 2/24/2021    Colon polyp     DJD (degenerative joint disease)     Hip    Hyperlipidemia     Hypertension     Laryngopharyngeal reflux     Left inguinal hernia     Lumbar herniated disc     Lumbar stenosis     Neck mass 10/7/2014 "    -4/29/2022 path - lipoma    OA (osteoarthritis) of knee     Bilateral    Paradoxical insomnia     Sinus trouble        Past Surgical History:   Procedure Laterality Date    BACK SURGERY  2014    COLONOSCOPY      COLONOSCOPY N/A 5/17/2023    Procedure: COLONOSCOPY;  Surgeon: Christiano Vazquez MD;  Location: [facility] ENDO;  Service: Endoscopy;  Laterality: N/A;  Constipation 2 day prep.Instructions to portal.EC    CORONARY ANGIOGRAPHY N/A 12/18/2023    Procedure: ANGIOGRAM, CORONARY ARTERY;  Surgeon: Flaco Kelly MD;  Location: [facility] CATH LAB/EP;  Service: Cardiology;  Laterality: N/A;    EPIDURAL STEROID INJECTION INTO LUMBAR SPINE N/A 7/5/2022    Procedure: Injection-steroid-epidural-lumbar L3-4;  Surgeon: Yury Crum Jr., MD;  Location: [facility] PAIN MGT;  Service: Pain Management;  Laterality: N/A;  oral sedation   asa      INSERTION, PACEMAKER, TEMPORARY TRANSVENOUS  12/18/2023    Procedure: Insertion, Pacemaker, Temporary Transvenous;  Surgeon: Flaco Kelly MD;  Location: [facility] CATH LAB/EP;  Service: Cardiology;;    JOINT REPLACEMENT Left 05/04/2018    KNEE SURGERY      left hand      LEG SURGERY      SPINE SURGERY      UPPER GASTROINTESTINAL ENDOSCOPY         Review of patient's allergies indicates:   Allergen Reactions    Clindamycin Swelling     Throat swells    Lisinopril Swelling and Other (See Comments)     Throat swelling    Shellfish containing products        No current facility-administered medications on file prior to encounter.     Current Outpatient Medications on File Prior to Encounter   Medication Sig    aspirin (ECOTRIN) 325 MG EC tablet Take 1 tablet (325 mg total) by mouth once daily. (Patient taking differently: Take 325 mg by mouth every other day.)    azelastine (ASTELIN) 137 mcg (0.1 %) nasal spray 1 spray (137 mcg total) by Nasal route 2 (two) times daily as needed for Rhinitis.    finasteride (PROSCAR) 5 mg tablet Take 1 tablet (5 mg total) by mouth once daily.     fluticasone propionate (FLONASE) 50 mcg/actuation nasal spray 1 spray (50 mcg total) by Each Nostril route once daily.    hydrOXYzine HCL (ATARAX) 25 MG tablet Take 1 tablet (25 mg total) by mouth 3 (three) times daily as needed for Anxiety.    linaCLOtide (LINZESS) 145 mcg Cap capsule Take 1 capsule (145 mcg total) by mouth daily as needed (constipation).    losartan (COZAAR) 100 MG tablet Take 1 tablet (100 mg total) by mouth once daily.    multivitamin capsule Take 1 capsule by mouth once daily.    pantoprazole (PROTONIX) 40 MG tablet Take 1 tablet (40 mg total) by mouth once daily.    polyethylene glycol (GLYCOLAX) 17 gram/dose powder Take 17 g by mouth daily as needed.    pravastatin (PRAVACHOL) 40 MG tablet Take 1 tablet (40 mg total) by mouth once daily.    tamsulosin (FLOMAX) 0.4 mg Cap Take 1 capsule (0.4 mg total) by mouth once daily.     Family History       Problem Relation (Age of Onset)    Cancer Father    No Known Problems Mother, Sister, Brother, Maternal Aunt, Maternal Uncle, Paternal Aunt, Paternal Uncle, Maternal Grandmother, Maternal Grandfather, Paternal Grandmother, Paternal Grandfather, Daughter, Son          Tobacco Use    Smoking status: Never    Smokeless tobacco: Never   Substance and Sexual Activity    Alcohol use: Yes     Alcohol/week: 0.0 standard drinks of alcohol     Comment: approximately 2 beers weekly    Drug use: No    Sexual activity: Yes     Partners: Female     Birth control/protection: None     Review of Systems   Constitutional:  Positive for fatigue. Negative for chills and fever.   Respiratory:  Negative for shortness of breath.    Cardiovascular:  Negative for chest pain and palpitations.   Gastrointestinal:  Negative for abdominal pain, constipation, diarrhea, nausea and vomiting.   Neurological:  Negative for dizziness and headaches.     Objective:     Vital Signs (Most Recent):  Temp: 98.6 °F (37 °C) (12/19/23 0745)  Pulse: 65 (12/19/23 1245)  Resp: (!) 21 (12/19/23  1245)  BP: 136/62 (12/19/23 1245)  SpO2: (!) 92 % (12/19/23 1245) Vital Signs (24h Range):  Temp:  [97.8 °F (36.6 °C)-98.9 °F (37.2 °C)] 98.6 °F (37 °C)  Pulse:  [32-77] 65  Resp:  [9-58] 21  SpO2:  [90 %-98 %] 92 %  BP: (110-254)/() 136/62     Weight: 105.7 kg (233 lb)  Body mass index is 32.5 kg/m².     Physical Exam  Constitutional:       Appearance: Normal appearance.   HENT:      Mouth/Throat:      Mouth: Mucous membranes are moist.      Pharynx: Oropharynx is clear.   Eyes:      Extraocular Movements: Extraocular movements intact.      Pupils: Pupils are equal, round, and reactive to light.   Cardiovascular:      Rate and Rhythm: Normal rate.   Pulmonary:      Effort: Pulmonary effort is normal.      Breath sounds: Normal breath sounds.   Abdominal:      General: Bowel sounds are normal.      Palpations: Abdomen is soft.   Musculoskeletal:         General: Normal range of motion.   Skin:     General: Skin is warm and dry.   Neurological:      General: No focal deficit present.      Mental Status: He is alert and oriented to person, place, and time.   Psychiatric:         Mood and Affect: Mood normal.         Behavior: Behavior normal.         Thought Content: Thought content normal.         Judgment: Judgment normal.              CRANIAL NERVES     CN III, IV, VI   Pupils are equal, round, and reactive to light.       Significant Labs: All pertinent labs within the past 24 hours have been reviewed.    Significant Imaging: I have reviewed all pertinent imaging results/findings within the past 24 hours.  Assessment/Plan:     * Bradycardia  On admission, EKG with RBBB and complete AV block.   Now s/p dual chamber pacemaker by  on 12/19/2023  Currently normal rate, stable, and asymptomatic    Plan:  Keflex 500mg TID x5 days  Repeat CXR in the AM  Anticipate discharge tomorrow    Hyperlipidemia  Continue statin    Benign prostatic hyperplasia with urinary frequency  Continue Flomax and  tamsulosin        VTE Risk Mitigation (From admission, onward)           Ordered     IP VTE HIGH RISK PATIENT  Once         12/18/23 1134     Place sequential compression device  Until discontinued         12/18/23 1134                  Critical care time spent on the evaluation and treatment of severe organ dysfunction, review of pertinent labs and imaging studies, discussions with consulting providers and discussions with patient/family: 40 minutes.                  Darrell Constantino MD  Department of Hospital Medicine  [place] - Intensive Care

## 2023-12-19 NOTE — PLAN OF CARE
CXR negative for pneumothorax   Good lead position   May discharge in am after CXR on keflex 500 TID for 5 days

## 2023-12-19 NOTE — NURSING
Pt trf to room 427 via wheelchair on tele monitor.  All of pt's personal belongings sent with him, including smartphone, cane, bag of clothes with shoes and pacemaker box with equipment from rep. Left upper chest site CDI, no hematoma or bleeding noted.  Pt oriented to room.  Call light in reach. Bed in lowest position.  Non-slip socks on.  Side rails upx2.  Rico RN at bedside.  All questions answered.

## 2023-12-20 ENCOUNTER — TELEPHONE (OUTPATIENT)
Dept: CARDIOLOGY | Facility: CLINIC | Age: 83
End: 2023-12-20
Payer: MEDICARE

## 2023-12-20 PROBLEM — M79.642 LEFT HAND PAIN: Status: ACTIVE | Noted: 2023-12-20

## 2023-12-20 LAB
ANION GAP SERPL CALC-SCNC: 8 MMOL/L (ref 8–16)
BASOPHILS # BLD AUTO: 0.06 K/UL (ref 0–0.2)
BASOPHILS NFR BLD: 0.7 % (ref 0–1.9)
BUN SERPL-MCNC: 25 MG/DL (ref 8–23)
CALCIUM SERPL-MCNC: 8.2 MG/DL (ref 8.7–10.5)
CHLORIDE SERPL-SCNC: 110 MMOL/L (ref 95–110)
CO2 SERPL-SCNC: 21 MMOL/L (ref 23–29)
CREAT SERPL-MCNC: 1.1 MG/DL (ref 0.5–1.4)
DIFFERENTIAL METHOD: ABNORMAL
EOSINOPHIL # BLD AUTO: 0.2 K/UL (ref 0–0.5)
EOSINOPHIL NFR BLD: 2 % (ref 0–8)
ERYTHROCYTE [DISTWIDTH] IN BLOOD BY AUTOMATED COUNT: 13.8 % (ref 11.5–14.5)
EST. GFR  (NO RACE VARIABLE): >60 ML/MIN/1.73 M^2
GLUCOSE SERPL-MCNC: 102 MG/DL (ref 70–110)
HCT VFR BLD AUTO: 38.9 % (ref 40–54)
HGB BLD-MCNC: 12.9 G/DL (ref 14–18)
IMM GRANULOCYTES # BLD AUTO: 0.03 K/UL (ref 0–0.04)
IMM GRANULOCYTES NFR BLD AUTO: 0.4 % (ref 0–0.5)
LYMPHOCYTES # BLD AUTO: 2.2 K/UL (ref 1–4.8)
LYMPHOCYTES NFR BLD: 25.8 % (ref 18–48)
MAGNESIUM SERPL-MCNC: 2.4 MG/DL (ref 1.6–2.6)
MCH RBC QN AUTO: 30.1 PG (ref 27–31)
MCHC RBC AUTO-ENTMCNC: 33.2 G/DL (ref 32–36)
MCV RBC AUTO: 91 FL (ref 82–98)
MONOCYTES # BLD AUTO: 0.9 K/UL (ref 0.3–1)
MONOCYTES NFR BLD: 10.7 % (ref 4–15)
NEUTROPHILS # BLD AUTO: 5.2 K/UL (ref 1.8–7.7)
NEUTROPHILS NFR BLD: 60.4 % (ref 38–73)
NRBC BLD-RTO: 0 /100 WBC
PLATELET # BLD AUTO: 152 K/UL (ref 150–450)
PMV BLD AUTO: 11.4 FL (ref 9.2–12.9)
POCT GLUCOSE: 100 MG/DL (ref 70–110)
POCT GLUCOSE: 118 MG/DL (ref 70–110)
POCT GLUCOSE: 119 MG/DL (ref 70–110)
POCT GLUCOSE: 95 MG/DL (ref 70–110)
POTASSIUM SERPL-SCNC: 4.1 MMOL/L (ref 3.5–5.1)
RBC # BLD AUTO: 4.28 M/UL (ref 4.6–6.2)
SODIUM SERPL-SCNC: 139 MMOL/L (ref 136–145)
WBC # BLD AUTO: 8.57 K/UL (ref 3.9–12.7)

## 2023-12-20 PROCEDURE — 97165 OT EVAL LOW COMPLEX 30 MIN: CPT | Mod: HCNC

## 2023-12-20 PROCEDURE — 83735 ASSAY OF MAGNESIUM: CPT | Mod: HCNC | Performed by: INTERNAL MEDICINE

## 2023-12-20 PROCEDURE — 63600175 PHARM REV CODE 636 W HCPCS: Mod: HCNC | Performed by: INTERNAL MEDICINE

## 2023-12-20 PROCEDURE — 99232 SBSQ HOSP IP/OBS MODERATE 35: CPT | Mod: HCNC,,, | Performed by: INTERNAL MEDICINE

## 2023-12-20 PROCEDURE — 80048 BASIC METABOLIC PNL TOTAL CA: CPT | Mod: HCNC | Performed by: INTERNAL MEDICINE

## 2023-12-20 PROCEDURE — 97535 SELF CARE MNGMENT TRAINING: CPT | Mod: HCNC

## 2023-12-20 PROCEDURE — 21400001 HC TELEMETRY ROOM: Mod: HCNC

## 2023-12-20 PROCEDURE — 97161 PT EVAL LOW COMPLEX 20 MIN: CPT | Mod: HCNC

## 2023-12-20 PROCEDURE — 25000003 PHARM REV CODE 250: Mod: HCNC | Performed by: HOSPITALIST

## 2023-12-20 PROCEDURE — 25000003 PHARM REV CODE 250: Mod: HCNC | Performed by: INTERNAL MEDICINE

## 2023-12-20 PROCEDURE — 94761 N-INVAS EAR/PLS OXIMETRY MLT: CPT | Mod: HCNC

## 2023-12-20 PROCEDURE — 97116 GAIT TRAINING THERAPY: CPT | Mod: HCNC

## 2023-12-20 PROCEDURE — 99232 PR SUBSEQUENT HOSPITAL CARE,LEVL II: ICD-10-PCS | Mod: HCNC,,, | Performed by: INTERNAL MEDICINE

## 2023-12-20 PROCEDURE — 85025 COMPLETE CBC W/AUTO DIFF WBC: CPT | Mod: HCNC | Performed by: INTERNAL MEDICINE

## 2023-12-20 PROCEDURE — 25000003 PHARM REV CODE 250: Mod: HCNC | Performed by: FAMILY MEDICINE

## 2023-12-20 PROCEDURE — 25000003 PHARM REV CODE 250: Mod: HCNC | Performed by: STUDENT IN AN ORGANIZED HEALTH CARE EDUCATION/TRAINING PROGRAM

## 2023-12-20 PROCEDURE — 36415 COLL VENOUS BLD VENIPUNCTURE: CPT | Mod: HCNC | Performed by: INTERNAL MEDICINE

## 2023-12-20 PROCEDURE — 97530 THERAPEUTIC ACTIVITIES: CPT | Mod: HCNC

## 2023-12-20 PROCEDURE — 92610 EVALUATE SWALLOWING FUNCTION: CPT | Mod: HCNC

## 2023-12-20 RX ADMIN — MUPIROCIN: 20 OINTMENT TOPICAL at 09:12

## 2023-12-20 RX ADMIN — PRAVASTATIN SODIUM 40 MG: 40 TABLET ORAL at 09:12

## 2023-12-20 RX ADMIN — ALUMINUM HYDROXIDE, MAGNESIUM HYDROXIDE, AND SIMETHICONE 30 ML: 200; 200; 20 SUSPENSION ORAL at 11:12

## 2023-12-20 RX ADMIN — ALUMINUM HYDROXIDE, MAGNESIUM HYDROXIDE, AND SIMETHICONE 30 ML: 200; 200; 20 SUSPENSION ORAL at 06:12

## 2023-12-20 RX ADMIN — SUCRALFATE 1 G: 1 SUSPENSION ORAL at 11:12

## 2023-12-20 RX ADMIN — LOSARTAN POTASSIUM 100 MG: 50 TABLET, FILM COATED ORAL at 09:12

## 2023-12-20 RX ADMIN — ALUMINUM HYDROXIDE, MAGNESIUM HYDROXIDE, AND SIMETHICONE 30 ML: 200; 200; 20 SUSPENSION ORAL at 05:12

## 2023-12-20 RX ADMIN — FINASTERIDE 5 MG: 5 TABLET, FILM COATED ORAL at 09:12

## 2023-12-20 RX ADMIN — SUCRALFATE 1 G: 1 SUSPENSION ORAL at 12:12

## 2023-12-20 RX ADMIN — ACETAMINOPHEN 650 MG: 325 TABLET ORAL at 11:12

## 2023-12-20 RX ADMIN — SUCRALFATE 1 G: 1 SUSPENSION ORAL at 06:12

## 2023-12-20 RX ADMIN — TAMSULOSIN HYDROCHLORIDE 0.4 MG: 0.4 CAPSULE ORAL at 09:12

## 2023-12-20 RX ADMIN — LINACLOTIDE 145 MCG: 145 CAPSULE, GELATIN COATED ORAL at 11:12

## 2023-12-20 RX ADMIN — ALUMINUM HYDROXIDE, MAGNESIUM HYDROXIDE, AND SIMETHICONE 30 ML: 200; 200; 20 SUSPENSION ORAL at 09:12

## 2023-12-20 RX ADMIN — LORAZEPAM 0.5 MG: 0.5 TABLET ORAL at 12:12

## 2023-12-20 RX ADMIN — SUCRALFATE 1 G: 1 SUSPENSION ORAL at 05:12

## 2023-12-20 RX ADMIN — CEFAZOLIN SODIUM 1 G: 1 SOLUTION INTRAVENOUS at 12:12

## 2023-12-20 RX ADMIN — FLUTICASONE PROPIONATE 50 MCG: 50 SPRAY, METERED NASAL at 09:12

## 2023-12-20 NOTE — ASSESSMENT & PLAN NOTE
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   Now s/p dual chamber pacemaker by  on 12/19/2023  Currently normal rate, stable, and asymptomatic    Plan:  Keflex 500mg TID x5 days at discharge  PT/OT to eval.

## 2023-12-20 NOTE — PLAN OF CARE
Pt will dc with no needs noted. Pts family will provide transport home. SW will continue to follow pt throughout his transitions of care and assist with any dc needs.       SW made aware that dc orders was rescinded.     Future Appointments   Date Time Provider Department Center   2/6/2024 10:45 AM Noah Vazquez MD [facility]        12/20/23 0812   Final Note   Assessment Type Final Discharge Note   Anticipated Discharge Disposition Home   Hospital Resources/Appts/Education Provided Appointments scheduled and added to AVS   Post-Acute Status   Discharge Delays None known at this time

## 2023-12-20 NOTE — PLAN OF CARE
Problem: Adult Inpatient Plan of Care  Goal: Plan of Care Review  Outcome: Ongoing, Progressing     Problem: Fall Injury Risk  Goal: Absence of Fall and Fall-Related Injury  Outcome: Ongoing, Progressing     Problem: Hypertension Comorbidity  Goal: Blood Pressure in Desired Range  Outcome: Ongoing, Progressing

## 2023-12-20 NOTE — PLAN OF CARE
OT evaluation completed. Patient with prehospitalization baseline function of residing alone, modified independence for ADLs, light IADLs, driving, mobility with use of straight cane and chronic R knee pain/balance deficits (patient reports needing a R TKA). Endorses no recent falls but multiple near falls.    On evaluation this date, patient ambulatory in room with primarily SBA with single point cane with frequent verbal cues to facilitate carryover of movement while abiding by pacemaker precautions. Deficits noted in static/dynamic balance, but no avert LOB with use of cane. LB dressing performed with CGA via adaptive sequence. Patient progressed to donning shirt with SBA with frequent verbal cues to perform in adaptive manner due to pacemaker/ LUE restrictions, but needs maximal assist for practice donning/ doffing sling (which is used at nighttime to prevent excess overhead movement post sx). Patient self requesting / communicating interest in home health therapy. When medically stable recommend low intensity therapy, home health OT  to support reintegration to environment  and support fall risk reduction and pacemaker precaution implementation. Recommend patient to use owned single point cane as well as to use owned shower chair (when medically cleared to shower).     Problem: Occupational Therapy  Goal: Occupational Therapy Goal  Description: Goals to be met by: 1/17/2023     Patient will increase functional independence with ADLs by performing:    UE Dressing with Modified [place] inclusive of item retrieval, donning/doffing sling to be used at night only, with increased time.  LE Dressing with Modified [place] inclusive of item retrieval with carryover of pacemaker precautions.  Grooming while standing with [place].  Toileting from toilet with [place] for hygiene and clothing management.   Functional mobility to / from bathroom inclusive of Toilet transfer to toilet with Modified  [place] with cane as needed.  100% reciprocation of gentle BUE ROM/ movements (I.e such as hand/fist pumps, elbow flexion/extension)  to encourage active movement and manage swelling while abiding by pacemaker precautions  100% reciprocation of pacemaker precautions, DME recommendations, and ADL/IADL/task modifications to support safe d/c at highest level of function.          Outcome: Ongoing, Progressing

## 2023-12-20 NOTE — NURSING
Patient having difficulty swallowing medications and meals. Reports it has been an ongoing problem not yet treated. Soft food and liquids was tolerated prior to admission, feels like it has worsen. Notified Dr. Constantino.     Weakness also reported to upper extremities, and pain to left hand with mobility. Notified Vira QUINONEZ and Cardiology Leelee QUINONEZ/ Anabella NP  at bedside.

## 2023-12-20 NOTE — PROGRESS NOTES
Pacemaker site normal  CXR pending  from today  Multiple complaints of left hand stiffness, sore throat  No fever or chills noted  Needs wound check in one week  Keflex 500 TID for 5 days

## 2023-12-20 NOTE — PT/OT/SLP EVAL
Physical Therapy Evaluation    Patient Name:  Korey Santos   MRN:  [id number]    Recommendations:     Discharge Recommendations: No Therapy Indicated   Discharge Equipment Recommendations: none   Barriers to discharge: None  Pt lives alone but current L knee limitations with ambulation are chronic and pt's baseline per report.    Assessment:     Korey Santos is a 83 y.o. male admitted with a medical diagnosis of Bradycardia. Pt s/p pacemaker placement also pertinent to this visit.  He presents with the following impairments/functional limitations: weakness, impaired endurance, gait instability, impaired functional mobility, impaired balance, decreased lower extremity function, decreased upper extremity function, pain, decreased safety awareness, impaired skin, edema, impaired cardiopulmonary response to activity.  Pt cooperative with PT Evaluation.  Redirection to task and safety/precaution instructions provided intermittently throughout session.  Pt demo amb with single pt cane with Good-/Fair+ balance due to pain in RLE knee which he states is chronic and needs TKA.  Rec cont acute therapy to improve endurance and gait safety and knowledge of pacemaker precautions during activity performance.  Do not anticipate further therapy needs upon discharge at this time.     Rehab Prognosis: Good; patient would benefit from acute skilled PT services to address these deficits and reach maximum level of function.    Recent Surgery: Procedure(s) (LRB):  INSERTION, CARDIAC PACEMAKER, DUAL CHAMBER (N/A) 1 Day Post-Op    Plan:     During this hospitalization, patient to be seen 3 x/week to address the identified rehab impairments via gait training, therapeutic activities, therapeutic exercises and progress toward the following goals:    Plan of Care Expires:  01/20/24    Subjective     Chief Complaint: c/o LUE edema at lower arm and pain at L chest to shoulder and down L upper arm, pain when trying to flex fingers/close  "L fist 6-7/10;  reports pain at R upper UE from procedure 5/10;  reports pain at L knee 4/10 that is chronic w pt stating it is stiff and needs a TKR.  Patient/Family Comments/goals: PLOF   return to home  Pain/Comfort:  Pain Rating 1: 7/10  Location - Side 1: Left  Location - Orientation 1: upper  Location 1: shoulder  Pain Addressed 1: Reposition, Distraction, Nurse notified, Cessation of Activity  Pain Rating Post-Intervention 1: other (see comments) (no increased or change reported following session)  Pain Rating 2: 4/10  Location - Side 2: Left  Location - Orientation 2: generalized  Location 2: knee  Pain Addressed 2: Reposition, Distraction, Nurse notified  Pain Rating Post-Intervention 2: other (see comments) (no change reported after session)    Patients cultural, spiritual, Mandaeism conflicts given the current situation: no    Living Environment:  Pt lives alone in SSH duplex with 6-8" step to enter as threshold step.  Pt uses WIS with grab bars.  Pt reports he owns BSC, RW, rollator, and shower chair  from when he had back sx but does not currently use any of the equipment except st cane.  Pt amb with st cane at community level distances and in home in the mornings when knee is stiff and his balance is not as good.  Pt reports by around noon he ambulates in home without AD and only occasionally needs to reach for furniture or wall for support. Pt is retired and is + .  Pt reports Mod Ind with all ADL and with functional mobility.   Equipment used at home: cane, straight.  DME owned (not currently used): rolling walker, bedside commode, shower chair, and rollator .  Upon discharge, patient will have assistance from friends as able.    Objective:     Communicated with nsg prior to session.  Patient found up in chair with upon PT entry to room.    General Precautions: Standard, fall, pacemaker  Orthopedic Precautions:N/A   Braces: UE Sling (sling for night use - not for use during the " day)  Respiratory Status: Room air    Exams:  Cog:  A & O x 4            Pt very conversational with redirection of focus to task and to education being provided required intermittently.  Pt return verbalize pacemaker precautions correctly, then later w slight mix ups, and ed provided again w verbal and demo w pt return verbalizing correctly.    ROM:  B LE WFL      BUE demo to 80-90* shoulder flex w pain reports w any movement of upper arm mm   MMT:   RLE 4- w knee pain limiting factor          LLE grossly 4+/5      Skin - bandages    and L UE edema hand and lower arm -        Functional Mobility:  Pt reports Ind with bed mobility - n/t  Txfs sit<>stand from low surface chair w SBA   Amb with st cane ~ 25' CGA then SBA after first initial steps w pt reporting shooting pain from R knee and stiffness w initial steps after decreased activities/mobility while in hospital      AM-PAC 6 CLICK MOBILITY  Total Score:22       Treatment & Education:  Ed provided for PT POC, safety w mobility, use of st cane in R hand.  Pacemaker precautions provided and reinforced throughout session including not to raise arm above head, no lifting/pushing/pulling 10# +, no vigorous activities until cleared by MD, no driving until cleared by MD, no pushing off of chair arm rest w LUE during sit<>stand txfs.   Ed provided for pt try to keep L UE from dependent position to aid in decreasing edema propping but not high - again stating not to lift UE above shoulder height.  Initiated txfs and gait training.      Patient left up in chair with all lines intact, call button in reach, and nsg notified.    GOALS:   Multidisciplinary Problems       Physical Therapy Goals          Problem: Physical Therapy    Goal Priority Disciplines Outcome Goal Variances Interventions   Physical Therapy Goal     PT, PT/OT Ongoing, Progressing     Description: Goals to be met by: discharge date     Patient will increase functional independence with mobility by  performin. Bed to chair transfer with Modified [place] using Single-point Cane w safe technique.  2. Gait  x 100 feet with Stand-by Assistance/Mod Ind using Single-point Cane .   3.  Pt to return verbalize appropriate pacemaker precautions consistently.                         History:     Past Medical History:   Diagnosis Date    Arthritis     Back pain, chronic     BPH with urinary obstruction     Chronic constipation     Closed fracture of right shoulder 2021    Closed nondisplaced fracture of greater tuberosity of right humerus 2021    Colon polyp     DJD (degenerative joint disease)     Hip    Hyperlipidemia     Hypertension     Laryngopharyngeal reflux     Left inguinal hernia     Lumbar herniated disc     Lumbar stenosis     Neck mass 10/7/2014    -2022 path - lipoma    OA (osteoarthritis) of knee     Bilateral    Paradoxical insomnia     Sinus trouble        Past Surgical History:   Procedure Laterality Date    BACK SURGERY      COLONOSCOPY      COLONOSCOPY N/A 2023    Procedure: COLONOSCOPY;  Surgeon: Christiano Vazquez MD;  Location: [facility] ENDO;  Service: Endoscopy;  Laterality: N/A;  Constipation 2 day prep.Instructions to portal.EC    CORONARY ANGIOGRAPHY N/A 2023    Procedure: ANGIOGRAM, CORONARY ARTERY;  Surgeon: Flaco Kelly MD;  Location: [facility] CATH LAB/EP;  Service: Cardiology;  Laterality: N/A;    EPIDURAL STEROID INJECTION INTO LUMBAR SPINE N/A 2022    Procedure: Injection-steroid-epidural-lumbar L3-4;  Surgeon: Yury Curm Jr., MD;  Location: [facility] PAIN MGT;  Service: Pain Management;  Laterality: N/A;  oral sedation   asa      INSERTION, PACEMAKER, TEMPORARY TRANSVENOUS  2023    Procedure: Insertion, Pacemaker, Temporary Transvenous;  Surgeon: Flaco Kelly MD;  Location: [facility] CATH LAB/EP;  Service: Cardiology;;    JOINT REPLACEMENT Left 2018    KNEE SURGERY      left hand      LEG SURGERY      SPINE SURGERY      UPPER  GASTROINTESTINAL ENDOSCOPY         Time Tracking:     PT Received On: 12/20/23  PT Start Time: 1055     PT Stop Time: 1145  PT Total Time (min): 50 min     Billable Minutes: Evaluation 12, Gait Training 12, and Therapeutic Activity 26      12/20/2023

## 2023-12-20 NOTE — PLAN OF CARE
Discharge orders noted. AVS prepared with clinical reference list and 24/7 [facility] Nurse On Call number attached. Awaiting discharge order reconciliation to finalize medication details.

## 2023-12-20 NOTE — PROGRESS NOTES
[facility] Medicine  Progress Note    Patient Name: Korey Santos  MRN: [id number]  Patient Class: IP- Inpatient   Admission Date: 12/18/2023  Length of Stay: 2 days  Attending Physician: Darrell Constantino MD  Primary Care Provider: Juan Bustamante MD        Subjective:     Principal Problem:Bradycardia        HPI:  Korey Santos 82 y/o. with BPH, HLD, DJD, HTN, , chronic constipation, GERD, presents to the emergency room with complaints of  few weeks' history of intermittent lightheadedness with activities is usually walking 20-40 feet.  Symptom has been worse since over the weekend.  He denies syncopal episode, chest pain, nausea, vomiting, weakness, tremor, diaphoresis, fever, chills. n report noncompliant with blood pressure medication recently because he forgets to take them, also concern that his heart rate has been between 30s to 40s with his new blood pressure machine.    In the ED HR 29, Bp 171/73, sodium 140 potassium 4.4, bicarb 20, BUN/creatinine 20/1.06, magnesium 2.3, H/H 13.7/41.9, troponin 0.036, CXR with no acute process.  Transferred to [facility] for Cardiology evaluation and pacemaker placement      Overview/Hospital Course:  No notes on file    Interval History:   No acute events overnight  Patient this AM has multiple complaints: including 1 month history of persistent difficulty swallowing pills, and left hand pain when making a fist that started since admission. Patient denies fall.  Denies dyspnea or chest pain    Review of Systems   Constitutional:  Positive for fatigue. Negative for chills and fever.   Respiratory:  Negative for chest tightness and shortness of breath.    Cardiovascular:  Negative for chest pain and palpitations.   Gastrointestinal:  Negative for abdominal pain.   Neurological:  Negative for dizziness and light-headedness.     Objective:     Vital Signs (Most Recent):  Temp: 96.9 °F (36.1 °C) (12/20/23 1231)  Pulse: 75 (12/20/23 1231)  Resp:  17 (12/20/23 0758)  BP: (!) 190/84 (12/20/23 1231)  SpO2: 97 % (12/20/23 1231) Vital Signs (24h Range):  Temp:  [96.9 °F (36.1 °C)-98.6 °F (37 °C)] 96.9 °F (36.1 °C)  Pulse:  [67-87] 75  Resp:  [17-27] 17  SpO2:  [93 %-97 %] 97 %  BP: (131-190)/(62-84) 190/84     Weight: 105.7 kg (233 lb)  Body mass index is 32.5 kg/m².    Intake/Output Summary (Last 24 hours) at 12/20/2023 1246  Last data filed at 12/20/2023 0900  Gross per 24 hour   Intake 298.81 ml   Output 350 ml   Net -51.19 ml         Physical Exam  Constitutional:       Appearance: Normal appearance.   HENT:      Mouth/Throat:      Mouth: Mucous membranes are moist.      Pharynx: Oropharynx is clear.   Eyes:      Extraocular Movements: Extraocular movements intact.      Pupils: Pupils are equal, round, and reactive to light.   Cardiovascular:      Rate and Rhythm: Normal rate and regular rhythm.   Pulmonary:      Effort: Pulmonary effort is normal.      Breath sounds: Normal breath sounds.   Musculoskeletal:         General: Normal range of motion.   Skin:     General: Skin is warm and dry.   Neurological:      General: No focal deficit present.      Mental Status: He is alert and oriented to person, place, and time.   Psychiatric:         Mood and Affect: Mood normal.         Behavior: Behavior normal.         Thought Content: Thought content normal.             Significant Labs: All pertinent labs within the past 24 hours have been reviewed.    Significant Imaging: I have reviewed all pertinent imaging results/findings within the past 24 hours.    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   Now s/p dual chamber pacemaker by  on 12/19/2023  Currently normal rate, stable, and asymptomatic    Plan:  Keflex 500mg TID x5 days at discharge  PT/OT to eval.    Left hand pain  Follow up XR  PRN analgesics    Dysphagia, pharyngoesophageal  Patient reports 1 month of persistent symptoms with difficulty  swallowing pills. Denies difficulty with liquids.    Plan:  ST consult for swallow eval  May need EGD as outpatient. He is followed by GI    Hyperlipidemia  Continue statin    Benign prostatic hyperplasia with urinary frequency  Continue Flomax and tamsulosin        VTE Risk Mitigation (From admission, onward)           Ordered     IP VTE HIGH RISK PATIENT  Once         12/18/23 1134     Place sequential compression device  Until discontinued         12/18/23 1134                    Discharge Planning   GONZALO: 12/20/2023     Code Status: Full Code   Is the patient medically ready for discharge?:     Reason for patient still in hospital (select all that apply): Imaging, PT / OT recommendations, and Pending disposition  Discharge Plan A: Home   Discharge Delays: None known at this time              Darrell Constantino MD  Department of [facility] Medicine   [facility]

## 2023-12-20 NOTE — PT/OT/SLP EVAL
Occupational Therapy   Evaluation and treatment    Name: Korey Santos  MRN: [id number]  Admitting Diagnosis: Bradycardia  Recent Surgery: Procedure(s) (LRB):  INSERTION, CARDIAC PACEMAKER, DUAL CHAMBER (N/A) 1 Day Post-Op    Recommendations:     Discharge Recommendations: Low Intensity Therapy (home health OT)  Discharge Equipment Recommendations:  none  Barriers to discharge:  Other (Comment) (additional acute care OT session recommended for facilitating implementation of pacemaker precautions in ADL/IADL / mobility)    Assessment:     Korey Santos is a 83 y.o. male with a medical diagnosis of Bradycardia.  He presents with ..The primary encounter diagnosis was Symptomatic bradycardia. Diagnoses of Bradycardia, Dizziness, and Complete heart block were also pertinent to this visit.  . Performance deficits affecting function: weakness, impaired endurance, impaired cognition, impaired self care skills, impaired functional mobility, gait instability, decreased lower extremity function, decreased upper extremity function, impaired balance, pain, impaired cardiopulmonary response to activity, decreased safety awareness, edema.      OT evaluation completed. Patient with prehospitalization baseline function of residing alone, modified independence for ADLs, light IADLs, driving, mobility with use of straight cane and chronic R knee pain/balance deficits (patient reports needing a R TKA). Endorses no recent falls but multiple near falls.     On evaluation this date, patient ambulatory in room with primarily SBA with single point cane with frequent verbal cues to facilitate carryover of movement while abiding by pacemaker precautions. Deficits noted in static/dynamic balance, but no avert LOB with use of cane. LB dressing performed with CGA via adaptive sequence. Patient progressed to donning shirt with SBA with frequent verbal cues to perform in adaptive manner due to pacemaker/ LUE restrictions, but needs maximal  assist for practice donning/ doffing sling (which is used at nighttime to prevent excess overhead movement post sx). Patient self requesting / communicating interest in home health therapy. When medically stable recommend low intensity therapy, home health OT  to support reintegration to environment  and support fall risk reduction and pacemaker precaution implementation. Recommend patient to use owned single point cane as well as to use owned shower chair (when medically cleared to shower).     Rehab Prognosis: Good; patient would benefit from acute skilled OT services to address these deficits and reach maximum level of function.       Plan:     Patient to be seen 4 x/week to address the above listed problems via self-care/home management, therapeutic activities, therapeutic exercises  Plan of Care Expires: 01/17/24  Plan of Care Reviewed with: patient     Subjective     Chief Complaint: indicates anxiety regarding how to put on and take off sling for night time use; indicates decreased mood due to pain in body and irritation due to decreased left hand use due to edema  Patient/Family Comments/goals: Patient self verbalizes interest for home health therapy / nursing; states he had therapy after a prior back surgery at his home and it helped him a lot    Occupational Profile:  Living Environment: Patient resides  alone in a single story home (duplex) with 6 inch step to enter without rails. Walk in shower.  Previous level of function: Patient with prehospitalization baseline function of residing alone, modified independence for ADLs, light IADLs, driving, mobility with use of straight cane and chronic R knee pain/balance deficits (patient reports needing a R TKA). Endorses no recent falls but multiple near falls and occasionally using furniture to steady self. Owns multiple DME below from prior surgeries but was most recently only using a cane. Previously enjoyed going to the gym 3x/week and has chronic right knee  "pain.  Equipment Used at Home: cane, straight, other (see comments) (also owns: rolling walker, bedside commode, shower chair, rollator)  Assistance upon Discharge: limited; reports has some supports such as neighbors but he is primarily alone    Pain/Comfort:  Pain Rating 1: other (see comments) (L wrist/hand "moderate"; L chest to L shoulder "hurts but less than L wrist hand"; stiffness/ discomfort in R knee (self reports needs R knee replacement))  Pain Addressed 1: Pre-medicate for activity, Distraction, Cessation of Activity  Pain Rating Post-Intervention 1: other (see comments) (no increased pain; indicates feeling better slightly upon return to bed)      Objective:     Communicated with: nursing prior to session.  Patient found HOB elevated with bed alarm, telemetry, peripheral IV upon OT entry to room.    General Precautions: Standard,  pacemaker  Orthopedic Precautions: N/A  Braces:  (UE sling for nighttime use only)  Respiratory Status: Room air    Occupational Performance:    Bed Mobility:    Patient completed Supine to Sit with minimal assist x1 trial, followed by post education / instruction for alternative technique 2/2 restrictions post pacemaker placement progressed to supervision  Patient completed Sit to Supine with minimal assist x1 trial with increased time, followed by post education / instruction for hand placement adaptation improved to supervision    Functional Mobility/Transfers:  Patient completed Sit <> Stand Transfer with SBA  with  moderate to minimal verbal cues both without device and with single point cane   Patient completed Toilet Transfer Step Transfer technique with stand by assistance with  min verbal cues  Patient completed  Shower Transfer Step Transfer technique with contact guard assistance with handheld assist.  Functional Mobility: in room mobility to / from bathroom with CGA/SBA; seated rest break. X2 trials of lateral side steps alongside bed with SBA. Single point cane " used in all trials (except attempts x1 time to step without cane with CGA, but indicates increased right knee pain)    Activities of Daily Living:  Upper Body Dressing: maximal assist to doff /don sling; x2 trials performed for doffing / donning shirt progress to SBA with verbal cues  Lower Body Dressing: contact guard assistance    Toileting: stand by assistance ; true bathroom context    Cognitive/Visual Perceptual:  Cognitive/Psychosocial Skills:     -       Oriented to: Person, Place, Time, and Situation   -       Follows Commands/attention:follows 2 step directions with occasional redirection to task  -       Safety awareness/insight to disability: needs further teaching for generalization of pacemaker precautions   -       Mood/Affect/Coping skills/emotional control: cooperative with redirection to task    Physical Exam:  Balance:    -       static standing WFL; with eyes closed minimal sway noted; dynamic standing: impaired, needs stability of single point cane; cannot perform instep to instep / cannot attain position of tandem (in part likely due to R knee chronic pain)  Skin integrity: incision sites with dressing: left sternal horizontal; right neck; right arm distal - lower angiogram puncture  Dominant hand:    -       right  Upper Extremity Range of Motion:     -       Right Upper Extremity: WFL  -       Left Upper Extremity: not fully assessed due to pacemaker precautions; elbow flexion /extension WFL; decreased wrist / hand / digit 2/2 edema  Upper Extremity Strength:    -       Right Upper Extremity: WFL via observed ADL  -       Left Upper Extremity: not assessed 2/2 pacemaker   Strength:    -       Right Upper Extremity: WNL  -       Left Upper Extremity: deficits; gross grasp weak; cannot oppose/ sustain opposition to all digits    AMPAC 6 Click ADL:  AMPAC Total Score: 21    Treatment & Education:  Patient educated on role of OT/ POC development.   Patient educated on pacemaker precautions (no  overhead movements past shoulder level, no lifting/push/pulling over 10#, no vigorous activity/ driving/ showering until cleared by MD.  Educated on transition movement technique with care to avoid excess pushing/pulling from LUE, and positioning and wearing schedule of sling for night time use; fair reciprocation noted.   Occupational profile developed via interview.   Assessment performed eob. ADL / functional mobility training performed as above x multiple trials, with task breakdown instruction.   Educated on performing fist/hand pumps / positioning of LUE in bed.  Answered questions in scope.      Patient left HOB elevated with all lines intact, call button in reach, bed alarm on, and nursing notified    GOALS:   Multidisciplinary Problems       Occupational Therapy Goals          Problem: Occupational Therapy    Goal Priority Disciplines Outcome Interventions   Occupational Therapy Goal     OT, PT/OT Ongoing, Progressing    Description: Goals to be met by: 1/17/2023     Patient will increase functional independence with ADLs by performing:    UE Dressing with Modified [place] inclusive of item retrieval, donning/doffing sling to be used at night only, with increased time.  LE Dressing with Modified [place] inclusive of item retrieval with carryover of pacemaker precautions.  Grooming while standing with [place].  Toileting from toilet with [place] for hygiene and clothing management.   Functional mobility to / from bathroom inclusive of Toilet transfer to toilet with Modified [place] with cane as needed.  100% reciprocation of gentle BUE ROM/ movements (I.e such as hand/fist pumps, elbow flexion/extension)  to encourage active movement and manage swelling while abiding by pacemaker precautions  100% reciprocation of pacemaker precautions, DME recommendations, and ADL/IADL/task modifications to support safe d/c at highest level of function.                               History:      Past Medical History:   Diagnosis Date    Arthritis     Back pain, chronic     BPH with urinary obstruction     Chronic constipation     Closed fracture of right shoulder 1/28/2021    Closed nondisplaced fracture of greater tuberosity of right humerus 2/24/2021    Colon polyp     DJD (degenerative joint disease)     Hip    Hyperlipidemia     Hypertension     Laryngopharyngeal reflux     Left inguinal hernia     Lumbar herniated disc     Lumbar stenosis     Neck mass 10/7/2014    -4/29/2022 path - lipoma    OA (osteoarthritis) of knee     Bilateral    Paradoxical insomnia     Sinus trouble          Past Surgical History:   Procedure Laterality Date    BACK SURGERY  2014    COLONOSCOPY      COLONOSCOPY N/A 5/17/2023    Procedure: COLONOSCOPY;  Surgeon: Christiano Vazquez MD;  Location: [facility] ENDO;  Service: Endoscopy;  Laterality: N/A;  Constipation 2 day prep.Instructions to portal.EC    CORONARY ANGIOGRAPHY N/A 12/18/2023    Procedure: ANGIOGRAM, CORONARY ARTERY;  Surgeon: Flaco Kelly MD;  Location: [facility] CATH LAB/EP;  Service: Cardiology;  Laterality: N/A;    EPIDURAL STEROID INJECTION INTO LUMBAR SPINE N/A 7/5/2022    Procedure: Injection-steroid-epidural-lumbar L3-4;  Surgeon: Yury Crum Jr., MD;  Location: [facility] PAIN MGT;  Service: Pain Management;  Laterality: N/A;  oral sedation   asa      INSERTION, PACEMAKER, TEMPORARY TRANSVENOUS  12/18/2023    Procedure: Insertion, Pacemaker, Temporary Transvenous;  Surgeon: Flaco Kelly MD;  Location: [facility] CATH LAB/EP;  Service: Cardiology;;    JOINT REPLACEMENT Left 05/04/2018    KNEE SURGERY      left hand      LEG SURGERY      SPINE SURGERY      UPPER GASTROINTESTINAL ENDOSCOPY         Time Tracking:     OT Date of Treatment: 12/20/23  OT Start Time: 1425  OT Stop Time: 1515  OT Total Time (min): 50 min    Billable Minutes:Evaluation 10 min  Self Care/Home Management 25 min  Therapeutic Activity 15 min    12/20/2023

## 2023-12-20 NOTE — ASSESSMENT & PLAN NOTE
Patient reports 1 month of persistent symptoms with difficulty swallowing pills. Denies difficulty with liquids.    Plan:  ST consult for swallow eval  May need EGD as outpatient. He is followed by GI

## 2023-12-20 NOTE — PLAN OF CARE
Problem: SLP  Goal: SLP Goal  Description: Short Term Goals:  1. Pt will participate in swallow eval to determine safest diet level.  2. Pt will tolerate mech soft and thin liquids with no s/sx of dysphagia.   Outcome: Ongoing, Progressing   Pt safe for mech soft and thin liquids, pt with hx of GERD and will benefit from GI as an outpatient.   MBS completed in 2018 revealed no dysphagia and no airway invasion.

## 2023-12-20 NOTE — PT/OT/SLP EVAL
Speech Language Pathology Evaluation  Bedside Swallow    Patient Name:  Korey Santos   MRN:  [id number]  Admitting Diagnosis: Bradycardia    Recommendations:                 General Recommendations:  follow up with diet to ensure safety with meal   Diet recommendations:  Minced & Moist Diet - IDDSI Level 5, Thin   Aspiration Precautions: 1 bite/sip at a time, Alternating bites/sips, Avoid talking while eating, Double swallow with each bite/sip, Feed only when awake/alert, HOB to 90 degrees, Meds crushed in puree, Remain upright 30 minutes post meal, Small bites/sips, and Standard aspiration precautions   General Precautions: Standard, fall, pacemaker  Communication strategies:  none    Assessment:     Korey Santos is a 83 y.o. male admitted with bradycardia and s/p PPM who presents with an SLP diagnosis of no oral or pharyngeal dysphagia, pt showing signs of reflux with belching/burp noted.    History per MD    HPI: Korey Santos 84 y/o. with BPH, HLD, DJD, HTN, , chronic constipation, GERD, presents to the emergency room with complaints of  few weeks' history of intermittent lightheadedness with activities is usually walking 20-40 feet.  Symptom has been worse since over the weekend.  He denies syncopal episode, chest pain, nausea, vomiting, weakness, tremor, diaphoresis, fever, chills. n report noncompliant with blood pressure medication recently because he forgets to take them, also concern that his heart rate has been between 30s to 40s with his new blood pressure machine.    In the ED HR 29, Bp 171/73, sodium 140 potassium 4.4, bicarb 20, BUN/creatinine 20/1.06, magnesium 2.3, H/H 13.7/41.9, troponin 0.036, CXR with no acute process.  Transferred to [facility] for Cardiology evaluation and pacemaker placement        Past Medical History:   Diagnosis Date    Arthritis     Back pain, chronic     BPH with urinary obstruction     Chronic constipation     Closed fracture of right shoulder 1/28/2021     Closed nondisplaced fracture of greater tuberosity of right humerus 2/24/2021    Colon polyp     DJD (degenerative joint disease)     Hip    Hyperlipidemia     Hypertension     Laryngopharyngeal reflux     Left inguinal hernia     Lumbar herniated disc     Lumbar stenosis     Neck mass 10/7/2014    -4/29/2022 path - lipoma    OA (osteoarthritis) of knee     Bilateral    Paradoxical insomnia     Sinus trouble        Past Surgical History:   Procedure Laterality Date    BACK SURGERY  2014    COLONOSCOPY      COLONOSCOPY N/A 5/17/2023    Procedure: COLONOSCOPY;  Surgeon: Christiano Vazquez MD;  Location: [facility] ENDO;  Service: Endoscopy;  Laterality: N/A;  Constipation 2 day prep.Instructions to portal.EC    CORONARY ANGIOGRAPHY N/A 12/18/2023    Procedure: ANGIOGRAM, CORONARY ARTERY;  Surgeon: Flaco Kelly MD;  Location: [facility] CATH LAB/EP;  Service: Cardiology;  Laterality: N/A;    EPIDURAL STEROID INJECTION INTO LUMBAR SPINE N/A 7/5/2022    Procedure: Injection-steroid-epidural-lumbar L3-4;  Surgeon: Yury Crum Jr., MD;  Location: [facility] PAIN MGT;  Service: Pain Management;  Laterality: N/A;  oral sedation   asa      INSERTION, PACEMAKER, TEMPORARY TRANSVENOUS  12/18/2023    Procedure: Insertion, Pacemaker, Temporary Transvenous;  Surgeon: Flaco Kelly MD;  Location: [facility] CATH LAB/EP;  Service: Cardiology;;    JOINT REPLACEMENT Left 05/04/2018    KNEE SURGERY      left hand      LEG SURGERY      SPINE SURGERY      UPPER GASTROINTESTINAL ENDOSCOPY         Social History: Patient lives home, indep with ADLs.    Prior Intubation HX:  n/a    Modified Barium Swallow: prior MBS showed no aspiration and functional oral and pharyngeal phase of the swallow.     Chest X-Rays: here is associated prominence of the pulmonary vascularity.  No pneumothorax, mass, nodule, airspace consolidation or pleural effusion.  The cardiomediastinal silhouette, osseous and soft tissue structures are stable for this patient.  "    Prior diet: unrestricted diet.    Subjective     Pt seen in room for swallow eval, RN did ok and reported brief episode of choking on oral meds.   Patient goals: "I am not sure why I choked."     Pain/Comfort:  Pain Rating 1: 0/10    Respiratory Status: room air     Objective:   Pt seen in room, cooperative and very talkative.   Pt reported feeling something "hitting the pocket" then goes away.     Oral Musculature Evaluation  Oral Musculature: WFL  Dentition: present and adequate  Secretion Management: adequate  Mucosal Quality: good, adequate  Mandibular Strength and Mobility: WFL  Oral Labial Strength and Mobility: WFL  Lingual Strength and Mobility: WFL  Buccal Strength and Mobility: WFL  Volitional Cough: elicited  Volitional Swallow: timely swallow  Voice Prior to PO Intake: clear voice    Bedside Swallow Eval:   Consistencies Assessed:  Thin liquids lemonade  by cup and straw  Puree pudding   Solids cracker and diced peaches       Oral Phase:   WFL    Pharyngeal Phase:   no overt clinical signs/symptoms of aspiration  no overt clinical signs/symptoms of pharyngeal dysphagia  Self reports that food slightly comes up   multiple spontaneous swallows  No coughing or choking noted     Compensatory Strategies  Multiple swallows   Alternate sips and bites  Upright for meals  Single straw sips      Treatment: Skilled education including role of SLP, swallow precautions, s/sx of dysphagia vs reflux were discussed and appropriate diet instructions were reviewed with all parties including patient. Pt demo'd understanding of info provided and all questions were answered within SLP scope of practice.       Goals:   Multidisciplinary Problems       SLP Goals          Problem: SLP    Goal Priority Disciplines Outcome   SLP Goal     SLP Ongoing, Progressing   Description: Short Term Goals:  1. Pt will participate in swallow eval to determine safest diet level.  2. Pt will tolerate mech soft and thin liquids with no s/sx " of dysphagia.                        Plan:     Patient to be seen:  2 x/week   Plan of Care expires:  01/18/24  Plan of Care reviewed with:  patient   SLP Follow-Up:  Yes       Discharge recommendations:   (return home)   Barriers to Discharge:  none    Time Tracking:     SLP Treatment Date:   12/20/23  Speech Start Time:  1356  Speech Stop Time:  1420     Speech Total Time (min):  24 min    Billable Minutes: Eval Swallow and Oral Function 13 and Self Care/Home Management Training 11    12/20/2023

## 2023-12-20 NOTE — TELEPHONE ENCOUNTER
----- Message from Mari Wooten sent at 12/20/2023  9:04 AM CST -----  Regarding: FW: HFU  Good Morning.  I am following up on this request for HFU.  Please advise so I can update DC.  Thanks!  ----- Message -----  From: Mari Wooten  Sent: 12/19/2023  10:50 AM CST  To: Dianna PARADA Staff  Subject: HFU                                              Patient is being discharged from [facility] and is requiring a hospital follow up appointment with their Primary Care Provider in 7 days. Patient is established. I am unable to schedule an appointment in that time frame. Please schedule patient a sooner appointment and message me back so Discharge Nurse can advise patient prior to discharge.    DX:Bradycardia      Thank you, Imani  Physician Referral Specialist/Discharge

## 2023-12-20 NOTE — PLAN OF CARE
Problem: Physical Therapy  Goal: Physical Therapy Goal  Description: Goals to be met by: discharge date     Patient will increase functional independence with mobility by performin. Bed to chair transfer with Modified [place] using Single-point Cane w safe technique.  2. Gait  x 100 feet with Stand-by Assistance/Mod Ind using Single-point Cane .   3.  Pt to return verbalize appropriate pacemaker precautions consistently.    Outcome: Ongoing, Progressing     Pt cooperative with PT Evaluation.  Redirection to task and safety/precaution instructions provided intermittently throughout session.  Pt demo amb with single pt cane with Good-/Fair+ balance due to pain in RLE knee which he states is chronic and needs TKA.  Rec cont acute therapy to improve endurance and gait safety and knowledge of pacemaker precautions during activity performance.  Do not anticipate further therapy needs upon discharge at this time.

## 2023-12-20 NOTE — PLAN OF CARE
NO notified of pt preference for HH at discharge. NO sent facesheet and PT/OT recs to Beny PETERSEN to review.    12/20/23 8524   Post-Acute Status   Post-Acute Authorization Home Health   Home Health Status Referrals Sent   Discharge Plan   Discharge Plan A Home Health   Discharge Plan B Home

## 2023-12-20 NOTE — PLAN OF CARE
12/19/23 1850   Admission   Initial VN Admission Questions Complete   Communication Issues? None   Shift   Virtual Nurse - Rounding Incomplete   Virtual Nurse - Patient Verbalized Approval Of Camera Use;VN Rounding     VN cued into patient's room for introduction with patient's permission.  Patient requested that VN return in a little while. Patient reported that he was on the phone with his son.

## 2023-12-20 NOTE — SUBJECTIVE & OBJECTIVE
Interval History:   No acute events overnight  Patient this AM has multiple complaints: including 1 month history of persistent difficulty swallowing pills, and left hand pain when making a fist that started since admission. Patient denies fall.  Denies dyspnea or chest pain    Review of Systems   Constitutional:  Positive for fatigue. Negative for chills and fever.   Respiratory:  Negative for chest tightness and shortness of breath.    Cardiovascular:  Negative for chest pain and palpitations.   Gastrointestinal:  Negative for abdominal pain.   Neurological:  Negative for dizziness and light-headedness.     Objective:     Vital Signs (Most Recent):  Temp: 96.9 °F (36.1 °C) (12/20/23 1231)  Pulse: 75 (12/20/23 1231)  Resp: 17 (12/20/23 0758)  BP: (!) 190/84 (12/20/23 1231)  SpO2: 97 % (12/20/23 1231) Vital Signs (24h Range):  Temp:  [96.9 °F (36.1 °C)-98.6 °F (37 °C)] 96.9 °F (36.1 °C)  Pulse:  [67-87] 75  Resp:  [17-27] 17  SpO2:  [93 %-97 %] 97 %  BP: (131-190)/(62-84) 190/84     Weight: 105.7 kg (233 lb)  Body mass index is 32.5 kg/m².    Intake/Output Summary (Last 24 hours) at 12/20/2023 1246  Last data filed at 12/20/2023 0900  Gross per 24 hour   Intake 298.81 ml   Output 350 ml   Net -51.19 ml         Physical Exam  Constitutional:       Appearance: Normal appearance.   HENT:      Mouth/Throat:      Mouth: Mucous membranes are moist.      Pharynx: Oropharynx is clear.   Eyes:      Extraocular Movements: Extraocular movements intact.      Pupils: Pupils are equal, round, and reactive to light.   Cardiovascular:      Rate and Rhythm: Normal rate and regular rhythm.   Pulmonary:      Effort: Pulmonary effort is normal.      Breath sounds: Normal breath sounds.   Musculoskeletal:         General: Normal range of motion.   Skin:     General: Skin is warm and dry.   Neurological:      General: No focal deficit present.      Mental Status: He is alert and oriented to person, place, and time.   Psychiatric:          Mood and Affect: Mood normal.         Behavior: Behavior normal.         Thought Content: Thought content normal.             Significant Labs: All pertinent labs within the past 24 hours have been reviewed.    Significant Imaging: I have reviewed all pertinent imaging results/findings within the past 24 hours.

## 2023-12-20 NOTE — TELEPHONE ENCOUNTER
----- Message from VANESSA Beckwith, ANP sent at 12/20/2023  1:49 PM CST -----  Leelee follow up. If he goes on my schedule just make sure he knows the plan     BH

## 2023-12-21 ENCOUNTER — TELEPHONE (OUTPATIENT)
Dept: INTERNAL MEDICINE | Facility: CLINIC | Age: 83
End: 2023-12-21
Payer: MEDICARE

## 2023-12-21 LAB
ANION GAP SERPL CALC-SCNC: 9 MMOL/L (ref 8–16)
BASOPHILS # BLD AUTO: 0.09 K/UL (ref 0–0.2)
BASOPHILS NFR BLD: 1 % (ref 0–1.9)
BUN SERPL-MCNC: 22 MG/DL (ref 8–23)
CALCIUM SERPL-MCNC: 8.2 MG/DL (ref 8.7–10.5)
CHLORIDE SERPL-SCNC: 108 MMOL/L (ref 95–110)
CO2 SERPL-SCNC: 22 MMOL/L (ref 23–29)
CREAT SERPL-MCNC: 0.9 MG/DL (ref 0.5–1.4)
DIFFERENTIAL METHOD: ABNORMAL
EOSINOPHIL # BLD AUTO: 0.3 K/UL (ref 0–0.5)
EOSINOPHIL NFR BLD: 3.1 % (ref 0–8)
ERYTHROCYTE [DISTWIDTH] IN BLOOD BY AUTOMATED COUNT: 13.6 % (ref 11.5–14.5)
EST. GFR  (NO RACE VARIABLE): >60 ML/MIN/1.73 M^2
GLUCOSE SERPL-MCNC: 96 MG/DL (ref 70–110)
HCT VFR BLD AUTO: 36.5 % (ref 40–54)
HGB BLD-MCNC: 12.4 G/DL (ref 14–18)
IMM GRANULOCYTES # BLD AUTO: 0.02 K/UL (ref 0–0.04)
IMM GRANULOCYTES NFR BLD AUTO: 0.2 % (ref 0–0.5)
LYMPHOCYTES # BLD AUTO: 2.2 K/UL (ref 1–4.8)
LYMPHOCYTES NFR BLD: 24.9 % (ref 18–48)
MAGNESIUM SERPL-MCNC: 2.6 MG/DL (ref 1.6–2.6)
MCH RBC QN AUTO: 30.2 PG (ref 27–31)
MCHC RBC AUTO-ENTMCNC: 34 G/DL (ref 32–36)
MCV RBC AUTO: 89 FL (ref 82–98)
MONOCYTES # BLD AUTO: 1 K/UL (ref 0.3–1)
MONOCYTES NFR BLD: 11 % (ref 4–15)
NEUTROPHILS # BLD AUTO: 5.4 K/UL (ref 1.8–7.7)
NEUTROPHILS NFR BLD: 59.8 % (ref 38–73)
NRBC BLD-RTO: 0 /100 WBC
PLATELET # BLD AUTO: 146 K/UL (ref 150–450)
PMV BLD AUTO: 11.3 FL (ref 9.2–12.9)
POCT GLUCOSE: 84 MG/DL (ref 70–110)
POTASSIUM SERPL-SCNC: 4.4 MMOL/L (ref 3.5–5.1)
RBC # BLD AUTO: 4.1 M/UL (ref 4.6–6.2)
SODIUM SERPL-SCNC: 139 MMOL/L (ref 136–145)
URATE SERPL-MCNC: 6.6 MG/DL (ref 3.4–7)
WBC # BLD AUTO: 9.01 K/UL (ref 3.9–12.7)

## 2023-12-21 PROCEDURE — 99900035 HC TECH TIME PER 15 MIN (STAT): Mod: HCNC

## 2023-12-21 PROCEDURE — 97116 GAIT TRAINING THERAPY: CPT | Mod: HCNC,CQ

## 2023-12-21 PROCEDURE — 25000003 PHARM REV CODE 250: Mod: HCNC | Performed by: STUDENT IN AN ORGANIZED HEALTH CARE EDUCATION/TRAINING PROGRAM

## 2023-12-21 PROCEDURE — 85025 COMPLETE CBC W/AUTO DIFF WBC: CPT | Mod: HCNC | Performed by: INTERNAL MEDICINE

## 2023-12-21 PROCEDURE — 25000003 PHARM REV CODE 250: Mod: HCNC | Performed by: INTERNAL MEDICINE

## 2023-12-21 PROCEDURE — 94761 N-INVAS EAR/PLS OXIMETRY MLT: CPT | Mod: HCNC

## 2023-12-21 PROCEDURE — 80048 BASIC METABOLIC PNL TOTAL CA: CPT | Mod: HCNC | Performed by: INTERNAL MEDICINE

## 2023-12-21 PROCEDURE — 25000003 PHARM REV CODE 250: Mod: HCNC | Performed by: NURSE PRACTITIONER

## 2023-12-21 PROCEDURE — 21400001 HC TELEMETRY ROOM: Mod: HCNC

## 2023-12-21 PROCEDURE — 25000003 PHARM REV CODE 250: Mod: HCNC | Performed by: FAMILY MEDICINE

## 2023-12-21 PROCEDURE — 97530 THERAPEUTIC ACTIVITIES: CPT | Mod: HCNC,CO

## 2023-12-21 PROCEDURE — 36415 COLL VENOUS BLD VENIPUNCTURE: CPT | Mod: HCNC | Performed by: INTERNAL MEDICINE

## 2023-12-21 PROCEDURE — 97530 THERAPEUTIC ACTIVITIES: CPT | Mod: HCNC,CQ

## 2023-12-21 PROCEDURE — 83735 ASSAY OF MAGNESIUM: CPT | Mod: HCNC | Performed by: INTERNAL MEDICINE

## 2023-12-21 PROCEDURE — 25000003 PHARM REV CODE 250: Mod: HCNC | Performed by: HOSPITALIST

## 2023-12-21 PROCEDURE — 97535 SELF CARE MNGMENT TRAINING: CPT | Mod: HCNC,CO

## 2023-12-21 PROCEDURE — 84550 ASSAY OF BLOOD/URIC ACID: CPT | Mod: HCNC | Performed by: INTERNAL MEDICINE

## 2023-12-21 PROCEDURE — 97110 THERAPEUTIC EXERCISES: CPT | Mod: HCNC,CO

## 2023-12-21 RX ORDER — HYDROCODONE BITARTRATE AND ACETAMINOPHEN 5; 325 MG/1; MG/1
2 TABLET ORAL EVERY 4 HOURS PRN
Status: DISCONTINUED | OUTPATIENT
Start: 2023-12-21 | End: 2023-12-23 | Stop reason: HOSPADM

## 2023-12-21 RX ORDER — CEPHALEXIN 500 MG/1
500 CAPSULE ORAL 4 TIMES DAILY
Status: DISCONTINUED | OUTPATIENT
Start: 2023-12-21 | End: 2023-12-23 | Stop reason: HOSPADM

## 2023-12-21 RX ORDER — MORPHINE SULFATE 2 MG/ML
1 INJECTION, SOLUTION INTRAMUSCULAR; INTRAVENOUS EVERY 6 HOURS PRN
Status: DISCONTINUED | OUTPATIENT
Start: 2023-12-21 | End: 2023-12-23 | Stop reason: HOSPADM

## 2023-12-21 RX ORDER — LIDOCAINE AND PRILOCAINE 25; 25 MG/G; MG/G
CREAM TOPICAL ONCE
Status: COMPLETED | OUTPATIENT
Start: 2023-12-21 | End: 2023-12-21

## 2023-12-21 RX ORDER — COLCHICINE 0.6 MG/1
1.2 TABLET, FILM COATED ORAL DAILY
Status: COMPLETED | OUTPATIENT
Start: 2023-12-21 | End: 2023-12-21

## 2023-12-21 RX ORDER — HYDROCODONE BITARTRATE AND ACETAMINOPHEN 5; 325 MG/1; MG/1
1 TABLET ORAL EVERY 4 HOURS PRN
Status: DISCONTINUED | OUTPATIENT
Start: 2023-12-21 | End: 2023-12-21

## 2023-12-21 RX ORDER — NIFEDIPINE 30 MG/1
30 TABLET, EXTENDED RELEASE ORAL DAILY
Status: DISCONTINUED | OUTPATIENT
Start: 2023-12-21 | End: 2023-12-23 | Stop reason: HOSPADM

## 2023-12-21 RX ORDER — COLCHICINE 0.6 MG/1
0.6 TABLET, FILM COATED ORAL DAILY
Status: DISCONTINUED | OUTPATIENT
Start: 2023-12-22 | End: 2023-12-21

## 2023-12-21 RX ADMIN — LOSARTAN POTASSIUM 100 MG: 50 TABLET, FILM COATED ORAL at 10:12

## 2023-12-21 RX ADMIN — ACETAMINOPHEN 650 MG: 325 TABLET ORAL at 01:12

## 2023-12-21 RX ADMIN — COLCHICINE 1.2 MG: 0.6 TABLET ORAL at 10:12

## 2023-12-21 RX ADMIN — CEPHALEXIN 500 MG: 500 CAPSULE ORAL at 08:12

## 2023-12-21 RX ADMIN — HYDROCODONE BITARTRATE AND ACETAMINOPHEN 2 TABLET: 5; 325 TABLET ORAL at 08:12

## 2023-12-21 RX ADMIN — PRAVASTATIN SODIUM 40 MG: 40 TABLET ORAL at 10:12

## 2023-12-21 RX ADMIN — TAMSULOSIN HYDROCHLORIDE 0.4 MG: 0.4 CAPSULE ORAL at 10:12

## 2023-12-21 RX ADMIN — LIDOCAINE AND PRILOCAINE: 25; 25 CREAM TOPICAL at 01:12

## 2023-12-21 RX ADMIN — FLUTICASONE PROPIONATE 50 MCG: 50 SPRAY, METERED NASAL at 10:12

## 2023-12-21 RX ADMIN — MUPIROCIN: 20 OINTMENT TOPICAL at 08:12

## 2023-12-21 RX ADMIN — FINASTERIDE 5 MG: 5 TABLET, FILM COATED ORAL at 10:12

## 2023-12-21 RX ADMIN — MUPIROCIN: 20 OINTMENT TOPICAL at 10:12

## 2023-12-21 RX ADMIN — NIFEDIPINE 30 MG: 30 TABLET, FILM COATED, EXTENDED RELEASE ORAL at 10:12

## 2023-12-21 RX ADMIN — HYDROCODONE BITARTRATE AND ACETAMINOPHEN 1 TABLET: 5; 325 TABLET ORAL at 10:12

## 2023-12-21 NOTE — PLAN OF CARE
Problem: Occupational Therapy  Goal: Occupational Therapy Goal  Description: Goals to be met by: 1/17/2023     Patient will increase functional independence with ADLs by performing:    UE Dressing with Modified [place] inclusive of item retrieval, donning/doffing sling to be used at night only, with increased time.  LE Dressing with Modified [place] inclusive of item retrieval with carryover of pacemaker precautions.  Grooming while standing with [place].  Toileting from toilet with [place] for hygiene and clothing management.   Functional mobility to / from bathroom inclusive of Toilet transfer to toilet with Modified [place] with cane as needed.  100% reciprocation of gentle BUE ROM/ movements (I.e such as hand/fist pumps, elbow flexion/extension)  to encourage active movement and manage swelling while abiding by pacemaker precautions  100% reciprocation of pacemaker precautions, DME recommendations, and ADL/IADL/task modifications to support safe d/c at highest level of function.          Outcome: Ongoing, Progressing   Korey Santos is a 83 y.o. male with a medical diagnosis of Bradycardia.  Performance deficits affecting function are weakness, impaired endurance, impaired self care skills, impaired functional mobility, gait instability, impaired balance, decreased upper extremity function, decreased lower extremity function, decreased safety awareness, pain, decreased ROM, impaired coordination, impaired fine motor, impaired skin, edema, impaired cardiopulmonary response to activity.    Pt found in chair, agreeable to therapy.  Pt unsteady during gait requiring CGA/Min A using SPC and requires assist with BADL's. Pt  10/10 pain in L hand/wrist with moderate swelling limiting ROM.  Continue OT services to address functional goals, progressing as able.

## 2023-12-21 NOTE — CARE UPDATE
Post pacemaker placement. Doing well with complaints of left hand pain. SBP elevated overnight. Medication regimen adjusted by Dr. Mckoy. No complaints of chest pain during rounds yesterday. [facility] with mild LAD disease- continue medical management and follow up as an outpatient. Suitable for discharge from cardiac standpoint with follow up as an outpatient

## 2023-12-21 NOTE — SUBJECTIVE & OBJECTIVE
Interval History:   No acute events overnight  This AM patient reports persistent L hand pain and swelling, tender to light touch  Pt expresses anger and frustration due to hand pain    Review of Systems   Constitutional:  Negative for chills and fever.   Respiratory:  Negative for shortness of breath.    Cardiovascular:  Negative for chest pain, palpitations and leg swelling.   Gastrointestinal:  Negative for abdominal pain.   Neurological:  Negative for dizziness and headaches.     Objective:     Vital Signs (Most Recent):  Temp: 98.1 °F (36.7 °C) (12/21/23 0735)  Pulse: 87 (12/21/23 1157)  Resp: 18 (12/21/23 1012)  BP: (!) 191/85 (12/21/23 0735)  SpO2: 95 % (12/21/23 0739) Vital Signs (24h Range):  Temp:  [97.9 °F (36.6 °C)-98.1 °F (36.7 °C)] 98.1 °F (36.7 °C)  Pulse:  [62-87] 87  Resp:  [17-18] 18  SpO2:  [94 %-98 %] 95 %  BP: (161-191)/(74-88) 191/85     Weight: 110.5 kg (243 lb 9.7 oz)  Body mass index is 33.98 kg/m².  No intake or output data in the 24 hours ending 12/21/23 1241      Physical Exam  Constitutional:       Appearance: Normal appearance. He is obese.   HENT:      Mouth/Throat:      Mouth: Mucous membranes are moist.      Pharynx: Oropharynx is clear.   Eyes:      Extraocular Movements: Extraocular movements intact.      Conjunctiva/sclera: Conjunctivae normal.   Cardiovascular:      Rate and Rhythm: Normal rate and regular rhythm.   Pulmonary:      Effort: Pulmonary effort is normal.      Breath sounds: Normal breath sounds.   Abdominal:      General: Bowel sounds are normal.      Palpations: Abdomen is soft.   Musculoskeletal:         General: Normal range of motion.      Comments: Left hand +1 edema, tender to light touch, unable to make a fist   Skin:     General: Skin is warm and dry.   Neurological:      General: No focal deficit present.      Mental Status: He is alert and oriented to person, place, and time. Mental status is at baseline.             Significant Labs: All pertinent labs  within the past 24 hours have been reviewed.    Significant Imaging: I have reviewed all pertinent imaging results/findings within the past 24 hours.

## 2023-12-21 NOTE — TELEPHONE ENCOUNTER
Called patient to get him in for a hosp f/u visit tomorrow. Patient declined appointment. Patient state that he is in the hospital.

## 2023-12-21 NOTE — PLAN OF CARE
"   12/21/23 1257   Post-Acute Status   Post-Acute Authorization Home Health   Home Health Status Pending medical clearance/testing  (Pending medical clearance for DC to home with HH. C/O of hand pain at present.)       Future Appointments   Date Time Provider Department Center   1/3/2024  1:45 PM Sherrie Kyle APRN, ANP [facility] CARDIO [facility]   2/6/2024 10:45 AM Noah Vazquez MD [facility]  [facility]     BP (!) 141/66 (BP Location: Right arm, Patient Position: Sitting)   Pulse 71   Temp 97.7 °F (36.5 °C) (Oral)   Resp 18   Ht 5' 11" (1.803 m)   Wt 110.5 kg (243 lb 9.7 oz)   SpO2 95%   BMI 33.98 kg/m²      aluminum-magnesium hydroxide-simethicone  30 mL Oral QID (AC & HS)    finasteride  5 mg Oral Daily    fluticasone propionate  1 spray Each Nostril Daily    losartan  100 mg Oral Daily    mupirocin   Nasal BID    NIFEdipine  30 mg Oral Daily    pravastatin  40 mg Oral Daily    sucralfate  1 g Oral Q6H    tamsulosin  1 capsule Oral Daily     Consults (From admission, onward)          Status Ordering Provider     Inpatient consult to Cardiology  Once        Provider:  Flaco Kelly MD    Completed TIFFANIE FRAGA.            "

## 2023-12-21 NOTE — ASSESSMENT & PLAN NOTE
Continue statin   Pt went to Vfib rhythm on tele.  RN entered room and pt converted back to P+C. Pt agonal breathing, weak pulses. Rapid response called and tele ICU hub called.  Unable to obtain BP.Upon arrival of RR team, pt had no palpable pulse or respirations.  RERE called at [phone].  Pt's son Ruslan at bedside.

## 2023-12-21 NOTE — PT/OT/SLP PROGRESS
Occupational Therapy   Treatment    Name: Korey Santos  MRN: [id number]  Admitting Diagnosis:  Bradycardia  2 Days Post-Op    Recommendations:     Discharge Recommendations: Low Intensity Therapy  Discharge Equipment Recommendations:  none  Barriers to discharge:  Other (Comment) (Increased level of assistance/Fall risk)    Assessment:     Korey Santos is a 83 y.o. male with a medical diagnosis of Bradycardia.  Performance deficits affecting function are weakness, impaired endurance, impaired self care skills, impaired functional mobility, gait instability, impaired balance, decreased upper extremity function, decreased lower extremity function, decreased safety awareness, pain, decreased ROM, impaired coordination, impaired fine motor, impaired skin, edema, impaired cardiopulmonary response to activity.    Pt found in chair, agreeable to therapy.  Pt unsteady during gait requiring CGA/Min A using SPC and requires assist with BADL's. Pt  10/10 pain in L hand/wrist with moderate swelling limiting ROM.  Continue OT services to address functional goals, progressing as able.      Rehab Prognosis:  Good; patient would benefit from acute skilled OT services to address these deficits and reach maximum level of function.       Plan:     Patient to be seen 4 x/week to address the above listed problems via self-care/home management, therapeutic activities, therapeutic exercises  Plan of Care Expires: 01/17/24  Plan of Care Reviewed with: patient    Subjective     Chief Complaint: L hand pain and swelling  Patient/Family Comments/goals: to return to PLOF which includes going to gym.    Pain/Comfort:  Pain Rating 1: 10/10 (L hand/wrist)    Objective:     Communicated with: RN prior to session.  Patient found up in chair with peripheral IV, telemetry upon OT entry to room.    General Precautions: Standard, fall, pacemaker    Orthopedic Precautions:N/A  Braces:  (UE sling for nighttime use only)  Respiratory Status: Room  air     Occupational Performance:      Functional Mobility/Transfers:  Patient completed Sit <> Stand Transfer with stand by assistance  with  no assistive device   Functional Mobility: Pt ambulated with CGA/Min A using SPC.  Pt unsteady, impulsive, and easily distracted increasing risk for falls. Unable to assess safety with RW 2/2 pt unable to grasp RW with L hand.     Activities of Daily Living:  Grooming: minimum assistance to manage items 2/2 unable to  items and/or manage with L hand.   Lower Body Dressing: moderate assistance pt able to place BLEs in Figure 4 position however unable to reach feet.  He reports using a sock aid at home.       [facility] 6 Click ADL: 21    Treatment & Education:  Reviewed pacemake precautions.  Pt needs reminders.   Pt performed BUE AROM ex x 10 reps for finger flex/ext, wrist f/e, sup/pro and shld flex to 90 degrees.  Pt with limited ROM in L hand and wrist.  Unable to oppose finger or make a fist.    Instructed pt to keep LUE elevated and to perform gentle finger and wrist pumps throughout day as tolerated.    Instructed to call for assistance for back to bed. Pt verbalizes understanding.        Patient left up in chair with all lines intact and call button in reach    GOALS:   Multidisciplinary Problems       Occupational Therapy Goals          Problem: Occupational Therapy    Goal Priority Disciplines Outcome Interventions   Occupational Therapy Goal     OT, PT/OT Ongoing, Progressing    Description: Goals to be met by: 1/17/2023     Patient will increase functional independence with ADLs by performing:    UE Dressing with Modified [place] inclusive of item retrieval, donning/doffing sling to be used at night only, with increased time.  LE Dressing with Modified [place] inclusive of item retrieval with carryover of pacemaker precautions.  Grooming while standing with [place].  Toileting from toilet with [place] for hygiene and clothing management.    Functional mobility to / from bathroom inclusive of Toilet transfer to toilet with Modified [place] with cane as needed.  100% reciprocation of gentle BUE ROM/ movements (I.e such as hand/fist pumps, elbow flexion/extension)  to encourage active movement and manage swelling while abiding by pacemaker precautions  100% reciprocation of pacemaker precautions, DME recommendations, and ADL/IADL/task modifications to support safe d/c at highest level of function.                               Time Tracking:     OT Date of Treatment: 12/21/23  OT Start Time: 1329  OT Stop Time: 1407  OT Total Time (min): 38 min    Billable Minutes:Self Care/Home Management 10  Therapeutic Activity 10  Therapeutic Exercise 18               12/21/2023

## 2023-12-21 NOTE — PLAN OF CARE
Pt c/o left hand pain, provider notified and order followed, per pt, left hand pain not relieved with PRN medications, provider notified.    Problem: Adult Inpatient Plan of Care  Goal: Patient-Specific Goal (Individualized)  Outcome: Ongoing, Progressing     Problem: Adult Inpatient Plan of Care  Goal: Optimal Comfort and Wellbeing  Outcome: Ongoing, Progressing     Problem: Fall Injury Risk  Goal: Absence of Fall and Fall-Related Injury  Outcome: Ongoing, Progressing

## 2023-12-21 NOTE — TELEPHONE ENCOUNTER
----- Message from Mari Wooten sent at 12/21/2023 12:26 PM CST -----  Regarding: FW: HFU  Good Morning.  I am following up on this message I sent on the 19th.  Please schedule appt and message me back so we can relay appt info to patient.  Thanks so much!    ----- Message -----  From: Mari Wooten  Sent: 12/20/2023   9:04 AM CST  To: Dianna PARADA Staff  Subject: FW: HFU                                          Good Morning.  I am following up on this request for HFU.  Please advise so I can update DC.  Thanks!  ----- Message -----  From: Mari Wooten  Sent: 12/19/2023  10:50 AM CST  To: Dianna PARADA Staff  Subject: HFU                                              Patient is being discharged from [facility] and is requiring a hospital follow up appointment with their Primary Care Provider in 7 days. Patient is established. I am unable to schedule an appointment in that time frame. Please schedule patient a sooner appointment and message me back so Discharge Nurse can advise patient prior to discharge.    DX:Bradycardia      Thank you, Imani  Physician Referral Specialist/Discharge

## 2023-12-21 NOTE — ASSESSMENT & PLAN NOTE
Follow up XR read. No fracture or dislocation per my read  Appears well perfused  Ddx includes thrombosis associated with pacemaker placement, gout, cellulitis    Plan:     -f/u Doppler US  -PRN analgesics  -trial of colchicine

## 2023-12-21 NOTE — PLAN OF CARE
VIRTUAL NURSE:  Labs, notes, orders, and careplan reviewed. VN to be available as needed.    Problem: Adult Inpatient Plan of Care  Goal: Plan of Care Review  Outcome: Ongoing, Progressing

## 2023-12-21 NOTE — PROGRESS NOTES
[facility] Medicine  Progress Note    Patient Name: Korey Santos  MRN: [id number]  Patient Class: IP- Inpatient   Admission Date: 12/18/2023  Length of Stay: 3 days  Attending Physician: Darrell Constantino MD  Primary Care Provider: Juan Bustamante MD        Subjective:     Principal Problem:Bradycardia        HPI:  Korey Santos 84 y/o. with BPH, HLD, DJD, HTN, , chronic constipation, GERD, presents to the emergency room with complaints of  few weeks' history of intermittent lightheadedness with activities is usually walking 20-40 feet.  Symptom has been worse since over the weekend.  He denies syncopal episode, chest pain, nausea, vomiting, weakness, tremor, diaphoresis, fever, chills. n report noncompliant with blood pressure medication recently because he forgets to take them, also concern that his heart rate has been between 30s to 40s with his new blood pressure machine.    In the ED HR 29, Bp 171/73, sodium 140 potassium 4.4, bicarb 20, BUN/creatinine 20/1.06, magnesium 2.3, H/H 13.7/41.9, troponin 0.036, CXR with no acute process.  Transferred to [facility] for Cardiology evaluation and pacemaker placement      Overview/Hospital Course:  No notes on file    Interval History:   No acute events overnight  This AM patient reports persistent L hand pain and swelling, tender to light touch  Pt expresses anger and frustration due to hand pain    Review of Systems   Constitutional:  Negative for chills and fever.   Respiratory:  Negative for shortness of breath.    Cardiovascular:  Negative for chest pain, palpitations and leg swelling.   Gastrointestinal:  Negative for abdominal pain.   Neurological:  Negative for dizziness and headaches.     Objective:     Vital Signs (Most Recent):  Temp: 98.1 °F (36.7 °C) (12/21/23 0735)  Pulse: 87 (12/21/23 1157)  Resp: 18 (12/21/23 1012)  BP: (!) 191/85 (12/21/23 0735)  SpO2: 95 % (12/21/23 0739) Vital Signs (24h Range):  Temp:  [97.9 °F (36.6  °C)-98.1 °F (36.7 °C)] 98.1 °F (36.7 °C)  Pulse:  [62-87] 87  Resp:  [17-18] 18  SpO2:  [94 %-98 %] 95 %  BP: (161-191)/(74-88) 191/85     Weight: 110.5 kg (243 lb 9.7 oz)  Body mass index is 33.98 kg/m².  No intake or output data in the 24 hours ending 12/21/23 1241      Physical Exam  Constitutional:       Appearance: Normal appearance. He is obese.   HENT:      Mouth/Throat:      Mouth: Mucous membranes are moist.      Pharynx: Oropharynx is clear.   Eyes:      Extraocular Movements: Extraocular movements intact.      Conjunctiva/sclera: Conjunctivae normal.   Cardiovascular:      Rate and Rhythm: Normal rate and regular rhythm.   Pulmonary:      Effort: Pulmonary effort is normal.      Breath sounds: Normal breath sounds.   Abdominal:      General: Bowel sounds are normal.      Palpations: Abdomen is soft.   Musculoskeletal:         General: Normal range of motion.      Comments: Left hand +1 edema, tender to light touch, unable to make a fist   Skin:     General: Skin is warm and dry.   Neurological:      General: No focal deficit present.      Mental Status: He is alert and oriented to person, place, and time. Mental status is at baseline.             Significant Labs: All pertinent labs within the past 24 hours have been reviewed.    Significant Imaging: I have reviewed all pertinent imaging results/findings within the past 24 hours.    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   Now s/p dual chamber pacemaker by  on 12/19/2023  Currently normal rate, stable, and asymptomatic    Plan:  Keflex 500mg TID x5 days at discharge  PT/OT to eval.    Left hand pain  Follow up XR read. No fracture or dislocation per my read  Appears well perfused  Ddx includes thrombosis associated with pacemaker placement, gout, cellulitis    Plan:     -f/u Doppler US  -PRN analgesics  -trial of colchicine    Dysphagia, pharyngoesophageal  Patient reports 1 month of  persistent symptoms with difficulty swallowing pills. Denies difficulty with liquids.    Plan:  ST consult for swallow eval  May need EGD as outpatient. He is followed by GI    Hyperlipidemia  Continue statin    Benign prostatic hyperplasia with urinary frequency  Continue Flomax and tamsulosin        VTE Risk Mitigation (From admission, onward)           Ordered     IP VTE HIGH RISK PATIENT  Once         12/18/23 1134     Place sequential compression device  Until discontinued         12/18/23 1134                    Discharge Planning   GONZALO: 12/20/2023     Code Status: Full Code   Is the patient medically ready for discharge?:     Reason for patient still in hospital (select all that apply): Patient trending condition, Laboratory test, and Treatment  Discharge Plan A: Home Health   Discharge Delays: Orders Needed (Potential DC today to home with HH. Awaiting orders if needed.)              Darrell Constantino MD  Department of [facility] Medicine   [facility]

## 2023-12-22 PROBLEM — R58 ECCHYMOSIS OF FOREARM: Status: ACTIVE | Noted: 2023-12-22

## 2023-12-22 LAB
ANION GAP SERPL CALC-SCNC: 10 MMOL/L (ref 8–16)
BASOPHILS # BLD AUTO: 0.06 K/UL (ref 0–0.2)
BASOPHILS NFR BLD: 0.7 % (ref 0–1.9)
BUN SERPL-MCNC: 19 MG/DL (ref 8–23)
CALCIUM SERPL-MCNC: 8.4 MG/DL (ref 8.7–10.5)
CHLORIDE SERPL-SCNC: 107 MMOL/L (ref 95–110)
CO2 SERPL-SCNC: 22 MMOL/L (ref 23–29)
CREAT SERPL-MCNC: 0.8 MG/DL (ref 0.5–1.4)
DIFFERENTIAL METHOD: ABNORMAL
EOSINOPHIL # BLD AUTO: 0.3 K/UL (ref 0–0.5)
EOSINOPHIL NFR BLD: 3.4 % (ref 0–8)
ERYTHROCYTE [DISTWIDTH] IN BLOOD BY AUTOMATED COUNT: 13.5 % (ref 11.5–14.5)
EST. GFR  (NO RACE VARIABLE): >60 ML/MIN/1.73 M^2
GLUCOSE SERPL-MCNC: 90 MG/DL (ref 70–110)
HCT VFR BLD AUTO: 36.3 % (ref 40–54)
HGB BLD-MCNC: 12.3 G/DL (ref 14–18)
IMM GRANULOCYTES # BLD AUTO: 0.02 K/UL (ref 0–0.04)
IMM GRANULOCYTES NFR BLD AUTO: 0.2 % (ref 0–0.5)
LYMPHOCYTES # BLD AUTO: 2.4 K/UL (ref 1–4.8)
LYMPHOCYTES NFR BLD: 27.9 % (ref 18–48)
MCH RBC QN AUTO: 30.4 PG (ref 27–31)
MCHC RBC AUTO-ENTMCNC: 33.9 G/DL (ref 32–36)
MCV RBC AUTO: 90 FL (ref 82–98)
MONOCYTES # BLD AUTO: 1.1 K/UL (ref 0.3–1)
MONOCYTES NFR BLD: 12.9 % (ref 4–15)
NEUTROPHILS # BLD AUTO: 4.8 K/UL (ref 1.8–7.7)
NEUTROPHILS NFR BLD: 54.9 % (ref 38–73)
NRBC BLD-RTO: 0 /100 WBC
PLATELET # BLD AUTO: 150 K/UL (ref 150–450)
PMV BLD AUTO: 11.5 FL (ref 9.2–12.9)
POTASSIUM SERPL-SCNC: 4.3 MMOL/L (ref 3.5–5.1)
RBC # BLD AUTO: 4.05 M/UL (ref 4.6–6.2)
SODIUM SERPL-SCNC: 139 MMOL/L (ref 136–145)
WBC # BLD AUTO: 8.7 K/UL (ref 3.9–12.7)

## 2023-12-22 PROCEDURE — 21400001 HC TELEMETRY ROOM: Mod: HCNC

## 2023-12-22 PROCEDURE — 36415 COLL VENOUS BLD VENIPUNCTURE: CPT | Mod: HCNC | Performed by: HOSPITALIST

## 2023-12-22 PROCEDURE — 97116 GAIT TRAINING THERAPY: CPT | Mod: HCNC,CQ

## 2023-12-22 PROCEDURE — 80048 BASIC METABOLIC PNL TOTAL CA: CPT | Mod: HCNC | Performed by: HOSPITALIST

## 2023-12-22 PROCEDURE — 25500020 PHARM REV CODE 255: Mod: HCNC | Performed by: HOSPITALIST

## 2023-12-22 PROCEDURE — 25000003 PHARM REV CODE 250: Mod: HCNC | Performed by: STUDENT IN AN ORGANIZED HEALTH CARE EDUCATION/TRAINING PROGRAM

## 2023-12-22 PROCEDURE — 97530 THERAPEUTIC ACTIVITIES: CPT | Mod: HCNC

## 2023-12-22 PROCEDURE — 97535 SELF CARE MNGMENT TRAINING: CPT | Mod: HCNC

## 2023-12-22 PROCEDURE — 99900035 HC TECH TIME PER 15 MIN (STAT): Mod: HCNC

## 2023-12-22 PROCEDURE — 97530 THERAPEUTIC ACTIVITIES: CPT | Mod: HCNC,CQ

## 2023-12-22 PROCEDURE — 85025 COMPLETE CBC W/AUTO DIFF WBC: CPT | Mod: HCNC | Performed by: HOSPITALIST

## 2023-12-22 PROCEDURE — 94761 N-INVAS EAR/PLS OXIMETRY MLT: CPT | Mod: HCNC

## 2023-12-22 PROCEDURE — 25000003 PHARM REV CODE 250: Mod: HCNC | Performed by: FAMILY MEDICINE

## 2023-12-22 PROCEDURE — 25000003 PHARM REV CODE 250: Mod: HCNC | Performed by: HOSPITALIST

## 2023-12-22 RX ORDER — POLYETHYLENE GLYCOL 3350 17 G/17G
17 POWDER, FOR SOLUTION ORAL ONCE
Status: COMPLETED | OUTPATIENT
Start: 2023-12-22 | End: 2023-12-22

## 2023-12-22 RX ORDER — AMOXICILLIN 250 MG
1 CAPSULE ORAL DAILY PRN
Status: DISCONTINUED | OUTPATIENT
Start: 2023-12-23 | End: 2023-12-23 | Stop reason: HOSPADM

## 2023-12-22 RX ADMIN — TAMSULOSIN HYDROCHLORIDE 0.4 MG: 0.4 CAPSULE ORAL at 08:12

## 2023-12-22 RX ADMIN — MUPIROCIN: 20 OINTMENT TOPICAL at 08:12

## 2023-12-22 RX ADMIN — CEPHALEXIN 500 MG: 500 CAPSULE ORAL at 08:12

## 2023-12-22 RX ADMIN — NIFEDIPINE 30 MG: 30 TABLET, FILM COATED, EXTENDED RELEASE ORAL at 08:12

## 2023-12-22 RX ADMIN — MUPIROCIN: 20 OINTMENT TOPICAL at 10:12

## 2023-12-22 RX ADMIN — POLYETHYLENE GLYCOL 3350 17 G: 17 POWDER, FOR SOLUTION ORAL at 05:12

## 2023-12-22 RX ADMIN — CEPHALEXIN 500 MG: 500 CAPSULE ORAL at 04:12

## 2023-12-22 RX ADMIN — CEPHALEXIN 500 MG: 500 CAPSULE ORAL at 10:12

## 2023-12-22 RX ADMIN — FINASTERIDE 5 MG: 5 TABLET, FILM COATED ORAL at 08:12

## 2023-12-22 RX ADMIN — IOHEXOL 75 ML: 350 INJECTION, SOLUTION INTRAVENOUS at 12:12

## 2023-12-22 RX ADMIN — FLUTICASONE PROPIONATE 50 MCG: 50 SPRAY, METERED NASAL at 08:12

## 2023-12-22 RX ADMIN — PRAVASTATIN SODIUM 40 MG: 40 TABLET ORAL at 08:12

## 2023-12-22 RX ADMIN — LOSARTAN POTASSIUM 100 MG: 50 TABLET, FILM COATED ORAL at 08:12

## 2023-12-22 RX ADMIN — CEPHALEXIN 500 MG: 500 CAPSULE ORAL at 12:12

## 2023-12-22 NOTE — PT/OT/SLP PROGRESS
"Occupational Therapy   Treatment    Name: Korey Santos  MRN: [id number]  Admitting Diagnosis:  Bradycardia  3 Days Post-Op    Recommendations:     Discharge Recommendations: Low Intensity Therapy (home health OT PT)  Discharge Equipment Recommendations:  none  Barriers to discharge:  Other (Comment) (ongoing workup for pain in left hand)    Assessment:     Korey Santos is a 83 y.o. male with a medical diagnosis of Bradycardia.  He presents with ..The primary encounter diagnosis was Symptomatic bradycardia. Diagnoses of Bradycardia, Dizziness, and Complete heart block were also pertinent to this visit.  . Performance deficits affecting function are weakness, impaired endurance, impaired self care skills, decreased coordination, impaired coordination, impaired joint extensibility, decreased upper extremity function, impaired functional mobility, gait instability, impaired balance, pain, impaired cardiopulmonary response to activity, edema.     Progressing. Continues with complaint of decreased functional use of and pain in left hand with improvement with use of warm compress. Per MD note and chart review: "Doppler US showed thrombus surrounding the venous catheter in the left cephalic vein.". MD approving continuing with OT / PT abiding by pacemaker precautions. Patient progressed in ability to complete toileting / participate in dressing self despite decreased left hand / opposition due to edema. Recommend low intensity therapy home health OT / PT when medically stable.        Rehab Prognosis:  Good; patient would benefit from acute skilled OT services to address these deficits and reach maximum level of function.       Plan:     Patient to be seen 4 x/week to address the above listed problems via self-care/home management, therapeutic activities, therapeutic exercises  Plan of Care Expires: 01/17/24  Plan of Care Reviewed with: patient    Subjective     Chief Complaint: left hand decreased use due to " "swelling; complaint of decreased satisfaction in hospital setting  Patient/Family Comments/goals: patient expresses feeling better about adaptive dressing performance  after 2nd practice trial during session  Pain/Comfort:  Pain Rating 1:  (3/10 L hand at rest; L shoulder "general soreness")  Pain Addressed 1: Reposition, Other (see comments) (use of hot packs to shoulder/ hand (left))  Pain Rating Post-Intervention 1: other (see comments) (with activity: 7/10 L hand/ unchanged in shoulder; mild pain in right knee; at end of session; reports mild general discomfort)    Objective:     Communicated with: nursing prior to session.  Patient found up in chair with bed alarm, telemetry, peripheral IV upon OT entry to room.    General Precautions: Standard, fall, pacemaker    Orthopedic Precautions:N/A  Braces: UE Sling x24 hours post cath procedure than reapply at night x2 ( order)  Respiratory Status: Room air     Occupational Performance:       Functional Mobility/Transfers:  Patient completed Sit <> Stand Transfer with modified independence  with  proper technique to stand without verbal reminders (ie. Maintains pacemaker precautions)    Patient completed Bed <> Chair Transfer using Step Transfer technique with supervision with straight cane  Patient completed Toilet Transfer Step Transfer technique with supervision with  straight cane  Functional Mobility: in room functional mobility to /from bathroom with straight cane, no avert LOB, supervision; in room mobility negotiation around bed return to chair with SBA; min verbal cues post task for pacing. Mild R knee pain reports during brief mobility    Activities of Daily Living:  Upper Body Dressing: set up assist on 2nd trial; min assist 1st trial; pacemaker precaution / adaptive method of seated - forward trunk flex, use of B elbows flexed, RUE raising to bring overhead  Lower Body Dressing: CGA; visual / verbal task breakdown  Toileting: no voiding performed; " education only; discussion on modified techniques 2/2 limited left hand grasp  Bathing: education only; instruction to avoid getting incision wet until cleared by physician, consider doing wash ups and work up to showers with handheld showerhead below site of pacemaker (while unable to get wet)      [facility] 6 Click ADL: 22    Treatment & Education:  Therapeutic use of self with reflective response provided. (RN reporting to OT prior to OT treatment patient desiring to speak with patient advocacy due to situational stress post surgery.)  Therapist facilitated reeducation on pacemaker precautions, adaptation strategies to use in the home 2/2 restrictions, provided opportunity for question / answer. Extended time for education with emphasis on importance to follow specific tailored recommendations to be provided by physician prior to patient discharge .  Patient reciprocating precautions including no lifting more than a gallon of milk, no strenuous activity, no reaching overhead with LUE, no driving, etc until clearance from physician.  Affirmed swelling in L hand - education / recommendation to continue including LUE into ADL tasks while abiding by pacemaker precautions. Education for conscious positioning to help with pain/swelling management.  ADL / mobility training as above with education on generalization to normal routines. End of session return to recliner.     Patient left up in chair with all lines intact, call button in reach, chair alarm on, and nursing notified    Nursing notified of OT exit; nursing reporting to call / request patient advocate.    GOALS:   Multidisciplinary Problems       Occupational Therapy Goals          Problem: Occupational Therapy    Goal Priority Disciplines Outcome Interventions   Occupational Therapy Goal     OT, PT/OT Ongoing, Progressing    Description: Goals to be met by: 1/17/2023     Patient will increase functional independence with ADLs by performing:    UE Dressing with  Modified [place] inclusive of item retrieval, donning/doffing sling to be used at night only, with increased time.  LE Dressing with Modified [place] inclusive of item retrieval with carryover of pacemaker precautions.  Grooming while standing with [place].  Toileting from toilet with [place] for hygiene and clothing management.   Functional mobility to / from bathroom inclusive of Toilet transfer to toilet with Modified [place] with cane as needed.  100% reciprocation of gentle BUE ROM/ movements (I.e such as hand/fist pumps, elbow flexion/extension)  to encourage active movement and manage swelling while abiding by pacemaker precautions  100% reciprocation of pacemaker precautions, DME recommendations, and ADL/IADL/task modifications to support safe d/c at highest level of function.                               Time Tracking:     OT Date of Treatment: 12/22/23  OT Start Time: 1409  OT Stop Time: 1449  OT Total Time (min): 40 min    Billable Minutes:Self Care/Home Management 25 min  Therapeutic Activity 15 min    OT/MINNIE: OT          12/22/2023

## 2023-12-22 NOTE — ASSESSMENT & PLAN NOTE
XR of hand demonstrated no acute fracture or dislocation  Doppler US showed thrombus surrounding the venous catheter in the left cephalic vein.  Gout is less likely. Pain did not respond to a trial of colchicine. Uric acid WNL.   Ddx includes superficial thrombosis associated with pacemaker / PIV placement, cellulitis (however no skin lesions, no fever or leukocytosis)    Appears well perfused on exam, but edema persists as well as pain    Plan:     -removal of left arm PIV, warm compress to IV site. If symptoms continue, may need to consider anticoagulation. Discussed risks / benefits with pt  -PRN analgesics  -empiric Keflex for s/p pacemaker coverage and for cellulitis coverage.   -f/u CT hand

## 2023-12-22 NOTE — PLAN OF CARE
"Progressing. Continues with complaint of decreased functional use of and pain in left hand with improvement with use of warm compress. Per MD note and chart review: "Doppler US showed thrombus surrounding the venous catheter in the left cephalic vein.". MD approving continuing with OT / PT abiding by pacemaker precautions. Patient progressed in ability to complete toileting / participate in dressing self despite decreased left hand / opposition due to edema. Recommend low intensity therapy home health OT / PT when medically stable.    Problem: Occupational Therapy  Goal: Occupational Therapy Goal  Description: Goals to be met by: 1/17/2023     Patient will increase functional independence with ADLs by performing:    UE Dressing with Modified [place] inclusive of item retrieval, donning/doffing sling to be used at night only, with increased time.  LE Dressing with Modified [place] inclusive of item retrieval with carryover of pacemaker precautions.  Grooming while standing with [place].  Toileting from toilet with [place] for hygiene and clothing management.   Functional mobility to / from bathroom inclusive of Toilet transfer to toilet with Modified [place] with cane as needed.  100% reciprocation of gentle BUE ROM/ movements (I.e such as hand/fist pumps, elbow flexion/extension)  to encourage active movement and manage swelling while abiding by pacemaker precautions  100% reciprocation of pacemaker precautions, DME recommendations, and ADL/IADL/task modifications to support safe d/c at highest level of function.          Outcome: Ongoing, Progressing     "

## 2023-12-22 NOTE — PROGRESS NOTES
[facility] Medicine  Progress Note    Patient Name: Korey Santos  MRN: [id number]  Patient Class: IP- Inpatient   Admission Date: 12/18/2023  Length of Stay: 4 days  Attending Physician: Darrell Constantino MD  Primary Care Provider: Juan Bustamante MD        Subjective:     Principal Problem:Bradycardia        HPI:  Korey Santos 84 y/o. with BPH, HLD, DJD, HTN, , chronic constipation, GERD, presents to the emergency room with complaints of  few weeks' history of intermittent lightheadedness with activities is usually walking 20-40 feet.  Symptom has been worse since over the weekend.  He denies syncopal episode, chest pain, nausea, vomiting, weakness, tremor, diaphoresis, fever, chills. n report noncompliant with blood pressure medication recently because he forgets to take them, also concern that his heart rate has been between 30s to 40s with his new blood pressure machine.    In the ED HR 29, Bp 171/73, sodium 140 potassium 4.4, bicarb 20, BUN/creatinine 20/1.06, magnesium 2.3, H/H 13.7/41.9, troponin 0.036, CXR with no acute process.  Transferred to [facility] for Cardiology evaluation and pacemaker placement      Overview/Hospital Course:  No notes on file    Interval History:   No acute events overnight  This AM patient reports multiple complaints including L shoulder pain, and persistent L hand pain and swelling, tender to light touch  Reports right forearm bruising that he says occurred after procedure  Denies any history of falls or trauma  Patient expresses dissatisfaction with care  Otherwise, reports ambulating well with mild instability which is improved    Review of Systems   Constitutional:  Negative for chills and fever.   Respiratory:  Negative for shortness of breath.    Cardiovascular:  Negative for chest pain, palpitations and leg swelling.   Gastrointestinal:  Negative for abdominal pain.   Neurological:  Negative for dizziness and headaches.     Objective:      Vital Signs (Most Recent):  Temp: 97.7 °F (36.5 °C) (12/22/23 1123)  Pulse: 75 (12/22/23 1248)  Resp: 18 (12/22/23 1123)  BP: (!) 174/67 (12/22/23 1123)  SpO2: 97 % (12/22/23 1148) Vital Signs (24h Range):  Temp:  [97.7 °F (36.5 °C)-99.2 °F (37.3 °C)] 97.7 °F (36.5 °C)  Pulse:  [62-75] 75  Resp:  [18-20] 18  SpO2:  [92 %-97 %] 97 %  BP: (144-179)/(67-83) 174/67     Weight: 110.5 kg (243 lb 9.7 oz)  Body mass index is 33.98 kg/m².  No intake or output data in the 24 hours ending 12/22/23 1334      Physical Exam  Constitutional:       Appearance: Normal appearance. He is obese.   HENT:      Mouth/Throat:      Mouth: Mucous membranes are moist.      Pharynx: Oropharynx is clear.   Eyes:      Extraocular Movements: Extraocular movements intact.      Conjunctiva/sclera: Conjunctivae normal.   Cardiovascular:      Rate and Rhythm: Normal rate and regular rhythm.   Pulmonary:      Effort: Pulmonary effort is normal.      Breath sounds: Normal breath sounds.   Abdominal:      General: Bowel sounds are normal.      Palpations: Abdomen is soft.   Musculoskeletal:         General: Normal range of motion.      Comments: Left hand +1 edema, tender to light touch, unable to make a fist   Skin:     General: Skin is warm and dry.      Comments: Scattered bruising involving R forearm     Neurological:      General: No focal deficit present.      Mental Status: He is alert and oriented to person, place, and time. Mental status is at baseline.             Significant Labs: All pertinent labs within the past 24 hours have been reviewed.    Significant Imaging: I have reviewed all pertinent imaging results/findings within the past 24 hours.    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   Now s/p dual chamber pacemaker by  on 12/19/2023  Currently normal rate, stable, and asymptomatic    Plan:  Keflex 500mg QID  PT/OT to eval.    Ecchymosis of forearm  In the setting of  patient on ASA.    -PRN analgesics and supportive care    Left hand pain  XR of hand demonstrated no acute fracture or dislocation  Doppler US showed thrombus surrounding the venous catheter in the left cephalic vein.  Gout is less likely. Pain did not respond to a trial of colchicine. Uric acid WNL.   Ddx includes superficial thrombosis associated with pacemaker / PIV placement, cellulitis (however no skin lesions, no fever or leukocytosis)    Appears well perfused on exam, but edema persists as well as pain    Plan:     -removal of left arm PIV, warm compress to IV site. If symptoms continue, may need to consider anticoagulation. Discussed risks / benefits with pt  -PRN analgesics  -empiric Keflex for s/p pacemaker coverage and for cellulitis coverage.   -f/u CT hand      Dysphagia, pharyngoesophageal  Patient reports 1 month of persistent symptoms with difficulty swallowing pills. Denies difficulty with liquids.    Plan:  ST consult for swallow eval  May need EGD as outpatient. He is followed by GI    Hyperlipidemia  Continue statin    Benign prostatic hyperplasia with urinary frequency  Continue Flomax and tamsulosin        VTE Risk Mitigation (From admission, onward)           Ordered     IP VTE HIGH RISK PATIENT  Once         12/18/23 1134     Place sequential compression device  Until discontinued         12/18/23 1134                    Discharge Planning   GONZALO: 12/20/2023     Code Status: Full Code   Is the patient medically ready for discharge?:     Reason for patient still in hospital (select all that apply): Patient trending condition and Consult recommendations  Discharge Plan A: Home Health   Discharge Delays:  (D/C pending until pt is medically ready for discharge)              Darrell Constantino MD  Department of Hospital Medicine   [place] [facility]

## 2023-12-22 NOTE — NURSING
Left FA Saline lock removed and warm compress applied as ordered. Patient left forearm swollen and tender. Test results to be discussed with patient by physician.

## 2023-12-22 NOTE — PT/OT/SLP PROGRESS
"Physical Therapy Treatment    Patient Name:  Korey Santos   MRN:  [id number]    Recommendations:     Discharge Recommendations: No Therapy Indicated  Discharge Equipment Recommendations: none  Barriers to discharge:  occasional balance instability    Assessment:     Korey Santos is a 83 y.o. male admitted with a medical diagnosis of Bradycardia.  He presents with the following impairments/functional limitations: weakness, impaired endurance, impaired self care skills, impaired functional mobility, gait instability, impaired balance, decreased upper extremity function, decreased lower extremity function, decreased safety awareness, pain, impaired coordination, decreased coordination, impaired cardiopulmonary response to activity, decreased ROM Pt would continue to benefit from P.T. To address impairments listed above.  .    Rehab Prognosis: Fair; patient would benefit from acute skilled PT services to address these deficits and reach maximum level of function.    Recent Surgery: Procedure(s) (LRB):  INSERTION, CARDIAC PACEMAKER, DUAL CHAMBER (N/A) 3 Days Post-Op    Plan:     During this hospitalization, patient to be seen 3 x/week to address the identified rehab impairments via gait training, therapeutic activities, therapeutic exercises and progress toward the following goals:    Plan of Care Expires:  01/20/24    Subjective     Patient/Family Comments/goals: Pt agreed to tx  Pain/Comfort:  Pain Rating 1:  (R knee pain, "A lot."  did not rate)  Location - Side 1: Right  Location - Orientation 1: generalized  Location 1: knee  Pain Addressed 1: Reposition, Distraction, Nurse notified  Pain Rating Post-Intervention 1:  (as above)      Objective:     Communicated with Rn prior to session.  Patient found up in chair with peripheral IV upon PT entry to room.     General Precautions: Standard, fall, pacemaker  Orthopedic Precautions: N/A  Braces: UE Sling (sling for night use - not for use during the " "day)  Respiratory Status: Room air     Functional Mobility:  Transfers:     Sit to Stand:  stand by assistance with straight cane  Gait: 125ft x 2 with S.C. and CGA/close SBA.  Pt had 2 bouts of mild buckling (R knee) while ambulating  secondary to knee pain and one bouts mild LOB when turing around the bed walking to b/s chair.  Pt education on slow pace for increased stability.  Pt states, "I just haven't been up in a while."  Balance: sitting good, standing fair+, gait fair/fair+      AM-PAC 6 CLICK MOBILITY  Turning over in bed (including adjusting bedclothes, sheets and blankets)?: 4  Sitting down on and standing up from a chair with arms (e.g., wheelchair, bedside commode, etc.): 3  Moving from lying on back to sitting on the side of the bed?: 4  Moving to and from a bed to a chair (including a wheelchair)?: 3  Need to walk in hospital room?: 3  Climbing 3-5 steps with a railing?: 3  Basic Mobility Total Score: 20       Treatment & Education:  Gait training as above.  Pt's LUE is still swollen and painful.  Pt education to elevate LUE (following precautions for PM) to assist in decreasing edema in L hand and UE.  Pt's LUE was elevated on folded blanket with hand higher than forearm to assist with decreasing edema (PM precautions followed).  Pt also instructed with visual demonstration on proper positioning to elevate LUE when lying in bed.  Pt remained up in b/s chair at end of tx.  RN notified.    Patient left HOB elevated with all lines intact, call button in reach, and RN notified..    GOALS:   Multidisciplinary Problems       Physical Therapy Goals          Problem: Physical Therapy    Goal Priority Disciplines Outcome Goal Variances Interventions   Physical Therapy Goal     PT, PT/OT Ongoing, Progressing     Description: Goals to be met by: discharge date     Patient will increase functional independence with mobility by performin. Bed to chair transfer with Modified [place] using Single-point " Cane w safe technique.  2. Gait  x 100 feet with Stand-by Assistance/Mod Ind using Single-point Cane .   3.  Pt to return verbalize appropriate pacemaker precautions consistently.                         Time Tracking:     PT Received On: 12/22/23  PT Start Time: 1440     PT Stop Time: 1503  PT Total Time (min): 23 min     Billable Minutes: Gait Training 15 and Therapeutic Activity 8    Treatment Type: Treatment  PT/PTA: PTA     Number of PTA visits since last PT visit: 2     12/22/2023

## 2023-12-22 NOTE — SUBJECTIVE & OBJECTIVE
Interval History:   No acute events overnight  This AM patient reports multiple complaints including L shoulder pain, and persistent L hand pain and swelling, tender to light touch  Reports right forearm bruising that he says occurred after procedure  Denies any history of falls or trauma  Patient expresses dissatisfaction with care  Otherwise, reports ambulating well with mild instability which is improved    Review of Systems   Constitutional:  Negative for chills and fever.   Respiratory:  Negative for shortness of breath.    Cardiovascular:  Negative for chest pain, palpitations and leg swelling.   Gastrointestinal:  Negative for abdominal pain.   Neurological:  Negative for dizziness and headaches.     Objective:     Vital Signs (Most Recent):  Temp: 97.7 °F (36.5 °C) (12/22/23 1123)  Pulse: 75 (12/22/23 1248)  Resp: 18 (12/22/23 1123)  BP: (!) 174/67 (12/22/23 1123)  SpO2: 97 % (12/22/23 1148) Vital Signs (24h Range):  Temp:  [97.7 °F (36.5 °C)-99.2 °F (37.3 °C)] 97.7 °F (36.5 °C)  Pulse:  [62-75] 75  Resp:  [18-20] 18  SpO2:  [92 %-97 %] 97 %  BP: (144-179)/(67-83) 174/67     Weight: 110.5 kg (243 lb 9.7 oz)  Body mass index is 33.98 kg/m².  No intake or output data in the 24 hours ending 12/22/23 1334      Physical Exam  Constitutional:       Appearance: Normal appearance. He is obese.   HENT:      Mouth/Throat:      Mouth: Mucous membranes are moist.      Pharynx: Oropharynx is clear.   Eyes:      Extraocular Movements: Extraocular movements intact.      Conjunctiva/sclera: Conjunctivae normal.   Cardiovascular:      Rate and Rhythm: Normal rate and regular rhythm.   Pulmonary:      Effort: Pulmonary effort is normal.      Breath sounds: Normal breath sounds.   Abdominal:      General: Bowel sounds are normal.      Palpations: Abdomen is soft.   Musculoskeletal:         General: Normal range of motion.      Comments: Left hand +1 edema, tender to light touch, unable to make a fist   Skin:     General: Skin  is warm and dry.      Comments: Scattered bruising involving R forearm     Neurological:      General: No focal deficit present.      Mental Status: He is alert and oriented to person, place, and time. Mental status is at baseline.             Significant Labs: All pertinent labs within the past 24 hours have been reviewed.    Significant Imaging: I have reviewed all pertinent imaging results/findings within the past 24 hours.

## 2023-12-22 NOTE — PT/OT/SLP PROGRESS
Physical Therapy Treatment    Patient Name:  Korey Santos   MRN:  [id number]    Recommendations:     Discharge Recommendations: No Therapy Indicated  Discharge Equipment Recommendations: none  Barriers to discharge:  mild instability during gait with S.C. use    Assessment:     Korey Santos is a 83 y.o. male admitted with a medical diagnosis of Bradycardia.  He presents with the following impairments/functional limitations: weakness, impaired endurance, impaired self care skills, gait instability, impaired balance, impaired functional mobility, decreased coordination, impaired coordination, decreased lower extremity function, decreased upper extremity function, decreased safety awareness, decreased ROM, impaired fine motor, impaired skin, edema, impaired cardiopulmonary response to activity, pain Pt would continue to benefit from P.T. To address impairments listed above.  .    Rehab Prognosis: Fair; patient would benefit from acute skilled PT services to address these deficits and reach maximum level of function.    Recent Surgery: Procedure(s) (LRB):  INSERTION, CARDIAC PACEMAKER, DUAL CHAMBER (N/A) 2 Days Post-Op    Plan:     During this hospitalization, patient to be seen 3 x/week to address the identified rehab impairments via gait training, therapeutic activities, therapeutic exercises and progress toward the following goals:    Plan of Care Expires:  01/20/24    Subjective       Patient/Family Comments/goals: Pt agreed to tx.  Pain/Comfort:  Pain Rating 1: 10/10 (to touch)  Location - Side 1: Left  Location - Orientation 1: generalized  Location 1: arm  Pain Addressed 1: Reposition, Distraction, Nurse notified  Pain Rating Post-Intervention 1: 10/10 (to touch)      Objective:     Communicated with RN prior to session.  Patient found up in chair with telemetry, peripheral IV upon PT entry to room.     General Precautions: Standard, fall, pacemaker  Orthopedic Precautions: N/A  Braces: UE Sling (sling  "for night use - not for use during the day)  Respiratory Status: Room air     Functional Mobility:  Transfers:     Sit to Stand:  contact guard assistance with straight cane x 3 reps  Gait: 100ft x 2 with S.C.  Pt demonstrates bouts of unsteadiness without LOB.  VC's for slower pace for increased stability and safety.  Pt reports having "a bad R knee" that has pain at times.    Balance: sitting good, standing fair/fair+, gait fair      AM-PAC 6 CLICK MOBILITY  Turning over in bed (including adjusting bedclothes, sheets and blankets)?: 4  Sitting down on and standing up from a chair with arms (e.g., wheelchair, bedside commode, etc.): 3  Moving from lying on back to sitting on the side of the bed?: 4  Moving to and from a bed to a chair (including a wheelchair)?: 3  Need to walk in hospital room?: 3  Climbing 3-5 steps with a railing?: 3  Basic Mobility Total Score: 20       Treatment & Education:  Seated BLE therex: AP, LAQs, hip flexion 10 x 2 reps with vc's for proper technique.  Gait training as above with mild instability and vc's to closer attention to slower pace for increased stability.  Pt remained up in b/s chair at end of tx.    Patient left up in chair with all lines intact, call button in reach, bed alarm on, and RN notified..    GOALS:   Multidisciplinary Problems       Physical Therapy Goals          Problem: Physical Therapy    Goal Priority Disciplines Outcome Goal Variances Interventions   Physical Therapy Goal     PT, PT/OT Ongoing, Progressing     Description: Goals to be met by: discharge date     Patient will increase functional independence with mobility by performin. Bed to chair transfer with Modified [place] using Single-point Cane w safe technique.  2. Gait  x 100 feet with Stand-by Assistance/Mod Ind using Single-point Cane .   3.  Pt to return verbalize appropriate pacemaker precautions consistently.                         Time Tracking:     PT Received On: 23  PT Start " Time: 1600     PT Stop Time: 1624  PT Total Time (min): 24 min     Billable Minutes: Gait Training 15 and Therapeutic Activity 9    Treatment Type: Treatment  PT/PTA: PTA     Number of PTA visits since last PT visit: 1 12/21/2023

## 2023-12-22 NOTE — ASSESSMENT & PLAN NOTE
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   Now s/p dual chamber pacemaker by  on 12/19/2023  Currently normal rate, stable, and asymptomatic    Plan:  Keflex 500mg QID  PT/OT to eval.

## 2023-12-22 NOTE — PLAN OF CARE
Future Appointments   Date Time Provider Department Center   1/3/2024  1:45 PM Sherrie Kyle APRN, [facility] CARDIO [facility]   2/6/2024 10:45 AM Noah Vazquez MD [facility]  [facility]        12/22/23 1316   Post-Acute Status   Post-Acute Authorization Home Health   Home Health Status Pending medical clearance/testing  (Pending medical clearance to discharge home with HH. HH orders needed.)   Coverage [employer] MANAGED MEDICARE - [employer] MEDICARE HMO -   Hospital Resources/Appts/Education Provided Appointments scheduled and added to AVS   Discharge Delays   (D/C pending until pt is medically ready for discharge)   Discharge Plan   Discharge Plan A Home Health   Discharge Plan B Home

## 2023-12-23 VITALS
BODY MASS INDEX: 34.11 KG/M2 | WEIGHT: 243.63 LBS | OXYGEN SATURATION: 94 % | DIASTOLIC BLOOD PRESSURE: 84 MMHG | SYSTOLIC BLOOD PRESSURE: 171 MMHG | HEART RATE: 66 BPM | HEIGHT: 71 IN | RESPIRATION RATE: 18 BRPM | TEMPERATURE: 98 F

## 2023-12-23 PROCEDURE — 25000003 PHARM REV CODE 250: Mod: HCNC | Performed by: STUDENT IN AN ORGANIZED HEALTH CARE EDUCATION/TRAINING PROGRAM

## 2023-12-23 PROCEDURE — 94761 N-INVAS EAR/PLS OXIMETRY MLT: CPT | Mod: HCNC

## 2023-12-23 PROCEDURE — 99900035 HC TECH TIME PER 15 MIN (STAT): Mod: HCNC

## 2023-12-23 PROCEDURE — 25000003 PHARM REV CODE 250: Mod: HCNC | Performed by: HOSPITALIST

## 2023-12-23 PROCEDURE — 25000003 PHARM REV CODE 250: Mod: HCNC | Performed by: INTERNAL MEDICINE

## 2023-12-23 PROCEDURE — 25000003 PHARM REV CODE 250: Mod: HCNC | Performed by: FAMILY MEDICINE

## 2023-12-23 PROCEDURE — 25000003 PHARM REV CODE 250: Mod: HCNC | Performed by: NURSE PRACTITIONER

## 2023-12-23 RX ORDER — LACTULOSE 10 G/15ML
30 SOLUTION ORAL 3 TIMES DAILY
Status: DISCONTINUED | OUTPATIENT
Start: 2023-12-23 | End: 2023-12-23 | Stop reason: HOSPADM

## 2023-12-23 RX ORDER — NIFEDIPINE 30 MG/1
30 TABLET, EXTENDED RELEASE ORAL DAILY
Qty: 30 TABLET | Refills: 0 | Status: SHIPPED | OUTPATIENT
Start: 2023-12-23 | End: 2024-01-03 | Stop reason: SDUPTHER

## 2023-12-23 RX ORDER — CEPHALEXIN 500 MG/1
500 CAPSULE ORAL 4 TIMES DAILY
Qty: 20 CAPSULE | Refills: 0 | Status: SHIPPED | OUTPATIENT
Start: 2023-12-23 | End: 2023-12-28

## 2023-12-23 RX ORDER — HYDROCODONE BITARTRATE AND ACETAMINOPHEN 5; 325 MG/1; MG/1
2 TABLET ORAL EVERY 4 HOURS PRN
Qty: 15 TABLET | Refills: 0 | Status: SHIPPED | OUTPATIENT
Start: 2023-12-23 | End: 2023-12-26

## 2023-12-23 RX ADMIN — LACTULOSE 30 G: 20 SOLUTION ORAL at 05:12

## 2023-12-23 RX ADMIN — DOCUSATE SODIUM AND SENNOSIDES 1 TABLET: 8.6; 5 TABLET, FILM COATED ORAL at 12:12

## 2023-12-23 RX ADMIN — NIFEDIPINE 30 MG: 30 TABLET, FILM COATED, EXTENDED RELEASE ORAL at 10:12

## 2023-12-23 RX ADMIN — CEPHALEXIN 500 MG: 500 CAPSULE ORAL at 10:12

## 2023-12-23 RX ADMIN — LOSARTAN POTASSIUM 100 MG: 50 TABLET, FILM COATED ORAL at 10:12

## 2023-12-23 NOTE — DISCHARGE SUMMARY
[facility] Medicine  Discharge Summary      Patient Name: Korey Santos  MRN: [id number]  LAUREEN: [id number]  Patient Class: IP- Inpatient  Admission Date: 12/18/2023  Hospital Length of Stay: 5 days  Discharge Date and Time: 12/23/2023 11:28 AM  Attending Physician: No att. providers found   Discharging Provider: Darrell Constantino MD  Primary Care Provider: Juan Bustamante MD    Primary Care Team: Networked reference to record PCT     HPI:   Korey Santos 82 y/o. with BPH, HLD, DJD, HTN, , chronic constipation, GERD, presents to the emergency room with complaints of  few weeks' history of intermittent lightheadedness with activities is usually walking 20-40 feet.  Symptom has been worse since over the weekend.  He denies syncopal episode, chest pain, nausea, vomiting, weakness, tremor, diaphoresis, fever, chills. n report noncompliant with blood pressure medication recently because he forgets to take them, also concern that his heart rate has been between 30s to 40s with his new blood pressure machine.    In the ED HR 29, Bp 171/73, sodium 140 potassium 4.4, bicarb 20, BUN/creatinine 20/1.06, magnesium 2.3, H/H 13.7/41.9, troponin 0.036, CXR with no acute process.  Transferred to [facility] for Cardiology evaluation and pacemaker placement      Procedure(s) (LRB):  INSERTION, CARDIAC PACEMAKER, DUAL CHAMBER (N/A)      Hospital Course:   No notes on file     Goals of Care Treatment Preferences:  Code Status: Full Code    Living Will: Yes              Consults:   Consults (From admission, onward)          Status Ordering Provider     Inpatient consult to Cardiology  Once        Provider:  Flaco Kelly MD    Completed TIFFANIE FRAGA            Cardiac/Vascular  * Bradycardia  On admission, EKG with RBBB and complete AV block.   Now s/p dual chamber pacemaker by  on 12/19/2023  Currently normal rate, stable, and asymptomatic    Plan:  Keflex 500mg QID  He was  discharged home in stable condition and will follow up with Cardiology    Hyperlipidemia  Continue statin    Renal/  Benign prostatic hyperplasia with urinary frequency  Continue Flomax and tamsulosin      GI  Dysphagia, pharyngoesophageal  Patient reports 1 month of persistent symptoms with difficulty swallowing pills. Denies difficulty with liquids.    Plan:  ST consult for swallow eval  May need EGD as outpatient. He is followed by GI    Orthopedic  Left hand pain  XR of hand demonstrated no acute fracture or dislocation  Doppler US showed thrombus surrounding the venous catheter in the left cephalic vein.  Gout is less likely. Pain did not respond to a trial of colchicine. Uric acid WNL.   CT hand was unremarkable  Ddx includes superficial thrombosis associated with pacemaker / PIV placement, cellulitis (however no skin lesions, no fever or leukocytosis)    Appears well perfused on exam.   Edema and range of motion of fingers improved prior to discharge.     Plan:     -removal of left arm PIV, warm compress to IV site. If symptoms continue, may need to consider anticoagulation. Discussed risks / benefits with pt. Recommended follow up with PCP  -PRN analgesics  -empiric Keflex for s/p pacemaker coverage and for cellulitis coverage.         Other  Ecchymosis of forearm  In the setting of patient on ASA.    -PRN analgesics and supportive care      Final Active Diagnoses:    Diagnosis Date Noted POA    PRINCIPAL PROBLEM:  Bradycardia [R00.1] 12/18/2023 Yes    Ecchymosis of forearm [R58] 12/22/2023 Clinically Undetermined    Left hand pain [M79.642] 12/20/2023 No    Dysphagia, pharyngoesophageal [R13.14] 10/30/2017 Yes    Hyperlipidemia [E78.5] 10/07/2014 Yes    Benign prostatic hyperplasia with urinary frequency [N40.1, R35.0] 04/23/2014 Yes      Problems Resolved During this Admission:       Discharged Condition: good    Disposition: Home or Self Care    Follow Up:   Follow-up Information       Taylor  MD Noah Follow up on 2/6/2024.    Specialties: Cardiology, Interventional Cardiology  Why: 10:45am  Contact information:  [address]  [address]  [place] [address]  [phone]               Sherrie Kyle APRN, ANP Follow up on 1/3/2024.    Specialty: Cardiology  Why: at 1:45pm; cardiology hospital follow up appointment  Contact information:  [address]  [address]  [place] [address]  [phone]                           Patient Instructions:   No discharge procedures on file.    Significant Diagnostic Studies: Labs: BMP:   Recent Labs   Lab 12/22/23  0327   GLU 90      K 4.3      CO2 22*   BUN 19   CREATININE 0.8   CALCIUM 8.4*    and CMP   Recent Labs   Lab 12/22/23  0327      K 4.3      CO2 22*   GLU 90   BUN 19   CREATININE 0.8   CALCIUM 8.4*   ANIONGAP 10       Pending Diagnostic Studies:       None           Medications:  Reconciled Home Medications:      Medication List        START taking these medications      cephALEXin 500 MG capsule  Commonly known as: KEFLEX  Take 1 capsule (500 mg total) by mouth 4 (four) times daily. for 5 days     HYDROcodone-acetaminophen 5-325 mg per tablet  Commonly known as: NORCO  Take 2 tablets by mouth every 4 (four) hours as needed for Pain.     NIFEdipine 30 MG (OSM) 24 hr tablet  Commonly known as: PROCARDIA-XL  Take 1 tablet (30 mg total) by mouth once daily.            CHANGE how you take these medications      aspirin 325 MG EC tablet  Commonly known as: ECOTRIN  Take 1 tablet (325 mg total) by mouth once daily.  What changed: when to take this            CONTINUE taking these medications      azelastine 137 mcg (0.1 %) nasal spray  Commonly known as: ASTELIN  1 spray (137 mcg total) by Nasal route 2 (two) times daily as needed for Rhinitis.     finasteride 5 mg tablet  Commonly known as: PROSCAR  Take 1 tablet (5 mg total) by mouth once daily.     fluticasone propionate 50 mcg/actuation nasal spray  Commonly known as:  FLONASE  1 spray (50 mcg total) by Each Nostril route once daily.     hydrOXYzine HCL 25 MG tablet  Commonly known as: ATARAX  Take 1 tablet (25 mg total) by mouth 3 (three) times daily as needed for Anxiety.     linaCLOtide 145 mcg Cap capsule  Commonly known as: LINZESS  Take 1 capsule (145 mcg total) by mouth daily as needed (constipation).     losartan 100 MG tablet  Commonly known as: COZAAR  Take 1 tablet (100 mg total) by mouth once daily.     multivitamin capsule  Take 1 capsule by mouth once daily.     pantoprazole 40 MG tablet  Commonly known as: PROTONIX  Take 1 tablet (40 mg total) by mouth once daily.     polyethylene glycol 17 gram/dose powder  Commonly known as: GLYCOLAX  Take 17 g by mouth daily as needed.     pravastatin 40 MG tablet  Commonly known as: PRAVACHOL  Take 1 tablet (40 mg total) by mouth once daily.     tamsulosin 0.4 mg Cap  Commonly known as: FLOMAX  Take 1 capsule (0.4 mg total) by mouth once daily.              Indwelling Lines/Drains at time of discharge:   Lines/Drains/Airways       None                   Time spent on the discharge of patient: 35 minutes         Darrell Constantino MD  Department of Hospital Medicine  [facility]

## 2023-12-23 NOTE — PLAN OF CARE
0815  Patient resting quietly in recliner when CM rounded via [employer]. No family present. Patient in agreement with plan to discharge home today, denied the need for assistance with transportation at time of discharge, & verbalized understanding regarding the hospital follow up appointment with MIEK Kyle (cards).    Message sent to nurse Gómez & virtual nurse Eleonora informing that the pt is cleared to discharge.

## 2023-12-23 NOTE — PLAN OF CARE
AVS and educational attachments shared with patient via [employer]. Discussed thoroughly. Patient verbalized clear understanding using teach back method. Notified bedside nurse of completion of education. At present no distress noted. Patient to be discharged via w/c with escort service and family with all of patient's belonings. Will cont to be available to patient and family and intervene prn.

## 2023-12-23 NOTE — PLAN OF CARE
VN note: VN completed AVS and attachments and notified bedside nurse, Eva. Will cont to be available and intervene prn.

## 2023-12-23 NOTE — PLAN OF CARE
[facility] - Telemetry  Discharge Final Note    Primary Care Provider: Juan Bustamante MD    Expected Discharge Date: 12/23/2023    Final Discharge Note (most recent)       Final Note - 12/23/23 1403          Final Note    Assessment Type Final Discharge Note (P)      Anticipated Discharge Disposition Home or Self Care (P)      Hospital Resources/Appts/Education Provided Appointments scheduled and added to AVS (P)                      Contact Info       Noah Vazquez MD   Specialty: Cardiology, Interventional Cardiology    [address]  [address]  [facility] [address]   Phone: [phone]       Next Steps: Follow up on 2/6/2024    Instructions: 10:45am    Sherrie Kyle APRN, ANNIE   Specialty: Cardiology    [address]  [address]  [place] [address]   Phone: [phone]       Next Steps: Follow up on 1/3/2024    Instructions: at 1:45pm; cardiology hospital follow up appointment

## 2023-12-23 NOTE — ASSESSMENT & PLAN NOTE
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   Now s/p dual chamber pacemaker by  on 12/19/2023  Currently normal rate, stable, and asymptomatic    Plan:  Keflex 500mg QID  He was discharged home in stable condition and will follow up with Cardiology

## 2023-12-23 NOTE — ASSESSMENT & PLAN NOTE
XR of hand demonstrated no acute fracture or dislocation  Doppler US showed thrombus surrounding the venous catheter in the left cephalic vein.  Gout is less likely. Pain did not respond to a trial of colchicine. Uric acid WNL.   CT hand was unremarkable  Ddx includes superficial thrombosis associated with pacemaker / PIV placement, cellulitis (however no skin lesions, no fever or leukocytosis)    Appears well perfused on exam.   Edema and range of motion of fingers improved prior to discharge.     Plan:     -removal of left arm PIV, warm compress to IV site. If symptoms continue, may need to consider anticoagulation. Discussed risks / benefits with pt. Recommended follow up with PCP  -PRN analgesics  -empiric Keflex for s/p pacemaker coverage and for cellulitis coverage.

## 2024-01-03 ENCOUNTER — OFFICE VISIT (OUTPATIENT)
Dept: CARDIOLOGY | Facility: CLINIC | Age: 84
End: 2024-01-03
Payer: MEDICARE

## 2024-01-03 ENCOUNTER — PATIENT OUTREACH (OUTPATIENT)
Dept: ADMINISTRATIVE | Facility: HOSPITAL | Age: 84
End: 2024-01-03
Payer: MEDICARE

## 2024-01-03 VITALS
SYSTOLIC BLOOD PRESSURE: 168 MMHG | DIASTOLIC BLOOD PRESSURE: 84 MMHG | OXYGEN SATURATION: 97 % | BODY MASS INDEX: 32.35 KG/M2 | WEIGHT: 231.06 LBS | HEART RATE: 64 BPM | HEIGHT: 71 IN

## 2024-01-03 DIAGNOSIS — I44.2 COMPLETE HEART BLOCK: ICD-10-CM

## 2024-01-03 DIAGNOSIS — R00.1 BRADYCARDIA: Primary | ICD-10-CM

## 2024-01-03 DIAGNOSIS — I25.10 CORONARY ARTERY DISEASE INVOLVING NATIVE CORONARY ARTERY OF NATIVE HEART WITHOUT ANGINA PECTORIS: ICD-10-CM

## 2024-01-03 DIAGNOSIS — E78.5 HYPERLIPIDEMIA, UNSPECIFIED HYPERLIPIDEMIA TYPE: ICD-10-CM

## 2024-01-03 DIAGNOSIS — I10 HYPERTENSION, ESSENTIAL: ICD-10-CM

## 2024-01-03 PROCEDURE — 99999 PR PBB SHADOW E&M-EST. PATIENT-LVL IV: CPT | Mod: PBBFAC,HCNC,, | Performed by: NURSE PRACTITIONER

## 2024-01-03 PROCEDURE — 1101F PT FALLS ASSESS-DOCD LE1/YR: CPT | Mod: HCNC,CPTII,S$GLB, | Performed by: NURSE PRACTITIONER

## 2024-01-03 PROCEDURE — 1159F MED LIST DOCD IN RCRD: CPT | Mod: HCNC,CPTII,S$GLB, | Performed by: NURSE PRACTITIONER

## 2024-01-03 PROCEDURE — 99214 OFFICE O/P EST MOD 30 MIN: CPT | Mod: HCNC,S$GLB,, | Performed by: NURSE PRACTITIONER

## 2024-01-03 PROCEDURE — 3077F SYST BP >= 140 MM HG: CPT | Mod: HCNC,CPTII,S$GLB, | Performed by: NURSE PRACTITIONER

## 2024-01-03 PROCEDURE — 3079F DIAST BP 80-89 MM HG: CPT | Mod: HCNC,CPTII,S$GLB, | Performed by: NURSE PRACTITIONER

## 2024-01-03 PROCEDURE — 1157F ADVNC CARE PLAN IN RCRD: CPT | Mod: HCNC,CPTII,S$GLB, | Performed by: NURSE PRACTITIONER

## 2024-01-03 PROCEDURE — 1111F DSCHRG MED/CURRENT MED MERGE: CPT | Mod: HCNC,CPTII,S$GLB, | Performed by: NURSE PRACTITIONER

## 2024-01-03 PROCEDURE — 3288F FALL RISK ASSESSMENT DOCD: CPT | Mod: HCNC,CPTII,S$GLB, | Performed by: NURSE PRACTITIONER

## 2024-01-03 PROCEDURE — 1125F AMNT PAIN NOTED PAIN PRSNT: CPT | Mod: HCNC,CPTII,S$GLB, | Performed by: NURSE PRACTITIONER

## 2024-01-03 RX ORDER — LOSARTAN POTASSIUM 100 MG/1
100 TABLET ORAL DAILY
Qty: 90 TABLET | Refills: 3 | Status: SHIPPED | OUTPATIENT
Start: 2024-01-03 | End: 2025-01-02

## 2024-01-03 RX ORDER — NIFEDIPINE 30 MG/1
30 TABLET, EXTENDED RELEASE ORAL DAILY
Qty: 90 TABLET | Refills: 3 | Status: SHIPPED | OUTPATIENT
Start: 2024-01-03 | End: 2024-02-06 | Stop reason: SDUPTHER

## 2024-01-03 NOTE — PROGRESS NOTES
Subjective:   Patient ID:  Korey Santos is a 83 y.o. male who presents to clinic for follow after hospital discharge       HPI    82yo male with CHB s/p PPM ([employer]), non obstructive CAD, HTN and HLP who presented for follow up after hospital discharge. He presented to the ER on 12/18/2023 with lightheadedness and mild chest pain. He was noted to be in CHB with HR in the 30s-40s and was taken to the cath lab for TVP placement and LHC. He was admitted to ICU and underwent permanent pacemaker placement the following day. He remained admitted due to complaints of left hand pain with no evidence of fracture on xray but notation of thrombus surrounding the venous catheter in the left cephalic vein. His symptoms improved with elevation and warm compresses and removal of IV. He continued to improve and was discharged on 12/23/2023    Today, he is doing well from a cardiac standpoint and denies any chest pain or SOB. He does complain of mild fatigue but denies any dizziness or syncope. He is concerned about his BP as it has been elevated since discharge. He brings in his BP log from home with SBP ranges 160s-180s. He was taking Losartan only prior to admission and was changed to Nifedipine only upon discharge. He reports even when taking Losartan his BP was still elevated and not controlled. He also complains of pain to his left hand/wrist but reports it is slowly getting better. He has numerous questions in regards to his activity restrictions since his pacemaker placement and further care.     Review of Systems   Constitutional: Positive for malaise/fatigue. Negative for chills, decreased appetite, diaphoresis and fever.   Cardiovascular:  Negative for chest pain, claudication, cyanosis, dyspnea on exertion, irregular heartbeat, leg swelling, near-syncope, orthopnea, palpitations, paroxysmal nocturnal dyspnea and syncope.   Respiratory:  Negative for cough, hemoptysis, shortness of breath and wheezing.     Gastrointestinal:  Negative for bloating, abdominal pain, constipation, diarrhea, melena, nausea and vomiting.   Neurological:  Negative for dizziness and weakness.       Objective:   Physical Exam  Constitutional:       General: He is not in acute distress.     Appearance: He is well-developed.   Neck:      Vascular: No JVD.   Cardiovascular:      Rate and Rhythm: Normal rate and regular rhythm.      Heart sounds: No murmur heard.     No gallop.   Pulmonary:      Effort: Pulmonary effort is normal. No respiratory distress.      Breath sounds: Normal breath sounds. No wheezing.   Abdominal:      General: Bowel sounds are normal. There is no distension.      Palpations: Abdomen is soft.      Tenderness: There is no abdominal tenderness.   Musculoskeletal:      Cervical back: Normal range of motion and neck supple.   Skin:     General: Skin is warm and dry.      Comments: Left sc site with steri strips intact- removed; site soft and free of ecchymosis, tenderness, redness, swelling or drainage    Neurological:      Mental Status: He is alert and oriented to person, place, and time.   Psychiatric:         Behavior: Behavior normal.         Thought Content: Thought content normal.         Judgment: Judgment normal.         Cardiac meds: ASA, Losartan, Nifedipine, Pravastatin     TTE 12/19/2023      Left Ventricle: The left ventricle is normal in size. There is concentric remodeling. Normal wall motion. There is normal systolic function. Biplane (2D) method of discs ejection fraction is 64%. Grade II diastolic dysfunction.    Right Ventricle: Normal right ventricular cavity size. Systolic function is normal. TAPSE is 2.72 cm.    Left Atrium: Left atrium is severely dilated. The left atrium volume index is 60.6 mL/m2.    Aortic Valve: There is mild aortic valve sclerosis.    Mitral Valve: There is mild regurgitation.    Tricuspid Valve: There is mild regurgitation.    Pulmonic Valve: There is no stenosis. There is mild  to moderate regurgitation.    Pulmonary Artery: The estimated pulmonary artery systolic pressure is 35 mmHg.    IVC/SVC: Normal venous pressure at 3 mmHg.    [facility] 12/18/2023    Successful TVP implantation    Non-obstructive coronary arteries      Left Main:  No stenosis        LAD:    40% proximal stenosis . Tortuous Vessel      Circumflex:  No stenosis . Tortuous vessel      RCA:  No stenosis      Assessment:      1. Bradycardia    2. Hyperlipidemia, unspecified hyperlipidemia type    3. Hypertension, essential    4. Coronary artery disease involving native coronary artery of native heart without angina pectoris    5. Complete heart block        Plan:     1. Bradycardia     - resolved; s/p PPM placement; see management under CHF    2. Hyperlipidemia, unspecified hyperlipidemia type     - On Pravastatin; last   - goal LDL ; not fully controlled but needs to be rechecked; will arrange prior to follow up appt in one month    3. Hypertension, essential     - elevated and not fully controlled; was on Losartan 100mg daily prior to admission; Losartan stopped upon discharge and currently on Nifedipine only  - will resume Losartan 100mg daily and continue Nifedipine  - goal BP less than 130/80; will monitor BP at home and follow up in one month with Dr. Mckoy; will bring home BP log at that time; if BP remains above goal then will  likely up titrate Nifedipine; briefly discussed heart healthy diet    4. Coronary artery disease involving native coronary artery of native heart without angina pectoris     - [facility] with non obstructive CAD  - no complaints of chest pain  - continue ASA, statin therapy; no BB due to CHB; given recent PPM placement likely suitable to resume if needed will defer final decision to follow up    5. Complete heart block     -admitted with HR 20 and fatigue  - s/p dual chamber [employer] pacemaker; site stable; wound without s/s infection; dressing and steri strips removed and remain MINNIE;  may shower; limit activity (no raising above level of shoulder, strenuous activity) for one more week  - has home monitor device and educated on use; follow up with Dr. Villa as scheduled in 3 weeks may need in office interrogation 4-6 weeks after implant defer final decision to Dr. Villa        I spent 45 minutes with the patient. Not all time was spent face to face with patient as time was spent reviewing pertinent cardiac images/testing, EKGs, labs and previous hospitalizations. Approximately 25 minutes was spent in the assessment, discussion of the POC and education on current cardiac conditions/diagnosis.     Education in regard to importance of heart healthy diet improvements to include low-salt meals, portion control and healthy food alternatives was stressed and I  also encourage 30 minutes of moderate exercise 3-4x a week.         Continue with current medical plan and lifestyle changes.  Return sooner for concerns or questions. If symptoms persist go to the ED  I have reviewed all pertinent data including patient's medical history in detail and updated the computerized patient record.              Follow up with Dr. Mckoy in 1 month and Dr Villa as scheduled; will enroll in pacemaker clinic at [facility] for ongoing pacemaker checks      He expressed verbal understanding and agreed with the plan

## 2024-01-03 NOTE — PROGRESS NOTES
Updates were requested from care everywhere.  Health Maintenance has been updated.  LIN[facility] immunization registry triggered.  Immunizations were reconciled.  Per BRENDAN in basket message, pt declined flu vaccine 01/03/2024.

## 2024-01-18 ENCOUNTER — TELEPHONE (OUTPATIENT)
Dept: CARDIOLOGY | Facility: CLINIC | Age: 84
End: 2024-01-18
Payer: MEDICARE

## 2024-01-18 NOTE — TELEPHONE ENCOUNTER
Called pt back lvm regarding if his pacemaker is safe to go through the metal detectors I stated in the msg that he needs to have his card with him and that its safe to go through but if he don't feel comfortable going through he can ask them to use the wand to scan him

## 2024-01-18 NOTE — TELEPHONE ENCOUNTER
----- Message from Jesusita Devries sent at 1/18/2024 11:07 AM CST -----  Type:  Needs Medical Advice    Who Called:  pt  Symptoms (please be specific):    How long has patient had these symptoms:    Pharmacy name and phone #:    Would the patient rather a call back or a response via [facility]?   Best Call Back Number: [phone]  Additional Information: pt has a pacemaker - needs to know if metal detector - are safe

## 2024-02-04 NOTE — PROGRESS NOTES
[facility] Cardiology 701     SUBJECTIVE:     History of Present Illness:  Patient is a 83 y.o. male presents with followup after cardiac pacemaker placement .     Primary Diagnosis:  Hypertension: positive  DM: none  Smoker: none   Family history of early CAD: none   Heart disease: complete heart block 12/23  ROS  Since last visit:  Blood pressure is high at home - 130 to 140 to 150's    Goes to the gym and rides a bike without issues and no chest pains   Rare chest pain seldom at night  No shortness of breath; no PND or orthopnea  No syncope    Past Hospitalization    Review of patient's allergies indicates:   Allergen Reactions    Clindamycin Swelling     Throat swells    Lisinopril Swelling and Other (See Comments)     Throat swelling    Shellfish containing products        Past Medical History:   Diagnosis Date    Arthritis     Back pain, chronic     BPH with urinary obstruction     Chronic constipation     Closed fracture of right shoulder 1/28/2021    Closed nondisplaced fracture of greater tuberosity of right humerus 2/24/2021    Colon polyp     DJD (degenerative joint disease)     Hip    Hyperlipidemia     Hypertension     Laryngopharyngeal reflux     Left inguinal hernia     Lumbar herniated disc     Lumbar stenosis     Neck mass 10/7/2014    -4/29/2022 path - lipoma    OA (osteoarthritis) of knee     Bilateral    Paradoxical insomnia     Sinus trouble        Past Surgical History:   Procedure Laterality Date    A-V CARDIAC PACEMAKER INSERTION N/A 12/19/2023    Procedure: INSERTION, CARDIAC PACEMAKER, DUAL CHAMBER;  Surgeon: Noah Vazquez MD;  Location: [facility] CATH LAB/EP;  Service: Cardiology;  Laterality: N/A;    BACK SURGERY  2014    COLONOSCOPY      COLONOSCOPY N/A 5/17/2023    Procedure: COLONOSCOPY;  Surgeon: Christiano Vazquez MD;  Location: [facility] ENDO;  Service: Endoscopy;  Laterality: N/A;  Constipation 2 day prep.Instructions to portal.EC    CORONARY ANGIOGRAPHY N/A 12/18/2023    Procedure:  "ANGIOGRAM, CORONARY ARTERY;  Surgeon: Flaco Kelly MD;  Location: [facility] CATH LAB/EP;  Service: Cardiology;  Laterality: N/A;    EPIDURAL STEROID INJECTION INTO LUMBAR SPINE N/A 2022    Procedure: Injection-steroid-epidural-lumbar L3-4;  Surgeon: Yury Crum Jr., MD;  Location: [facility] PAIN MGT;  Service: Pain Management;  Laterality: N/A;  oral sedation   asa      INSERTION, PACEMAKER, TEMPORARY TRANSVENOUS  2023    Procedure: Insertion, Pacemaker, Temporary Transvenous;  Surgeon: Flaco Kelly MD;  Location: [facility] CATH LAB/EP;  Service: Cardiology;;    JOINT REPLACEMENT Left 2018    KNEE SURGERY      left hand      LEG SURGERY      SPINE SURGERY      UPPER GASTROINTESTINAL ENDOSCOPY                 Cardiac meds:     ASA 81 mg  Losartan 100 mg  Nifedipine 30 mg  Pravastatin 40 mg           OBJECTIVE:     Vital Signs (Most Recent)  Vitals:    24 1100   BP: (!) 191/73   Pulse: 61   SpO2: 96%   Weight: 108.1 kg (238 lb 5.1 oz)   Height: 5' 11" (1.803 m)         Neck: normal carotids, no bruits; normal JVP  Lungs :clear  Heart: RR, normal S1,S2, no murmurs, no gallops  Abd: no masses; no bruits;   Exts: normal DP and PT pulses bilaterally, normal radials; no edema noted   Pacemaker site:normal           Labs        Old Results:    : GFR normal   Diagnostic Results:    1.EK/23: ventricular paced   2.Echo: : normal EF, diastolic dysfunction; LAE, aortic sclerosis; mild MR, mild TR   3.stress test:  4. Cath:: left main; normal; LAD 40%, CX ok; Right ok  5. Dual chamber pacemaker : [employer]       ASSESSMENT/PLAN:     Hypertension: could be better  Pacemaker function: normal      Plan: 1. Increase nifedipine to 60 mg   2. Continue losartan  3. Record blood pressures  4. Return 6 months to see me and device check     Noah Vazquez MD    "

## 2024-02-06 ENCOUNTER — OFFICE VISIT (OUTPATIENT)
Dept: CARDIOLOGY | Facility: CLINIC | Age: 84
End: 2024-02-06
Payer: MEDICARE

## 2024-02-06 VITALS
WEIGHT: 238.31 LBS | DIASTOLIC BLOOD PRESSURE: 73 MMHG | HEIGHT: 71 IN | HEART RATE: 61 BPM | OXYGEN SATURATION: 96 % | BODY MASS INDEX: 33.36 KG/M2 | SYSTOLIC BLOOD PRESSURE: 191 MMHG

## 2024-02-06 DIAGNOSIS — I44.2 COMPLETE HEART BLOCK: ICD-10-CM

## 2024-02-06 DIAGNOSIS — R00.1 BRADYCARDIA: Primary | ICD-10-CM

## 2024-02-06 DIAGNOSIS — I10 HYPERTENSION, ESSENTIAL: ICD-10-CM

## 2024-02-06 DIAGNOSIS — Z95.0 CARDIAC PACEMAKER IN SITU: ICD-10-CM

## 2024-02-06 DIAGNOSIS — I25.10 CORONARY ARTERY DISEASE INVOLVING NATIVE CORONARY ARTERY OF NATIVE HEART WITHOUT ANGINA PECTORIS: ICD-10-CM

## 2024-02-06 PROCEDURE — 1157F ADVNC CARE PLAN IN RCRD: CPT | Mod: HCNC,CPTII,S$GLB, | Performed by: INTERNAL MEDICINE

## 2024-02-06 PROCEDURE — 3077F SYST BP >= 140 MM HG: CPT | Mod: HCNC,CPTII,S$GLB, | Performed by: INTERNAL MEDICINE

## 2024-02-06 PROCEDURE — 99999 PR PBB SHADOW E&M-EST. PATIENT-LVL III: CPT | Mod: PBBFAC,HCNC,, | Performed by: INTERNAL MEDICINE

## 2024-02-06 PROCEDURE — 1160F RVW MEDS BY RX/DR IN RCRD: CPT | Mod: HCNC,CPTII,S$GLB, | Performed by: INTERNAL MEDICINE

## 2024-02-06 PROCEDURE — 1101F PT FALLS ASSESS-DOCD LE1/YR: CPT | Mod: HCNC,CPTII,S$GLB, | Performed by: INTERNAL MEDICINE

## 2024-02-06 PROCEDURE — 3078F DIAST BP <80 MM HG: CPT | Mod: HCNC,CPTII,S$GLB, | Performed by: INTERNAL MEDICINE

## 2024-02-06 PROCEDURE — 1125F AMNT PAIN NOTED PAIN PRSNT: CPT | Mod: HCNC,CPTII,S$GLB, | Performed by: INTERNAL MEDICINE

## 2024-02-06 PROCEDURE — 3288F FALL RISK ASSESSMENT DOCD: CPT | Mod: HCNC,CPTII,S$GLB, | Performed by: INTERNAL MEDICINE

## 2024-02-06 PROCEDURE — 99214 OFFICE O/P EST MOD 30 MIN: CPT | Mod: HCNC,S$GLB,, | Performed by: INTERNAL MEDICINE

## 2024-02-06 PROCEDURE — 1159F MED LIST DOCD IN RCRD: CPT | Mod: HCNC,CPTII,S$GLB, | Performed by: INTERNAL MEDICINE

## 2024-02-06 RX ORDER — NIFEDIPINE 60 MG/1
60 TABLET, EXTENDED RELEASE ORAL DAILY
Qty: 90 TABLET | Refills: 3 | Status: SHIPPED | OUTPATIENT
Start: 2024-02-06 | End: 2024-03-12 | Stop reason: SDUPTHER

## 2024-02-27 ENCOUNTER — TELEPHONE (OUTPATIENT)
Dept: INTERNAL MEDICINE | Facility: CLINIC | Age: 84
End: 2024-02-27
Payer: MEDICARE

## 2024-02-27 DIAGNOSIS — T81.72XA COMPLICATION OF VEIN FOLLOWING A PROCEDURE, NOT ELSEWHERE CLASSIFIED, INITIAL ENCOUNTER: ICD-10-CM

## 2024-02-27 DIAGNOSIS — M79.642 LEFT HAND PAIN: Primary | ICD-10-CM

## 2024-02-27 DIAGNOSIS — I82.90 VENOUS THROMBOSIS: ICD-10-CM

## 2024-02-27 NOTE — TELEPHONE ENCOUNTER
Please schedule patient for left upper extremity venous ultrasound, vascular surgery appointment, orthopedic hand appointment, asap.    I advise him to keep his appointment with Noemi Yu on Wednesday as well.    Please call patient, thank you

## 2024-02-27 NOTE — TELEPHONE ENCOUNTER
Patient would like a call to explain why he still has pain in hand at site of IV.  An appointment is scheduled with Noemi Yu at 130pm tomorrow.  Patient states he will not keep appointment if does not receive a call from PCP.    Patient would like his records reviewed to determine next steps.      I was unable to assist patient, only wants to speak to his pcp.

## 2024-02-27 NOTE — TELEPHONE ENCOUNTER
At the request scheduling supervisor, I did call the patient myself he is upset about hand pain following a venous procedure.  Chart shows he has a superficial thrombophlebitis.  He states he has pain in the hand with movement of the thumb.  Did not report shortness of breath or arm pain.    He has an appointment with VANESSA Yu tomorrow at 1:30 a.m..  He is unsure whether he is going to keep that or not, I did advise so that somebody could take an acute look at it.  Hard to diagnose over the chart.    I will order another ultrasound and consult with vascular surgery and hand surgery at his request.  Will forward to scheduling supervisor, nurse and CC VANESSA Yu as well.

## 2024-02-28 ENCOUNTER — HOSPITAL ENCOUNTER (OUTPATIENT)
Dept: CARDIOLOGY | Facility: HOSPITAL | Age: 84
Discharge: HOME | End: 2024-02-28
Attending: FAMILY MEDICINE
Payer: MEDICARE

## 2024-02-28 DIAGNOSIS — M79.642 LEFT HAND PAIN: ICD-10-CM

## 2024-02-28 DIAGNOSIS — T81.72XA COMPLICATION OF VEIN FOLLOWING A PROCEDURE, NOT ELSEWHERE CLASSIFIED, INITIAL ENCOUNTER: ICD-10-CM

## 2024-02-28 DIAGNOSIS — I82.90 VENOUS THROMBOSIS: ICD-10-CM

## 2024-02-28 PROCEDURE — 93971 EXTREMITY STUDY: CPT | Mod: 26,HCNC,LT, | Performed by: INTERNAL MEDICINE

## 2024-02-28 PROCEDURE — 93971 EXTREMITY STUDY: CPT | Mod: HCNC,LT

## 2024-03-04 ENCOUNTER — TELEPHONE (OUTPATIENT)
Dept: INTERNAL MEDICINE | Facility: CLINIC | Age: 84
End: 2024-03-04
Payer: MEDICARE

## 2024-03-05 ENCOUNTER — OFFICE VISIT (OUTPATIENT)
Dept: INTERNAL MEDICINE | Facility: CLINIC | Age: 84
End: 2024-03-05
Attending: FAMILY MEDICINE
Payer: MEDICARE

## 2024-03-05 ENCOUNTER — TELEPHONE (OUTPATIENT)
Dept: INTERNAL MEDICINE | Facility: CLINIC | Age: 84
End: 2024-03-05
Payer: MEDICARE

## 2024-03-05 VITALS
DIASTOLIC BLOOD PRESSURE: 66 MMHG | WEIGHT: 237.19 LBS | OXYGEN SATURATION: 97 % | HEIGHT: 71 IN | SYSTOLIC BLOOD PRESSURE: 154 MMHG | HEART RATE: 65 BPM | BODY MASS INDEX: 33.21 KG/M2

## 2024-03-05 DIAGNOSIS — M19.032 OSTEOARTHRITIS OF LEFT WRIST, UNSPECIFIED OSTEOARTHRITIS TYPE: ICD-10-CM

## 2024-03-05 DIAGNOSIS — R00.1 BRADYCARDIA: ICD-10-CM

## 2024-03-05 DIAGNOSIS — M65.9 SYNOVITIS AND TENOSYNOVITIS, UNSPECIFIED: Primary | ICD-10-CM

## 2024-03-05 DIAGNOSIS — I10 HYPERTENSION, ESSENTIAL: ICD-10-CM

## 2024-03-05 DIAGNOSIS — Z95.0 PACEMAKER: ICD-10-CM

## 2024-03-05 DIAGNOSIS — I25.10 CORONARY ARTERY DISEASE INVOLVING NATIVE CORONARY ARTERY OF NATIVE HEART WITHOUT ANGINA PECTORIS: ICD-10-CM

## 2024-03-05 DIAGNOSIS — I44.2 COMPLETE HEART BLOCK: ICD-10-CM

## 2024-03-05 DIAGNOSIS — R07.9 CHEST PAIN, UNSPECIFIED TYPE: ICD-10-CM

## 2024-03-05 DIAGNOSIS — R79.89 ABNORMAL COMPLETE BLOOD COUNT: ICD-10-CM

## 2024-03-05 DIAGNOSIS — M25.532 LEFT WRIST PAIN: ICD-10-CM

## 2024-03-05 PROCEDURE — 1101F PT FALLS ASSESS-DOCD LE1/YR: CPT | Mod: HCNC,CPTII,S$GLB, | Performed by: FAMILY MEDICINE

## 2024-03-05 PROCEDURE — 1160F RVW MEDS BY RX/DR IN RCRD: CPT | Mod: HCNC,CPTII,S$GLB, | Performed by: FAMILY MEDICINE

## 2024-03-05 PROCEDURE — 3078F DIAST BP <80 MM HG: CPT | Mod: HCNC,CPTII,S$GLB, | Performed by: FAMILY MEDICINE

## 2024-03-05 PROCEDURE — 99215 OFFICE O/P EST HI 40 MIN: CPT | Mod: HCNC,S$GLB,, | Performed by: FAMILY MEDICINE

## 2024-03-05 PROCEDURE — 99999 PR PBB SHADOW E&M-EST. PATIENT-LVL V: CPT | Mod: PBBFAC,HCNC,, | Performed by: FAMILY MEDICINE

## 2024-03-05 PROCEDURE — 1125F AMNT PAIN NOTED PAIN PRSNT: CPT | Mod: HCNC,CPTII,S$GLB, | Performed by: FAMILY MEDICINE

## 2024-03-05 PROCEDURE — 1157F ADVNC CARE PLAN IN RCRD: CPT | Mod: HCNC,CPTII,S$GLB, | Performed by: FAMILY MEDICINE

## 2024-03-05 PROCEDURE — 3288F FALL RISK ASSESSMENT DOCD: CPT | Mod: HCNC,CPTII,S$GLB, | Performed by: FAMILY MEDICINE

## 2024-03-05 PROCEDURE — 3077F SYST BP >= 140 MM HG: CPT | Mod: HCNC,CPTII,S$GLB, | Performed by: FAMILY MEDICINE

## 2024-03-05 PROCEDURE — 1159F MED LIST DOCD IN RCRD: CPT | Mod: HCNC,CPTII,S$GLB, | Performed by: FAMILY MEDICINE

## 2024-03-05 RX ORDER — METHYLPREDNISOLONE 4 MG/1
TABLET ORAL
Qty: 21 EACH | Refills: 0 | Status: SHIPPED | OUTPATIENT
Start: 2024-03-05

## 2024-03-05 NOTE — TELEPHONE ENCOUNTER
----- Message from Brea Luu sent at 3/5/2024  3:00 PM CST -----  Contact: Mobile  [phone]  Patient said hat he was scheduled to have his MRI done at [address] [place] [address]. Patient said that he was told that they cannot do an MRI for a patient with a pacemaker.       Patient would like for you to schedule his MRI at the main campus on [facility].     He would like for you to get in touch with Ms. Boyd ..    You can call  Ms. Boyd @ [phone].

## 2024-03-05 NOTE — PROGRESS NOTES
Subjective:       Patient ID: Korey Santos is a 83 y.o. male.    Chief Complaint: Hand Pain    Appointment today's to look at left wrist.  Symptoms began in December when he was in the hospital in [place] with bradycardia for pacemaker.  During that hospitalization developed pain at an IV site, left wrist, radial aspect with some swelling.  X-ray 12/20/2023 showed DJD in the wrist and 1st CMC; ultrasound 12/21/2023 revealed no DVT in the upper extremity deep veins however he did have thrombosis about the catheter site; CT scan hand and wrist 12/22/2024 showed no fluid but DJD as above.  Patient was apparently treated with Keflex and reassurance.  Symptoms continued or worsened over the past month or 2.  We were notified a proximally last week and appointments were made to see APRN.  Patient requested an ultrasound.  The ultrasound was performed but the patient canceled both of his APRN visits.  His ultrasound was reported by patient portal, he states he did not receive that and does not use portal.  We were not involved in his initial treatment, did not have knowledge until recently and brought in today to investigate and come up with a treatment plan.  Of note our ultrasound done on 02/28 2024 showed no DVT.    Pain or discomfort is located to the radial aspect of the wrist at or about the 1st CMC joint area.  He describes mild swelling and mild erythema.  No distal neurovascular complaints such as numbness, tingling.  States some weakness with flexion of the thumb.      No fever chills or constitutional complaints noted.  No proximal forearm, elbow or shoulder pain.    >>>        Patient presents for a post hospitalization follow up visit. Current complaints addressed in the ROS section. Reviewed the pertinent chart data including (but not limited to) labs, imaging, cardiovascular, procedures and available ER/DC Summary and inpatient provider notes. Problem list items reviewed and modified or added entries  (in the overview section) may not be transcribed into this encounter note due to note writer format.    Copy D/C Summary:    Patient Class: IP- Inpatient  Admission Date: 12/18/2023  Hospital Length of Stay: 5 days  Discharge Date and Time: 12/23/2023 11:28 AM  Attending Physician: No att. providers found   Discharging Provider: Darrell Constantino MD  Primary Care Provider: Juan Bustamante MD     Primary Care Team: Networked reference to record PCT      HPI:   Korey Santos 84 y/o. with BPH, HLD, DJD, HTN, , chronic constipation, GERD, presents to the emergency room with complaints of  few weeks' history of intermittent lightheadedness with activities is usually walking 20-40 feet.  Symptom has been worse since over the weekend.  He denies syncopal episode, chest pain, nausea, vomiting, weakness, tremor, diaphoresis, fever, chills. n report noncompliant with blood pressure medication recently because he forgets to take them, also concern that his heart rate has been between 30s to 40s with his new blood pressure machine.    In the ED HR 29, Bp 171/73, sodium 140 potassium 4.4, bicarb 20, BUN/creatinine 20/1.06, magnesium 2.3, H/H 13.7/41.9, troponin 0.036, CXR with no acute process.  Transferred to [facility] for Cardiology evaluation and pacemaker placement        Procedure(s) (LRB):  INSERTION, CARDIAC PACEMAKER, DUAL CHAMBER (N/A)           Cardiac/Vascular  * Bradycardia  On admission, EKG with RBBB and complete AV block.   Now s/p dual chamber pacemaker by  on 12/19/2023  Currently normal rate, stable, and asymptomatic     Plan:  Keflex 500mg QID  He was discharged home in stable condition and will follow up with Cardiology     Hyperlipidemia  Continue statin     Renal/  Benign prostatic hyperplasia with urinary frequency  Continue Flomax and tamsulosin        GI  Dysphagia, pharyngoesophageal  Patient reports 1 month of persistent symptoms with difficulty swallowing pills. Denies  difficulty with liquids.     Plan:  ST consult for swallow eval  May need EGD as outpatient. He is followed by GI     Orthopedic  Left hand pain  XR of hand demonstrated no acute fracture or dislocation  Doppler US showed thrombus surrounding the venous catheter in the left cephalic vein.  Gout is less likely. Pain did not respond to a trial of colchicine. Uric acid WNL.   CT hand was unremarkable  Ddx includes superficial thrombosis associated with pacemaker / PIV placement, cellulitis (however no skin lesions, no fever or leukocytosis)     Appears well perfused on exam.   Edema and range of motion of fingers improved prior to discharge.      Plan:      -removal of left arm PIV, warm compress to IV site. If symptoms continue, may need to consider anticoagulation. Discussed risks / benefits with pt. Recommended follow up with PCP  -PRN analgesics  -empiric Keflex for s/p pacemaker coverage and for cellulitis coverage.           Other  Ecchymosis of forearm  In the setting of patient on ASA.     -PRN analgesics and supportive care        Final Active Diagnoses:    Diagnosis Date Noted POA  · PRINCIPAL PROBLEM:  Bradycardia [R00.1] 12/18/2023 Yes  · Ecchymosis of forearm [R58] 12/22/2023 Clinically Undetermined  · Left hand pain [M79.642] 12/20/2023 No  · Dysphagia, pharyngoesophageal [R13.14] 10/30/2017 Yes  · Hyperlipidemia [E78.5] 10/07/2014 Yes  · Benign prostatic hyperplasia with urinary frequency [N40.1, R35.0] 04/23/2014 Yes     Problems Resolved During this Admission:        Discharged Condition: good     Disposition: Home or Self Care     Follow Up:      Follow-up Information            Noah Vazquez MD Follow up on 2/6/2024.   Specialties: Cardiology, Interventional Cardiology  Why: 10:45am  Contact information:  [address]  [address]  [place] [address]  [phone]        Sherrie Kyle APRN, ANP Follow up on 1/3/2024.   Specialty: Cardiology  Why: at 1:45pm; cardiology hospital follow  up appointment  Contact information:  [address]  [address]  [place] [address]  [phone]                Review of Systems   Constitutional:  Negative for appetite change, chills, diaphoresis, fatigue and fever.   HENT:  Negative for congestion, postnasal drip, rhinorrhea, sore throat and trouble swallowing.    Eyes:  Negative for visual disturbance.   Respiratory:  Negative for cough, choking, chest tightness, shortness of breath and wheezing.    Cardiovascular:  Positive for chest pain. Negative for leg swelling.   Gastrointestinal:  Negative for abdominal distention, abdominal pain, diarrhea, nausea and vomiting.   Genitourinary:  Negative for difficulty urinating and hematuria.   Musculoskeletal:  Positive for arthralgias and joint swelling. Negative for myalgias.   Skin:  Negative for rash.   Neurological:  Positive for tremors. Negative for weakness, light-headedness and headaches.   Psychiatric/Behavioral:  Positive for sleep disturbance. Negative for confusion and dysphoric mood.        Objective:      Physical Exam  Vitals and nursing note reviewed.   Constitutional:       General: He is not in acute distress.     Appearance: He is well-developed.   Pulmonary:      Effort: Pulmonary effort is normal.   Musculoskeletal:      Left wrist: Swelling and deformity present. No effusion, tenderness, bony tenderness, snuff box tenderness or crepitus. Decreased range of motion. Normal pulse.      Left hand: Swelling present. No deformity. Decreased range of motion. Normal strength. Normal sensation. There is no disruption of two-point discrimination. Normal capillary refill. Normal pulse.      Cervical back: Neck supple.      Right lower leg: No edema.      Left lower leg: No edema.      Comments: 1st CMC DJD changes L. Neg finklestein, Neg grind. OK sign intact. DNVI. Mild swelling and flushing over snuffbox and 1st CMC. No edema. Veins w/o cords. Radial,ulnar and median NN intact. Patient states decr ROM  w/ flexion.   Skin:     General: Skin is warm and dry.      Findings: No rash.   Neurological:      Mental Status: He is alert and oriented to person, place, and time.   Psychiatric:         Behavior: Behavior normal.         Thought Content: Thought content normal.         Judgment: Judgment normal.         Assessment:       1. Synovitis and tenosynovitis, unspecified    2. Osteoarthritis of left wrist, unspecified osteoarthritis type    3. Bradycardia    4. Complete heart block    5. Pacemaker    6. Coronary artery disease involving native coronary artery of native heart without angina pectoris    7. Hypertension, essential    8. Abnormal complete blood count    9. Chest pain, unspecified type        Plan:     Medication List with Changes/Refills   New Medications    METHYLPREDNISOLONE (MEDROL DOSEPACK) 4 MG TABLET    use as directed   Current Medications    ASPIRIN (ECOTRIN) 325 MG EC TABLET    Take 1 tablet (325 mg total) by mouth once daily.    AZELASTINE (ASTELIN) 137 MCG (0.1 %) NASAL SPRAY    1 spray (137 mcg total) by Nasal route 2 (two) times daily as needed for Rhinitis.    FINASTERIDE (PROSCAR) 5 MG TABLET    Take 1 tablet (5 mg total) by mouth once daily.    FLUTICASONE PROPIONATE (FLONASE) 50 MCG/ACTUATION NASAL SPRAY    1 spray (50 mcg total) by Each Nostril route once daily.    HYDROXYZINE HCL (ATARAX) 25 MG TABLET    Take 1 tablet (25 mg total) by mouth 3 (three) times daily as needed for Anxiety.    LINACLOTIDE (LINZESS) 145 MCG CAP CAPSULE    Take 1 capsule (145 mcg total) by mouth daily as needed (constipation).    LOSARTAN (COZAAR) 100 MG TABLET    Take 1 tablet (100 mg total) by mouth once daily.    MULTIVITAMIN CAPSULE    Take 1 capsule by mouth once daily.    NIFEDIPINE (PROCARDIA-XL) 60 MG (OSM) 24 HR TABLET    Take 1 tablet (60 mg total) by mouth once daily.    PRAVASTATIN (PRAVACHOL) 40 MG TABLET    Take 1 tablet (40 mg total) by mouth once daily.    TAMSULOSIN (FLOMAX) 0.4 MG CAP    Take  1 capsule (0.4 mg total) by mouth once daily.   Discontinued Medications    LOSARTAN (COZAAR) 100 MG TABLET    Take 1 tablet (100 mg total) by mouth once daily.     1. Synovitis and tenosynovitis, unspecified  -     Cancel: MRI Wrist Joint Without Contrast Left; Future; Expected date: 03/05/2024  -     C-Reactive Protein; Future; Expected date: 03/05/2024  -     Sedimentation rate; Future; Expected date: 03/05/2024  -     Cyclic Citrullinated Peptide Antibody, IgG; Future; Expected date: 03/05/2024  -     Rheumatoid Factor; Future; Expected date: 03/05/2024  -     CK; Future; Expected date: 03/05/2024  -     Uric Acid; Future; Expected date: 03/05/2024  -     Creatinine, Serum; Future; Expected date: 03/05/2024  -     CBC Auto Differential; Future; Expected date: 03/05/2024  -     Ambulatory referral/consult to Orthopedics; Future; Expected date: 03/06/2024  -     methylPREDNISolone (MEDROL DOSEPACK) 4 mg tablet; use as directed  Dispense: 21 each; Refill: 0    2. Osteoarthritis of left wrist, unspecified osteoarthritis type  -     Cancel: MRI Wrist Joint Without Contrast Left; Future; Expected date: 03/05/2024  -     Ambulatory referral/consult to Orthopedics; Future; Expected date: 03/06/2024    3. Bradycardia  Overview:  -followed in cardiology  -PPM Dual 12/19/2023      4. Complete heart block  Overview:  -followed in cardiology  -PPM Dual 12/19/2023      5. Pacemaker  Overview:  -CHB and symptomatic bradycardia  -followed in cardiology  -PPM Dual 12/19/2023        6. Coronary artery disease involving native coronary artery of native heart without angina pectoris  Overview:  - [facility] 12/18/2023 in setting of CHB   LM patent; pLAD 40%; LCX patent; RCA patent       7. Hypertension, essential  Comments:  144/68 R; 152/79 L. [facility] cardiology    8. Abnormal complete blood count  -     CBC Auto Differential; Future; Expected date: 03/05/2024    9. Chest pain, unspecified type  -     Ambulatory referral/consult to  Cardiology; Future; Expected date: 03/05/2024      See meds, orders, follow up, routing and instructions sections of encounter and AVS. Discussed with patient and provided on AVS.    During the review of systems today patient did describe an episode of chest pain.  There was no associated syncope, near-syncope or diaphoresis.  During his recent hospitalization he did have a left heart catheterization with nonocclusive CAD as listed above.  He has been compliant with his medications as well including aspirin.  He saw cardiologist a proximally a week or 2 ago, and earlier in January.    On examination today, he had mild flushing at the radial aspect of the left wrist.  Provocative tests for tendinous function, CMC grind and tenderness inflammation were negative.  X-rays and scan were reviewed.    At today's visit I recommended inflammatory workup, MRI of the hand and wrist, expedite referrals to orthopedics and Cardiology.  I walked patient to the checkout desk and explained everything to our .    Schedule her notify me that an MRI was in available today, however we later learned that this location could not accommodate pacemaker MRI and that would have to be scheduled separately.  Following that discourse we discovered that the machinery to do so is not available at this time.  We will contact patient's separately and set up for a contrast CT scan.  Scheduling staff also informed me that they could offer a cardiology visit within a couple of days however patient declined.  Additionally, an appointment with Orthopedics, hand service at [facility] was available on Thursday and patient declined that appointment as well.    I advised him concerning chest pain that we would refer urgently to the cardiologist, noting that he declined that appointment as listed in the preceding paragraph.  I advise him should he experience anymore symptoms to go straight to the emergency room.  Symptoms were certainly atypical by  history today and considerations for reflux.    Will await laboratory and substitute CT scan.  Do not feel antibiotics are needed at this time, his examination was relatively unremarkable.  There may be a component of neuropathic pain as well, noting provoked motions today did not cause much discomfort at all.    Today's appointment was >50 minutes including chart review (such as review of clinic notes, consult notes, op notes, ER notes, discharge summaries, labs, imaging, path, cultures, cardiovascular etc.), medication reconciliation and adjustment, and (in some cases) >50% time spent in counseling.    Patient complained this test results were not sent to him.  I explained that we sent his ultrasound reports to him within a couple of hours with the report being available.  He states that he has not interested in using the portal and can not access it at this time.  Will discontinue, has that will notify providers and physicians to use alternative means for test reporting.    >>>    Addendum - was advised that [facility] has a PM comptable MRI, but also advised that the machine is not currently available, will instead order CT w/ contrast in separate encounter

## 2024-03-05 NOTE — TELEPHONE ENCOUNTER
Advise patient that I was responding to a message from patient relations concerning his care.  He states he has left hand pain.  I pointed out that we made him appointments last week.  Appointment scheduling section indicates ' patient canceled' however He states that he was told not to come by .    I told him the ultrasound we ordered was normal and the result was sent to his patient portal.  He states he does not full with patient portal.     He states his hand is swollen not getting any better, I advised him that he has appointment with the hand specialist and vascular later this month.  He states he was unaware of that.  He states he wants the problem fixed, I advised him that we do not know what to fix if we can not take a look at it.  He states this is been going on for a couple of months and had 6 or 7 people take a look at him already.    I made him an appointment for tomorrow he states he will come into our office to be seen.  I advise him I will do my best to try to determine what the situation is and where to go for treatment.

## 2024-03-06 NOTE — TELEPHONE ENCOUNTER
Patient has a pacemaker, the only place a can do an MRI would be main [place].  There equipment is not available right now, I have talked to scheduling staff today.  I will substitute a contrasted CT scan, please see orders and schedule patient's sometime soon, thank you

## 2024-03-07 NOTE — TELEPHONE ENCOUNTER
Please call patient and advise that an MRI of the hand is not available because the equipment to do MRI with patients with pacemaker is not currently functioning.  I ordered a CT scan with contrast of the left wrist instead.  Please call patient and schedule that sometime soon, thank you

## 2024-03-07 NOTE — TELEPHONE ENCOUNTER
Pt informed. Confrontational with informing and answering questions being asked. He wanted to know why Dr Bustamante would order a MRI knowing he had a pacemaker. I explained to him that we have a MRI that can be used with someone who has a pacemaker but it is current not working. He wanted to know when it would be fixed? He was then told that Dr Bustamante would like to order another test called a CTSCAN of the wrist so that there be a better visual of his wrist issue. He stated to order it then abruptly hung-up.

## 2024-03-10 NOTE — PROGRESS NOTES
[facility] Cardiology [address]     SUBJECTIVE:     History of Present Illness:  Patient is a 83 y.o. male presents with followup after cardiac pacemaker placement .     Primary Diagnosis:  Hypertension: positive  DM: none  Smoker: none   Family history of early CAD: none   Heart disease: complete heart block 12/23  ROS  Since last visit 2/24  Blood pressure at home on the higher dose of nifedipine around 130 to 150's.    Goes to the gym and rides a bike without issues and no chest pains   Two weeks ago; had two episodes of chest pain, mid chest; stayed for one hour; no change with body movement; ; 2nd episode was also when he was sitting down . None since then   No shortness of breath; no PND or orthopnea  No syncope    Past Hospitalization    Review of patient's allergies indicates:   Allergen Reactions    Clindamycin Swelling     Throat swells    Lisinopril Swelling and Other (See Comments)     Throat swelling    Shellfish containing products        Past Medical History:   Diagnosis Date    Arthritis     Back pain, chronic     BPH with urinary obstruction     Chronic constipation     Closed fracture of right shoulder 1/28/2021    Closed nondisplaced fracture of greater tuberosity of right humerus 2/24/2021    Colon polyp     DJD (degenerative joint disease)     Hip    Hyperlipidemia     Hypertension     Laryngopharyngeal reflux     Left inguinal hernia     Lumbar herniated disc     Lumbar stenosis     Neck mass 10/7/2014    -4/29/2022 path - lipoma    OA (osteoarthritis) of knee     Bilateral    Paradoxical insomnia     Sinus trouble        Past Surgical History:   Procedure Laterality Date    A-V CARDIAC PACEMAKER INSERTION N/A 12/19/2023    Procedure: INSERTION, CARDIAC PACEMAKER, DUAL CHAMBER;  Surgeon: Noah Vazquez MD;  Location: [facility] CATH LAB/EP;  Service: Cardiology;  Laterality: N/A;    BACK SURGERY  2014    COLONOSCOPY      COLONOSCOPY N/A 5/17/2023    Procedure: COLONOSCOPY;  Surgeon: Christiano Vazquez MD;   "Location: [facility] ENDO;  Service: Endoscopy;  Laterality: N/A;  Constipation 2 day prep.Instructions to portal.EC    CORONARY ANGIOGRAPHY N/A 2023    Procedure: ANGIOGRAM, CORONARY ARTERY;  Surgeon: Flaco Kelly MD;  Location: [facility] CATH LAB/EP;  Service: Cardiology;  Laterality: N/A;    EPIDURAL STEROID INJECTION INTO LUMBAR SPINE N/A 2022    Procedure: Injection-steroid-epidural-lumbar L3-4;  Surgeon: Yury Crum Jr., MD;  Location: [facility] PAIN MGT;  Service: Pain Management;  Laterality: N/A;  oral sedation   asa      INSERTION, PACEMAKER, TEMPORARY TRANSVENOUS  2023    Procedure: Insertion, Pacemaker, Temporary Transvenous;  Surgeon: Flaco Kelly MD;  Location: [facility] CATH LAB/EP;  Service: Cardiology;;    JOINT REPLACEMENT Left 2018    KNEE SURGERY      left hand      LEG SURGERY      SPINE SURGERY      UPPER GASTROINTESTINAL ENDOSCOPY                 Cardiac meds:     ASA 81 mg  Losartan 100 mg  Nifedipine 60 mg  Pravastatin 40 mg           OBJECTIVE:     Vital Signs (Most Recent)  Vitals:    24 1526   BP: (!) 144/75   Pulse: 60   SpO2: 97%   Weight: 106.2 kg (234 lb 2.1 oz)   Height: 5' 11" (1.803 m)           Neck: normal carotids, no bruits; normal JVP  Lungs :clear  Heart: RR, normal S1,S2, no murmurs, no gallops  Abd: no masses; no bruits;   Exts: normal DP and PT pulses bilaterally, normal radials; no edema noted   Pacemaker site:normal           Labs  3/24: CBC normal;       Old Results:    : GFR normal     Diagnostic Results:    1.EK/23: ventricular paced   2.Echo: : normal EF, diastolic dysfunction; LAE, aortic sclerosis; mild MR, mild TR   3.stress test:  4. Cath:: left main; normal; LAD 40%, CX ok; Right ok  5. Dual chamber pacemaker : [employer]       ASSESSMENT/PLAN:     Hypertension: could be better  Pacemaker function: normal  3.  Atypical chest pains: with negative cath, this is not cardiac for sure. Is this esophageal spasm? "       Plan: 1. Increase nifedipine to 90 mg   2. Return 6 months   Noah Vazquez MD

## 2024-03-12 ENCOUNTER — OFFICE VISIT (OUTPATIENT)
Dept: CARDIOLOGY | Facility: CLINIC | Age: 84
End: 2024-03-12
Payer: MEDICARE

## 2024-03-12 ENCOUNTER — TELEPHONE (OUTPATIENT)
Dept: UROLOGY | Facility: CLINIC | Age: 84
End: 2024-03-12
Payer: MEDICARE

## 2024-03-12 VITALS
SYSTOLIC BLOOD PRESSURE: 144 MMHG | WEIGHT: 234.13 LBS | BODY MASS INDEX: 32.78 KG/M2 | OXYGEN SATURATION: 97 % | HEIGHT: 71 IN | HEART RATE: 60 BPM | DIASTOLIC BLOOD PRESSURE: 75 MMHG

## 2024-03-12 DIAGNOSIS — R00.1 BRADYCARDIA: Primary | ICD-10-CM

## 2024-03-12 DIAGNOSIS — Z95.0 CARDIAC PACEMAKER IN SITU: ICD-10-CM

## 2024-03-12 DIAGNOSIS — I10 HYPERTENSION, ESSENTIAL: ICD-10-CM

## 2024-03-12 DIAGNOSIS — R07.9 CHEST PAIN, UNSPECIFIED TYPE: ICD-10-CM

## 2024-03-12 DIAGNOSIS — I44.2 COMPLETE HEART BLOCK: ICD-10-CM

## 2024-03-12 PROCEDURE — 3078F DIAST BP <80 MM HG: CPT | Mod: HCNC,CPTII,S$GLB, | Performed by: INTERNAL MEDICINE

## 2024-03-12 PROCEDURE — 3288F FALL RISK ASSESSMENT DOCD: CPT | Mod: HCNC,CPTII,S$GLB, | Performed by: INTERNAL MEDICINE

## 2024-03-12 PROCEDURE — 1125F AMNT PAIN NOTED PAIN PRSNT: CPT | Mod: HCNC,CPTII,S$GLB, | Performed by: INTERNAL MEDICINE

## 2024-03-12 PROCEDURE — 1160F RVW MEDS BY RX/DR IN RCRD: CPT | Mod: HCNC,CPTII,S$GLB, | Performed by: INTERNAL MEDICINE

## 2024-03-12 PROCEDURE — 1159F MED LIST DOCD IN RCRD: CPT | Mod: HCNC,CPTII,S$GLB, | Performed by: INTERNAL MEDICINE

## 2024-03-12 PROCEDURE — 1101F PT FALLS ASSESS-DOCD LE1/YR: CPT | Mod: HCNC,CPTII,S$GLB, | Performed by: INTERNAL MEDICINE

## 2024-03-12 PROCEDURE — 99999 PR PBB SHADOW E&M-EST. PATIENT-LVL III: CPT | Mod: PBBFAC,HCNC,, | Performed by: INTERNAL MEDICINE

## 2024-03-12 PROCEDURE — 3077F SYST BP >= 140 MM HG: CPT | Mod: HCNC,CPTII,S$GLB, | Performed by: INTERNAL MEDICINE

## 2024-03-12 PROCEDURE — 1157F ADVNC CARE PLAN IN RCRD: CPT | Mod: HCNC,CPTII,S$GLB, | Performed by: INTERNAL MEDICINE

## 2024-03-12 PROCEDURE — 99214 OFFICE O/P EST MOD 30 MIN: CPT | Mod: HCNC,S$GLB,, | Performed by: INTERNAL MEDICINE

## 2024-03-12 RX ORDER — NIFEDIPINE 90 MG/1
90 TABLET, EXTENDED RELEASE ORAL DAILY
Qty: 90 TABLET | Refills: 3 | Status: SHIPPED | OUTPATIENT
Start: 2024-03-12 | End: 2025-03-07

## 2024-03-12 NOTE — TELEPHONE ENCOUNTER
I reached out to the pt regarding his refill request. I voiced since he haven't been seen within 6 months to a year he'll have to schedule an appointment in order to get his refill. I offered March 26 at 1:15 pm. Pt confirmed.

## 2024-03-12 NOTE — TELEPHONE ENCOUNTER
----- Message from Rno Altamirano sent at 3/12/2024  2:18 PM CDT -----  Type:  RX Refill Request    Who Called: pt  Refill or New Rx:refill  RX Name and Strength:finasteride (PROSCAR) 5 mg tablet  How is the patient currently taking it? (ex. 1XDay):Take 1 tablet (5 mg total) by mouth once daily. - Oral  Is this a 30 day or 90 day RX:90  Preferred Pharmacy with phone number:  [facility] [address] [place] [address] [facility]   [phone]  Local or Mail Order:local  Ordering Provider:  Juan Ward MD  Would the patient rather a call back or a response via [facility]? call  Best Call Back Number:[phone]

## 2024-03-13 ENCOUNTER — OFFICE VISIT (OUTPATIENT)
Dept: ORTHOPEDICS | Facility: CLINIC | Age: 84
End: 2024-03-13
Attending: FAMILY MEDICINE
Payer: MEDICARE

## 2024-03-13 DIAGNOSIS — M65.9 SYNOVITIS AND TENOSYNOVITIS, UNSPECIFIED: ICD-10-CM

## 2024-03-13 DIAGNOSIS — M19.032 OSTEOARTHRITIS OF LEFT WRIST, UNSPECIFIED OSTEOARTHRITIS TYPE: ICD-10-CM

## 2024-03-13 DIAGNOSIS — I82.90 VENOUS THROMBOSIS: ICD-10-CM

## 2024-03-13 DIAGNOSIS — T81.72XA COMPLICATION OF VEIN FOLLOWING A PROCEDURE, NOT ELSEWHERE CLASSIFIED, INITIAL ENCOUNTER: ICD-10-CM

## 2024-03-13 DIAGNOSIS — M79.642 LEFT HAND PAIN: ICD-10-CM

## 2024-03-13 DIAGNOSIS — M18.12 ARTHRITIS OF CARPOMETACARPAL (CMC) JOINT OF LEFT THUMB: Primary | ICD-10-CM

## 2024-03-13 PROCEDURE — 1159F MED LIST DOCD IN RCRD: CPT | Mod: HCNC,CPTII,S$GLB, | Performed by: PHYSICIAN ASSISTANT

## 2024-03-13 PROCEDURE — 99999 PR PBB SHADOW E&M-EST. PATIENT-LVL III: CPT | Mod: PBBFAC,HCNC,, | Performed by: PHYSICIAN ASSISTANT

## 2024-03-13 PROCEDURE — 20600 DRAIN/INJ JOINT/BURSA W/O US: CPT | Mod: HCNC,LT,S$GLB, | Performed by: PHYSICIAN ASSISTANT

## 2024-03-13 PROCEDURE — 99213 OFFICE O/P EST LOW 20 MIN: CPT | Mod: HCNC,25,S$GLB, | Performed by: PHYSICIAN ASSISTANT

## 2024-03-13 PROCEDURE — 3288F FALL RISK ASSESSMENT DOCD: CPT | Mod: HCNC,CPTII,S$GLB, | Performed by: PHYSICIAN ASSISTANT

## 2024-03-13 PROCEDURE — 1157F ADVNC CARE PLAN IN RCRD: CPT | Mod: HCNC,CPTII,S$GLB, | Performed by: PHYSICIAN ASSISTANT

## 2024-03-13 PROCEDURE — 1101F PT FALLS ASSESS-DOCD LE1/YR: CPT | Mod: HCNC,CPTII,S$GLB, | Performed by: PHYSICIAN ASSISTANT

## 2024-03-13 PROCEDURE — 1125F AMNT PAIN NOTED PAIN PRSNT: CPT | Mod: HCNC,CPTII,S$GLB, | Performed by: PHYSICIAN ASSISTANT

## 2024-03-13 RX ORDER — TRIAMCINOLONE ACETONIDE 40 MG/ML
20 INJECTION, SUSPENSION INTRA-ARTICULAR; INTRAMUSCULAR
Status: DISCONTINUED | OUTPATIENT
Start: 2024-03-13 | End: 2024-03-13 | Stop reason: HOSPADM

## 2024-03-13 RX ADMIN — TRIAMCINOLONE ACETONIDE 20 MG: 40 INJECTION, SUSPENSION INTRA-ARTICULAR; INTRAMUSCULAR at 12:03

## 2024-03-13 NOTE — PROGRESS NOTES
[facility]  History & Physical  Orthopedics    SUBJECTIVE:      Chief Complaint: Left thumb/hand pain    Referring Provider: Juan Bustamante MD Dr. Dunbar is the supervising physician for this encounter/patient    History of Present Illness:  Patient is a 83 y.o. right hand dominant male who presents today with complaints of left thumb/hand pain since December 2023 after an IV placement. US at the time was positive for a thrombus at the IV site, pain did improve after IV was removed. He did have hand xrays and CT as well, all negative. Pain is located to the base of the thumb, worse with gripping. He was previously treated by Dr. Bergeron for Left thumb CMC steroid injection which was performed in July 2023.    Onset of symptoms/DOI was December 2023.    Symptoms are aggravated by activity and movement.    Symptoms are alleviated by rest.    Symptoms consist of pain and decreased ROM.    The patient rates their pain as a 5/10.    Attempted treatment(s) and/or interventions include activity modifications, rest.     The patient denies any fevers, chills, N/V, D/C and presents for evaluation.       Past Medical History:   Diagnosis Date    Arthritis     Back pain, chronic     BPH with urinary obstruction     Chronic constipation     Closed fracture of right shoulder 1/28/2021    Closed nondisplaced fracture of greater tuberosity of right humerus 2/24/2021    Colon polyp     DJD (degenerative joint disease)     Hip    Hyperlipidemia     Hypertension     Laryngopharyngeal reflux     Left inguinal hernia     Lumbar herniated disc     Lumbar stenosis     Neck mass 10/7/2014    -4/29/2022 path - lipoma    OA (osteoarthritis) of knee     Bilateral    Paradoxical insomnia     Sinus trouble      Past Surgical History:   Procedure Laterality Date    A-V CARDIAC PACEMAKER INSERTION N/A 12/19/2023    Procedure: INSERTION, CARDIAC PACEMAKER, DUAL CHAMBER;  Surgeon: Noah Vazquez MD;   Location: [facility] CATH LAB/EP;  Service: Cardiology;  Laterality: N/A;    BACK SURGERY  2014    COLONOSCOPY      COLONOSCOPY N/A 5/17/2023    Procedure: COLONOSCOPY;  Surgeon: Christiano Vazquez MD;  Location: [facility] ENDO;  Service: Endoscopy;  Laterality: N/A;  Constipation 2 day prep.Instructions to portal.EC    CORONARY ANGIOGRAPHY N/A 12/18/2023    Procedure: ANGIOGRAM, CORONARY ARTERY;  Surgeon: Flaco Kelly MD;  Location: [facility] CATH LAB/EP;  Service: Cardiology;  Laterality: N/A;    EPIDURAL STEROID INJECTION INTO LUMBAR SPINE N/A 7/5/2022    Procedure: Injection-steroid-epidural-lumbar L3-4;  Surgeon: Yury Crum Jr., MD;  Location: [facility] PAIN MGT;  Service: Pain Management;  Laterality: N/A;  oral sedation   asa      INSERTION, PACEMAKER, TEMPORARY TRANSVENOUS  12/18/2023    Procedure: Insertion, Pacemaker, Temporary Transvenous;  Surgeon: Flaco Kelly MD;  Location: [facility] CATH LAB/EP;  Service: Cardiology;;    JOINT REPLACEMENT Left 05/04/2018    KNEE SURGERY      left hand      LEG SURGERY      SPINE SURGERY      UPPER GASTROINTESTINAL ENDOSCOPY       Review of patient's allergies indicates:   Allergen Reactions    Clindamycin Swelling     Throat swells    Lisinopril Swelling and Other (See Comments)     Throat swelling    Shellfish containing products      Social History     Social History Narrative    Ret. 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     Family History   Problem Relation Age of Onset    Cancer Father         lung or smoking    No Known Problems Mother     No Known Problems Sister     No Known Problems Brother     No Known Problems Maternal Aunt     No Known Problems Maternal Uncle     No Known Problems Paternal Aunt     No Known Problems Paternal Uncle     No Known Problems Maternal Grandmother     No Known Problems Maternal Grandfather     No Known Problems Paternal Grandmother     No Known Problems Paternal Grandfather     No Known Problems Daughter     No Known Problems Son      Glaucoma Neg Hx     Diabetes Neg Hx     Amblyopia Neg Hx     Blindness Neg Hx     Cataracts Neg Hx     Hypertension Neg Hx     Macular degeneration Neg Hx     Retinal detachment Neg Hx     Strabismus Neg Hx     Stroke Neg Hx     Thyroid disease Neg Hx     Colon cancer Neg Hx     Esophageal cancer Neg Hx          Current Outpatient Medications:     aspirin (ECOTRIN) 325 MG EC tablet, Take 1 tablet (325 mg total) by mouth once daily. (Patient taking differently: Take 325 mg by mouth every other day.), Disp: 42 tablet, Rfl: 0    azelastine (ASTELIN) 137 mcg (0.1 %) nasal spray, 1 spray (137 mcg total) by Nasal route 2 (two) times daily as needed for Rhinitis., Disp: 30 mL, Rfl: 0    finasteride (PROSCAR) 5 mg tablet, Take 1 tablet (5 mg total) by mouth once daily., Disp: 90 tablet, Rfl: 3    fluticasone propionate (FLONASE) 50 mcg/actuation nasal spray, 1 spray (50 mcg total) by Each Nostril route once daily., Disp: 16 g, Rfl: 6    hydrOXYzine HCL (ATARAX) 25 MG tablet, Take 1 tablet (25 mg total) by mouth 3 (three) times daily as needed for Anxiety., Disp: 30 tablet, Rfl: 0    linaCLOtide (LINZESS) 145 mcg Cap capsule, Take 1 capsule (145 mcg total) by mouth daily as needed (constipation)., Disp: 90 capsule, Rfl: 3    losartan (COZAAR) 100 MG tablet, Take 1 tablet (100 mg total) by mouth once daily., Disp: 90 tablet, Rfl: 3    methylPREDNISolone (MEDROL DOSEPACK) 4 mg tablet, use as directed, Disp: 21 each, Rfl: 0    multivitamin capsule, Take 1 capsule by mouth once daily., Disp: , Rfl:     NIFEdipine (PROCARDIA-XL) 90 MG (OSM) 24 hr tablet, Take 1 tablet (90 mg total) by mouth once daily., Disp: 90 tablet, Rfl: 3    pravastatin (PRAVACHOL) 40 MG tablet, Take 1 tablet (40 mg total) by mouth once daily., Disp: 90 tablet, Rfl: 3    tamsulosin (FLOMAX) 0.4 mg Cap, Take 1 capsule (0.4 mg total) by mouth once daily., Disp: 90 capsule, Rfl: 4      Review of Systems:  Constitutional: no fever or chills  Eyes: no visual  changes  ENT: no nasal congestion or sore throat  Respiratory: no cough or shortness of breath  Cardiovascular: no chest pain  Gastrointestinal: no nausea or vomiting, tolerating diet  Musculoskeletal: pain, soreness, and decreased ROM    OBJECTIVE:      Vital Signs (Most Recent):  There were no vitals filed for this visit.  There is no height or weight on file to calculate BMI.      Physical Exam:  Constitutional: The patient appears well-developed and well-nourished. No distress.   Skin: No lesions appreciated  Head: Normocephalic and atraumatic.   Nose: Nose normal.   Ears: No deformities seen  Eyes: Conjunctivae and EOM are normal.   Neck: No tracheal deviation present.   Cardiovascular: Normal rate and intact distal pulses.    Pulmonary/Chest: Effort normal. No respiratory distress.   Abdominal: There is no guarding.   Neurological: The patient is alert.   Psychiatric: The patient has a normal mood and affect.     Left Hand/Wrist Examination:    Observation/Inspection:  Swelling  Mild over thumb CMC    Deformity  none  Discoloration  none     Scars   none    Atrophy  none    HAND/WRIST EXAMINATION:  Finkelstein's Test   Neg  WHAT Test    Neg  Snuff box tenderness   Neg  Saldana's Test    Neg  Hook of Hamate Tenderness  Neg  CMC grind    POS  Circumduction test   Neg  TTP over the thumb CMC joint. NTTP throughout the rest of the hand    Neurovascular Exam:  Digits WWP, brisk CR < 3s throughout  NVI motor/LTS to M/R/U nerves, radial pulse 2+  Tinel's Test - Carpal Tunnel  Neg  Tinel's Test - Cubital Tunnel  Neg  Phalen's Test    Neg  Median Nerve Compression Test Neg    ROM hand: full thumb extension, decreased flexion at the MCP due to pain.    ROM wrist full, painless    ROM elbow full, painless    Abdomen not guarded  Respirations nonlabored  Perfusion intact    Diagnostic Results:     Imaging - I independently viewed the patient's imaging as well as the radiology report.    FINDINGS:  Bones/joints: osseous  structures of the hand are without an acute process. Distal radioulnar joint and radiocarpal joints grossly normal. Carpus without fracture. Metacarpals and phalangeal without fracture or dislocation. No erosive changes or periarticular calcifications. Moderate degenerative changes at the first carpometacarpal joint. Ulnar minus variance of approximately 3 mm Soft tissues: Soft tissue swelling dorsally at the level of the carpus.     Impression:     1. Moderate degenerative changes at the first carpometacarpal joint. 2. Soft tissue swelling dorsally at the level of the carpus.    CT hand 12/22/23  FINDINGS:  No acute fracture or delbert osseous destruction.  Scattered DJD change at the hand and wrist. Well corticated cyst-like focus of distal capitate. No discrete soft tissue collection     Impression:     No discrete soft tissue collection.      ASSESSMENT/PLAN:      83 y.o. yo male with Left thumb CMC arthritis    Plan: The patient and I had a thorough discussion today.  We discussed the working diagnosis as well as several other potential alternative diagnoses.  Treatment options were discussed, both conservative and surgical.  Conservative treatment options would include things such as activity modifications, workplace modifications, a period of rest, oral vs topical OTC and prescription anti-inflammatory medications, occupational therapy, splinting/bracing, immobilization, corticosteroid injections, and others.  Surgical options were discussed as well.     At this time, the patient would like to proceed with a trial of Left thumb CMC steroid injection, performed today without any issues. We discuss heat and voltaren gel massage. RTC on prn basis.    Should the patient's symptoms worsen, persist, or fail to improve they should return for reevaluation and I would be happy to see them back anytime.           Please do not hesitate to reach out to us via email, phone, or MyChart with any questions, concerns, or  feedback.

## 2024-03-13 NOTE — PROCEDURES
Left thumb CMC: L thumb CMC    Date/Time: 3/13/2024 12:30 PM    Performed by: Russo-Digeorge, Jamie L., PA-C  Authorized by: Russo-Digeorge, Jamie L., PA-C    Consent Done?:  Yes (Verbal)  Indications:  Pain  Site marked: the procedure site was marked    Timeout: prior to procedure the correct patient, procedure, and site was verified    Prep: patient was prepped and draped in usual sterile fashion      Local anesthetic:  Lidocaine 1% without epinephrine  Location:  Thumb  Site:  L thumb CMC  Ultrasonic guidance for needle placement?: No    Needle size:  25 G  Approach:  Dorsal  Medications:  20 mg triamcinolone acetonide 40 mg/mL  Patient tolerance:  Patient tolerated the procedure well with no immediate complications

## 2024-03-22 ENCOUNTER — TELEPHONE (OUTPATIENT)
Dept: VASCULAR SURGERY | Facility: CLINIC | Age: 84
End: 2024-03-22
Payer: MEDICARE

## 2024-03-22 NOTE — TELEPHONE ENCOUNTER
Attempted to contact the pt in reference to appt scheduled on 3/27/24 will be cancelled but no answer.Left a voice message informing him that per Dr Wei he needs to see vascular medicine provider not vascular surgeon.Reiterated that the appt on 3/27/24 will be cancelled.Message sent to referring provider.  ----- Message from Eric Wei MD sent at 3/22/2024  1:36 PM CDT -----  Doesn't need to see us    [facility] medicine      ----- Message -----  From: April Coleman LPN  Sent: 3/22/2024   7:13 AM CDT  To: Eric Wei MD    Good morning,    Can you please review this chart and advise? The pt is scheduled for Wednesday.    Thanks,  April

## 2024-03-24 ENCOUNTER — TELEPHONE (OUTPATIENT)
Dept: INTERNAL MEDICINE | Facility: CLINIC | Age: 84
End: 2024-03-24
Payer: MEDICARE

## 2024-03-24 NOTE — TELEPHONE ENCOUNTER
----- Message from April Coleman LPN sent at 3/22/2024  3:23 PM CDT -----  Regarding: referral  Good afternoon,    Thank you for the referral! However,per Dr Wei the pt needs to see vascular medicine.Pts come to vascular surgeons when there's a surgical intervention.    Thank you,  April

## 2024-03-25 ENCOUNTER — CLINICAL SUPPORT (OUTPATIENT)
Dept: CARDIOLOGY | Facility: CLINIC | Age: 84
End: 2024-03-25
Payer: MEDICARE

## 2024-03-25 DIAGNOSIS — I44.2 COMPLETE HEART BLOCK: Primary | ICD-10-CM

## 2024-03-25 DIAGNOSIS — R00.1 BRADYCARDIA: ICD-10-CM

## 2024-03-25 PROCEDURE — 93296 REM INTERROG EVL PM/IDS: CPT | Mod: HCNC,S$GLB,, | Performed by: INTERNAL MEDICINE

## 2024-03-25 PROCEDURE — 93294 REM INTERROG EVL PM/LDLS PM: CPT | Mod: HCNC,S$GLB,, | Performed by: INTERNAL MEDICINE

## 2024-03-25 NOTE — PROGRESS NOTES
Remote check today - pacemaker dual  ESTELA:10 years   Impedances normal  P and R waves good  High ventricular rate noted for 26 seconds 3/4/24 - regular: not atrial fibrillation; wonder if this is a short b urst of V tach.

## 2024-03-26 ENCOUNTER — OFFICE VISIT (OUTPATIENT)
Dept: UROLOGY | Facility: CLINIC | Age: 84
End: 2024-03-26
Payer: MEDICARE

## 2024-03-26 VITALS
HEART RATE: 59 BPM | DIASTOLIC BLOOD PRESSURE: 74 MMHG | SYSTOLIC BLOOD PRESSURE: 154 MMHG | BODY MASS INDEX: 32.98 KG/M2 | WEIGHT: 235.56 LBS | HEIGHT: 71 IN

## 2024-03-26 DIAGNOSIS — R35.0 BENIGN PROSTATIC HYPERPLASIA WITH URINARY FREQUENCY: ICD-10-CM

## 2024-03-26 DIAGNOSIS — N40.1 BENIGN PROSTATIC HYPERPLASIA WITH URINARY FREQUENCY: ICD-10-CM

## 2024-03-26 DIAGNOSIS — N40.0 PROSTATISM: ICD-10-CM

## 2024-03-26 PROCEDURE — 1101F PT FALLS ASSESS-DOCD LE1/YR: CPT | Mod: HCNC,CPTII,S$GLB, | Performed by: UROLOGY

## 2024-03-26 PROCEDURE — 3288F FALL RISK ASSESSMENT DOCD: CPT | Mod: HCNC,CPTII,S$GLB, | Performed by: UROLOGY

## 2024-03-26 PROCEDURE — 3078F DIAST BP <80 MM HG: CPT | Mod: HCNC,CPTII,S$GLB, | Performed by: UROLOGY

## 2024-03-26 PROCEDURE — 99999 PR PBB SHADOW E&M-EST. PATIENT-LVL IV: CPT | Mod: PBBFAC,HCNC,, | Performed by: UROLOGY

## 2024-03-26 PROCEDURE — 1126F AMNT PAIN NOTED NONE PRSNT: CPT | Mod: HCNC,CPTII,S$GLB, | Performed by: UROLOGY

## 2024-03-26 PROCEDURE — 1157F ADVNC CARE PLAN IN RCRD: CPT | Mod: HCNC,CPTII,S$GLB, | Performed by: UROLOGY

## 2024-03-26 PROCEDURE — 87086 URINE CULTURE/COLONY COUNT: CPT | Mod: HCNC | Performed by: UROLOGY

## 2024-03-26 PROCEDURE — 1160F RVW MEDS BY RX/DR IN RCRD: CPT | Mod: HCNC,CPTII,S$GLB, | Performed by: UROLOGY

## 2024-03-26 PROCEDURE — 99214 OFFICE O/P EST MOD 30 MIN: CPT | Mod: HCNC,S$GLB,, | Performed by: UROLOGY

## 2024-03-26 PROCEDURE — 3077F SYST BP >= 140 MM HG: CPT | Mod: HCNC,CPTII,S$GLB, | Performed by: UROLOGY

## 2024-03-26 PROCEDURE — 1159F MED LIST DOCD IN RCRD: CPT | Mod: HCNC,CPTII,S$GLB, | Performed by: UROLOGY

## 2024-03-26 RX ORDER — LIDOCAINE HYDROCHLORIDE 20 MG/ML
JELLY TOPICAL ONCE
Status: CANCELLED | OUTPATIENT
Start: 2024-03-26 | End: 2024-03-26

## 2024-03-26 RX ORDER — ACETAMINOPHEN 500 MG
1000 TABLET ORAL
Status: CANCELLED | OUTPATIENT
Start: 2024-03-26

## 2024-03-26 RX ORDER — FINASTERIDE 5 MG/1
5 TABLET, FILM COATED ORAL DAILY
Qty: 90 TABLET | Refills: 3 | Status: SHIPPED | OUTPATIENT
Start: 2024-03-26 | End: 2025-03-26

## 2024-03-26 RX ORDER — SULFAMETHOXAZOLE AND TRIMETHOPRIM 800; 160 MG/1; MG/1
1 TABLET ORAL 2 TIMES DAILY
Qty: 20 TABLET | Refills: 0 | Status: SHIPPED | OUTPATIENT
Start: 2024-04-08 | End: 2024-04-18

## 2024-03-26 RX ORDER — TAMSULOSIN HYDROCHLORIDE 0.4 MG/1
1 CAPSULE ORAL DAILY
Qty: 90 CAPSULE | Refills: 3 | Status: SHIPPED | OUTPATIENT
Start: 2024-03-26

## 2024-03-26 RX ORDER — CEFAZOLIN SODIUM 2 G/50ML
2 SOLUTION INTRAVENOUS
Status: CANCELLED | OUTPATIENT
Start: 2024-03-26

## 2024-03-26 NOTE — H&P (VIEW-ONLY)
Subjective:      Patient ID: Korey Santos is a 83 y.o. male.    Chief Complaint: BPH  Patient is a 83 y.o. male with h/o BPH.    Patient complains of lower urinary tract symptoms. He reports frequency, incomplete emptying, intermittency, nocturia two times a night, straining, urgency and weak stream. He denies  dysuria . Patient states symptoms are of severe severity. Onset of symptoms was several years ago and was gradual in onset. His AUA Symptom Score is, 20/3. He has no personal history and no family history of prostate cancer. He reports a history of no complicating symptoms. He denies flank pain, gross hematuria, kidney stones and recurrent UTI.  On tamsulosin and finasteride for years.  H/o elevated PSA in 2017 (does not think was on finasteride at that time) but normal for age at that time.      Cysto/TRUS 2/23: 49 gm prostate, lateral lobe hyperplasia.   Uroflow 2/23: voided 461 cc, Qmax 14.3, average 8.8 ml/s    Lab Results   Component Value Date    PSA 4.7 (H) 07/08/2017    PSA 4.7 (H) 10/07/2014    PSA 3.2 12/18/2013    PSA 2.72 03/05/2012    PSA 2.1 01/21/2011    PSA 2.3 10/02/2008    PSA 1.6 04/05/2007    PSADIAG 2.9 10/22/2014       IPSS Questionnaire (AUA-SS):  Over the past month    1)  Incomplete Emptying - How often have you had a sensation of not emptying your bladder completely after you finish urinating?  3 - About half the time   2)  Frequency - How often have you had to urinate again less than two hours after you finished urinating? 3 - About half the time   3)  Intermittency - How often have you found you stopped and started again several times when you urinated?  3 - About half the time   4) Urgency - How difficult have you found it to postpone urination?  3 - About half the time   5) Weak Stream - How often have you had a weak urinary stream?  3 - About half the time   6) Straining  - How often have you had to push or strain to begin urination?  3 - About half the time   7) Nocturia  - How many times did you most typically get up to urinate from the time you went to bed until the time you got up in the morning?  2 - 2 times   Total score:  0-7 mild, 8-19 moderate, 20-35 severe 20   Quality of Life:  3 - Mixed      Review of Systems   Constitutional:  Negative for activity change, chills and fever.   HENT:  Negative for congestion.    Respiratory:  Negative for cough, chest tightness and shortness of breath.    Cardiovascular:  Negative for chest pain and palpitations.   Gastrointestinal:  Negative for abdominal distention, abdominal pain, nausea and vomiting.   Genitourinary:  Positive for difficulty urinating, frequency and urgency. Negative for flank pain, hematuria, penile pain, scrotal swelling and testicular pain.   Musculoskeletal:  Negative for gait problem.     All other systems reviewed and negative except pertinent positives noted in HPI.      Objective:     Physical Exam  Vitals reviewed.   Constitutional:       Appearance: Normal appearance.   HENT:      Head: Atraumatic.   Cardiovascular:      Pulses: Normal pulses.   Pulmonary:      Effort: Pulmonary effort is normal.      Breath sounds: Normal breath sounds.   Abdominal:      General: There is no distension.      Palpations: Abdomen is soft.      Tenderness: There is no abdominal tenderness. There is no right CVA tenderness, left CVA tenderness or guarding.   Genitourinary:     Penis: Normal.       Testes: Normal.      Prostate: Normal.      Comments: Enlarged prostate  Musculoskeletal:      Cervical back: Normal range of motion.   Neurological:      Mental Status: He is alert.         Assessment:     Problem Noted   Benign Prostatic Hyperplasia With Urinary Frequency 4/23/2014    -see urology note 1/24/2023 -   BPH w LUTs, frequency  Rx/Tx: tamsulosin/finasteride for years  AUA SS initial: 20/3    Cysto/TRUS 2/23: 49 gm prostate, lateral lobe hyperplasia.   Uroflow 2/23: voided 461 cc, Qmax 14.3, average 8.8 ml/s     Elevated Psa  8/16/2017    4.7 in 2017         Plan:   1.  Discussed options for his enlarged prostate with borderline obstructive flow.  Discussed the options including observation with medications, Rezum, UroLift, holmium laser enucleation of prostate, Transurethral resection of the prostate, Aqua-ablation, etc.    2.  In my hands I recommended either less invasive Rezum versus holmium laser enucleation.  The risks, benefits and alternatives of each procedure were discussed.  3.  Wishes to proceed with Rezum plan for 4/11/24.  4.  Hold  mg for 5 days prior to procedure    Discussed risks/benefits of Rezum.  Discussed bleeding, infection, urgency/frequency, need for catheter, failure amongst other risks.  Also discussed very small risk ED or ejaculatory issues.  He was given the chance to ask questions.  Alternatives discussed.        Juan Ward MD

## 2024-03-28 DIAGNOSIS — M17.11 PRIMARY OSTEOARTHRITIS OF RIGHT KNEE: Primary | ICD-10-CM

## 2024-03-28 LAB — BACTERIA UR CULT: NO GROWTH

## 2024-04-11 ENCOUNTER — ANESTHESIA EVENT (OUTPATIENT)
Dept: SURGERY | Facility: HOSPITAL | Age: 84
End: 2024-04-11
Payer: MEDICARE

## 2024-04-11 ENCOUNTER — HOSPITAL ENCOUNTER (OUTPATIENT)
Facility: HOSPITAL | Age: 84
Discharge: HOME | End: 2024-04-11
Attending: UROLOGY | Admitting: UROLOGY
Payer: MEDICARE

## 2024-04-11 ENCOUNTER — TELEPHONE (OUTPATIENT)
Dept: UROLOGY | Facility: CLINIC | Age: 84
End: 2024-04-11
Payer: MEDICARE

## 2024-04-11 ENCOUNTER — ANESTHESIA (OUTPATIENT)
Dept: SURGERY | Facility: HOSPITAL | Age: 84
End: 2024-04-11
Payer: MEDICARE

## 2024-04-11 VITALS
WEIGHT: 235 LBS | TEMPERATURE: 98 F | DIASTOLIC BLOOD PRESSURE: 77 MMHG | RESPIRATION RATE: 15 BRPM | HEIGHT: 71 IN | OXYGEN SATURATION: 98 % | BODY MASS INDEX: 32.9 KG/M2 | HEART RATE: 77 BPM | SYSTOLIC BLOOD PRESSURE: 139 MMHG

## 2024-04-11 DIAGNOSIS — Z95.0 PACEMAKER: ICD-10-CM

## 2024-04-11 DIAGNOSIS — R35.0 BENIGN PROSTATIC HYPERPLASIA WITH URINARY FREQUENCY: Primary | ICD-10-CM

## 2024-04-11 DIAGNOSIS — N40.1 BENIGN PROSTATIC HYPERPLASIA WITH URINARY FREQUENCY: Primary | ICD-10-CM

## 2024-04-11 PROCEDURE — 71000015 HC POSTOP RECOV 1ST HR: Mod: HCNC | Performed by: UROLOGY

## 2024-04-11 PROCEDURE — 71000016 HC POSTOP RECOV ADDL HR: Mod: HCNC | Performed by: UROLOGY

## 2024-04-11 PROCEDURE — 36000707: Mod: HCNC | Performed by: UROLOGY

## 2024-04-11 PROCEDURE — 93005 ELECTROCARDIOGRAM TRACING: CPT | Mod: HCNC

## 2024-04-11 PROCEDURE — 36000706: Mod: HCNC | Performed by: UROLOGY

## 2024-04-11 PROCEDURE — 63600175 PHARM REV CODE 636 W HCPCS: Mod: HCNC | Performed by: UROLOGY

## 2024-04-11 PROCEDURE — 53854 TRURL DSTRJ PRST8 TISS RF WV: CPT | Mod: ,,, | Performed by: UROLOGY

## 2024-04-11 PROCEDURE — 93010 ELECTROCARDIOGRAM REPORT: CPT | Mod: HCNC,,, | Performed by: INTERNAL MEDICINE

## 2024-04-11 PROCEDURE — D9220A PRA ANESTHESIA: Mod: ANES,,, | Performed by: ANESTHESIOLOGY

## 2024-04-11 PROCEDURE — 27201423 OPTIME MED/SURG SUP & DEVICES STERILE SUPPLY: Mod: HCNC | Performed by: UROLOGY

## 2024-04-11 PROCEDURE — 25000003 PHARM REV CODE 250: Mod: HCNC | Performed by: NURSE ANESTHETIST, CERTIFIED REGISTERED

## 2024-04-11 PROCEDURE — 37000009 HC ANESTHESIA EA ADD 15 MINS: Mod: HCNC | Performed by: UROLOGY

## 2024-04-11 PROCEDURE — 63600175 PHARM REV CODE 636 W HCPCS: Mod: HCNC | Performed by: NURSE ANESTHETIST, CERTIFIED REGISTERED

## 2024-04-11 PROCEDURE — 37000008 HC ANESTHESIA 1ST 15 MINUTES: Mod: HCNC | Performed by: UROLOGY

## 2024-04-11 PROCEDURE — 25000003 PHARM REV CODE 250: Mod: HCNC | Performed by: UROLOGY

## 2024-04-11 PROCEDURE — D9220A PRA ANESTHESIA: Mod: CRNA,,, | Performed by: NURSE ANESTHETIST, CERTIFIED REGISTERED

## 2024-04-11 RX ORDER — TRAMADOL HYDROCHLORIDE 50 MG/1
50 TABLET ORAL EVERY 8 HOURS PRN
Qty: 6 TABLET | Refills: 0 | Status: SHIPPED | OUTPATIENT
Start: 2024-04-11 | End: 2024-04-13

## 2024-04-11 RX ORDER — CEFAZOLIN SODIUM 2 G/50ML
2 SOLUTION INTRAVENOUS
Status: COMPLETED | OUTPATIENT
Start: 2024-04-11 | End: 2024-04-11

## 2024-04-11 RX ORDER — IBUPROFEN 400 MG/1
400 TABLET ORAL 3 TIMES DAILY
Qty: 30 TABLET | Refills: 0 | Status: SHIPPED | OUTPATIENT
Start: 2024-04-11 | End: 2024-04-21

## 2024-04-11 RX ORDER — ONDANSETRON HYDROCHLORIDE 2 MG/ML
INJECTION, SOLUTION INTRAVENOUS
Status: DISCONTINUED | OUTPATIENT
Start: 2024-04-11 | End: 2024-04-11

## 2024-04-11 RX ORDER — HYDROMORPHONE HYDROCHLORIDE 2 MG/ML
0.2 INJECTION, SOLUTION INTRAMUSCULAR; INTRAVENOUS; SUBCUTANEOUS EVERY 5 MIN PRN
Status: DISCONTINUED | OUTPATIENT
Start: 2024-04-11 | End: 2024-04-11 | Stop reason: HOSPADM

## 2024-04-11 RX ORDER — LIDOCAINE HYDROCHLORIDE 20 MG/ML
INJECTION INTRAVENOUS
Status: DISCONTINUED | OUTPATIENT
Start: 2024-04-11 | End: 2024-04-11

## 2024-04-11 RX ORDER — IPRATROPIUM BROMIDE AND ALBUTEROL SULFATE 2.5; .5 MG/3ML; MG/3ML
3 SOLUTION RESPIRATORY (INHALATION) EVERY 4 HOURS PRN
Status: DISCONTINUED | OUTPATIENT
Start: 2024-04-11 | End: 2024-04-11 | Stop reason: HOSPADM

## 2024-04-11 RX ORDER — DOCUSATE SODIUM 100 MG/1
100 CAPSULE, LIQUID FILLED ORAL 2 TIMES DAILY
Qty: 14 CAPSULE | Refills: 0 | Status: SHIPPED | OUTPATIENT
Start: 2024-04-11 | End: 2024-04-18

## 2024-04-11 RX ORDER — OXYCODONE HYDROCHLORIDE 5 MG/1
5 TABLET ORAL
Status: DISCONTINUED | OUTPATIENT
Start: 2024-04-11 | End: 2024-04-11 | Stop reason: HOSPADM

## 2024-04-11 RX ORDER — ACETAMINOPHEN 500 MG
1000 TABLET ORAL
Status: COMPLETED | OUTPATIENT
Start: 2024-04-11 | End: 2024-04-11

## 2024-04-11 RX ORDER — LIDOCAINE HYDROCHLORIDE 20 MG/ML
JELLY TOPICAL ONCE
Status: DISCONTINUED | OUTPATIENT
Start: 2024-04-11 | End: 2024-04-11 | Stop reason: HOSPADM

## 2024-04-11 RX ORDER — PROPOFOL 10 MG/ML
VIAL (ML) INTRAVENOUS
Status: DISCONTINUED | OUTPATIENT
Start: 2024-04-11 | End: 2024-04-11

## 2024-04-11 RX ORDER — PROCHLORPERAZINE EDISYLATE 5 MG/ML
5 INJECTION INTRAMUSCULAR; INTRAVENOUS EVERY 30 MIN PRN
Status: DISCONTINUED | OUTPATIENT
Start: 2024-04-11 | End: 2024-04-11 | Stop reason: HOSPADM

## 2024-04-11 RX ORDER — FENTANYL CITRATE 50 UG/ML
INJECTION, SOLUTION INTRAMUSCULAR; INTRAVENOUS
Status: DISCONTINUED | OUTPATIENT
Start: 2024-04-11 | End: 2024-04-11

## 2024-04-11 RX ORDER — PROPOFOL 10 MG/ML
VIAL (ML) INTRAVENOUS CONTINUOUS PRN
Status: DISCONTINUED | OUTPATIENT
Start: 2024-04-11 | End: 2024-04-11

## 2024-04-11 RX ORDER — FENTANYL CITRATE 50 UG/ML
25 INJECTION, SOLUTION INTRAMUSCULAR; INTRAVENOUS EVERY 5 MIN PRN
Status: DISCONTINUED | OUTPATIENT
Start: 2024-04-11 | End: 2024-04-11 | Stop reason: HOSPADM

## 2024-04-11 RX ADMIN — CEFAZOLIN SODIUM 2 G: 2 SOLUTION INTRAVENOUS at 12:04

## 2024-04-11 RX ADMIN — PROPOFOL 60 MG: 10 INJECTION, EMULSION INTRAVENOUS at 12:04

## 2024-04-11 RX ADMIN — LIDOCAINE HYDROCHLORIDE 100 MG: 20 INJECTION, SOLUTION INTRAVENOUS at 12:04

## 2024-04-11 RX ADMIN — PROPOFOL 150 MCG/KG/MIN: 10 INJECTION, EMULSION INTRAVENOUS at 12:04

## 2024-04-11 RX ADMIN — PROPOFOL 30 MG: 10 INJECTION, EMULSION INTRAVENOUS at 12:04

## 2024-04-11 RX ADMIN — ACETAMINOPHEN 1000 MG: 500 TABLET ORAL at 11:04

## 2024-04-11 RX ADMIN — ONDANSETRON 4 MG: 2 INJECTION, SOLUTION INTRAMUSCULAR; INTRAVENOUS at 12:04

## 2024-04-11 RX ADMIN — FENTANYL CITRATE 25 MCG: 50 INJECTION INTRAMUSCULAR; INTRAVENOUS at 12:04

## 2024-04-11 RX ADMIN — SODIUM CHLORIDE: 0.9 INJECTION, SOLUTION INTRAVENOUS at 12:04

## 2024-04-11 NOTE — DISCHARGE SUMMARY
[facility])  Discharge Note  Short Stay    Procedure(s) (LRB):  CYSTOSCOPY WITH TRANSURETHRAL DESTRUCTION OF PROSTATE,RFA  rezum generator and at least 2 handpieces Contact Paul grant to schedule (N/A)      OUTCOME: Patient tolerated treatment/procedure well without complication and is now ready for discharge.    DISPOSITION: Home or Self Care    FINAL DIAGNOSIS:  BPH    FOLLOWUP: In clinic    DISCHARGE INSTRUCTIONS:    Discharge Procedure Orders   Diet general     Other restrictions (specify):   Order Comments: NO strenuous activity for 1 week     Call MD for:  temperature >100.4     Call MD for:  persistent nausea and vomiting     Call MD for:  severe uncontrolled pain     Call MD for:  difficulty breathing, headache or visual disturbances     Call MD for:   Order Comments: Rezum Prostate Water Vapor Therapy   Postoperative Instructions    Carpenter Catheter: The catheter typically stays in for 3 to 7 days depending on the size of your prostate gland. Once the catheter is removed after Rezum procedure, most men can void. There are a subset of men who may not be able to void if the bladder is not ready to go yet and may need the catheter replaced. When the catheter first comes out it is very common to feel like you have a urinary tract infection with burning with urination, increased urinary frequency and urinary urgency. These symptoms typically improve over a 2 to 3 week period of time. If these symptoms persist then you should come in for a urine check to make sure you do not have an infection.      Fluids: You should drink plenty of fluids during the day to promote increased production of urine.  Avoid bladder irritants including caffeine, alcohol, smoking, spicy foods, acidic foods, tomato-based products, citrus, artificial sweeteners, chocolate, coffee or tea.    Activity: You should avoid strenuous activity during the first two weeks, which includes heavy lifting, bike riding, treadmill  activity, or riding a lawnmower. Avoid sexual activity for two weeks. You may resume your normal daily activities. You may resume driving a car.    Urinary symptoms:  During the first several months, the primary improvement to note is increased urinary flow. With the Rezum procedure, the procedure destroys the prostate tissue with steam and can take some time for the body to reabsorb this prostate tissue. The bladder should now be able to empty with better flow, which you will notice as a more powerful urinary stream. It is important to recognize that it may take a much longer timeframe for the frequency of urination, particularly frequency of urination at night, to reduce.    Symptoms will get worse before they get better. Generally, at two weeks symptoms begin to stabilize. At four weeks, patients are back where they were before the procedure, and at six weeks patients start to get off medication.    The bladder has been working against obstruction for years. As the bladder has worked against obstruction, it has become thicker and less elastic so that its storage capacity is diminished. After the Rezum procedure, the bladder is no longer working as hard against obstruction. Some of the thickening of the bladder muscle should decreases and the bladder should regain its elasticity. When that occurs, there will be less urgency of urination, but it may take time to notice this improvement. It is important to recognize that it took a long time for the bladder to get to where we are now and so it may take time for the bladder to reverse those changes.    Blood in the urine: You may note some blood in the urine and semen after this procedure. This is normal, and may be intermittent and last for several weeks.  Drink plenty of water.     Medications: Take medications  as prescribed.      Follow up appointment:  The office will call to arrange your follow up for del valle catheter removal.     Call the office ([phone]) or  go to nearest Emergency Room if:   -Fever of 101 degrees F or above, chills.    -Inability to urinate.  -Severe Pain that is not controlled with medications.     Activity as tolerated        TIME SPENT ON DISCHARGE: 5 minutes

## 2024-04-11 NOTE — OP NOTE
[facility]  Operative Note     Date: 04/11/2024     Pre-Op Diagnosis: BPH with obstructive urinary symptoms     Post-Op Diagnosis: same     Procedure(s) Performed:   1. Transurethral destruction of prostate; radiofrequency thermotherapy (Rezum procedure)      Specimen(s): none     Staff Surgeon: Juan Ward MD     Anesthesia:  MAC     Indications: Korey Santos is a 83 y.o. male with BPH presenting for surgical intervention.         Findings:    1. Lateral lobe hyperplasia, 2 treatments each side total 4 treatments.      Estimated Blood Loss: minimal     Drains:   1.  18 Fr del valle catheter     Procedure in detail: After informed consent was obtained and all questions were answered, the patient was brought to the operating suite and placed in the lithotomy position. General anesthesia was administered. SCDs were applied and working prior to induction of anesthesia. That patient was then prepped and draped in the usual sterile fashion. Time out was performed and preoperative antibiotics were confirmed.       We began the case by inserting the Rezum scope into the bladder. No urethral strictures were seen and scope entered easily.     Next, we examined the prostate and found lateral lobe hyperplasia. The rezum was then used to create thermal energy to selectively ablate prostate tissue 1 cm from the bladder neck to the proximal end of the veru spaced 1 cm apart.      4 total treatments were given. Specifically 2 treatments were given in each to 3 and 9 o'clock positions, and 0 treatments to the median lobe.      At the completion of the procedure the device was removed. There was a careful check that there were no clots in the bladder or injury to the bladder, prostate or urethra.      18 fr del valle catheter was placed with 10 mL of sterile water in the balloon.      The patient tolerated the procedure well and was transferred to the PACU in stable condition.       Disposition:  He will follow  up with me in clinic in approximately 5  days to have his del valle catheter removed.    Juan Ward MD

## 2024-04-11 NOTE — DISCHARGE INSTRUCTIONS
Rezum Prostate Water Vapor Therapy   Postoperative Instructions    Carpenter Catheter: The catheter typically stays in for 3 to 7 days depending on the size of your prostate gland. Once the catheter is removed after Rezum procedure, most men can void. There are a subset of men who may not be able to void if the bladder is not ready to go yet and may need the catheter replaced. When the catheter first comes out it is very common to feel like you have a urinary tract infection with burning with urination, increased urinary frequency and urinary urgency. These symptoms typically improve over a 2 to 3 week period of time. If these symptoms persist then you should come in for a urine check to make sure you do not have an infection.      Fluids: You should drink plenty of fluids during the day to promote increased production of urine.  Avoid bladder irritants including caffeine, alcohol, smoking, spicy foods, acidic foods, tomato-based products, citrus, artificial sweeteners, chocolate, coffee or tea.    Activity: You should avoid strenuous activity during the first two weeks, which includes heavy lifting, bike riding, treadmill activity, or riding a lawnmower. Avoid sexual activity for two weeks. You may resume your normal daily activities. You may resume driving a car.    Urinary symptoms:  During the first several months, the primary improvement to note is increased urinary flow. With the Rezum procedure, the procedure destroys the prostate tissue with steam and can take some time for the body to reabsorb this prostate tissue. The bladder should now be able to empty with better flow, which you will notice as a more powerful urinary stream. It is important to recognize that it may take a much longer timeframe for the frequency of urination, particularly frequency of urination at night, to reduce.    Symptoms will get worse before they get better. Generally, at two weeks symptoms begin to stabilize. At four weeks, patients  are back where they were before the procedure, and at six weeks patients start to get off medication.    The bladder has been working against obstruction for years. As the bladder has worked against obstruction, it has become thicker and less elastic so that its storage capacity is diminished. After the Rezum procedure, the bladder is no longer working as hard against obstruction. Some of the thickening of the bladder muscle should decreases and the bladder should regain its elasticity. When that occurs, there will be less urgency of urination, but it may take time to notice this improvement. It is important to recognize that it took a long time for the bladder to get to where we are now and so it may take time for the bladder to reverse those changes.    Blood in the urine: You may note some blood in the urine and semen after this procedure. This is normal, and may be intermittent and last for several weeks.  Drink plenty of water.     Medications: Take medications  as prescribed.      Follow up appointment:  The office will call to arrange your follow up for del valle catheter removal.     Call the office ([phone]) or go to nearest Emergency Room if:   -Fever of 101 degrees F or above, chills.    -Inability to urinate.  -Severe Pain that is not controlled with medications.

## 2024-04-11 NOTE — ANESTHESIA POSTPROCEDURE EVALUATION
Anesthesia Post Evaluation    Patient: Korey Santos    Procedure(s) Performed: Procedure(s) (LRB):  CYSTOSCOPY WITH TRANSURETHRAL DESTRUCTION OF PROSTATE,RFA  rezum generator and at least 2 handpieces Contact Paul grant to schedule (N/A)    Final Anesthesia Type: general      Patient location during evaluation: PACU  Patient participation: Yes- Able to Participate  Level of consciousness: awake and alert  Post-procedure vital signs: reviewed and stable  Pain management: adequate  Airway patency: patent    PONV status at discharge: No PONV  Anesthetic complications: no      Cardiovascular status: stable  Respiratory status: spontaneous ventilation  Hydration status: euvolemic  Follow-up not needed.          Vitals Value Taken Time   /77 04/11/24 1340   Temp 36.7 °C (98.1 °F) 04/11/24 1340   Pulse 77 04/11/24 1340   Resp 15 04/11/24 1340   SpO2 98 % 04/11/24 1340         No case tracking events are documented in the log.      Pain/Meredith Score: Pain Rating Prior to Med Admin: 0 (4/11/2024  1:40 PM)  Pain Rating Post Med Admin: 0 (4/11/2024  1:40 PM)

## 2024-04-11 NOTE — INTERVAL H&P NOTE
The patient has been examined and the H&P has been reviewed:    I concur with the findings and no changes have occurred since H&P was written.    Anesthesia/Surgery risks, benefits and alternative options discussed and understood by patient/family.    Problem Noted   Benign Prostatic Hyperplasia With Urinary Frequency 4/23/2014      BPH w LUTs, frequency  Rx/Tx: tamsulosin/finasteride for years  AUA SS initial: 20/3    Cysto/TRUS 2/23: 49 gm prostate, lateral lobe hyperplasia.   Uroflow 2/23: voided 461 cc, Qmax 14.3, average 8.8 ml/s     Elevated Psa 8/16/2017    4.7 in 2017       OR for cysto/Rezum  The risks, benefits, alteratives of the procedure were discussed with the patient.  The patient was given time for questions, all questions were answered.  Written, informed consent was obtained.      Juan Ward MD

## 2024-04-11 NOTE — TELEPHONE ENCOUNTER
----- Message from Annmarie Evangelista sent at 4/10/2024  2:18 PM CDT -----  Type:   Call    Who Called:pt  Does the patient know what this is regarding?:procedure  Would the patient rather a call back or a response via [facility]? call  Best Call Back Number:[phone]  Additional Information: Pt would like to know how long the procedure is going to take.

## 2024-04-11 NOTE — TRANSFER OF CARE
"Anesthesia Transfer of Care Note    Patient: Korey Santos    Procedure(s) Performed: Procedure(s) (LRB):  CYSTOSCOPY WITH TRANSURETHRAL DESTRUCTION OF PROSTATE,RFA  rezum generator and at least 2 handpieces Contact Paul grant to schedule (N/A)    Patient location: [facility]    Anesthesia Type: MAC    Transport from OR: Transported from OR on 2-3 L/min O2 by NC with adequate spontaneous ventilation    Post pain: adequate analgesia    Post assessment: no apparent anesthetic complications    Post vital signs: stable    Level of consciousness: awake    Nausea/Vomiting: no nausea/vomiting    Complications: none    Transfer of care protocol was followed      Last vitals: Visit Vitals  BP (!) 175/81 (BP Location: Right arm, Patient Position: Lying)   Pulse 66   Temp 37 °C (98.6 °F) (Skin)   Resp 18   Ht 5' 11" (1.803 m)   Wt 106.6 kg (235 lb)   SpO2 97%   BMI 32.78 kg/m²     "

## 2024-04-12 LAB
OHS QRS DURATION: 142 MS
OHS QTC CALCULATION: 446 MS

## 2024-04-16 ENCOUNTER — TELEPHONE (OUTPATIENT)
Dept: CARDIOLOGY | Facility: CLINIC | Age: 84
End: 2024-04-16
Payer: MEDICARE

## 2024-04-16 NOTE — TELEPHONE ENCOUNTER
RT CALL TO PT AND LVM STATING THAT OUR PHONE LINES ARE NOT SET UP TO DIRECTLY REACH OUT TO STAFF AND YOU HAVE TO GO THROUGH THE CALL CENTER AND LEAVE A VOICE MESSAGE AND I APOLOGIZED FOR THE INCONVENIENCE AND TO GIVE US A CALL BCK AND LEAVE A MESSAGE AND WE WILL CALL HIM BACK

## 2024-04-16 NOTE — TELEPHONE ENCOUNTER
----- Message from Birdie Jones sent at 4/16/2024  2:57 PM CDT -----  Type:  Patient Returning Call    Who Called:Pt  Who Left Message for Patient:Gill  Does the patient know what this is regarding?:returning missed call  Would the patient rather a call back or a response via [facility]? Call  Best Call Back Number: [phone]  Additional Information: Pt is very upset that he could not directly call back

## 2024-04-16 NOTE — TELEPHONE ENCOUNTER
Attempted to reach in regards to concerns with medications    Detailed VM left     Lostartan 100 mg daily restarted during visit on 1/3 with GERMANIA Yin due to elevated BP (was discontinued during discharge process from hosp admission)    Also seen by Dr Villa on 2/6 Nifedipine 60 mg daily was added to medications due to need for BP control    Seen 3/12 and Nifedipine increased 90 mg daily due to need for BP control       Request return call to discuss further Office number left with message

## 2024-04-17 ENCOUNTER — TELEPHONE (OUTPATIENT)
Dept: UROLOGY | Facility: CLINIC | Age: 84
End: 2024-04-17
Payer: MEDICARE

## 2024-04-17 NOTE — TELEPHONE ENCOUNTER
----- Message from Octavia Romo sent at 4/17/2024  9:07 AM CDT -----  Pt would like a call back regarding removing  cathter, having   a problems    Can be reached at [phone]

## 2024-04-18 ENCOUNTER — OFFICE VISIT (OUTPATIENT)
Dept: UROLOGY | Facility: CLINIC | Age: 84
End: 2024-04-18
Payer: MEDICARE

## 2024-04-18 DIAGNOSIS — R35.0 BENIGN PROSTATIC HYPERPLASIA WITH URINARY FREQUENCY: Primary | ICD-10-CM

## 2024-04-18 DIAGNOSIS — N40.1 BENIGN PROSTATIC HYPERPLASIA WITH URINARY FREQUENCY: Primary | ICD-10-CM

## 2024-04-18 PROCEDURE — 99024 POSTOP FOLLOW-UP VISIT: CPT | Mod: HCNC,S$GLB,, | Performed by: UROLOGY

## 2024-04-18 PROCEDURE — 1157F ADVNC CARE PLAN IN RCRD: CPT | Mod: HCNC,CPTII,S$GLB, | Performed by: UROLOGY

## 2024-04-18 NOTE — PROGRESS NOTES
Subjective:      Patient ID: Korey Santos is a 83 y.o. male.    Chief Complaint: BPH  Patient is a 83 y.o. male with h/o BPH.    Patient complains of lower urinary tract symptoms. He reports frequency, incomplete emptying, intermittency, nocturia two times a night, straining, urgency and weak stream. He denies  dysuria . Patient states symptoms are of severe severity. Onset of symptoms was several years ago and was gradual in onset. His AUA Symptom Score is, 20/3. He has no personal history and no family history of prostate cancer. He reports a history of no complicating symptoms. He denies flank pain, gross hematuria, kidney stones and recurrent UTI.  On tamsulosin and finasteride for years.  H/o elevated PSA in 2017 (does not think was on finasteride at that time) but normal for age at that time.      Cysto/TRUS 2/23: 49 gm prostate, lateral lobe hyperplasia.   Uroflow 2/23: voided 461 cc, Qmax 14.3, average 8.8 ml/s    [facility] 4/11/24.      Lab Results   Component Value Date    PSA 4.7 (H) 07/08/2017    PSA 4.7 (H) 10/07/2014    PSA 3.2 12/18/2013    PSA 2.72 03/05/2012    PSA 2.1 01/21/2011    PSA 2.3 10/02/2008    PSA 1.6 04/05/2007    PSADIAG 2.9 10/22/2014       IPSS Questionnaire (AUA-SS):  Over the past month    1)  Incomplete Emptying - How often have you had a sensation of not emptying your bladder completely after you finish urinating?  3 - About half the time   2)  Frequency - How often have you had to urinate again less than two hours after you finished urinating? 3 - About half the time   3)  Intermittency - How often have you found you stopped and started again several times when you urinated?  3 - About half the time   4) Urgency - How difficult have you found it to postpone urination?  3 - About half the time   5) Weak Stream - How often have you had a weak urinary stream?  3 - About half the time   6) Straining  - How often have you had to push or strain to begin urination?  3 - About half the  time   7) Nocturia - How many times did you most typically get up to urinate from the time you went to bed until the time you got up in the morning?  2 - 2 times   Total score:  0-7 mild, 8-19 moderate, 20-35 severe 20   Quality of Life:  3 - Mixed      Review of Systems   Constitutional:  Negative for activity change, chills and fever.   HENT:  Negative for congestion.    Respiratory:  Negative for cough, chest tightness and shortness of breath.    Cardiovascular:  Negative for chest pain and palpitations.   Gastrointestinal:  Negative for abdominal distention, abdominal pain, nausea and vomiting.   Genitourinary:  Positive for difficulty urinating, frequency and urgency. Negative for flank pain, hematuria, penile pain, scrotal swelling and testicular pain.   Musculoskeletal:  Negative for gait problem.     All other systems reviewed and negative except pertinent positives noted in HPI.      Objective:     Physical Exam  Vitals reviewed.   Constitutional:       Appearance: Normal appearance.   HENT:      Head: Atraumatic.   Cardiovascular:      Pulses: Normal pulses.   Pulmonary:      Effort: Pulmonary effort is normal.      Breath sounds: Normal breath sounds.   Abdominal:      General: There is no distension.      Palpations: Abdomen is soft.      Tenderness: There is no abdominal tenderness. There is no right CVA tenderness, left CVA tenderness or guarding.   Genitourinary:     Penis: Normal.       Testes: Normal.      Prostate: Normal.      Comments: Enlarged prostate  Musculoskeletal:      Cervical back: Normal range of motion.   Neurological:      Mental Status: He is alert.         Assessment:     Problem Noted   Benign Prostatic Hyperplasia With Urinary Frequency 4/23/2014      BPH w LUTs, frequency  Rx/Tx: tamsulosin/finasteride for years  AUA SS initial: 20/3    Cysto/TRUS 2/23: 49 gm prostate, lateral lobe hyperplasia.   Uroflow 2/23: voided 461 cc, Qmax 14.3, average 8.8 ml/s    [facility] 4/11/24.       Elevated Psa 8/16/2017    4.7 in 2017         Plan:   1.  Catheter removed  2.  Return if unable to void  3.  Follow up in 3 weeks.       Juan Ward MD

## 2024-04-18 NOTE — PATIENT INSTRUCTIONS
Rezum Prostate Water Vapor Therapy   Postoperative Instructions    Carpenter Catheter: The catheter typically stays in for 3 to 7 days depending on the size of your prostate gland. Once the catheter is removed after Rezum procedure, most men can void. There are a subset of men who may not be able to void if the bladder is not ready to go yet and may need the catheter replaced. When the catheter first comes out it is very common to feel like you have a urinary tract infection with burning with urination, increased urinary frequency and urinary urgency. These symptoms typically improve over a 2 to 3 week period of time. If these symptoms persist then you should come in for a urine check to make sure you do not have an infection.      Fluids: You should drink plenty of fluids during the day to promote increased production of urine.  Avoid bladder irritants including caffeine, alcohol, smoking, spicy foods, acidic foods, tomato-based products, citrus, artificial sweeteners, chocolate, coffee or tea.    Activity: You should avoid strenuous activity during the first two weeks, which includes heavy lifting, bike riding, treadmill activity, or riding a lawnmower. Avoid sexual activity for two weeks. You may resume your normal daily activities. You may resume driving a car.    Urinary symptoms:  During the first several months, the primary improvement to note is increased urinary flow. With the Rezum procedure, the procedure destroys the prostate tissue with steam and can take some time for the body to reabsorb this prostate tissue. The bladder should now be able to empty with better flow, which you will notice as a more powerful urinary stream. It is important to recognize that it may take a much longer timeframe for the frequency of urination, particularly frequency of urination at night, to reduce.    Symptoms will get worse before they get better. Generally, at two weeks symptoms begin to stabilize. At four weeks, patients  are back where they were before the procedure, and at six weeks patients start to get off medication.    The bladder has been working against obstruction for years. As the bladder has worked against obstruction, it has become thicker and less elastic so that its storage capacity is diminished. After the Rezum procedure, the bladder is no longer working as hard against obstruction. Some of the thickening of the bladder muscle should decreases and the bladder should regain its elasticity. When that occurs, there will be less urgency of urination, but it may take time to notice this improvement. It is important to recognize that it took a long time for the bladder to get to where we are now and so it may take time for the bladder to reverse those changes.    Blood in the urine: You may note some blood in the urine and semen after this procedure. This is normal, and may be intermittent and last for several weeks.  Drink plenty of water.     Medications: Take medications  as prescribed.      Follow up appointment:  The office will call to arrange your follow up for del valle catheter removal.     Call the office ([phone]) or go to nearest Emergency Room if:   -Fever of 101 degrees F or above, chills.    -Inability to urinate.  -Severe Pain that is not controlled with medications.     Juan Ward MD

## 2024-05-02 ENCOUNTER — OFFICE VISIT (OUTPATIENT)
Dept: SPORTS MEDICINE | Facility: CLINIC | Age: 84
End: 2024-05-02
Payer: MEDICARE

## 2024-05-02 VITALS
HEIGHT: 71 IN | DIASTOLIC BLOOD PRESSURE: 80 MMHG | HEART RATE: 66 BPM | SYSTOLIC BLOOD PRESSURE: 145 MMHG | BODY MASS INDEX: 32.9 KG/M2 | WEIGHT: 235 LBS

## 2024-05-02 DIAGNOSIS — M17.11 PRIMARY OSTEOARTHRITIS OF RIGHT KNEE: Primary | ICD-10-CM

## 2024-05-02 PROCEDURE — 99499 UNLISTED E&M SERVICE: CPT | Mod: HCNC,S$GLB,, | Performed by: STUDENT IN AN ORGANIZED HEALTH CARE EDUCATION/TRAINING PROGRAM

## 2024-05-02 PROCEDURE — 99999 PR PBB SHADOW E&M-EST. PATIENT-LVL III: CPT | Mod: PBBFAC,HCNC,, | Performed by: STUDENT IN AN ORGANIZED HEALTH CARE EDUCATION/TRAINING PROGRAM

## 2024-05-02 PROCEDURE — 20611 DRAIN/INJ JOINT/BURSA W/US: CPT | Mod: HCNC,RT,S$GLB, | Performed by: STUDENT IN AN ORGANIZED HEALTH CARE EDUCATION/TRAINING PROGRAM

## 2024-05-02 NOTE — PROGRESS NOTES
CC: right knee pain    83 y.o. Male presents today for his Monovisc injection of his right knee. Pt reports he had at least 6 months of relief from prev injection.     Attempted treatments: exercise, tylenol, ibuprofen  Pain score: 5-6/10   History of trauma/injury: none since last visit  Affecting ADLs: yes      REVIEW OF SYSTEMS:   Constitution: Patient denies fever or chills.  Eyes: Patient denies eye pain or vision changes.  HEENT: Patient denies ear pain, sore throat, or nasal discharge.  CVS: Patient denies chest pain.  Lungs: Patient denies shortness of breath or cough.  Skin: Patient denies skin rash or itching.    Musculoskeletal: Patient denies recent falls. See HPI.  Psych: Patient denies any current anxiety or nervousness.    PAST MEDICAL HISTORY:   Past Medical History:   Diagnosis Date    Arthritis     Back pain, chronic     BPH with urinary obstruction     Chronic constipation     Closed fracture of right shoulder 1/28/2021    Closed nondisplaced fracture of greater tuberosity of right humerus 2/24/2021    Colon polyp     DJD (degenerative joint disease)     Hip    Hyperlipidemia     Hypertension     Laryngopharyngeal reflux     Left inguinal hernia     Lumbar herniated disc     Lumbar stenosis     Neck mass 10/7/2014    -4/29/2022 path - lipoma    OA (osteoarthritis) of knee     Bilateral    Paradoxical insomnia     Sinus trouble        MEDICATIONS:     Current Outpatient Medications:     azelastine (ASTELIN) 137 mcg (0.1 %) nasal spray, 1 spray (137 mcg total) by Nasal route 2 (two) times daily as needed for Rhinitis., Disp: 30 mL, Rfl: 0    finasteride (PROSCAR) 5 mg tablet, Take 1 tablet (5 mg total) by mouth once daily., Disp: 90 tablet, Rfl: 3    fluticasone propionate (FLONASE) 50 mcg/actuation nasal spray, 1 spray (50 mcg total) by Each Nostril route once daily., Disp: 16 g, Rfl: 6    hydrOXYzine HCL (ATARAX) 25 MG tablet, Take 1 tablet (25 mg total) by mouth 3 (three) times daily as needed  "for Anxiety., Disp: 30 tablet, Rfl: 0    linaCLOtide (LINZESS) 145 mcg Cap capsule, Take 1 capsule (145 mcg total) by mouth daily as needed (constipation)., Disp: 90 capsule, Rfl: 3    losartan (COZAAR) 100 MG tablet, Take 1 tablet (100 mg total) by mouth once daily., Disp: 90 tablet, Rfl: 3    methylPREDNISolone (MEDROL DOSEPACK) 4 mg tablet, use as directed, Disp: 21 each, Rfl: 0    multivitamin capsule, Take 1 capsule by mouth once daily., Disp: , Rfl:     NIFEdipine (PROCARDIA-XL) 90 MG (OSM) 24 hr tablet, Take 1 tablet (90 mg total) by mouth once daily., Disp: 90 tablet, Rfl: 3    pravastatin (PRAVACHOL) 40 MG tablet, Take 1 tablet (40 mg total) by mouth once daily., Disp: 90 tablet, Rfl: 3    tamsulosin (FLOMAX) 0.4 mg Cap, Take 1 capsule (0.4 mg total) by mouth once daily., Disp: 90 capsule, Rfl: 3    aspirin (ECOTRIN) 325 MG EC tablet, Take 1 tablet (325 mg total) by mouth once daily. (Patient taking differently: Take 325 mg by mouth every other day.), Disp: 42 tablet, Rfl: 0    ALLERGIES:   Review of patient's allergies indicates:   Allergen Reactions    Clindamycin Swelling     Throat swells    Lisinopril Swelling and Other (See Comments)     Throat swelling    Shellfish containing products         PHYSICAL EXAMINATION:  BP (!) 145/80   Pulse 66   Ht 5' 11" (1.803 m)   Wt 106.6 kg (235 lb 0.2 oz)   BMI 32.78 kg/m²   There are no signs of infection at the injection site, including no rubor, calor, or skin lesions.  Gen: NAD.  Psych: Affect & judgment nl.  Neuro: Grossly CNI. [place].  HEENT: -Trach dev. -Eye d/c. -Rhinorrhea.  CV: Color nl. -E/C/C. WWPx4.  Pulm: -Dyspnea. -Cough.  Lymph: -Edema.  Int: -Rash/lesion noted. Skin is warm and dry    Diagnostic Extremity - [facility]-Sports Ultrasound was recommended due to right knee(s) evaluation.    FOCUSED: examination.     TECHNIQUE: Real time ultrasound examination of the right knee was performed with [employer] 2, 9-L MHz linear probe.     FINDINGS: The images " are of adequate diagnostic quality with identification of all echogenic structures made except for the vascular structures unless otherwise noted. There is no sonographic evidence of periosteal abnormalities, soft tissue edema, musculotendinous or nerve irregularities. The rest of the sonographic examination was unremarkable.    Ultrasound images were directly reviewed with the patient and then a treatment plan was discussed.     Images were stored in the patients medical record.     IMPRESSION: No effusion to be aspirated.       ASSESSMENT:      ICD-10-CM ICD-9-CM   1. Primary osteoarthritis of right knee  M17.11 715.16         PLAN:  Ultrasound-guided injection of the right knee with Monovisc performed at visit today.    Future planning includes - Continue exercise program    Risks and benefits were discussed with patient prior to receiving injection.  Depending on injection type, risks include the possibility of infection, pain, disruptions in blood pressure and blood sugar, and cosmetic deformity at site of injection.    All questions were answered to the best of my ability and all concerns were addressed at this time.    Follow up when pain returns.      This note is dictated using the M*Modal Fluency Direct word recognition program. There are word recognition mistakes that are occasionally missed on review.

## 2024-05-02 NOTE — PROCEDURES
Large Joint Aspiration/Injection: R knee    Date/Time: 5/2/2024 2:30 PM    Performed by: Chela Gilliam MD  Authorized by: Chela Gilliam MD    Consent Done?:  Yes (Verbal)  Indications:  Arthritis and pain  Site marked: the procedure site was marked    Timeout: prior to procedure the correct patient, procedure, and site was verified    Prep: patient was prepped and draped in usual sterile fashion      Local anesthesia used?: Yes    Anesthesia:  Local infiltration  Local anesthetic:  Co-phenylcaine spray    Details:  Needle Size:  22 G  Ultrasonic Guidance for needle placement?: Yes (Ultrasound guidance used to avoid neurovascular injury and/or to improve accuracy given body habitus.)    Images are saved and documented.  Approach: Superolateral.  Location:  Knee  Site:  R knee  Medications:  88 mg hyaluronate sodium, stabilized 88 mg/4 mL  Medications comment:  Ropivacaine 0.2% 2mL  Patient tolerance:  Patient tolerated the procedure well with no immediate complications     TECHNIQUE: Real time ultrasound examination of the right knee was performed with [employer] 2, 9-L MHz linear probe(s). Ultrasound guidance was used for needle localization. Images were saved and stored for documentation. Dynamic visualization of the needle was continuous throughout the procedures and maintained in good position.

## 2024-05-09 ENCOUNTER — OFFICE VISIT (OUTPATIENT)
Dept: UROLOGY | Facility: CLINIC | Age: 84
End: 2024-05-09
Payer: MEDICARE

## 2024-05-09 VITALS
DIASTOLIC BLOOD PRESSURE: 74 MMHG | HEIGHT: 71 IN | BODY MASS INDEX: 33.48 KG/M2 | HEART RATE: 73 BPM | WEIGHT: 239.19 LBS | SYSTOLIC BLOOD PRESSURE: 137 MMHG

## 2024-05-09 DIAGNOSIS — R35.0 BENIGN PROSTATIC HYPERPLASIA WITH URINARY FREQUENCY: Primary | ICD-10-CM

## 2024-05-09 DIAGNOSIS — N40.1 BENIGN PROSTATIC HYPERPLASIA WITH URINARY FREQUENCY: Primary | ICD-10-CM

## 2024-05-09 DIAGNOSIS — N40.0 PROSTATISM: ICD-10-CM

## 2024-05-09 LAB
BILIRUB SERPL-MCNC: NEGATIVE MG/DL
BLOOD URINE, POC: 250
CLARITY, POC UA: CLEAR
COLOR, POC UA: YELLOW
GLUCOSE UR QL STRIP: NORMAL
KETONES UR QL STRIP: NEGATIVE
LEUKOCYTE ESTERASE URINE, POC: ABNORMAL
NITRITE, POC UA: ABNORMAL
PH, POC UA: 5
POC RESIDUAL URINE VOLUME: 0 ML (ref 0–100)
PROTEIN, POC: ABNORMAL
SPECIFIC GRAVITY, POC UA: 1.01
UROBILINOGEN, POC UA: NORMAL

## 2024-05-09 PROCEDURE — 51798 US URINE CAPACITY MEASURE: CPT | Mod: HCNC,S$GLB,, | Performed by: UROLOGY

## 2024-05-09 PROCEDURE — 1159F MED LIST DOCD IN RCRD: CPT | Mod: HCNC,CPTII,S$GLB, | Performed by: UROLOGY

## 2024-05-09 PROCEDURE — 99024 POSTOP FOLLOW-UP VISIT: CPT | Mod: HCNC,S$GLB,, | Performed by: UROLOGY

## 2024-05-09 PROCEDURE — 99999 PR PBB SHADOW E&M-EST. PATIENT-LVL III: CPT | Mod: PBBFAC,HCNC,, | Performed by: UROLOGY

## 2024-05-09 PROCEDURE — 3075F SYST BP GE 130 - 139MM HG: CPT | Mod: HCNC,CPTII,S$GLB, | Performed by: UROLOGY

## 2024-05-09 PROCEDURE — 1125F AMNT PAIN NOTED PAIN PRSNT: CPT | Mod: HCNC,CPTII,S$GLB, | Performed by: UROLOGY

## 2024-05-09 PROCEDURE — 1157F ADVNC CARE PLAN IN RCRD: CPT | Mod: HCNC,CPTII,S$GLB, | Performed by: UROLOGY

## 2024-05-09 PROCEDURE — 1101F PT FALLS ASSESS-DOCD LE1/YR: CPT | Mod: HCNC,CPTII,S$GLB, | Performed by: UROLOGY

## 2024-05-09 PROCEDURE — 81002 URINALYSIS NONAUTO W/O SCOPE: CPT | Mod: HCNC,S$GLB,, | Performed by: UROLOGY

## 2024-05-09 PROCEDURE — 1160F RVW MEDS BY RX/DR IN RCRD: CPT | Mod: HCNC,CPTII,S$GLB, | Performed by: UROLOGY

## 2024-05-09 PROCEDURE — 3078F DIAST BP <80 MM HG: CPT | Mod: HCNC,CPTII,S$GLB, | Performed by: UROLOGY

## 2024-05-09 PROCEDURE — 3288F FALL RISK ASSESSMENT DOCD: CPT | Mod: HCNC,CPTII,S$GLB, | Performed by: UROLOGY

## 2024-05-09 NOTE — PROGRESS NOTES
Subjective:      Patient ID: Korey Santos is a 83 y.o. male.    Chief Complaint: BPH  Patient is a 83 y.o. male with h/o BPH.    Patient complains of lower urinary tract symptoms. He reports frequency, incomplete emptying, intermittency, nocturia two times a night, straining, urgency and weak stream. He denies  dysuria . Patient states symptoms are of severe severity. Onset of symptoms was several years ago and was gradual in onset. His AUA Symptom Score is, 20/3. He has no personal history and no family history of prostate cancer. He reports a history of no complicating symptoms. He denies flank pain, gross hematuria, kidney stones and recurrent UTI.  On tamsulosin and finasteride for years.  H/o elevated PSA in 2017 (does not think was on finasteride at that time) but normal for age at that time.      Cysto/TRUS 2/23: 49 gm prostate, lateral lobe hyperplasia.   Uroflow 2/23: voided 461 cc, Qmax 14.3, average 8.8 ml/s    zu 4/11/24.  Improved urination, intermittently some blood and small tissue passage which is expected.        Lab Results   Component Value Date    PSA 4.7 (H) 07/08/2017    PSA 4.7 (H) 10/07/2014    PSA 3.2 12/18/2013    PSA 2.72 03/05/2012    PSA 2.1 01/21/2011    PSA 2.3 10/02/2008    PSA 1.6 04/05/2007    PSADIAG 2.9 10/22/2014       IPSS Questionnaire (AUA-SS):  Over the past month    1)  Incomplete Emptying - How often have you had a sensation of not emptying your bladder completely after you finish urinating?  3 - About half the time   2)  Frequency - How often have you had to urinate again less than two hours after you finished urinating? 3 - About half the time   3)  Intermittency - How often have you found you stopped and started again several times when you urinated?  3 - About half the time   4) Urgency - How difficult have you found it to postpone urination?  3 - About half the time   5) Weak Stream - How often have you had a weak urinary stream?  3 - About half the time   6)  Straining  - How often have you had to push or strain to begin urination?  3 - About half the time   7) Nocturia - How many times did you most typically get up to urinate from the time you went to bed until the time you got up in the morning?  2 - 2 times   Total score:  0-7 mild, 8-19 moderate, 20-35 severe 20   Quality of Life:  3 - Mixed      Review of Systems   Constitutional:  Negative for activity change, chills and fever.   HENT:  Negative for congestion.    Respiratory:  Negative for cough, chest tightness and shortness of breath.    Cardiovascular:  Negative for chest pain and palpitations.   Gastrointestinal:  Negative for abdominal distention, abdominal pain, nausea and vomiting.   Genitourinary:  Positive for hematuria. Negative for difficulty urinating, flank pain, penile pain, scrotal swelling and testicular pain.   Musculoskeletal:  Negative for gait problem.     All other systems reviewed and negative except pertinent positives noted in HPI.      Objective:     Physical Exam  Vitals reviewed.   Constitutional:       Appearance: Normal appearance.   HENT:      Head: Atraumatic.   Cardiovascular:      Pulses: Normal pulses.   Pulmonary:      Effort: Pulmonary effort is normal.      Breath sounds: Normal breath sounds.   Abdominal:      General: There is no distension.      Palpations: Abdomen is soft.      Tenderness: There is no abdominal tenderness. There is no right CVA tenderness, left CVA tenderness or guarding.   Genitourinary:     Penis: Normal.       Testes: Normal.      Prostate: Normal.      Comments: Enlarged prostate  Musculoskeletal:      Cervical back: Normal range of motion.   Neurological:      Mental Status: He is alert.       Assessment:     Problem Noted   Benign Prostatic Hyperplasia With Urinary Frequency 4/23/2014      BPH w LUTs, frequency  Rx/Tx: tamsulosin/finasteride for years  AUA SS initial: 20/3    Cysto/TRUS 2/23: 49 gm prostate, lateral lobe hyperplasia.   Uroflow 2/23:  voided 461 cc, Qmax 14.3, average 8.8 ml/s    Rezum 4/11/24.      Elevated Psa 8/16/2017    4.7 in 2017         Plan:   1.  Expected post op course with improved urination  2.  Follow up in 6 weeks.       Juan Ward MD

## 2024-06-24 ENCOUNTER — CLINICAL SUPPORT (OUTPATIENT)
Dept: CARDIOLOGY | Facility: CLINIC | Age: 84
End: 2024-06-24
Payer: MEDICARE

## 2024-06-24 DIAGNOSIS — I44.2 COMPLETE HEART BLOCK: Primary | ICD-10-CM

## 2024-06-24 DIAGNOSIS — I47.19 ATRIAL TACHYCARDIA: ICD-10-CM

## 2024-06-24 DIAGNOSIS — R00.1 BRADYCARDIA: ICD-10-CM

## 2024-06-24 PROCEDURE — 93294 REM INTERROG EVL PM/LDLS PM: CPT | Mod: HCNC,S$GLB,, | Performed by: INTERNAL MEDICINE

## 2024-06-24 PROCEDURE — 93296 REM INTERROG EVL PM/IDS: CPT | Mod: HCNC,S$GLB,, | Performed by: INTERNAL MEDICINE

## 2024-06-24 NOTE — PROGRESS NOTES
Subjective:      Patient ID: Korey Santos is a 83 y.o. male.    Chief Complaint: BPH  Patient is a 83 y.o. male with h/o BPH.    He reports frequency, incomplete emptying, intermittency, nocturia two times a night, straining, urgency and weak stream. He denies  dysuria . Patient states symptoms are of severe severity. Onset of symptoms was several years ago and was gradual in onset. His AUA Symptom Score is, 20/3. He has no personal history and no family history of prostate cancer. He reports a history of no complicating symptoms. He denies flank pain, gross hematuria, kidney stones and recurrent UTI.  On tamsulosin and finasteride for years.  H/o elevated PSA in 2017 (does not think was on finasteride at that time) but normal for age at that time.      Cysto/TRUS 2/23: 49 gm prostate, lateral lobe hyperplasia.   Uroflow 2/23: voided 461 cc, Qmax 14.3, average 8.8 ml/s    Rezum 4/11/24.  Improved urination after.  AUA SS 12/2.  PVR 35 cc.  Some urgency and mild Uui in post op period.       Lab Results   Component Value Date    PSA 4.7 (H) 07/08/2017    PSA 4.7 (H) 10/07/2014    PSA 3.2 12/18/2013    PSA 2.72 03/05/2012    PSA 2.1 01/21/2011    PSA 2.3 10/02/2008    PSA 1.6 04/05/2007    PSADIAG 2.9 10/22/2014       IPSS Questionnaire (AUA-SS):  Over the past month    1)  Incomplete Emptying - How often have you had a sensation of not emptying your bladder completely after you finish urinating?  2 - Less than half the time   2)  Frequency - How often have you had to urinate again less than two hours after you finished urinating? 2 - Less than half the time   3)  Intermittency - How often have you found you stopped and started again several times when you urinated?  2 - Less than half the time   4) Urgency - How difficult have you found it to postpone urination?  2 - Less than half the time   5) Weak Stream - How often have you had a weak urinary stream?  1 - Less than 1 time in 5   6) Straining  - How often  have you had to push or strain to begin urination?  1 - Less than 1 time in 5   7) Nocturia - How many times did you most typically get up to urinate from the time you went to bed until the time you got up in the morning?  2 - 2 times   Total score:  0-7 mild, 8-19 moderate, 20-35 severe 12   Quality of Life:  2 - Mostly Satisfied           Review of Systems   Constitutional:  Negative for activity change, chills and fever.   HENT:  Negative for congestion.    Respiratory:  Negative for cough, chest tightness and shortness of breath.    Cardiovascular:  Negative for chest pain and palpitations.   Gastrointestinal:  Negative for abdominal distention, abdominal pain, nausea and vomiting.   Genitourinary:  Positive for hematuria. Negative for difficulty urinating, flank pain, penile pain, scrotal swelling and testicular pain.   Musculoskeletal:  Negative for gait problem.     All other systems reviewed and negative except pertinent positives noted in HPI.      Objective:     Physical Exam  Vitals reviewed.   Constitutional:       Appearance: Normal appearance.   HENT:      Head: Atraumatic.   Cardiovascular:      Pulses: Normal pulses.   Pulmonary:      Effort: Pulmonary effort is normal.      Breath sounds: Normal breath sounds.   Abdominal:      General: There is no distension.      Palpations: Abdomen is soft.      Tenderness: There is no abdominal tenderness. There is no right CVA tenderness, left CVA tenderness or guarding.   Genitourinary:     Penis: Normal.       Testes: Normal.      Prostate: Normal.      Comments: Enlarged prostate  Musculoskeletal:      Cervical back: Normal range of motion.   Neurological:      Mental Status: He is alert.         Assessment:     Problem Noted   Benign Prostatic Hyperplasia With Urinary Frequency 4/23/2014      BPH w LUTs, frequency  Rx/Tx: tamsulosin/finasteride for years  AUA SS initial: 20/3    Cysto/TRUS 2/23: 49 gm prostate, lateral lobe hyperplasia.   Uroflow 2/23:  voided 461 cc, Qmax 14.3, average 8.8 ml/s    Rezum 4/11/24.      Elevated Psa 8/16/2017    4.7 in 2017         Plan:   1. Doing well  2.  Follow up in 6 months.       Juan Ward MD

## 2024-06-25 ENCOUNTER — OFFICE VISIT (OUTPATIENT)
Dept: UROLOGY | Facility: CLINIC | Age: 84
End: 2024-06-25
Payer: MEDICARE

## 2024-06-25 VITALS
WEIGHT: 222.75 LBS | HEIGHT: 71 IN | HEART RATE: 65 BPM | DIASTOLIC BLOOD PRESSURE: 73 MMHG | SYSTOLIC BLOOD PRESSURE: 129 MMHG | BODY MASS INDEX: 31.19 KG/M2

## 2024-06-25 DIAGNOSIS — N40.1 BENIGN PROSTATIC HYPERPLASIA WITH URINARY FREQUENCY: Primary | ICD-10-CM

## 2024-06-25 DIAGNOSIS — R35.0 BENIGN PROSTATIC HYPERPLASIA WITH URINARY FREQUENCY: Primary | ICD-10-CM

## 2024-06-25 LAB — POC RESIDUAL URINE VOLUME: 35 ML (ref 0–100)

## 2024-06-25 PROCEDURE — 99999 PR PBB SHADOW E&M-EST. PATIENT-LVL III: CPT | Mod: PBBFAC,HCNC,, | Performed by: UROLOGY

## 2024-06-25 NOTE — PROGRESS NOTES
Remote check today - pacemaker dual  ESTELA:9.7 years  Impedances normal  P wave great  R 4.0  4/24: short burst of atrial tachycardia for 8 seconds

## 2024-07-03 ENCOUNTER — TELEPHONE (OUTPATIENT)
Dept: INTERNAL MEDICINE | Facility: CLINIC | Age: 84
End: 2024-07-03
Payer: MEDICARE

## 2024-07-03 NOTE — TELEPHONE ENCOUNTER
----- Message from Tamir Ferreira sent at 7/3/2024 10:58 AM CDT -----  Regarding: PT'S REQUESTING A CALL BACK REGARDING BEING SEEN SOONER THAN 7/29  Contact: PT  Pt is also wanting to see id he needs any testing done..     Confirmed contact info below:  Contact Name: Korey Santos  Phone Number: [phone]

## 2024-07-08 ENCOUNTER — TELEPHONE (OUTPATIENT)
Dept: SPORTS MEDICINE | Facility: CLINIC | Age: 84
End: 2024-07-08
Payer: MEDICARE

## 2024-07-09 ENCOUNTER — OFFICE VISIT (OUTPATIENT)
Dept: INTERNAL MEDICINE | Facility: CLINIC | Age: 84
End: 2024-07-09
Attending: FAMILY MEDICINE
Payer: MEDICARE

## 2024-07-09 ENCOUNTER — LAB VISIT (OUTPATIENT)
Dept: LAB | Facility: HOSPITAL | Age: 84
End: 2024-07-09
Attending: FAMILY MEDICINE
Payer: MEDICARE

## 2024-07-09 VITALS
DIASTOLIC BLOOD PRESSURE: 60 MMHG | HEIGHT: 71 IN | WEIGHT: 239.19 LBS | OXYGEN SATURATION: 96 % | HEART RATE: 75 BPM | BODY MASS INDEX: 33.48 KG/M2 | SYSTOLIC BLOOD PRESSURE: 122 MMHG

## 2024-07-09 DIAGNOSIS — Z95.0 PACEMAKER: ICD-10-CM

## 2024-07-09 DIAGNOSIS — R60.9 EDEMA, UNSPECIFIED TYPE: Primary | ICD-10-CM

## 2024-07-09 DIAGNOSIS — E78.5 HYPERLIPIDEMIA, UNSPECIFIED HYPERLIPIDEMIA TYPE: ICD-10-CM

## 2024-07-09 DIAGNOSIS — R97.20 ELEVATED PSA: ICD-10-CM

## 2024-07-09 DIAGNOSIS — E04.2 MULTIPLE THYROID NODULES: ICD-10-CM

## 2024-07-09 DIAGNOSIS — G47.00 INSOMNIA, UNSPECIFIED TYPE: ICD-10-CM

## 2024-07-09 DIAGNOSIS — R60.9 EDEMA, UNSPECIFIED TYPE: ICD-10-CM

## 2024-07-09 DIAGNOSIS — I44.2 COMPLETE HEART BLOCK: ICD-10-CM

## 2024-07-09 DIAGNOSIS — I25.10 CORONARY ARTERY DISEASE INVOLVING NATIVE CORONARY ARTERY OF NATIVE HEART WITHOUT ANGINA PECTORIS: ICD-10-CM

## 2024-07-09 DIAGNOSIS — I10 HYPERTENSION, ESSENTIAL: ICD-10-CM

## 2024-07-09 DIAGNOSIS — R13.14 DYSPHAGIA, PHARYNGOESOPHAGEAL: ICD-10-CM

## 2024-07-09 LAB — BNP SERPL-MCNC: 45 PG/ML (ref 0–99)

## 2024-07-09 PROCEDURE — 1159F MED LIST DOCD IN RCRD: CPT | Mod: HCNC,CPTII,S$GLB, | Performed by: FAMILY MEDICINE

## 2024-07-09 PROCEDURE — 99999 PR PBB SHADOW E&M-EST. PATIENT-LVL V: CPT | Mod: PBBFAC,HCNC,, | Performed by: FAMILY MEDICINE

## 2024-07-09 PROCEDURE — 99214 OFFICE O/P EST MOD 30 MIN: CPT | Mod: HCNC,S$GLB,, | Performed by: FAMILY MEDICINE

## 2024-07-09 PROCEDURE — 1101F PT FALLS ASSESS-DOCD LE1/YR: CPT | Mod: HCNC,CPTII,S$GLB, | Performed by: FAMILY MEDICINE

## 2024-07-09 PROCEDURE — 83880 ASSAY OF NATRIURETIC PEPTIDE: CPT | Mod: HCNC | Performed by: FAMILY MEDICINE

## 2024-07-09 PROCEDURE — 3078F DIAST BP <80 MM HG: CPT | Mod: HCNC,CPTII,S$GLB, | Performed by: FAMILY MEDICINE

## 2024-07-09 PROCEDURE — 3074F SYST BP LT 130 MM HG: CPT | Mod: HCNC,CPTII,S$GLB, | Performed by: FAMILY MEDICINE

## 2024-07-09 PROCEDURE — 36415 COLL VENOUS BLD VENIPUNCTURE: CPT | Mod: HCNC | Performed by: FAMILY MEDICINE

## 2024-07-09 PROCEDURE — 1160F RVW MEDS BY RX/DR IN RCRD: CPT | Mod: HCNC,CPTII,S$GLB, | Performed by: FAMILY MEDICINE

## 2024-07-09 PROCEDURE — 80061 LIPID PANEL: CPT | Mod: HCNC | Performed by: FAMILY MEDICINE

## 2024-07-09 PROCEDURE — 1125F AMNT PAIN NOTED PAIN PRSNT: CPT | Mod: HCNC,CPTII,S$GLB, | Performed by: FAMILY MEDICINE

## 2024-07-09 PROCEDURE — 1157F ADVNC CARE PLAN IN RCRD: CPT | Mod: HCNC,CPTII,S$GLB, | Performed by: FAMILY MEDICINE

## 2024-07-09 PROCEDURE — 3288F FALL RISK ASSESSMENT DOCD: CPT | Mod: HCNC,CPTII,S$GLB, | Performed by: FAMILY MEDICINE

## 2024-07-09 PROCEDURE — 84443 ASSAY THYROID STIM HORMONE: CPT | Mod: HCNC | Performed by: FAMILY MEDICINE

## 2024-07-09 PROCEDURE — 80053 COMPREHEN METABOLIC PANEL: CPT | Mod: HCNC | Performed by: FAMILY MEDICINE

## 2024-07-09 RX ORDER — NIFEDIPINE 30 MG/1
30 TABLET, EXTENDED RELEASE ORAL DAILY
Qty: 90 TABLET | Refills: 0 | Status: ON HOLD | OUTPATIENT
Start: 2024-07-09 | End: 2024-07-13

## 2024-07-09 RX ORDER — HYDROXYZINE HYDROCHLORIDE 10 MG/1
10 TABLET, FILM COATED ORAL NIGHTLY PRN
Qty: 30 TABLET | Refills: 0 | Status: SHIPPED | OUTPATIENT
Start: 2024-07-09

## 2024-07-09 RX ORDER — PRAVASTATIN SODIUM 40 MG/1
40 TABLET ORAL DAILY
Qty: 90 TABLET | Refills: 3 | Status: ON HOLD | OUTPATIENT
Start: 2024-07-09 | End: 2024-07-12

## 2024-07-09 NOTE — ASSESSMENT & PLAN NOTE
-cardiologist incr nifedipine dose jermoe, 60-90 3/12/2024  -he sometimes cuts them in half  -reduce back to 30 mg, RTC in 1 month, ck BP, if still hi or for edema, then consider diuretic  -compression stockings  -call for BP over 140/90

## 2024-07-09 NOTE — PROGRESS NOTES
Subjective:       Patient ID: Korey Santos is a 83 y.o. male.    Chief Complaint: Foot Swelling and Joint Swelling    Established patient follows up for management of chronic medical illnesses with complaints today. Please see dictation and ROS for interval problems, specific complaints and disease management discussion.    Past, Surgical, Family, Social, Histories; Medications, allergies reviewed and reconciled.  Health maintenance file reviewed and addressed items due. Recent applicable lab, imaging and cardiovascular results reviewed.  Problem list items reviewed and modified or added entries (in the overview section) may not be transcribed into this encounter note due to note writer format.      Primary complaint is edema for several months possibly related to change of blood pressure medicine by cardiologist.  Had titrated 30 mg nifedipine up 90 mg 03/12/2024 visit.  Patient is currently cutting these in half even though they are sustained release.  No dyspnea, chest pain abdominal distension bloating or leg tenderness noted.  No erythema or fever.  He asked several times what can be causing this.  We went through a long ROS and the most likely cause his blood pressure medicine.  Does not seem to be heart failure nature, does not seem to be abdominal process or thromboembolism.    Also desires sleeping medication.        Review of Systems   Constitutional:  Negative for appetite change, chills, diaphoresis, fatigue and fever.   HENT:  Negative for congestion, postnasal drip, rhinorrhea, sore throat and trouble swallowing.    Eyes:  Negative for visual disturbance.   Respiratory:  Negative for cough, choking, chest tightness, shortness of breath and wheezing.    Cardiovascular:  Positive for leg swelling. Negative for chest pain.   Gastrointestinal:  Negative for abdominal distention, abdominal pain, diarrhea, nausea and vomiting.   Genitourinary:  Negative for difficulty urinating and hematuria.    Musculoskeletal:  Positive for arthralgias. Negative for myalgias.   Skin:  Negative for rash.   Neurological:  Negative for weakness, light-headedness and headaches.   Psychiatric/Behavioral:  Negative for confusion and dysphoric mood.        Objective:      Physical Exam  Vitals and nursing note reviewed.   Constitutional:       Appearance: He is well-developed. He is not diaphoretic.   HENT:      Head: Normocephalic and atraumatic.   Eyes:      General: No scleral icterus.     Conjunctiva/sclera: Conjunctivae normal.   Cardiovascular:      Rate and Rhythm: Normal rate and regular rhythm.      Heart sounds: Heart sounds are distant. No murmur heard.     No friction rub. No gallop.   Pulmonary:      Effort: Pulmonary effort is normal. No respiratory distress.      Breath sounds: Normal breath sounds. No wheezing or rales.   Abdominal:      General: There is no distension.      Tenderness: There is no abdominal tenderness.   Musculoskeletal:         General: No deformity.      Cervical back: Normal range of motion and neck supple.      Right lower le+ Edema present.      Left lower le+ Edema present.   Skin:     General: Skin is warm and dry.      Findings: No erythema or rash.   Neurological:      Mental Status: He is alert and oriented to person, place, and time.      Cranial Nerves: No cranial nerve deficit.      Motor: No tremor.      Coordination: Coordination normal.      Gait: Gait normal.   Psychiatric:         Behavior: Behavior normal.         Thought Content: Thought content normal.         Judgment: Judgment normal.         Assessment:       1. Edema, unspecified type    2. Hypertension, essential    3. Coronary artery disease involving native coronary artery of native heart without angina pectoris    4. Complete heart block    5. Pacemaker    6. Multiple thyroid nodules: see u/s     7. Dysphagia, pharyngoesophageal    8. Elevated PSA    9. Hyperlipidemia, unspecified hyperlipidemia type     10. Insomnia, unspecified type        Plan:     Medication List with Changes/Refills   New Medications    HYDROXYZINE HCL (ATARAX) 10 MG TAB    Take 1 tablet (10 mg total) by mouth nightly as needed (sleep).    NIFEDIPINE (PROCARDIA-XL) 30 MG (OSM) 24 HR TABLET    Take 1 tablet (30 mg total) by mouth once daily.   Current Medications    ASPIRIN (ECOTRIN) 325 MG EC TABLET    Take 1 tablet (325 mg total) by mouth once daily.    AZELASTINE (ASTELIN) 137 MCG (0.1 %) NASAL SPRAY    1 spray (137 mcg total) by Nasal route 2 (two) times daily as needed for Rhinitis.    FINASTERIDE (PROSCAR) 5 MG TABLET    Take 1 tablet (5 mg total) by mouth once daily.    FLUTICASONE PROPIONATE (FLONASE) 50 MCG/ACTUATION NASAL SPRAY    1 spray (50 mcg total) by Each Nostril route once daily.    LINACLOTIDE (LINZESS) 145 MCG CAP CAPSULE    Take 1 capsule (145 mcg total) by mouth daily as needed (constipation).    LOSARTAN (COZAAR) 100 MG TABLET    Take 1 tablet (100 mg total) by mouth once daily.    METHYLPREDNISOLONE (MEDROL DOSEPACK) 4 MG TABLET    use as directed    MULTIVITAMIN CAPSULE    Take 1 capsule by mouth once daily.    TAMSULOSIN (FLOMAX) 0.4 MG CAP    Take 1 capsule (0.4 mg total) by mouth once daily.   Changed and/or Refilled Medications    Modified Medication Previous Medication    PRAVASTATIN (PRAVACHOL) 40 MG TABLET pravastatin (PRAVACHOL) 40 MG tablet       Take 1 tablet (40 mg total) by mouth once daily.    Take 1 tablet (40 mg total) by mouth once daily.   Discontinued Medications    HYDROXYZINE HCL (ATARAX) 25 MG TABLET    Take 1 tablet (25 mg total) by mouth 3 (three) times daily as needed for Anxiety.    NIFEDIPINE (PROCARDIA-XL) 90 MG (OSM) 24 HR TABLET    Take 1 tablet (90 mg total) by mouth once daily.     1. Edema, unspecified type  -     Comprehensive Metabolic Panel; Future; Expected date: 07/09/2024  -     BNP; Future; Expected date: 07/09/2024  -     TSH; Future; Expected date: 07/09/2024  -     US Lower  Extremity Veins Bilateral; Future; Expected date: 07/09/2024    2. Hypertension, essential  Assessment & Plan:  -cardiologist incr nifedipine dose progressiely, 60-90 3/12/2024  -he sometimes cuts them in half  -reduce back to 30 mg, RTC in 1 month, ck BP, if still hi or for edema, then consider diuretic  -compression stockings  -call for BP over 140/90    Orders:  -     NIFEdipine (PROCARDIA-XL) 30 MG (OSM) 24 hr tablet; Take 1 tablet (30 mg total) by mouth once daily.  Dispense: 90 tablet; Refill: 0    3. Coronary artery disease involving native coronary artery of native heart without angina pectoris  Overview:  - LHC 12/18/2023 in setting of CHB   LM patent; pLAD 40%; LCX patent; RCA patent       4. Complete heart block  Overview:  -followed in cardiology  -PPM Dual 12/19/2023      5. Pacemaker  Overview:  -CHB and symptomatic bradycardia  -followed in cardiology  -PPM Dual 12/19/2023        6. Multiple thyroid nodules: see u/s 7/23  Overview:  -7/24/2023 US for dysphagia    Assessment & Plan:  -not particularly concerning, but will surveil    Orders:  -     US Soft Tissue Head Neck; Future; Expected date: 07/09/2024    7. Dysphagia, pharyngoesophageal  Assessment & Plan:  -7/2023 - when ate bad crab, thyroid US and GI consult (8/7/2023). Esophagram 8/8/2023 Lower esophageal ring/Schatzki is ring. Moderate tertiary contractions throughout the esophagus. Sx improved and did not complete EGD      8. Elevated PSA  Overview:  -4.7 in 2017  -9/6/2017  eval - nml for age, further surveillance not recommended      9. Hyperlipidemia, unspecified hyperlipidemia type  -     pravastatin (PRAVACHOL) 40 MG tablet; Take 1 tablet (40 mg total) by mouth once daily.  Dispense: 90 tablet; Refill: 3  -     Lipid Panel; Future; Expected date: 07/09/2024    10. Insomnia, unspecified type  -     hydrOXYzine HCL (ATARAX) 10 MG Tab; Take 1 tablet (10 mg total) by mouth nightly as needed (sleep).  Dispense: 30 tablet; Refill:  0      See meds, orders, follow up, routing and instructions sections of encounter and AVS. Discussed with patient and provided on AVS.    Discussed diet and exercise as therapeutic modalities for metabolic and other conditions. Provided patient information, which are included as links on the AVS for detailed information.    Lab Results   Component Value Date     12/22/2023    K 4.3 12/22/2023     12/22/2023    BUN 19 12/22/2023    CREATININE 0.75 03/05/2024    GLU 90 12/22/2023    HGBA1C 5.9 (H) 12/28/2022    MG 2.6 12/21/2023    AST 29 12/18/2023    ALT 26 12/18/2023    ALBUMIN 4.0 12/18/2023    ALBUMIN 4.3 09/26/2012    PROT 7.1 12/18/2023    BILITOT 0.8 12/18/2023    CHOL 190 12/28/2022    HDL 45 12/28/2022    LDLCALC 132.6 12/28/2022    TRIG 62 12/28/2022    WBC 6.66 03/05/2024    HGB 14.4 03/05/2024    HCT 43.8 03/05/2024     03/05/2024    PSA 4.7 (H) 07/08/2017    PSADIAG 2.9 10/22/2014    TSH 1.780 12/28/2022    URICACID 5.6 03/05/2024

## 2024-07-09 NOTE — ASSESSMENT & PLAN NOTE
-7/2023 - when ate bad crab, thyroid US and GI consult (8/7/2023). Esophagram 8/8/2023 Lower esophageal ring/Schatzki is ring. Moderate tertiary contractions throughout the esophagus. Sx improved and did not complete EGD   ambulatory

## 2024-07-10 ENCOUNTER — HOSPITAL ENCOUNTER (OUTPATIENT)
Dept: RADIOLOGY | Facility: HOSPITAL | Age: 84
Discharge: HOME | End: 2024-07-10
Attending: FAMILY MEDICINE
Payer: MEDICARE

## 2024-07-10 DIAGNOSIS — R60.9 EDEMA, UNSPECIFIED TYPE: ICD-10-CM

## 2024-07-10 DIAGNOSIS — E04.2 MULTIPLE THYROID NODULES: ICD-10-CM

## 2024-07-10 LAB
ALBUMIN SERPL BCP-MCNC: 3.9 G/DL (ref 3.5–5.2)
ALP SERPL-CCNC: 67 U/L (ref 55–135)
ALT SERPL W/O P-5'-P-CCNC: 18 U/L (ref 10–44)
ANION GAP SERPL CALC-SCNC: 7 MMOL/L (ref 8–16)
AST SERPL-CCNC: 21 U/L (ref 10–40)
BILIRUB SERPL-MCNC: 0.3 MG/DL (ref 0.1–1)
BUN SERPL-MCNC: 15 MG/DL (ref 8–23)
CALCIUM SERPL-MCNC: 9.6 MG/DL (ref 8.7–10.5)
CHLORIDE SERPL-SCNC: 107 MMOL/L (ref 95–110)
CHOLEST SERPL-MCNC: 157 MG/DL (ref 120–199)
CHOLEST/HDLC SERPL: 3.2 {RATIO} (ref 2–5)
CO2 SERPL-SCNC: 27 MMOL/L (ref 23–29)
CREAT SERPL-MCNC: 0.8 MG/DL (ref 0.5–1.4)
EST. GFR  (NO RACE VARIABLE): >60 ML/MIN/1.73 M^2
GLUCOSE SERPL-MCNC: 96 MG/DL (ref 70–110)
HDLC SERPL-MCNC: 49 MG/DL (ref 40–75)
HDLC SERPL: 31.2 % (ref 20–50)
LDLC SERPL CALC-MCNC: 94.8 MG/DL (ref 63–159)
NONHDLC SERPL-MCNC: 108 MG/DL
POTASSIUM SERPL-SCNC: 4.9 MMOL/L (ref 3.5–5.1)
PROT SERPL-MCNC: 7 G/DL (ref 6–8.4)
SODIUM SERPL-SCNC: 141 MMOL/L (ref 136–145)
TRIGL SERPL-MCNC: 66 MG/DL (ref 30–150)
TSH SERPL DL<=0.005 MIU/L-ACNC: 1.63 UIU/ML (ref 0.4–4)

## 2024-07-10 PROCEDURE — 76536 US EXAM OF HEAD AND NECK: CPT | Mod: 26,HCNC,, | Performed by: RADIOLOGY

## 2024-07-10 PROCEDURE — 93970 EXTREMITY STUDY: CPT | Mod: TC,HCNC,PN

## 2024-07-10 PROCEDURE — 93970 EXTREMITY STUDY: CPT | Mod: 26,HCNC,, | Performed by: RADIOLOGY

## 2024-07-10 PROCEDURE — 76536 US EXAM OF HEAD AND NECK: CPT | Mod: TC,HCNC,PN

## 2024-07-11 ENCOUNTER — HOSPITAL ENCOUNTER (INPATIENT)
Facility: HOSPITAL | Age: 84
LOS: 2 days | Discharge: HOME | DRG: 322 | End: 2024-07-13
Attending: STUDENT IN AN ORGANIZED HEALTH CARE EDUCATION/TRAINING PROGRAM | Admitting: FAMILY MEDICINE
Payer: MEDICARE

## 2024-07-11 DIAGNOSIS — I21.4 NSTEMI (NON-ST ELEVATED MYOCARDIAL INFARCTION): Primary | ICD-10-CM

## 2024-07-11 DIAGNOSIS — R79.89 ELEVATED TROPONIN: ICD-10-CM

## 2024-07-11 DIAGNOSIS — R07.9 CHEST PAIN: ICD-10-CM

## 2024-07-11 DIAGNOSIS — I10 HYPERTENSION, ESSENTIAL: ICD-10-CM

## 2024-07-11 LAB
ALBUMIN SERPL BCP-MCNC: 3.5 G/DL (ref 3.5–5.2)
ALBUMIN SERPL BCP-MCNC: 4 G/DL (ref 3.5–5.2)
ALP SERPL-CCNC: 63 U/L (ref 55–135)
ALP SERPL-CCNC: 85 U/L (ref 38–126)
ALT SERPL W/O P-5'-P-CCNC: 16 U/L (ref 10–44)
ALT SERPL W/O P-5'-P-CCNC: 20 U/L (ref 10–44)
ANION GAP SERPL CALC-SCNC: 12 MMOL/L (ref 8–16)
ANION GAP SERPL CALC-SCNC: 6 MMOL/L (ref 8–16)
APTT PPP: 29.1 SEC (ref 21–32)
AST SERPL-CCNC: 19 U/L (ref 10–40)
AST SERPL-CCNC: 23 U/L (ref 15–46)
BASOPHILS # BLD AUTO: 0.1 K/UL (ref 0–0.2)
BASOPHILS # BLD AUTO: 0.1 K/UL (ref 0–0.2)
BASOPHILS # BLD AUTO: 0.12 K/UL (ref 0–0.2)
BASOPHILS NFR BLD: 1.3 % (ref 0–1.9)
BASOPHILS NFR BLD: 1.5 % (ref 0–1.9)
BASOPHILS NFR BLD: 1.8 % (ref 0–1.9)
BILIRUB SERPL-MCNC: 0.3 MG/DL (ref 0.1–1)
BILIRUB SERPL-MCNC: 0.3 MG/DL (ref 0.1–1)
BUN SERPL-MCNC: 9 MG/DL (ref 8–23)
CALCIUM SERPL-MCNC: 8.9 MG/DL (ref 8.7–10.5)
CALCIUM SERPL-MCNC: 9.1 MG/DL (ref 8.7–10.5)
CHLORIDE SERPL-SCNC: 105 MMOL/L (ref 95–110)
CHLORIDE SERPL-SCNC: 109 MMOL/L (ref 95–110)
CO2 SERPL-SCNC: 24 MMOL/L (ref 23–29)
CO2 SERPL-SCNC: 25 MMOL/L (ref 23–29)
CREAT SERPL-MCNC: 0.73 MG/DL (ref 0.5–1.4)
CREAT SERPL-MCNC: 0.8 MG/DL (ref 0.5–1.4)
DIFFERENTIAL METHOD BLD: ABNORMAL
EOSINOPHIL # BLD AUTO: 0.2 K/UL (ref 0–0.5)
EOSINOPHIL # BLD AUTO: 0.2 K/UL (ref 0–0.5)
EOSINOPHIL # BLD AUTO: 0.3 K/UL (ref 0–0.5)
EOSINOPHIL NFR BLD: 2.9 % (ref 0–8)
EOSINOPHIL NFR BLD: 2.9 % (ref 0–8)
EOSINOPHIL NFR BLD: 3.4 % (ref 0–8)
ERYTHROCYTE [DISTWIDTH] IN BLOOD BY AUTOMATED COUNT: 13.1 % (ref 11.5–14.5)
ERYTHROCYTE [DISTWIDTH] IN BLOOD BY AUTOMATED COUNT: 13.2 % (ref 11.5–14.5)
ERYTHROCYTE [DISTWIDTH] IN BLOOD BY AUTOMATED COUNT: 13.2 % (ref 11.5–14.5)
EST. GFR  (NO RACE VARIABLE): >60 ML/MIN/1.73 M^2
EST. GFR  (NO RACE VARIABLE): >60 ML/MIN/1.73 M^2
GLUCOSE SERPL-MCNC: 138 MG/DL (ref 70–110)
GLUCOSE SERPL-MCNC: 95 MG/DL (ref 70–110)
HCT VFR BLD AUTO: 40.3 % (ref 40–54)
HCT VFR BLD AUTO: 40.9 % (ref 40–54)
HCT VFR BLD AUTO: 41.8 % (ref 40–54)
HGB BLD-MCNC: 13.3 G/DL (ref 14–18)
HGB BLD-MCNC: 13.4 G/DL (ref 14–18)
HGB BLD-MCNC: 13.9 G/DL (ref 14–18)
IMM GRANULOCYTES # BLD AUTO: 0.01 K/UL (ref 0–0.04)
IMM GRANULOCYTES # BLD AUTO: 0.02 K/UL (ref 0–0.04)
IMM GRANULOCYTES # BLD AUTO: 0.02 K/UL (ref 0–0.04)
IMM GRANULOCYTES NFR BLD AUTO: 0.1 % (ref 0–0.5)
IMM GRANULOCYTES NFR BLD AUTO: 0.3 % (ref 0–0.5)
IMM GRANULOCYTES NFR BLD AUTO: 0.3 % (ref 0–0.5)
INR PPP: 1 (ref 0.8–1.2)
LYMPHOCYTES # BLD AUTO: 2.6 K/UL (ref 1–4.8)
LYMPHOCYTES # BLD AUTO: 2.7 K/UL (ref 1–4.8)
LYMPHOCYTES # BLD AUTO: 3.6 K/UL (ref 1–4.8)
LYMPHOCYTES NFR BLD: 37.9 % (ref 18–48)
LYMPHOCYTES NFR BLD: 40.6 % (ref 18–48)
LYMPHOCYTES NFR BLD: 45.7 % (ref 18–48)
MAGNESIUM SERPL-MCNC: 2.1 MG/DL (ref 1.6–2.6)
MCH RBC QN AUTO: 30 PG (ref 27–31)
MCH RBC QN AUTO: 30.4 PG (ref 27–31)
MCH RBC QN AUTO: 30.6 PG (ref 27–31)
MCHC RBC AUTO-ENTMCNC: 32.8 G/DL (ref 32–36)
MCHC RBC AUTO-ENTMCNC: 33 G/DL (ref 32–36)
MCHC RBC AUTO-ENTMCNC: 33.3 G/DL (ref 32–36)
MCV RBC AUTO: 92 FL (ref 82–98)
MONOCYTES # BLD AUTO: 0.6 K/UL (ref 0.3–1)
MONOCYTES # BLD AUTO: 0.6 K/UL (ref 0.3–1)
MONOCYTES # BLD AUTO: 0.7 K/UL (ref 0.3–1)
MONOCYTES NFR BLD: 10 % (ref 4–15)
MONOCYTES NFR BLD: 7.4 % (ref 4–15)
MONOCYTES NFR BLD: 9.2 % (ref 4–15)
NEUTROPHILS # BLD AUTO: 2.9 K/UL (ref 1.8–7.7)
NEUTROPHILS # BLD AUTO: 3.3 K/UL (ref 1.8–7.7)
NEUTROPHILS # BLD AUTO: 3.3 K/UL (ref 1.8–7.7)
NEUTROPHILS NFR BLD: 42.1 % (ref 38–73)
NEUTROPHILS NFR BLD: 44.4 % (ref 38–73)
NEUTROPHILS NFR BLD: 48.2 % (ref 38–73)
NRBC BLD-RTO: 0 /100 WBC
NT-PROBNP SERPL-MCNC: 198 PG/ML (ref 5–1800)
OHS QRS DURATION: 168 MS
OHS QRS DURATION: 172 MS
OHS QTC CALCULATION: 477 MS
OHS QTC CALCULATION: 503 MS
PHOSPHATE SERPL-MCNC: 2.8 MG/DL (ref 2.7–4.5)
PLATELET # BLD AUTO: 193 K/UL (ref 150–450)
PLATELET # BLD AUTO: 214 K/UL (ref 150–450)
PLATELET # BLD AUTO: ABNORMAL K/UL (ref 150–450)
PLATELET BLD QL SMEAR: ABNORMAL
PMV BLD AUTO: 10.4 FL (ref 9.2–12.9)
PMV BLD AUTO: 10.8 FL (ref 9.2–12.9)
PMV BLD AUTO: 11.6 FL (ref 9.2–12.9)
POTASSIUM SERPL-SCNC: 3.9 MMOL/L (ref 3.5–5.1)
POTASSIUM SERPL-SCNC: 4.1 MMOL/L (ref 3.5–5.1)
PROT SERPL-MCNC: 6.4 G/DL (ref 6–8.4)
PROT SERPL-MCNC: 6.8 G/DL (ref 6–8.4)
PROTHROMBIN TIME: 10.9 SEC (ref 9–12.5)
RBC # BLD AUTO: 4.38 M/UL (ref 4.6–6.2)
RBC # BLD AUTO: 4.46 M/UL (ref 4.6–6.2)
RBC # BLD AUTO: 4.54 M/UL (ref 4.6–6.2)
SODIUM SERPL-SCNC: 140 MMOL/L (ref 136–145)
SODIUM SERPL-SCNC: 141 MMOL/L (ref 136–145)
TROPONIN I SERPL DL<=0.01 NG/ML-MCNC: 0.38 NG/ML (ref 0–0.03)
TROPONIN I SERPL DL<=0.01 NG/ML-MCNC: 0.68 NG/ML (ref 0–0.03)
TROPONIN I SERPL-MCNC: 0.02 NG/ML (ref 0.01–0.03)
TROPONIN I SERPL-MCNC: 0.12 NG/ML (ref 0.01–0.03)
TROPONIN I SERPL-MCNC: 0.19 NG/ML (ref 0.01–0.03)
UUN UR-MCNC: 13 MG/DL (ref 2–20)
WBC # BLD AUTO: 6.6 K/UL (ref 3.9–12.7)
WBC # BLD AUTO: 6.84 K/UL (ref 3.9–12.7)
WBC # BLD AUTO: 7.86 K/UL (ref 3.9–12.7)

## 2024-07-11 PROCEDURE — 84484 ASSAY OF TROPONIN QUANT: CPT | Mod: 91,HCNC | Performed by: FAMILY MEDICINE

## 2024-07-11 PROCEDURE — 25000003 PHARM REV CODE 250: Mod: HCNC | Performed by: FAMILY MEDICINE

## 2024-07-11 PROCEDURE — 84484 ASSAY OF TROPONIN QUANT: CPT | Mod: 91,HCNC,ER | Performed by: EMERGENCY MEDICINE

## 2024-07-11 PROCEDURE — 93005 ELECTROCARDIOGRAM TRACING: CPT | Mod: HCNC,ER

## 2024-07-11 PROCEDURE — 85730 THROMBOPLASTIN TIME PARTIAL: CPT | Mod: HCNC | Performed by: FAMILY MEDICINE

## 2024-07-11 PROCEDURE — 84484 ASSAY OF TROPONIN QUANT: CPT | Mod: HCNC,ER | Performed by: STUDENT IN AN ORGANIZED HEALTH CARE EDUCATION/TRAINING PROGRAM

## 2024-07-11 PROCEDURE — 93010 ELECTROCARDIOGRAM REPORT: CPT | Mod: HCNC,,, | Performed by: INTERNAL MEDICINE

## 2024-07-11 PROCEDURE — 99285 EMERGENCY DEPT VISIT HI MDM: CPT | Mod: 25,HCNC,ER

## 2024-07-11 PROCEDURE — 85025 COMPLETE CBC W/AUTO DIFF WBC: CPT | Mod: HCNC,ER | Performed by: STUDENT IN AN ORGANIZED HEALTH CARE EDUCATION/TRAINING PROGRAM

## 2024-07-11 PROCEDURE — 25000003 PHARM REV CODE 250: Mod: HCNC,ER | Performed by: STUDENT IN AN ORGANIZED HEALTH CARE EDUCATION/TRAINING PROGRAM

## 2024-07-11 PROCEDURE — 83735 ASSAY OF MAGNESIUM: CPT | Mod: HCNC | Performed by: FAMILY MEDICINE

## 2024-07-11 PROCEDURE — 11000001 HC ACUTE MED/SURG PRIVATE ROOM: Mod: HCNC

## 2024-07-11 PROCEDURE — 63600175 PHARM REV CODE 636 W HCPCS: Mod: HCNC,ER | Performed by: STUDENT IN AN ORGANIZED HEALTH CARE EDUCATION/TRAINING PROGRAM

## 2024-07-11 PROCEDURE — 96375 TX/PRO/DX INJ NEW DRUG ADDON: CPT | Mod: HCNC,ER

## 2024-07-11 PROCEDURE — 25000003 PHARM REV CODE 250: Mod: HCNC

## 2024-07-11 PROCEDURE — 63600175 PHARM REV CODE 636 W HCPCS: Mod: HCNC | Performed by: FAMILY MEDICINE

## 2024-07-11 PROCEDURE — 63600175 PHARM REV CODE 636 W HCPCS: Mod: HCNC,ER | Performed by: EMERGENCY MEDICINE

## 2024-07-11 PROCEDURE — 85610 PROTHROMBIN TIME: CPT | Mod: HCNC | Performed by: FAMILY MEDICINE

## 2024-07-11 PROCEDURE — 96376 TX/PRO/DX INJ SAME DRUG ADON: CPT | Mod: HCNC,ER

## 2024-07-11 PROCEDURE — 96372 THER/PROPH/DIAG INJ SC/IM: CPT | Performed by: STUDENT IN AN ORGANIZED HEALTH CARE EDUCATION/TRAINING PROGRAM

## 2024-07-11 PROCEDURE — 36415 COLL VENOUS BLD VENIPUNCTURE: CPT | Mod: HCNC | Performed by: FAMILY MEDICINE

## 2024-07-11 PROCEDURE — 83880 ASSAY OF NATRIURETIC PEPTIDE: CPT | Mod: HCNC,ER | Performed by: STUDENT IN AN ORGANIZED HEALTH CARE EDUCATION/TRAINING PROGRAM

## 2024-07-11 PROCEDURE — 25000003 PHARM REV CODE 250: Mod: HCNC,ER | Performed by: EMERGENCY MEDICINE

## 2024-07-11 PROCEDURE — 84100 ASSAY OF PHOSPHORUS: CPT | Mod: HCNC | Performed by: FAMILY MEDICINE

## 2024-07-11 PROCEDURE — 85025 COMPLETE CBC W/AUTO DIFF WBC: CPT | Mod: 91,HCNC | Performed by: FAMILY MEDICINE

## 2024-07-11 PROCEDURE — 94760 N-INVAS EAR/PLS OXIMETRY 1: CPT | Mod: HCNC

## 2024-07-11 PROCEDURE — 80053 COMPREHEN METABOLIC PANEL: CPT | Mod: 91,HCNC | Performed by: FAMILY MEDICINE

## 2024-07-11 PROCEDURE — 96365 THER/PROPH/DIAG IV INF INIT: CPT

## 2024-07-11 PROCEDURE — 25000242 PHARM REV CODE 250 ALT 637 W/ HCPCS: Mod: HCNC,ER | Performed by: STUDENT IN AN ORGANIZED HEALTH CARE EDUCATION/TRAINING PROGRAM

## 2024-07-11 PROCEDURE — 80053 COMPREHEN METABOLIC PANEL: CPT | Mod: HCNC,ER | Performed by: STUDENT IN AN ORGANIZED HEALTH CARE EDUCATION/TRAINING PROGRAM

## 2024-07-11 RX ORDER — NITROGLYCERIN 0.4 MG/1
0.4 TABLET SUBLINGUAL
Status: COMPLETED | OUTPATIENT
Start: 2024-07-11 | End: 2024-07-11

## 2024-07-11 RX ORDER — HEPARIN SODIUM,PORCINE/D5W 25000/250
0-40 INTRAVENOUS SOLUTION INTRAVENOUS CONTINUOUS
Status: DISCONTINUED | OUTPATIENT
Start: 2024-07-11 | End: 2024-07-12

## 2024-07-11 RX ORDER — FINASTERIDE 5 MG/1
5 TABLET, FILM COATED ORAL DAILY
Status: DISCONTINUED | OUTPATIENT
Start: 2024-07-11 | End: 2024-07-13 | Stop reason: HOSPADM

## 2024-07-11 RX ORDER — SODIUM CHLORIDE 0.9 % (FLUSH) 0.9 %
10 SYRINGE (ML) INJECTION
Status: DISCONTINUED | OUTPATIENT
Start: 2024-07-11 | End: 2024-07-13 | Stop reason: HOSPADM

## 2024-07-11 RX ORDER — NIFEDIPINE 30 MG/1
TABLET, EXTENDED RELEASE ORAL
Status: DISPENSED
Start: 2024-07-11 | End: 2024-07-11

## 2024-07-11 RX ORDER — NIFEDIPINE 30 MG/1
30 TABLET, EXTENDED RELEASE ORAL DAILY
Status: DISCONTINUED | OUTPATIENT
Start: 2024-07-12 | End: 2024-07-13 | Stop reason: HOSPADM

## 2024-07-11 RX ORDER — LOSARTAN POTASSIUM 50 MG/1
100 TABLET ORAL DAILY
Status: DISCONTINUED | OUTPATIENT
Start: 2024-07-12 | End: 2024-07-13 | Stop reason: HOSPADM

## 2024-07-11 RX ORDER — LACTULOSE 10 G/15ML
30 SOLUTION ORAL 2 TIMES DAILY
Status: DISCONTINUED | OUTPATIENT
Start: 2024-07-11 | End: 2024-07-13 | Stop reason: HOSPADM

## 2024-07-11 RX ORDER — MORPHINE SULFATE 4 MG/ML
2 INJECTION, SOLUTION INTRAMUSCULAR; INTRAVENOUS
Status: COMPLETED | OUTPATIENT
Start: 2024-07-11 | End: 2024-07-11

## 2024-07-11 RX ORDER — HYDROXYZINE HYDROCHLORIDE 10 MG/1
10 TABLET, FILM COATED ORAL NIGHTLY PRN
Status: DISCONTINUED | OUTPATIENT
Start: 2024-07-12 | End: 2024-07-11

## 2024-07-11 RX ORDER — LABETALOL HYDROCHLORIDE 5 MG/ML
20 INJECTION, SOLUTION INTRAVENOUS
Status: COMPLETED | OUTPATIENT
Start: 2024-07-11 | End: 2024-07-11

## 2024-07-11 RX ORDER — FINASTERIDE 5 MG/1
5 TABLET, FILM COATED ORAL DAILY
Status: DISCONTINUED | OUTPATIENT
Start: 2024-07-12 | End: 2024-07-11

## 2024-07-11 RX ORDER — ASPIRIN 325 MG
325 TABLET, DELAYED RELEASE (ENTERIC COATED) ORAL EVERY OTHER DAY
Status: DISCONTINUED | OUTPATIENT
Start: 2024-07-12 | End: 2024-07-13

## 2024-07-11 RX ORDER — LABETALOL HYDROCHLORIDE 5 MG/ML
10 INJECTION, SOLUTION INTRAVENOUS
Status: COMPLETED | OUTPATIENT
Start: 2024-07-11 | End: 2024-07-11

## 2024-07-11 RX ORDER — NAPROXEN SODIUM 220 MG/1
244 TABLET, FILM COATED ORAL
Status: COMPLETED | OUTPATIENT
Start: 2024-07-11 | End: 2024-07-11

## 2024-07-11 RX ORDER — LOSARTAN POTASSIUM 25 MG/1
100 TABLET ORAL
Status: COMPLETED | OUTPATIENT
Start: 2024-07-11 | End: 2024-07-11

## 2024-07-11 RX ORDER — PRAVASTATIN SODIUM 40 MG/1
40 TABLET ORAL DAILY
Status: DISCONTINUED | OUTPATIENT
Start: 2024-07-12 | End: 2024-07-12

## 2024-07-11 RX ORDER — NITROGLYCERIN 0.4 MG/1
0.8 TABLET SUBLINGUAL
Status: DISCONTINUED | OUTPATIENT
Start: 2024-07-11 | End: 2024-07-11

## 2024-07-11 RX ORDER — ASPIRIN 81 MG/1
81 TABLET ORAL DAILY
Status: ON HOLD | COMMUNITY
End: 2024-07-13 | Stop reason: HOSPADM

## 2024-07-11 RX ORDER — POLYETHYLENE GLYCOL 3350 17 G/17G
17 POWDER, FOR SOLUTION ORAL 2 TIMES DAILY
Status: DISCONTINUED | OUTPATIENT
Start: 2024-07-11 | End: 2024-07-13 | Stop reason: HOSPADM

## 2024-07-11 RX ORDER — LABETALOL HYDROCHLORIDE 5 MG/ML
INJECTION, SOLUTION INTRAVENOUS
Status: DISPENSED
Start: 2024-07-11 | End: 2024-07-11

## 2024-07-11 RX ORDER — FLUTICASONE PROPIONATE 50 MCG
1 SPRAY, SUSPENSION (ML) NASAL DAILY
Status: DISCONTINUED | OUTPATIENT
Start: 2024-07-12 | End: 2024-07-13 | Stop reason: HOSPADM

## 2024-07-11 RX ORDER — TAMSULOSIN HYDROCHLORIDE 0.4 MG/1
1 CAPSULE ORAL DAILY
Status: DISCONTINUED | OUTPATIENT
Start: 2024-07-12 | End: 2024-07-11

## 2024-07-11 RX ORDER — ONDANSETRON HYDROCHLORIDE 2 MG/ML
4 INJECTION, SOLUTION INTRAVENOUS
Status: COMPLETED | OUTPATIENT
Start: 2024-07-11 | End: 2024-07-11

## 2024-07-11 RX ORDER — CLOPIDOGREL BISULFATE 75 MG/1
75 TABLET ORAL DAILY
Status: DISCONTINUED | OUTPATIENT
Start: 2024-07-11 | End: 2024-07-13 | Stop reason: HOSPADM

## 2024-07-11 RX ADMIN — CLOPIDOGREL BISULFATE 75 MG: 75 TABLET ORAL at 06:07

## 2024-07-11 RX ADMIN — HEPARIN SODIUM 12 UNITS/KG/HR: 10000 INJECTION, SOLUTION INTRAVENOUS at 06:07

## 2024-07-11 RX ADMIN — POLYETHYLENE GLYCOL 3350 17 G: 17 POWDER, FOR SOLUTION ORAL at 06:07

## 2024-07-11 RX ADMIN — LACTULOSE 30 G: 20 SOLUTION ORAL at 06:07

## 2024-07-11 RX ADMIN — ASPIRIN 81 MG CHEWABLE TABLET 243 MG: 81 TABLET CHEWABLE at 04:07

## 2024-07-11 RX ADMIN — LABETALOL HYDROCHLORIDE 10 MG: 5 INJECTION INTRAVENOUS at 11:07

## 2024-07-11 RX ADMIN — ONDANSETRON 4 MG: 2 INJECTION INTRAMUSCULAR; INTRAVENOUS at 04:07

## 2024-07-11 RX ADMIN — MORPHINE SULFATE 2 MG: 4 INJECTION INTRAVENOUS at 04:07

## 2024-07-11 RX ADMIN — FINASTERIDE 5 MG: 5 TABLET, FILM COATED ORAL at 11:07

## 2024-07-11 RX ADMIN — LOSARTAN POTASSIUM 100 MG: 25 TABLET, FILM COATED ORAL at 07:07

## 2024-07-11 RX ADMIN — NITROGLYCERIN 0.4 MG: 0.4 TABLET, ORALLY DISINTEGRATING SUBLINGUAL at 04:07

## 2024-07-11 RX ADMIN — LABETALOL HYDROCHLORIDE 20 MG: 5 INJECTION INTRAVENOUS at 02:07

## 2024-07-11 NOTE — SUBJECTIVE & OBJECTIVE
Past Medical History:   Diagnosis Date    Arthritis     Back pain, chronic     BPH with urinary obstruction     Chronic constipation     Closed fracture of right shoulder 1/28/2021    Closed nondisplaced fracture of greater tuberosity of right humerus 2/24/2021    Colon polyp     DJD (degenerative joint disease)     Hip    Hyperlipidemia     Hypertension     Laryngopharyngeal reflux     Left inguinal hernia     Lumbar herniated disc     Lumbar stenosis     Neck mass 10/7/2014    -4/29/2022 path - lipoma    OA (osteoarthritis) of knee     Bilateral    Paradoxical insomnia     Sinus trouble        Past Surgical History:   Procedure Laterality Date    A-V CARDIAC PACEMAKER INSERTION N/A 12/19/2023    Procedure: INSERTION, CARDIAC PACEMAKER, DUAL CHAMBER;  Surgeon: Noah Vazquez MD;  Location: [facility] CATH LAB/EP;  Service: Cardiology;  Laterality: N/A;    BACK SURGERY  2014    COLONOSCOPY      COLONOSCOPY N/A 5/17/2023    Procedure: COLONOSCOPY;  Surgeon: Christiano Vazquez MD;  Location: [facility] ENDO;  Service: Endoscopy;  Laterality: N/A;  Constipation 2 day prep.Instructions to portal.EC    CORONARY ANGIOGRAPHY N/A 12/18/2023    Procedure: ANGIOGRAM, CORONARY ARTERY;  Surgeon: Flaco Kelly MD;  Location: [facility] CATH LAB/EP;  Service: Cardiology;  Laterality: N/A;    CYSTOSCOPY WITH TRANSURETHRAL DESTRUCTION OF PROSTATE,RFA N/A 4/11/2024    Procedure: CYSTOSCOPY WITH TRANSURETHRAL DESTRUCTION OF PROSTATE,RFA  rezum generator and at least 2 handpieces Contact Paul grant to schedule;  Surgeon: Juan Ward MD;  Location: [facility] OR;  Service: Urology;  Laterality: N/A;  rezum generator and at least 2 handpieces  Contact Paul grant to schedule- Marquise notified 3/26 AM    EPIDURAL STEROID INJECTION INTO LUMBAR SPINE N/A 7/5/2022    Procedure: Injection-steroid-epidural-lumbar L3-4;  Surgeon: Yury Crum Jr., MD;  Location: [facility] PAIN MGT;  Service: Pain Management;  Laterality: N/A;  oral sedation    asa      INSERTION, PACEMAKER, TEMPORARY TRANSVENOUS  12/18/2023    Procedure: Insertion, Pacemaker, Temporary Transvenous;  Surgeon: Flaco Kelly MD;  Location: [facility] CATH LAB/EP;  Service: Cardiology;;    JOINT REPLACEMENT Left 05/04/2018    KNEE SURGERY      left hand      LEG SURGERY      SPINE SURGERY      UPPER GASTROINTESTINAL ENDOSCOPY         Review of patient's allergies indicates:   Allergen Reactions    Clindamycin Swelling     Throat swells    Lisinopril Swelling and Other (See Comments)     Throat swelling    Shellfish containing products        No current facility-administered medications on file prior to encounter.     Current Outpatient Medications on File Prior to Encounter   Medication Sig    aspirin (ECOTRIN) 81 MG EC tablet Take 81 mg by mouth once daily.    finasteride (PROSCAR) 5 mg tablet Take 1 tablet (5 mg total) by mouth once daily.    fluticasone propionate (FLONASE) 50 mcg/actuation nasal spray 1 spray (50 mcg total) by Each Nostril route once daily.    hydrOXYzine HCL (ATARAX) 10 MG Tab Take 1 tablet (10 mg total) by mouth nightly as needed (sleep).    linaCLOtide (LINZESS) 145 mcg Cap capsule Take 1 capsule (145 mcg total) by mouth daily as needed (constipation).    multivitamin capsule Take 1 capsule by mouth once daily.    NIFEdipine (PROCARDIA-XL) 30 MG (OSM) 24 hr tablet Take 1 tablet (30 mg total) by mouth once daily.    pravastatin (PRAVACHOL) 40 MG tablet Take 1 tablet (40 mg total) by mouth once daily.    losartan (COZAAR) 100 MG tablet Take 1 tablet (100 mg total) by mouth once daily. (Patient not taking: Reported on 7/11/2024)    tamsulosin (FLOMAX) 0.4 mg Cap Take 1 capsule (0.4 mg total) by mouth once daily. (Patient not taking: Reported on 7/11/2024)    [DISCONTINUED] aspirin (ECOTRIN) 325 MG EC tablet Take 1 tablet (325 mg total) by mouth once daily. (Patient taking differently: Take 325 mg by mouth every other day.)    [DISCONTINUED] azelastine (ASTELIN) 137  mcg (0.1 %) nasal spray 1 spray (137 mcg total) by Nasal route 2 (two) times daily as needed for Rhinitis.    [DISCONTINUED] methylPREDNISolone (MEDROL DOSEPACK) 4 mg tablet use as directed     Family History       Problem Relation (Age of Onset)    Cancer Father    No Known Problems Mother, Sister, Brother, Maternal Aunt, Maternal Uncle, Paternal Aunt, Paternal Uncle, Maternal Grandmother, Maternal Grandfather, Paternal Grandmother, Paternal Grandfather, Daughter, Son          Tobacco Use    Smoking status: Never    Smokeless tobacco: Never   Substance and Sexual Activity    Alcohol use: Yes     Alcohol/week: 0.0 standard drinks of alcohol     Comment: approximately 2 beers weekly    Drug use: No    Sexual activity: Yes     Partners: Female     Birth control/protection: None     Review of Systems   Constitutional:  Positive for activity change.   Eyes:  Negative for photophobia and visual disturbance.   Respiratory:  Positive for shortness of breath. Negative for chest tightness.    Cardiovascular:  Positive for chest pain and leg swelling.   Endocrine: Negative for polyphagia.   Genitourinary:  Negative for dysuria and enuresis.   Neurological:  Negative for numbness.   Psychiatric/Behavioral:  Negative for agitation and confusion.      Objective:     Vital Signs (Most Recent):  Temp: 97.4 °F (36.3 °C) (07/11/24 1623)  Pulse: 60 (07/11/24 1623)  Resp: 18 (07/11/24 1623)  BP: 135/85 (07/11/24 1623)  SpO2: 98 % (07/11/24 1623) Vital Signs (24h Range):  Temp:  [97.4 °F (36.3 °C)-98 °F (36.7 °C)] 97.4 °F (36.3 °C)  Pulse:  [59-83] 60  Resp:  [11-18] 18  SpO2:  [93 %-99 %] 98 %  BP: (135-226)/() 135/85     Weight: 106.6 kg (235 lb)  Body mass index is 32.78 kg/m².     Physical Exam  Constitutional:       Appearance: He is obese. He is not ill-appearing.   HENT:      Head: Normocephalic and atraumatic.   Eyes:      Extraocular Movements: Extraocular movements intact.      Pupils: Pupils are equal, round, and  "reactive to light.   Cardiovascular:      Rate and Rhythm: Normal rate.   Pulmonary:      Effort: Pulmonary effort is normal.      Breath sounds: No rales.   Abdominal:      General: Bowel sounds are normal.      Palpations: Abdomen is soft.   Musculoskeletal:         General: Swelling present. Normal range of motion.      Cervical back: Normal range of motion.      Right lower leg: Edema present.      Left lower leg: Edema present.      Comments: +++ bilateral pedal edema   Neurological:      General: No focal deficit present.      Mental Status: He is alert and oriented to person, place, and time.   Psychiatric:         Mood and Affect: Mood normal.         Behavior: Behavior normal.              CRANIAL NERVES     CN III, IV, VI   Pupils are equal, round, and reactive to light.       Significant Labs: A1C: No results for input(s): "HGBA1C" in the last 4320 hours.  Blood Culture: No results for input(s): "LABBLOO" in the last 48 hours.  CBC:   Recent Labs   Lab 07/11/24  0403 07/11/24  1633   WBC 7.86 6.60   HGB 13.9* 13.4*   HCT 41.8 40.9     --      CMP:   Recent Labs   Lab 07/11/24  0403 07/11/24  1633    140   K 3.9 4.1    109   CO2 24 25   * 95   BUN 13 9   CREATININE 0.73 0.8   CALCIUM 8.9 9.1   PROT 6.8 6.4   ALBUMIN 4.0 3.5   BILITOT 0.3 0.3   ALKPHOS 85 63   AST 23 19   ALT 20 16   ANIONGAP 12 6*     Coagulation: No results for input(s): "PT", "INR", "APTT" in the last 48 hours.  Lactic Acid: No results for input(s): "LACTATE" in the last 48 hours.  Lipase: No results for input(s): "LIPASE" in the last 48 hours.  Lipid Panel: No results for input(s): "CHOL", "HDL", "LDLCALC", "TRIG", "CHOLHDL" in the last 48 hours.  Magnesium:   Recent Labs   Lab 07/11/24  1633   MG 2.1     Troponin:   Recent Labs   Lab 07/11/24  0654 07/11/24  1115 07/11/24  1633   TROPONINI 0.119* 0.194* 0.382*     TSH:   Recent Labs   Lab 07/09/24  1711   TSH 1.626     Urine Culture: No results for input(s): " ""LABURIN" in the last 48 hours.  Urine Studies: No results for input(s): "COLORU", "APPEARANCEUA", "PHUR", "SPECGRAV", "PROTEINUA", "GLUCUA", "KETONESU", "BILIRUBINUA", "OCCULTUA", "NITRITE", "UROBILINOGEN", "LEUKOCYTESUR", "RBCUA", "WBCUA", "BACTERIA", "SQUAMEPITHEL", "HYALINECASTS" in the last 48 hours.    Invalid input(s): "WRIGHTSUR"    Significant Imaging: I have reviewed all pertinent imaging results/findings within the past 24 hours.  "

## 2024-07-11 NOTE — ED NOTES
Nifedipine 30mg PO given during downtime per written order per Dr. Farris. Unable to order medication at this time.

## 2024-07-11 NOTE — H&P
[facility] Medicine  History & Physical    Patient Name: Korey Santos  MRN: [id number]  Patient Class: OP- Observation  Admission Date: 7/11/2024  Attending Physician: Daisy Calvin MD  Primary Care Provider: Juan Bustamante MD         Patient information was obtained from patient and ER records.     Subjective:     Principal Problem:Chest pain    Chief Complaint:   Chief Complaint   Patient presents with    Chest Pain     PT to the ED with C/O of mid sternal chest pain without radiation approx 3hrs PTA. PT states he took new medication for sleep and thinks this is causing the anxiousness and chest pain. No SOB reported. No nausea/vomiting.        HPI: Tom is an 84 y/o M with past medical history of, hypertension, hyperlipidemia, DJD, insomnia, constipation, BPH, chronic back pain, GERD, presents with few hours history of midsternal chest pain that started at about 12 midnight.  Pain is about 7/10, radiates to the jaw.  There is associated headaches, leg swelling, and shortness of breath, chest discomfort.  He denies fever, chills, nausea, vomiting, diaphoresis, dysuria, syncope, weakness.   Sodium 140, potassium 4.1, BUN/creatinine 9/0.8, BNP 45, H/H 13.4/40.9, WBC 6.6, platelet 214, troponin 0.020, repeat was at 0.119 end up trending, chest x-ray with no acute chest finding, EKG with no ST changes.  Patient received morphine aspirin and nitroglycerin without any change in pain.  Admit hospital medicine in consult cardiology    Past Medical History:   Diagnosis Date    Arthritis     Back pain, chronic     BPH with urinary obstruction     Chronic constipation     Closed fracture of right shoulder 1/28/2021    Closed nondisplaced fracture of greater tuberosity of right humerus 2/24/2021    Colon polyp     DJD (degenerative joint disease)     Hip    Hyperlipidemia     Hypertension     Laryngopharyngeal reflux     Left inguinal hernia     Lumbar herniated disc     Lumbar  stenosis     Neck mass 10/7/2014    -4/29/2022 path - lipoma    OA (osteoarthritis) of knee     Bilateral    Paradoxical insomnia     Sinus trouble        Past Surgical History:   Procedure Laterality Date    A-V CARDIAC PACEMAKER INSERTION N/A 12/19/2023    Procedure: INSERTION, CARDIAC PACEMAKER, DUAL CHAMBER;  Surgeon: Noah Vazquez MD;  Location: [facility] CATH LAB/EP;  Service: Cardiology;  Laterality: N/A;    BACK SURGERY  2014    COLONOSCOPY      COLONOSCOPY N/A 5/17/2023    Procedure: COLONOSCOPY;  Surgeon: Christiano Vazquez MD;  Location: [facility] ENDO;  Service: Endoscopy;  Laterality: N/A;  Constipation 2 day prep.Instructions to portal.EC    CORONARY ANGIOGRAPHY N/A 12/18/2023    Procedure: ANGIOGRAM, CORONARY ARTERY;  Surgeon: Flaco Kelly MD;  Location: [facility] CATH LAB/EP;  Service: Cardiology;  Laterality: N/A;    CYSTOSCOPY WITH TRANSURETHRAL DESTRUCTION OF PROSTATE,RFA N/A 4/11/2024    Procedure: CYSTOSCOPY WITH TRANSURETHRAL DESTRUCTION OF PROSTATE,RFA  rezum generator and at least 2 handpieces Contact Paul grant to schedule;  Surgeon: Juan Ward MD;  Location: [facility] OR;  Service: Urology;  Laterality: N/A;  rezum generator and at least 2 handpieces  Contact Paul grant to schedule- Marquise notified 3/26 AM    EPIDURAL STEROID INJECTION INTO LUMBAR SPINE N/A 7/5/2022    Procedure: Injection-steroid-epidural-lumbar L3-4;  Surgeon: Yury Crum Jr., MD;  Location: [facility] PAIN MGT;  Service: Pain Management;  Laterality: N/A;  oral sedation   asa      INSERTION, PACEMAKER, TEMPORARY TRANSVENOUS  12/18/2023    Procedure: Insertion, Pacemaker, Temporary Transvenous;  Surgeon: Flaco Kelly MD;  Location: [facility] CATH LAB/EP;  Service: Cardiology;;    JOINT REPLACEMENT Left 05/04/2018    KNEE SURGERY      left hand      LEG SURGERY      SPINE SURGERY      UPPER GASTROINTESTINAL ENDOSCOPY         Review of patient's allergies indicates:   Allergen Reactions    Clindamycin  Swelling     Throat swells    Lisinopril Swelling and Other (See Comments)     Throat swelling    Shellfish containing products        No current facility-administered medications on file prior to encounter.     Current Outpatient Medications on File Prior to Encounter   Medication Sig    aspirin (ECOTRIN) 81 MG EC tablet Take 81 mg by mouth once daily.    finasteride (PROSCAR) 5 mg tablet Take 1 tablet (5 mg total) by mouth once daily.    fluticasone propionate (FLONASE) 50 mcg/actuation nasal spray 1 spray (50 mcg total) by Each Nostril route once daily.    hydrOXYzine HCL (ATARAX) 10 MG Tab Take 1 tablet (10 mg total) by mouth nightly as needed (sleep).    linaCLOtide (LINZESS) 145 mcg Cap capsule Take 1 capsule (145 mcg total) by mouth daily as needed (constipation).    multivitamin capsule Take 1 capsule by mouth once daily.    NIFEdipine (PROCARDIA-XL) 30 MG (OSM) 24 hr tablet Take 1 tablet (30 mg total) by mouth once daily.    pravastatin (PRAVACHOL) 40 MG tablet Take 1 tablet (40 mg total) by mouth once daily.    losartan (COZAAR) 100 MG tablet Take 1 tablet (100 mg total) by mouth once daily. (Patient not taking: Reported on 7/11/2024)    tamsulosin (FLOMAX) 0.4 mg Cap Take 1 capsule (0.4 mg total) by mouth once daily. (Patient not taking: Reported on 7/11/2024)    [DISCONTINUED] aspirin (ECOTRIN) 325 MG EC tablet Take 1 tablet (325 mg total) by mouth once daily. (Patient taking differently: Take 325 mg by mouth every other day.)    [DISCONTINUED] azelastine (ASTELIN) 137 mcg (0.1 %) nasal spray 1 spray (137 mcg total) by Nasal route 2 (two) times daily as needed for Rhinitis.    [DISCONTINUED] methylPREDNISolone (MEDROL DOSEPACK) 4 mg tablet use as directed     Family History       Problem Relation (Age of Onset)    Cancer Father    No Known Problems Mother, Sister, Brother, Maternal Aunt, Maternal Uncle, Paternal Aunt, Paternal Uncle, Maternal Grandmother, Maternal Grandfather, Paternal Grandmother,  Paternal Grandfather, Daughter, Son          Tobacco Use    Smoking status: Never    Smokeless tobacco: Never   Substance and Sexual Activity    Alcohol use: Yes     Alcohol/week: 0.0 standard drinks of alcohol     Comment: approximately 2 beers weekly    Drug use: No    Sexual activity: Yes     Partners: Female     Birth control/protection: None     Review of Systems   Constitutional:  Positive for activity change.   Eyes:  Negative for photophobia and visual disturbance.   Respiratory:  Positive for shortness of breath. Negative for chest tightness.    Cardiovascular:  Positive for chest pain and leg swelling.   Endocrine: Negative for polyphagia.   Genitourinary:  Negative for dysuria and enuresis.   Neurological:  Negative for numbness.   Psychiatric/Behavioral:  Negative for agitation and confusion.      Objective:     Vital Signs (Most Recent):  Temp: 97.4 °F (36.3 °C) (07/11/24 1623)  Pulse: 60 (07/11/24 1623)  Resp: 18 (07/11/24 1623)  BP: 135/85 (07/11/24 1623)  SpO2: 98 % (07/11/24 1623) Vital Signs (24h Range):  Temp:  [97.4 °F (36.3 °C)-98 °F (36.7 °C)] 97.4 °F (36.3 °C)  Pulse:  [59-83] 60  Resp:  [11-18] 18  SpO2:  [93 %-99 %] 98 %  BP: (135-226)/() 135/85     Weight: 106.6 kg (235 lb)  Body mass index is 32.78 kg/m².     Physical Exam  Constitutional:       Appearance: He is obese. He is not ill-appearing.   HENT:      Head: Normocephalic and atraumatic.   Eyes:      Extraocular Movements: Extraocular movements intact.      Pupils: Pupils are equal, round, and reactive to light.   Cardiovascular:      Rate and Rhythm: Normal rate.   Pulmonary:      Effort: Pulmonary effort is normal.      Breath sounds: No rales.   Abdominal:      General: Bowel sounds are normal.      Palpations: Abdomen is soft.   Musculoskeletal:         General: Swelling present. Normal range of motion.      Cervical back: Normal range of motion.      Right lower leg: Edema present.      Left lower leg: Edema present.       "Comments: +++ bilateral pedal edema   Neurological:      General: No focal deficit present.      Mental Status: He is alert and oriented to person, place, and time.   Psychiatric:         Mood and Affect: Mood normal.         Behavior: Behavior normal.              CRANIAL NERVES     CN III, IV, VI   Pupils are equal, round, and reactive to light.       Significant Labs: A1C: No results for input(s): "HGBA1C" in the last 4320 hours.  Blood Culture: No results for input(s): "LABBLOO" in the last 48 hours.  CBC:   Recent Labs   Lab 07/11/24  0403 07/11/24  1633   WBC 7.86 6.60   HGB 13.9* 13.4*   HCT 41.8 40.9     --      CMP:   Recent Labs   Lab 07/11/24  0403 07/11/24  1633    140   K 3.9 4.1    109   CO2 24 25   * 95   BUN 13 9   CREATININE 0.73 0.8   CALCIUM 8.9 9.1   PROT 6.8 6.4   ALBUMIN 4.0 3.5   BILITOT 0.3 0.3   ALKPHOS 85 63   AST 23 19   ALT 20 16   ANIONGAP 12 6*     Coagulation: No results for input(s): "PT", "INR", "APTT" in the last 48 hours.  Lactic Acid: No results for input(s): "LACTATE" in the last 48 hours.  Lipase: No results for input(s): "LIPASE" in the last 48 hours.  Lipid Panel: No results for input(s): "CHOL", "HDL", "LDLCALC", "TRIG", "CHOLHDL" in the last 48 hours.  Magnesium:   Recent Labs   Lab 07/11/24  1633   MG 2.1     Troponin:   Recent Labs   Lab 07/11/24  0654 07/11/24  1115 07/11/24  1633   TROPONINI 0.119* 0.194* 0.382*     TSH:   Recent Labs   Lab 07/09/24  1711   TSH 1.626     Urine Culture: No results for input(s): "LABURIN" in the last 48 hours.  Urine Studies: No results for input(s): "COLORU", "APPEARANCEUA", "PHUR", "SPECGRAV", "PROTEINUA", "GLUCUA", "KETONESU", "BILIRUBINUA", "OCCULTUA", "NITRITE", "UROBILINOGEN", "LEUKOCYTESUR", "RBCUA", "WBCUA", "BACTERIA", "SQUAMEPITHEL", "HYALINECASTS" in the last 48 hours.    Invalid input(s): "WRIGHTSUR"    Significant Imaging: I have reviewed all pertinent imaging results/findings within the past 24 " hours.  Assessment/Plan:     * Chest pain  Coronary artery disease involving native coronary artery without angina pectoris  History of recent LHC from 12/18/23 in setting of CHF, LM patent, pLAD 40%, LCX patent, RCA patent.   Troponin 0.020 > 0.119  Chest x-ray   TTE   Continue aspirin  Morphine p.r.n.  Add Plavix  Strict input output  Heparin GTT  Nitro p.r.n.   Supplemental oxygen  Consult cardiology      Pacemaker  Chronic      Constipation    Chronic   On Linzess at home   MiraLax and lactulose    Hypertension, essential  Chronic, controlled. Latest blood pressure and vitals reviewed-     Temp:  [97.4 °F (36.3 °C)-98.3 °F (36.8 °C)]   Pulse:  [59-83]   Resp:  [11-19]   BP: (135-226)/()   SpO2:  [93 %-99 %] .   Home meds for hypertension were reviewed and noted below.   Hypertension Medications               NIFEdipine (PROCARDIA-XL) 30 MG (OSM) 24 hr tablet Take 1 tablet (30 mg total) by mouth once daily.    losartan (COZAAR) 100 MG tablet Take 1 tablet (100 mg total) by mouth once daily.            While in the hospital, will manage blood pressure as follows; Continue home antihypertensive regimen    Will utilize p.r.n. blood pressure medication only if patient's blood pressure greater than 140/90 and he develops symptoms such as worsening chest pain or shortness of breath.    Hyperlipidemia  Continue statin        VTE Risk Mitigation (From admission, onward)           Ordered     heparin 25,000 units in dextrose 5% (100 units/ml) IV bolus from bag LOW INTENSITY nomogram - [facility]  As needed (PRN)        Question:  Heparin Infusion Adjustment (DO NOT MODIFY ANSWER)  Answer:  [email or web address]\epic\Images\Pharmacy\HeparinInfusions\heparin LOW INTENSITY nomogram for [facility] [id number].pdf    07/11/24 8619     heparin 25,000 units in dextrose 5% (100 units/ml) IV bolus from bag LOW INTENSITY nomogram - [facility]  As needed (PRN)        Question:  Heparin Infusion Adjustment (DO NOT MODIFY ANSWER)  Answer:   [email or web address]\epic\Images\Pharmacy\HeparinInfusions\heparin LOW INTENSITY nomogram for [facility] [id number].pdf    07/11/24 1718     heparin 25,000 units in dextrose 5% (100 units/ml) IV bolus from bag LOW INTENSITY nomogram - [facility]  Once        Question:  Heparin Infusion Adjustment (DO NOT MODIFY ANSWER)  Answer:  [email or web address]\epic\Images\Pharmacy\HeparinInfusions\heparin LOW INTENSITY nomogram for [facility] [id number].pdf    07/11/24 1718     heparin 25,000 units in dextrose 5% 250 mL (100 units/mL) infusion LOW INTENSITY nomogram - [facility]  Continuous        Question:  Begin at (units/kg/hr)  Answer:  12 07/11/24 1718                       On 07/11/2024, patient should be placed in hospital observation services under my care.        VIRTUAL NURSE:  Cued into patient's room.  Permission received per patient to turn camera to view patient.  Introduced as VN that will be working with floor nurse and nursing assistant.  Educated patient on VN's role in patient care and  VIP model.  Plan of care reviewed with patient.  Education per flowsheet.   Informed patient that staff will round on them every 2 hours but to use call light for any other needs they may have; informed of fall risk and fall precautions.  Patient verbalized understanding.  Call light within reach; bed siderails up x3.  Opportunity given for questions and questions answered.  Admission assessment questions answered.  Patient denies complaints or any needs at this time. Instructed to call for assistance.  Will cont to monitor and intervene as needed.    Labs, notes, orders, and careplan reviewed.        07/11/24 1638   Admission   Initial VN Admission Questions Complete   Communication Issues? None   Shift   Virtual Nurse - Rounding Complete   Pain Management Interventions quiet environment facilitated;relaxation techniques promoted   Virtual Nurse - Patient Verbalized Approval Of VN Rounding;Camera Use   Type of Frequent Check   Type Patient Rounds   Safety/Activity    Patient Rounds visualized patient;placement of personal items at bedside;bed in low position;bed wheels locked;call light in patient/parent reach;clutter free environment maintained;ID band on   Activity Assistance Provided independent   Positioning   Body Position position changed independently   Head of Bed (HOB) Positioning HOB at 60-90 degrees         Daisy Calvin MD  Department of Hospital Medicine  [place] - [facility]

## 2024-07-11 NOTE — PROVIDER PROGRESS NOTES - EMERGENCY DEPT.
Encounter Date: 7/11/2024    ED Physician Progress Notes            83-year-old male signed out pending repeat troponin after presenting with midsternal chest pain.  Repeat troponin elevated from 0.02 to 0.119.  Repeat ECG shows atrial paced rhythm w/o acute ischemic changes. On reassessment, patient says his chest pain has resolved.  Was already given aspirin overnight.  Ordered his home losartan, nifedipine is not available at [facility]. Reviewed recent LHC from 12/18/23 in setting of CHF, LM patent, pLAD 40%, LCX patent, RCA patent. Discussed with Dr. Innocent, [facility]  and admitted for further workup and management of chest pain.     After pt admitted while awaiting transfer, was able to administer his home oral nifedipine via cabinet override/verbal order. Also required administration of IV labetalol for BP in the 200's systolic with improvement.     Critical care time spent on the evaluation and treatment of severe organ dysfunction, review of pertinent labs and imaging studies, discussions with consulting providers and discussions with patient/family: 30 minutes.

## 2024-07-11 NOTE — Clinical Note
250 ml of contrast were injected throughout the case. 100 mL of contrast was the total wasted during the case. 350 mL was the total amount used during the case.

## 2024-07-11 NOTE — ED PROVIDER NOTES
Encounter Date: 7/11/2024       History     Chief Complaint   Patient presents with    Chest Pain     PT to the ED with C/O of mid sternal chest pain without radiation approx 3hrs PTA. PT states he took new medication for sleep and thinks this is causing the anxiousness and chest pain. No SOB reported. No nausea/vomiting.     83-year-old male presents with midsternal chest pain for the last 3 hours.  He associates anxiousness that he thinks is due to initiating Atarax for insomnia.  He says he was given this prescription rather recently and this was the 1st time he is tried it.  No nausea, vomiting or diarrhea.  No radiation of pain or diaphoresis.  No trauma or falls.  No numbness or paresthesias.  At this time he does endorse that he feels anxiety.      Review of patient's allergies indicates:   Allergen Reactions    Clindamycin Swelling     Throat swells    Lisinopril Swelling and Other (See Comments)     Throat swelling    Shellfish containing products      Past Medical History:   Diagnosis Date    Arthritis     Back pain, chronic     BPH with urinary obstruction     Chronic constipation     Closed fracture of right shoulder 1/28/2021    Closed nondisplaced fracture of greater tuberosity of right humerus 2/24/2021    Colon polyp     DJD (degenerative joint disease)     Hip    Hyperlipidemia     Hypertension     Laryngopharyngeal reflux     Left inguinal hernia     Lumbar herniated disc     Lumbar stenosis     Neck mass 10/7/2014    -4/29/2022 path - lipoma    OA (osteoarthritis) of knee     Bilateral    Paradoxical insomnia     Sinus trouble      Past Surgical History:   Procedure Laterality Date    A-V CARDIAC PACEMAKER INSERTION N/A 12/19/2023    Procedure: INSERTION, CARDIAC PACEMAKER, DUAL CHAMBER;  Surgeon: Noah Vazquez MD;  Location: [facility] CATH LAB/EP;  Service: Cardiology;  Laterality: N/A;    BACK SURGERY  2014    COLONOSCOPY      COLONOSCOPY N/A 5/17/2023    Procedure: COLONOSCOPY;  Surgeon:  Christiano Vazquez MD;  Location: [facility] ENDO;  Service: Endoscopy;  Laterality: N/A;  Constipation 2 day prep.Instructions to portal.EC    CORONARY ANGIOGRAPHY N/A 12/18/2023    Procedure: ANGIOGRAM, CORONARY ARTERY;  Surgeon: Flaco Kelly MD;  Location: [facility] CATH LAB/EP;  Service: Cardiology;  Laterality: N/A;    CYSTOSCOPY WITH TRANSURETHRAL DESTRUCTION OF PROSTATE,RFA N/A 4/11/2024    Procedure: CYSTOSCOPY WITH TRANSURETHRAL DESTRUCTION OF PROSTATE,RFA  rezum generator and at least 2 handpieces Contact Paul grant to schedule;  Surgeon: Juan Ward MD;  Location: [facility] OR;  Service: Urology;  Laterality: N/A;  rezum generator and at least 2 handpieces  Contact Paul grant to schedule- Marquise notified 3/26 AM    EPIDURAL STEROID INJECTION INTO LUMBAR SPINE N/A 7/5/2022    Procedure: Injection-steroid-epidural-lumbar L3-4;  Surgeon: Yury Crum Jr., MD;  Location: [facility] PAIN MGT;  Service: Pain Management;  Laterality: N/A;  oral sedation   asa      INSERTION, PACEMAKER, TEMPORARY TRANSVENOUS  12/18/2023    Procedure: Insertion, Pacemaker, Temporary Transvenous;  Surgeon: Flaco Kelly MD;  Location: [facility] CATH LAB/EP;  Service: Cardiology;;    JOINT REPLACEMENT Left 05/04/2018    KNEE SURGERY      left hand      LEG SURGERY      SPINE SURGERY      UPPER GASTROINTESTINAL ENDOSCOPY       Family History   Problem Relation Name Age of Onset    Cancer Father          lung or smoking    No Known Problems Mother      No Known Problems Sister none     No Known Problems Brother none     No Known Problems Maternal Aunt      No Known Problems Maternal Uncle      No Known Problems Paternal Aunt      No Known Problems Paternal Uncle      No Known Problems Maternal Grandmother      No Known Problems Maternal Grandfather      No Known Problems Paternal Grandmother      No Known Problems Paternal Grandfather      No Known Problems Daughter      No Known Problems Son      Glaucoma Neg Hx      Diabetes  Neg Hx      Amblyopia Neg Hx      Blindness Neg Hx      Cataracts Neg Hx      Hypertension Neg Hx      Macular degeneration Neg Hx      Retinal detachment Neg Hx      Strabismus Neg Hx      Stroke Neg Hx      Thyroid disease Neg Hx      Colon cancer Neg Hx      Esophageal cancer Neg Hx       Social History     Tobacco Use    Smoking status: Never    Smokeless tobacco: Never   Substance Use Topics    Alcohol use: Yes     Alcohol/week: 0.0 standard drinks of alcohol     Comment: approximately 2 beers weekly    Drug use: No     Review of Systems    Physical Exam     Initial Vitals [07/11/24 0346]   BP Pulse Resp Temp SpO2   (!) 145/91 83 17 97.8 °F (36.6 °C) 98 %      MAP       --         Physical Exam    Nursing note and vitals reviewed.  Constitutional:   Anxious, phonating   HENT:   Head: Normocephalic and atraumatic.   Eyes: EOM are normal. Pupils are equal, round, and reactive to light.   Neck: Neck supple. No JVD present.   Normal range of motion.  Cardiovascular:  Normal rate and regular rhythm.           Pulmonary/Chest: Breath sounds normal. No stridor. No respiratory distress.   Abdominal: Abdomen is soft. There is no abdominal tenderness.   Musculoskeletal:         General: No tenderness or edema. Normal range of motion.      Cervical back: Normal range of motion and neck supple.     Neurological: He is alert and oriented to person, place, and time. GCS score is 15. GCS eye subscore is 4. GCS verbal subscore is 5. GCS motor subscore is 6.   Skin: Skin is warm and dry. Capillary refill takes less than 2 seconds.   Psychiatric: He has a normal mood and affect. Thought content normal.         ED Course   Procedures  Labs Reviewed   CBC W/ AUTO DIFFERENTIAL - Abnormal; Notable for the following components:       Result Value    RBC 4.54 (*)     Hemoglobin 13.9 (*)     All other components within normal limits   COMPREHENSIVE METABOLIC PANEL - Abnormal; Notable for the following components:    Glucose 138 (*)      All other components within normal limits   TROPONIN I   NT-PRO NATRIURETIC PEPTIDE   NT-PRO NATRIURETIC PEPTIDE   TROPONIN I          Imaging Results              X-Ray Chest AP Portable (In process)                      Medications   nitroGLYCERIN SL tablet 0.4 mg (0.4 mg Sublingual Given 7/11/24 0404)   aspirin chewable tablet 243 mg (243 mg Oral Given 7/11/24 0402)   morphine injection 2 mg (2 mg Intravenous Given 7/11/24 0424)   ondansetron injection 4 mg (4 mg Intramuscular Given 7/11/24 0426)     Medical Decision Making  Hemodynamically stable. Afebrile. Phonating and protecting the airway spontaneously. No clinical evidence for cardiovascular instability or impending airway compromise. Examination as above. Prior medical records reviewed.  Recent family medicine note reviewed as per ED course. Current co-morbidities considered that will impact clinical decision making include as above.    Plan:  Patient was high risk cardiac patient therefore will obtain 2 troponins.  Will consider admission based upon clinical workup.  Unclear if this has an adverse reaction to hydroxyzine which may result in the paradoxical effect given his age.  Will provide 1 sublingual nitroglycerin in his assess improvement of his chest discomfort.  If no improvement, will attempt anxiolysis gently.      Amount and/or Complexity of Data Reviewed  Labs: ordered.  Radiology: ordered.    Risk  OTC drugs.  Prescription drug management.               ED Course as of 07/11/24 0508   Thu Jul 11, 2024   1707 Recent family medicine note reviewed.  Patient was started on hydroxyzine for insomnia.  He was evaluated for edema in obtained laboratory studies and 9th of this month, all of these were reviewed. [BG]   0400 Twelve lead EKG per my independent interpretation revealed ventricularly paced rhythm at a rate of 74.  No regional ST segment elevation. [BG]   0450 Patient reassessed. Resting comfortably and in no acute distress. Discussed the  findings that have been obtained as of this time. All questions answered. Will continue to monitor.   I offered the patient admission to [facility] given his clinical presentation, he would refer to not be transferred until 2nd set of labs result. [BG]   0456 Patient says it morphine and aspirin and nitroglycerin did not have any effect in his pain.  He says that his pain was worse at his house but then it was began to spontaneously improve just by being in the emergency department. [BG]      ED Course User Index  [BG] Parth Hernandez MD        Repeat troponin ordered for 7:00 a.m..  Patient will be signed out the change of shift to oncoming physician for final disposition.                   Clinical Impression:  Final diagnoses:  [R07.9] Chest pain                 Parth Hernandez MD  07/11/24 0502

## 2024-07-11 NOTE — DISCHARGE INSTRUCTIONS
Discharge Instructions:     Do not drive a car, operate heavy equipment, care for a young child, etc for the next 12-24 hours.   Avoid drinking alcohol for 24 hours.  Do not make any important decisions for 24 hours.     Drink fluids to keep hydrated. Resume your usual diet as tolerated.      Rest for today then activity as tolerated.   Do not lift anything over 5 pounds for the first 3 days after procedure.     Remove dressing tomorrow, 7/13/24 after 2pm and then you may shower with warm soapy water. Do not scrub site. Pat dry.   May apply bandaid for 2 days.  No tub baths.  Do not submerge wound in water for 3 days.      Call MD for any unrelieved pain, excessive nausea or vomiting, redness around site, bleeding, or pus or foul smelling drainage, or any other questions or concerns.     Go to the ER for any difficulty breathing or chest pain.        If site swells or bleeds, hold direct pressure to area for 10 full minutes.   If site continues to bleed, continue to hold pressure to site and have someone bring you to the ER.       Follow any additional instructions given to you by MD.      Call MD to schedule a follow up appointment, or follow up as instructed.         Last Modified by Kesha Huang RN at 6/1/22 3423   Thank you for choosing [facility]! We appreciate you trusting us with your medical care.     It is important to remember that some problems are difficult to diagnose and may not be found during your first visit. Be sure to follow up with your primary care doctor and review any labs/imaging that was performed during your visit with them. If you do not have a primary care doctor, you may contact the one listed on your discharge paperwork, or you may also call the [facility] Appointment Desk at [phone] to schedule an appointment.     All medications may potentially have side effects and it is impossible to predict which medications may give you side effects. If you feel that  you are having a negative effect of any medication you should immediately stop taking them and seek medical attention. Do not drive or make any important decisions for 24 hours if you have received any pain medications, sedatives or mood altering drugs during your ER visit.    We will be happy to take care of you for all of your future medical needs. You may return to the ER at any time for any new/concerning symptoms, worsening condition, or failure to improve. We hope you feel better soon.     Parth Hernandez MD  Emergency Medicine Physician

## 2024-07-11 NOTE — ED NOTES
"Assisted pt up to restroom, instructed on safety call light in restroom. Pt denies any CP or SOB at present. Pt noted with +2 pitting edema to BLE, pt states "it's been like that and they are doing test."   "

## 2024-07-11 NOTE — ED NOTES
Pt provided with sandwich tray and juice per MD baum. Returned to room and reconnected to CM and pulse ox.  Pt continues to deny CP or SOB.

## 2024-07-11 NOTE — ASSESSMENT & PLAN NOTE
Coronary artery disease involving native coronary artery without angina pectoris  History of recent LHC from 12/18/23 in setting of CHF, LM patent, pLAD 40%, LCX patent, RCA patent.   Troponin 0.020 > 0.119  Chest x-ray   TTE   Continue aspirin  Morphine p.r.n.  Add Plavix  Strict input output  Heparin GTT  Nitro p.r.n.   Supplemental oxygen  Consult cardiology

## 2024-07-11 NOTE — HPI
Tom is an 82 y/o M with past medical history of, hypertension, hyperlipidemia, DJD, insomnia, constipation, BPH, chronic back pain, GERD, presents with few hours history of midsternal chest pain that started at about 12 midnight.  Pain is about 7/10, radiates to the jaw.  There is associated headaches, leg swelling, and shortness of breath, chest discomfort.  He denies fever, chills, nausea, vomiting, diaphoresis, dysuria, syncope, weakness.   Sodium 140, potassium 4.1, BUN/creatinine 9/0.8, BNP 45, H/H 13.4/40.9, WBC 6.6, platelet 214, troponin 0.020, repeat was at 0.119 end up trending, chest x-ray with no acute chest finding, EKG with no ST changes.  Patient received morphine aspirin and nitroglycerin without any change in pain.  Admit hospital medicine in consult cardiology

## 2024-07-11 NOTE — PROGRESS NOTES
VIRTUAL NURSE:  Cued into patient's room.  Permission received per patient to turn camera to view patient.  Introduced as VN that will be working with floor nurse and nursing assistant.  Educated patient on VN's role in patient care and  [facility] model.  Plan of care reviewed with patient.  Education per flowsheet.   Informed patient that staff will round on them every 2 hours but to use call light for any other needs they may have; informed of fall risk and fall precautions.  Patient verbalized understanding.  Call light within reach; bed siderails up x3.  Opportunity given for questions and questions answered.  Admission assessment questions answered.  Patient denies complaints or any needs at this time. Instructed to call for assistance.  Will cont to monitor and intervene as needed.    Labs, notes, orders, and careplan reviewed.        07/11/24 1638   Admission   Initial VN Admission Questions Complete   Communication Issues? None   Shift   Virtual Nurse - Rounding Complete   Pain Management Interventions quiet environment facilitated;relaxation techniques promoted   Virtual Nurse - Patient Verbalized Approval Of VN Rounding;Camera Use   Type of Frequent Check   Type Patient Rounds   Safety/Activity   Patient Rounds visualized patient;placement of personal items at bedside;bed in low position;bed wheels locked;call light in patient/parent reach;clutter free environment maintained;ID band on   Activity Assistance Provided independent   Positioning   Body Position position changed independently   Head of Bed (HOB) Positioning HOB at 60-90 degrees

## 2024-07-11 NOTE — Clinical Note
The catheter was inserted into the and was removed from the proximal   left anterior descending. IVUS performed

## 2024-07-11 NOTE — Clinical Note
The catheter was inserted into the and was removed from the ostium   right coronary artery. An angiography was performed of the right coronary arteries. Multiple views were taken. The angiography was performed via hand injection with 20 mL of contrast.

## 2024-07-11 NOTE — ED NOTES
Pt reports no change in pain after first dose of nitro.  Pain 4/10  BP as documented  MD notified

## 2024-07-11 NOTE — ED NOTES
"Pt reports mid sternal chest pain starting approx 1 hr after taking fist dose of hydroxyzine at approx midnight.   Pt describes pain as "pressure"  Took one dose of Asprin 81 mg pta.  Reports pain to "jaw and teeth".   "

## 2024-07-11 NOTE — PHARMACY MED REC
"  [facility]           Pharmacy  Admission Medication History     The home medication history was taken by Noemi Josue.      Medication history obtained from Medications listed below were obtained from: Patient/family    Based on information gathered for medication list, you may go to "Admission" then "Reconcile Home Medications" tabs to review and/or act upon those items.     The home medication list has been updated by the Pharmacy department.   Please read ALL comments highlighted in yellow.   Please address this information as you see fit.    Feel free to contact us if you have any questions or require assistance.    The medications listed below were removed from the home medication list.  Please reorder if appropriate:    Patient reports NOT TAKING the following medication(s):  Medrol dosepack 4mg     aspirin (ECOTRIN) 81 MG EC tablet Take 81 mg by mouth once daily.      finasteride (PROSCAR) 5 mg tablet Take 1 tablet (5 mg total) by mouth once daily. 90 tablet 3    fluticasone propionate (FLONASE) 50 mcg/actuation nasal spray 1 spray (50 mcg total) by Each Nostril route once daily. 16 g 6    hydrOXYzine HCL (ATARAX) 10 MG Tab Take 1 tablet (10 mg total) by mouth nightly as needed (sleep). 30 tablet 0    linaCLOtide (LINZESS) 145 mcg Cap capsule Take 1 capsule (145 mcg total) by mouth daily as needed (constipation). 90 capsule 3    multivitamin capsule Take 1 capsule by mouth once daily.      NIFEdipine (PROCARDIA-XL) 30 MG (OSM) 24 hr tablet Take 1 tablet (30 mg total) by mouth once daily. 90 tablet 0    pravastatin (PRAVACHOL) 40 MG tablet Take 1 tablet (40 mg total) by mouth once daily. 90 tablet 3       Please address this information as you see fit.  Feel free to contact us if you have any questions or require assistance.    Noemi Josue  [phone]                .          "

## 2024-07-11 NOTE — Clinical Note
The catheter was inserted into the and was removed from the left ventricle. Hemodynamics were performed.  and Pullback was recorded.  The angiography was performed via power injection. The injected amount was 30 mL contrast at 10 mL/s. The PSI from the power injection was 900.

## 2024-07-11 NOTE — Clinical Note
The catheter was inserted into the and was removed from the distal   first diagonal artery. IVUS performed

## 2024-07-11 NOTE — ED NOTES
"Pt reports improvement of chest pain of "heaviness" to "soreness".   Pain 4/10. Now reporting initial pain was 6-7/10  "

## 2024-07-11 NOTE — Clinical Note
The catheter was inserted into the and was removed from the ostium   left main. An angiography was performed of the left coronary arteries. Multiple views were taken. The angiography was performed via hand injection with 20 mL of contrast.

## 2024-07-11 NOTE — ASSESSMENT & PLAN NOTE
Chronic, controlled. Latest blood pressure and vitals reviewed-     Temp:  [97.4 °F (36.3 °C)-98.3 °F (36.8 °C)]   Pulse:  [59-83]   Resp:  [11-19]   BP: (135-226)/()   SpO2:  [93 %-99 %] .   Home meds for hypertension were reviewed and noted below.   Hypertension Medications               NIFEdipine (PROCARDIA-XL) 30 MG (OSM) 24 hr tablet Take 1 tablet (30 mg total) by mouth once daily.    losartan (COZAAR) 100 MG tablet Take 1 tablet (100 mg total) by mouth once daily.            While in the hospital, will manage blood pressure as follows; Continue home antihypertensive regimen    Will utilize p.r.n. blood pressure medication only if patient's blood pressure greater than 140/90 and he develops symptoms such as worsening chest pain or shortness of breath.

## 2024-07-11 NOTE — ED NOTES
Care assumed, pt resting with eyes closed and resplirations even and unlabored.  Pt states is pain free at present.  Updated repeating lab work, verbalized understanding.  CL in reach.

## 2024-07-11 NOTE — Clinical Note
The catheter was inserted into the and was removed from the distal   left anterior descending. Ivus performed

## 2024-07-11 NOTE — PLAN OF CARE
0905  [facility] appt scheduled for the pt with Dr Juan Bustamante (PCP) on 7/22/2024 at 0940. Information added to the pt's discharge paperwork.       Will continue to follow.

## 2024-07-12 LAB
APICAL FOUR CHAMBER EJECTION FRACTION: 56 %
APICAL TWO CHAMBER EJECTION FRACTION: 43 %
APTT PPP: 43.2 SEC (ref 21–32)
APTT PPP: 43.4 SEC (ref 21–32)
ASCENDING AORTA: 3.8 CM
AV INDEX (PROSTH): 0.88
AV MEAN GRADIENT: 4 MMHG
AV PEAK GRADIENT: 6 MMHG
AV VALVE AREA BY VELOCITY RATIO: 2.88 CM²
AV VALVE AREA: 3.06 CM²
AV VELOCITY RATIO: 0.83
BASOPHILS # BLD AUTO: 0.1 K/UL (ref 0–0.2)
BASOPHILS NFR BLD: 1.4 % (ref 0–1.9)
BSA FOR ECHO PROCEDURE: 2.28 M2
CV ECHO LV RWT: 0.51 CM
DIFFERENTIAL METHOD BLD: NORMAL
DOP CALC AO PEAK VEL: 1.26 M/S
DOP CALC AO VTI: 27.2 CM
DOP CALC LVOT AREA: 3.5 CM2
DOP CALC LVOT DIAMETER: 2.11 CM
DOP CALC LVOT PEAK VEL: 1.04 M/S
DOP CALC LVOT STROKE VOLUME: 83.18 CM3
DOP CALC MV VTI: 26 CM
DOP CALCLVOT PEAK VEL VTI: 23.8 CM
E WAVE DECELERATION TIME: 269.35 MSEC
E/A RATIO: 0.55
E/E' RATIO: 9.27 M/S
ECHO LV POSTERIOR WALL: 1.2 CM (ref 0.6–1.1)
EOSINOPHIL # BLD AUTO: 0.2 K/UL (ref 0–0.5)
EOSINOPHIL NFR BLD: 3.2 % (ref 0–8)
ERYTHROCYTE [DISTWIDTH] IN BLOOD BY AUTOMATED COUNT: 13.6 % (ref 11.5–14.5)
ESTIMATED AVG GLUCOSE: 111 MG/DL (ref 68–131)
FRACTIONAL SHORTENING: 30 % (ref 28–44)
HBA1C MFR BLD: 5.5 % (ref 4–5.6)
HCT VFR BLD AUTO: 42.4 % (ref 40–54)
HGB BLD-MCNC: 14 G/DL (ref 14–18)
IMM GRANULOCYTES # BLD AUTO: 0.02 K/UL (ref 0–0.04)
IMM GRANULOCYTES NFR BLD AUTO: 0.3 % (ref 0–0.5)
INTERVENTRICULAR SEPTUM: 1.5 CM (ref 0.6–1.1)
IVC DIAMETER: 1.57 CM
LA MAJOR: 5.77 CM
LA MINOR: 6.24 CM
LA WIDTH: 3.7 CM
LEFT ATRIUM AREA SYSTOLIC (APICAL 2 CHAMBER): 21.96 CM2
LEFT ATRIUM AREA SYSTOLIC (APICAL 4 CHAMBER): 19.8 CM2
LEFT ATRIUM SIZE: 4.51 CM
LEFT ATRIUM VOLUME INDEX MOD: 29.2 ML/M2
LEFT ATRIUM VOLUME INDEX: 38.1 ML/M2
LEFT ATRIUM VOLUME MOD: 65.06 CM3
LEFT ATRIUM VOLUME: 85.04 CM3
LEFT INTERNAL DIMENSION IN SYSTOLE: 3.3 CM (ref 2.1–4)
LEFT VENTRICLE DIASTOLIC VOLUME INDEX: 45.68 ML/M2
LEFT VENTRICLE DIASTOLIC VOLUME: 101.87 ML
LEFT VENTRICLE END DIASTOLIC VOLUME APICAL 2 CHAMBER: 73.27 ML
LEFT VENTRICLE END DIASTOLIC VOLUME APICAL 4 CHAMBER: 86.75 ML
LEFT VENTRICLE END SYSTOLIC VOLUME APICAL 2 CHAMBER: 60.59 ML
LEFT VENTRICLE END SYSTOLIC VOLUME APICAL 4 CHAMBER: 58.5 ML
LEFT VENTRICLE MASS INDEX: 112 G/M2
LEFT VENTRICLE SYSTOLIC VOLUME INDEX: 19.8 ML/M2
LEFT VENTRICLE SYSTOLIC VOLUME: 44.08 ML
LEFT VENTRICULAR INTERNAL DIMENSION IN DIASTOLE: 4.69 CM (ref 3.5–6)
LEFT VENTRICULAR MASS: 250.55 G
LV LATERAL E/E' RATIO: 12.75 M/S
LV SEPTAL E/E' RATIO: 7.29 M/S
LVED V (TEICH): 101.87 ML
LVES V (TEICH): 44.08 ML
LVOT MG: 2.87 MMHG
LVOT MV: 0.83 CM/S
LYMPHOCYTES # BLD AUTO: 2.6 K/UL (ref 1–4.8)
LYMPHOCYTES NFR BLD: 35.6 % (ref 18–48)
MCH RBC QN AUTO: 30.2 PG (ref 27–31)
MCHC RBC AUTO-ENTMCNC: 33 G/DL (ref 32–36)
MCV RBC AUTO: 92 FL (ref 82–98)
MONOCYTES # BLD AUTO: 0.7 K/UL (ref 0.3–1)
MONOCYTES NFR BLD: 9.5 % (ref 4–15)
MV PEAK A VEL: 0.92 M/S
MV PEAK E VEL: 0.51 M/S
MV PEAK GRADIENT: 4 MMHG
MV STENOSIS PRESSURE HALF TIME: 78.11 MS
MV VALVE AREA BY CONTINUITY EQUATION: 3.2 CM2
MV VALVE AREA P 1/2 METHOD: 2.82 CM2
NEUTROPHILS # BLD AUTO: 3.6 K/UL (ref 1.8–7.7)
NEUTROPHILS NFR BLD: 50 % (ref 38–73)
NRBC BLD-RTO: 0 /100 WBC
OHS QRS DURATION: 134 MS
OHS QTC CALCULATION: 472 MS
PISA MRMAX VEL: 2.56 M/S
PISA TR MAX VEL: 2.18 M/S
PLATELET # BLD AUTO: 196 K/UL (ref 150–450)
PMV BLD AUTO: 11 FL (ref 9.2–12.9)
POC ACTIVATED CLOTTING TIME K: 134 SEC (ref 74–137)
POC ACTIVATED CLOTTING TIME K: 262 SEC (ref 74–137)
POC ACTIVATED CLOTTING TIME K: 678 SEC (ref 74–137)
POC ACTIVATED CLOTTING TIME K: 727 SEC (ref 74–137)
PULM VEIN S/D RATIO: 2.16
PV PEAK D VEL: 0.32 M/S
PV PEAK GRADIENT: 5 MMHG
PV PEAK S VEL: 0.69 M/S
PV PEAK VELOCITY: 1.14 M/S
RA MAJOR: 5.05 CM
RA PRESSURE ESTIMATED: 3 MMHG
RA WIDTH: 2.78 CM
RBC # BLD AUTO: 4.63 M/UL (ref 4.6–6.2)
RV TB RVSP: 5 MMHG
RV TISSUE DOPPLER FREE WALL SYSTOLIC VELOCITY 1 (APICAL 4 CHAMBER VIEW): 13.68 CM/S
SAMPLE: ABNORMAL
SAMPLE: NORMAL
SINUS: 3.39 CM
STJ: 3.31 CM
TDI LATERAL: 0.04 M/S
TDI SEPTAL: 0.07 M/S
TDI: 0.06 M/S
TR MAX PG: 19 MMHG
TRICUSPID ANNULAR PLANE SYSTOLIC EXCURSION: 2.17 CM
TROPONIN I SERPL DL<=0.01 NG/ML-MCNC: 0.83 NG/ML (ref 0–0.03)
TROPONIN I SERPL DL<=0.01 NG/ML-MCNC: 0.84 NG/ML (ref 0–0.03)
TV REST PULMONARY ARTERY PRESSURE: 22 MMHG
WBC # BLD AUTO: 7.16 K/UL (ref 3.9–12.7)
Z-SCORE OF LEFT VENTRICULAR DIMENSION IN END DIASTOLE: -5.19
Z-SCORE OF LEFT VENTRICULAR DIMENSION IN END SYSTOLE: -2.93

## 2024-07-12 PROCEDURE — 83036 HEMOGLOBIN GLYCOSYLATED A1C: CPT | Mod: HCNC | Performed by: FAMILY MEDICINE

## 2024-07-12 PROCEDURE — 93458 L HRT ARTERY/VENTRICLE ANGIO: CPT | Mod: 26,XU,51,HCNC | Performed by: INTERNAL MEDICINE

## 2024-07-12 PROCEDURE — 4A023N7 MEASUREMENT OF CARDIAC SAMPLING AND PRESSURE, LEFT HEART, PERCUTANEOUS APPROACH: ICD-10-PCS | Performed by: INTERNAL MEDICINE

## 2024-07-12 PROCEDURE — 99153 MOD SED SAME PHYS/QHP EA: CPT | Mod: HCNC | Performed by: INTERNAL MEDICINE

## 2024-07-12 PROCEDURE — B241ZZ3 ULTRASONOGRAPHY OF MULTIPLE CORONARY ARTERIES, INTRAVASCULAR: ICD-10-PCS | Performed by: INTERNAL MEDICINE

## 2024-07-12 PROCEDURE — 93005 ELECTROCARDIOGRAM TRACING: CPT | Mod: HCNC

## 2024-07-12 PROCEDURE — 84484 ASSAY OF TROPONIN QUANT: CPT | Mod: 91,HCNC | Performed by: NURSE PRACTITIONER

## 2024-07-12 PROCEDURE — 85025 COMPLETE CBC W/AUTO DIFF WBC: CPT | Mod: HCNC | Performed by: FAMILY MEDICINE

## 2024-07-12 PROCEDURE — 63600175 PHARM REV CODE 636 W HCPCS: Mod: HCNC | Performed by: INTERNAL MEDICINE

## 2024-07-12 PROCEDURE — 92978 ENDOLUMINL IVUS OCT C 1ST: CPT | Mod: LD,HCNC | Performed by: INTERNAL MEDICINE

## 2024-07-12 PROCEDURE — 84484 ASSAY OF TROPONIN QUANT: CPT | Mod: HCNC | Performed by: FAMILY MEDICINE

## 2024-07-12 PROCEDURE — 25500020 PHARM REV CODE 255: Mod: HCNC | Performed by: INTERNAL MEDICINE

## 2024-07-12 PROCEDURE — 99152 MOD SED SAME PHYS/QHP 5/>YRS: CPT | Mod: HCNC,,, | Performed by: INTERNAL MEDICINE

## 2024-07-12 PROCEDURE — 11000001 HC ACUTE MED/SURG PRIVATE ROOM: Mod: HCNC

## 2024-07-12 PROCEDURE — 93010 ELECTROCARDIOGRAM REPORT: CPT | Mod: HCNC,,, | Performed by: INTERNAL MEDICINE

## 2024-07-12 PROCEDURE — C1769 GUIDE WIRE: HCPCS | Mod: HCNC | Performed by: INTERNAL MEDICINE

## 2024-07-12 PROCEDURE — 93458 L HRT ARTERY/VENTRICLE ANGIO: CPT | Mod: XU,HCNC | Performed by: INTERNAL MEDICINE

## 2024-07-12 PROCEDURE — 25000003 PHARM REV CODE 250: Mod: HCNC | Performed by: INTERNAL MEDICINE

## 2024-07-12 PROCEDURE — 85730 THROMBOPLASTIN TIME PARTIAL: CPT | Mod: HCNC | Performed by: FAMILY MEDICINE

## 2024-07-12 PROCEDURE — 25000242 PHARM REV CODE 250 ALT 637 W/ HCPCS: Mod: HCNC | Performed by: FAMILY MEDICINE

## 2024-07-12 PROCEDURE — 99900035 HC TECH TIME PER 15 MIN (STAT): Mod: HCNC

## 2024-07-12 PROCEDURE — 85347 COAGULATION TIME ACTIVATED: CPT | Mod: HCNC | Performed by: INTERNAL MEDICINE

## 2024-07-12 PROCEDURE — 92978 ENDOLUMINL IVUS OCT C 1ST: CPT | Mod: 26,LD,HCNC, | Performed by: INTERNAL MEDICINE

## 2024-07-12 PROCEDURE — C1753 CATH, INTRAVAS ULTRASOUND: HCPCS | Mod: HCNC | Performed by: INTERNAL MEDICINE

## 2024-07-12 PROCEDURE — C9600 PERC DRUG-EL COR STENT SING: HCPCS | Mod: LD,HCNC | Performed by: INTERNAL MEDICINE

## 2024-07-12 PROCEDURE — 36415 COLL VENOUS BLD VENIPUNCTURE: CPT | Mod: HCNC | Performed by: FAMILY MEDICINE

## 2024-07-12 PROCEDURE — 25000003 PHARM REV CODE 250: Mod: HCNC | Performed by: FAMILY MEDICINE

## 2024-07-12 PROCEDURE — C1874 STENT, COATED/COV W/DEL SYS: HCPCS | Mod: HCNC | Performed by: INTERNAL MEDICINE

## 2024-07-12 PROCEDURE — 94761 N-INVAS EAR/PLS OXIMETRY MLT: CPT | Mod: HCNC

## 2024-07-12 PROCEDURE — C1725 CATH, TRANSLUMIN NON-LASER: HCPCS | Mod: HCNC | Performed by: INTERNAL MEDICINE

## 2024-07-12 PROCEDURE — 85730 THROMBOPLASTIN TIME PARTIAL: CPT | Mod: 91,HCNC | Performed by: FAMILY MEDICINE

## 2024-07-12 PROCEDURE — 027035Z DILATION OF CORONARY ARTERY, ONE ARTERY WITH TWO DRUG-ELUTING INTRALUMINAL DEVICES, PERCUTANEOUS APPROACH: ICD-10-PCS | Performed by: INTERNAL MEDICINE

## 2024-07-12 PROCEDURE — C1894 INTRO/SHEATH, NON-LASER: HCPCS | Mod: HCNC | Performed by: INTERNAL MEDICINE

## 2024-07-12 PROCEDURE — 25000003 PHARM REV CODE 250: Mod: HCNC | Performed by: NURSE PRACTITIONER

## 2024-07-12 PROCEDURE — 27201423 OPTIME MED/SURG SUP & DEVICES STERILE SUPPLY: Mod: HCNC | Performed by: INTERNAL MEDICINE

## 2024-07-12 PROCEDURE — 99152 MOD SED SAME PHYS/QHP 5/>YRS: CPT | Mod: HCNC | Performed by: INTERNAL MEDICINE

## 2024-07-12 PROCEDURE — 99223 1ST HOSP IP/OBS HIGH 75: CPT | Mod: HCNC,GC,, | Performed by: NURSE PRACTITIONER

## 2024-07-12 PROCEDURE — 63600175 PHARM REV CODE 636 W HCPCS: Mod: HCNC | Performed by: NURSE PRACTITIONER

## 2024-07-12 PROCEDURE — 92928 PRQ TCAT PLMT NTRAC ST 1 LES: CPT | Mod: LD,HCNC,, | Performed by: INTERNAL MEDICINE

## 2024-07-12 PROCEDURE — B2111ZZ FLUOROSCOPY OF MULTIPLE CORONARY ARTERIES USING LOW OSMOLAR CONTRAST: ICD-10-PCS | Performed by: INTERNAL MEDICINE

## 2024-07-12 PROCEDURE — 25000003 PHARM REV CODE 250: Mod: HCNC

## 2024-07-12 PROCEDURE — C1887 CATHETER, GUIDING: HCPCS | Mod: HCNC | Performed by: INTERNAL MEDICINE

## 2024-07-12 PROCEDURE — 36415 COLL VENOUS BLD VENIPUNCTURE: CPT | Mod: HCNC,XB | Performed by: NURSE PRACTITIONER

## 2024-07-12 DEVICE — EVEROLIMUS-ELUTING PLATINUM CHROMIUM CORONARY STENT SYSTEM
Type: IMPLANTABLE DEVICE | Site: HEART | Status: FUNCTIONAL
Brand: SYNERGY™ XD

## 2024-07-12 RX ORDER — LIDOCAINE HYDROCHLORIDE 10 MG/ML
INJECTION INFILTRATION; PERINEURAL
Status: DISCONTINUED | OUTPATIENT
Start: 2024-07-12 | End: 2024-07-12 | Stop reason: HOSPADM

## 2024-07-12 RX ORDER — METHYLPREDNISOLONE SOD SUCC 125 MG
125 VIAL (EA) INJECTION ONCE
Status: COMPLETED | OUTPATIENT
Start: 2024-07-12 | End: 2024-07-12

## 2024-07-12 RX ORDER — MIDAZOLAM HYDROCHLORIDE 1 MG/ML
INJECTION INTRAMUSCULAR; INTRAVENOUS
Status: DISCONTINUED | OUTPATIENT
Start: 2024-07-12 | End: 2024-07-12 | Stop reason: HOSPADM

## 2024-07-12 RX ORDER — SODIUM CHLORIDE 9 MG/ML
INJECTION, SOLUTION INTRAVENOUS CONTINUOUS
Status: ACTIVE | OUTPATIENT
Start: 2024-07-12 | End: 2024-07-12

## 2024-07-12 RX ORDER — HEPARIN SODIUM 1000 [USP'U]/ML
INJECTION, SOLUTION INTRAVENOUS; SUBCUTANEOUS
Status: DISCONTINUED | OUTPATIENT
Start: 2024-07-12 | End: 2024-07-12 | Stop reason: HOSPADM

## 2024-07-12 RX ORDER — CLOPIDOGREL BISULFATE 75 MG/1
TABLET ORAL
Status: DISCONTINUED | OUTPATIENT
Start: 2024-07-12 | End: 2024-07-12 | Stop reason: HOSPADM

## 2024-07-12 RX ORDER — VERAPAMIL HYDROCHLORIDE 2.5 MG/ML
INJECTION, SOLUTION INTRAVENOUS
Status: DISCONTINUED | OUTPATIENT
Start: 2024-07-12 | End: 2024-07-12 | Stop reason: HOSPADM

## 2024-07-12 RX ORDER — FENTANYL CITRATE 50 UG/ML
INJECTION, SOLUTION INTRAMUSCULAR; INTRAVENOUS
Status: DISCONTINUED | OUTPATIENT
Start: 2024-07-12 | End: 2024-07-12 | Stop reason: HOSPADM

## 2024-07-12 RX ORDER — DIPHENHYDRAMINE HYDROCHLORIDE 50 MG/ML
INJECTION INTRAMUSCULAR; INTRAVENOUS
Status: DISCONTINUED | OUTPATIENT
Start: 2024-07-12 | End: 2024-07-12 | Stop reason: HOSPADM

## 2024-07-12 RX ORDER — CLOPIDOGREL BISULFATE 75 MG/1
225 TABLET ORAL ONCE
Status: COMPLETED | OUTPATIENT
Start: 2024-07-12 | End: 2024-07-12

## 2024-07-12 RX ORDER — ATORVASTATIN CALCIUM 40 MG/1
40 TABLET, FILM COATED ORAL DAILY
Qty: 90 TABLET | Refills: 3 | Status: SHIPPED | OUTPATIENT
Start: 2024-07-12 | End: 2025-07-12

## 2024-07-12 RX ORDER — ASPIRIN 81 MG/1
TABLET ORAL
Status: DISCONTINUED | OUTPATIENT
Start: 2024-07-12 | End: 2024-07-12 | Stop reason: HOSPADM

## 2024-07-12 RX ORDER — HEPARIN SODIUM 200 [USP'U]/100ML
INJECTION, SOLUTION INTRAVENOUS
Status: DISCONTINUED | OUTPATIENT
Start: 2024-07-12 | End: 2024-07-13 | Stop reason: HOSPADM

## 2024-07-12 RX ORDER — METOPROLOL SUCCINATE 25 MG/1
25 TABLET, EXTENDED RELEASE ORAL DAILY
Status: DISCONTINUED | OUTPATIENT
Start: 2024-07-12 | End: 2024-07-13 | Stop reason: HOSPADM

## 2024-07-12 RX ORDER — IODIXANOL 320 MG/ML
INJECTION, SOLUTION INTRAVASCULAR
Status: DISCONTINUED | OUTPATIENT
Start: 2024-07-12 | End: 2024-07-12 | Stop reason: HOSPADM

## 2024-07-12 RX ADMIN — SODIUM CHLORIDE: 9 INJECTION, SOLUTION INTRAVENOUS at 03:07

## 2024-07-12 RX ADMIN — METHYLPREDNISOLONE SODIUM SUCCINATE 125 MG: 125 INJECTION, POWDER, FOR SOLUTION INTRAMUSCULAR; INTRAVENOUS at 03:07

## 2024-07-12 RX ADMIN — TRAZODONE HYDROCHLORIDE 25 MG: 50 TABLET ORAL at 10:07

## 2024-07-12 RX ADMIN — CLOPIDOGREL BISULFATE 75 MG: 75 TABLET ORAL at 08:07

## 2024-07-12 RX ADMIN — FLUTICASONE PROPIONATE 50 MCG: 50 SPRAY, METERED NASAL at 08:07

## 2024-07-12 RX ADMIN — NIFEDIPINE 30 MG: 30 TABLET, FILM COATED, EXTENDED RELEASE ORAL at 08:07

## 2024-07-12 RX ADMIN — LOSARTAN POTASSIUM 100 MG: 50 TABLET, FILM COATED ORAL at 08:07

## 2024-07-12 RX ADMIN — FINASTERIDE 5 MG: 5 TABLET, FILM COATED ORAL at 08:07

## 2024-07-12 RX ADMIN — LACTULOSE 30 G: 20 SOLUTION ORAL at 10:07

## 2024-07-12 RX ADMIN — POLYETHYLENE GLYCOL 3350 17 G: 17 POWDER, FOR SOLUTION ORAL at 08:07

## 2024-07-12 RX ADMIN — TRAZODONE HYDROCHLORIDE 25 MG: 50 TABLET ORAL at 12:07

## 2024-07-12 RX ADMIN — METOPROLOL SUCCINATE 25 MG: 25 TABLET, EXTENDED RELEASE ORAL at 03:07

## 2024-07-12 RX ADMIN — PRAVASTATIN SODIUM 40 MG: 40 TABLET ORAL at 08:07

## 2024-07-12 RX ADMIN — CLOPIDOGREL BISULFATE 225 MG: 75 TABLET ORAL at 03:07

## 2024-07-12 RX ADMIN — POLYETHYLENE GLYCOL 3350 17 G: 17 POWDER, FOR SOLUTION ORAL at 10:07

## 2024-07-12 RX ADMIN — LACTULOSE 30 G: 20 SOLUTION ORAL at 08:07

## 2024-07-12 NOTE — H&P (VIEW-ONLY)
[place] - Telemetry  Cardiology  Consult Note    Patient Name: Korey Santos  MRN: [id number]  Admission Date: 7/11/2024  Hospital Length of Stay: 1 days  Code Status: Full Code   Attending Provider: Daisy Calvin*   Consulting Provider: Romeo Cobb NP  Primary Care Physician: Juan Bustamante MD  Principal Problem:Chest pain    Patient information was obtained from patient, past medical records, and ER records.     [facility]  Consult performed by: Romeo Cobb NP  Consult ordered by: Daisy Calvin MD  Assessment/Recommendations: I agree with the assessment and plan for the patient.      Overall 83-year-old male with past medical history of CAD, moderate disease in the mid LAD, sick sinus syndrome status post pacemaker in December 20, 2023, admitted to the hospital with chest pain after swallowing sleeping pills, noted to have troponin of 0.8.  EKG shows paced rhythm.  Blood pressure on arrival was 220 mm Hg systolic.  Currently chest pain-free on heparin drip.      We have discussed risks and benefits of left heart catheterization.  Patient is to proceed with left heart catheterization with possible IFR of the LAD.        Subjective:     Chief Complaint:  Chest Pain      HPI:   Korey Santos is an 84 y/o Male with CAD, HTN, HLD, constipation, SSS s/p PPM, BPH, chronic back pain, GERD. Patient presented to the ED with MS SILVIA which started Wednesday that radiates to his jaw. He reports that he is actiuve and works out TIW without limitation. Patient reports GERD with recent dysphagia with swallowing pills. He denies NV, diaphoresis, dizziness, SOB. Patient reports allergic reaction to shellfish a year ago- side effect was noted to be throat swelling. Troponin elevated 0.1-0.8. EKG without acute ischemic changes. Patient on DAPT and heparin gtt. NPO for LHC today. TTE pending. SBP >220 - improved at this time. SoluMedrol ordered in the event  he has cross reaction to contrast given report of shellfish reaction.     [facility] 12/2023  Left Main:  No stenosis    LAD:    40% proximal stenosis . Tortuous Vessel  Circumflex:  No stenosis . Tortuous vessel  RCA:  No stenosis    The patient has non-obstructive coronary disease     TTE 12/2023    Left Ventricle: The left ventricle is normal in size. There is concentric remodeling. Normal wall motion. There is normal systolic function. Biplane (2D) method of discs ejection fraction is 64%. Grade II diastolic dysfunction.    Right Ventricle: Normal right ventricular cavity size. Systolic function is normal. TAPSE is 2.72 cm.    Left Atrium: Left atrium is severely dilated. The left atrium volume index is 60.6 mL/m2.    Aortic Valve: There is mild aortic valve sclerosis.    Mitral Valve: There is mild regurgitation.    Tricuspid Valve: There is mild regurgitation.    Pulmonic Valve: There is no stenosis. There is mild to moderate regurgitation.    Pulmonary Artery: The estimated pulmonary artery systolic pressure is 35 mmHg.    IVC/SVC: Normal venous pressure at 3 mmHg.      Past Medical History:   Diagnosis Date    Arthritis     Back pain, chronic     BPH with urinary obstruction     Chronic constipation     Closed fracture of right shoulder 1/28/2021    Closed nondisplaced fracture of greater tuberosity of right humerus 2/24/2021    Colon polyp     DJD (degenerative joint disease)     Hip    Hyperlipidemia     Hypertension     Laryngopharyngeal reflux     Left inguinal hernia     Lumbar herniated disc     Lumbar stenosis     Neck mass 10/7/2014    -4/29/2022 path - lipoma    OA (osteoarthritis) of knee     Bilateral    Paradoxical insomnia     Sinus trouble        Past Surgical History:   Procedure Laterality Date    A-V CARDIAC PACEMAKER INSERTION N/A 12/19/2023    Procedure: INSERTION, CARDIAC PACEMAKER, DUAL CHAMBER;  Surgeon: Noah Vazquez MD;  Location: [facility] CATH LAB/EP;  Service: Cardiology;  Laterality:  N/A;    BACK SURGERY  2014    COLONOSCOPY      COLONOSCOPY N/A 5/17/2023    Procedure: COLONOSCOPY;  Surgeon: Christiano Vazquez MD;  Location: [facility] ENDO;  Service: Endoscopy;  Laterality: N/A;  Constipation 2 day prep.Instructions to portal.EC    CORONARY ANGIOGRAPHY N/A 12/18/2023    Procedure: ANGIOGRAM, CORONARY ARTERY;  Surgeon: Flaco Kelly MD;  Location: [facility] CATH LAB/EP;  Service: Cardiology;  Laterality: N/A;    CYSTOSCOPY WITH TRANSURETHRAL DESTRUCTION OF PROSTATE,RFA N/A 4/11/2024    Procedure: CYSTOSCOPY WITH TRANSURETHRAL DESTRUCTION OF PROSTATE,RFA  rezum generator and at least 2 handpieces Contact Paul grant to schedule;  Surgeon: Juan Ward MD;  Location: [facility] OR;  Service: Urology;  Laterality: N/A;  rezum generator and at least 2 handpieces  Contact Paul grant to schedule- Marquise notified 3/26 AM    EPIDURAL STEROID INJECTION INTO LUMBAR SPINE N/A 7/5/2022    Procedure: Injection-steroid-epidural-lumbar L3-4;  Surgeon: Yury Crum Jr., MD;  Location: [facility] PAIN MGT;  Service: Pain Management;  Laterality: N/A;  oral sedation   asa      INSERTION, PACEMAKER, TEMPORARY TRANSVENOUS  12/18/2023    Procedure: Insertion, Pacemaker, Temporary Transvenous;  Surgeon: Flaco Kelly MD;  Location: [facility] CATH LAB/EP;  Service: Cardiology;;    JOINT REPLACEMENT Left 05/04/2018    KNEE SURGERY      left hand      LEG SURGERY      SPINE SURGERY      UPPER GASTROINTESTINAL ENDOSCOPY         Review of patient's allergies indicates:   Allergen Reactions    Clindamycin Swelling     Throat swells    Lisinopril Swelling and Other (See Comments)     Throat swelling    Shellfish containing products        No current facility-administered medications on file prior to encounter.     Current Outpatient Medications on File Prior to Encounter   Medication Sig    aspirin (ECOTRIN) 81 MG EC tablet Take 81 mg by mouth once daily.    finasteride (PROSCAR) 5 mg tablet Take 1 tablet (5 mg total) by  mouth once daily.    fluticasone propionate (FLONASE) 50 mcg/actuation nasal spray 1 spray (50 mcg total) by Each Nostril route once daily.    hydrOXYzine HCL (ATARAX) 10 MG Tab Take 1 tablet (10 mg total) by mouth nightly as needed (sleep).    linaCLOtide (LINZESS) 145 mcg Cap capsule Take 1 capsule (145 mcg total) by mouth daily as needed (constipation).    multivitamin capsule Take 1 capsule by mouth once daily.    NIFEdipine (PROCARDIA-XL) 30 MG (OSM) 24 hr tablet Take 1 tablet (30 mg total) by mouth once daily.    pravastatin (PRAVACHOL) 40 MG tablet Take 1 tablet (40 mg total) by mouth once daily.    losartan (COZAAR) 100 MG tablet Take 1 tablet (100 mg total) by mouth once daily. (Patient not taking: Reported on 7/11/2024)    tamsulosin (FLOMAX) 0.4 mg Cap Take 1 capsule (0.4 mg total) by mouth once daily. (Patient not taking: Reported on 7/11/2024)     Family History       Problem Relation (Age of Onset)    Cancer Father    No Known Problems Mother, Sister, Brother, Maternal Aunt, Maternal Uncle, Paternal Aunt, Paternal Uncle, Maternal Grandmother, Maternal Grandfather, Paternal Grandmother, Paternal Grandfather, Daughter, Son          Tobacco Use    Smoking status: Never    Smokeless tobacco: Never   Substance and Sexual Activity    Alcohol use: Yes     Alcohol/week: 0.0 standard drinks of alcohol     Comment: approximately 2 beers weekly    Drug use: No    Sexual activity: Yes     Partners: Female     Birth control/protection: None     Review of Systems   Constitutional: Negative. Negative for diaphoresis.   HENT: Negative.     Cardiovascular:  Positive for chest pain and palpitations (remote). Negative for irregular heartbeat, leg swelling, near-syncope, orthopnea, paroxysmal nocturnal dyspnea and syncope.   Respiratory:  Negative for cough and shortness of breath.    Endocrine: Negative.    Hematologic/Lymphatic: Negative.    Skin: Negative.    Musculoskeletal: Negative.    Gastrointestinal:  Positive  for constipation and dysphagia. Negative for nausea and vomiting.   Genitourinary: Negative.    Neurological:  Negative for dizziness, focal weakness and weakness.   Psychiatric/Behavioral: Negative.     Allergic/Immunologic: Negative.      Objective:     Vital Signs (Most Recent):  Temp: 97.6 °F (36.4 °C) (07/12/24 1115)  Pulse: 63 (07/12/24 1115)  Resp: 18 (07/12/24 1115)  BP: 116/68 (07/12/24 1115)  SpO2: 95 % (07/12/24 1115) Vital Signs (24h Range):  Temp:  [97.4 °F (36.3 °C)-98.3 °F (36.8 °C)] 97.6 °F (36.4 °C)  Pulse:  [] 63  Resp:  [16-19] 18  SpO2:  [95 %-98 %] 95 %  BP: (116-226)/() 116/68     Weight: 104 kg (229 lb 4.5 oz) (Simultaneous filing. User may not have seen previous data.)  Body mass index is 31.98 kg/m².    SpO2: 95 %         Intake/Output Summary (Last 24 hours) at 7/12/2024 1115  Last data filed at 7/12/2024 0653  Gross per 24 hour   Intake 643.52 ml   Output 600 ml   Net 43.52 ml       Lines/Drains/Airways       Peripheral Intravenous Line  Duration                  Peripheral IV - Single Lumen 07/11/24 0403 20 G Left Antecubital 1 day                     Physical Exam  Constitutional:       General: He is not in acute distress.     Appearance: He is not diaphoretic.   HENT:      Head: Atraumatic.   Eyes:      General:         Right eye: No discharge.         Left eye: No discharge.   Cardiovascular:      Rate and Rhythm: Normal rate and regular rhythm.      Heart sounds: Murmur heard.   Pulmonary:      Effort: Pulmonary effort is normal.      Breath sounds: Normal breath sounds.   Abdominal:      General: Bowel sounds are normal.      Palpations: Abdomen is soft.   Skin:     General: Skin is warm and dry.   Neurological:      Mental Status: He is alert. Mental status is at baseline.          Significant Labs: BMP:   Recent Labs   Lab 07/11/24  0403 07/11/24  1633   * 95    140   K 3.9 4.1    109   CO2 24 25   BUN 13 9   CREATININE 0.73 0.8   CALCIUM 8.9 9.1  "  MG  --  2.1   , CMP   Recent Labs   Lab 07/11/24  0403 07/11/24  1633    140   K 3.9 4.1    109   CO2 24 25   * 95   BUN 13 9   CREATININE 0.73 0.8   CALCIUM 8.9 9.1   PROT 6.8 6.4   ALBUMIN 4.0 3.5   BILITOT 0.3 0.3   ALKPHOS 85 63   AST 23 19   ALT 20 16   ANIONGAP 12 6*   , CBC   Recent Labs   Lab 07/11/24  1633 07/11/24  1730 07/12/24  0520   WBC 6.60 6.84 7.16   HGB 13.4* 13.3* 14.0   HCT 40.9 40.3 42.4   PLT SEE COMMENT 193 196   , INR   Recent Labs   Lab 07/11/24  1730   INR 1.0   , Lipid Panel No results for input(s): "CHOL", "HDL", "LDLCALC", "TRIG", "CHOLHDL" in the last 48 hours., Troponin   Recent Labs   Lab 07/11/24  2023 07/12/24  0009 07/12/24  0520   TROPONINI 0.681* 0.828* 0.844*   , and All pertinent lab results from the last 24 hours have been reviewed.    Significant Imaging: Echocardiogram: Transthoracic echo (TTE) complete (Cupid Only):   Results for orders placed or performed during the hospital encounter of 12/18/23   Echo   Result Value Ref Range    RA Width 4.23 cm    LA volume (mod) 107.75 cm3    Left Atrium Major Axis 7.19 cm    Left Atrium Minor Axis 6.47 cm    RA Major Axis 5.44 cm    LV Diastolic Volume 59.31 mL    LV Systolic Volume 25.95 mL    PV Peak D Kalyan 0.35 m/s    PV Peak S Kalyan 0.38 m/s    MV Peak A Kalyan 1.17 m/s    MV stenosis pressure 1/2 time 62.22 ms    MV VTI 45.1 cm    TR Max Kalyan 2.81 m/s    MV Peak E Kalyan 0.78 m/s    MV peak gradient 7 mmHg    Mr max kalyan 4.64 m/s    Ao VTI 37.80 cm    Ao peak kalyan 1.72 m/s    LVOT peak VTI 25.70 cm    LVOT peak kalyan 1.23 m/s    LVOT diameter 2.00 cm    E wave deceleration time 214.54 msec    MV mean gradient 2 mmHg    AV mean gradient 7 mmHg    RV S' 14.75 cm/s    TAPSE 2.72 cm    RVDD 3.41 cm    LA size 4.81 cm    Ascending aorta 3.63 cm    STJ 3.56 cm    Sinus 4.03 cm    LVIDs 2.66 2.1 - 4.0 cm    Posterior Wall 1.46 (A) 0.6 - 1.1 cm    IVS 1.40 (A) 0.6 - 1.1 cm    LVIDd 3.73 3.5 - 6.0 cm    PV PEAK VELOCITY 1.56 m/s    " TDI LATERAL 0.08 m/s    Left Ventricular Outflow Tract Mean Gradient 3.70 mmHg    Left Ventricular Outflow Tract Mean Velocity 0.92 cm/s    Ansari's Biplane MOD Ejection Fraction 64 %    TDI SEPTAL 0.06 m/s    LV LATERAL E/E' RATIO 9.75 m/s    LV SEPTAL E/E' RATIO 13.00 m/s    FS 29 28 - 44 %    LV mass 195.53 g    ZLVIDD -7.82     ZLVIDS -4.89     Left Ventricle Relative Wall Thickness 0.78 cm    AV valve area 2.13 cm²    AV Velocity Ratio 0.72     AV index (prosthetic) 0.68     MV valve area p 1/2 method 3.54 cm2    MV valve area by continuity eq 1.79 cm2    PV peak gradient 10 mmHg    E/A ratio 0.67     Mean e' 0.07 m/s    Pulm vein S/D ratio 1.09     LVOT area 3.1 cm2    LVOT stroke volume 80.70 cm3    AV peak gradient 12 mmHg    E/E' ratio 11.14 m/s    LV Systolic Volume Index 11.5 mL/m2    LV Diastolic Volume Index 26.36 mL/m2    LV Mass Index 87 g/m2    Triscuspid Valve Regurgitation Peak Gradient 32 mmHg    LA Volume Index (Mod) 47.9 mL/m2    JANINE by Velocity Ratio 2.25 cm²    BSA 2.3 m2    LA Volume Index 60.6 mL/m2    LA volume 136.45 cm3    LA WIDTH 4.9 cm    TV resting pulmonary artery pressure 35 mmHg    RV TB RVSP 6 mmHg    Est. RA pres 3 mmHg    Narrative      Left Ventricle: The left ventricle is normal in size. There is   concentric remodeling. Normal wall motion. There is normal systolic   function. Biplane (2D) method of discs ejection fraction is 64%. Grade II   diastolic dysfunction.    Right Ventricle: Normal right ventricular cavity size. Systolic   function is normal. TAPSE is 2.72 cm.    Left Atrium: Left atrium is severely dilated. The left atrium volume   index is 60.6 mL/m2.    Aortic Valve: There is mild aortic valve sclerosis.    Mitral Valve: There is mild regurgitation.    Tricuspid Valve: There is mild regurgitation.    Pulmonic Valve: There is no stenosis. There is mild to moderate   regurgitation.    Pulmonary Artery: The estimated pulmonary artery systolic pressure is   35 mmHg.     IVC/SVC: Normal venous pressure at 3 mmHg.       Assessment and Plan:     * Chest pain  Per CAD    Pacemaker  Patient with SSS s/p PPM  Rhythm paced  Rep contacted for device interrogation  Reports palpitations this week with elevated HR per home monitoring     Coronary artery disease involving native coronary artery without angina pectoris  [facility] 12/2023  Left Main:  No stenosis    LAD:    40% proximal stenosis . Tortuous Vessel  Circumflex:  No stenosis . Tortuous vessel  RCA:  No stenosis       TTE 12/2023    Left Ventricle: The left ventricle is normal in size. There is concentric remodeling. Normal wall motion. There is normal systolic function. Biplane (2D) method of discs ejection fraction is 64%. Grade II diastolic dysfunction.    Right Ventricle: Normal right ventricular cavity size. Systolic function is normal. TAPSE is 2.72 cm.    Left Atrium: Left atrium is severely dilated. The left atrium volume index is 60.6 mL/m2.    Aortic Valve: There is mild aortic valve sclerosis.    Mitral Valve: There is mild regurgitation.    Tricuspid Valve: There is mild regurgitation.    Pulmonic Valve: There is no stenosis. There is mild to moderate regurgitation.    Pulmonary Artery: The estimated pulmonary artery systolic pressure is 35 mmHg.    IVC/SVC: Normal venous pressure at 3 mmHg.    Troponin 0.1-0.8  EKG without acute ischemic changes  SBP >220 - reports SBP runs in the 140s per home cuff  Will proceed with [facility] today and repeat TTE  Continue DAPT, statin, heparin gtt   BB initiated       Hypertension, essential  SBP >220 this admission- improved at present.   Continue CCB, ARB, BB initiated        VTE Risk Mitigation (From admission, onward)           Ordered     heparin 25,000 units in dextrose 5% (100 units/ml) IV bolus from bag LOW INTENSITY nomogram - [facility]  As needed (PRN)        Question:  Heparin Infusion Adjustment (DO NOT MODIFY ANSWER)  Answer:  [email or web address]\epic\Images\Pharmacy\HeparinInfusions\heparin  LOW INTENSITY nomogram for [facility] [id number]    07/11/24 1718     heparin 25,000 units in dextrose 5% (100 units/ml) IV bolus from bag LOW INTENSITY nomogram - [facility]  As needed (PRN)        Question:  Heparin Infusion Adjustment (DO NOT MODIFY ANSWER)  Answer:  [email or web address]\epic\Images\Pharmacy\HeparinInfusions\heparin LOW INTENSITY nomogram for [facility] [id number]    07/11/24 1718     heparin 25,000 units in dextrose 5% 250 mL (100 units/mL) infusion LOW INTENSITY nomogram - [facility]  Continuous        Question:  Begin at (units/kg/hr)  Answer:  12    07/11/24 1719                    Thank you for your consult. I will follow-up with patient. Please contact us if you have any additional questions.    Romeo Cobb NP  Cardiology   [place] - Telemetry

## 2024-07-12 NOTE — HPI
Korey Santos is an 84 y/o Male with CAD, HTN, HLD, constipation, SSS s/p PPM, BPH, chronic back pain, GERD. Patient presented to the ED with MS SILVIA which started Wednesday that radiates to his jaw. He reports that he is actiuve and works out TIW without limitation. Patient reports GERD with recent dysphagia with swallowing pills. He denies NV, diaphoresis, dizziness, SOB. Patient reports allergic reaction to shellfish a year ago- side effect was noted to be throat swelling. Troponin elevated 0.1-0.8. EKG without acute ischemic changes. Patient on DAPT and heparin gtt. NPO for LHC today. TTE pending. SBP >220 - improved at this time. SoluMedrol ordered in the event he has cross reaction to contrast given report of shellfish reaction.     [facility] 12/2023  Left Main:  No stenosis    LAD:    40% proximal stenosis . Tortuous Vessel  Circumflex:  No stenosis . Tortuous vessel  RCA:  No stenosis    The patient has non-obstructive coronary disease     TTE 12/2023    Left Ventricle: The left ventricle is normal in size. There is concentric remodeling. Normal wall motion. There is normal systolic function. Biplane (2D) method of discs ejection fraction is 64%. Grade II diastolic dysfunction.    Right Ventricle: Normal right ventricular cavity size. Systolic function is normal. TAPSE is 2.72 cm.    Left Atrium: Left atrium is severely dilated. The left atrium volume index is 60.6 mL/m2.    Aortic Valve: There is mild aortic valve sclerosis.    Mitral Valve: There is mild regurgitation.    Tricuspid Valve: There is mild regurgitation.    Pulmonic Valve: There is no stenosis. There is mild to moderate regurgitation.    Pulmonary Artery: The estimated pulmonary artery systolic pressure is 35 mmHg.    IVC/SVC: Normal venous pressure at 3 mmHg.

## 2024-07-12 NOTE — PROGRESS NOTES
[facility]  Progress Note    Patient Name: Korey Santos  MRN: [id number]  Patient Class: IP- Inpatient   Admission Date: 7/11/2024  Length of Stay: 1 days  Attending Physician: Daisy Calvin*  Primary Care Provider: Juan Bustamante MD        Subjective:     Principal Problem:Chest pain        HPI:  Tom is an 84 y/o M with past medical history of, hypertension, hyperlipidemia, DJD, insomnia, constipation, BPH, chronic back pain, GERD, presents with few hours history of midsternal chest pain that started at about 12 midnight.  Pain is about 7/10, radiates to the jaw.  There is associated headaches, leg swelling, and shortness of breath, chest discomfort.  He denies fever, chills, nausea, vomiting, diaphoresis, dysuria, syncope, weakness.   Sodium 140, potassium 4.1, BUN/creatinine 9/0.8, BNP 45, H/H 13.4/40.9, WBC 6.6, platelet 214, troponin 0.020, repeat was at 0.119 end up trending, chest x-ray with no acute chest finding, EKG with no ST changes.  Patient received morphine aspirin and nitroglycerin without any change in pain.  Admit hospital medicine in consult cardiology    Overview/Hospital Course:  No notes on file    Interval History: awake and alert. No chest pain this morning. Leg swelling, improved  Troponin still up trending- on heparin gtt, possible LHC   Awaits cardiology rec's  TTE pending    Review of Systems   Constitutional:  Positive for activity change.   Eyes:  Negative for photophobia and visual disturbance.   Respiratory:  Negative for chest tightness and shortness of breath.    Cardiovascular:  Negative for chest pain and leg swelling.   Endocrine: Negative for polyphagia.   Genitourinary:  Negative for dysuria and enuresis.   Neurological:  Negative for numbness.   Psychiatric/Behavioral:  Negative for agitation and confusion.      Objective:     Vital Signs (Most Recent):  Temp: 97.9 °F (36.6 °C) (07/12/24 0740)  Pulse: 60 (07/12/24  "0740)  Resp: 18 (07/12/24 0740)  BP: (!) 147/73 (07/12/24 0820)  SpO2: 97 % (07/12/24 0821) Vital Signs (24h Range):  Temp:  [97.4 °F (36.3 °C)-98.3 °F (36.8 °C)] 97.9 °F (36.6 °C)  Pulse:  [] 60  Resp:  [16-19] 18  SpO2:  [95 %-98 %] 97 %  BP: (135-226)/() 147/73     Weight: 104 kg (229 lb 4.5 oz) (Simultaneous filing. User may not have seen previous data.)  Body mass index is 31.98 kg/m².    Intake/Output Summary (Last 24 hours) at 7/12/2024 1010  Last data filed at 7/12/2024 0653  Gross per 24 hour   Intake 643.52 ml   Output 600 ml   Net 43.52 ml         Physical Exam  Constitutional:       Appearance: He is obese. He is not ill-appearing.   HENT:      Head: Normocephalic and atraumatic.   Cardiovascular:      Rate and Rhythm: Normal rate.   Pulmonary:      Effort: Pulmonary effort is normal.      Breath sounds: No rales.   Abdominal:      General: Bowel sounds are normal.      Palpations: Abdomen is soft.   Musculoskeletal:         General: Swelling present. Normal range of motion.      Cervical back: Normal range of motion.      Right lower leg: Edema present.      Left lower leg: Edema present.      Comments: + bilateral pedal edema   Neurological:      General: No focal deficit present.      Mental Status: He is alert and oriented to person, place, and time.   Psychiatric:         Mood and Affect: Mood normal.         Behavior: Behavior normal.             Significant Labs: A1C: No results for input(s): "HGBA1C" in the last 4320 hours.  ABGs: No results for input(s): "PH", "PCO2", "HCO3", "POCSATURATED", "BE", "TOTALHB", "COHB", "METHB", "O2HB", "POCFIO2", "PO2" in the last 48 hours.  Blood Culture: No results for input(s): "LABBLOO" in the last 48 hours.  CBC:   Recent Labs   Lab 07/11/24  1633 07/11/24  1730 07/12/24  0520   WBC 6.60 6.84 7.16   HGB 13.4* 13.3* 14.0   HCT 40.9 40.3 42.4   PLT SEE COMMENT 193 196     CMP:   Recent Labs   Lab 07/11/24  0403 07/11/24  1633    140   K 3.9 4.1 " "   109   CO2 24 25   * 95   BUN 13 9   CREATININE 0.73 0.8   CALCIUM 8.9 9.1   PROT 6.8 6.4   ALBUMIN 4.0 3.5   BILITOT 0.3 0.3   ALKPHOS 85 63   AST 23 19   ALT 20 16   ANIONGAP 12 6*     Lactic Acid: No results for input(s): "LACTATE" in the last 48 hours.  Lipase: No results for input(s): "LIPASE" in the last 48 hours.  Lipid Panel: No results for input(s): "CHOL", "HDL", "LDLCALC", "TRIG", "CHOLHDL" in the last 48 hours.  Magnesium:   Recent Labs   Lab 07/11/24  1633   MG 2.1     Troponin:   Recent Labs   Lab 07/11/24  2023 07/12/24  0009 07/12/24  0520   TROPONINI 0.681* 0.828* 0.844*     TSH:   Recent Labs   Lab 07/09/24  1711   TSH 1.626     Urine Culture: No results for input(s): "LABURIN" in the last 48 hours.  Urine Studies: No results for input(s): "COLORU", "APPEARANCEUA", "PHUR", "SPECGRAV", "PROTEINUA", "GLUCUA", "KETONESU", "BILIRUBINUA", "OCCULTUA", "NITRITE", "UROBILINOGEN", "LEUKOCYTESUR", "RBCUA", "WBCUA", "BACTERIA", "SQUAMEPITHEL", "HYALINECASTS" in the last 48 hours.    Invalid input(s): "WRIGHTSUR"    Significant Imaging: I have reviewed all pertinent imaging results/findings within the past 24 hours.    Assessment/Plan:      * Chest pain  Coronary artery disease involving native coronary artery without angina pectoris  History of recent LHC from 12/18/23 in setting of CHF, LM patent, pLAD 40%, LCX patent, RCA patent.   Troponin 0.020 > 0.119  Chest x-ray   EKG- reviewed  TTE   Continue aspirin  Morphine p.r.n.  Add Plavix  Strict input output  Heparin GTT  Nitro p.r.n.   Supplemental oxygen  Consult cardiology for possible LHC- awaits rec's       Elevated troponin  0.022 .> 0.119 > 0.194 > 0.382  Heparin gtt  TTE  Consult cardiology      Pacemaker  Chronic      Constipation    Chronic   On Linzess at home   MiraLax and lactulose    Hypertension, essential  Chronic, controlled. Latest blood pressure and vitals reviewed-     Temp:  [97.4 °F (36.3 °C)-98.3 °F (36.8 °C)]   Pulse:  " [59-83]   Resp:  [11-19]   BP: (135-226)/()   SpO2:  [93 %-99 %] .   Home meds for hypertension were reviewed and noted below.   Hypertension Medications               NIFEdipine (PROCARDIA-XL) 30 MG (OSM) 24 hr tablet Take 1 tablet (30 mg total) by mouth once daily.    losartan (COZAAR) 100 MG tablet Take 1 tablet (100 mg total) by mouth once daily.            While in the hospital, will manage blood pressure as follows; Continue home antihypertensive regimen    Will utilize p.r.n. blood pressure medication only if patient's blood pressure greater than 140/90 and he develops symptoms such as worsening chest pain or shortness of breath.    Hyperlipidemia  Continue statin        VTE Risk Mitigation (From admission, onward)           Ordered     heparin 25,000 units in dextrose 5% (100 units/ml) IV bolus from bag LOW INTENSITY nomogram - [facility]  As needed (PRN)        Question:  Heparin Infusion Adjustment (DO NOT MODIFY ANSWER)  Answer:  \\[email or web address]\epic\Images\Pharmacy\HeparinInfusions\heparin LOW INTENSITY nomogram for [facility] [id number]    07/11/24 1718     heparin 25,000 units in dextrose 5% (100 units/ml) IV bolus from bag LOW INTENSITY nomogram - [facility]  As needed (PRN)        Question:  Heparin Infusion Adjustment (DO NOT MODIFY ANSWER)  Answer:  \\[email or web address]\epic\Images\Pharmacy\HeparinInfusions\heparin LOW INTENSITY nomogram for [facility] [id number].pdf    07/11/24 1718     heparin 25,000 units in dextrose 5% 250 mL (100 units/mL) infusion LOW INTENSITY nomogram - [facility]  Continuous        Question:  Begin at (units/kg/hr)  Answer:  12 07/11/24 1718                    Discharge Planning   GONZALO:      Code Status: Full Code   Is the patient medically ready for discharge?:     Reason for patient still in hospital (select all that apply): Patient trending condition                     Daisy Calvin MD  Department of Hospital Medicine   [facility]

## 2024-07-12 NOTE — BRIEF OP NOTE
Procedure performed:  Left heart catheterization   Coronary angiogram   I have asked guided PCI of the mid LAD x2      Indication of the procedure:   Non ST-elevation MI        Findings:    1. Right dominant coronary circulation  2. 20 % ostial RCA stenosis.  No other areas of focal stenosis in the right coronary artery   3. No significant obstructive coronary artery disease of left main coronary artery and left circumflex artery  4. 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 there is another lesion that measures around 70% angiographically in the mid LAD.  These lesions were treated with overlapping Xience XD stent 3.0 X 16 mm and 2.75 X 20 mm.  Proximal stent was post dilated using 3.0 noncompliant balloon and distal stent was post dilated using 2.71 mm noncompliant balloon  5. LVEDP of 11 mm Hg.  EF around 50- 55% without any focal wall motion abnormality.  No gradient across the aortic valve      Recommendations:    1. Dual antiplatelet therapy for at least 1 year  2. High-intensity statin  3. IV hydration

## 2024-07-12 NOTE — PLAN OF CARE
Remaining air removed from R radial vasc band. Gauze and tegaderm dressing applied. Site is CDI. No bleeding or hematoma noted around site. Site and surrounding areas soft. +2 uma radial pulses palpated. Skin normal in color, warm to touch, < 3 sec cap refill. Will continue to monitor pt.

## 2024-07-12 NOTE — NURSING
APTT at 0010 noted to be 43.2, therapeutic. No dose/rate change to heparin gtt. Next APTT to be drawn at 0600, order entered in EPIC.

## 2024-07-12 NOTE — PLAN OF CARE
[facility] (Hospital)  Initial Discharge Assessment       Primary Care Provider: Juan Bustamante MD    Admission Diagnosis: Elevated troponin [R79.89]  Chest pain [R07.9]    Admission Date: 7/11/2024  Expected Discharge Date: 7/14/2024    Consult: anthony    Payor: [employer] MEDICARE / Plan: [employer] MEDICARE HMO / Product Type: Capitation /     Extended Emergency Contact Information  Primary Emergency Contact: Melisa Santos  Address:  [facility] [address] [place] [id number] [place]  Mobile Phone: [phone]  Relation: Daughter    Discharge Plan A: (P) Home  Discharge Plan B: (P) Home Health      [facility] [address] [place] [address], [place] [address]  [address] [facility] [address]  Phone: [phone] Fax: [phone]      Initial Assessment (most recent)       Adult Discharge Assessment - 07/12/24 1125          Discharge Assessment    Assessment Type Discharge Planning Assessment (P)      Confirmed/corrected address, phone number and insurance Yes (P)      Confirmed Demographics Correct on Facesheet (P)      Source of Information --     Communicated GONZALO with patient/caregiver Date not available/Unable to determine (P)      People in Home alone (P)      Do you expect to return to your current living situation? Yes (P)      Do you have help at home or someone to help you manage your care at home? Yes (P)      Prior to hospitilization cognitive status: Alert/Oriented (P)      Current cognitive status: Alert/Oriented (P)      Equipment Currently Used at Home cane, straight;bedside commode;grab bar;walker, rolling;shower chair;blood pressure machine (P)    pt has but does not use listed equipment    Readmission within 30 days? No (P)      Patient currently being followed by outpatient case management? No (P)      Do you currently have service(s) that help you manage your care at home? No (P)      Do you take prescription medications? Yes (P)      Do you have prescription  coverage? Yes (P)      Do you have any problems affording any of your prescribed medications? No (P)      Is the patient taking medications as prescribed? yes (P)      How do you get to doctors appointments? car, drives self (P)      Are you on dialysis? No (P)      Do you take coumadin? No (P)      Discharge Plan A Home (P)      Discharge Plan B Home Health (P)      DME Needed Upon Discharge  none (P)      Discharge Plan discussed with: Patient (P)         Physical Activity    On average, how many days per week do you engage in moderate to strenuous exercise (like a brisk walk)? 3 days (P)      On average, how many minutes do you engage in exercise at this level? 90 min (P)         Financial Resource Strain    How hard is it for you to pay for the very basics like food, housing, medical care, and heating? Not hard at all (P)         Housing Stability    In the last 12 months, was there a time when you were not able to pay the mortgage or rent on time? No (P)      At any time in the past 12 months, were you homeless or living in a shelter (including now)? No (P)         Transportation Needs    Has the lack of transportation kept you from medical appointments, meetings, work or from getting things needed for daily living? No (P)         Food Insecurity    Within the past 12 months, you worried that your food would run out before you got the money to buy more. Never true (P)      Within the past 12 months, the food you bought just didn't last and you didn't have money to get more. Never true (P)         Stress    Do you feel stress - tense, restless, nervous, or anxious, or unable to sleep at night because your mind is troubled all the time - these days? Not at all (P)         Social Isolation    How often do you feel lonely or isolated from those around you?  Never (P)         Alcohol Use    Q1: How often do you have a drink containing alcohol? Monthly or less (P)      Q2: How many drinks containing alcohol do you  have on a typical day when you are drinking? 1 or 2 (P)      Q3: How often do you have six or more drinks on one occasion? Never (P)         Utilities    In the past 12 months has the electric, gas, oil, or water company threatened to shut off services in your home? No (P)         Health Literacy    How often do you need to have someone help you when you read instructions, pamphlets, or other written material from your doctor or pharmacy? Rarely (P)    Pt wears glasses                     0910  Patient resting quietly in bed when CM rounded. No family present. Patient was admitted with chest pain & is being followed by [facility]. Pt denied chest pain at this time. Pt NPO for possible procedure today.     Patient lives alone, is independent of all ADLs, & will take an uber to get to his car parked at [facility] ED at time of discharge.     CM informed the pt of the scheduled hospital follow up appointment with Dr Juan Bustamante (PCP) on 7/22/2024 at 0940. Pt verbalized understanding.     Pt requested an appt with [facility] optho.     CM updated patient's whiteboard with CM name & contact information.     Cardiology team entered the room when CM left.        07/12/24 1030   Rounds   Attendance Nurse ;Provider   Discharge Plan A Home   Why the patient remains in the hospital Requires continued medical care     1030  CM was informed by Dr Oliveros that the pt is not medically stable to discharge  & will have a LHC done by nancy Mohan today.     1140  Message sent ot Dr Mohan's (cards)  requesting a hospfu appt. Patient will be notified of appt date & time. Information added to the pt's discharge paperwork.     Optometry appt scheduled for the pt with Dr Courtney Vincent on 77/12/2024 at 1340. Information added to the pt's discharge paperwork.       Will continue to follow.

## 2024-07-12 NOTE — ASSESSMENT & PLAN NOTE
Patient with SSS s/p PPM  Rhythm paced  Rep contacted for device interrogation  Reports palpitations this week with elevated HR per home monitoring

## 2024-07-12 NOTE — SUBJECTIVE & OBJECTIVE
Past Medical History:   Diagnosis Date    Arthritis     Back pain, chronic     BPH with urinary obstruction     Chronic constipation     Closed fracture of right shoulder 1/28/2021    Closed nondisplaced fracture of greater tuberosity of right humerus 2/24/2021    Colon polyp     DJD (degenerative joint disease)     Hip    Hyperlipidemia     Hypertension     Laryngopharyngeal reflux     Left inguinal hernia     Lumbar herniated disc     Lumbar stenosis     Neck mass 10/7/2014    -4/29/2022 path - lipoma    OA (osteoarthritis) of knee     Bilateral    Paradoxical insomnia     Sinus trouble        Past Surgical History:   Procedure Laterality Date    A-V CARDIAC PACEMAKER INSERTION N/A 12/19/2023    Procedure: INSERTION, CARDIAC PACEMAKER, DUAL CHAMBER;  Surgeon: Noah Vazquez MD;  Location: [facility] CATH LAB/EP;  Service: Cardiology;  Laterality: N/A;    BACK SURGERY  2014    COLONOSCOPY      COLONOSCOPY N/A 5/17/2023    Procedure: COLONOSCOPY;  Surgeon: Christiano Vazquez MD;  Location: [facility] ENDO;  Service: Endoscopy;  Laterality: N/A;  Constipation 2 day prep.Instructions to portal.EC    CORONARY ANGIOGRAPHY N/A 12/18/2023    Procedure: ANGIOGRAM, CORONARY ARTERY;  Surgeon: Flaco Kelly MD;  Location: [facility] CATH LAB/EP;  Service: Cardiology;  Laterality: N/A;    CYSTOSCOPY WITH TRANSURETHRAL DESTRUCTION OF PROSTATE,RFA N/A 4/11/2024    Procedure: CYSTOSCOPY WITH TRANSURETHRAL DESTRUCTION OF PROSTATE,RFA  rezum generator and at least 2 handpieces Contact Paul grant to schedule;  Surgeon: Juan Wadr MD;  Location: [facility] OR;  Service: Urology;  Laterality: N/A;  rezum generator and at least 2 handpieces  Contact Paul grant to schedule- Marquise notified 3/26 AM    EPIDURAL STEROID INJECTION INTO LUMBAR SPINE N/A 7/5/2022    Procedure: Injection-steroid-epidural-lumbar L3-4;  Surgeon: Yury Crum Jr., MD;  Location: [facility] PAIN MGT;  Service: Pain Management;  Laterality: N/A;  oral sedation    asa      INSERTION, PACEMAKER, TEMPORARY TRANSVENOUS  12/18/2023    Procedure: Insertion, Pacemaker, Temporary Transvenous;  Surgeon: Flaco Kelly MD;  Location: [facility] CATH LAB/EP;  Service: Cardiology;;    JOINT REPLACEMENT Left 05/04/2018    KNEE SURGERY      left hand      LEG SURGERY      SPINE SURGERY      UPPER GASTROINTESTINAL ENDOSCOPY         Review of patient's allergies indicates:   Allergen Reactions    Clindamycin Swelling     Throat swells    Lisinopril Swelling and Other (See Comments)     Throat swelling    Shellfish containing products        No current facility-administered medications on file prior to encounter.     Current Outpatient Medications on File Prior to Encounter   Medication Sig    aspirin (ECOTRIN) 81 MG EC tablet Take 81 mg by mouth once daily.    finasteride (PROSCAR) 5 mg tablet Take 1 tablet (5 mg total) by mouth once daily.    fluticasone propionate (FLONASE) 50 mcg/actuation nasal spray 1 spray (50 mcg total) by Each Nostril route once daily.    hydrOXYzine HCL (ATARAX) 10 MG Tab Take 1 tablet (10 mg total) by mouth nightly as needed (sleep).    linaCLOtide (LINZESS) 145 mcg Cap capsule Take 1 capsule (145 mcg total) by mouth daily as needed (constipation).    multivitamin capsule Take 1 capsule by mouth once daily.    NIFEdipine (PROCARDIA-XL) 30 MG (OSM) 24 hr tablet Take 1 tablet (30 mg total) by mouth once daily.    pravastatin (PRAVACHOL) 40 MG tablet Take 1 tablet (40 mg total) by mouth once daily.    losartan (COZAAR) 100 MG tablet Take 1 tablet (100 mg total) by mouth once daily. (Patient not taking: Reported on 7/11/2024)    tamsulosin (FLOMAX) 0.4 mg Cap Take 1 capsule (0.4 mg total) by mouth once daily. (Patient not taking: Reported on 7/11/2024)     Family History       Problem Relation (Age of Onset)    Cancer Father    No Known Problems Mother, Sister, Brother, Maternal Aunt, Maternal Uncle, Paternal Aunt, Paternal Uncle, Maternal Grandmother, Maternal  Grandfather, Paternal Grandmother, Paternal Grandfather, Daughter, Son          Tobacco Use    Smoking status: Never    Smokeless tobacco: Never   Substance and Sexual Activity    Alcohol use: Yes     Alcohol/week: 0.0 standard drinks of alcohol     Comment: approximately 2 beers weekly    Drug use: No    Sexual activity: Yes     Partners: Female     Birth control/protection: None     Review of Systems   Constitutional: Negative. Negative for diaphoresis.   HENT: Negative.     Cardiovascular:  Positive for chest pain and palpitations (remote). Negative for irregular heartbeat, leg swelling, near-syncope, orthopnea, paroxysmal nocturnal dyspnea and syncope.   Respiratory:  Negative for cough and shortness of breath.    Endocrine: Negative.    Hematologic/Lymphatic: Negative.    Skin: Negative.    Musculoskeletal: Negative.    Gastrointestinal:  Positive for constipation and dysphagia. Negative for nausea and vomiting.   Genitourinary: Negative.    Neurological:  Negative for dizziness, focal weakness and weakness.   Psychiatric/Behavioral: Negative.     Allergic/Immunologic: Negative.      Objective:     Vital Signs (Most Recent):  Temp: 97.6 °F (36.4 °C) (07/12/24 1115)  Pulse: 63 (07/12/24 1115)  Resp: 18 (07/12/24 1115)  BP: 116/68 (07/12/24 1115)  SpO2: 95 % (07/12/24 1115) Vital Signs (24h Range):  Temp:  [97.4 °F (36.3 °C)-98.3 °F (36.8 °C)] 97.6 °F (36.4 °C)  Pulse:  [] 63  Resp:  [16-19] 18  SpO2:  [95 %-98 %] 95 %  BP: (116-226)/() 116/68     Weight: 104 kg (229 lb 4.5 oz) (Simultaneous filing. User may not have seen previous data.)  Body mass index is 31.98 kg/m².    SpO2: 95 %         Intake/Output Summary (Last 24 hours) at 7/12/2024 1115  Last data filed at 7/12/2024 0653  Gross per 24 hour   Intake 643.52 ml   Output 600 ml   Net 43.52 ml       Lines/Drains/Airways       Peripheral Intravenous Line  Duration                  Peripheral IV - Single Lumen 07/11/24 0403 20 G Left Antecubital 1  "day                     Physical Exam  Constitutional:       General: He is not in acute distress.     Appearance: He is not diaphoretic.   HENT:      Head: Atraumatic.   Eyes:      General:         Right eye: No discharge.         Left eye: No discharge.   Cardiovascular:      Rate and Rhythm: Normal rate and regular rhythm.      Heart sounds: Murmur heard.   Pulmonary:      Effort: Pulmonary effort is normal.      Breath sounds: Normal breath sounds.   Abdominal:      General: Bowel sounds are normal.      Palpations: Abdomen is soft.   Skin:     General: Skin is warm and dry.   Neurological:      Mental Status: He is alert. Mental status is at baseline.          Significant Labs: BMP:   Recent Labs   Lab 07/11/24  0403 07/11/24  1633   * 95    140   K 3.9 4.1    109   CO2 24 25   BUN 13 9   CREATININE 0.73 0.8   CALCIUM 8.9 9.1   MG  --  2.1   , CMP   Recent Labs   Lab 07/11/24  0403 07/11/24  1633    140   K 3.9 4.1    109   CO2 24 25   * 95   BUN 13 9   CREATININE 0.73 0.8   CALCIUM 8.9 9.1   PROT 6.8 6.4   ALBUMIN 4.0 3.5   BILITOT 0.3 0.3   ALKPHOS 85 63   AST 23 19   ALT 20 16   ANIONGAP 12 6*   , CBC   Recent Labs   Lab 07/11/24  1633 07/11/24  1730 07/12/24  0520   WBC 6.60 6.84 7.16   HGB 13.4* 13.3* 14.0   HCT 40.9 40.3 42.4   PLT SEE COMMENT 193 196   , INR   Recent Labs   Lab 07/11/24  1730   INR 1.0   , Lipid Panel No results for input(s): "CHOL", "HDL", "LDLCALC", "TRIG", "CHOLHDL" in the last 48 hours., Troponin   Recent Labs   Lab 07/11/24  2023 07/12/24  0009 07/12/24  0520   TROPONINI 0.681* 0.828* 0.844*   , and All pertinent lab results from the last 24 hours have been reviewed.    Significant Imaging: Echocardiogram: Transthoracic echo (TTE) complete (Cupid Only):   Results for orders placed or performed during the hospital encounter of 12/18/23   Echo   Result Value Ref Range    RA Width 4.23 cm    LA volume (mod) 107.75 cm3    Left Atrium Major Axis 7.19 " cm    Left Atrium Minor Axis 6.47 cm    RA Major Axis 5.44 cm    LV Diastolic Volume 59.31 mL    LV Systolic Volume 25.95 mL    PV Peak D Kalyan 0.35 m/s    PV Peak S Kalyan 0.38 m/s    MV Peak A Kalyan 1.17 m/s    MV stenosis pressure 1/2 time 62.22 ms    MV VTI 45.1 cm    TR Max Kalyan 2.81 m/s    MV Peak E Kalyan 0.78 m/s    MV peak gradient 7 mmHg    Mr max kalyan 4.64 m/s    Ao VTI 37.80 cm    Ao peak kalyan 1.72 m/s    LVOT peak VTI 25.70 cm    LVOT peak kalyan 1.23 m/s    LVOT diameter 2.00 cm    E wave deceleration time 214.54 msec    MV mean gradient 2 mmHg    AV mean gradient 7 mmHg    RV S' 14.75 cm/s    TAPSE 2.72 cm    RVDD 3.41 cm    LA size 4.81 cm    Ascending aorta 3.63 cm    STJ 3.56 cm    Sinus 4.03 cm    LVIDs 2.66 2.1 - 4.0 cm    Posterior Wall 1.46 (A) 0.6 - 1.1 cm    IVS 1.40 (A) 0.6 - 1.1 cm    LVIDd 3.73 3.5 - 6.0 cm    PV PEAK VELOCITY 1.56 m/s    TDI LATERAL 0.08 m/s    Left Ventricular Outflow Tract Mean Gradient 3.70 mmHg    Left Ventricular Outflow Tract Mean Velocity 0.92 cm/s    Ansari's Biplane MOD Ejection Fraction 64 %    TDI SEPTAL 0.06 m/s    LV LATERAL E/E' RATIO 9.75 m/s    LV SEPTAL E/E' RATIO 13.00 m/s    FS 29 28 - 44 %    LV mass 195.53 g    ZLVIDD -7.82     ZLVIDS -4.89     Left Ventricle Relative Wall Thickness 0.78 cm    AV valve area 2.13 cm²    AV Velocity Ratio 0.72     AV index (prosthetic) 0.68     MV valve area p 1/2 method 3.54 cm2    MV valve area by continuity eq 1.79 cm2    PV peak gradient 10 mmHg    E/A ratio 0.67     Mean e' 0.07 m/s    Pulm vein S/D ratio 1.09     LVOT area 3.1 cm2    LVOT stroke volume 80.70 cm3    AV peak gradient 12 mmHg    E/E' ratio 11.14 m/s    LV Systolic Volume Index 11.5 mL/m2    LV Diastolic Volume Index 26.36 mL/m2    LV Mass Index 87 g/m2    Triscuspid Valve Regurgitation Peak Gradient 32 mmHg    LA Volume Index (Mod) 47.9 mL/m2    JANINE by Velocity Ratio 2.25 cm²    BSA 2.3 m2    LA Volume Index 60.6 mL/m2    LA volume 136.45 cm3    LA WIDTH 4.9 cm     TV resting pulmonary artery pressure 35 mmHg    RV TB RVSP 6 mmHg    Est. RA pres 3 mmHg    Narrative      Left Ventricle: The left ventricle is normal in size. There is   concentric remodeling. Normal wall motion. There is normal systolic   function. Biplane (2D) method of discs ejection fraction is 64%. Grade II   diastolic dysfunction.    Right Ventricle: Normal right ventricular cavity size. Systolic   function is normal. TAPSE is 2.72 cm.    Left Atrium: Left atrium is severely dilated. The left atrium volume   index is 60.6 mL/m2.    Aortic Valve: There is mild aortic valve sclerosis.    Mitral Valve: There is mild regurgitation.    Tricuspid Valve: There is mild regurgitation.    Pulmonic Valve: There is no stenosis. There is mild to moderate   regurgitation.    Pulmonary Artery: The estimated pulmonary artery systolic pressure is   35 mmHg.    IVC/SVC: Normal venous pressure at 3 mmHg.

## 2024-07-12 NOTE — SUBJECTIVE & OBJECTIVE
Interval History: awake and alert. No chest pain this morning. Leg swelling, improved  Troponin still up trending- on heparin gtt, possible LHC   Awaits cardiology rec's  TTE pending    Review of Systems   Constitutional:  Positive for activity change.   Eyes:  Negative for photophobia and visual disturbance.   Respiratory:  Negative for chest tightness and shortness of breath.    Cardiovascular:  Negative for chest pain and leg swelling.   Endocrine: Negative for polyphagia.   Genitourinary:  Negative for dysuria and enuresis.   Neurological:  Negative for numbness.   Psychiatric/Behavioral:  Negative for agitation and confusion.      Objective:     Vital Signs (Most Recent):  Temp: 97.9 °F (36.6 °C) (07/12/24 0740)  Pulse: 60 (07/12/24 0740)  Resp: 18 (07/12/24 0740)  BP: (!) 147/73 (07/12/24 0820)  SpO2: 97 % (07/12/24 0821) Vital Signs (24h Range):  Temp:  [97.4 °F (36.3 °C)-98.3 °F (36.8 °C)] 97.9 °F (36.6 °C)  Pulse:  [] 60  Resp:  [16-19] 18  SpO2:  [95 %-98 %] 97 %  BP: (135-226)/() 147/73     Weight: 104 kg (229 lb 4.5 oz) (Simultaneous filing. User may not have seen previous data.)  Body mass index is 31.98 kg/m².    Intake/Output Summary (Last 24 hours) at 7/12/2024 1010  Last data filed at 7/12/2024 0653  Gross per 24 hour   Intake 643.52 ml   Output 600 ml   Net 43.52 ml         Physical Exam  Constitutional:       Appearance: He is obese. He is not ill-appearing.   HENT:      Head: Normocephalic and atraumatic.   Cardiovascular:      Rate and Rhythm: Normal rate.   Pulmonary:      Effort: Pulmonary effort is normal.      Breath sounds: No rales.   Abdominal:      General: Bowel sounds are normal.      Palpations: Abdomen is soft.   Musculoskeletal:         General: Swelling present. Normal range of motion.      Cervical back: Normal range of motion.      Right lower leg: Edema present.      Left lower leg: Edema present.      Comments: + bilateral pedal edema   Neurological:      General:  "No focal deficit present.      Mental Status: He is alert and oriented to person, place, and time.   Psychiatric:         Mood and Affect: Mood normal.         Behavior: Behavior normal.             Significant Labs: A1C: No results for input(s): "HGBA1C" in the last 4320 hours.  ABGs: No results for input(s): "PH", "PCO2", "HCO3", "POCSATURATED", "BE", "TOTALHB", "COHB", "METHB", "O2HB", "POCFIO2", "PO2" in the last 48 hours.  Blood Culture: No results for input(s): "LABBLOO" in the last 48 hours.  CBC:   Recent Labs   Lab 07/11/24  1633 07/11/24  1730 07/12/24  0520   WBC 6.60 6.84 7.16   HGB 13.4* 13.3* 14.0   HCT 40.9 40.3 42.4   PLT SEE COMMENT 193 196     CMP:   Recent Labs   Lab 07/11/24  0403 07/11/24  1633    140   K 3.9 4.1    109   CO2 24 25   * 95   BUN 13 9   CREATININE 0.73 0.8   CALCIUM 8.9 9.1   PROT 6.8 6.4   ALBUMIN 4.0 3.5   BILITOT 0.3 0.3   ALKPHOS 85 63   AST 23 19   ALT 20 16   ANIONGAP 12 6*     Lactic Acid: No results for input(s): "LACTATE" in the last 48 hours.  Lipase: No results for input(s): "LIPASE" in the last 48 hours.  Lipid Panel: No results for input(s): "CHOL", "HDL", "LDLCALC", "TRIG", "CHOLHDL" in the last 48 hours.  Magnesium:   Recent Labs   Lab 07/11/24  1633   MG 2.1     Troponin:   Recent Labs   Lab 07/11/24  2023 07/12/24  0009 07/12/24  0520   TROPONINI 0.681* 0.828* 0.844*     TSH:   Recent Labs   Lab 07/09/24  1711   TSH 1.626     Urine Culture: No results for input(s): "LABURIN" in the last 48 hours.  Urine Studies: No results for input(s): "COLORU", "APPEARANCEUA", "PHUR", "SPECGRAV", "PROTEINUA", "GLUCUA", "KETONESU", "BILIRUBINUA", "OCCULTUA", "NITRITE", "UROBILINOGEN", "LEUKOCYTESUR", "RBCUA", "WBCUA", "BACTERIA", "SQUAMEPITHEL", "HYALINECASTS" in the last 48 hours.    Invalid input(s): "WRIGHTSUR"    Significant Imaging: I have reviewed all pertinent imaging results/findings within the past 24 hours.  "

## 2024-07-12 NOTE — ASSESSMENT & PLAN NOTE
Coronary artery disease involving native coronary artery without angina pectoris  History of recent LHC from 12/18/23 in setting of CHF, LM patent, pLAD 40%, LCX patent, RCA patent.   Troponin 0.020 > 0.119  Chest x-ray   EKG- reviewed  TTE   Continue aspirin  Morphine p.r.n.  Add Plavix  Strict input output  Heparin GTT  Nitro p.r.n.   Supplemental oxygen  Consult cardiology for possible LHC- awaits rec's

## 2024-07-12 NOTE — PLAN OF CARE
2 cc of air removed from R radial vasc band. No hematoma or bleeding noted. +2 uma radial pulses palpated. Skin is normal in color, warm to touch, < 3 sec cap refill. VSS. Will continue to monitor pt for safety and needs.

## 2024-07-12 NOTE — NURSING
APTT at 0600 noted to be 43.4, therapeutic. No dose/rate change to heparin gtt. Next APTT with AM-draw, order entered in EPIC.

## 2024-07-12 NOTE — MEDICAL/APP STUDENT
Epic way  Chief complaint?83-year-old male presents to ED with midsternal chest pain radiating to the jaw that started 3 hours prior to presentation.  Patient has a past medical history of BPH, chronic back pain, constipation, DJD, GERD, hyperlipidemia, hypertension, insomnia, and pacemaker. Patient was transferred to [facility] from [facility] and admitted for further workup and management of chest pain.      HPI  83-year-old male presents to ED with midsternal chest pain that radiates to the jaw. Patient describes the pain as being a pressure in the middle of his chest. Patient has had swelling in his legs and feet for the past couple of months that became worse in the past week to the point where he is unable to put on his shoes. The pain started at approximately 11pm on Wednesday, July 10. Patient went to [facility] at 3am July 11 with the chest pain. Patient reports that nothing makes the chest pain better or worse. Patient was transferred to [facility]     PMHX    BPH, chronic back pain, constipation, DJD, GERD, hyperlipidemia, hypertension, insomnia, and pacemaker.     SHX  Back surgery   Knee replacement  Hand surgery  Pacemaker  Coronary angiography  Cystoscopy  Upper GI endoscopy  Spine surgery      Social  Lives alone in two story [facility]. Patient has been  for many years and has children. 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 Patient is not sexually active. Patient is a social drinker and does not use recreational drugs. Patient does not smoke.     Family history  Cancer in father.   Heart problems in son aged 50.     Medications  Current Outpatient Medications on File Prior to Encounter     Medication Sig     aspirin (ECOTRIN) 81 MG EC tablet Take 81 mg by mouth once daily.    finasteride (PROSCAR) 5 mg tablet Take 1 tablet (5 mg total) by mouth once daily.    fluticasone propionate (FLONASE) 50 mcg/actuation nasal spray 1 spray (50  mcg total) by Each Nostril route once daily.    hydrOXYzine HCL (ATARAX) 10 MG Tab Take 1 tablet (10 mg total) by mouth nightly as needed (sleep).    linaCLOtide (LINZESS) 145 mcg Cap capsule Take 1 capsule (145 mcg total) by mouth daily as needed (constipation).    multivitamin capsule Take 1 capsule by mouth once daily.    NIFEdipine (PROCARDIA-XL) 30 MG (OSM) 24 hr tablet Take 1 tablet (30 mg total) by mouth once daily.    pravastatin (PRAVACHOL) 40 MG tablet Take 1 tablet (40 mg total) by mouth once daily.    losartan (COZAAR) 100 MG tablet Take 1 tablet (100 mg total) by mouth once daily. (Patient not taking: Reported on 7/11/2024)    tamsulosin (FLOMAX) 0.4 mg Cap Take 1 capsule (0.4 mg total) by mouth once daily. (Patient not taking: Reported on 7/11/2024)    [DISCONTINUED] aspirin (ECOTRIN) 325 MG EC tablet Take 1 tablet (325 mg total) by mouth once daily. (Patient taking differently: Take 325 mg by mouth every other day.)    [DISCONTINUED] azelastine (ASTELIN) 137 mcg (0.1 %) nasal spray 1 spray (137 mcg total) by Nasal route 2 (two) times daily as needed for Rhinitis.    [DISCONTINUED] methylPREDNISolone (MEDROL DOSEPACK) 4 mg tablet use as directed      Allergies        Review of patient's allergies indicates:      Allergen Reactions      Clindamycin Swelling      Throat swells     Lisinopril Swelling and Other (See Comments)      Throat swelling     Shellfish containing products           Vitals    Vital Signs (Most Recent):  Temp: 97.4 °F (36.3 °C) (07/11/24 1623)  Pulse: 60 (07/11/24 1623)  Resp: 18 (07/11/24 1623)  BP: 135/85 (07/11/24 1623)  SpO2: 98 % (07/11/24 1623) Vital Signs (24h Range):  Temp:  [97.4 °F (36.3 °C)-98 °F (36.7 °C)] 97.4 °F (36.3 °C)  Pulse:  [59-83] 60  Resp:  [11-18] 18  SpO2:  [93 %-99 %] 98 %  BP: (135-226)/() 135/85   ?Weight: 106.6 kg (235 lb)  Body mass index is 32.78 kg/m².     Physical Exam  Constitutional:       Appearance: He is obese. He is not ill-appearing.  "  HENT:      Head: Normocephalic and atraumatic.   Eyes:      Extraocular Movements: Extraocular movements intact.      Pupils: Pupils are equal, round, and reactive to light.   Cardiovascular:      Rate and Rhythm: Normal rate.   Pulmonary:      Effort: Pulmonary effort is normal.      Breath sounds: No rales.   Abdominal:      General: Bowel sounds are normal.      Palpations: Abdomen is soft.   Musculoskeletal:         General: Swelling present. Normal range of motion.      Cervical back: Normal range of motion.      Right lower leg: Edema present.      Left lower leg: Edema present.      Comments: +++ bilateral pedal edema   Neurological:      General: No focal deficit present.      Mental Status: He is alert and oriented to person, place, and time.   Psychiatric:         Mood and Affect: Mood normal.         Behavior: Behavior normal.     Labs and tests    Significant Labs: A1C: No results for input(s): "HGBA1C" in the last 4320 hours.  Blood Culture: No results for input(s): "LABBLOO" in the last 48 hours.  CBC:        Recent Labs     Lab 07/11/24  0403 07/11/24  1633   WBC 7.86 6.60   HGB 13.9* 13.4*   HCT 41.8 40.9    --      CMP:        Recent Labs     Lab 07/11/24  0403 07/11/24  1633    140   K 3.9 4.1    109   CO2 24 25   * 95   BUN 13 9   CREATININE 0.73 0.8   CALCIUM 8.9 9.1   PROT 6.8 6.4   ALBUMIN 4.0 3.5   BILITOT 0.3 0.3   ALKPHOS 85 63   AST 23 19   ALT 20 16   ANIONGAP 12 6*      Coagulation: No results for input(s): "PT", "INR", "APTT" in the last 48 hours.  Lactic Acid: No results for input(s): "LACTATE" in the last 48 hours.  Lipase: No results for input(s): "LIPASE" in the last 48 hours.  Lipid Panel: No results for input(s): "CHOL", "HDL", "LDLCALC", "TRIG", "CHOLHDL" in the last 48 hours.  Magnesium:       Recent Labs    Lab 07/11/24  1633   MG 2.1      Troponin:         Recent Labs      Lab 07/11/24  0654 07/11/24  1115 07/11/24  1633   TROPONINI 0.119* 0.194* " "0.382*      TSH:       Recent Labs    Lab 07/09/24  1711   TSH 1.626      Urine Culture: No results for input(s): "LABURIN" in the last 48 hours.  Urine Studies: No results for input(s): "COLORU", "APPEARANCEUA", "PHUR", "SPECGRAV", "PROTEINUA", "GLUCUA", "KETONESU", "BILIRUBINUA", "OCCULTUA", "NITRITE", "UROBILINOGEN", "LEUKOCYTESUR", "RBCUA", "WBCUA", "BACTERIA", "SQUAMEPITHEL", "HYALINECASTS" in the last 48 hours.     Invalid input(s): "WRIGHTSUR"     Significant Imaging: I have reviewed all pertinent imaging results/findings within the past 24 hours.    ROS  Constitutional:  Positive for activity change.   Eyes:  Negative for photophobia and visual disturbance.   Respiratory:  Positive for shortness of breath. Negative for chest tightness.    Cardiovascular:  Positive for chest pain and leg swelling.   Endocrine: Negative for polyphagia.   Genitourinary:  Negative for dysuria and enuresis.   Neurological:  Negative for numbness.   Psychiatric/Behavioral:  Negative for agitation and confusion    Assessment and Plan       Differential diagnosis for chest pain includes MI, angina, pericarditis, aortic dissection, PE, pneumothorax, pneumonia, GERD, peptic ulcers, osteochondritis, and panic attack.     Patient does not report any SOB or cough eliminating PE, pneumothorax, pneumonia, and panic attack.     Patient does not report any abdominal pain and denies any symptoms of reflux.     Given presentation patient most likely has a cardiac cause to his chest pain.          * Chest pain  Coronary artery disease involving native coronary artery without angina pectoris  History of recent LHC from 12/18/23 in setting of CHF, LM patent, pLAD 40%, LCX patent, RCA patent.   Troponin 0.020 > 0.119  Chest x-ray   TTE   Continue aspirin  Morphine p.r.n.  Add Plavix  Strict input output  Heparin GTT  Nitro p.r.n.   Supplemental oxygen  Consult cardiology        Pacemaker  Chronic     Constipation     Chronic   On Linzess at home "   MiraLax and lactulose     Hypertension, essential  Chronic, controlled. Latest blood pressure and vitals reviewed-      Temp:  [97.4 °F (36.3 °C)-98.3 °F (36.8 °C)]   Pulse:  [59-83]   Resp:  [11-19]   BP: (135-226)/()   SpO2:  [93 %-99 %] .   Home meds for hypertension were reviewed and noted below.   Hypertension Medications     NIFEdipine (PROCARDIA-XL) 30 MG (OSM) 24 hr tablet Take 1 tablet (30 mg total) by mouth once daily.     losartan (COZAAR) 100 MG tablet Take 1 tablet (100 mg total) by mouth once daily.       While in the hospital, will manage blood pressure as follows; Continue home antihypertensive regimen     Will utilize p.r.n. blood pressure medication only if patient's blood pressure greater than 140/90 and he develops symptoms such as worsening chest pain or shortness of breath.    Hyperlipidemia  Continue statin

## 2024-07-12 NOTE — INTERVAL H&P NOTE
The patient has been examined and the H&P has been reviewed:    I concur with the findings and no changes have occurred since H&P was written.    Procedure risks, benefits and alternative options discussed and understood by patient/family.          Active Hospital Problems    Diagnosis  POA    *Chest pain [R07.9]  Yes    Elevated troponin [R79.89]  Yes    Pacemaker [Z95.0]  Yes     -CHB and symptomatic bradycardia  -followed in cardiology  -PPM Dual 12/19/2023        Coronary artery disease involving native coronary artery without angina pectoris [I25.10]  Yes     - LHC 12/18/2023 in setting of CHB   LM patent; pLAD 40%; LCX patent; RCA patent       Constipation [K59.00]  Yes    Hypertension, essential [I10]  Yes    Hyperlipidemia [E78.5]  Yes      Resolved Hospital Problems   No resolved problems to display.

## 2024-07-12 NOTE — PLAN OF CARE
Patient transfered to cath lab bay # 4 via stretcher with side rails up x2. Pt AAOx4 and able to follow commands. Pt is stable when connecting to cardiac monitors. VSS.   Right radial vasc band in place with 12cc of air in band per report. Site is CDI. No redness, bruising, or hematoma noted around site. +2 uma radial pulses palpated. Palpated uma pedal pulses. Skin normal in color and warm to touch, <3 sec cap refill.  Fall risk precautions given and patient acknowledges.  AIDET completed to pt.  Will continue to monitor patient.    160

## 2024-07-12 NOTE — ASSESSMENT & PLAN NOTE
C 12/2023  Left Main:  No stenosis    LAD:    40% proximal stenosis . Tortuous Vessel  Circumflex:  No stenosis . Tortuous vessel  RCA:  No stenosis       TTE 12/2023    Left Ventricle: The left ventricle is normal in size. There is concentric remodeling. Normal wall motion. There is normal systolic function. Biplane (2D) method of discs ejection fraction is 64%. Grade II diastolic dysfunction.    Right Ventricle: Normal right ventricular cavity size. Systolic function is normal. TAPSE is 2.72 cm.    Left Atrium: Left atrium is severely dilated. The left atrium volume index is 60.6 mL/m2.    Aortic Valve: There is mild aortic valve sclerosis.    Mitral Valve: There is mild regurgitation.    Tricuspid Valve: There is mild regurgitation.    Pulmonic Valve: There is no stenosis. There is mild to moderate regurgitation.    Pulmonary Artery: The estimated pulmonary artery systolic pressure is 35 mmHg.    IVC/SVC: Normal venous pressure at 3 mmHg.    Troponin 0.1-0.8  EKG without acute ischemic changes  SBP >220 - reports SBP runs in the 140s per home cuff  Will proceed with C today and repeat TTE  Continue DAPT, statin, heparin gtt   BB initiated

## 2024-07-12 NOTE — CONSULTS
Post - Telemetry  Cardiology  Consult Note    Patient Name: Korey Santos  MRN: [id number]  Admission Date: 7/11/2024  Hospital Length of Stay: 1 days  Code Status: Full Code   Attending Provider: Daisy Calvin*   Consulting Provider: Romeo Cobb NP  Primary Care Physician: Juan Bustamante MD  Principal Problem:Chest pain    Patient information was obtained from patient, past medical records, and ER records.     [facility]  Consult performed by: Romeo Cobb NP  Consult ordered by: Daisy Calvin MD  Assessment/Recommendations: I agree with the assessment and plan for the patient.      Overall 83-year-old male with past medical history of CAD, moderate disease in the mid LAD, sick sinus syndrome status post pacemaker in December 20, 2023, admitted to the hospital with chest pain after swallowing sleeping pills, noted to have troponin of 0.8.  EKG shows paced rhythm.  Blood pressure on arrival was 220 mm Hg systolic.  Currently chest pain-free on heparin drip.      We have discussed risks and benefits of left heart catheterization.  Patient is to proceed with left heart catheterization with possible IFR of the LAD.        Subjective:     Chief Complaint:  Chest Pain      HPI:   Korey Santos is an 82 y/o Male with CAD, HTN, HLD, constipation, SSS s/p PPM, BPH, chronic back pain, GERD. Patient presented to the ED with MS SILVIA which started Wednesday that radiates to his jaw. He reports that he is actiuve and works out TIW without limitation. Patient reports GERD with recent dysphagia with swallowing pills. He denies NV, diaphoresis, dizziness, SOB. Patient reports allergic reaction to shellfish a year ago- side effect was noted to be throat swelling. Troponin elevated 0.1-0.8. EKG without acute ischemic changes. Patient on DAPT and heparin gtt. NPO for LHC today. TTE pending. SBP >220 - improved at this time. SoluMedrol ordered in the event  he has cross reaction to contrast given report of shellfish reaction.     [facility] 12/2023  Left Main:  No stenosis    LAD:    40% proximal stenosis . Tortuous Vessel  Circumflex:  No stenosis . Tortuous vessel  RCA:  No stenosis    The patient has non-obstructive coronary disease     TTE 12/2023    Left Ventricle: The left ventricle is normal in size. There is concentric remodeling. Normal wall motion. There is normal systolic function. Biplane (2D) method of discs ejection fraction is 64%. Grade II diastolic dysfunction.    Right Ventricle: Normal right ventricular cavity size. Systolic function is normal. TAPSE is 2.72 cm.    Left Atrium: Left atrium is severely dilated. The left atrium volume index is 60.6 mL/m2.    Aortic Valve: There is mild aortic valve sclerosis.    Mitral Valve: There is mild regurgitation.    Tricuspid Valve: There is mild regurgitation.    Pulmonic Valve: There is no stenosis. There is mild to moderate regurgitation.    Pulmonary Artery: The estimated pulmonary artery systolic pressure is 35 mmHg.    IVC/SVC: Normal venous pressure at 3 mmHg.      Past Medical History:   Diagnosis Date    Arthritis     Back pain, chronic     BPH with urinary obstruction     Chronic constipation     Closed fracture of right shoulder 1/28/2021    Closed nondisplaced fracture of greater tuberosity of right humerus 2/24/2021    Colon polyp     DJD (degenerative joint disease)     Hip    Hyperlipidemia     Hypertension     Laryngopharyngeal reflux     Left inguinal hernia     Lumbar herniated disc     Lumbar stenosis     Neck mass 10/7/2014    -4/29/2022 path - lipoma    OA (osteoarthritis) of knee     Bilateral    Paradoxical insomnia     Sinus trouble        Past Surgical History:   Procedure Laterality Date    A-V CARDIAC PACEMAKER INSERTION N/A 12/19/2023    Procedure: INSERTION, CARDIAC PACEMAKER, DUAL CHAMBER;  Surgeon: Noah Vazquez MD;  Location: [facility] CATH LAB/EP;  Service: Cardiology;  Laterality:  N/A;    BACK SURGERY  2014    COLONOSCOPY      COLONOSCOPY N/A 5/17/2023    Procedure: COLONOSCOPY;  Surgeon: Christiano Vazquez MD;  Location: [facility] ENDO;  Service: Endoscopy;  Laterality: N/A;  Constipation 2 day prep.Instructions to portal.EC    CORONARY ANGIOGRAPHY N/A 12/18/2023    Procedure: ANGIOGRAM, CORONARY ARTERY;  Surgeon: Flaco Kelly MD;  Location: [facility] CATH LAB/EP;  Service: Cardiology;  Laterality: N/A;    CYSTOSCOPY WITH TRANSURETHRAL DESTRUCTION OF PROSTATE,RFA N/A 4/11/2024    Procedure: CYSTOSCOPY WITH TRANSURETHRAL DESTRUCTION OF PROSTATE,RFA  rezum generator and at least 2 handpieces Contact Paul grant to schedule;  Surgeon: Juan Ward MD;  Location: [facility] OR;  Service: Urology;  Laterality: N/A;  rezum generator and at least 2 handpieces  Contact Paul grant to schedule- Marquise notified 3/26 AM    EPIDURAL STEROID INJECTION INTO LUMBAR SPINE N/A 7/5/2022    Procedure: Injection-steroid-epidural-lumbar L3-4;  Surgeon: Yury Crum Jr., MD;  Location: [facility] PAIN MGT;  Service: Pain Management;  Laterality: N/A;  oral sedation   asa      INSERTION, PACEMAKER, TEMPORARY TRANSVENOUS  12/18/2023    Procedure: Insertion, Pacemaker, Temporary Transvenous;  Surgeon: Flaco Kelly MD;  Location: [facility] CATH LAB/EP;  Service: Cardiology;;    JOINT REPLACEMENT Left 05/04/2018    KNEE SURGERY      left hand      LEG SURGERY      SPINE SURGERY      UPPER GASTROINTESTINAL ENDOSCOPY         Review of patient's allergies indicates:   Allergen Reactions    Clindamycin Swelling     Throat swells    Lisinopril Swelling and Other (See Comments)     Throat swelling    Shellfish containing products        No current facility-administered medications on file prior to encounter.     Current Outpatient Medications on File Prior to Encounter   Medication Sig    aspirin (ECOTRIN) 81 MG EC tablet Take 81 mg by mouth once daily.    finasteride (PROSCAR) 5 mg tablet Take 1 tablet (5 mg total) by  mouth once daily.    fluticasone propionate (FLONASE) 50 mcg/actuation nasal spray 1 spray (50 mcg total) by Each Nostril route once daily.    hydrOXYzine HCL (ATARAX) 10 MG Tab Take 1 tablet (10 mg total) by mouth nightly as needed (sleep).    linaCLOtide (LINZESS) 145 mcg Cap capsule Take 1 capsule (145 mcg total) by mouth daily as needed (constipation).    multivitamin capsule Take 1 capsule by mouth once daily.    NIFEdipine (PROCARDIA-XL) 30 MG (OSM) 24 hr tablet Take 1 tablet (30 mg total) by mouth once daily.    pravastatin (PRAVACHOL) 40 MG tablet Take 1 tablet (40 mg total) by mouth once daily.    losartan (COZAAR) 100 MG tablet Take 1 tablet (100 mg total) by mouth once daily. (Patient not taking: Reported on 7/11/2024)    tamsulosin (FLOMAX) 0.4 mg Cap Take 1 capsule (0.4 mg total) by mouth once daily. (Patient not taking: Reported on 7/11/2024)     Family History       Problem Relation (Age of Onset)    Cancer Father    No Known Problems Mother, Sister, Brother, Maternal Aunt, Maternal Uncle, Paternal Aunt, Paternal Uncle, Maternal Grandmother, Maternal Grandfather, Paternal Grandmother, Paternal Grandfather, Daughter, Son          Tobacco Use    Smoking status: Never    Smokeless tobacco: Never   Substance and Sexual Activity    Alcohol use: Yes     Alcohol/week: 0.0 standard drinks of alcohol     Comment: approximately 2 beers weekly    Drug use: No    Sexual activity: Yes     Partners: Female     Birth control/protection: None     Review of Systems   Constitutional: Negative. Negative for diaphoresis.   HENT: Negative.     Cardiovascular:  Positive for chest pain and palpitations (remote). Negative for irregular heartbeat, leg swelling, near-syncope, orthopnea, paroxysmal nocturnal dyspnea and syncope.   Respiratory:  Negative for cough and shortness of breath.    Endocrine: Negative.    Hematologic/Lymphatic: Negative.    Skin: Negative.    Musculoskeletal: Negative.    Gastrointestinal:  Positive  for constipation and dysphagia. Negative for nausea and vomiting.   Genitourinary: Negative.    Neurological:  Negative for dizziness, focal weakness and weakness.   Psychiatric/Behavioral: Negative.     Allergic/Immunologic: Negative.      Objective:     Vital Signs (Most Recent):  Temp: 97.6 °F (36.4 °C) (07/12/24 1115)  Pulse: 63 (07/12/24 1115)  Resp: 18 (07/12/24 1115)  BP: 116/68 (07/12/24 1115)  SpO2: 95 % (07/12/24 1115) Vital Signs (24h Range):  Temp:  [97.4 °F (36.3 °C)-98.3 °F (36.8 °C)] 97.6 °F (36.4 °C)  Pulse:  [] 63  Resp:  [16-19] 18  SpO2:  [95 %-98 %] 95 %  BP: (116-226)/() 116/68     Weight: 104 kg (229 lb 4.5 oz) (Simultaneous filing. User may not have seen previous data.)  Body mass index is 31.98 kg/m².    SpO2: 95 %         Intake/Output Summary (Last 24 hours) at 7/12/2024 1115  Last data filed at 7/12/2024 0653  Gross per 24 hour   Intake 643.52 ml   Output 600 ml   Net 43.52 ml       Lines/Drains/Airways       Peripheral Intravenous Line  Duration                  Peripheral IV - Single Lumen 07/11/24 0403 20 G Left Antecubital 1 day                     Physical Exam  Constitutional:       General: He is not in acute distress.     Appearance: He is not diaphoretic.   HENT:      Head: Atraumatic.   Eyes:      General:         Right eye: No discharge.         Left eye: No discharge.   Cardiovascular:      Rate and Rhythm: Normal rate and regular rhythm.      Heart sounds: Murmur heard.   Pulmonary:      Effort: Pulmonary effort is normal.      Breath sounds: Normal breath sounds.   Abdominal:      General: Bowel sounds are normal.      Palpations: Abdomen is soft.   Skin:     General: Skin is warm and dry.   Neurological:      Mental Status: He is alert. Mental status is at baseline.          Significant Labs: BMP:   Recent Labs   Lab 07/11/24  0403 07/11/24  1633   * 95    140   K 3.9 4.1    109   CO2 24 25   BUN 13 9   CREATININE 0.73 0.8   CALCIUM 8.9 9.1  "  MG  --  2.1   , CMP   Recent Labs   Lab 07/11/24  0403 07/11/24  1633    140   K 3.9 4.1    109   CO2 24 25   * 95   BUN 13 9   CREATININE 0.73 0.8   CALCIUM 8.9 9.1   PROT 6.8 6.4   ALBUMIN 4.0 3.5   BILITOT 0.3 0.3   ALKPHOS 85 63   AST 23 19   ALT 20 16   ANIONGAP 12 6*   , CBC   Recent Labs   Lab 07/11/24  1633 07/11/24  1730 07/12/24  0520   WBC 6.60 6.84 7.16   HGB 13.4* 13.3* 14.0   HCT 40.9 40.3 42.4   PLT SEE COMMENT 193 196   , INR   Recent Labs   Lab 07/11/24  1730   INR 1.0   , Lipid Panel No results for input(s): "CHOL", "HDL", "LDLCALC", "TRIG", "CHOLHDL" in the last 48 hours., Troponin   Recent Labs   Lab 07/11/24  2023 07/12/24  0009 07/12/24  0520   TROPONINI 0.681* 0.828* 0.844*   , and All pertinent lab results from the last 24 hours have been reviewed.    Significant Imaging: Echocardiogram: Transthoracic echo (TTE) complete (Cupid Only):   Results for orders placed or performed during the hospital encounter of 12/18/23   Echo   Result Value Ref Range    RA Width 4.23 cm    LA volume (mod) 107.75 cm3    Left Atrium Major Axis 7.19 cm    Left Atrium Minor Axis 6.47 cm    RA Major Axis 5.44 cm    LV Diastolic Volume 59.31 mL    LV Systolic Volume 25.95 mL    PV Peak D Kalyan 0.35 m/s    PV Peak S Kalyan 0.38 m/s    MV Peak A Kalyan 1.17 m/s    MV stenosis pressure 1/2 time 62.22 ms    MV VTI 45.1 cm    TR Max Kalyan 2.81 m/s    MV Peak E Kalyan 0.78 m/s    MV peak gradient 7 mmHg    Mr max kalyan 4.64 m/s    Ao VTI 37.80 cm    Ao peak kalyan 1.72 m/s    LVOT peak VTI 25.70 cm    LVOT peak kalyan 1.23 m/s    LVOT diameter 2.00 cm    E wave deceleration time 214.54 msec    MV mean gradient 2 mmHg    AV mean gradient 7 mmHg    RV S' 14.75 cm/s    TAPSE 2.72 cm    RVDD 3.41 cm    LA size 4.81 cm    Ascending aorta 3.63 cm    STJ 3.56 cm    Sinus 4.03 cm    LVIDs 2.66 2.1 - 4.0 cm    Posterior Wall 1.46 (A) 0.6 - 1.1 cm    IVS 1.40 (A) 0.6 - 1.1 cm    LVIDd 3.73 3.5 - 6.0 cm    PV PEAK VELOCITY 1.56 m/s    " TDI LATERAL 0.08 m/s    Left Ventricular Outflow Tract Mean Gradient 3.70 mmHg    Left Ventricular Outflow Tract Mean Velocity 0.92 cm/s    Ansari's Biplane MOD Ejection Fraction 64 %    TDI SEPTAL 0.06 m/s    LV LATERAL E/E' RATIO 9.75 m/s    LV SEPTAL E/E' RATIO 13.00 m/s    FS 29 28 - 44 %    LV mass 195.53 g    ZLVIDD -7.82     ZLVIDS -4.89     Left Ventricle Relative Wall Thickness 0.78 cm    AV valve area 2.13 cm²    AV Velocity Ratio 0.72     AV index (prosthetic) 0.68     MV valve area p 1/2 method 3.54 cm2    MV valve area by continuity eq 1.79 cm2    PV peak gradient 10 mmHg    E/A ratio 0.67     Mean e' 0.07 m/s    Pulm vein S/D ratio 1.09     LVOT area 3.1 cm2    LVOT stroke volume 80.70 cm3    AV peak gradient 12 mmHg    E/E' ratio 11.14 m/s    LV Systolic Volume Index 11.5 mL/m2    LV Diastolic Volume Index 26.36 mL/m2    LV Mass Index 87 g/m2    Triscuspid Valve Regurgitation Peak Gradient 32 mmHg    LA Volume Index (Mod) 47.9 mL/m2    JNAINE by Velocity Ratio 2.25 cm²    BSA 2.3 m2    LA Volume Index 60.6 mL/m2    LA volume 136.45 cm3    LA WIDTH 4.9 cm    TV resting pulmonary artery pressure 35 mmHg    RV TB RVSP 6 mmHg    Est. RA pres 3 mmHg    Narrative      Left Ventricle: The left ventricle is normal in size. There is   concentric remodeling. Normal wall motion. There is normal systolic   function. Biplane (2D) method of discs ejection fraction is 64%. Grade II   diastolic dysfunction.    Right Ventricle: Normal right ventricular cavity size. Systolic   function is normal. TAPSE is 2.72 cm.    Left Atrium: Left atrium is severely dilated. The left atrium volume   index is 60.6 mL/m2.    Aortic Valve: There is mild aortic valve sclerosis.    Mitral Valve: There is mild regurgitation.    Tricuspid Valve: There is mild regurgitation.    Pulmonic Valve: There is no stenosis. There is mild to moderate   regurgitation.    Pulmonary Artery: The estimated pulmonary artery systolic pressure is   35 mmHg.     IVC/SVC: Normal venous pressure at 3 mmHg.       Assessment and Plan:     * Chest pain  Per CAD    Pacemaker  Patient with SSS s/p PPM  Rhythm paced  Rep contacted for device interrogation  Reports palpitations this week with elevated HR per home monitoring     Coronary artery disease involving native coronary artery without angina pectoris  [facility] 12/2023  Left Main:  No stenosis    LAD:    40% proximal stenosis . Tortuous Vessel  Circumflex:  No stenosis . Tortuous vessel  RCA:  No stenosis       TTE 12/2023    Left Ventricle: The left ventricle is normal in size. There is concentric remodeling. Normal wall motion. There is normal systolic function. Biplane (2D) method of discs ejection fraction is 64%. Grade II diastolic dysfunction.    Right Ventricle: Normal right ventricular cavity size. Systolic function is normal. TAPSE is 2.72 cm.    Left Atrium: Left atrium is severely dilated. The left atrium volume index is 60.6 mL/m2.    Aortic Valve: There is mild aortic valve sclerosis.    Mitral Valve: There is mild regurgitation.    Tricuspid Valve: There is mild regurgitation.    Pulmonic Valve: There is no stenosis. There is mild to moderate regurgitation.    Pulmonary Artery: The estimated pulmonary artery systolic pressure is 35 mmHg.    IVC/SVC: Normal venous pressure at 3 mmHg.    Troponin 0.1-0.8  EKG without acute ischemic changes  SBP >220 - reports SBP runs in the 140s per home cuff  Will proceed with [facility] today and repeat TTE  Continue DAPT, statin, heparin gtt   BB initiated       Hypertension, essential  SBP >220 this admission- improved at present.   Continue CCB, ARB, BB initiated        VTE Risk Mitigation (From admission, onward)           Ordered     heparin 25,000 units in dextrose 5% (100 units/ml) IV bolus from bag LOW INTENSITY nomogram - [facility]  As needed (PRN)        Question:  Heparin Infusion Adjustment (DO NOT MODIFY ANSWER)  Answer:  [email or web address]\epic\Images\Pharmacy\HeparinInfusions\heparin  LOW INTENSITY nomogram for [facility] [id number]    07/11/24 1718     heparin 25,000 units in dextrose 5% (100 units/ml) IV bolus from bag LOW INTENSITY nomogram - [facility]  As needed (PRN)        Question:  Heparin Infusion Adjustment (DO NOT MODIFY ANSWER)  Answer:  [email or web address]\epic\Images\Pharmacy\HeparinInfusions\heparin LOW INTENSITY nomogram for [facility] [id number]    07/11/24 1718     heparin 25,000 units in dextrose 5% 250 mL (100 units/mL) infusion LOW INTENSITY nomogram - [facility]  Continuous        Question:  Begin at (units/kg/hr)  Answer:  12    07/11/24 1713                    Thank you for your consult. I will follow-up with patient. Please contact us if you have any additional questions.    Romeo Cobb NP  Cardiology   [place] - Telemetry

## 2024-07-12 NOTE — PLAN OF CARE
Patient transferred via wheelchair to Room 466 4th floor tele on assigned cardiac tele monitor. VSS, AAOx4. No c/o pain.   Right radial gauze/tegaderm site CDI. No bleeding no hematoma noted. Site and surrounding area soft.  Assessed by Charge nurse, RN at bedside. +2 uma radial pulses. All questions answered.

## 2024-07-13 VITALS
HEIGHT: 71 IN | OXYGEN SATURATION: 100 % | SYSTOLIC BLOOD PRESSURE: 151 MMHG | WEIGHT: 229 LBS | RESPIRATION RATE: 18 BRPM | HEART RATE: 60 BPM | BODY MASS INDEX: 32.06 KG/M2 | TEMPERATURE: 98 F | DIASTOLIC BLOOD PRESSURE: 73 MMHG

## 2024-07-13 PROBLEM — I21.4 NSTEMI (NON-ST ELEVATED MYOCARDIAL INFARCTION): Status: ACTIVE | Noted: 2024-07-13

## 2024-07-13 LAB
APTT PPP: 29.2 SEC (ref 21–32)
POCT GLUCOSE: 103 MG/DL (ref 70–110)
POCT GLUCOSE: 142 MG/DL (ref 70–110)

## 2024-07-13 PROCEDURE — 36415 COLL VENOUS BLD VENIPUNCTURE: CPT | Mod: HCNC | Performed by: FAMILY MEDICINE

## 2024-07-13 PROCEDURE — 25000003 PHARM REV CODE 250: Mod: HCNC | Performed by: FAMILY MEDICINE

## 2024-07-13 PROCEDURE — 99233 SBSQ HOSP IP/OBS HIGH 50: CPT | Mod: HCNC,,, | Performed by: INTERNAL MEDICINE

## 2024-07-13 PROCEDURE — 94761 N-INVAS EAR/PLS OXIMETRY MLT: CPT | Mod: HCNC

## 2024-07-13 PROCEDURE — 25000242 PHARM REV CODE 250 ALT 637 W/ HCPCS: Mod: HCNC | Performed by: FAMILY MEDICINE

## 2024-07-13 PROCEDURE — 85730 THROMBOPLASTIN TIME PARTIAL: CPT | Mod: HCNC | Performed by: FAMILY MEDICINE

## 2024-07-13 PROCEDURE — 25000003 PHARM REV CODE 250: Mod: HCNC

## 2024-07-13 PROCEDURE — 99900035 HC TECH TIME PER 15 MIN (STAT): Mod: HCNC

## 2024-07-13 PROCEDURE — 25000003 PHARM REV CODE 250: Mod: HCNC | Performed by: NURSE PRACTITIONER

## 2024-07-13 PROCEDURE — 25000003 PHARM REV CODE 250: Mod: HCNC | Performed by: INTERNAL MEDICINE

## 2024-07-13 RX ORDER — METOPROLOL SUCCINATE 25 MG/1
25 TABLET, EXTENDED RELEASE ORAL DAILY
Qty: 90 TABLET | Refills: 3 | Status: SHIPPED | OUTPATIENT
Start: 2024-07-14 | End: 2025-07-14

## 2024-07-13 RX ORDER — POLYETHYLENE GLYCOL 3350 17 G/17G
17 POWDER, FOR SOLUTION ORAL 2 TIMES DAILY
Qty: 30 EACH | Refills: 1 | Status: SHIPPED | OUTPATIENT
Start: 2024-07-13

## 2024-07-13 RX ORDER — NIFEDIPINE 30 MG/1
30 TABLET, EXTENDED RELEASE ORAL DAILY
Qty: 90 TABLET | Refills: 3 | Status: SHIPPED | OUTPATIENT
Start: 2024-07-13

## 2024-07-13 RX ORDER — NAPROXEN SODIUM 220 MG/1
81 TABLET, FILM COATED ORAL DAILY
Qty: 30 TABLET | Refills: 11 | Status: SHIPPED | OUTPATIENT
Start: 2024-07-14 | End: 2025-07-14

## 2024-07-13 RX ORDER — CLOPIDOGREL BISULFATE 75 MG/1
75 TABLET ORAL DAILY
Qty: 30 TABLET | Refills: 11 | Status: SHIPPED | OUTPATIENT
Start: 2024-07-14 | End: 2025-07-14

## 2024-07-13 RX ORDER — LOSARTAN POTASSIUM 100 MG/1
100 TABLET ORAL DAILY
Qty: 90 TABLET | Refills: 3 | Status: SHIPPED | OUTPATIENT
Start: 2024-07-13 | End: 2025-07-13

## 2024-07-13 RX ORDER — NAPROXEN SODIUM 220 MG/1
81 TABLET, FILM COATED ORAL DAILY
Status: DISCONTINUED | OUTPATIENT
Start: 2024-07-13 | End: 2024-07-13 | Stop reason: HOSPADM

## 2024-07-13 RX ADMIN — METOPROLOL SUCCINATE 25 MG: 25 TABLET, EXTENDED RELEASE ORAL at 09:07

## 2024-07-13 RX ADMIN — POLYETHYLENE GLYCOL 3350 17 G: 17 POWDER, FOR SOLUTION ORAL at 09:07

## 2024-07-13 RX ADMIN — CLOPIDOGREL BISULFATE 75 MG: 75 TABLET ORAL at 09:07

## 2024-07-13 RX ADMIN — FINASTERIDE 5 MG: 5 TABLET, FILM COATED ORAL at 09:07

## 2024-07-13 RX ADMIN — FLUTICASONE PROPIONATE 50 MCG: 50 SPRAY, METERED NASAL at 09:07

## 2024-07-13 RX ADMIN — LOSARTAN POTASSIUM 100 MG: 50 TABLET, FILM COATED ORAL at 09:07

## 2024-07-13 RX ADMIN — ASPIRIN 81 MG CHEWABLE TABLET 81 MG: 81 TABLET CHEWABLE at 09:07

## 2024-07-13 RX ADMIN — LACTULOSE 30 G: 20 SOLUTION ORAL at 09:07

## 2024-07-13 RX ADMIN — NIFEDIPINE 30 MG: 30 TABLET, FILM COATED, EXTENDED RELEASE ORAL at 09:07

## 2024-07-13 NOTE — PLAN OF CARE
VN note: Chart review completed. Patient labs and notes reviewed. Care plan and goals updated. VN available to intervene as needed.       Problem: Adult Inpatient Plan of Care  Goal: Plan of Care Review  Outcome: Progressing

## 2024-07-13 NOTE — DISCHARGE SUMMARY
[facility] Medicine  Discharge Summary      Patient Name: Korey Santos  MRN: [id number]  LAUREEN: [id number]  Patient Class: IP- Inpatient  Admission Date: 7/11/2024  Hospital Length of Stay: 2 days  Discharge Date and Time: 7/13/2024  1:26 PM  Attending Physician: Daisy Hopson*   Discharging Provider: Daisy Hopson MD  Primary Care Provider: Juan Bustamante MD    Primary Care Team: Networked reference to record PCT     HPI:   Tom is an 84 y/o M with past medical history of, hypertension, hyperlipidemia, DJD, insomnia, constipation, BPH, chronic back pain, GERD, presents with few hours history of midsternal chest pain that started at about 12 midnight.  Pain is about 7/10, radiates to the jaw.  There is associated headaches, leg swelling, and shortness of breath, chest discomfort.  He denies fever, chills, nausea, vomiting, diaphoresis, dysuria, syncope, weakness.   Sodium 140, potassium 4.1, BUN/creatinine 9/0.8, BNP 45, H/H 13.4/40.9, WBC 6.6, platelet 214, troponin 0.020, repeat was at 0.119 end up trending, chest x-ray with no acute chest finding, EKG with no ST changes.  Patient received morphine aspirin and nitroglycerin without any change in pain.  Admit hospital medicine in consult cardiology    Procedure(s) (LRB):  Left heart cath (Left)  IVUS, Coronary  ANGIOPLASTY, CORONARY ARTERY, WITH STENT INSERTION (N/A)      Hospital Course:   No notes on file     Goals of Care Treatment Preferences:  Code Status: Full Code    Living Will: Yes              Consults:   Consults (From admission, onward)          Status Ordering Provider     Inpatient consult to Cardiology-[facility]  Once        Provider:  (Not yet assigned)    Completed DAISY HOPSON            Cardiac/Vascular  * Chest pain  Coronary artery disease involving native coronary artery without angina pectoris  History of recent LHC from 12/18/23 in setting of CHF, LM patent, pLAD 40%, LCX  patent, RCA patent.   Troponin 0.020 > 0.119  Chest x-ray   EKG- reviewed  TTE   Continue aspirin  Morphine p.r.n.  Add Plavix  Strict input output  Heparin GTT  Nitro p.r.n.   Supplemental oxygen  Consult cardiology for possible LHC- awaits rec's   S/p LHC  20 % ostial RCA stenosis. No other areas of focal stenosis in the right coronary artery.  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 there is another lesion that measures around 70% angiographically in the mid LAD  rec's Dual antiplatelet therapy for at least 1 year   High-intensity statin      NSTEMI (non-ST elevated myocardial infarction)      TTE  Left Ventricle: The left ventricle is normal in size. Normal wall thickness. There is concentric remodeling. Regional wall motion abnormalities present. See diagram for wall motion findings. There is normal systolic function with a visually estimated ejection fraction of 55 - 60%. There is normal diastolic function.    Right Ventricle: Normal right ventricular cavity size. Wall thickness is normal. Systolic function is normal. Pacemaker lead present in the ventricle.    Left Atrium: Left atrium is mildly dilated.    Pulmonic Valve: There is mild regurgitation.    Pulmonary Artery: The estimated pulmonary artery systolic pressure is 22 mmHg.    IVC/SVC: Normal venous pressure at 3 mmHg.      S/p LHC  20 % ostial RCA stenosis. No other areas of focal stenosis in the right coronary artery.  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 there is another lesion that measures around 70% angiographically in the mid LAD  Ec's Dual antiplatelet therapy for at least 1 year   High-intensity statin      Elevated troponin  0.022 .> 0.119 > 0.194 > 0.382  Heparin gtt  TTE  Consult cardiology-  LHC      Pacemaker  Chronic      Hypertension, essential  Chronic, controlled. Latest blood pressure and vitals reviewed-     Temp:  [97 °F (36.1 °C)-98.3 °F (36.8 °C)]   Pulse:  [57-78]   Resp:  [17-20]   BP: (111-143)/(55-76)   SpO2:  [92 %-100 %] .   Home meds for hypertension were reviewed and noted below.   Hypertension Medications               NIFEdipine (PROCARDIA-XL) 30 MG (OSM) 24 hr tablet Take 1 tablet (30 mg total) by mouth once daily.    losartan (COZAAR) 100 MG tablet Take 1 tablet (100 mg total) by mouth once daily.            While in the hospital, will manage blood pressure as follows; Continue home antihypertensive regimen    Will utilize p.r.n. blood pressure medication only if patient's blood pressure greater than 140/90 and he develops symptoms such as worsening chest pain or shortness of breath.    Hyperlipidemia  Continue statin      GI  Constipation    Chronic   On Linzess at home   MiraLax and lactulose      Final Active Diagnoses:    Diagnosis Date Noted POA    PRINCIPAL PROBLEM:  Chest pain [R07.9] 07/11/2024 Yes    NSTEMI (non-ST elevated myocardial infarction) [I21.4] 07/13/2024 Yes    Elevated troponin [R79.89] 07/11/2024 Yes    Pacemaker [Z95.0] 03/05/2024 Yes    Coronary artery disease involving native coronary artery without angina pectoris [I25.10] 01/03/2024 Yes    Constipation [K59.00] 01/26/2023 Yes    Hypertension, essential [I10] 01/22/2016 Yes    Hyperlipidemia [E78.5] 10/07/2014 Yes      Problems Resolved During this Admission:       Discharged Condition: stable    Disposition: Home or Self Care    Follow Up:   Follow-up Information       Juan Bustamante MD. Go on 8/22/2024.    Specialties: Family Medicine, Sports Medicine  Why: at 9:40 AM; PCP hospital follow up appointment  Contact information:  [address]  [facility] [address] [phone]               Marky Mohan MD Follow up.    Specialties: Interventional Cardiology, Cardiology  Why: Patient will be  notified of a cardiology hospital follow up appointment.  Contact information:  [address]  [address] [place] [address]  [phone]               Courtney Vincent OD Follow up on 11/12/2024.    Specialties: Optometry, Pediatric Ophthalmology  Why: at 1:40pm; optometry appointment  Contact information:  [place] [address]  [phone]                           Patient Instructions:      Ambulatory referral/consult to Cardiology   Standing Status: Future   Referral Priority: Routine Referral Type: Consultation   Referral Reason: Specialty Services Required   Requested Specialty: Cardiology   Number of Visits Requested: 1     Activity as tolerated       Significant Diagnostic Studies: N/A    Pending Diagnostic Studies:       None           Medications:  Reconciled Home Medications:      Medication List        START taking these medications      aspirin 81 MG Chew  Take 1 tablet (81 mg total) by mouth once daily.  Start taking on: July 14, 2024  Replaces: aspirin 81 MG EC tablet     atorvastatin 40 MG tablet  Commonly known as: LIPITOR  Take 1 tablet (40 mg total) by mouth once daily.     clopidogreL 75 mg tablet  Commonly known as: PLAVIX  Take 1 tablet (75 mg total) by mouth once daily.  Start taking on: July 14, 2024     metoprolol succinate 25 MG 24 hr tablet  Commonly known as: TOPROL-XL  Take 1 tablet (25 mg total) by mouth once daily.  Start taking on: July 14, 2024     polyethylene glycol 17 gram Pwpk  Commonly known as: GLYCOLAX  Take 17 g by mouth 2 (two) times daily.            CONTINUE taking these medications      finasteride 5 mg tablet  Commonly known as: PROSCAR  Take 1 tablet (5 mg total) by mouth once daily.     fluticasone propionate 50 mcg/actuation nasal spray  Commonly known as: FLONASE  1 spray (50 mcg total) by Each Nostril route once daily.     hydrOXYzine HCL 10 MG Tab  Commonly known as: ATARAX  Take 1 tablet (10 mg total) by mouth nightly as needed  (sleep).     linaCLOtide 145 mcg Cap capsule  Commonly known as: LINZESS  Take 1 capsule (145 mcg total) by mouth daily as needed (constipation).     losartan 100 MG tablet  Commonly known as: COZAAR  Take 1 tablet (100 mg total) by mouth once daily.     multivitamin capsule  Take 1 capsule by mouth once daily.     NIFEdipine 30 MG (OSM) 24 hr tablet  Commonly known as: PROCARDIA-XL  Take 1 tablet (30 mg total) by mouth once daily.            STOP taking these medications      aspirin 81 MG EC tablet  Commonly known as: ECOTRIN  Replaced by: aspirin 81 MG Chew            ASK your doctor about these medications      tamsulosin 0.4 mg Cap  Commonly known as: FLOMAX  Take 1 capsule (0.4 mg total) by mouth once daily.              Indwelling Lines/Drains at time of discharge:   Lines/Drains/Airways       None                   Time spent on the discharge of patient: 35 minutes         Daisy Calvin MD  Department of Hospital Medicine  [place] - Telemetry

## 2024-07-13 NOTE — PLAN OF CARE
Discharge orders noted. Additional clinical references attached.    Patient's discharge instructions given by bedside RN and reviewed via this VN with patient.    Education provided on new medication, diagnosis, and follow-up appointments.    All questions answered. Teach back method used. Patient verbalized understanding.    Transport to [facility] requested. Floor nurse notified.      07/13/24 1302   AVS Confirmation   Discharge instructions and AVS provided to and reviewed with patient and/or significant other. Yes

## 2024-07-13 NOTE — PLAN OF CARE
Pt is agreeable with discharge to home today. Pt stated he needs a ride to get his car that is at [facility] in [place]. PFC arrangements made with [facility] WC van to  pt for 12:30 PM and bring to [facility] ED parking lot in [place] as requested.   Future Appointments   Date Time Provider Department Center   7/13/2024 11:30 AM Chela [facility]illiam MD [facility]   7/22/2024  9:40 AM Juan Bustamante MD [facility]   8/6/2024  2:40 PM Juan Bustamante MD [facility]   8/13/2024  2:15 PM Noah Vazquez MD [place] [facility]   11/12/2024  1:40 PM Courtney Vincent, OD [facility]      07/13/24 1123   Final Note   Assessment Type Final Discharge Note   Anticipated Discharge Disposition Home   What phone number can be called within the next 1-3 days to see how you are doing after discharge? [phone]   Hospital Resources/Appts/Education Provided Appointments scheduled and added to AVS   Post-Acute Status   Discharge Delays None known at this time

## 2024-07-13 NOTE — PROGRESS NOTES
[facility] Medicine  Progress Note    Patient Name: Korey Santos  MRN: [id number]  Patient Class: IP- Inpatient   Admission Date: 7/11/2024  Length of Stay: 2 days  Attending Physician: Daisy Calvin*  Primary Care Provider: Juan Bustamante MD        Subjective:     Principal Problem:Chest pain        HPI:  Tom is an 84 y/o M with past medical history of, hypertension, hyperlipidemia, DJD, insomnia, constipation, BPH, chronic back pain, GERD, presents with few hours history of midsternal chest pain that started at about 12 midnight.  Pain is about 7/10, radiates to the jaw.  There is associated headaches, leg swelling, and shortness of breath, chest discomfort.  He denies fever, chills, nausea, vomiting, diaphoresis, dysuria, syncope, weakness.   Sodium 140, potassium 4.1, BUN/creatinine 9/0.8, BNP 45, H/H 13.4/40.9, WBC 6.6, platelet 214, troponin 0.020, repeat was at 0.119 end up trending, chest x-ray with no acute chest finding, EKG with no ST changes.  Patient received morphine aspirin and nitroglycerin without any change in pain.  Admit hospital medicine in consult cardiology    Overview/Hospital Course:  No notes on file    Interval History: awake and alert. No chest pain this morning. Leg swelling, improved  Appreciates cardiology- s/p LHC- rec's noted    TTE reviewed    Review of Systems   Constitutional:  Positive for activity change.   Eyes:  Negative for photophobia and visual disturbance.   Respiratory:  Negative for chest tightness and shortness of breath.    Cardiovascular:  Negative for chest pain and leg swelling.   Endocrine: Negative for polyphagia.   Genitourinary:  Negative for dysuria and enuresis.   Neurological:  Negative for numbness.   Psychiatric/Behavioral:  Negative for agitation and confusion.      Objective:     Vital Signs (Most Recent):  Temp: 97.9 °F (36.6 °C) (07/13/24 0737)  Pulse: 61 (07/13/24 0805)  Resp: 17 (07/13/24 0805)  BP: 137/64  "(07/13/24 0737)  SpO2: 96 % (07/13/24 0805) Vital Signs (24h Range):  Temp:  [97 °F (36.1 °C)-98.3 °F (36.8 °C)] 97.9 °F (36.6 °C)  Pulse:  [57-78] 61  Resp:  [17-20] 17  SpO2:  [92 %-100 %] 96 %  BP: (111-143)/(55-76) 137/64     Weight: 103.9 kg (229 lb)  Body mass index is 31.94 kg/m².  No intake or output data in the 24 hours ending 07/13/24 0957        Physical Exam  Constitutional:       Appearance: He is obese. He is not ill-appearing.   HENT:      Head: Normocephalic and atraumatic.   Cardiovascular:      Rate and Rhythm: Normal rate.   Pulmonary:      Effort: Pulmonary effort is normal.      Breath sounds: No rales.   Abdominal:      General: Bowel sounds are normal.      Palpations: Abdomen is soft.   Musculoskeletal:         General: Swelling present. Normal range of motion.      Cervical back: Normal range of motion.      Right lower leg: Edema present.      Left lower leg: Edema present.      Comments: + bilateral pedal edema   Neurological:      General: No focal deficit present.      Mental Status: He is alert and oriented to person, place, and time.   Psychiatric:         Mood and Affect: Mood normal.         Behavior: Behavior normal.             Significant Labs: A1C:   Recent Labs   Lab 07/12/24  0520   HGBA1C 5.5     ABGs: No results for input(s): "PH", "PCO2", "HCO3", "POCSATURATED", "BE", "TOTALHB", "COHB", "METHB", "O2HB", "POCFIO2", "PO2" in the last 48 hours.  Blood Culture: No results for input(s): "LABBLOO" in the last 48 hours.  CBC:   Recent Labs   Lab 07/11/24  1633 07/11/24  1730 07/12/24  0520   WBC 6.60 6.84 7.16   HGB 13.4* 13.3* 14.0   HCT 40.9 40.3 42.4   PLT SEE COMMENT 193 196     CMP:   Recent Labs   Lab 07/11/24  1633      K 4.1      CO2 25   GLU 95   BUN 9   CREATININE 0.8   CALCIUM 9.1   PROT 6.4   ALBUMIN 3.5   BILITOT 0.3   ALKPHOS 63   AST 19   ALT 16   ANIONGAP 6*     Lactic Acid: No results for input(s): "LACTATE" in the last 48 hours.  Lipase: No results for " "input(s): "LIPASE" in the last 48 hours.  Lipid Panel: No results for input(s): "CHOL", "HDL", "LDLCALC", "TRIG", "CHOLHDL" in the last 48 hours.  Magnesium:   Recent Labs   Lab 07/11/24  1633   MG 2.1     Troponin:   Recent Labs   Lab 07/11/24  2023 07/12/24  0009 07/12/24  0520   TROPONINI 0.681* 0.828* 0.844*     TSH:   Recent Labs   Lab 07/09/24  1711   TSH 1.626     Urine Culture: No results for input(s): "LABURIN" in the last 48 hours.  Urine Studies: No results for input(s): "COLORU", "APPEARANCEUA", "PHUR", "SPECGRAV", "PROTEINUA", "GLUCUA", "KETONESU", "BILIRUBINUA", "OCCULTUA", "NITRITE", "UROBILINOGEN", "LEUKOCYTESUR", "RBCUA", "WBCUA", "BACTERIA", "SQUAMEPITHEL", "HYALINECASTS" in the last 48 hours.    Invalid input(s): "WRIGHTSUR"    Significant Imaging: I have reviewed all pertinent imaging results/findings within the past 24 hours.    Assessment/Plan:      * Chest pain  Coronary artery disease involving native coronary artery without angina pectoris  History of recent LHC from 12/18/23 in setting of CHF, LM patent, pLAD 40%, LCX patent, RCA patent.   Troponin 0.020 > 0.119  Chest x-ray   EKG- reviewed  TTE   Continue aspirin  Morphine p.r.n.  Add Plavix  Strict input output  Heparin GTT  Nitro p.r.n.   Supplemental oxygen  Consult cardiology for possible LHC- awaits rec's   S/p LHC  20 % ostial RCA stenosis. No other areas of focal stenosis in the right coronary artery.  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 there is another lesion that measures around 70% angiographically in the mid LAD  rec's Dual antiplatelet therapy for at least 1 year   High-intensity statin      NSTEMI (non-ST elevated myocardial infarction)      TTE  Left Ventricle: The left ventricle is normal in size. Normal wall thickness. There is concentric remodeling. Regional wall motion abnormalities present. See diagram for wall " motion findings. There is normal systolic function with a visually estimated ejection fraction of 55 - 60%. There is normal diastolic function.    Right Ventricle: Normal right ventricular cavity size. Wall thickness is normal. Systolic function is normal. Pacemaker lead present in the ventricle.    Left Atrium: Left atrium is mildly dilated.    Pulmonic Valve: There is mild regurgitation.    Pulmonary Artery: The estimated pulmonary artery systolic pressure is 22 mmHg.    IVC/SVC: Normal venous pressure at 3 mmHg.      S/p LHC  20 % ostial RCA stenosis. No other areas of focal stenosis in the right coronary artery.  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 there is another lesion that measures around 70% angiographically in the mid LAD  Ec's Dual antiplatelet therapy for at least 1 year   High-intensity statin      Elevated troponin  0.022 .> 0.119 > 0.194 > 0.382  Heparin gtt  TTE  Consult cardiology- [facility]      Pacemaker  Chronic      Constipation    Chronic   On Linzess at home   MiraLax and lactulose    Hypertension, essential  Chronic, controlled. Latest blood pressure and vitals reviewed-     Temp:  [97 °F (36.1 °C)-98.3 °F (36.8 °C)]   Pulse:  [57-78]   Resp:  [17-20]   BP: (111-143)/(55-76)   SpO2:  [92 %-100 %] .   Home meds for hypertension were reviewed and noted below.   Hypertension Medications               NIFEdipine (PROCARDIA-XL) 30 MG (OSM) 24 hr tablet Take 1 tablet (30 mg total) by mouth once daily.    losartan (COZAAR) 100 MG tablet Take 1 tablet (100 mg total) by mouth once daily.            While in the hospital, will manage blood pressure as follows; Continue home antihypertensive regimen    Will utilize p.r.n. blood pressure medication only if patient's blood pressure greater than 140/90 and he develops symptoms such as worsening chest pain or shortness of breath.    Hyperlipidemia  Continue  statin        VTE Risk Mitigation (From admission, onward)           Ordered     heparin infusion 1,000 units/500 ml in 0.9% NaCl (pressure line flush)  Intra-op continuous PRN         07/12/24 1144                    Discharge Planning   [place]: 7/13/2024     Code Status: Full Code   Is the patient medically ready for discharge?:     Reason for patient still in hospital (select all that apply): Pending disposition  Discharge Plan A: Home   Discharge Delays: None known at this time              Daisy Calvin MD  Department of [facility] Medicine   [facility]

## 2024-07-13 NOTE — ASSESSMENT & PLAN NOTE
0.022 .> 0.119 > 0.194 > 0.382  Heparin gtt  TTE  Consult cardiology- [facility]

## 2024-07-13 NOTE — ASSESSMENT & PLAN NOTE
TTE  Left Ventricle: The left ventricle is normal in size. Normal wall thickness. There is concentric remodeling. Regional wall motion abnormalities present. See diagram for wall motion findings. There is normal systolic function with a visually estimated ejection fraction of 55 - 60%. There is normal diastolic function.    Right Ventricle: Normal right ventricular cavity size. Wall thickness is normal. Systolic function is normal. Pacemaker lead present in the ventricle.    Left Atrium: Left atrium is mildly dilated.    Pulmonic Valve: There is mild regurgitation.    Pulmonary Artery: The estimated pulmonary artery systolic pressure is 22 mmHg.    IVC/SVC: Normal venous pressure at 3 mmHg.      S/p LHC  20 % ostial RCA stenosis. No other areas of focal stenosis in the right coronary artery.  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 there is another lesion that measures around 70% angiographically in the mid LAD  Ec's Dual antiplatelet therapy for at least 1 year   High-intensity statin

## 2024-07-13 NOTE — ASSESSMENT & PLAN NOTE
Coronary artery disease involving native coronary artery without angina pectoris  History of recent LHC from 12/18/23 in setting of CHF, LM patent, pLAD 40%, LCX patent, RCA patent.   Troponin 0.020 > 0.119  Chest x-ray   EKG- reviewed  TTE   Continue aspirin  Morphine p.r.n.  Add Plavix  Strict input output  Heparin GTT  Nitro p.r.n.   Supplemental oxygen  Consult cardiology for possible LHC- awaits rec's   S/p LHC  20 % ostial RCA stenosis. No other areas of focal stenosis in the right coronary artery.  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 there is another lesion that measures around 70% angiographically in the mid LAD  rec's Dual antiplatelet therapy for at least 1 year   High-intensity statin

## 2024-07-13 NOTE — SUBJECTIVE & OBJECTIVE
Interval History: awake and alert. No chest pain this morning. Leg swelling, improved  Appreciates cardiology- s/p LHC- rec's noted    TTE reviewed    Review of Systems   Constitutional:  Positive for activity change.   Eyes:  Negative for photophobia and visual disturbance.   Respiratory:  Negative for chest tightness and shortness of breath.    Cardiovascular:  Negative for chest pain and leg swelling.   Endocrine: Negative for polyphagia.   Genitourinary:  Negative for dysuria and enuresis.   Neurological:  Negative for numbness.   Psychiatric/Behavioral:  Negative for agitation and confusion.      Objective:     Vital Signs (Most Recent):  Temp: 97.9 °F (36.6 °C) (07/13/24 0737)  Pulse: 61 (07/13/24 0805)  Resp: 17 (07/13/24 0805)  BP: 137/64 (07/13/24 0737)  SpO2: 96 % (07/13/24 0805) Vital Signs (24h Range):  Temp:  [97 °F (36.1 °C)-98.3 °F (36.8 °C)] 97.9 °F (36.6 °C)  Pulse:  [57-78] 61  Resp:  [17-20] 17  SpO2:  [92 %-100 %] 96 %  BP: (111-143)/(55-76) 137/64     Weight: 103.9 kg (229 lb)  Body mass index is 31.94 kg/m².  No intake or output data in the 24 hours ending 07/13/24 0957        Physical Exam  Constitutional:       Appearance: He is obese. He is not ill-appearing.   HENT:      Head: Normocephalic and atraumatic.   Cardiovascular:      Rate and Rhythm: Normal rate.   Pulmonary:      Effort: Pulmonary effort is normal.      Breath sounds: No rales.   Abdominal:      General: Bowel sounds are normal.      Palpations: Abdomen is soft.   Musculoskeletal:         General: Swelling present. Normal range of motion.      Cervical back: Normal range of motion.      Right lower leg: Edema present.      Left lower leg: Edema present.      Comments: + bilateral pedal edema   Neurological:      General: No focal deficit present.      Mental Status: He is alert and oriented to person, place, and time.   Psychiatric:         Mood and Affect: Mood normal.         Behavior: Behavior normal.             Significant  "Labs: A1C:   Recent Labs   Lab 07/12/24  0520   HGBA1C 5.5     ABGs: No results for input(s): "PH", "PCO2", "HCO3", "POCSATURATED", "BE", "TOTALHB", "COHB", "METHB", "O2HB", "POCFIO2", "PO2" in the last 48 hours.  Blood Culture: No results for input(s): "LABBLOO" in the last 48 hours.  CBC:   Recent Labs   Lab 07/11/24  1633 07/11/24  1730 07/12/24  0520   WBC 6.60 6.84 7.16   HGB 13.4* 13.3* 14.0   HCT 40.9 40.3 42.4   PLT SEE COMMENT 193 196     CMP:   Recent Labs   Lab 07/11/24  1633      K 4.1      CO2 25   GLU 95   BUN 9   CREATININE 0.8   CALCIUM 9.1   PROT 6.4   ALBUMIN 3.5   BILITOT 0.3   ALKPHOS 63   AST 19   ALT 16   ANIONGAP 6*     Lactic Acid: No results for input(s): "LACTATE" in the last 48 hours.  Lipase: No results for input(s): "LIPASE" in the last 48 hours.  Lipid Panel: No results for input(s): "CHOL", "HDL", "LDLCALC", "TRIG", "CHOLHDL" in the last 48 hours.  Magnesium:   Recent Labs   Lab 07/11/24  1633   MG 2.1     Troponin:   Recent Labs   Lab 07/11/24  2023 07/12/24  0009 07/12/24  0520   TROPONINI 0.681* 0.828* 0.844*     TSH:   Recent Labs   Lab 07/09/24  1711   TSH 1.626     Urine Culture: No results for input(s): "LABURIN" in the last 48 hours.  Urine Studies: No results for input(s): "COLORU", "APPEARANCEUA", "PHUR", "SPECGRAV", "PROTEINUA", "GLUCUA", "KETONESU", "BILIRUBINUA", "OCCULTUA", "NITRITE", "UROBILINOGEN", "LEUKOCYTESUR", "RBCUA", "WBCUA", "BACTERIA", "SQUAMEPITHEL", "HYALINECASTS" in the last 48 hours.    Invalid input(s): "WRIGHTSUR"    Significant Imaging: I have reviewed all pertinent imaging results/findings within the past 24 hours.  "

## 2024-07-13 NOTE — ASSESSMENT & PLAN NOTE
Chronic, controlled. Latest blood pressure and vitals reviewed-     Temp:  [97 °F (36.1 °C)-98.3 °F (36.8 °C)]   Pulse:  [57-78]   Resp:  [17-20]   BP: (111-143)/(55-76)   SpO2:  [92 %-100 %] .   Home meds for hypertension were reviewed and noted below.   Hypertension Medications               NIFEdipine (PROCARDIA-XL) 30 MG (OSM) 24 hr tablet Take 1 tablet (30 mg total) by mouth once daily.    losartan (COZAAR) 100 MG tablet Take 1 tablet (100 mg total) by mouth once daily.            While in the hospital, will manage blood pressure as follows; Continue home antihypertensive regimen    Will utilize p.r.n. blood pressure medication only if patient's blood pressure greater than 140/90 and he develops symptoms such as worsening chest pain or shortness of breath.

## 2024-07-13 NOTE — PROGRESS NOTES
[facility]  [facility]  Progress Note    Patient Name: Korey Santos  MRN: [id number]  Admission Date: 7/11/2024  Hospital Length of Stay: 2 days  Code Status: Full Code   Attending Physician: Daisy Calvin*   Primary Care Physician: Juan Bustamante MD  Expected Discharge Date: 7/13/2024  Principal Problem:Chest pain    Subjective:      No acute issues overnight.  Discussed medical management for coronary artery disease.  Stable from Cardiology standpoint to be discharged home today.    Review of Systems   Constitutional: Negative for chills and fever.   HENT:  Negative for hearing loss and nosebleeds.    Eyes:  Negative for blurred vision.   Cardiovascular:  Negative for chest pain, leg swelling and palpitations.   Respiratory:  Negative for hemoptysis and shortness of breath.    Hematologic/Lymphatic: Negative for bleeding problem.   Skin:  Negative for itching.   Musculoskeletal:  Negative for falls.   Gastrointestinal:  Negative for abdominal pain and hematochezia.   Genitourinary:  Negative for hematuria.   Neurological:  Negative for dizziness and loss of balance.   Psychiatric/Behavioral:  Negative for altered mental status and depression.      Objective:     Vital Signs (Most Recent):  Temp: 98.1 °F (36.7 °C) (07/13/24 1106)  Pulse: 60 (07/13/24 1106)  Resp: 18 (07/13/24 1106)  BP: (!) 151/73 (07/13/24 1106)  SpO2: 100 % (07/13/24 1106) Vital Signs (24h Range):  Temp:  [97 °F (36.1 °C)-98.3 °F (36.8 °C)] 98.1 °F (36.7 °C)  Pulse:  [57-78] 60  Resp:  [17-20] 18  SpO2:  [92 %-100 %] 100 %  BP: (111-151)/(55-76) 151/73     Weight: 103.9 kg (229 lb)  Body mass index is 31.94 kg/m².    SpO2: 100 %       No intake or output data in the 24 hours ending 07/13/24 1157    Lines/Drains/Airways       None                   Physical Exam  Constitutional:       Appearance: He is well-developed.   HENT:      Head: Normocephalic and atraumatic.   Eyes:      Conjunctiva/sclera: Conjunctivae normal.  "  Neck:      Vascular: No carotid bruit or JVD.   Cardiovascular:      Rate and Rhythm: Normal rate and regular rhythm.      Pulses:           Carotid pulses are 2+ on the right side and 2+ on the left side.       Radial pulses are 2+ on the right side and 2+ on the left side.      Heart sounds: Normal heart sounds. No murmur heard.     No friction rub. No gallop.   Pulmonary:      Effort: Pulmonary effort is normal. No respiratory distress.      Breath sounds: Normal breath sounds. No stridor. No wheezing.   Musculoskeletal:      Cervical back: Neck supple.   Skin:     General: Skin is warm and dry.   Neurological:      Mental Status: He is alert and oriented to person, place, and time.   Psychiatric:         Behavior: Behavior normal.         Significant Labs: BMP:   Recent Labs   Lab 07/11/24  1633   GLU 95      K 4.1      CO2 25   BUN 9   CREATININE 0.8   CALCIUM 9.1   MG 2.1   , CMP   Recent Labs   Lab 07/11/24  1633      K 4.1      CO2 25   GLU 95   BUN 9   CREATININE 0.8   CALCIUM 9.1   PROT 6.4   ALBUMIN 3.5   BILITOT 0.3   ALKPHOS 63   AST 19   ALT 16   ANIONGAP 6*   , CBC   Recent Labs   Lab 07/11/24  1633 07/11/24  1730 07/12/24  0520   WBC 6.60 6.84 7.16   HGB 13.4* 13.3* 14.0   HCT 40.9 40.3 42.4   PLT SEE COMMENT 193 196   , Lipid Panel No results for input(s): "CHOL", "HDL", "LDLCALC", "TRIG", "CHOLHDL" in the last 48 hours., and Troponin   Recent Labs   Lab 07/11/24 2023 07/12/24  0009 07/12/24  0520   TROPONINI 0.681* 0.828* 0.844*         Assessment and Plan:     Brief HPI:Overall 83-year-old male with past medical history of CAD, moderate disease in the mid LAD, sick sinus syndrome status post pacemaker in December 20, 2023, admitted to the hospital with chest pain after swallowing sleeping pills, noted to have troponin of 0.8.  EKG shows paced rhythm.  Blood pressure on arrival was 220 mm Hg systolic.  Currently chest pain-free on heparin drip.        We have discussed " risks and benefits of left heart catheterization.  Patient is to proceed with left heart catheterization with possible IFR of the LAD.          Active Diagnoses:    Diagnosis Date Noted POA    PRINCIPAL PROBLEM:  Chest pain [R07.9] 07/11/2024 Yes    NSTEMI (non-ST elevated myocardial infarction) [I21.4] 07/13/2024 Yes    Elevated troponin [R79.89] 07/11/2024 Yes    Pacemaker [Z95.0] 03/05/2024 Yes    Coronary artery disease involving native coronary artery without angina pectoris [I25.10] 01/03/2024 Yes    Constipation [K59.00] 01/26/2023 Yes    Hypertension, essential [I10] 01/22/2016 Yes    Hyperlipidemia [E78.5] 10/07/2014 Yes      Problems Resolved During this Admission:       VTE Risk Mitigation (From admission, onward)           Ordered     heparin infusion 1,000 units/500 ml in 0.9% NaCl (pressure line flush)  Intra-op continuous PRN         07/12/24 1144                    -   Patient admitted for non ST elevation MI now status post PCI of the mid LAD with stents x 2  -    continue aspirin and Plavix for a total of 1 year.  Loaded with 600 mg Plavix on 07/12/2024.  Continue aspirin 81 mg daily and Plavix 75 mg daily  -   stopped the pravastatin and start Lipitor 40 mg daily.  -   Continue nifedipine and losartan.  I agree with the addition of Toprol-XL.  Likely will stop  nifedipine and escalate beta-blocker therapy on outpatient basis  -  recommend follow-up with Cardiology Clinic in 1-2 weeks.    Marky Mohan MD  Cardiology  [facility] - Telemetry

## 2024-07-15 ENCOUNTER — TELEPHONE (OUTPATIENT)
Dept: SPORTS MEDICINE | Facility: CLINIC | Age: 84
End: 2024-07-15
Payer: MEDICARE

## 2024-07-15 NOTE — TELEPHONE ENCOUNTER
Returned call, scheduled pt for next available at [place] on 7/26. Added to waitlist.    Marisela Vivar MS, OTC  Clinical Assistant to Dr. Chela Gilliam      ----- Message from Talia Kurtz sent at 7/15/2024 12:59 PM CDT -----  Regarding: Appt Access  Contact: [phone]  CONSULT/ADVISORY    Name of Caller:  KYRA PEREZ [[id number]]    Contact Preference:   [phone]    Nature of Call:  Pt is calling to r/s appt he had on 07/13/2024 due to he was in the Emergency Room.  Next avail is 09/2024.  Pt wants to be seen sooner.  Please call.

## 2024-07-16 ENCOUNTER — PATIENT OUTREACH (OUTPATIENT)
Dept: ADMINISTRATIVE | Facility: CLINIC | Age: 84
End: 2024-07-16
Payer: MEDICARE

## 2024-07-26 ENCOUNTER — HOSPITAL ENCOUNTER (OUTPATIENT)
Dept: RADIOLOGY | Facility: HOSPITAL | Age: 84
Discharge: HOME | End: 2024-07-26
Attending: STUDENT IN AN ORGANIZED HEALTH CARE EDUCATION/TRAINING PROGRAM
Payer: MEDICARE

## 2024-07-26 ENCOUNTER — OFFICE VISIT (OUTPATIENT)
Dept: SPORTS MEDICINE | Facility: CLINIC | Age: 84
End: 2024-07-26
Payer: MEDICARE

## 2024-07-26 VITALS
BODY MASS INDEX: 33.27 KG/M2 | DIASTOLIC BLOOD PRESSURE: 76 MMHG | WEIGHT: 238.56 LBS | HEART RATE: 62 BPM | SYSTOLIC BLOOD PRESSURE: 142 MMHG

## 2024-07-26 DIAGNOSIS — M16.11 PRIMARY OSTEOARTHRITIS OF RIGHT HIP: ICD-10-CM

## 2024-07-26 DIAGNOSIS — M25.551 RIGHT HIP PAIN: Primary | ICD-10-CM

## 2024-07-26 DIAGNOSIS — R73.03 PREDIABETES: ICD-10-CM

## 2024-07-26 DIAGNOSIS — M25.551 RIGHT HIP PAIN: ICD-10-CM

## 2024-07-26 PROCEDURE — 73502 X-RAY EXAM HIP UNI 2-3 VIEWS: CPT | Mod: 26,RT,, | Performed by: RADIOLOGY

## 2024-07-26 PROCEDURE — 73502 X-RAY EXAM HIP UNI 2-3 VIEWS: CPT | Mod: TC,RT

## 2024-07-26 PROCEDURE — 99999 PR PBB SHADOW E&M-EST. PATIENT-LVL III: CPT | Mod: PBBFAC,,, | Performed by: STUDENT IN AN ORGANIZED HEALTH CARE EDUCATION/TRAINING PROGRAM

## 2024-07-26 NOTE — PROGRESS NOTES
"CC: right hip pain    83 y.o. Male presents today for evaluation of his right hip pain. Pt reports gradual onset of hip pain about 8-9 months ago. Pt also notes "bad arthritis" in his right knee as well, and states his right hip is now more painful that his right knee. Pt notes pain with walking long distances. Pt localizes pain to anterolateral hip. Pt reports pain is 4-6/10 today. Pt denies mechanical symptoms. Pt reports numbness/tingling in both feet and swelling in the legs intermittently.     Side: right  Problem: hip pain  Pain Score: 4-6/10  SYMPTOMS:   Time of onset: 8-9 months  Trauma, injury: gradual onset  Pain location: anterolateral    C-sign: yes    Radiation past knee: no    Exacerbating factors / difficult actions:    Nocturnal pain lying with ipsilateral side down: yes    Pivoting: no    Putting on shoes / socks: no    Getting into / out of cars: no    Getting up / down from chairs: no    Known back problems: lumbar fusion 2022 w/ Dr. William  Weakness: none  Numbness: yes (both feet)  Mechanical symptoms:     Clicking, catching: none    Snapping internal: none    Snapping external: none    INTERVENTIONS:   Medications tried: none  Physical therapy: none  Injections: none    RELEVANT HISTORY:   Imaging to date: 7/26/24    REVIEW OF SYSTEMS:   Constitution: Patient denies fever or chills.  Eyes: Patient denies eye pain or vision changes.  HEENT: Patient denies ear pain, sore throat, or nasal discharge.  CVS: Patient denies chest pain.  Lungs: Patient denies shortness of breath or cough.  Abdomen: Patient denies any stomach pain, nausea, vomiting, or diarrhea  Skin: Patient denies skin rash. Pt reports itching on posterior left upper arm on scab-like wound.    Musculoskeletal: Patient denies recent injuries or trauma.  Neuro: Patient denies any numbness or tingling in upper extremities.  Psych: Patient denies any current anxiety or nervousness.    PAST MEDICAL HISTORY:   Past Medical History:   Diagnosis " Date    Arthritis     Back pain, chronic     BPH with urinary obstruction     Chronic constipation     Closed fracture of right shoulder 1/28/2021    Closed nondisplaced fracture of greater tuberosity of right humerus 2/24/2021    Colon polyp     DJD (degenerative joint disease)     Hip    Hyperlipidemia     Hypertension     Laryngopharyngeal reflux     Left inguinal hernia     Lumbar herniated disc     Lumbar stenosis     Neck mass 10/7/2014    -4/29/2022 path - lipoma    OA (osteoarthritis) of knee     Bilateral    Paradoxical insomnia     Sinus trouble        PAST SURGICAL HISTORY:  Past Surgical History:   Procedure Laterality Date    A-V CARDIAC PACEMAKER INSERTION N/A 12/19/2023    Procedure: INSERTION, CARDIAC PACEMAKER, DUAL CHAMBER;  Surgeon: Noah Vazquez MD;  Location: [facility] CATH LAB/EP;  Service: Cardiology;  Laterality: N/A;    BACK SURGERY  2014    COLONOSCOPY      COLONOSCOPY N/A 5/17/2023    Procedure: COLONOSCOPY;  Surgeon: Christiano Vazquez MD;  Location: [facility] ENDO;  Service: Endoscopy;  Laterality: N/A;  Constipation 2 day prep.Instructions to portal.EC    CORONARY ANGIOGRAPHY N/A 12/18/2023    Procedure: ANGIOGRAM, CORONARY ARTERY;  Surgeon: Flaco Kelly MD;  Location: [facility] CATH LAB/EP;  Service: Cardiology;  Laterality: N/A;    CORONARY ANGIOPLASTY WITH STENT PLACEMENT N/A 7/12/2024    Procedure: ANGIOPLASTY, CORONARY ARTERY, WITH STENT INSERTION;  Surgeon: Marky Mohan MD;  Location: [facility] CATH LAB/EP;  Service: Cardiology;  Laterality: N/A;    CYSTOSCOPY WITH TRANSURETHRAL DESTRUCTION OF PROSTATE,RFA N/A 4/11/2024    Procedure: CYSTOSCOPY WITH TRANSURETHRAL DESTRUCTION OF PROSTATE,RFA  rezum generator and at least 2 handpieces Contact Paul grant to schedule;  Surgeon: Juan Ward MD;  Location: [facility] OR;  Service: Urology;  Laterality: N/A;  rezum generator and at least 2 handpieces  Contact Paul grant to schedule- Marquise notified 3/26 AM    EPIDURAL STEROID  INJECTION INTO LUMBAR SPINE N/A 7/5/2022    Procedure: Injection-steroid-epidural-lumbar L3-4;  Surgeon: Yury Crum Jr., MD;  Location: [facility] PAIN MGT;  Service: Pain Management;  Laterality: N/A;  oral sedation   asa      INSERTION, PACEMAKER, TEMPORARY TRANSVENOUS  12/18/2023    Procedure: Insertion, Pacemaker, Temporary Transvenous;  Surgeon: Flaco Kelly MD;  Location: [facility] CATH LAB/EP;  Service: Cardiology;;    IVUS, CORONARY  7/12/2024    Procedure: IVUS, Coronary;  Surgeon: Marky Mohan MD;  Location: [facility] CATH LAB/EP;  Service: Cardiology;;    JOINT REPLACEMENT Left 05/04/2018    KNEE SURGERY      left hand      LEFT HEART CATHETERIZATION Left 7/12/2024    Procedure: Left heart cath;  Surgeon: Marky Mohan MD;  Location: [facility] CATH LAB/EP;  Service: Cardiology;  Laterality: Left;    LEG SURGERY      SPINE SURGERY      UPPER GASTROINTESTINAL ENDOSCOPY         FAMILY HISTORY:  Family History   Problem Relation Name Age of Onset    Cancer Father          lung or smoking    No Known Problems Mother      No Known Problems Sister none     No Known Problems Brother none     No Known Problems Maternal Aunt      No Known Problems Maternal Uncle      No Known Problems Paternal Aunt      No Known Problems Paternal Uncle      No Known Problems Maternal Grandmother      No Known Problems Maternal Grandfather      No Known Problems Paternal Grandmother      No Known Problems Paternal Grandfather      No Known Problems Daughter      No Known Problems Son      Glaucoma Neg Hx      Diabetes Neg Hx      Amblyopia Neg Hx      Blindness Neg Hx      Cataracts Neg Hx      Hypertension Neg Hx      Macular degeneration Neg Hx      Retinal detachment Neg Hx      Strabismus Neg Hx      Stroke Neg Hx      Thyroid disease Neg Hx      Colon cancer Neg Hx      Esophageal cancer Neg Hx         SOCIAL HISTORY:  Social History     Socioeconomic History    Marital status: Single   Tobacco Use    Smoking status: Never     Smokeless tobacco: Never   Substance and Sexual Activity    Alcohol use: Yes     Alcohol/week: 0.0 standard drinks of alcohol     Comment: approximately 2 beers weekly    Drug use: No    Sexual activity: Yes     Partners: Female     Birth control/protection: None   Social History Narrative    Ret. 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     Social Determinants of Health     Financial Resource Strain: Low Risk  (7/12/2024)    Overall Financial Resource Strain (CARDIA)     Difficulty of Paying Living Expenses: Not hard at all   Food Insecurity: No Food Insecurity (7/12/2024)    Hunger Vital Sign     Worried About Running Out of Food in the Last Year: Never true     Ran Out of Food in the Last Year: Never true   Transportation Needs: No Transportation Needs (7/12/2024)    TRANSPORTATION NEEDS     Transportation : No   Physical Activity: Sufficiently Active (7/12/2024)    Exercise Vital Sign     Days of Exercise per Week: 3 days     Minutes of Exercise per Session: 90 min   Stress: No Stress Concern Present (7/12/2024)    [employer] - Occupational Stress Questionnaire     Feeling of Stress : Not at all   Housing Stability: Low Risk  (7/12/2024)    Housing Stability Vital Sign     Unable to Pay for Housing in the Last Year: No     Homeless in the Last Year: No       MEDICATIONS:     Current Outpatient Medications:     aspirin 81 MG Chew, Take 1 tablet (81 mg total) by mouth once daily., Disp: 30 tablet, Rfl: 11    atorvastatin (LIPITOR) 40 MG tablet, Take 1 tablet (40 mg total) by mouth once daily., Disp: 90 tablet, Rfl: 3    clopidogreL (PLAVIX) 75 mg tablet, Take 1 tablet (75 mg total) by mouth once daily., Disp: 30 tablet, Rfl: 11    finasteride (PROSCAR) 5 mg tablet, Take 1 tablet (5 mg total) by mouth once daily., Disp: 90 tablet, Rfl: 3    fluticasone propionate (FLONASE) 50 mcg/actuation nasal spray, 1 spray (50 mcg total) by Each Nostril route once daily., Disp: 16 g, Rfl: 6     hydrOXYzine HCL (ATARAX) 10 MG Tab, Take 1 tablet (10 mg total) by mouth nightly as needed (sleep)., Disp: 30 tablet, Rfl: 0    linaCLOtide (LINZESS) 145 mcg Cap capsule, Take 1 capsule (145 mcg total) by mouth daily as needed (constipation)., Disp: 90 capsule, Rfl: 3    losartan (COZAAR) 100 MG tablet, Take 1 tablet (100 mg total) by mouth once daily., Disp: 90 tablet, Rfl: 3    metoprolol succinate (TOPROL-XL) 25 MG 24 hr tablet, Take 1 tablet (25 mg total) by mouth once daily., Disp: 90 tablet, Rfl: 3    multivitamin capsule, Take 1 capsule by mouth once daily., Disp: , Rfl:     NIFEdipine (PROCARDIA-XL) 30 MG (OSM) 24 hr tablet, Take 1 tablet (30 mg total) by mouth once daily., Disp: 90 tablet, Rfl: 3    polyethylene glycol (GLYCOLAX) 17 gram PwPk, Take 17 g by mouth 2 (two) times daily., Disp: 30 each, Rfl: 1    tamsulosin (FLOMAX) 0.4 mg Cap, Take 1 capsule (0.4 mg total) by mouth once daily. (Patient not taking: Reported on 7/11/2024), Disp: 90 capsule, Rfl: 3    ALLERGIES:   Review of patient's allergies indicates:   Allergen Reactions    Clindamycin Swelling     Throat swells    Lisinopril Swelling and Other (See Comments)     Throat swelling    Shellfish containing products         PHYSICAL EXAMINATION:  BP (!) 142/76   Pulse 62   Wt 108.2 kg (238 lb 8.6 oz)   BMI 33.27 kg/m²   Vitals signs and nursing note have been reviewed.    General: In no acute distress, well developed, well nourished, no diaphoresis  Eyes: EOM full and smooth, no eye redness or discharge  HEENT: normocephalic and atraumatic, neck supple, trachea midline, no nasal discharge  Cardiovascular: no LE edema  Lungs: respirations non-labored, no conversational dyspnea   Neuro: AAOx3, CN2-12 grossly intact  Skin: No rashes, warm and dry  Psychiatric: cooperative, pleasant, mood and affect appropriate for age    Right Hip:   Gait: wnl    Inspection/Palpation:   -Deformities     TTP:  -Greater trochanter  -Abductors  -ASIS  -AIIS   -Ischial  "tuberosity/hamstring origin  -Mid substance hamstring  -SIJ  -ITB    ROM (* = with pain):   Flexion: 100°  Extension: limited  Popliteal Angle: 45°  Internal rotation seated: 20°  External rotation seated: 50°      Functional:   -Log roll   -VICKIE  +FADIR  +Figure-4  +Scour/Circumduction with pressure  -Cristobal Test  -Snapping int  -Snapping ext  -Cali's Sign  -Gaenslen's  =Stinchfield test  -Rishi  -Lasegue's     Strength/Functional:   5/5 in glut med / abductors    5/5 in hamstrings   5/5 in Quads    IMAGIN. Hip X-ray ordered due to right hip pain. 2 views taken today.   2. X-ray images were reviewed personally by me and then directly with patient.  3. FINDINGS:  Normal bony alignment, no signs of acute fracture or dislocation, moderate degenerative changes.    4. IMPRESSION:  No acute osseous abnormalities appreciated.    ASSESSMENT:      ICD-10-CM ICD-9-CM   1. Right hip pain  M25.551 719.45   2. Prediabetes  R73.03 790.29   3. Primary osteoarthritis of right hip  M16.11 715.15         PLAN:  Based on patient history, physical exam findings, and imaging I believe patient's main pain generator is his right primary osteoarthritis of the hip.  Ideally would like to move for with a corticosteroid injection to the right hip joint.  Patient recently had a non-STEMI ("-maker" LAD) and we need clearance from his primary care provider prior to moving forward with injection to make sure he is medically stable.  We will schedule patient after his  appointment with his PCP.    All questions were answered to the best of my ability and all concerns were addressed at this time.    Follow up for above, or sooner if needed.      This note is dictated using the M*Modal Fluency Direct word recognition program. There are word recognition mistakes that are occasionally missed on review.          "

## 2024-08-06 ENCOUNTER — OFFICE VISIT (OUTPATIENT)
Dept: INTERNAL MEDICINE | Facility: CLINIC | Age: 84
End: 2024-08-06
Attending: FAMILY MEDICINE
Payer: MEDICARE

## 2024-08-06 VITALS
HEART RATE: 76 BPM | DIASTOLIC BLOOD PRESSURE: 62 MMHG | BODY MASS INDEX: 33.83 KG/M2 | SYSTOLIC BLOOD PRESSURE: 120 MMHG | OXYGEN SATURATION: 91 % | HEIGHT: 71 IN | WEIGHT: 241.63 LBS

## 2024-08-06 DIAGNOSIS — I21.4 NSTEMI (NON-ST ELEVATED MYOCARDIAL INFARCTION): ICD-10-CM

## 2024-08-06 DIAGNOSIS — G47.00 INSOMNIA, UNSPECIFIED TYPE: ICD-10-CM

## 2024-08-06 DIAGNOSIS — E04.2 MULTIPLE THYROID NODULES: ICD-10-CM

## 2024-08-06 DIAGNOSIS — I10 HYPERTENSION, ESSENTIAL: ICD-10-CM

## 2024-08-06 DIAGNOSIS — M16.10 ARTHRITIS, HIP: ICD-10-CM

## 2024-08-06 DIAGNOSIS — I44.2 COMPLETE HEART BLOCK: ICD-10-CM

## 2024-08-06 DIAGNOSIS — I25.10 CORONARY ARTERY DISEASE INVOLVING NATIVE CORONARY ARTERY OF NATIVE HEART WITHOUT ANGINA PECTORIS: Primary | ICD-10-CM

## 2024-08-06 DIAGNOSIS — I70.0 AORTIC ATHEROSCLEROSIS: ICD-10-CM

## 2024-08-06 DIAGNOSIS — Z95.0 PACEMAKER: ICD-10-CM

## 2024-08-06 DIAGNOSIS — E78.5 HYPERLIPIDEMIA, UNSPECIFIED HYPERLIPIDEMIA TYPE: ICD-10-CM

## 2024-08-06 PROBLEM — R07.9 CHEST PAIN: Status: RESOLVED | Noted: 2024-07-11 | Resolved: 2024-08-06

## 2024-08-06 PROCEDURE — 99999 PR PBB SHADOW E&M-EST. PATIENT-LVL IV: CPT | Mod: PBBFAC,HCNC,, | Performed by: FAMILY MEDICINE

## 2024-08-06 RX ORDER — MIRTAZAPINE 7.5 MG/1
7.5 TABLET, FILM COATED ORAL NIGHTLY
Qty: 30 TABLET | Refills: 1 | Status: SHIPPED | OUTPATIENT
Start: 2024-08-06

## 2024-08-10 NOTE — PROGRESS NOTES
[facility] Cardiology 701     SUBJECTIVE:     History of Present Illness:  Patient is a 83 y.o. male presents with followup after cardiac pacemaker placement .     Primary Diagnosis:  Hypertension: positive  DM: none  Smoker: none   Family history of early CAD: none   Heart disease: complete heart block 12/23  ROS  Since last visit 3/24 - had chest pains and had stent LAD 7/24:   Blood pressure at home on the higher dose of nifedipine around 130 to 150's.    Goes to the gym and rides a bike without issues and no chest pains   Two weeks ago; had two episodes of chest pain, mid chest; stayed for one hour; no change with body movement; ; 2nd episode was also when he was sitting down . None since then   No shortness of breath; no PND or orthopnea  No syncope    Past Hospitalization      7/11/24: chest pains; paced rhythm, troponin 0.828; ; cath and stent placed LAD   Review of patient's allergies indicates:   Allergen Reactions    Clindamycin Swelling     Throat swells    Lisinopril Swelling and Other (See Comments)     Throat swelling    Shellfish containing products        Past Medical History:   Diagnosis Date    Arthritis     Back pain, chronic     BPH with urinary obstruction     Chronic constipation     Closed fracture of right shoulder 1/28/2021    Closed nondisplaced fracture of greater tuberosity of right humerus 2/24/2021    Colon polyp     DJD (degenerative joint disease)     Hip    Hyperlipidemia     Hypertension     Laryngopharyngeal reflux     Left inguinal hernia     Lumbar herniated disc     Lumbar stenosis     Neck mass 10/7/2014    -4/29/2022 path - lipoma    OA (osteoarthritis) of knee     Bilateral    Paradoxical insomnia     Sinus trouble        Past Surgical History:   Procedure Laterality Date    A-V CARDIAC PACEMAKER INSERTION N/A 12/19/2023    Procedure: INSERTION, CARDIAC PACEMAKER, DUAL CHAMBER;  Surgeon: Noah Vazquez MD;  Location: [facility] CATH LAB/EP;  Service: Cardiology;   Laterality: N/A;    BACK SURGERY  2014    COLONOSCOPY      COLONOSCOPY N/A 5/17/2023    Procedure: COLONOSCOPY;  Surgeon: Christiano Vazquez MD;  Location: [facility] ENDO;  Service: Endoscopy;  Laterality: N/A;  Constipation 2 day prep.Instructions to portal.EC    CORONARY ANGIOGRAPHY N/A 12/18/2023    Procedure: ANGIOGRAM, CORONARY ARTERY;  Surgeon: Flaco Kelly MD;  Location: [facility] CATH LAB/EP;  Service: Cardiology;  Laterality: N/A;    CORONARY ANGIOPLASTY WITH STENT PLACEMENT N/A 7/12/2024    Procedure: ANGIOPLASTY, CORONARY ARTERY, WITH STENT INSERTION;  Surgeon: Marky Mohan MD;  Location: [facility] CATH LAB/EP;  Service: Cardiology;  Laterality: N/A;    CYSTOSCOPY WITH TRANSURETHRAL DESTRUCTION OF PROSTATE,RFA N/A 4/11/2024    Procedure: CYSTOSCOPY WITH TRANSURETHRAL DESTRUCTION OF PROSTATE,RFA  rezum generator and at least 2 handpieces Contact Paul grant to schedule;  Surgeon: Juan Ward MD;  Location: [facility] OR;  Service: Urology;  Laterality: N/A;  rezum generator and at least 2 handpieces  Contact Paul grant to schedule- Marquise notified 3/26 AM    EPIDURAL STEROID INJECTION INTO LUMBAR SPINE N/A 7/5/2022    Procedure: Injection-steroid-epidural-lumbar L3-4;  Surgeon: Yury Crum Jr., MD;  Location: [facility] PAIN MGT;  Service: Pain Management;  Laterality: N/A;  oral sedation   asa      INSERTION, PACEMAKER, TEMPORARY TRANSVENOUS  12/18/2023    Procedure: Insertion, Pacemaker, Temporary Transvenous;  Surgeon: Flaco Kelly MD;  Location: [facility] CATH LAB/EP;  Service: Cardiology;;    IVUS, CORONARY  7/12/2024    Procedure: IVUS, Coronary;  Surgeon: Marky Mohan MD;  Location: [facility] CATH LAB/EP;  Service: Cardiology;;    JOINT REPLACEMENT Left 05/04/2018    KNEE SURGERY      left hand      LEFT HEART CATHETERIZATION Left 7/12/2024    Procedure: Left heart cath;  Surgeon: Marky Mohan MD;  Location: [facility] CATH LAB/EP;  Service: Cardiology;  Laterality: Left;    LEG SURGERY      SPINE  "SURGERY      UPPER GASTROINTESTINAL ENDOSCOPY                 Cardiac meds:     ASA 81 mg  Atorvastatin 40 mg   Plavix 75 mg  Losartan 100 mg  Metoprolol succinate 256 mg  Nifedipine 30 mg               OBJECTIVE:     Vital Signs (Most Recent)  Vitals:    24 1429   BP: 122/68   Pulse: 60   SpO2: 97%   Weight: 109.2 kg (240 lb 13.6 oz)   Height: 5' 11" (1.803 m)             Neck: normal carotids, no bruits; normal JVP  Lungs :clear  Heart: RR, normal S1,S2, no murmurs, no gallops  Abd: no masses; no bruits;   Exts: normal DP and PT pulses bilaterally, normal radials; no edema noted   Pacemaker site:normal           Labs  3/24: CBC normal;       Old Results:    : GFR normal   : CMP normal; CBC normal; LDL 94    Diagnostic Results:    1.EK/23: ventricular paced   1b. EK/24: paced rhythm   2.Echo: : normal EF, diastolic dysfunction; LAE, aortic sclerosis; mild MR, mild TR   3.stress test:  4. Cath:: left main; normal; LAD 40%, CX ok; Right ok  4b. Cath: : right OK; left main; normal; CX normal; mid LAD 90% and origin of large diagonal; overlapping stents in LAD ; EF normal   5. Dual chamber pacemaker : [employer]       ASSESSMENT/PLAN:     Hypertension: controlled   Pacemaker function: normal  3.  CAD: s/p stent mid LAD   4. Lipids: LDL goal of 70 or less         Plan: 1. Continue the same medications  2. Return 4 months   Noah Vazquez MD      "

## 2024-08-13 ENCOUNTER — OFFICE VISIT (OUTPATIENT)
Dept: CARDIOLOGY | Facility: CLINIC | Age: 84
End: 2024-08-13
Payer: MEDICARE

## 2024-08-13 VITALS
DIASTOLIC BLOOD PRESSURE: 68 MMHG | SYSTOLIC BLOOD PRESSURE: 122 MMHG | BODY MASS INDEX: 33.72 KG/M2 | HEART RATE: 60 BPM | OXYGEN SATURATION: 97 % | HEIGHT: 71 IN | WEIGHT: 240.88 LBS

## 2024-08-13 DIAGNOSIS — Z95.0 CARDIAC PACEMAKER IN SITU: ICD-10-CM

## 2024-08-13 DIAGNOSIS — R07.9 CHEST PAIN: ICD-10-CM

## 2024-08-13 DIAGNOSIS — I25.10 CORONARY ARTERY DISEASE INVOLVING NATIVE CORONARY ARTERY OF NATIVE HEART WITHOUT ANGINA PECTORIS: Primary | ICD-10-CM

## 2024-08-13 DIAGNOSIS — Z95.5 STENTED CORONARY ARTERY: ICD-10-CM

## 2024-08-13 DIAGNOSIS — I44.2 COMPLETE HEART BLOCK: ICD-10-CM

## 2024-08-13 PROCEDURE — 99999 PR PBB SHADOW E&M-EST. PATIENT-LVL III: CPT | Mod: PBBFAC,HCNC,, | Performed by: INTERNAL MEDICINE

## 2024-08-30 ENCOUNTER — TELEPHONE (OUTPATIENT)
Dept: SPORTS MEDICINE | Facility: CLINIC | Age: 84
End: 2024-08-30
Payer: MEDICARE

## 2024-08-30 NOTE — TELEPHONE ENCOUNTER
----- Message from Sirisha Quick sent at 8/30/2024  3:51 PM CDT -----  Pt calling to make sure his injection lazaro is cancelled , got a notification in his My chart of the lazaro on 9-5     Confirmed patient's contact info below:  Contact Name: Korey Santos  Phone Number: [phone]

## 2024-09-23 ENCOUNTER — TELEPHONE (OUTPATIENT)
Dept: CARDIOLOGY | Facility: CLINIC | Age: 84
End: 2024-09-23

## 2024-09-23 ENCOUNTER — CLINICAL SUPPORT (OUTPATIENT)
Dept: CARDIOLOGY | Facility: CLINIC | Age: 84
End: 2024-09-23
Payer: MEDICARE

## 2024-09-23 DIAGNOSIS — I44.2 COMPLETE HEART BLOCK: Primary | ICD-10-CM

## 2024-09-23 DIAGNOSIS — I47.19 ATRIAL TACHYCARDIA: ICD-10-CM

## 2024-09-23 DIAGNOSIS — Z95.0 CARDIAC PACEMAKER IN SITU: ICD-10-CM

## 2024-09-23 PROCEDURE — 93294 REM INTERROG EVL PM/LDLS PM: CPT | Mod: HCNC,S$GLB,, | Performed by: INTERNAL MEDICINE

## 2024-09-23 PROCEDURE — 93296 REM INTERROG EVL PM/IDS: CPT | Mod: HCNC,S$GLB,, | Performed by: INTERNAL MEDICINE

## 2024-09-23 NOTE — TELEPHONE ENCOUNTER
----- Message from Willis Robertson sent at 9/23/2024  9:58 AM CDT -----  Regarding: Appt  Contact: pt [phone]  Pt is requesting call back to discuss appt scheduled for today, pt is unsure what appt is about and for, please call pt @[phone]

## 2024-09-23 NOTE — PROGRESS NOTES
Remote check today-pacemaker dual  ESTELA:9.7 years  Impedances normal  P wave great; R 4.0  8 seconds of atrial tachycardia at 194; not atrial fibrillation

## 2024-10-14 ENCOUNTER — TELEPHONE (OUTPATIENT)
Dept: CARDIOLOGY | Facility: CLINIC | Age: 84
End: 2024-10-14
Payer: MEDICARE

## 2024-10-14 PROBLEM — I21.4 NSTEMI (NON-ST ELEVATED MYOCARDIAL INFARCTION): Status: RESOLVED | Noted: 2024-07-13 | Resolved: 2024-10-14

## 2024-10-14 NOTE — TELEPHONE ENCOUNTER
Cardiac clearance forms were faxed over to surgery clinic      ad        ----- Message from Kristina sent at 10/14/2024  9:09 AM CDT -----  Type:  Needs Medical Advice    Who Called: Lauren with [facility]   Would the patient rather a call back or a response via [facility]? fax  Best Call Back Number: Fax # [phone] Ph# [phone]  Additional Information: Calling to inform office that she will be faxing over surgery clearances paperwork on today 10/14/2024 please complete and fax back   Pt is schedule for surgery on 10/16/2024   Left Eye

## 2024-10-16 ENCOUNTER — TELEPHONE (OUTPATIENT)
Dept: INTERNAL MEDICINE | Facility: CLINIC | Age: 84
End: 2024-10-16
Payer: MEDICARE

## 2024-10-16 ENCOUNTER — TELEPHONE (OUTPATIENT)
Dept: CARDIOLOGY | Facility: CLINIC | Age: 84
End: 2024-10-16
Payer: MEDICARE

## 2024-10-16 NOTE — TELEPHONE ENCOUNTER
I reached out to patient today ,I offered my assistance , Patient stated he didn't   Call Cardiology Department . He doesn't know who called.      Nw                          ----- Message from Catherine sent at 10/16/2024 10:44 AM CDT -----  Type:  Patient Returning Call    Who Called:pt  Who Left Message for Patient:office  Does the patient know what this is regarding?:   Would the patient rather a call back or a response via [facility]? call  Best Call Back Number:[phone]  Additional Information:

## 2024-10-16 NOTE — TELEPHONE ENCOUNTER
----- Message from Catherine sent at 10/16/2024 10:47 AM CDT -----  Type:  Needs Medical Advice    Who Called: pt  Symptoms (please be specific): pt has been waiting 3 days now to schedule his wellness visit please call right back to schedule    Would the patient rather a call back or a response via [facility]? call  Best Call Back Number: [phone]  Additional Information:

## 2024-10-23 ENCOUNTER — OFFICE VISIT (OUTPATIENT)
Dept: FAMILY MEDICINE | Facility: CLINIC | Age: 84
End: 2024-10-23
Payer: MEDICARE

## 2024-10-23 VITALS
DIASTOLIC BLOOD PRESSURE: 74 MMHG | BODY MASS INDEX: 33.77 KG/M2 | WEIGHT: 241.19 LBS | HEART RATE: 63 BPM | HEIGHT: 71 IN | TEMPERATURE: 98 F | SYSTOLIC BLOOD PRESSURE: 138 MMHG | OXYGEN SATURATION: 97 %

## 2024-10-23 DIAGNOSIS — Z00.00 ENCOUNTER FOR MEDICARE ANNUAL WELLNESS EXAM: Primary | ICD-10-CM

## 2024-10-23 DIAGNOSIS — Z95.0 PACEMAKER: ICD-10-CM

## 2024-10-23 DIAGNOSIS — R35.0 BENIGN PROSTATIC HYPERPLASIA WITH URINARY FREQUENCY: ICD-10-CM

## 2024-10-23 DIAGNOSIS — M51.26 LUMBAR HERNIATED DISC: ICD-10-CM

## 2024-10-23 DIAGNOSIS — N40.1 BENIGN PROSTATIC HYPERPLASIA WITH URINARY FREQUENCY: ICD-10-CM

## 2024-10-23 DIAGNOSIS — I10 HYPERTENSION, ESSENTIAL: ICD-10-CM

## 2024-10-23 DIAGNOSIS — Z00.00 ENCOUNTER FOR PREVENTIVE HEALTH EXAMINATION: ICD-10-CM

## 2024-10-23 DIAGNOSIS — N52.01 ERECTILE DYSFUNCTION DUE TO ARTERIAL INSUFFICIENCY: ICD-10-CM

## 2024-10-23 DIAGNOSIS — Z96.652 STATUS POST TOTAL LEFT KNEE REPLACEMENT: ICD-10-CM

## 2024-10-23 DIAGNOSIS — M16.9 OSTEOARTHRITIS OF HIP, UNSPECIFIED LATERALITY, UNSPECIFIED OSTEOARTHRITIS TYPE: ICD-10-CM

## 2024-10-23 DIAGNOSIS — E66.09 CLASS 1 OBESITY DUE TO EXCESS CALORIES WITH SERIOUS COMORBIDITY AND BODY MASS INDEX (BMI) OF 33.0 TO 33.9 IN ADULT: ICD-10-CM

## 2024-10-23 DIAGNOSIS — M21.162 GENU VARUM OF BOTH LOWER EXTREMITIES: ICD-10-CM

## 2024-10-23 DIAGNOSIS — E66.811 CLASS 1 OBESITY DUE TO EXCESS CALORIES WITH SERIOUS COMORBIDITY AND BODY MASS INDEX (BMI) OF 33.0 TO 33.9 IN ADULT: ICD-10-CM

## 2024-10-23 DIAGNOSIS — I44.2 COMPLETE HEART BLOCK: ICD-10-CM

## 2024-10-23 DIAGNOSIS — R00.1 BRADYCARDIA: ICD-10-CM

## 2024-10-23 DIAGNOSIS — R13.14 DYSPHAGIA, PHARYNGOESOPHAGEAL: ICD-10-CM

## 2024-10-23 DIAGNOSIS — E78.5 HYPERLIPIDEMIA, UNSPECIFIED HYPERLIPIDEMIA TYPE: ICD-10-CM

## 2024-10-23 DIAGNOSIS — G47.00 INSOMNIA, UNSPECIFIED TYPE: ICD-10-CM

## 2024-10-23 DIAGNOSIS — I25.10 CORONARY ARTERY DISEASE INVOLVING NATIVE CORONARY ARTERY OF NATIVE HEART WITHOUT ANGINA PECTORIS: ICD-10-CM

## 2024-10-23 DIAGNOSIS — M21.161 GENU VARUM OF BOTH LOWER EXTREMITIES: ICD-10-CM

## 2024-10-23 DIAGNOSIS — E04.2 MULTIPLE THYROID NODULES: ICD-10-CM

## 2024-10-23 DIAGNOSIS — J31.0 CHRONIC RHINITIS: ICD-10-CM

## 2024-10-23 DIAGNOSIS — I70.0 AORTIC ATHEROSCLEROSIS: ICD-10-CM

## 2024-10-23 DIAGNOSIS — K21.9 LARYNGOPHARYNGEAL REFLUX: ICD-10-CM

## 2024-10-23 NOTE — PROGRESS NOTES
"  Korey Santos presented for a follow-up Medicare AWV today. The following components were reviewed and updated:    Medical history  Family History  Social history  Allergies and Current Medications  Health Risk Assessment  Health Maintenance  Care Team    **See Completed Assessments for Annual Wellness visit with in the encounter summary    The following assessments were completed:  Depression Screening  Cognitive function Screening  Timed Get Up Test  Whisper Test      Opioid documentation:      Patient does not have a current opioid prescription.          Vitals:    10/23/24 1311   BP: 138/74   BP Location: Right arm   Patient Position: Sitting   Pulse: 63   Temp: 98.3 °F (36.8 °C)   TempSrc: Oral   SpO2: 97%   Weight: 109.4 kg (241 lb 2.9 oz)   Height: 5' 11" (1.803 m)     Body mass index is 33.64 kg/m².       Physical Exam  Constitutional:       General: He is not in acute distress.     Appearance: Normal appearance. He is obese.   HENT:      Head: Normocephalic and atraumatic.   Eyes:      General: Lids are normal. Gaze aligned appropriately.      Pupils: Pupils are equal, round, and reactive to light.   Neck:      Trachea: Trachea normal.   Cardiovascular:      Rate and Rhythm: Normal rate and regular rhythm.      Heart sounds: S1 normal and S2 normal.   Pulmonary:      Effort: Pulmonary effort is normal.      Breath sounds: Normal breath sounds.   Abdominal:      General: Bowel sounds are normal. There is no distension.      Palpations: Abdomen is soft.      Tenderness: There is no abdominal tenderness.   Musculoskeletal:      Cervical back: Neck supple.      Right lower leg: No edema.      Left lower leg: No edema.   Lymphadenopathy:      Cervical: No cervical adenopathy.   Skin:     General: Skin is cool and dry.   Neurological:      Mental Status: He is alert and oriented to person, place, and time.   Psychiatric:         Attention and Perception: Attention normal.         Mood and Affect: Mood " normal.         Behavior: Behavior is cooperative.         Thought Content: Thought content normal.         Diagnoses and health risks identified today and associated recommendations/orders:    1. Encounter for Medicare annual wellness exam  - Chart reviewed. Problem list updated. Discussed current medical diagnosis, current medications, medical/surgical/family/social history; updated provider list; documented vital signs; identified any cognitive impairment; and updated risk factor list. Addressed any outstanding health maintenance. Provided patient with personalized health advice. Continue to follow up with PCP and any specialists.     2. Encounter for preventive health examination  - Chart reviewed. Problem list updated. Discussed current medical diagnosis, current medications, medical/surgical/family/social history; updated provider list; documented vital signs; identified any cognitive impairment; and updated risk factor list. Addressed any outstanding health maintenance. Provided patient with personalized health advice. Continue to follow up with PCP and any specialists.     3. Lumbar herniated disc  - Chronic, stable  - Continue to monitor  - Follow with PCP    4. Chronic rhinitis  - Chronic, stable  - Continue to monitor  - Follow with PCP    5. Aortic atherosclerosis  - Chronic, stable  - Continue aspirin and atorvastatin  - Follow with PCP    6. Complete heart block  - Chronic, stable  - Continue metoprolol, plavix  - Follow with PCP    7. Coronary artery disease involving native coronary artery of native heart without angina pectoris  - Chronic, stable  - Continue aspirin, atorvastatin, and plavix  - Follow with PCP    8. Hyperlipidemia, unspecified hyperlipidemia type  - Chronic, stable  - Continue aspirin, atorvastatin, and plavix  - Follow with PCP    9. Hypertension, essential  - Chronic, stable  - Continue losartan and metoprolol  - Follow with PCP    10. Pacemaker  - Chronic, stable  - Continue to  "monitor  - Follow with cardiology    11. Bradycardia  - Chronic, stable  - Continue medications  - Follow with cardiology    12. Benign prostatic hyperplasia with urinary frequency  - Chronic, stable  - Continue flomax  - Follow with PCP    13. Erectile dysfunction due to arterial insufficiency  - Chronic, stable  - No med management at this time  - Follow with PCP    14. Multiple thyroid nodules: see u/s 7/23  - Chronic, stable  - Continue to monitor  - Follow with PCP    15. Dysphagia, pharyngoesophageal  - Chronic, stable  - Continue to monitor  - Follow with GI    16. Laryngopharyngeal reflux  - Chronic, stable  - Continue to monitor  - Follow with GI    17. Osteoarthritis of hip, unspecified laterality, unspecified osteoarthritis type  - Chronic, stable  - Continue medications  - Follow with PCP    18. Status post total left knee replacement  - Chronic, stable  - Continue medications  - Follow with PCP    19. Genu varum of both lower extremities  - Chronic, stable  - Continue medications  - Follow with PCP    20. Insomnia, unspecified type  - Pt went on at length about insomnia in most of today's appointment. States that hydroxyzine that PCP started as well as mirtazipine do not work. States, "I have to take multiple of these in order for them to help me at all. I only get 1-3 hours of sleep." States, "alcohol works better." Reports, "At some point in age, you just have to start self-medicating." Denies direct question of using street drugs or misusing alcohol. Denies anxiety, depression, SI/HI. Advised to follow up with PCP. States he has trouble following with PCP office due to [employer] phone service. I sent DM to PCP as well as staff message to help pt with appointment.    21. Class 1 obesity due to excess calories with serious comorbidity and body mass index (BMI) of 33.0 to 33.9 in adult  - Body mass index is 33.64 kg/m².  - Recommendation for healthy diet and increasing exercise as tolerated with goal of " 150min/week. Recommend weight loss.      Provided Korey with a 5-10 year written screening schedule and personal prevention plan. Recommendations were developed using the USPSTF age appropriate recommendations. Education, counseling, and referrals were provided as needed.  After Visit Summary printed and given to patient which includes a list of additional screenings\tests needed.    No follow-ups on file.      Lida Pastrana PA-C  I offered to discuss advanced care planning, including how to pick a person who would make decisions for you if you were unable to make them for yourself, called a health care power of , and what kind of decisions you might make such as use of life sustaining treatments such as ventilators and tube feeding when faced with a life limiting illness recorded on a living will that they will need to know. (How you want to be cared for as you near the end of your natural life)     X  Patient has advanced directives on file, which we reviewed, and they do not wish to make changes.

## 2024-10-23 NOTE — Clinical Note
Good afternoon. I am seeing patient for deborah Yanez. He requests change in medication for sleep. I advised him to follow back up with Dr. Solorzano. Would you assist me in scheduling patient for visit? Thanks Lida Pastrana PA-C

## 2024-10-23 NOTE — PATIENT INSTRUCTIONS
Counseling and Referral of Other Preventative  (Italic type indicates deductible and co-insurance are waived)    Patient Name: Korey Santos  Today's Date: 10/23/2024    Health Maintenance       Date Due Completion Date    Shingles Vaccine (1 of 2) Never done ---    Pneumococcal Vaccines (Age 65+) (1 of 1 - PCV) Never done ---    RSV Vaccine (Age 60+ and Pregnant patients) (1 - 1-dose 75+ series) Never done ---    Influenza Vaccine (1) 09/01/2024 1/3/2024 (Declined)    Override on 1/3/2024: Declined (Declined for season)    COVID-19 Vaccine (3 - 2024-25 season) 09/01/2024 7/3/2021    Hemoglobin A1c (Prediabetes) 07/12/2025 7/12/2024    Colonoscopy 05/17/2028 5/17/2023    Lipid Panel 07/09/2029 7/9/2024    TETANUS VACCINE 08/13/2029 8/13/2019        No orders of the defined types were placed in this encounter.      The following information is provided to all patients.  This information is to help you find resources for any of the problems found today that may be affecting your health:                  Living healthy guide: www.[facility].[place].gov      Understanding Diabetes: www.diabetes.org      Eating healthy: www.cdc.gov/healthyweight      CDC home safety checklist: www.cdc.gov/steadi/patient.html      Agency on Aging: www.goea.[place].[facility]      Alcoholics anonymous (AA): www.aa.org      Physical Activity: www.ruthann.nih.gov/vs3esri      Tobacco use: www.quitwithusla.org

## 2024-12-24 ENCOUNTER — CLINICAL SUPPORT (OUTPATIENT)
Dept: CARDIOLOGY | Facility: CLINIC | Age: 84
End: 2024-12-24
Attending: INTERNAL MEDICINE
Payer: MEDICARE

## 2024-12-24 ENCOUNTER — CLINICAL SUPPORT (OUTPATIENT)
Dept: CARDIOLOGY | Facility: CLINIC | Age: 84
End: 2024-12-24
Payer: MEDICARE

## 2024-12-24 DIAGNOSIS — R00.1 BRADYCARDIA, UNSPECIFIED: ICD-10-CM

## 2024-12-24 DIAGNOSIS — Z95.0 PRESENCE OF CARDIAC PACEMAKER: ICD-10-CM

## 2024-12-24 PROCEDURE — 93294 REM INTERROG EVL PM/LDLS PM: CPT | Mod: S$GLB,,, | Performed by: INTERNAL MEDICINE

## 2024-12-24 PROCEDURE — 93296 REM INTERROG EVL PM/IDS: CPT | Mod: PN | Performed by: INTERNAL MEDICINE

## 2024-12-27 ENCOUNTER — OFFICE VISIT (OUTPATIENT)
Dept: OPHTHALMOLOGY | Facility: CLINIC | Age: 84
End: 2024-12-27
Payer: MEDICARE

## 2024-12-27 DIAGNOSIS — H02.59 FLOPPY EYELID SYNDROME OF BOTH EYES: Primary | ICD-10-CM

## 2024-12-27 DIAGNOSIS — H57.13 EYE PAIN, BILATERAL: ICD-10-CM

## 2024-12-27 PROCEDURE — 99999 PR PBB SHADOW E&M-EST. PATIENT-LVL II: CPT | Mod: PBBFAC,,, | Performed by: OPHTHALMOLOGY

## 2024-12-27 NOTE — Clinical Note
Letter to dr. White/sheila for thank you for referral etc. Pt was meant to see merritt per records in media.

## 2024-12-27 NOTE — PROGRESS NOTES
HPI    Christopher / sheila referral - to rené.    Pt c/o eye pain    Pciol ou      2 months go   Dryness ou   FB sensation ou     Uncomfortable     feeling more in OD   corner            Gtts   art tears ou prn      not really helping   Octavio   qhs   night gel syatane   Dls 01/23/2020      Dls 01/23/2020  Last edited by Elizabeth Valdez MD on 12/27/2024 11:39 AM.            Assessment /Plan     For exam results, see Encounter Report.    Floppy eyelid syndrome of both eyes    Eye pain, bilateral        # Floppy eyelids OU  Referral for eye pain OU (OD worse than OS).    Reports irritation/FBS in corner of his eyes, started after each CEIOL     Using Ats q1 hour and refresh ointment qHS. Reports relief with refresh ointment.    No fluorescein uptake seen.    Patient with floppy eyelids, does not snore or use CPAP. Floppy eyelids potentially worsened by eyelid speculum during cat sx    Patient gets relief from lifting up his eyelids.    Recommend Oculoplastics evaluation for dermatochalasis/floppy eyelid syndrome.           In meantime -- can trial Maxitrol octavio qhs lateral canthus.    Today's visit is associated with current and anticipated ongoing medical care related to this patient's single serious/complex condition (floppy eyelid syndrome). Follow up is to be continued indefinitely to monitor the condition.

## 2025-01-07 LAB
OHS CV AF BURDEN PERCENT: < 1
OHS CV DC REMOTE DEVICE TYPE: NORMAL
OHS CV ICD SHOCK: NO
OHS CV RV PACING PERCENT: 42 %

## 2025-01-09 ENCOUNTER — TELEPHONE (OUTPATIENT)
Dept: SPORTS MEDICINE | Facility: CLINIC | Age: 85
End: 2025-01-09
Payer: MEDICARE

## 2025-01-09 ENCOUNTER — TELEPHONE (OUTPATIENT)
Dept: OPHTHALMOLOGY | Facility: CLINIC | Age: 85
End: 2025-01-09
Payer: MEDICARE

## 2025-01-09 DIAGNOSIS — M17.11 PRIMARY OSTEOARTHRITIS OF RIGHT KNEE: Primary | ICD-10-CM

## 2025-01-09 NOTE — TELEPHONE ENCOUNTER
Returned call, LVM advising pt that separate appt will need to be scheduled on or after 1/23 in order to repeat Monovisc injection for right knee. Advised pt that we did not move forward with right hip CSI last year as we were waiting to hear from pt regarding cardiologist clearance for CSI. Requested return phone call to further discuss.     Marisela Vivar MS, OTC  Clinical Assistant to Dr. Chela Gilliam      ----- Message from Katia sent at 1/9/2025  7:46 AM CST -----    Nature of Call: requesting to a call to inform     Best Call Back Number: [phone]     Additional Information:  KYRADIEGO PEREZ calling regarding Patient Advice for wanting to be seen for the right knee injection and also wanted to know if he can get the right hip injection that he was scheduled for last year, but was cancelled and never called to be rescheduled. PT is looking to see if both can be done on the same day. Please call PT to inform. PT is also wanting to know the difference between the CSI and the Gel injections I scheduled the PT per his request on 01/16/25, but I explained that it was for a f/u and not for the actual injection. PT verbally understood

## 2025-01-09 NOTE — TELEPHONE ENCOUNTER
Called and spoke to patient in regards to scheduling his Monovisc injection with Dr. Gilliam.  Patient has been scheduled for 1/23/25.

## 2025-01-09 NOTE — TELEPHONE ENCOUNTER
Called pt regarding questions about CSI vs gel injections. All questions answered - pt agreed to move forward with gel injection on 1/23 and is aware he needs cardiology clearance for R hip injection.    Jhonathan Vivar MS, OTC  Clinical Assistant to Dr. Chela Gilliam      ----- Message from Fidel Crowe sent at 1/9/2025  1:27 PM CST -----  Regarding: FW: PT'S RETURNING A CALL FROM JHONATHAN REGARDING APPT  Contact: PT  Patient wants to proceed with Monovisc for right knee and wants to know if we are going to contact his Cardiologist for clearance for hip injection.    Amrita  ----- Message -----  From: Tamir Ferreira  Sent: 1/9/2025  11:34 AM CST  To: Mane CHAVEZ Staff  Subject: PT'S RETURNING A CALL FROM JHONATHAN REGARDING #    Confirmed contact info below:  Contact Name: Korey Santos  Phone Number: [phone]

## 2025-01-09 NOTE — TELEPHONE ENCOUNTER
----- Message from Tamir sent at 1/9/2025 11:19 AM CST -----  Regarding: PT'S RETURNING A CALL FROM JHONATHAN REGARDING APPT  Contact: PT  Confirmed contact info below:  Contact Name: Korey Santos  Phone Number: [phone]

## 2025-01-09 NOTE — TELEPHONE ENCOUNTER
----- Message from Katia sent at 1/9/2025  7:46 AM CST -----       Nature of Call: requesting a call for clarity and to be rescheduled for missed appt    Best Call Back Number: [phone]     Additional Information:  KYRA PEREZ calling regarding Appointment Access for wanting to inform that the appt that was set for 01/02/25, he was not inform of the scheduled appt stating no one reached out to him to inform and is now wanting to speak to the staff to see about getting rescheduled and notified as to what this appt is for and when he can be seen. Please call PT to inform and answer the questions of concerns

## 2025-01-09 NOTE — TELEPHONE ENCOUNTER
Called and spoke to patient.  Informed that we would not be able to do injections tomorrow due to needing authorization from insurance.

## 2025-01-10 ENCOUNTER — TELEPHONE (OUTPATIENT)
Dept: OPHTHALMOLOGY | Facility: CLINIC | Age: 85
End: 2025-01-10
Payer: MEDICARE

## 2025-01-10 NOTE — TELEPHONE ENCOUNTER
----- Message from Concetta Michaeldiana sent at 1/9/2025  5:11 PM CST -----    ----- Message -----  From: Katarzyna Gray MA  Sent: 1/9/2025   4:29 PM CST  To: Kira Neves; Yuniel Boyd Staff    This message was sent to me by mistake  Pt needs to see Yuniel Valdez   Can you please call .     Thanks  ----- Message -----  From: Katia Draper  Sent: 1/9/2025  11:25 AM CST  To: Naima CISNEROS Staff         Nature of Call: requesting a call for clarity and to be rescheduled for missed appt    Best Call Back Number: [phone]     Additional Information:  KYRA PEREZ calling regarding Appointment Access for wanting to inform that the appt that was set for 01/02/25, he was not inform of the scheduled appt stating no one reached out to him to inform and is now wanting to speak to the staff to see about getting rescheduled and notified as to what this appt is for and when he can be seen. Please call PT to inform and answer the questions of concerns

## 2025-01-13 ENCOUNTER — TELEPHONE (OUTPATIENT)
Dept: SPORTS MEDICINE | Facility: CLINIC | Age: 85
End: 2025-01-13
Payer: MEDICARE

## 2025-01-13 NOTE — TELEPHONE ENCOUNTER
Called pt to advise that we received cardiologist clearance for R hip CSI for appt on 1/23. MAYANK.    Marisela Vivar MS, OTC  Clinical Assistant to Dr. Chela Gilliam

## 2025-01-15 ENCOUNTER — TELEPHONE (OUTPATIENT)
Dept: OPHTHALMOLOGY | Facility: CLINIC | Age: 85
End: 2025-01-15
Payer: MEDICARE

## 2025-01-17 ENCOUNTER — TELEPHONE (OUTPATIENT)
Dept: SPORTS MEDICINE | Facility: CLINIC | Age: 85
End: 2025-01-17
Payer: MEDICARE

## 2025-01-17 NOTE — TELEPHONE ENCOUNTER
Called and spoke to patient in regards to weather closure. Patient was advised that we will reach out to him on Monday with any updates on office closure.

## 2025-01-17 NOTE — TELEPHONE ENCOUNTER
----- Message from Tonio sent at 1/17/2025  9:05 AM CST -----  Regarding: pt advice  Contact: [phone]  Hello Pt is callin gto because pt has an appointment scheduled for 01/23/2025 . Pt would like an update from your office about The weather condition that's suppose to be taking place . If possible can we change appointment date.     Korey Santos  [phone]    Please contact pt with an call or a new date.

## 2025-01-22 ENCOUNTER — TELEPHONE (OUTPATIENT)
Dept: SPORTS MEDICINE | Facility: CLINIC | Age: 85
End: 2025-01-22
Payer: MEDICARE

## 2025-01-22 NOTE — TELEPHONE ENCOUNTER
Called and left voicemail for patient that due to the weather on 1/23/25, appointment was cancelled and patient needs to contact our office at [phone] to reschedule.

## 2025-01-25 ENCOUNTER — OFFICE VISIT (OUTPATIENT)
Dept: SPORTS MEDICINE | Facility: CLINIC | Age: 85
End: 2025-01-25
Payer: MEDICARE

## 2025-01-25 VITALS — DIASTOLIC BLOOD PRESSURE: 77 MMHG | HEART RATE: 81 BPM | SYSTOLIC BLOOD PRESSURE: 146 MMHG

## 2025-01-25 DIAGNOSIS — G89.29 CHRONIC PAIN OF RIGHT KNEE: Primary | ICD-10-CM

## 2025-01-25 DIAGNOSIS — M17.11 PRIMARY OSTEOARTHRITIS OF RIGHT KNEE: ICD-10-CM

## 2025-01-25 DIAGNOSIS — M25.551 RIGHT HIP PAIN: ICD-10-CM

## 2025-01-25 DIAGNOSIS — M25.561 CHRONIC PAIN OF RIGHT KNEE: Primary | ICD-10-CM

## 2025-01-25 DIAGNOSIS — M16.11 PRIMARY OSTEOARTHRITIS OF RIGHT HIP: ICD-10-CM

## 2025-01-25 PROCEDURE — 1157F ADVNC CARE PLAN IN RCRD: CPT | Mod: CPTII,S$GLB,, | Performed by: STUDENT IN AN ORGANIZED HEALTH CARE EDUCATION/TRAINING PROGRAM

## 2025-01-25 PROCEDURE — 1159F MED LIST DOCD IN RCRD: CPT | Mod: CPTII,S$GLB,, | Performed by: STUDENT IN AN ORGANIZED HEALTH CARE EDUCATION/TRAINING PROGRAM

## 2025-01-25 PROCEDURE — 1125F AMNT PAIN NOTED PAIN PRSNT: CPT | Mod: CPTII,S$GLB,, | Performed by: STUDENT IN AN ORGANIZED HEALTH CARE EDUCATION/TRAINING PROGRAM

## 2025-01-25 PROCEDURE — 3077F SYST BP >= 140 MM HG: CPT | Mod: CPTII,S$GLB,, | Performed by: STUDENT IN AN ORGANIZED HEALTH CARE EDUCATION/TRAINING PROGRAM

## 2025-01-25 PROCEDURE — 99999 PR PBB SHADOW E&M-EST. PATIENT-LVL III: CPT | Mod: PBBFAC,,, | Performed by: STUDENT IN AN ORGANIZED HEALTH CARE EDUCATION/TRAINING PROGRAM

## 2025-01-25 PROCEDURE — 3078F DIAST BP <80 MM HG: CPT | Mod: CPTII,S$GLB,, | Performed by: STUDENT IN AN ORGANIZED HEALTH CARE EDUCATION/TRAINING PROGRAM

## 2025-01-25 PROCEDURE — 20611 DRAIN/INJ JOINT/BURSA W/US: CPT | Mod: RT,S$GLB,, | Performed by: STUDENT IN AN ORGANIZED HEALTH CARE EDUCATION/TRAINING PROGRAM

## 2025-01-25 PROCEDURE — 1160F RVW MEDS BY RX/DR IN RCRD: CPT | Mod: CPTII,S$GLB,, | Performed by: STUDENT IN AN ORGANIZED HEALTH CARE EDUCATION/TRAINING PROGRAM

## 2025-01-25 PROCEDURE — 99215 OFFICE O/P EST HI 40 MIN: CPT | Mod: 25,S$GLB,, | Performed by: STUDENT IN AN ORGANIZED HEALTH CARE EDUCATION/TRAINING PROGRAM

## 2025-01-25 RX ORDER — TRIAMCINOLONE ACETONIDE 40 MG/ML
40 INJECTION, SUSPENSION INTRA-ARTICULAR; INTRAMUSCULAR
Status: DISCONTINUED | OUTPATIENT
Start: 2025-01-25 | End: 2025-01-25 | Stop reason: HOSPADM

## 2025-01-25 RX ADMIN — TRIAMCINOLONE ACETONIDE 40 MG: 40 INJECTION, SUSPENSION INTRA-ARTICULAR; INTRAMUSCULAR at 09:01

## 2025-01-25 NOTE — PROCEDURES
Large Joint Aspiration/Injection: R knee    Date/Time: 1/25/2025 9:30 AM    Performed by: Chela Gilliam MD  Authorized by: Chela Gilliam MD    Consent Done?:  Yes (Verbal)  Indications:  Arthritis and pain  Site marked: the procedure site was marked    Timeout: prior to procedure the correct patient, procedure, and site was verified      Local anesthesia used?: Yes    Anesthesia:  Local infiltration  Local anesthetic:  Co-phenylcaine spray    Details:  Needle Size:  22 G  Ultrasonic Guidance for needle placement?: Yes (Ultrasound guidance used to avoid neurovascular injury and/or to improve accuracy given body habitus.)    Images are saved and documented.  Approach: Superolateral.  Location:  Knee  Site:  R knee  Medications:  88 mg hyaluronate sodium, stabilized 88 mg/4 mL  Medications comment:  Ropivacaine 0.2% 2mL  Patient tolerance:  Patient tolerated the procedure well with no immediate complications     TECHNIQUE: Real time ultrasound examination of the right knee was performed with SonoSite Edge 2, 9-L MHz linear probe(s). Ultrasound guidance was used for needle localization. Images were saved and stored for documentation. Dynamic visualization of the needle was continuous throughout the procedures and maintained in good position.

## 2025-01-25 NOTE — PROCEDURES
Large Joint Aspiration/Injection: R hip joint    Date/Time: 1/25/2025 9:30 AM    Performed by: Chela Gilliam MD  Authorized by: Chela Gilliam MD    Consent Done?:  Yes (Verbal)  Indications:  Arthritis and pain  Site marked: the procedure site was marked    Timeout: prior to procedure the correct patient, procedure, and site was verified      Local anesthesia used?: Yes    Anesthesia:  Local infiltration  Local anesthetic:  Co-phenylcaine spray    Details:  Needle Size:  22 G  Ultrasonic Guidance for needle placement?: Yes (Ultrasound guidance used to avoid neurovascular injury and/or to improve accuracy given body habitus.)    Images are saved and documented.  Approach:  Anterior  Location:  Hip  Site:  R hip joint  Medications:  40 mg triamcinolone acetonide 40 mg/mL  Medications comment:  Ropivacaine 0.2% 2mL  Patient tolerance:  Patient tolerated the procedure well with no immediate complications     TECHNIQUE: Real time ultrasound examination of the right femoroacetabular joint was performed with SonWild Needlete Edge 2, C1-5 MHz probe(s). Ultrasound guidance was used for needle localization. Images were saved and stored for documentation. Dynamic visualization of the needle was continuous throughout the procedures and maintained in good position.

## 2025-01-25 NOTE — PROGRESS NOTES
"CC: right knee pain, right hip pain    84 y.o. Male presents today for his Monovisc injection of his right knee. Pt reports pain did not fully resolve after last injection, but states pain worsened between Thanksgiving - Christmas 2024.     Attempted treatments: ibuprofen 200mg OTC PRN, "arthritis pain pill" prescribed by Dr. Bustamante (unsure of name), topicals   Pain score: 6/10  History of trauma/injury: none since last visit  Affecting ADLs: yes    84 y.o. Male presents today for CSI injection of his right hip joint.     Attempted treatments: ibuprofen 200mg OTC PRN, "arthritis pain pill" prescribed by Dr. Bustamante (unsure of name)  Pain score: 6/10  History of trauma/injury: none since last visit  Affecting ADLs: yes     REVIEW OF SYSTEMS:   Constitution: Patient denies fever or chills.  Eyes: Patient denies eye pain or vision changes.  HEENT: Patient denies ear pain, sore throat, or nasal discharge.  CVS: Patient denies chest pain.  Lungs: Patient denies shortness of breath or cough.  Skin: Patient denies skin rash or itching.    Musculoskeletal: Patient denies recent falls. See HPI.  Psych: Patient denies any current anxiety or nervousness.    PAST MEDICAL HISTORY:   Past Medical History:   Diagnosis Date    Arthritis     Back pain, chronic     BPH with urinary obstruction     Chronic constipation     Closed fracture of right shoulder 1/28/2021    Closed nondisplaced fracture of greater tuberosity of right humerus 2/24/2021    Colon polyp     DJD (degenerative joint disease)     Hip    Hyperlipidemia     Hypertension     Laryngopharyngeal reflux     Left inguinal hernia     Lumbar herniated disc     Lumbar stenosis     Neck mass 10/7/2014    -4/29/2022 path - lipoma    OA (osteoarthritis) of knee     Bilateral    Paradoxical insomnia     Sinus trouble        MEDICATIONS:     Current Outpatient Medications:     aspirin 81 MG Chew, Take 1 tablet (81 mg total) by mouth once daily., Disp: 30 tablet, Rfl: 11    " atorvastatin (LIPITOR) 40 MG tablet, Take 1 tablet (40 mg total) by mouth once daily., Disp: 90 tablet, Rfl: 3    clopidogreL (PLAVIX) 75 mg tablet, Take 1 tablet (75 mg total) by mouth once daily., Disp: 30 tablet, Rfl: 11    finasteride (PROSCAR) 5 mg tablet, Take 1 tablet (5 mg total) by mouth once daily., Disp: 90 tablet, Rfl: 3    fluticasone propionate (FLONASE) 50 mcg/actuation nasal spray, 1 spray (50 mcg total) by Each Nostril route once daily., Disp: 16 g, Rfl: 6    linaCLOtide (LINZESS) 145 mcg Cap capsule, TAKE 1 CAPSULE BY MOUTH ONCE DAILY AS NEEDED FOR  CONSTIPATION, Disp: 90 capsule, Rfl: 3    losartan (COZAAR) 100 MG tablet, Take 1 tablet (100 mg total) by mouth once daily., Disp: 90 tablet, Rfl: 3    metoprolol succinate (TOPROL-XL) 25 MG 24 hr tablet, Take 1 tablet (25 mg total) by mouth once daily., Disp: 90 tablet, Rfl: 3    mirtazapine (REMERON) 7.5 MG Tab, Take 1 tablet (7.5 mg total) by mouth every evening., Disp: 30 tablet, Rfl: 1    multivitamin capsule, Take 1 capsule by mouth once daily., Disp: , Rfl:     NIFEdipine (PROCARDIA-XL) 30 MG (OSM) 24 hr tablet, Take 1 tablet (30 mg total) by mouth once daily., Disp: 90 tablet, Rfl: 3    polyethylene glycol (GLYCOLAX) 17 gram PwPk, Take 17 g by mouth 2 (two) times daily., Disp: 30 each, Rfl: 1    tamsulosin (FLOMAX) 0.4 mg Cap, Take 1 capsule (0.4 mg total) by mouth once daily., Disp: 90 capsule, Rfl: 3    ALLERGIES:   Review of patient's allergies indicates:   Allergen Reactions    Clindamycin Swelling     Throat swells    Lisinopril Swelling and Other (See Comments)     Throat swelling    Shellfish containing products         PHYSICAL EXAMINATION:  BP (!) 146/77   Pulse 81   There are no signs of infection at the injection site, including no rubor, calor, or skin lesions.  Gen: NAD.  Psych: Affect & judgment nl.  Neuro: Grossly CNI. CARRINGTON.  HEENT: -Trach dev. -Eye d/c. -Rhinorrhea.  CV: Color nl. -E/C/C. WWPx4.  Pulm: -Dyspnea. -Cough.  Lymph:  -Edema.  Int: -Rash/lesion noted. Skin is warm and dry    ASSESSMENT:      ICD-10-CM ICD-9-CM   1. Chronic pain of right knee  M25.561 719.46    G89.29 338.29   2. Primary osteoarthritis of right knee  M17.11 715.16   3. Right hip pain  M25.551 719.45   4. Primary osteoarthritis of right hip  M16.11 715.15         PLAN:  Ultrasound-guided injection of the right knee with Monovisc and right hip with triamcinolone performed at visit today.    Future planning includes - Continue exercise program    Risks and benefits were discussed with patient prior to receiving injection.  Depending on injection type, risks include the possibility of infection, pain, disruptions in blood pressure and blood sugar, and cosmetic deformity at site of injection.    All questions were answered to the best of my ability and all concerns were addressed at this time.    Time spent on the encounter includes face to face time and non-face to face time preparing to see the patient (eg, review of test), obtaining and/or reviewing separately obtained history, documenting clinical information in the electronic or other health record, independently interpreting results (not separately reported) and communicating results to the patient/family/caregiver, or care coordination (not separately reported). I reviewed Primary care, and other specialty's notes to better coordinate patient's care. I also counseled patient  on common and most usual side effect of prescribed medications,the purppose of any new tests that may have been ordered, and possible next steps in management.  Patient voiced understanding.     EST MINUTES X   28019 5    12204 10    66681 15    74400 25    38623 40 X   NEW     39645 10    21637 20    81901 30    07578 45    61847 60    PHONE      5-10    49074 11-20    88906 21-30        Follow up in 6 weeks, or sooner if need be.    This note is dictated using the M*Modal Fluency Direct word recognition program. There are word  recognition mistakes that are occasionally missed on review.

## 2025-01-25 NOTE — PATIENT INSTRUCTIONS
You had a cortisone (steroid) injection today. There are two medicines in this injection, a numbing medicine (local anesthetic) and a steroid. The numbing medication component usually takes effect within a few minutes and wears off after a few hours, after which your pain may return. The steroid medication typically takes effect in 3-7 days, although some people have benefits sooner.    There are risks with this procedure.   Any time the skin is punctured with a needle, there is a small risk of infection. With these injections that risk is less than 1 and 14,000. Corticosteroid injections can raise your blood pressure and blood sugar over the next few days.  Less than 1% of people experience of pain flare with the steroid, which usually goes away in 2-3 days.  2-3% of people developed a dimple or pale spot at the injection site, which is strictly cosmetic.    You had a hyaluronic acid (gel) injection today. There are two medicines that were injected into your knee, a numbing medicine (local anesthetic) and the hyaluronic acid. The numbing medication component usually takes effect within a few minutes and wears off after a few hours, after which your pain may return. The gel medication typically takes effect in 4-6 weeks, although some people have benefits sooner.    There are risks with this procedure.  Any time the skin is punctured with a needle, there is a small risk of infection. With these injections that risk is less than 1 and 14,000. Less than 1% of people experience of pain flare with the hyaluronic acid injection (pseudoseptic reaction)  which usually goes away in 2-3 days or may require further intervention.

## 2025-01-28 ENCOUNTER — TELEPHONE (OUTPATIENT)
Dept: CARDIOLOGY | Facility: CLINIC | Age: 85
End: 2025-01-28
Payer: MEDICARE

## 2025-01-28 NOTE — TELEPHONE ENCOUNTER
----- Message from Ligia sent at 1/28/2025 12:34 PM CST -----  .Type:  Needs Medical Advice    Who Called: Edna with [facility]    Would the patient rather a call back or a response via [facility]? Call back  Best Call Back Number:   fax#   Additional Information:     Edna stated pt needs a medical clearance faxed over before his procedure on 1/30/25

## 2025-01-28 NOTE — TELEPHONE ENCOUNTER
Call [facility] s/w Edna to let her know I was faxing off pt cardiac clearance

## 2025-01-28 NOTE — TELEPHONE ENCOUNTER
Rt call to pt and he wanted to know when do he stop taking the blood thinner and I stated to him to call Dr. ORTIZ AND THEY WILL GIVE HIM THAT INFORMATION      AD          ----- Message from David sent at 1/28/2025 12:06 PM CST -----  Type: Patient Call          Who Called: Patient      Does the patient know what this is regarding? Requesting a call back because he is having a eye procedure called [facility] Thursday 1/30 with a Dr. Ortiz ([phone]) and they are needing information as to whether or not the pt should stop taking blood thinner clopidogreL (PLAVIX) 75 mg tablet before the procedure. Please advise          Does the patient rather a call back or a response via [facility]? call        Best Call Back Number: [phone]         Additional Information: Pt would like to be contacted also once the information has been given to the provider Dr. Ortiz

## 2025-01-29 ENCOUNTER — TELEPHONE (OUTPATIENT)
Dept: INTERNAL MEDICINE | Facility: CLINIC | Age: 85
End: 2025-01-29
Payer: MEDICARE

## 2025-01-29 NOTE — TELEPHONE ENCOUNTER
----- Message from Kary sent at 1/28/2025  5:59 PM CST -----  Regarding: PT ADVICE  Contact: PT  Requesting a call back because he is having a eye procedure called [facility] Thursday 1/30 with a Dr. Chowdhury ([phone], nurse is Edna) and they are needing information as to whether or not the pt should stop taking blood thinner clopidogreL (PLAVIX) 75 mg tablet before the procedure.     Please advise. Pt can be reached at [phone]

## 2025-01-29 NOTE — TELEPHONE ENCOUNTER
----- Message from "[employer] sent at 1/28/2025  6:14 PM CST -----  Type: General Call Back     Name of Caller:pt  Requesting a call back because he is having a eye procedure called [facility] Thursday 1/30 with a Dr. Chowdhury ([phone]) and they are needing information as to whether or not the pt should stop taking blood thinner clopidogreL (PLAVIX) 75 mg tablet before the procedure. Please advise

## 2025-01-30 ENCOUNTER — TELEPHONE (OUTPATIENT)
Dept: CARDIOLOGY | Facility: CLINIC | Age: 85
End: 2025-01-30
Payer: MEDICARE

## 2025-01-30 ENCOUNTER — NURSE TRIAGE (OUTPATIENT)
Dept: ADMINISTRATIVE | Facility: CLINIC | Age: 85
End: 2025-01-30
Payer: MEDICARE

## 2025-01-30 NOTE — TELEPHONE ENCOUNTER
Rt call to pt regarding his clearance and read over the clearance to pt what Dr. Villa stated and pt want to speak to  the doctor....        ad          ----- Message from Birdie sent at 1/30/2025 12:02 PM CST -----  Type:  Medical Clearance     Who Called: Pt   Does the patient know what this is regarding?: pt needs approval to stop clopidogreL (PLAVIX) 75 mg tablet to proceed with emergency eye procedure   Would the patient rather a call back or a response via [facility]? Call   Best Call Back Number:[phone]   Additional Information: pt only wants to speak to provider, not office staff

## 2025-01-30 NOTE — TELEPHONE ENCOUNTER
"Spoke with pt in regards to clearance for eye procedure     Reviewed with him the clearance note provided by Dr Vazquez     He is adamant this is an emergency and can not wait any longer to have this procedure    Instructed on the use of Plavix post coronary stent and risk associated with stopping this medication to soon.    He responded that this is "ridiculous". He has stopped this medication for a few days at a time due to pharmacy issues and "nothing has happened" so he can stop it now for this     He is requesting to speak directly to Dr Vazquez and not another staff member.      "

## 2025-01-30 NOTE — TELEPHONE ENCOUNTER
This has been addressed by Dr Mathur on 1/30/2025     Dr Villa informed me at 4pm that she has spoken to this pt via personal phone call and addressed his concerns.  She has submitted paper work for pending procedure with her recommendations and she is not changing these recommendations. He can discuss further with his surgeon.

## 2025-01-30 NOTE — TELEPHONE ENCOUNTER
Pt calling requesting to speak with cardiologist, adamant on speaking with cardiologist directly. Recently received update that he was not cleared by cardiology & advised he cannot stop plavix for eye procedure. He is upset & does not understand why. States he has requested several callbacks from MD and only gets callback from clinic, never able to speak to doctor directly. Validated pt's concerns. Informed pt that message would be routed to clinic & cardiologist directly as requested. PT relations number given per pt request.     Triaged for eye pain, reports is severe    Advised pt to go to ER. Pt refusing dispo. Reiterated dispo multiple times.                Reason for Disposition   SEVERE eye pain    Protocols used: Eye Pain and Other Symptoms-A-[facility]

## 2025-01-30 NOTE — TELEPHONE ENCOUNTER
----- Message from Birdie sent at 1/30/2025 12:02 PM CST -----  Type:  Medical Clearance     Who Called: Pt   Does the patient know what this is regarding?: pt needs approval to stop clopidogreL (PLAVIX) 75 mg tablet to proceed with emergency eye procedure   Would the patient rather a call back or a response via [facility]? Call   Best Call Back Number:[phone]   Additional Information: pt only wants to speak to provider, not office staff

## 2025-02-06 ENCOUNTER — CLINICAL SUPPORT (OUTPATIENT)
Dept: OPHTHALMOLOGY | Facility: CLINIC | Age: 85
End: 2025-02-06
Payer: MEDICARE

## 2025-02-06 ENCOUNTER — OFFICE VISIT (OUTPATIENT)
Dept: OPHTHALMOLOGY | Facility: CLINIC | Age: 85
End: 2025-02-06
Payer: MEDICARE

## 2025-02-06 DIAGNOSIS — H02.59 FLOPPY EYELID SYNDROME OF BOTH EYES: Primary | ICD-10-CM

## 2025-02-06 PROCEDURE — 1160F RVW MEDS BY RX/DR IN RCRD: CPT | Mod: HCNC,CPTII,S$GLB, | Performed by: OPHTHALMOLOGY

## 2025-02-06 PROCEDURE — 99999 PR PBB SHADOW E&M-EST. PATIENT-LVL III: CPT | Mod: PBBFAC,HCNC,, | Performed by: OPHTHALMOLOGY

## 2025-02-06 PROCEDURE — 1157F ADVNC CARE PLAN IN RCRD: CPT | Mod: HCNC,CPTII,S$GLB, | Performed by: OPHTHALMOLOGY

## 2025-02-06 PROCEDURE — 1159F MED LIST DOCD IN RCRD: CPT | Mod: HCNC,CPTII,S$GLB, | Performed by: OPHTHALMOLOGY

## 2025-02-06 PROCEDURE — 99214 OFFICE O/P EST MOD 30 MIN: CPT | Mod: 25,HCNC,S$GLB, | Performed by: OPHTHALMOLOGY

## 2025-02-06 PROCEDURE — 92082 INTERMEDIATE VISUAL FIELD XM: CPT | Mod: HCNC,S$GLB,, | Performed by: OPHTHALMOLOGY

## 2025-02-06 PROCEDURE — 92285 EXTERNAL OCULAR PHOTOGRAPHY: CPT | Mod: HCNC,S$GLB,, | Performed by: OPHTHALMOLOGY

## 2025-02-06 PROCEDURE — 68840 EXPLORE/IRRIGATE TEAR DUCTS: CPT | Mod: 50,HCNC,S$GLB, | Performed by: OPHTHALMOLOGY

## 2025-02-06 NOTE — PROGRESS NOTES
EXT PIX DONE OU     [facility] VF DONE OU     REL & FIX =  GOOD OU     COOP =       GOOD     PATIENT HAS NO ALLERGIES TO LATEX OR ADHESIVES AT THIS TIME    JTHOMAS

## 2025-02-06 NOTE — PROGRESS NOTES
HPI    Korey Santos is a/an 84 y.o. male here for droopy lid and floppy   eyelid evaluation.   Referred by: Dr. Valdez   Eye Meds: maxitrol octavio qhs    Pt sts after his CE OU he has had extreme irritation and discomfort of   lateral corners of both eyes. He does c/o heaviness and droopiness of BUL.      Last edited by Kira Neves on 2/6/2025  1:43 PM.            Assessment /Plan     For exam results, see Encounter Report.    Floppy eyelid syndrome of both eyes      The patient is a pleasant 84-year-old male here for evaluation of bilateral lateral canthus irritation.  The patient has a history of bilateral cataract extraction with placement of posterior chamber intra-ocular lenses by Dr. Ram.  After his surgery, has not noted persistent bilateral canthal irritation.  This has been alleviated with Maxitrol ointment at nighttime.  He is referred by my colleague Dr. Valdez.  The patient have any prior history of eyelid surgery or eyelid trauma in the past.      On exam, the patient has chronic frontalis use.  He has bilateral temporal brow ptosis worse on the left than the right with the inferior aspect of the brow sitting below the frontal bone.  Has bilateral upper eyelid mechanical ptosis with skin lash touch.  He has bilateral lower eyelid senile ectropion with severe laxity symmetrically.  He has trace inferior exposure keratopathy bilaterally.    Irrigation:  OD:patent lower punctum, canaliculus, and nld with 80 % flow to the nose and 20% reflux  OS:patent lower punctum, canaliculus, and nld with 100% flow to the nose     Pt. With significant improvement of superior visual fields with lids and brows taped vs. untaped.    We discussed options for brow ptosis repair including direct temporal browlift or browpexy.    We discussed options for dermatochalasis or mechanical ptosis repair with blepharoplasty.     Recommend bilateral lower eyelid canthoplasty to address chronic tearing and  irritation.    Recommend bilateral direct temporal brow plasty left greater than right and bilateral upper eyelid blepharoplasty in the future 3 months after bilateral lower eyelid canthoplasty.    The patient had cardiac stent placed in July of 2024.  He is currently on Plavix right now.  Expectation is that he will remain on Plavix till July of 2025.  The plan is to perform bilateral lower eyelid canthoplasty and let the patient heal.  We will plan for his bilateral ptosis repair approximately 3 months after his initial bilateral lower eyelid surgery.    Informed consent obtained after extensive risks/benefits/alternatives were discussed with the patient including but not limited to pain, bleeding, infection, ocular injury, loss of the eye, asymmetry, need for revision in future, scarring.  Alternatives such as waiting were discussed.  All questions were answered.      Hold ASA, NSAIDS, fish oil, Vitamin E and Multivitamins 5 to 7 days prior to procedure.    Hold Xarelto for three days prior to surgery, and hold eliquis for two days prior to surgery.    Recommend medical clearance by your PCP prior to your procedure.     Return for surgery     Continue maxitrol octavio qhs ou until canthoplasty to relieve irritation. Start PF AT's up to bid ou.

## 2025-02-10 ENCOUNTER — HOSPITAL ENCOUNTER (EMERGENCY)
Facility: HOSPITAL | Age: 85
Discharge: HOME | End: 2025-02-10
Attending: EMERGENCY MEDICINE
Payer: MEDICARE

## 2025-02-10 VITALS
TEMPERATURE: 99 F | DIASTOLIC BLOOD PRESSURE: 81 MMHG | WEIGHT: 235 LBS | OXYGEN SATURATION: 96 % | BODY MASS INDEX: 32.9 KG/M2 | SYSTOLIC BLOOD PRESSURE: 170 MMHG | HEIGHT: 71 IN | HEART RATE: 60 BPM | RESPIRATION RATE: 22 BRPM

## 2025-02-10 DIAGNOSIS — R53.83 FATIGUE: ICD-10-CM

## 2025-02-10 DIAGNOSIS — R68.89 CHANGE IN BLOOD PRESSURE: Primary | ICD-10-CM

## 2025-02-10 LAB
OHS QRS DURATION: 140 MS
OHS QTC CALCULATION: 418 MS

## 2025-02-10 PROCEDURE — 99283 EMERGENCY DEPT VISIT LOW MDM: CPT | Mod: 25,HCNC,ER

## 2025-02-10 PROCEDURE — 93005 ELECTROCARDIOGRAM TRACING: CPT | Mod: HCNC,ER

## 2025-02-10 PROCEDURE — 93010 ELECTROCARDIOGRAM REPORT: CPT | Mod: HCNC,,, | Performed by: STUDENT IN AN ORGANIZED HEALTH CARE EDUCATION/TRAINING PROGRAM

## 2025-02-10 NOTE — DISCHARGE INSTRUCTIONS
Please call your cardiologist for a follow-up appointment for further evaluation and management.  Please return with any new or worsening symptoms.

## 2025-02-10 NOTE — FIRST PROVIDER EVALUATION
Emergency Department TeleTriage Encounter Note      CHIEF COMPLAINT    Chief Complaint   Patient presents with    Hypotension     Patient has been taking his BP at home and reports it has been trending downward all day. Patient reports he took his BP meds this morning.        VITAL SIGNS   Initial Vitals [02/10/25 1338]   BP Pulse Resp Temp SpO2   (!) 150/70 60 20 98.9 °F (37.2 °C) 97 %      MAP       --            ALLERGIES    Review of patient's allergies indicates:   Allergen Reactions    Clindamycin Swelling     Throat swells    Lisinopril Swelling and Other (See Comments)     Throat swelling    Shellfish containing products        PROVIDER TRIAGE NOTE  Patient presents with complaint of low blood pressure at home. States mild fatigue this am but denied any other symptoms.      Phy:   Constitutional: well nourished, well developed, appearing stated age, NAD        Initial orders will be placed and care will be transferred to an alternate provider when patient is roomed for a full evaluation. Any additional orders and the final disposition will be determined by that provider.        ORDERS  Labs Reviewed - No data to display    ED Orders (720h ago, onward)      None              Virtual Visit Note: The provider triage portion of this emergency department evaluation and documentation was performed via [employer], a HIPAA-compliant telemedicine application, in concert with a tele-presenter in the room. A face to face patient evaluation with one of my colleagues will occur once the patient is placed in an emergency department room.      DISCLAIMER: This note was prepared with [employer] voice recognition transcription software. Garbled syntax, mangled pronouns, and other bizarre constructions may be attributed to that software system.

## 2025-02-10 NOTE — ED PROVIDER NOTES
Encounter Date: 2/10/2025       History     Chief Complaint   Patient presents with    Hypotension     Patient has been taking his BP at home and reports it has been trending downward all day. Patient reports he took his BP meds this morning.      84-year-old male presents emergency department concerned about his blood pressure readings.  States that throughout the day his blood pressure readings have been lower than usual.  They have been in the 110 over 40 to 50s.  States he is usually more 130 or 140 over 60-70.  States that his cardiologist told him if he had significant changes to his blood pressure he should come into the emergency room to get evaluated.  When asked if he had any symptoms he states he feels little more fatigued today.  No other symptoms reported.  Denies any headache, chest pain, shortness of breath, lightheadedness, dizziness, nausea, vomiting, cough, congestion, fever, focal numbness or weakness, vision changes.      Review of patient's allergies indicates:   Allergen Reactions    Clindamycin Swelling     Throat swells    Lisinopril Swelling and Other (See Comments)     Throat swelling    Shellfish containing products      Past Medical History:   Diagnosis Date    Arthritis     Back pain, chronic     BPH with urinary obstruction     Chronic constipation     Closed fracture of right shoulder 1/28/2021    Closed nondisplaced fracture of greater tuberosity of right humerus 2/24/2021    Colon polyp     DJD (degenerative joint disease)     Hip    Hyperlipidemia     Hypertension     Laryngopharyngeal reflux     Left inguinal hernia     Lumbar herniated disc     Lumbar stenosis     Neck mass 10/7/2014    -4/29/2022 path - lipoma    OA (osteoarthritis) of knee     Bilateral    Paradoxical insomnia     Sinus trouble      Past Surgical History:   Procedure Laterality Date    A-V CARDIAC PACEMAKER INSERTION N/A 12/19/2023    Procedure: INSERTION, CARDIAC PACEMAKER, DUAL CHAMBER;  Surgeon: Taylor  MD Noah;  Location: [facility] CATH LAB/EP;  Service: Cardiology;  Laterality: N/A;    BACK SURGERY  2014    COLONOSCOPY      COLONOSCOPY N/A 5/17/2023    Procedure: COLONOSCOPY;  Surgeon: Christiano Vazquez MD;  Location: [facility] ENDO;  Service: Endoscopy;  Laterality: N/A;  Constipation 2 day prep.Instructions to portal.EC    CORONARY ANGIOGRAPHY N/A 12/18/2023    Procedure: ANGIOGRAM, CORONARY ARTERY;  Surgeon: Flaco Kelly MD;  Location: [facility] CATH LAB/EP;  Service: Cardiology;  Laterality: N/A;    CORONARY ANGIOPLASTY WITH STENT PLACEMENT N/A 7/12/2024    Procedure: ANGIOPLASTY, CORONARY ARTERY, WITH STENT INSERTION;  Surgeon: Marky Mohan MD;  Location: [facility] CATH LAB/EP;  Service: Cardiology;  Laterality: N/A;    CYSTOSCOPY WITH TRANSURETHRAL DESTRUCTION OF PROSTATE,RFA N/A 4/11/2024    Procedure: CYSTOSCOPY WITH TRANSURETHRAL DESTRUCTION OF PROSTATE,RFA  rezum generator and at least 2 handpieces Contact Paul grant to schedule;  Surgeon: Juan Ward MD;  Location: [facility] OR;  Service: Urology;  Laterality: N/A;  rezum generator and at least 2 handpieces  Contact Paul grant to schedule- Marquise notified 3/26 AM    EPIDURAL STEROID INJECTION INTO LUMBAR SPINE N/A 7/5/2022    Procedure: Injection-steroid-epidural-lumbar L3-4;  Surgeon: Yury Crum Jr., MD;  Location: [facility] PAIN MGT;  Service: Pain Management;  Laterality: N/A;  oral sedation   asa      INSERTION, PACEMAKER, TEMPORARY TRANSVENOUS  12/18/2023    Procedure: Insertion, Pacemaker, Temporary Transvenous;  Surgeon: Flaco Kelly MD;  Location: [facility] CATH LAB/EP;  Service: Cardiology;;    IVUS, CORONARY  7/12/2024    Procedure: IVUS, Coronary;  Surgeon: Marky Mohan MD;  Location: [facility] CATH LAB/EP;  Service: Cardiology;;    JOINT REPLACEMENT Left 05/04/2018    KNEE SURGERY      left hand      LEFT HEART CATHETERIZATION Left 7/12/2024    Procedure: Left heart cath;  Surgeon: Marky Mohan MD;  Location: [facility] CATH LAB/EP;   Service: Cardiology;  Laterality: Left;    LEG SURGERY      SPINE SURGERY      UPPER GASTROINTESTINAL ENDOSCOPY       Family History   Problem Relation Name Age of Onset    No Known Problems Mother      Cancer Father          lung or smoking    Lung cancer Father      No Known Problems Sister none     No Known Problems Brother none     No Known Problems Daughter      No Known Problems Son      Glaucoma Neg Hx      Diabetes Neg Hx      Amblyopia Neg Hx      Blindness Neg Hx      Cataracts Neg Hx      Hypertension Neg Hx      Macular degeneration Neg Hx      Retinal detachment Neg Hx      Strabismus Neg Hx      Stroke Neg Hx      Thyroid disease Neg Hx      Colon cancer Neg Hx      Esophageal cancer Neg Hx       Social History     Tobacco Use    Smoking status: Never     Passive exposure: Never    Smokeless tobacco: Never   Substance Use Topics    Alcohol use: Yes     Alcohol/week: 0.0 standard drinks of alcohol     Comment: approximately 2 beers weekly    Drug use: No     Review of Systems   Constitutional:  Negative for chills and fever.   HENT:  Negative for congestion.    Respiratory:  Negative for cough and shortness of breath.    Cardiovascular:  Negative for chest pain.   Gastrointestinal:  Negative for abdominal pain.   Musculoskeletal:  Negative for back pain.   Neurological:  Negative for light-headedness and headaches.       Physical Exam     Initial Vitals [02/10/25 1338]   BP Pulse Resp Temp SpO2   (!) 150/70 60 20 98.9 °F (37.2 °C) 97 %      MAP       --         Physical Exam    Nursing note and vitals reviewed.  Constitutional: He appears well-developed and well-nourished. No distress.   HENT:   Head: Normocephalic and atraumatic.   Eyes: Conjunctivae and EOM are normal. Pupils are equal, round, and reactive to light.   Neck: Neck supple. No tracheal deviation present.   Normal range of motion.  Cardiovascular:  Normal rate and intact distal pulses.           Pulmonary/Chest: No respiratory distress.    Musculoskeletal:         General: No tenderness or edema. Normal range of motion.      Cervical back: Normal range of motion and neck supple.     Neurological: He is alert and oriented to person, place, and time. He has normal strength. No cranial nerve deficit. GCS score is 15. GCS eye subscore is 4. GCS verbal subscore is 5. GCS motor subscore is 6.   Skin: Skin is warm and dry.         ED Course   Procedures  Labs Reviewed - No data to display  EKG Readings: (Independently Interpreted)   Initial Reading: No STEMI. Previous EKG: Compared with most recent EKG Previous EKG Date: 7/12/2024 (Minimal change). Rhythm: Paced Rhythm. Heart Rate: 60. Ectopy: No Ectopy. Conduction: RBBB. ST Segments: Normal ST Segments. Axis: Normal.   EKG independently interpreted by me pending Cardiology review       Imaging Results    None          Medications - No data to display  Medical Decision Making  84-year-old male presents emergency department complaining of lower blood pressure readings than usual      Differential: Hypotension, symptomatic hypotension, dehydration, arrhythmia,      Exam fairly benign.  Patient's blood pressure here is a little better.  He really does not report any symptoms other than feeling little more fatigued today.  Encouraged him to continue measuring his blood pressure, follow up with his cardiologist, given strict return precautions.  Vital signs stable.    Problems Addressed:  Change in blood pressure: acute illness or injury    Amount and/or Complexity of Data Reviewed  External Data Reviewed: ECG and notes.     Details: Reviewed most recent EKG for comparison     Reviewed most recent cardiology note documenting baseline medications and past medical history    Risk  OTC drugs.  Prescription drug management.                                      Clinical Impression:  Final diagnoses:  [R53.83] Fatigue  [R68.89] Change in blood pressure (Primary)                 Jomar Marquez MD  02/10/25  [id number]

## 2025-02-11 ENCOUNTER — TELEPHONE (OUTPATIENT)
Facility: OTHER | Age: 85
End: 2025-02-11
Payer: MEDICARE

## 2025-02-11 NOTE — PROGRESS NOTES
Patient seen in the ED on 2/10/25 for fatigue and change in blood pressure. Called patient to see if he has any concerns or follow up needs related to ED visit. Patient states that he scheduled the appointments that he needed already. Offered to schedule with Cardiology as AVS instructs, patient declined. Encounter closed.

## 2025-02-12 ENCOUNTER — PATIENT OUTREACH (OUTPATIENT)
Facility: OTHER | Age: 85
End: 2025-02-12
Payer: MEDICARE

## 2025-02-12 ENCOUNTER — TELEPHONE (OUTPATIENT)
Dept: OPHTHALMOLOGY | Facility: CLINIC | Age: 85
End: 2025-02-12
Payer: MEDICARE

## 2025-02-12 NOTE — TELEPHONE ENCOUNTER
----- Message from Concetta Alexander sent at 2/11/2025 10:46 AM CST -----    ----- Message -----  From: Paul Villareal  Sent: 2/11/2025  10:23 AM CST  To: Yuniel Boyd Staff    Name Of Caller: Korey        Provider Name:Deb Brice        Does patient feel the need to be seen today?        Relationship to the Pt?:        Contact Preference?:[phone]         What is the nature of the call?:  Patient states that he was seen in the office on last week and was told that an ointment was going to be sent to [facility] in [place] but that medication has not been sent yet.

## 2025-02-13 NOTE — PROGRESS NOTES
Pt visited the ED on 2/10/25. I made 2 attempts to reach patient to assist with scheduling a post ED 7-day follow up with PCP. Unable to reach.  Closing Encounter.    Nichole Esposito

## 2025-02-21 DIAGNOSIS — Z00.00 ENCOUNTER FOR MEDICARE ANNUAL WELLNESS EXAM: ICD-10-CM

## 2025-03-06 ENCOUNTER — OFFICE VISIT (OUTPATIENT)
Dept: INTERNAL MEDICINE | Facility: CLINIC | Age: 85
End: 2025-03-06
Attending: FAMILY MEDICINE
Payer: MEDICARE

## 2025-03-06 ENCOUNTER — LAB VISIT (OUTPATIENT)
Dept: LAB | Facility: HOSPITAL | Age: 85
End: 2025-03-06
Attending: FAMILY MEDICINE
Payer: MEDICARE

## 2025-03-06 VITALS
HEIGHT: 71 IN | WEIGHT: 247.81 LBS | OXYGEN SATURATION: 97 % | BODY MASS INDEX: 34.69 KG/M2 | DIASTOLIC BLOOD PRESSURE: 63 MMHG | SYSTOLIC BLOOD PRESSURE: 133 MMHG | HEART RATE: 76 BPM

## 2025-03-06 DIAGNOSIS — I44.2 COMPLETE HEART BLOCK: ICD-10-CM

## 2025-03-06 DIAGNOSIS — R13.14 DYSPHAGIA, PHARYNGOESOPHAGEAL: ICD-10-CM

## 2025-03-06 DIAGNOSIS — I25.10 CORONARY ARTERY DISEASE INVOLVING NATIVE CORONARY ARTERY OF NATIVE HEART WITHOUT ANGINA PECTORIS: ICD-10-CM

## 2025-03-06 DIAGNOSIS — I10 HYPERTENSION, ESSENTIAL: ICD-10-CM

## 2025-03-06 DIAGNOSIS — Z00.00 ANNUAL PHYSICAL EXAM: ICD-10-CM

## 2025-03-06 DIAGNOSIS — G47.00 INSOMNIA, UNSPECIFIED TYPE: ICD-10-CM

## 2025-03-06 DIAGNOSIS — E04.2 MULTIPLE THYROID NODULES: ICD-10-CM

## 2025-03-06 DIAGNOSIS — J31.0 CHRONIC RHINITIS: ICD-10-CM

## 2025-03-06 DIAGNOSIS — I70.0 AORTIC ATHEROSCLEROSIS: ICD-10-CM

## 2025-03-06 DIAGNOSIS — E78.5 HYPERLIPIDEMIA, UNSPECIFIED HYPERLIPIDEMIA TYPE: ICD-10-CM

## 2025-03-06 DIAGNOSIS — Z95.0 PACEMAKER: ICD-10-CM

## 2025-03-06 DIAGNOSIS — Z00.00 ANNUAL PHYSICAL EXAM: Primary | ICD-10-CM

## 2025-03-06 DIAGNOSIS — L98.9 SKIN LESION: ICD-10-CM

## 2025-03-06 PROBLEM — M79.642 LEFT HAND PAIN: Status: RESOLVED | Noted: 2023-12-20 | Resolved: 2025-03-06

## 2025-03-06 PROBLEM — I11.0 HYPERTENSIVE HEART DISEASE WITH HEART FAILURE: Status: ACTIVE | Noted: 2025-03-06

## 2025-03-06 PROBLEM — R58 ECCHYMOSIS OF FOREARM: Status: RESOLVED | Noted: 2023-12-22 | Resolved: 2025-03-06

## 2025-03-06 PROBLEM — I11.0 HYPERTENSIVE HEART DISEASE WITH HEART FAILURE: Status: RESOLVED | Noted: 2025-03-06 | Resolved: 2025-03-06

## 2025-03-06 PROBLEM — R79.89 ELEVATED TROPONIN: Status: RESOLVED | Noted: 2024-07-11 | Resolved: 2025-03-06

## 2025-03-06 LAB
ALBUMIN SERPL BCP-MCNC: 3.6 G/DL (ref 3.5–5.2)
ALP SERPL-CCNC: 60 U/L (ref 40–150)
ALT SERPL W/O P-5'-P-CCNC: 15 U/L (ref 10–44)
ANION GAP SERPL CALC-SCNC: 7 MMOL/L (ref 8–16)
AST SERPL-CCNC: 22 U/L (ref 10–40)
BILIRUB SERPL-MCNC: 0.4 MG/DL (ref 0.1–1)
BUN SERPL-MCNC: 17 MG/DL (ref 8–23)
CALCIUM SERPL-MCNC: 8.9 MG/DL (ref 8.7–10.5)
CHLORIDE SERPL-SCNC: 108 MMOL/L (ref 95–110)
CHOLEST SERPL-MCNC: 137 MG/DL (ref 120–199)
CHOLEST/HDLC SERPL: 2.9 {RATIO} (ref 2–5)
CO2 SERPL-SCNC: 28 MMOL/L (ref 23–29)
CREAT SERPL-MCNC: 1 MG/DL (ref 0.5–1.4)
EST. GFR  (NO RACE VARIABLE): >60 ML/MIN/1.73 M^2
GLUCOSE SERPL-MCNC: 95 MG/DL (ref 70–110)
HDLC SERPL-MCNC: 47 MG/DL (ref 40–75)
HDLC SERPL: 34.3 % (ref 20–50)
LDLC SERPL CALC-MCNC: 76.8 MG/DL (ref 63–159)
NONHDLC SERPL-MCNC: 90 MG/DL
POTASSIUM SERPL-SCNC: 4.5 MMOL/L (ref 3.5–5.1)
PROT SERPL-MCNC: 6.5 G/DL (ref 6–8.4)
SODIUM SERPL-SCNC: 143 MMOL/L (ref 136–145)
TRIGL SERPL-MCNC: 66 MG/DL (ref 30–150)

## 2025-03-06 PROCEDURE — 36415 COLL VENOUS BLD VENIPUNCTURE: CPT | Mod: HCNC | Performed by: FAMILY MEDICINE

## 2025-03-06 PROCEDURE — 99999 PR PBB SHADOW E&M-EST. PATIENT-LVL V: CPT | Mod: PBBFAC,HCNC,, | Performed by: FAMILY MEDICINE

## 2025-03-06 PROCEDURE — 80061 LIPID PANEL: CPT | Mod: HCNC | Performed by: FAMILY MEDICINE

## 2025-03-06 PROCEDURE — 80053 COMPREHEN METABOLIC PANEL: CPT | Mod: HCNC | Performed by: FAMILY MEDICINE

## 2025-03-06 RX ORDER — MIRTAZAPINE 15 MG/1
15 TABLET, FILM COATED ORAL NIGHTLY
Qty: 30 TABLET | Refills: 2 | Status: SHIPPED | OUTPATIENT
Start: 2025-03-06

## 2025-03-06 RX ORDER — FLUTICASONE PROPIONATE 50 MCG
1 SPRAY, SUSPENSION (ML) NASAL DAILY
Qty: 16 G | Refills: 6 | Status: SHIPPED | OUTPATIENT
Start: 2025-03-06 | End: 2025-03-06 | Stop reason: SDUPTHER

## 2025-03-06 RX ORDER — FLUTICASONE PROPIONATE 50 MCG
2 SPRAY, SUSPENSION (ML) NASAL DAILY
Qty: 18.2 ML | Refills: 2 | Status: SHIPPED | OUTPATIENT
Start: 2025-03-06

## 2025-03-06 NOTE — PROGRESS NOTES
Subjective:       Patient ID: Korey Santos is a 84 y.o. male.  History of Present Illness    CHIEF COMPLAINT:  Patient presents today with concerns about sleep difficulties and chronic runny nose.    SLEEP CONCERNS:  He reports getting only 4-5 hours of sleep per night. Mirtazapine 7.5 mg is ineffective for sleep. He uses additional substances including extra sleeping pills, ibuprofen, and alcohol as a sleep aid. He reports high tolerance to sleep medications and pain relievers. He experiences daytime fatigue and dry mouth. He sleeps in a recliner due to inability to sleep lying down.    ENT:  He reports rhinorrhea that worsens when lying down, causing nasal breathing difficulties. These symptoms are absent when upright. He uses Flonase nasal spray inconsistently, reporting dissatisfaction with the lack of immediate results. He has a history of swallowing difficulties, describing a sensation of food getting stuck in a small pocket in his throat. His swallowing issues began after a severe reaction to shellfish in [facility] that caused significant throat swelling. He denies current shellfish reactions.    CARDIOVASCULAR:  His cardiac history is significant for pacemaker placement in early 2022 and stent placement in mid-July 2022. He is currently on four blood pressure medications and Atorvastatin 40 mg for cholesterol management.    MUSCULOSKELETAL:  He has a history of left knee replacement and received a right knee injection a couple weeks ago. He has neck arthritis that fluctuates with sleep position and receives hip injections for management of hip issues.    OTHER MEDICAL HISTORY:  He has thyroid nodules found on imaging in 2023 and an asymptomatic, stable hernia that is only noticeable during bathing.         Chief Complaint: Annual Exam    Established patient follows up for management of chronic medical illnesses with complaints today. Please see dictation and ROS for interval problems, specific complaints  and disease management discussion.    Past, Surgical, Family, Social, Histories; Medications, allergies reviewed and reconciled.  Health maintenance file reviewed and addressed items due. Recent applicable lab, imaging and cardiovascular results reviewed.  Problem list items reviewed and modified or added entries (in the overview section) may not be transcribed into this encounter note due to note writer format.        Established patient for an annual wellness check/physical exam and also chronic disease management. Specific complaints - see dictation, M*model entries and please see ROS.  Past, Surgical, Family, Social Histories; Medications, Allergies reviewed and reconciled.  Health maintenance file reviewed and addressed items due. Recent applicable lab, imaging and cardiovascular results reviewed.  Problem list items reviewed and modified or added entries (in the overview section) may not be transcribed into this encounter note due to note writer format.              Review of Systems   Constitutional:  Negative for appetite change, chills, diaphoresis, fatigue and fever.   HENT:  Positive for rhinorrhea. Negative for congestion, postnasal drip, sore throat and trouble swallowing.    Eyes:  Negative for visual disturbance.   Respiratory:  Negative for cough, choking, chest tightness, shortness of breath and wheezing.    Cardiovascular:  Negative for chest pain and leg swelling.   Gastrointestinal:  Negative for abdominal distention, abdominal pain, diarrhea, nausea and vomiting.   Genitourinary:  Negative for difficulty urinating and hematuria.   Musculoskeletal:  Positive for arthralgias and gait problem. Negative for myalgias.   Skin:  Negative for rash.   Neurological:  Negative for weakness, light-headedness and headaches.   Psychiatric/Behavioral:  Positive for sleep disturbance. Negative for confusion and dysphoric mood.        Objective:      Physical Exam  Vitals and nursing note reviewed.    Constitutional:       Appearance: He is well-developed. He is not diaphoretic.   HENT:      Head: Normocephalic and atraumatic.   Eyes:      General: No scleral icterus.     Conjunctiva/sclera: Conjunctivae normal.   Cardiovascular:      Rate and Rhythm: Normal rate and regular rhythm.      Heart sounds: Normal heart sounds. No murmur heard.     No friction rub. No gallop.   Pulmonary:      Effort: Pulmonary effort is normal. No respiratory distress.      Breath sounds: Normal breath sounds. No wheezing or rales.   Abdominal:      General: There is no distension.      Tenderness: There is no abdominal tenderness.   Musculoskeletal:         General: No deformity.      Cervical back: Normal range of motion and neck supple.   Skin:     General: Skin is warm and dry.      Findings: No erythema or rash.   Neurological:      Mental Status: He is alert and oriented to person, place, and time.      Cranial Nerves: No cranial nerve deficit.      Motor: No tremor.      Coordination: Coordination normal.      Gait: Gait normal.   Psychiatric:         Behavior: Behavior normal.         Thought Content: Thought content normal.         Judgment: Judgment normal.         Assessment:       1. Annual physical exam    2. Insomnia, unspecified type    3. Complete heart block    4. Pacemaker    5. Coronary artery disease involving native coronary artery of native heart without angina pectoris    6. Aortic atherosclerosis    7. Hypertension, essential    8. Hyperlipidemia, unspecified hyperlipidemia type    9. Chronic rhinitis    10. Dysphagia, pharyngoesophageal    11. Multiple thyroid nodules: see u/s 7/23    12. Skin lesion        Plan:     Medication List with Changes/Refills   New Medications    FLUTICASONE PROPIONATE (FLONASE) 50 MCG/ACTUATION NASAL SPRAY    2 sprays (100 mcg total) by Each Nostril route once daily.   Current Medications    ASPIRIN 81 MG CHEW    Take 1 tablet (81 mg total) by mouth once daily.    ATORVASTATIN  (LIPITOR) 40 MG TABLET    Take 1 tablet (40 mg total) by mouth once daily.    CLOPIDOGREL (PLAVIX) 75 MG TABLET    Take 1 tablet (75 mg total) by mouth once daily.    FINASTERIDE (PROSCAR) 5 MG TABLET    Take 1 tablet (5 mg total) by mouth once daily.    LINACLOTIDE (LINZESS) 145 MCG CAP CAPSULE    TAKE 1 CAPSULE BY MOUTH ONCE DAILY AS NEEDED FOR  CONSTIPATION    LOSARTAN (COZAAR) 100 MG TABLET    Take 1 tablet (100 mg total) by mouth once daily.    METOPROLOL SUCCINATE (TOPROL-XL) 25 MG 24 HR TABLET    Take 1 tablet (25 mg total) by mouth once daily.    MULTIVITAMIN CAPSULE    Take 1 capsule by mouth once daily.    NIFEDIPINE (PROCARDIA-XL) 30 MG (OSM) 24 HR TABLET    Take 1 tablet (30 mg total) by mouth once daily.    TAMSULOSIN (FLOMAX) 0.4 MG CAP    Take 1 capsule (0.4 mg total) by mouth once daily.   Changed and/or Refilled Medications    Modified Medication Previous Medication    MIRTAZAPINE (REMERON) 15 MG TABLET mirtazapine (REMERON) 7.5 MG Tab       Take 1 tablet (15 mg total) by mouth every evening.    Take 1 tablet (7.5 mg total) by mouth every evening.   Discontinued Medications    FLUTICASONE PROPIONATE (FLONASE) 50 MCG/ACTUATION NASAL SPRAY    1 spray (50 mcg total) by Each Nostril route once daily.    POLYETHYLENE GLYCOL (GLYCOLAX) 17 GRAM PWPK    Take 17 g by mouth 2 (two) times daily.     1. Annual physical exam  -     Comprehensive Metabolic Panel; Future; Expected date: 03/06/2025  -     Lipid Panel; Future; Expected date: 03/06/2025    2. Insomnia, unspecified type  Overview:  -hydroxyzine not helpful    Assessment & Plan:  -increase mirtazapine dose to 15    Orders:  -     mirtazapine (REMERON) 15 MG tablet; Take 1 tablet (15 mg total) by mouth every evening.  Dispense: 30 tablet; Refill: 2    3. Complete heart block  Overview:  -followed in cardiology  -PPM Dual 12/19/2023    >>OVERVIEW FOR BRADYCARDIA WRITTEN ON 3/5/2024 11:46 AM BY ORLIN REILLY MD    -followed in cardiology  -PPM Dual  12/19/2023    Orders:  -     Ambulatory referral/consult to Cardiology; Future; Expected date: 03/13/2025    4. Pacemaker  Overview:  -CHB and symptomatic bradycardia  -followed in cardiology  -PPM Dual 12/19/2023      Orders:  -     Ambulatory referral/consult to Cardiology; Future; Expected date: 03/13/2025    5. Coronary artery disease involving native coronary artery of native heart without angina pectoris  Overview:  - [facility] 12/18/2023 in setting of CHB   LM patent; pLAD 40%; LCX patent; RCA patent     -[facility] 7/12/2024 plaque rutpure LAD, 2 overlapping stents    Orders:  -     Ambulatory referral/consult to Cardiology; Future; Expected date: 03/13/2025    6. Aortic atherosclerosis  Overview:  -on statin for CAD and ASCVD      7. Hypertension, essential  -     Comprehensive Metabolic Panel; Future; Expected date: 03/06/2025  -     Lipid Panel; Future; Expected date: 03/06/2025    8. Hyperlipidemia, unspecified hyperlipidemia type  -     Comprehensive Metabolic Panel; Future; Expected date: 03/06/2025  -     Lipid Panel; Future; Expected date: 03/06/2025    9. Chronic rhinitis  -     Discontinue: fluticasone propionate (FLONASE) 50 mcg/actuation nasal spray; 1 spray (50 mcg total) by Each Nostril route once daily.  Dispense: 16 g; Refill: 6  -     Ambulatory referral/consult to ENT; Future; Expected date: 03/13/2025  -     fluticasone propionate (FLONASE) 50 mcg/actuation nasal spray; 2 sprays (100 mcg total) by Each Nostril route once daily.  Dispense: 18.2 mL; Refill: 2    10. Dysphagia, pharyngoesophageal  -     Ambulatory referral/consult to Gastroenterology; Future; Expected date: 03/13/2025    11. Multiple thyroid nodules: see u/s 7/23  Overview:  -7/24/2023 US for dysphagia    Orders:  -     Ambulatory referral/consult to Endocrinology; Future; Expected date: 03/13/2025    12. Skin lesion  Comments:  papule L hand and L arm  Orders:  -     Ambulatory referral/consult to Dermatology; Future; Expected date:  03/13/2025      See meds, orders, follow up, routing and instructions sections of encounter and AVS. Discussed with patient and provided on AVS.      Assessment & Plan    IMPRESSION:  - Increased mirtazapine from 7.5mg to 15mg daily for sleep issues, considering patient's reported elevated tolerance  - Recommend consistent use of Flonase nasal spray for chronic rhinitis, increasing from 1 puff to 2 puffs each nostril daily  - Advised against use of OTC nasal sprays like Afrin due to risk of dependency and potential blood pressure effects  - Ordered cholesterol test to evaluate efficacy of current atorvastatin 40mg dose  - Considering ENT referral if Flonase ineffective for chronic rhinitis, especially given positional nasal congestion when supine  - avoid alcohol and mixing alcohol with prescription or nonprescription medication.    HYPERTENSIVE HEART DISEASE WITH HEART FAILURE:  - Monitored the patient's cardiac history, including a myocardial infarction last year, pacemaker implantation at the beginning of the year, and stent placement in mid-July.  - Current blood pressure of 133/63 and heart rate of 60 bpm were noted, with the diastolic pressure being on the low side but not problematic.  - Confirmed the patient's history of myocardial infarction, explaining that elevated cardiac biomarkers indicate a heart attack, even if mild.    COMPLETE HEART BLOCK:  - Monitored the patient's pacemaker function, which was implanted at the beginning of the year.  - The consistent heart rate of 60 bpm is likely maintained by the pacemaker and is considered appropriate.  - Advised against using OTC sleep aids (e.g., diphenhydramine, doxylamine) due to potential cumulative effects on heart rhythm.    SLEEP ISSUES:  - Explained risks of mixing alcohol with prescribed medications.  Avoid alcohol.  - Patient to use recliner for sleep to mitigate dry mouth caused by mouth breathing when lying flat.  - Increased mirtazapine from  7.5mg to 15mg, take 1 tablet daily at bedtime.  - Follow up if increased dose is ineffective.    CHRONIC RHINITIS:  - Started Flonase nasal spray, use 2 sprays in each nostril daily.  - future considerations Astelin.  - ENT consult given postural changes.    HYPERLIPIDEMIA:  - Continued atorvastatin 40mg daily.  - Cholesterol test ordered.    LABS:  - Comprehensive metabolic panel and glucose test ordered.    DERMATOLOGY REFERRAL:  - Referred for evaluation of skin lesions, including crusty areas on left hand and left upper arm.    ENT REFERRAL:  - Referred for evaluation of chronic rhinitis and positional nasal congestion.    ENDOCRINOLOGY REFERRAL:  - Referred for evaluation of thyroid nodules.  Previously referred, did not schedule.    ORTHOPEDICS REFERRAL:  - Follow up regarding knee and hip injections.           This note was generated with the assistance of ambient listening technology. Verbal consent was obtained by the patient and accompanying visitor(s) for the recording of patient appointment to facilitate this note. I attest to having reviewed and edited the generated note for accuracy, though some syntax or spelling errors may persist. Please contact the author of this note for any clarification.     Lab Results   Component Value Date     07/11/2024    K 4.1 07/11/2024     07/11/2024    BUN 9 07/11/2024    CREATININE 0.8 07/11/2024    GLU 95 07/11/2024    HGBA1C 5.5 07/12/2024    MG 2.1 07/11/2024    AST 19 07/11/2024    ALT 16 07/11/2024    ALBUMIN 3.5 07/11/2024    ALBUMIN 4.3 09/26/2012    PROT 6.4 07/11/2024    BILITOT 0.3 07/11/2024    CHOL 157 07/09/2024    HDL 49 07/09/2024    LDLCALC 94.8 07/09/2024    TRIG 66 07/09/2024    WBC 7.16 07/12/2024    HGB 14.0 07/12/2024    HCT 42.4 07/12/2024     07/12/2024    PSA 4.7 (H) 07/08/2017    PSADIAG 2.9 10/22/2014    TSH 1.626 07/09/2024    BNP 45 07/09/2024    URICACID 5.6 03/05/2024         Discussed diet and exercise as therapeutic  modalities for metabolic and other conditions. Provided patient information, which are included as links on the AVS for detailed information.

## 2025-03-07 NOTE — PROGRESS NOTES
CC: right hip pain, right knee pain    84 y.o. Male presents today for follow up evaluation of his right hip pain following CSI.     Attempted treatments: hip joint CSI  Pain score:  History of trauma/injury:  Affecting ADLs:    84 y.o. Male also presents today for follow up evaluation of his right knee pain following Monovisc injection.     Attempted treatments: Monovisc  Pain score:  History of trauma/injury:  Affecting ADLs:     REVIEW OF SYSTEMS:   Constitution: Patient denies fever or chills.  Eyes: Patient denies eye pain or vision changes.  HEENT: Patient denies ear pain, sore throat, or nasal discharge.  CVS: Patient denies chest pain.  Lungs: Patient denies shortness of breath or cough.  Skin: Patient denies skin rash or itching.    Musculoskeletal: Patient denies recent falls. See HPI.  Psych: Patient denies any current anxiety or nervousness.    PAST MEDICAL HISTORY:   Past Medical History:   Diagnosis Date    Arthritis     Back pain, chronic     BPH with urinary obstruction     Chronic constipation     Closed fracture of right shoulder 01/28/2021    Closed nondisplaced fracture of greater tuberosity of right humerus 02/24/2021    Colon polyp     DJD (degenerative joint disease)     Hip    Elevated troponin 07/11/2024    Hyperlipidemia     Hypertension     Laryngopharyngeal reflux     Left hand pain 12/20/2023    Left inguinal hernia     Lumbar herniated disc     Lumbar stenosis     Neck mass 10/07/2014    -4/29/2022 path - lipoma    OA (osteoarthritis) of knee     Bilateral    Paradoxical insomnia     Sinus trouble        MEDICATIONS:   Current Medications[1]    ALLERGIES:   Review of patient's allergies indicates:   Allergen Reactions    Clindamycin Swelling     Throat swells    Lisinopril Swelling and Other (See Comments)     Throat swelling    Shellfish containing products         PHYSICAL EXAMINATION:  There were no vitals taken for this visit.  Vitals signs and nursing note have been  reviewed.    General: In no acute distress, well developed, well nourished, no diaphoresis  Eyes: EOM full and smooth, no eye redness or discharge  HENT: normocephalic and atraumatic, neck supple, trachea midline, no nasal discharge  Cardiovascular: no LE edema  Lungs: respirations non-labored, no conversational dyspnea   Neuro: AAOx3, CN2-12 grossly intact  Skin: No rashes, warm and dry  Psychiatric: cooperative, pleasant, mood and affect appropriate for age    Right Hip:   Gait: ***    Inspection/Palpation:   -Deformities     TTP:  -Greater trochanter  -Abductors  -ASIS  -AIIS   -Ischial tuberosity/hamstring origin  -Mid substance hamstring  -SIJ  -ITB    ROM (* = with pain):   Flexion: ***°  Extension: ***°  Popliteal Angle: ***°  Internal rotation seated: ***°  External rotation seated: ***°      Functional:   -Log roll   -VICKIE  -FADIR  -Figure-4  -Scour/Circumduction with pressure  -Cristobal Test  -Snapping int  -Snapping ext  -Cali's Sign  -Gaenslen's  -Stinchfield test  -Rishi  -Lasegue's     Strength/Functional:   5/5 in glut med / abductors    5/5 in hamstrings   2-legged squat {WITH-WITHOUT:94885} valgus    1-legged squat with {WITH-WITHOUT:70270} valgus    Right Knee:   Gait: ***    Inspection/Palpation:   -Rubor   -Calor  -Effusion   -Patella ballotable   -Patellar apprehension  -Retinacular tenderness   -Patellar crepitus   Patellar tilt grossly normal     TTP at:  -Joint line   -MCL   -LCL   -Popliteal region   -Quad tendon   -Patella  -Pat tendon  -Pat border  -Med condyle   -Lat condyle   -Pes   -Prox fibula   -Tib tub  -Gerdy's tubercle  -Distal Hamstring tendons  -Proximal Hamstrings/Ischial tuberosity  -ITB    ROM:   Extension: ***°   Flexion:***°   Popliteal Angle: ***°   -Discomfort w/ full flex   -Bounce-home discomfort     Ligamentous:   -Ant drawer   -Post drawer   -Lachman's   Good endpoints & no pain w/ valgus & varus stress    Meniscal:  -Tyrone's   -Luigi   -Pain w/ squat      Other:  -Patellar apprehension  -Patellar grind  -Rosado's   -J sign  -Rishi's  Abductors 5/5    ASSESSMENT:    No diagnosis found.      PLAN:  ***    Future planning includes - ***    All questions were answered to the best of my ability and all concerns were addressed at this time.    Follow up {Assessment and Plan Follow-up location:[facility]} in *** {FU time frame:76943}, or sooner if need be.    This note is dictated using the M*Modal Fluency Direct word recognition program. There are word recognition mistakes that are occasionally missed on review.            [1]   Current Outpatient Medications:     aspirin 81 MG Chew, Take 1 tablet (81 mg total) by mouth once daily., Disp: 30 tablet, Rfl: 11    atorvastatin (LIPITOR) 40 MG tablet, Take 1 tablet (40 mg total) by mouth once daily., Disp: 90 tablet, Rfl: 3    clopidogreL (PLAVIX) 75 mg tablet, Take 1 tablet (75 mg total) by mouth once daily., Disp: 30 tablet, Rfl: 11    finasteride (PROSCAR) 5 mg tablet, Take 1 tablet (5 mg total) by mouth once daily., Disp: 90 tablet, Rfl: 3    fluticasone propionate (FLONASE) 50 mcg/actuation nasal spray, 2 sprays (100 mcg total) by Each Nostril route once daily., Disp: 18.2 mL, Rfl: 2    linaCLOtide (LINZESS) 145 mcg Cap capsule, TAKE 1 CAPSULE BY MOUTH ONCE DAILY AS NEEDED FOR  CONSTIPATION, Disp: 90 capsule, Rfl: 3    losartan (COZAAR) 100 MG tablet, Take 1 tablet (100 mg total) by mouth once daily., Disp: 90 tablet, Rfl: 3    metoprolol succinate (TOPROL-XL) 25 MG 24 hr tablet, Take 1 tablet (25 mg total) by mouth once daily., Disp: 90 tablet, Rfl: 3    mirtazapine (REMERON) 15 MG tablet, Take 1 tablet (15 mg total) by mouth every evening., Disp: 30 tablet, Rfl: 2    multivitamin capsule, Take 1 capsule by mouth once daily., Disp: , Rfl:     NIFEdipine (PROCARDIA-XL) 30 MG (OSM) 24 hr tablet, Take 1 tablet (30 mg total) by mouth once daily., Disp: 90 tablet, Rfl: 3    tamsulosin (FLOMAX) 0.4 mg Cap, Take 1 capsule (0.4 mg  total) by mouth once daily., Disp: 90 capsule, Rfl: 3

## 2025-03-07 NOTE — PROGRESS NOTES
"Subjective:     HPI: Korey Satnos is a 84 y.o. male who was self-referred for rhinorrhea.    He reports chronic R>L rhinorrhea without significant sneezing or hypsomia.  He has tried flonase in the past but recently increased dosage per PCP to 2 SEN daily.   He also reported R>L nasal obstruction at night when lying flat.  Overall symptoms mildly improved with increase in flonase.  Rhinorrhea not worse with eating/drinking.     Of note, patient underwent flexible laryngoscopy in 2017 with Dr. Douglas.  Procedure notes "There is evidence for a superior right nasal septal deviation/deflection with right inferior turbinate hypertrophy. "     He was also evaluated by Dr. Perea on 10/30/17 for dysphagia which showed a normal head and neck exam, normal laryngeal videostroboscopy, and normal MBSS.  Symptoms were deemed multifactorial from rhinitis, LPR, and behavioral/functional.     Current sinonasal medications as above.  .    He does not recall previously having allergy testing.    He does not recall a prior history of nasal trauma.  He has not had sinonasal surgery.      Past Medical/Past Surgical History  Past Medical History:   Diagnosis Date    Arthritis     Back pain, chronic     BPH with urinary obstruction     Chronic constipation     Closed fracture of right shoulder 01/28/2021    Closed nondisplaced fracture of greater tuberosity of right humerus 02/24/2021    Colon polyp     DJD (degenerative joint disease)     Hip    Elevated troponin 07/11/2024    Hyperlipidemia     Hypertension     Laryngopharyngeal reflux     Left hand pain 12/20/2023    Left inguinal hernia     Lumbar herniated disc     Lumbar stenosis     Neck mass 10/07/2014    -4/29/2022 path - lipoma    OA (osteoarthritis) of knee     Bilateral    Paradoxical insomnia     Sinus trouble      He has a past surgical history that includes left hand; Back surgery (2014); Leg Surgery; Knee surgery; Colonoscopy; Upper gastrointestinal endoscopy; " Joint replacement (Left, 05/04/2018); Spine surgery; Epidural steroid injection into lumbar spine (N/A, 7/5/2022); Colonoscopy (N/A, 5/17/2023); Coronary angiography (N/A, 12/18/2023); insertion, pacemaker, temporary transvenous (12/18/2023); A-V cardiac pacemaker insertion (N/A, 12/19/2023); cystoscopy with transurethral destruction of prostate,rfa (N/A, 4/11/2024); Left heart catheterization (Left, 7/12/2024); ivus, coronary (7/12/2024); and Coronary angioplasty with stent (N/A, 7/12/2024).    Family History/Social History  His family history includes Cancer in his father; Lung cancer in his father; No Known Problems in his brother, daughter, mother, sister, and son.  He reports that he has never smoked. He has never been exposed to tobacco smoke. He has never used smokeless tobacco. He reports current alcohol use. He reports that he does not use drugs.    Allergies/Immunizations  He is allergic to clindamycin, lisinopril, and shellfish containing products.  Immunization History   Administered Date(s) Administered    COVID-19 Vaccine 06/04/2021, 07/03/2021    Influenza 10/02/2008    Influenza (FLUAD) - Quadrivalent - Adjuvanted - PF *Preferred* (65+) 01/13/2021    Influenza Split 10/02/2008    Tdap 08/13/2019        Medications   aspirin Chew  atorvastatin  clopidogreL  finasteride  fluticasone propionate  LINZESS Cap  losartan  metoprolol succinate  mirtazapine  multivitamin  NIFEdipine  tamsulosin Cap     Review of Systems    Objective:     BP (!) 171/89 (BP Location: Right arm, Patient Position: Sitting)   Pulse 89   Wt 112.4 kg (247 lb 12.8 oz)   BMI 34.56 kg/m²      Physical Exam  Vitals reviewed.   Constitutional:       Appearance: Normal appearance.   HENT:      Head: Normocephalic and atraumatic.      Right Ear: Tympanic membrane, ear canal and external ear normal.      Left Ear: Tympanic membrane, ear canal and external ear normal.      Nose: Nasal deformity (mid nasal bridge with curvature to right)  "and septal deviation present. No mucosal edema or rhinorrhea.      Right Nostril: No epistaxis.      Left Nostril: No epistaxis.      Right Turbinates: Enlarged.      Left Turbinates: Enlarged.      Right Sinus: No maxillary sinus tenderness or frontal sinus tenderness.      Left Sinus: No maxillary sinus tenderness or frontal sinus tenderness.      Mouth/Throat:      Lips: Pink.      Mouth: Mucous membranes are moist.      Dentition: Normal dentition.      Tongue: No lesions. Tongue does not deviate from midline.      Palate: No mass and lesions.      Pharynx: Oropharynx is clear. Uvula midline. No uvula swelling.      Tonsils: No tonsillar exudate or tonsillar abscesses.   Eyes:      Extraocular Movements: Extraocular movements intact.      Conjunctiva/sclera: Conjunctivae normal.   Musculoskeletal:      Cervical back: No tenderness.   Lymphadenopathy:      Cervical: No cervical adenopathy.   Neurological:      Mental Status: He is alert.   Psychiatric:         Mood and Affect: Mood normal.         Behavior: Behavior normal.          Procedure    None    Data Reviewed  I personally reviewed the chart, including any outside records, and pertinent data below:    I reviewed the following notes Internal Medicine     WBC (K/uL)   Date Value   07/12/2024 7.16     Lymph # (K/uL)   Date Value   07/12/2024 2.6     Lymph % (%)   Date Value   07/12/2024 35.6     Eosinophil % (%)   Date Value   07/12/2024 3.2     No results found for: "IGE"  Eos # (K/uL)   Date Value   07/12/2024 0.2       Platelets (K/uL)   Date Value   07/12/2024 196     Glucose (mg/dL)   Date Value   03/06/2025 95     MBSS 10/24/2017  WNL        I independently reviewed the images of the CT head dated 2/1/2018. Pertinent sinus findings include patent sinuses and mild right deviation.    Assessment & Plan:     1. Chronic rhinitis  2. Hypertrophy of inferior nasal turbinate  3. Deviated nasal septum  4. Nasal deformity   - suspect dependent blood flow+ " deviated septum significant contributing to patient's nasal obstruction.   - Daily INCS   - if not improved, will consider surgical options  5. Vasomotor rhinitis  -     ipratropium (ATROVENT) 21 mcg (0.03 %) nasal spray; 2 sprays by Each Nostril route 3 (three) times daily as needed (runny nose).  Dispense: 30 mL; Refill: 2    6. Long term current use of antithrombotics/antiplatelets   - no procedures for one year s/p DAISY   - on DAPT    He will follow up in 1 month  I had a discussion with the patient regarding his condition and the further workup and management options.    All questions were answered, and the patient is in agreement with the above.     Disclaimer:  This note may have been prepared utilizing voice recognition software which may result in occasional typographical errors in the text such as sound alike words.   If further clarification is needed, please contact the ENT department of [facility].

## 2025-03-08 ENCOUNTER — RESULTS FOLLOW-UP (OUTPATIENT)
Dept: INTERNAL MEDICINE | Facility: CLINIC | Age: 85
End: 2025-03-08
Payer: MEDICARE

## 2025-03-10 ENCOUNTER — OFFICE VISIT (OUTPATIENT)
Dept: OTOLARYNGOLOGY | Facility: CLINIC | Age: 85
End: 2025-03-10
Payer: MEDICARE

## 2025-03-10 VITALS
BODY MASS INDEX: 34.56 KG/M2 | DIASTOLIC BLOOD PRESSURE: 89 MMHG | SYSTOLIC BLOOD PRESSURE: 171 MMHG | HEART RATE: 89 BPM | WEIGHT: 247.81 LBS

## 2025-03-10 DIAGNOSIS — J31.0 CHRONIC RHINITIS: Primary | ICD-10-CM

## 2025-03-10 DIAGNOSIS — Z79.02 LONG TERM CURRENT USE OF ANTITHROMBOTICS/ANTIPLATELETS: ICD-10-CM

## 2025-03-10 DIAGNOSIS — J34.2 DEVIATED NASAL SEPTUM: ICD-10-CM

## 2025-03-10 DIAGNOSIS — J30.0 VASOMOTOR RHINITIS: ICD-10-CM

## 2025-03-10 DIAGNOSIS — M95.0 NASAL DEFORMITY: ICD-10-CM

## 2025-03-10 DIAGNOSIS — J34.3 HYPERTROPHY OF INFERIOR NASAL TURBINATE: ICD-10-CM

## 2025-03-10 PROCEDURE — 1159F MED LIST DOCD IN RCRD: CPT | Mod: CPTII,S$GLB,, | Performed by: PHYSICIAN ASSISTANT

## 2025-03-10 PROCEDURE — 3079F DIAST BP 80-89 MM HG: CPT | Mod: CPTII,S$GLB,, | Performed by: PHYSICIAN ASSISTANT

## 2025-03-10 PROCEDURE — 3077F SYST BP >= 140 MM HG: CPT | Mod: CPTII,S$GLB,, | Performed by: PHYSICIAN ASSISTANT

## 2025-03-10 PROCEDURE — 99214 OFFICE O/P EST MOD 30 MIN: CPT | Mod: S$GLB,,, | Performed by: PHYSICIAN ASSISTANT

## 2025-03-10 PROCEDURE — 1125F AMNT PAIN NOTED PAIN PRSNT: CPT | Mod: CPTII,S$GLB,, | Performed by: PHYSICIAN ASSISTANT

## 2025-03-10 PROCEDURE — 1157F ADVNC CARE PLAN IN RCRD: CPT | Mod: CPTII,S$GLB,, | Performed by: PHYSICIAN ASSISTANT

## 2025-03-10 PROCEDURE — 3288F FALL RISK ASSESSMENT DOCD: CPT | Mod: CPTII,S$GLB,, | Performed by: PHYSICIAN ASSISTANT

## 2025-03-10 PROCEDURE — 1101F PT FALLS ASSESS-DOCD LE1/YR: CPT | Mod: CPTII,S$GLB,, | Performed by: PHYSICIAN ASSISTANT

## 2025-03-10 PROCEDURE — 99999 PR PBB SHADOW E&M-EST. PATIENT-LVL III: CPT | Mod: PBBFAC,,, | Performed by: PHYSICIAN ASSISTANT

## 2025-03-10 RX ORDER — OFLOXACIN 3 MG/ML
1 SOLUTION/ DROPS OPHTHALMIC 4 TIMES DAILY
COMMUNITY
Start: 2024-10-11

## 2025-03-10 RX ORDER — NIFEDIPINE 90 MG/1
90 TABLET, EXTENDED RELEASE ORAL
COMMUNITY
Start: 2025-03-06

## 2025-03-10 RX ORDER — IPRATROPIUM BROMIDE 21 UG/1
2 SPRAY, METERED NASAL 3 TIMES DAILY PRN
Qty: 30 ML | Refills: 2 | Status: SHIPPED | OUTPATIENT
Start: 2025-03-10

## 2025-03-10 RX ORDER — DOXYCYCLINE 50 MG/1
50 CAPSULE ORAL 2 TIMES DAILY
COMMUNITY
Start: 2025-01-16

## 2025-03-10 RX ORDER — PREDNISOLONE ACETATE 10 MG/ML
SUSPENSION/ DROPS OPHTHALMIC 4 TIMES DAILY
COMMUNITY
Start: 2024-11-14

## 2025-03-10 RX ORDER — KETOROLAC TROMETHAMINE 5 MG/ML
1 SOLUTION OPHTHALMIC 4 TIMES DAILY
COMMUNITY
Start: 2024-10-11

## 2025-03-10 RX ORDER — NEOMYCIN SULFATE, POLYMYXIN B SULFATE, AND DEXAMETHASONE 3.5; 10000; 1 MG/G; [USP'U]/G; MG/G
OINTMENT OPHTHALMIC
COMMUNITY
Start: 2025-02-12

## 2025-03-13 ENCOUNTER — OFFICE VISIT (OUTPATIENT)
Dept: SPORTS MEDICINE | Facility: CLINIC | Age: 85
End: 2025-03-13
Payer: MEDICARE

## 2025-03-13 ENCOUNTER — HOSPITAL ENCOUNTER (OUTPATIENT)
Dept: RADIOLOGY | Facility: HOSPITAL | Age: 85
Discharge: HOME | End: 2025-03-13
Attending: STUDENT IN AN ORGANIZED HEALTH CARE EDUCATION/TRAINING PROGRAM
Payer: MEDICARE

## 2025-03-13 VITALS
HEIGHT: 71 IN | DIASTOLIC BLOOD PRESSURE: 71 MMHG | BODY MASS INDEX: 33.79 KG/M2 | SYSTOLIC BLOOD PRESSURE: 120 MMHG | WEIGHT: 241.38 LBS | HEART RATE: 60 BPM

## 2025-03-13 DIAGNOSIS — M54.50 CHRONIC BILATERAL LOW BACK PAIN WITHOUT SCIATICA: ICD-10-CM

## 2025-03-13 DIAGNOSIS — M16.11 PRIMARY OSTEOARTHRITIS OF RIGHT HIP: ICD-10-CM

## 2025-03-13 DIAGNOSIS — M17.11 PRIMARY OSTEOARTHRITIS OF RIGHT KNEE: Primary | ICD-10-CM

## 2025-03-13 DIAGNOSIS — G89.29 CHRONIC BILATERAL LOW BACK PAIN WITHOUT SCIATICA: ICD-10-CM

## 2025-03-13 PROCEDURE — 72114 X-RAY EXAM L-S SPINE BENDING: CPT | Mod: 26,HCNC,, | Performed by: INTERNAL MEDICINE

## 2025-03-13 PROCEDURE — 72114 X-RAY EXAM L-S SPINE BENDING: CPT | Mod: TC,HCNC

## 2025-03-13 PROCEDURE — 99999 PR PBB SHADOW E&M-EST. PATIENT-LVL V: CPT | Mod: PBBFAC,HCNC,, | Performed by: STUDENT IN AN ORGANIZED HEALTH CARE EDUCATION/TRAINING PROGRAM

## 2025-03-13 NOTE — PROGRESS NOTES
CC: right hip pain, right knee pain    84 y.o. Male presents today for follow up evaluation of his right hip pain following CSI.     Attempted treatments: hip joint CSI  Pain score: 3/10  History of trauma/injury: no   Affecting ADLs: yes    84 y.o. Male also presents today for follow up evaluation of his right knee pain following Monovisc injection.     Attempted treatments: Monovisc  Pain score: 3/10  History of trauma/injury:  Affecting ADLs: no     Of note, patient is starting to have left hip pain.  Pain is mostly posterior.     REVIEW OF SYSTEMS:   Constitution: Patient denies fever or chills.  Eyes: Patient denies eye pain or vision changes.  HEENT: Patient denies ear pain, sore throat, or nasal discharge.  CVS: Patient denies chest pain.  Lungs: Patient denies shortness of breath or cough.  Skin: Patient denies skin rash or itching.    Musculoskeletal: Patient denies recent falls. See HPI.  Psych: Patient denies any current anxiety or nervousness.    PAST MEDICAL HISTORY:   Past Medical History:   Diagnosis Date    Arthritis     Back pain, chronic     BPH with urinary obstruction     Chronic constipation     Closed fracture of right shoulder 01/28/2021    Closed nondisplaced fracture of greater tuberosity of right humerus 02/24/2021    Colon polyp     DJD (degenerative joint disease)     Hip    Elevated troponin 07/11/2024    Hyperlipidemia     Hypertension     Laryngopharyngeal reflux     Left hand pain 12/20/2023    Left inguinal hernia     Lumbar herniated disc     Lumbar stenosis     Neck mass 10/07/2014    -4/29/2022 path - lipoma    OA (osteoarthritis) of knee     Bilateral    Paradoxical insomnia     Sinus trouble        MEDICATIONS:   Current Medications[1]    ALLERGIES:   Review of patient's allergies indicates:   Allergen Reactions    Clindamycin Swelling     Throat swells    Lisinopril Swelling and Other (See Comments)     Throat swelling    Shellfish containing products         PHYSICAL  "EXAMINATION:  /71   Pulse 60   Ht 5' 11" (1.803 m)   Wt 109.5 kg (241 lb 6.1 oz)   BMI 33.67 kg/m²   Vitals signs and nursing note have been reviewed.    General: In no acute distress, well developed, well nourished, no diaphoresis  Eyes: EOM full and smooth, no eye redness or discharge  HENT: normocephalic and atraumatic, neck supple, trachea midline, no nasal discharge  Cardiovascular: no LE edema  Lungs: respirations non-labored, no conversational dyspnea   Neuro: AAOx3, CN2-12 grossly intact  Skin: No rashes, warm and dry  Psychiatric: cooperative, pleasant, mood and affect appropriate for age    Left Hip:   Gait: wnl for age    Inspection/Palpation:   -Deformities     TTP:  -Greater trochanter  -Abductors  -ASIS  -AIIS   -Ischial tuberosity/hamstring origin  -Mid substance hamstring  -SIJ  -ITB    ROM (* = with pain):   Flexion: 110°  Popliteal Angle: 45°  Internal rotation supine: 30°  External rotation  supine: 45°      Functional:   -Log roll   -VICKIE  -FADIR  -Figure-4  -Scour/Circumduction with pressure  -Cristobal Test  -Snapping int  -Snapping ext  -Cali's Sign  -Gaenslen's  -Stinchfield test  -Rishi  -Lasegue's     Strength/Functional:   5/5 in glut med / abductors    5/5 in hamstrings   5/5 in Quads    ASSESSMENT:      ICD-10-CM ICD-9-CM   1. Primary osteoarthritis of right knee  M17.11 715.16   2. Primary osteoarthritis of right hip  M16.11 715.15   3. Chronic bilateral low back pain without sciatica  M54.50 724.2    G89.29 338.29         PLAN:  1:  Patient with great results following Monovisc injection to the right knee(s).  Plan is to repeat Monovisc on or after 7/25/25.     2:  Patient with satisfactory results following corticosteroid injection to the right hip joint.  Plan is to repeat CSI on or after 4/25/25.     3:  Based on patient history, physical exam findings, imaging I do not believe the patient's left hip pain is hip in origin.  I believe he is dealing with lumbago.  " Questionable sacroiliitis, but when patient points to the pain, it is more paraspinal lumbar.  Given these findings, we will order a lumbar spine x-rays and [facility].    All questions were answered to the best of my ability and all concerns were addressed at this time.    Follow up for.    This note is dictated using the M*Modal Fluency Direct word recognition program. There are word recognition mistakes that are occasionally missed on review.    Time spent on the encounter includes face to face time and non-face to face time preparing to see the patient (eg, review of test), obtaining and/or reviewing separately obtained history, documenting clinical information in the electronic or other health record, independently interpreting results (not separately reported) and communicating results to the patient/family/caregiver, or care coordination (not separately reported). I reviewed Primary care, and other specialty's notes to better coordinate patient's care. I also counseled patient  on common and most usual side effect of prescribed medications,the purppose of any new tests that may have been ordered, and possible next steps in management.  Patient voiced understanding.     EST MINUTES X   [id number] 5    68818 10    67868 15    31281 25    45803 40 X   NEW     44038 10    02946 20    10223 30    02311 45    85341 60    PHONE      5-10    80338 11-20    28876 21-30                  [1]   Current Outpatient Medications:     aspirin 81 MG Chew, Take 1 tablet (81 mg total) by mouth once daily., Disp: 30 tablet, Rfl: 11    atorvastatin (LIPITOR) 40 MG tablet, Take 1 tablet (40 mg total) by mouth once daily., Disp: 90 tablet, Rfl: 3    clopidogreL (PLAVIX) 75 mg tablet, Take 1 tablet (75 mg total) by mouth once daily., Disp: 30 tablet, Rfl: 11    doxycycline (MONODOX) 50 MG Cap, Take 50 mg by mouth 2 (two) times daily., Disp: , Rfl:     finasteride (PROSCAR) 5 mg tablet, Take 1 tablet (5 mg total)  by mouth once daily., Disp: 90 tablet, Rfl: 3    fluticasone propionate (FLONASE) 50 mcg/actuation nasal spray, 2 sprays (100 mcg total) by Each Nostril route once daily., Disp: 18.2 mL, Rfl: 2    ipratropium (ATROVENT) 21 mcg (0.03 %) nasal spray, 2 sprays by Each Nostril route 3 (three) times daily as needed (runny nose)., Disp: 30 mL, Rfl: 2    ketorolac 0.5% (ACULAR) 0.5 % Drop, Place 1 drop into both eyes 4 (four) times daily., Disp: , Rfl:     linaCLOtide (LINZESS) 145 mcg Cap capsule, TAKE 1 CAPSULE BY MOUTH ONCE DAILY AS NEEDED FOR  CONSTIPATION, Disp: 90 capsule, Rfl: 3    losartan (COZAAR) 100 MG tablet, Take 1 tablet (100 mg total) by mouth once daily., Disp: 90 tablet, Rfl: 3    metoprolol succinate (TOPROL-XL) 25 MG 24 hr tablet, Take 1 tablet (25 mg total) by mouth once daily., Disp: 90 tablet, Rfl: 3    mirtazapine (REMERON) 15 MG tablet, Take 1 tablet (15 mg total) by mouth every evening., Disp: 30 tablet, Rfl: 2    multivitamin capsule, Take 1 capsule by mouth once daily., Disp: , Rfl:     neomycin-polymyxin-dexamethasone (DEXACINE) 3.5 mg/g-10,000 unit/g-0.1 % Oint, , Disp: , Rfl:     NIFEdipine (PROCARDIA-XL) 90 MG (OSM) 24 hr tablet, Take 90 mg by mouth., Disp: , Rfl:     ofloxacin (OCUFLOX) 0.3 % ophthalmic solution, Place 1 drop into both eyes 4 (four) times daily., Disp: , Rfl:     prednisoLONE acetate (PRED FORTE) 1 % DrpS, Place into the left eye 4 (four) times daily., Disp: , Rfl:     tamsulosin (FLOMAX) 0.4 mg Cap, Take 1 capsule (0.4 mg total) by mouth once daily., Disp: 90 capsule, Rfl: 3

## 2025-03-20 ENCOUNTER — TELEPHONE (OUTPATIENT)
Dept: ADMINISTRATIVE | Facility: CLINIC | Age: 85
End: 2025-03-20
Payer: MEDICARE

## 2025-03-24 ENCOUNTER — TELEPHONE (OUTPATIENT)
Dept: CARDIOLOGY | Facility: CLINIC | Age: 85
End: 2025-03-24
Payer: MEDICARE

## 2025-03-24 NOTE — TELEPHONE ENCOUNTER
Rt call to pt to reschedule pt for another appt time due to bad weather. Pt is rescheduled        ad          ----- Message from Tamir sent at 3/24/2025  8:18 AM CDT -----  Regarding: PT'S REQUESTING A CALL BACK TO RECHEDULE APPT DUE TI THE BAD WEATHER  Contact: pt  Pt was scheduled for 9:30Confirmed contact info below:Contact Name: Korey Greer Number: [phone]

## 2025-03-25 ENCOUNTER — CLINICAL SUPPORT (OUTPATIENT)
Dept: CARDIOLOGY | Facility: CLINIC | Age: 85
End: 2025-03-25
Payer: MEDICARE

## 2025-03-25 ENCOUNTER — CLINICAL SUPPORT (OUTPATIENT)
Dept: CARDIOLOGY | Facility: CLINIC | Age: 85
End: 2025-03-25
Attending: INTERNAL MEDICINE
Payer: MEDICARE

## 2025-03-25 DIAGNOSIS — R00.1 BRADYCARDIA, UNSPECIFIED: ICD-10-CM

## 2025-03-25 DIAGNOSIS — Z95.0 PRESENCE OF CARDIAC PACEMAKER: ICD-10-CM

## 2025-03-25 PROCEDURE — 93294 REM INTERROG EVL PM/LDLS PM: CPT | Mod: HCNC,S$GLB,, | Performed by: INTERNAL MEDICINE

## 2025-03-25 PROCEDURE — 93296 REM INTERROG EVL PM/IDS: CPT | Mod: HCNC,PN | Performed by: INTERNAL MEDICINE

## 2025-03-28 ENCOUNTER — OFFICE VISIT (OUTPATIENT)
Dept: FAMILY MEDICINE | Facility: CLINIC | Age: 85
End: 2025-03-28
Payer: MEDICARE

## 2025-03-28 VITALS
OXYGEN SATURATION: 95 % | HEART RATE: 85 BPM | SYSTOLIC BLOOD PRESSURE: 102 MMHG | HEIGHT: 71 IN | TEMPERATURE: 99 F | BODY MASS INDEX: 33.46 KG/M2 | DIASTOLIC BLOOD PRESSURE: 64 MMHG | WEIGHT: 239 LBS

## 2025-03-28 DIAGNOSIS — I70.0 AORTIC ATHEROSCLEROSIS: ICD-10-CM

## 2025-03-28 DIAGNOSIS — I44.2 COMPLETE HEART BLOCK: ICD-10-CM

## 2025-03-28 DIAGNOSIS — K59.00 CONSTIPATION, UNSPECIFIED CONSTIPATION TYPE: ICD-10-CM

## 2025-03-28 DIAGNOSIS — Z95.0 PACEMAKER: ICD-10-CM

## 2025-03-28 DIAGNOSIS — M47.816 LUMBAR SPONDYLOSIS: ICD-10-CM

## 2025-03-28 DIAGNOSIS — R35.0 BENIGN PROSTATIC HYPERPLASIA WITH URINARY FREQUENCY: ICD-10-CM

## 2025-03-28 DIAGNOSIS — Z00.00 ENCOUNTER FOR MEDICARE ANNUAL WELLNESS EXAM: Primary | ICD-10-CM

## 2025-03-28 DIAGNOSIS — E78.5 HYPERLIPIDEMIA, UNSPECIFIED HYPERLIPIDEMIA TYPE: ICD-10-CM

## 2025-03-28 DIAGNOSIS — K21.9 LARYNGOPHARYNGEAL REFLUX: ICD-10-CM

## 2025-03-28 DIAGNOSIS — G47.10 HYPERSOMNOLENCE: ICD-10-CM

## 2025-03-28 DIAGNOSIS — E66.09 CLASS 1 OBESITY DUE TO EXCESS CALORIES WITH SERIOUS COMORBIDITY AND BODY MASS INDEX (BMI) OF 33.0 TO 33.9 IN ADULT: ICD-10-CM

## 2025-03-28 DIAGNOSIS — E66.811 CLASS 1 OBESITY DUE TO EXCESS CALORIES WITH SERIOUS COMORBIDITY AND BODY MASS INDEX (BMI) OF 33.0 TO 33.9 IN ADULT: ICD-10-CM

## 2025-03-28 DIAGNOSIS — M16.9 OSTEOARTHRITIS OF HIP, UNSPECIFIED LATERALITY, UNSPECIFIED OSTEOARTHRITIS TYPE: ICD-10-CM

## 2025-03-28 DIAGNOSIS — M51.26 LUMBAR HERNIATED DISC: ICD-10-CM

## 2025-03-28 DIAGNOSIS — N40.1 BENIGN PROSTATIC HYPERPLASIA WITH URINARY FREQUENCY: ICD-10-CM

## 2025-03-28 DIAGNOSIS — S83.241D TEAR OF MEDIAL MENISCUS OF RIGHT KNEE, CURRENT, UNSPECIFIED TEAR TYPE, SUBSEQUENT ENCOUNTER: ICD-10-CM

## 2025-03-28 DIAGNOSIS — I10 HYPERTENSION, ESSENTIAL: ICD-10-CM

## 2025-03-28 DIAGNOSIS — Z00.00 ENCOUNTER FOR PREVENTIVE HEALTH EXAMINATION: ICD-10-CM

## 2025-03-28 DIAGNOSIS — G47.00 INSOMNIA, UNSPECIFIED TYPE: ICD-10-CM

## 2025-03-28 DIAGNOSIS — M21.161 GENU VARUM OF BOTH LOWER EXTREMITIES: ICD-10-CM

## 2025-03-28 DIAGNOSIS — I25.10 CORONARY ARTERY DISEASE INVOLVING NATIVE CORONARY ARTERY OF NATIVE HEART WITHOUT ANGINA PECTORIS: ICD-10-CM

## 2025-03-28 DIAGNOSIS — M21.162 GENU VARUM OF BOTH LOWER EXTREMITIES: ICD-10-CM

## 2025-03-28 DIAGNOSIS — Z96.652 STATUS POST TOTAL LEFT KNEE REPLACEMENT: ICD-10-CM

## 2025-03-28 DIAGNOSIS — E04.2 MULTIPLE THYROID NODULES: ICD-10-CM

## 2025-03-28 PROCEDURE — 3078F DIAST BP <80 MM HG: CPT | Mod: CPTII,S$GLB,, | Performed by: PHYSICIAN ASSISTANT

## 2025-03-28 PROCEDURE — G9920 SCRNING PERF AND NEGATIVE: HCPCS | Mod: CPTII,S$GLB,, | Performed by: PHYSICIAN ASSISTANT

## 2025-03-28 PROCEDURE — 1159F MED LIST DOCD IN RCRD: CPT | Mod: CPTII,S$GLB,, | Performed by: PHYSICIAN ASSISTANT

## 2025-03-28 PROCEDURE — 1123F ACP DISCUSS/DSCN MKR DOCD: CPT | Mod: CPTII,S$GLB,, | Performed by: PHYSICIAN ASSISTANT

## 2025-03-28 PROCEDURE — 3074F SYST BP LT 130 MM HG: CPT | Mod: CPTII,S$GLB,, | Performed by: PHYSICIAN ASSISTANT

## 2025-03-28 PROCEDURE — G0439 PPPS, SUBSEQ VISIT: HCPCS | Mod: S$GLB,,, | Performed by: PHYSICIAN ASSISTANT

## 2025-03-28 PROCEDURE — 3288F FALL RISK ASSESSMENT DOCD: CPT | Mod: CPTII,S$GLB,, | Performed by: PHYSICIAN ASSISTANT

## 2025-03-28 PROCEDURE — 1160F RVW MEDS BY RX/DR IN RCRD: CPT | Mod: CPTII,S$GLB,, | Performed by: PHYSICIAN ASSISTANT

## 2025-03-28 PROCEDURE — 1101F PT FALLS ASSESS-DOCD LE1/YR: CPT | Mod: CPTII,S$GLB,, | Performed by: PHYSICIAN ASSISTANT

## 2025-03-28 PROCEDURE — 1125F AMNT PAIN NOTED PAIN PRSNT: CPT | Mod: CPTII,S$GLB,, | Performed by: PHYSICIAN ASSISTANT

## 2025-03-28 NOTE — PATIENT INSTRUCTIONS
Bring your blood pressure cuff to cardiology with you - ask if there are any blood pressure medications we can decrease to help tolerate feeling of fatigue with low blood pressure    Decreased process food - look for more fresh or frozen foods to eat on a regular basis    Follow with sleep medicine and sleep study    Counseling and Referral of Other Preventative  (Italic type indicates deductible and co-insurance are waived)    Patient Name: Korey Santos  Today's Date: 3/28/2025    Health Maintenance         Date Due Completion Date    Shingles Vaccine (1 of 2) Never done ---    Pneumococcal Vaccines (Age 50+) (1 of 1 - PCV) Never done ---    RSV Vaccine (Age 60+ and Pregnant patients) (1 - 1-dose 75+ series) Never done ---    COVID-19 Vaccine (3 - 2024-25 season) 09/01/2024 7/3/2021    Influenza Vaccine (1) 06/30/2025 (Originally 9/1/2024) 1/3/2024 (Declined)    Override on 1/3/2024: Declined (Declined for season)    Colonoscopy 05/17/2028 5/17/2023    TETANUS VACCINE 08/13/2029 8/13/2019    Lipid Panel 03/06/2030 3/6/2025          No orders of the defined types were placed in this encounter.      The following information is provided to all patients.  This information is to help you find resources for any of the problems found today that may be affecting your health:                  Living healthy guide: www.[facility].[place].gov      Understanding Diabetes: www.diabetes.org      Eating healthy: www.cdc.gov/healthyweight      CDC home safety checklist: www.cdc.gov/steadi/patient.html      Agency on Aging: www.goea.[place].[facility]      Alcoholics anonymous (AA): www.aa.org      Physical Activity: www.ruthann.nih.gov/rj9znbl      Tobacco use: www.quitwithusla.org

## 2025-04-01 NOTE — PROGRESS NOTES
"DATE: 4/9/2025  PATIENT: Korey Santos    Attending Physician: Luis William M.D.    HISTORY:  Korey Santos is a 84 y.o. male who returns to me today for follow up.  He was last seen by me 4/6/2023.  Today he is doing well but notes he has been having left sided low back pain without radiating leg pain. The pain is intermittent. Otherwise he has been doing well since surgery.    The Patient denies myelopathic symptoms such as handwriting changes or difficulty with buttons/coins/keys. Denies perineal paresthesias, bowel/bladder dysfunction.      EXAM:  Ht 5' 11" (1.803 m)   Wt 112.2 kg (247 lb 5.7 oz)   BMI 34.50 kg/m²     My physical examination was notable for the following findings:     Musculoskeletal and neuro exam stable    IMAGING:    Today I personally re- reviewed AP, Lat and Flex/Ex  upright L-spine that demonstrate L2-3 hardware in place no failure. Trace anterolisthesis of L4 on L5.    Body mass index is 34.5 kg/m².    Hemoglobin A1C   Date Value Ref Range Status   07/12/2024 5.5 4.0 - 5.6 % Final     Comment:     ADA Screening Guidelines:  5.7-6.4%  Consistent with prediabetes  >or=6.5%  Consistent with diabetes    High levels of fetal hemoglobin interfere with the HbA1C  assay. Heterozygous hemoglobin variants (HbS, HgC, etc)do  not significantly interfere with this assay.   However, presence of multiple variants may affect accuracy.     12/28/2022 5.9 (H) 4.0 - 5.6 % Final     Comment:     ADA Screening Guidelines:  5.7-6.4%  Consistent with prediabetes  >or=6.5%  Consistent with diabetes    High levels of fetal hemoglobin interfere with the HbA1C  assay. Heterozygous hemoglobin variants (HbS, HgC, etc)do  not significantly interfere with this assay.   However, presence of multiple variants may affect accuracy.     09/16/2017 5.8 (H) 4.0 - 5.6 % Final     Comment:     According to ADA guidelines, hemoglobin A1c <7.0% represents  optimal control in non-pregnant diabetic patients. " Different  metrics may apply to specific patient populations.   Standards of Medical Care in Diabetes-2016.  For the purpose of screening for the presence of diabetes:  <5.7%     Consistent with the absence of diabetes  5.7-6.4%  Consistent with increasing risk for diabetes   (prediabetes)  >or=6.5%  Consistent with diabetes  Currently, no consensus exists for use of hemoglobin A1c  for diagnosis of diabetes for children.  This Hemoglobin A1c assay has significant interference with fetal   hemoglobin   (HbF). The results are invalid for patients with abnormal amounts of   HbF,   including those with known Hereditary Persistence   of Fetal Hemoglobin. Heterozygous hemoglobin variants (HbAS, HbAC,   HbAD, HbAE, HbA2) do not significantly interfere with this assay;   however, presence of multiple variants in a sample may impact the %   interference.           ASSESSMENT/PLAN:    Korey was seen today for hip pain.    Diagnoses and all orders for this visit:    Chronic bilateral low back pain without sciatica  -     Ambulatory referral/consult to [facility]        Today we discussed at length all of the different treatment options including anti-inflammatories, acetaminophen, rest, ice, heat, physical therapy including strengthening and stretching exercises, home exercises, ROM, aerobic conditioning, aqua therapy, other modalities including ultrasound, massage, and dry needling, epidural steroid injections and finally surgical intervention.      Pt presents with chronic low back pain without radiculopathy. Will try left SIJ injection with Dr. Gilliam. Pt will fu if pain persists.

## 2025-04-06 NOTE — PROGRESS NOTES
[facility] Cardiology [id number]     SUBJECTIVE:     History of Present Illness:  Patient is a 84 y.o. male presents with followup after cardiac pacemaker placement and CAD stent     Primary Diagnosis:  Hypertension: positive  DM: none  Smoker: none   Family history of early CAD: none   Heart disease: complete heart block 12/23  CAD stent LAD 7/24  Sleep apnea     ROS  Since last visit 8/24 -   No chest pains  No shortness of breath; no PND or orthopnea  No syncope  4. Blood pressures at home - 130's - 140's  5. Feels tired all the time;   Past Hospitalization      7/11/24: chest pains; paced rhythm, troponin 0.828; ; cath and stent placed LAD   Review of patient's allergies indicates:   Allergen Reactions    Clindamycin Swelling     Throat swells    Lisinopril Swelling and Other (See Comments)     Throat swelling    Shellfish containing products        Past Medical History:   Diagnosis Date    Arthritis     Back pain, chronic     BPH with urinary obstruction     Chronic constipation     Closed fracture of right shoulder 01/28/2021    Closed nondisplaced fracture of greater tuberosity of right humerus 02/24/2021    Colon polyp     DJD (degenerative joint disease)     Hip    Elevated troponin 07/11/2024    Hyperlipidemia     Hypertension     Laryngopharyngeal reflux     Left hand pain 12/20/2023    Left inguinal hernia     Lumbar herniated disc     Lumbar stenosis     Neck mass 10/07/2014    -4/29/2022 path - lipoma    OA (osteoarthritis) of knee     Bilateral    Paradoxical insomnia     Sinus trouble        Past Surgical History:   Procedure Laterality Date    A-V CARDIAC PACEMAKER INSERTION N/A 12/19/2023    Procedure: INSERTION, CARDIAC PACEMAKER, DUAL CHAMBER;  Surgeon: Noah Vazquez MD;  Location: [facility] CATH LAB/EP;  Service: Cardiology;  Laterality: N/A;    BACK SURGERY  2014    COLONOSCOPY      COLONOSCOPY N/A 5/17/2023    Procedure: COLONOSCOPY;  Surgeon: Christiano Vazquez MD;  Location: [facility] ENDO;  Service:  Endoscopy;  Laterality: N/A;  Constipation 2 day prep.Instructions to portal.EC    CORONARY ANGIOGRAPHY N/A 12/18/2023    Procedure: ANGIOGRAM, CORONARY ARTERY;  Surgeon: Flaco Kelly MD;  Location: [facility] CATH LAB/EP;  Service: Cardiology;  Laterality: N/A;    CORONARY ANGIOPLASTY WITH STENT PLACEMENT N/A 7/12/2024    Procedure: ANGIOPLASTY, CORONARY ARTERY, WITH STENT INSERTION;  Surgeon: Marky Mohan MD;  Location: [facility] CATH LAB/EP;  Service: Cardiology;  Laterality: N/A;    CYSTOSCOPY WITH TRANSURETHRAL DESTRUCTION OF PROSTATE,RFA N/A 4/11/2024    Procedure: CYSTOSCOPY WITH TRANSURETHRAL DESTRUCTION OF PROSTATE,RFA  rezum generator and at least 2 handpieces Contact Paul grant to schedule;  Surgeon: Juan Ward MD;  Location: [facility] OR;  Service: Urology;  Laterality: N/A;  rezum generator and at least 2 handpieces  Contact Paul grant to schedule- Marquise notified 3/26 AM    EPIDURAL STEROID INJECTION INTO LUMBAR SPINE N/A 7/5/2022    Procedure: Injection-steroid-epidural-lumbar L3-4;  Surgeon: Yury Crum Jr., MD;  Location: [facility] PAIN MGT;  Service: Pain Management;  Laterality: N/A;  oral sedation   asa      INSERTION, PACEMAKER, TEMPORARY TRANSVENOUS  12/18/2023    Procedure: Insertion, Pacemaker, Temporary Transvenous;  Surgeon: Flaco Kelly MD;  Location: [facility] CATH LAB/EP;  Service: Cardiology;;    IVUS, CORONARY  7/12/2024    Procedure: IVUS, Coronary;  Surgeon: Marky Mohan MD;  Location: [facility] CATH LAB/EP;  Service: Cardiology;;    JOINT REPLACEMENT Left 05/04/2018    KNEE SURGERY      left hand      LEFT HEART CATHETERIZATION Left 7/12/2024    Procedure: Left heart cath;  Surgeon: Marky Mohan MD;  Location: [facility] CATH LAB/EP;  Service: Cardiology;  Laterality: Left;    LEG SURGERY      SPINE SURGERY      UPPER GASTROINTESTINAL ENDOSCOPY           Cardiac meds:     ASA 81 mg - not on this   Atorvastatin 40 mg   Plavix 75 mg  Losartan 100 mg  Metoprolol succinate 25  "mg  Nifedipine 30 mg               OBJECTIVE:     Vital Signs (Most Recent)  Vitals:    25 1131   BP: 124/74   Pulse: 75   SpO2: 97%   Weight: 112.2 kg (247 lb 7.5 oz)   Height: 5' 11" (1.803 m)           Neck: normal carotids, no bruits; normal JVP  Lungs :clear  Heart: RR, normal S1,S2, no murmurs, no gallops  Abd: no masses; no bruits;   Exts: normal DP and PT pulses bilaterally, normal radials; no edema noted   Pacemaker site:normal           Labs  3/24: CBC normal;   3/25: LDL 76    Old Results:    : GFR normal   : CMP normal; CBC normal; LDL 94    Diagnostic Results:    1.EK/23: ventricular paced   1b. EK/24: paced rhythm   2.Echo: : normal EF, diastolic dysfunction; LAE, aortic sclerosis; mild MR, mild TR   3.stress test:  4. Cath:: left main; normal; LAD 40%, CX ok; Right ok  4b. Cath: : right OK; left main; normal; CX normal; mid LAD 90% and origin of large diagonal; overlapping stents in LAD ; EF normal   5. Dual chamber pacemaker : [employer] ; remote check 3/25: 8 second episode of atrial tachycardia       ASSESSMENT/PLAN:     Hypertension: controlled   Pacemaker function: normal  3.  CAD: s/p stent mid LAD   4. Lipids: LDL goal of 70 or less -   5. Tiredness; most likely from sleep apnea       Plan: 1. Continue the same medications  2. Increase atorvastatin 80 mg   3.  Return 6 months   Noah Vazquez MD        "

## 2025-04-07 LAB
OHS CV AF BURDEN PERCENT: < 1
OHS CV DC REMOTE DEVICE TYPE: NORMAL
OHS CV RV PACING PERCENT: 48 %

## 2025-04-09 ENCOUNTER — OFFICE VISIT (OUTPATIENT)
Dept: ORTHOPEDICS | Facility: CLINIC | Age: 85
End: 2025-04-09
Payer: MEDICARE

## 2025-04-09 ENCOUNTER — OFFICE VISIT (OUTPATIENT)
Dept: CARDIOLOGY | Facility: CLINIC | Age: 85
End: 2025-04-09
Payer: MEDICARE

## 2025-04-09 VITALS
HEART RATE: 75 BPM | HEIGHT: 71 IN | DIASTOLIC BLOOD PRESSURE: 74 MMHG | BODY MASS INDEX: 34.64 KG/M2 | WEIGHT: 247.44 LBS | OXYGEN SATURATION: 97 % | SYSTOLIC BLOOD PRESSURE: 124 MMHG

## 2025-04-09 VITALS — WEIGHT: 247.38 LBS | BODY MASS INDEX: 34.63 KG/M2 | HEIGHT: 71 IN

## 2025-04-09 DIAGNOSIS — M54.50 CHRONIC BILATERAL LOW BACK PAIN WITHOUT SCIATICA: ICD-10-CM

## 2025-04-09 DIAGNOSIS — I44.2 COMPLETE HEART BLOCK: ICD-10-CM

## 2025-04-09 DIAGNOSIS — G89.29 CHRONIC BILATERAL LOW BACK PAIN WITHOUT SCIATICA: ICD-10-CM

## 2025-04-09 DIAGNOSIS — I25.10 CORONARY ARTERY DISEASE INVOLVING NATIVE CORONARY ARTERY OF NATIVE HEART WITHOUT ANGINA PECTORIS: ICD-10-CM

## 2025-04-09 DIAGNOSIS — Z95.0 PACEMAKER: ICD-10-CM

## 2025-04-09 PROCEDURE — 1160F RVW MEDS BY RX/DR IN RCRD: CPT | Mod: HCNC,CPTII,S$GLB, | Performed by: INTERNAL MEDICINE

## 2025-04-09 PROCEDURE — 99999 PR PBB SHADOW E&M-EST. PATIENT-LVL IV: CPT | Mod: PBBFAC,HCNC,, | Performed by: INTERNAL MEDICINE

## 2025-04-09 PROCEDURE — 1125F AMNT PAIN NOTED PAIN PRSNT: CPT | Mod: HCNC,CPTII,S$GLB, | Performed by: INTERNAL MEDICINE

## 2025-04-09 PROCEDURE — 1157F ADVNC CARE PLAN IN RCRD: CPT | Mod: HCNC,CPTII,S$GLB, | Performed by: INTERNAL MEDICINE

## 2025-04-09 PROCEDURE — 99999 PR PBB SHADOW E&M-EST. PATIENT-LVL III: CPT | Mod: PBBFAC,HCNC,, | Performed by: ORTHOPAEDIC SURGERY

## 2025-04-09 PROCEDURE — 1101F PT FALLS ASSESS-DOCD LE1/YR: CPT | Mod: HCNC,CPTII,S$GLB, | Performed by: INTERNAL MEDICINE

## 2025-04-09 PROCEDURE — 3288F FALL RISK ASSESSMENT DOCD: CPT | Mod: HCNC,CPTII,S$GLB, | Performed by: INTERNAL MEDICINE

## 2025-04-09 PROCEDURE — 3074F SYST BP LT 130 MM HG: CPT | Mod: HCNC,CPTII,S$GLB, | Performed by: INTERNAL MEDICINE

## 2025-04-09 PROCEDURE — 3078F DIAST BP <80 MM HG: CPT | Mod: HCNC,CPTII,S$GLB, | Performed by: INTERNAL MEDICINE

## 2025-04-09 PROCEDURE — 99214 OFFICE O/P EST MOD 30 MIN: CPT | Mod: HCNC,S$GLB,, | Performed by: INTERNAL MEDICINE

## 2025-04-09 PROCEDURE — 1159F MED LIST DOCD IN RCRD: CPT | Mod: HCNC,CPTII,S$GLB, | Performed by: INTERNAL MEDICINE

## 2025-04-09 RX ORDER — ATORVASTATIN CALCIUM 80 MG/1
80 TABLET, FILM COATED ORAL DAILY
Qty: 90 TABLET | Refills: 3 | Status: SHIPPED | OUTPATIENT
Start: 2025-04-09 | End: 2026-04-09

## 2025-04-09 NOTE — PROGRESS NOTES
"  Subjective:      Korey is a 84 y.o. male who comes for follow-up of nasal obstruction.  His last visit with me was on 3/10/2025.      Significant improvement in nasal obstruction following flonase 2 SEN BID, that is still has episodes of nasal congestion at night when lying flat.      Per last office visit 3/10/2025 :  He reports chronic R>L rhinorrhea without significant sneezing or hypsomia.  He has tried flonase in the past but recently increased dosage per PCP to 2 SEN daily.   He also reported R>L nasal obstruction at night when lying flat.  Overall symptoms mildly improved with increase in flonase.  Rhinorrhea not worse with eating/drinking.      Of note, patient underwent flexible laryngoscopy in 2017 with Dr. Douglas.  Procedure notes "There is evidence for a superior right nasal septal deviation/deflection with right inferior turbinate hypertrophy. "      He was also evaluated by Dr. Perea on 10/30/17 for dysphagia which showed a normal head and neck exam, normal laryngeal videostroboscopy, and normal MBSS.  Symptoms were deemed multifactorial from rhinitis, LPR, and behavioral/functional.      Current sinonasal medications as above.  .    He does not recall previously having allergy testing.     He does not recall a prior history of nasal trauma.  He has not had sinonasal surgery.      1. Chronic rhinitis  2. Hypertrophy of inferior nasal turbinate  3. Deviated nasal septum  4. Nasal deformity              - suspect dependent blood flow+ deviated septum significant contributing to patient's nasal obstruction.              - Daily INCS              - if not improved, will consider surgical options  5. Vasomotor rhinitis  -     ipratropium (ATROVENT) 21 mcg (0.03 %) nasal spray; 2 sprays by Each Nostril route 3 (three) times daily as needed (runny nose).  Dispense: 30 mL; Refill: 2     6. Long term current use of antithrombotics/antiplatelets              - no procedures for one year s/p DAISY             " " - on DAPT    The patient's medications, allergies, past medical, surgical, social and family histories were reviewed and updated as appropriate.    A detailed review of systems was obtained with pertinent positives as per the above HPI, and otherwise negative.        Objective:     Wt 111.6 kg (246 lb 0.5 oz)   BMI 34.31 kg/m²      Physical Exam  Vitals reviewed.   Constitutional:       Appearance: Normal appearance.   HENT:      Head: Normocephalic and atraumatic.      Right Ear: External ear normal.      Left Ear: External ear normal.      Nose: Septal deviation present.      Right Turbinates: Enlarged.      Left Turbinates: Enlarged.      Right Sinus: No maxillary sinus tenderness or frontal sinus tenderness.      Left Sinus: No maxillary sinus tenderness or frontal sinus tenderness.   Eyes:      Conjunctiva/sclera: Conjunctivae normal.   Pulmonary:      Effort: Pulmonary effort is normal.   Neurological:      Mental Status: He is alert.          Procedure    none    Data Reviewed    WBC (K/uL)   Date Value   07/12/2024 7.16     Eosinophil % (%)   Date Value   07/12/2024 3.2     Eos # (K/uL)   Date Value   07/12/2024 0.2     Platelets (K/uL)   Date Value   07/12/2024 196     Glucose (mg/dL)   Date Value   03/06/2025 95     No results found for: "IGE"    I independently reviewed the images of the CT head dated 2/1/2018. Pertinent sinus findings include patent sinuses and mild right deviation.     Assessment:     1. Chronic rhinitis    2. Nasal deformity    3. Hypertrophy of inferior nasal turbinate    4. Deviated nasal septum    5. Long term current use of antithrombotics/antiplatelets         Plan:     Discussed surgical intervention with patient.   He had DAISY in July of 2024 for CAD and will require at least one year of DAPT prior to any procedures.   He will follow up with me in August     I had a discussion with the patient regarding his condition and the further workup and management options.    All " questions were answered, and the patient is in agreement with the above.     Disclaimer:  This note may have been prepared utilizing voice recognition software which may result in occasional typographical errors in the text such as sound alike words.   If further clarification is needed, please contact the ENT department of [facility].

## 2025-04-10 ENCOUNTER — OFFICE VISIT (OUTPATIENT)
Dept: OTOLARYNGOLOGY | Facility: CLINIC | Age: 85
End: 2025-04-10
Payer: MEDICARE

## 2025-04-10 VITALS — BODY MASS INDEX: 34.31 KG/M2 | WEIGHT: 246.06 LBS

## 2025-04-10 DIAGNOSIS — J34.3 HYPERTROPHY OF INFERIOR NASAL TURBINATE: ICD-10-CM

## 2025-04-10 DIAGNOSIS — Z79.02 LONG TERM CURRENT USE OF ANTITHROMBOTICS/ANTIPLATELETS: ICD-10-CM

## 2025-04-10 DIAGNOSIS — J34.2 DEVIATED NASAL SEPTUM: ICD-10-CM

## 2025-04-10 DIAGNOSIS — J31.0 CHRONIC RHINITIS: Primary | ICD-10-CM

## 2025-04-10 DIAGNOSIS — M95.0 NASAL DEFORMITY: ICD-10-CM

## 2025-04-10 PROCEDURE — 1157F ADVNC CARE PLAN IN RCRD: CPT | Mod: CPTII,S$GLB,, | Performed by: PHYSICIAN ASSISTANT

## 2025-04-10 PROCEDURE — 99213 OFFICE O/P EST LOW 20 MIN: CPT | Mod: S$GLB,,, | Performed by: PHYSICIAN ASSISTANT

## 2025-04-10 PROCEDURE — 1126F AMNT PAIN NOTED NONE PRSNT: CPT | Mod: CPTII,S$GLB,, | Performed by: PHYSICIAN ASSISTANT

## 2025-04-10 PROCEDURE — 3288F FALL RISK ASSESSMENT DOCD: CPT | Mod: CPTII,S$GLB,, | Performed by: PHYSICIAN ASSISTANT

## 2025-04-10 PROCEDURE — 1159F MED LIST DOCD IN RCRD: CPT | Mod: CPTII,S$GLB,, | Performed by: PHYSICIAN ASSISTANT

## 2025-04-10 PROCEDURE — 1101F PT FALLS ASSESS-DOCD LE1/YR: CPT | Mod: CPTII,S$GLB,, | Performed by: PHYSICIAN ASSISTANT

## 2025-04-10 PROCEDURE — 99999 PR PBB SHADOW E&M-EST. PATIENT-LVL III: CPT | Mod: PBBFAC,,, | Performed by: PHYSICIAN ASSISTANT

## 2025-04-10 NOTE — PATIENT INSTRUCTIONS
This spray is for the breathing through your nose.  2 sprays each nostril twice daily is the max     This spray is for runny nose and can use it 2 sprays each nostril three times daily at max

## 2025-04-17 ENCOUNTER — HOSPITAL ENCOUNTER (OUTPATIENT)
Dept: SLEEP MEDICINE | Facility: HOSPITAL | Age: 85
Discharge: HOME | End: 2025-04-17
Attending: PSYCHIATRY & NEUROLOGY
Payer: MEDICARE

## 2025-04-17 DIAGNOSIS — G47.10 HYPERSOMNOLENCE: ICD-10-CM

## 2025-04-17 NOTE — PROGRESS NOTES
Per physician orders, patient was given home sleep testing device and instructed on how to apply the device before going to bed tonight. I sized the device and showed the patient using a mirror how the device fits and what it should look like so they can use a mirror when putting it on themselves at home. We reviewed the instruction booklet. Patient verbalized understanding of the instructions and teach back complete. Patient was instructed to return the sleep testing device on either Saturday morning between the hours of 7:00am and 10:00am or on Monday morning between the hours of 5:00am and 9:00am in the drop box that is located to the left as you walk into the main lobby of the hospital. I also educated the patient on sleep apnea, treatments options for sleep apnea, and what to expect after the home sleep study is complete.       Assessment: Neck Size: 16 1/2 inches         [place] Sleepiness Scale Score:5    Home Sleep Testing Device: [employer] EVETTE 620 - S/N: [id number]     Carmen Nick Muñoz (DO)  [address], [facility] [place] [address]  Phone: [phone]  Fax: [phone]  Follow Up Time: 7-10 Days

## 2025-04-21 PROBLEM — G47.33 OSA (OBSTRUCTIVE SLEEP APNEA): Status: ACTIVE | Noted: 2025-04-21

## 2025-04-21 PROBLEM — G47.10 HYPERSOMNOLENCE: Status: ACTIVE | Noted: 2025-04-21

## 2025-04-22 ENCOUNTER — RESULTS FOLLOW-UP (OUTPATIENT)
Dept: FAMILY MEDICINE | Facility: CLINIC | Age: 85
End: 2025-04-22
Payer: MEDICARE

## 2025-04-22 DIAGNOSIS — G47.33 SEVERE OBSTRUCTIVE SLEEP APNEA: Primary | ICD-10-CM

## 2025-04-22 NOTE — PROGRESS NOTES
Good morning,    For some reason I am unable to see the sleep study results in the media tab, even when loading all and filing by date uploaded. Could you forward to me in another way?    Thanks  Lida Pastrana PA-C

## 2025-04-28 ENCOUNTER — TELEPHONE (OUTPATIENT)
Dept: FAMILY MEDICINE | Facility: CLINIC | Age: 85
End: 2025-04-28
Payer: MEDICARE

## 2025-05-01 ENCOUNTER — OFFICE VISIT (OUTPATIENT)
Dept: SPORTS MEDICINE | Facility: CLINIC | Age: 85
End: 2025-05-01
Payer: MEDICARE

## 2025-05-01 ENCOUNTER — TELEPHONE (OUTPATIENT)
Dept: SPORTS MEDICINE | Facility: CLINIC | Age: 85
End: 2025-05-01
Payer: MEDICARE

## 2025-05-01 VITALS
BODY MASS INDEX: 34.55 KG/M2 | SYSTOLIC BLOOD PRESSURE: 132 MMHG | DIASTOLIC BLOOD PRESSURE: 83 MMHG | HEIGHT: 71 IN | WEIGHT: 246.81 LBS | HEART RATE: 60 BPM

## 2025-05-01 DIAGNOSIS — M17.11 PRIMARY OSTEOARTHRITIS OF RIGHT KNEE: ICD-10-CM

## 2025-05-01 DIAGNOSIS — M53.3 PAIN OF LEFT SACROILIAC JOINT: ICD-10-CM

## 2025-05-01 DIAGNOSIS — M16.11 PRIMARY OSTEOARTHRITIS OF RIGHT HIP: ICD-10-CM

## 2025-05-01 DIAGNOSIS — M25.551 RIGHT HIP PAIN: Primary | ICD-10-CM

## 2025-05-01 PROCEDURE — 99999 PR PBB SHADOW E&M-EST. PATIENT-LVL IV: CPT | Mod: PBBFAC,HCNC,, | Performed by: STUDENT IN AN ORGANIZED HEALTH CARE EDUCATION/TRAINING PROGRAM

## 2025-05-01 RX ORDER — TRIAMCINOLONE ACETONIDE 40 MG/ML
40 INJECTION, SUSPENSION INTRA-ARTICULAR; INTRAMUSCULAR
Status: DISCONTINUED | OUTPATIENT
Start: 2025-05-01 | End: 2025-05-01 | Stop reason: HOSPADM

## 2025-05-01 RX ADMIN — TRIAMCINOLONE ACETONIDE 40 MG: 40 INJECTION, SUSPENSION INTRA-ARTICULAR; INTRAMUSCULAR at 01:05

## 2025-05-01 NOTE — PROGRESS NOTES
"CC: bilateral hip pain    84 y.o. Male presents today for CSI injection of his left SI joint and right hip joint. Pt was referred by Cherelle Slaughter PA-C for left SIJ CSI.     Pt reports gradual return of right hip pain, but states he is not experiencing severe pain at this time. Pt is requesting a medication to help with pain following increased activity.     Pt reports he is having right knee pain, but states the hip pain is "overriding" the knee pain.     Attempted treatments: ibuprofen 400mg PRN (some relief), acetaminophen rx (some relief)  Last Injection: 1/25/25 R hip IA CSI  Pain score: 6/10 L, 5/10 R with walking  History of trauma/injury: none since last visit  Affecting ADLs: yes, walking     REVIEW OF SYSTEMS:   Constitution: Patient denies fever or chills.  Eyes: Patient denies eye pain or vision changes.  HEENT: Patient denies ear pain, sore throat, or nasal discharge. Pt reports trouble swallowing.   CVS: Patient denies chest pain.  Lungs: Patient denies shortness of breath or cough.  Skin: Patient reports skin rash.   Musculoskeletal: Patient denies recent falls. See HPI.  Psych: Patient denies any current anxiety or nervousness.    PAST MEDICAL HISTORY:   Past Medical History:   Diagnosis Date    Arthritis     Back pain, chronic     BPH with urinary obstruction     Chronic constipation     Closed fracture of right shoulder 01/28/2021    Closed nondisplaced fracture of greater tuberosity of right humerus 02/24/2021    Colon polyp     DJD (degenerative joint disease)     Hip    Elevated troponin 07/11/2024    Hyperlipidemia     Hypertension     Laryngopharyngeal reflux     Left hand pain 12/20/2023    Left inguinal hernia     Lumbar herniated disc     Lumbar stenosis     Neck mass 10/07/2014    -4/29/2022 path - lipoma    OA (osteoarthritis) of knee     Bilateral    Paradoxical insomnia     Sinus trouble        MEDICATIONS:   Current Medications[1]    ALLERGIES:   Review of patient's allergies " "indicates:   Allergen Reactions    Clindamycin Swelling     Throat swells    Lisinopril Swelling and Other (See Comments)     Throat swelling    Shellfish containing products         PHYSICAL EXAMINATION:  /83   Pulse 60   Ht 5' 11" (1.803 m)   Wt 111.9 kg (246 lb 12.5 oz)   BMI 34.42 kg/m²   There are no signs of infection at the injection site, including no rubor, calor, or skin lesions.  Gen: NAD.  Psych: Affect & judgment nl.  Neuro: Grossly CNI. CARRINGTON.  HEENT: -Trach dev. -Eye d/c.   CV: Color nl. -E/C/C. WWPx4.  Pulm: -Dyspnea. -Cough.  Lymph: -Edema.  Int: -Rash/lesion noted. Skin is warm and dry    ASSESSMENT:      ICD-10-CM ICD-9-CM   1. Right hip pain  M25.551 719.45   2. Primary osteoarthritis of right hip  M16.11 715.15   3. Pain of left sacroiliac joint  M53.3 724.6   4. Primary osteoarthritis of right knee  M17.11 715.16         PLAN:  1-3: Ultrasound-guided injection of the left SI joint and right hip joint with triamcinolone performed at visit today.  Repeat right hip joint corticosteroid injection on or after 08/01/2025.  Follow up with Genie Slaughter regarding sacroiliac joint pain.    4:  Patient with great results following hyaluronic injection to the right knee(s).  Plan is for Synvisc-One on or after 7/25/25.       Future planning includes - Continue exercise program    Risks and benefits were discussed with patient prior to receiving injection.  Depending on injection type, risks include the possibility of infection, pain, disruptions in blood pressure and blood sugar, and cosmetic deformity at site of injection.    All questions were answered to the best of my ability and all concerns were addressed at this time.    Follow up for above.     This note is dictated using the M*Modal Fluency Direct word recognition program. There are word recognition mistakes that are occasionally missed on review.             [1]   Current Outpatient Medications:     aspirin 81 MG Chew, Take 1 tablet " (81 mg total) by mouth once daily., Disp: 30 tablet, Rfl: 11    atorvastatin (LIPITOR) 80 MG tablet, Take 1 tablet (80 mg total) by mouth once daily., Disp: 90 tablet, Rfl: 3    clopidogreL (PLAVIX) 75 mg tablet, Take 1 tablet (75 mg total) by mouth once daily., Disp: 30 tablet, Rfl: 11    finasteride (PROSCAR) 5 mg tablet, Take 1 tablet (5 mg total) by mouth once daily., Disp: 90 tablet, Rfl: 3    fluticasone propionate (FLONASE) 50 mcg/actuation nasal spray, 2 sprays (100 mcg total) by Each Nostril route once daily., Disp: 18.2 mL, Rfl: 2    ipratropium (ATROVENT) 21 mcg (0.03 %) nasal spray, 2 sprays by Each Nostril route 3 (three) times daily as needed (runny nose)., Disp: 30 mL, Rfl: 2    ketorolac 0.5% (ACULAR) 0.5 % Drop, Place 1 drop into both eyes 4 (four) times daily., Disp: , Rfl:     linaCLOtide (LINZESS) 145 mcg Cap capsule, TAKE 1 CAPSULE BY MOUTH ONCE DAILY AS NEEDED FOR  CONSTIPATION, Disp: 90 capsule, Rfl: 3    losartan (COZAAR) 100 MG tablet, Take 1 tablet (100 mg total) by mouth once daily., Disp: 90 tablet, Rfl: 3    metoprolol succinate (TOPROL-XL) 25 MG 24 hr tablet, Take 1 tablet (25 mg total) by mouth once daily., Disp: 90 tablet, Rfl: 3    mirtazapine (REMERON) 15 MG tablet, Take 1 tablet (15 mg total) by mouth every evening., Disp: 30 tablet, Rfl: 2    multivitamin capsule, Take 1 capsule by mouth once daily., Disp: , Rfl:     neomycin-polymyxin-dexamethasone (DEXACINE) 3.5 mg/g-10,000 unit/g-0.1 % Oint, , Disp: , Rfl:     NIFEdipine (PROCARDIA-XL) 90 MG (OSM) 24 hr tablet, Take 90 mg by mouth., Disp: , Rfl:     ofloxacin (OCUFLOX) 0.3 % ophthalmic solution, Place 1 drop into both eyes 4 (four) times daily., Disp: , Rfl:     prednisoLONE acetate (PRED FORTE) 1 % DrpS, Place into the left eye 4 (four) times daily., Disp: , Rfl:     tamsulosin (FLOMAX) 0.4 mg Cap, Take 1 capsule (0.4 mg total) by mouth once daily., Disp: 90 capsule, Rfl: 3

## 2025-05-01 NOTE — PROCEDURES
Large Joint Aspiration/Injection    Date/Time: 5/1/2025 1:00 PM    Performed by: Chela Gilliam MD  Authorized by: Chela Gilliam MD    Consent Done?:  Yes (Verbal)  Indications:  Diagnostic evaluation and pain  Site marked: the procedure site was marked    Timeout: prior to procedure the correct patient, procedure, and site was verified      Local anesthesia used?: Yes    Anesthesia:  Local infiltration  Local anesthetic:  Co-phenylcaine spray    Details:  Needle Size:  22 G  Ultrasonic Guidance for needle placement?: Yes (Ultrasound guidance used to avoid neurovascular injury and/or to improve accuracy given body habitus.)    Images are saved and documented.  Approach: Posteromedial.  Location: LEFT Sacroiliac Joint.  Medications:  40 mg triamcinolone acetonide 40 mg/mL  Medications comment:  Ropivacaine 0.2% 2mL  Patient tolerance:  Patient tolerated the procedure well with no immediate complications     TECHNIQUE: Real time ultrasound examination of the left sacroiliac joint was performed with SonoSite Edge 2, C1-5 MHz probe(s). Ultrasound guidance was used for needle localization. Images were saved and stored for documentation. Dynamic visualization of the needle was continuous throughout the procedures and maintained in good position.
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
## 2025-05-01 NOTE — PROCEDURES
Large Joint Aspiration/Injection: R hip joint    Date/Time: 5/1/2025 1:00 PM    Performed by: Chela Gilliam MD  Authorized by: Chela Gilliam MD    Consent Done?:  Yes (Verbal)  Indications:  Arthritis and pain  Site marked: the procedure site was marked    Timeout: prior to procedure the correct patient, procedure, and site was verified      Local anesthesia used?: Yes    Anesthesia:  Local infiltration  Local anesthetic:  Co-phenylcaine spray    Details:  Needle Size:  22 G  Ultrasonic Guidance for needle placement?: Yes (Ultrasound guidance used to avoid neurovascular injury and/or to improve accuracy given body habitus.)    Images are saved and documented.  Approach:  Anterior  Location:  Hip  Site:  R hip joint  Medications:  40 mg triamcinolone acetonide 40 mg/mL  Medications comment:  Ropivacaine 0.2% 2mL  Patient tolerance:  Patient tolerated the procedure well with no immediate complications     TECHNIQUE: Real time ultrasound examination of the right femoroacetabular joint was performed with SonPrediculouste Edge 2, C1-5 MHz probe(s). Ultrasound guidance was used for needle localization. Images were saved and stored for documentation. Dynamic visualization of the needle was continuous throughout the procedures and maintained in good position.

## 2025-05-01 NOTE — TELEPHONE ENCOUNTER
----- Message from Katia sent at 5/1/2025  1:59 PM CDT -----  Name of Caller: Nature of Call: inform of the portal link Best Call Back Number:[phone] Additional Information:KYRA PEREZ calling regarding Patient Advice for wanting to inform that she still have not received the text to set up the portal, I sent the PT the link via email and he will call back to inform if received that way.

## 2025-05-02 ENCOUNTER — TELEPHONE (OUTPATIENT)
Dept: FAMILY MEDICINE | Facility: CLINIC | Age: 85
End: 2025-05-02
Payer: MEDICARE

## 2025-05-02 NOTE — TELEPHONE ENCOUNTER
Spoke to pt. Pt wanted to have his results to be viewable on the portal. I explained that Sleep study test results do not go to the portal. I explained that I can print out a copy and have it at the  for pick or attach it in a message to send through the portal.     Pt wanted it sent in a message.

## 2025-05-02 NOTE — TELEPHONE ENCOUNTER
CRM # [id number]  Owner: None  Status: Unresolved  Open  Priority: High Created on: 05/02/2025 03:14 PM By: Antonia Mishra     Primary Information    Source   Korey Santos (Patient)    Subject   Korey Santos (Patient)    Topic   General Inquiry - Patient Advice        Communication   Type: Patient Call Back            Who called: Patient            What is the request in detail: Please call patient about sleep study results            Can the clinic reply by [facility]? No            Would the patient rather a call back or a response via [facility]?  Call            Best call back number:[phone]            Additional Information:

## 2025-05-06 ENCOUNTER — OFFICE VISIT (OUTPATIENT)
Dept: SLEEP MEDICINE | Facility: CLINIC | Age: 85
End: 2025-05-06
Attending: PSYCHIATRY & NEUROLOGY
Payer: MEDICARE

## 2025-05-06 VITALS
WEIGHT: 244.5 LBS | DIASTOLIC BLOOD PRESSURE: 80 MMHG | HEART RATE: 73 BPM | BODY MASS INDEX: 34.1 KG/M2 | SYSTOLIC BLOOD PRESSURE: 145 MMHG

## 2025-05-06 DIAGNOSIS — G47.33 OSA (OBSTRUCTIVE SLEEP APNEA): Primary | ICD-10-CM

## 2025-05-06 DIAGNOSIS — G47.10 HYPERSOMNOLENCE: ICD-10-CM

## 2025-05-06 DIAGNOSIS — G47.00 INSOMNIA, UNSPECIFIED TYPE: ICD-10-CM

## 2025-05-06 PROCEDURE — 3077F SYST BP >= 140 MM HG: CPT | Mod: CPTII,S$GLB,,

## 2025-05-06 PROCEDURE — 1125F AMNT PAIN NOTED PAIN PRSNT: CPT | Mod: CPTII,S$GLB,,

## 2025-05-06 PROCEDURE — 99999 PR PBB SHADOW E&M-EST. PATIENT-LVL IV: CPT | Mod: PBBFAC,HCNC,,

## 2025-05-06 PROCEDURE — 1101F PT FALLS ASSESS-DOCD LE1/YR: CPT | Mod: CPTII,S$GLB,,

## 2025-05-06 PROCEDURE — 3288F FALL RISK ASSESSMENT DOCD: CPT | Mod: CPTII,S$GLB,,

## 2025-05-06 PROCEDURE — 1157F ADVNC CARE PLAN IN RCRD: CPT | Mod: CPTII,S$GLB,,

## 2025-05-06 PROCEDURE — 99205 OFFICE O/P NEW HI 60 MIN: CPT | Mod: S$GLB,,,

## 2025-05-06 PROCEDURE — 1159F MED LIST DOCD IN RCRD: CPT | Mod: CPTII,S$GLB,,

## 2025-05-06 PROCEDURE — 3079F DIAST BP 80-89 MM HG: CPT | Mod: CPTII,S$GLB,,

## 2025-05-06 NOTE — PROGRESS NOTES
Korey Santos is a 84 y.o. male seen today for a No chief complaint on file.   follow-up.    Subjective      HPI: DALE  Symptoms:  []  Dry Mouth on Awakening []  Aerophagia/Air Hunger []  Mask Leaks/Discomfort []  Daytime Sleepiness []  Breakthrough Snoring [] Pressure Discomfort [] None  Current Treatments: [] APAP [] BIPAP [] ASV [] Positional Therapy [] OA [x] None  CPAP Adherence: [] Uses > 4 hrs/night [] Uses < 4 hrs/night [] Nonadherent   Treatments Effects: [] Improved Daytimes Sleepiness [] Improved Sleep Continuity [] Unable to Tolerate [] Sleep Deterioration  DALE: [] Improving [] Worsening [] Unchanged [] Stable  Comments: patient here for sleep apnea and insomnia follow up. Recently completed HST: 4/17/25: Sleep time 6.75 hrs, AHI: 46, RDI: 47, Min O2: 80.3%. Patient scheduled to get set up with cpap later today. Can't imagine that he could wear a cpap machine nightly.  Patient does not believe that he slept at all during HST. Patient reports that he doesn't sleep so he could not have slept for 6.35 hrs. Admits he might have slept for one hour because the tv was on a different program at one point during the night.     Insomnia  Symptoms: [x] Difficulty Falling Asleep [x] Difficulty Staying Asleep [] Frequent Nocturnal Awakenings [x] Poor Sleep Quality [x] Non-Restorative Sleep [x] Early Morning Awakening  Current Episode Severity: [] Mild [] Moderate [x] Severe  Treatments: [x] Medications []  Sleep Hygiene & Stimulus Control []  Sleep Restriction []  CBTI [] Relaxation Techniques  Treatment Effects: [] Decreased Sleep Latency [] Sleep Consolidation [] Improved Sleep Quality []  Medication Side Effects [x] No Improvement  Insomnia: [] Improving [] Worsening [x] Unchanged [] Stable  Comments: patient reports that he does not sleep at all. Despite not sleeping his epworth sleepiness scale is 1/24. Patient has no consistent bedtime or morning awakening time. Does not know how many times he is waking up  per night, maybe once to use the bathroom. Reports using mirtazapine for sleep currently but not effective. Contributing factors include poor sleep hygiene, tv on all night (can't sleep without tv), no consistent sleep schedule. Stays in bed when can't sleep. Excessive alcohol usage once a week (reports drinking a 6 pack to fall asleep).         5/6/2025    11:48 AM   Sleep Clinic New Patient   What time do you go to bed on a week day? (Give a range) Do not sleep   What time do you go to bed on a day off? (Give a range) Do not sleep   How long does it take you to fall asleep? (Give a range) Can not sleep   On average, how many times per night do you wake up? Do not sleep   How long does it take you to fall back into sleep? (Give a range) Do not sleep   What time do you wake up to start your day on a week day? (Give a range) Do not sleep   What time do you wake up to start your day on a day off? (Give a range) N/A   What time do you get out of bed? (Give a range) N/A   On average, how many hours do you sleep? N/A   On average, how many naps do you take per day? N/A   Rate your sleep quality from 0 to 5 (0-poor, 5-great). 0   Have you experienced:  Weight gain?   How much weight have you lost or gained (in lbs.) in the last year? 10lbs   On average, how many times per night do you go to the bathroom?  0   Have you ever had a sleep study/CPAP machine/surgery for sleep apnea? Yes   Have you ever had a CPAP machine for sleep apnea? No   Have you ever had surgery for sleep apnea? No           5/6/2025    11:49 AM   EPWORTH SLEEPINESS SCALE TOTAL SCORE    Total score 1        Proxy-reported     (Validated Sleepiness Questionnaire with higher score indicating greater sleepiness (0-24)    Current Sleep Medication(s): mirtazapine 15 mg (not effective)    Previous Sleep Medication(s): Melatonin 10 mg    Sleep Factors:  Sleep Environment: Cool, Dark, Comfortable, TV on while asleep, and spends an excessive period of time in bed  doing activities besides sleep and sex  Caffeine: 1 beverages, last beverage at  am/pm  Bed Partner: None  Alcohol: > 5 beers per week(s)  Sleep Disorder Family History: None  Sleep Problems: naps during the day  spends excessive time in bed  stays in bed when can't sleep  does stimulating activities when can't sleep         Objective     CPAP Data:     :  cmH?O;  days): % usage; Avg Usage:  hrs; Avg Pressure: ; Leak:  L/min; AHI: ; met compliance.      DME: [facility], , setup date: 5/6/25    Records Reviewed:   Lab Results   Component Value Date    TSH 1.626 07/09/2024    CO2 28 03/06/2025    HGBA1C 5.5 07/12/2024    FERRITIN 94.3 06/15/2007      Echo  Result Date: 7/12/2024    Left Ventricle: The left ventricle is normal in size. Normal wall   thickness. There is concentric remodeling. Regional wall motion   abnormalities present. See diagram for wall motion findings. There is   normal systolic function with a visually estimated ejection fraction of 55   - 60%. There is normal diastolic function.    Right Ventricle: Normal right ventricular cavity size. Wall thickness   is normal. Systolic function is normal. Pacemaker lead present in the   ventricle.    Left Atrium: Left atrium is mildly dilated.    Pulmonic Valve: There is mild regurgitation.    Pulmonary Artery: The estimated pulmonary artery systolic pressure is   22 mmHg.    IVC/SVC: Normal venous pressure at 3 mmHg.       Sleep Studies: Diagnostic: Date: 4/17/25, .HST, AHI: 46, RDI: 47, Min O2: 80.3%, severe DALE    Physical Exam:  Vitals: Blood pressure (!) 145/80, pulse 73, weight 110.9 kg (244 lb 7.8 oz).    Gen: Well Appearing, demonstrates insight  Skin: No rash or lesions on bridge of nose or mouth        Assessment & Plan  Hypersomnolence    Orders:    Ambulatory referral/consult to Sleep Disorders    DALE (obstructive sleep apnea)  DALE Treatment: Continue APAP:    PAP Adjustments: None   Discussed sleep study report in detail, using graphs and  charts  Education & Treatment Options:  Discussed the etiology and consequences of untreated DALE, including risks of cardiovascular disease, hypertension, stroke, metabolic dysfunction, and excessive daytime sleepiness.  Reviewed treatment options:  Positive Airway Pressure (PAP) therapy, Weight loss Positional therapy, Oral appliance therapy  Lifestyle Modifications: Advised weight management, alcohol reduction, and optimizing sleep hygiene.  Safety Precautions: Recommended avoiding driving if experiencing excessive daytime sleepiness.  Patient is urged to avoid supine sleep, weight gain and alcoholic beverages since all of these can worsen DALE.    Precautions: The patient was advised to abstain from driving should he feel sleepy or drowsy.   Renewed Prescription for Supplies  Follow up: 2 months  Orders:    CPAP/BIPAP SUPPLIES    Insomnia, unspecified type  Discussed sleep hygiene measures including regular sleep schedule, optimal sleep environment, and relaxing presleep rituals.  Discussed sleep restriction: shift bedtime to 12 am  Avoid daytime naps.  Avoid caffeine after noon.  Avoid excess alcohol.  Avoid tobacco.  Recommended daily exercise.  Educational materials distributed.  Continue taking medication(s): Mirtapazine 15 mg  Recommended smart sleep tracker to monitor sleeping patterns.   Orders:    Ambulatory referral/consult to Behavioral Health; Future

## 2025-05-06 NOTE — ASSESSMENT & PLAN NOTE
DALE Treatment: Continue APAP:    PAP Adjustments: None   Discussed sleep study report in detail, using graphs and charts  Education & Treatment Options:  Discussed the etiology and consequences of untreated DALE, including risks of cardiovascular disease, hypertension, stroke, metabolic dysfunction, and excessive daytime sleepiness.  Reviewed treatment options:  Positive Airway Pressure (PAP) therapy, Weight loss Positional therapy, Oral appliance therapy  Lifestyle Modifications: Advised weight management, alcohol reduction, and optimizing sleep hygiene.  Safety Precautions: Recommended avoiding driving if experiencing excessive daytime sleepiness.  Patient is urged to avoid supine sleep, weight gain and alcoholic beverages since all of these can worsen DALE.    Precautions: The patient was advised to abstain from driving should he feel sleepy or drowsy.   Renewed Prescription for Supplies  Follow up: 2 months  Orders:    CPAP/BIPAP SUPPLIES

## 2025-05-06 NOTE — ASSESSMENT & PLAN NOTE
Discussed sleep hygiene measures including regular sleep schedule, optimal sleep environment, and relaxing presleep rituals.  Discussed sleep restriction: shift bedtime to 12 am  Avoid daytime naps.  Avoid caffeine after noon.  Avoid excess alcohol.  Avoid tobacco.  Recommended daily exercise.  Educational materials distributed.  Continue taking medication(s): Mirtapazine 15 mg  Recommended smart sleep tracker to monitor sleeping patterns.   Orders:    Ambulatory referral/consult to Behavioral Health; Future

## 2025-05-07 ENCOUNTER — PATIENT MESSAGE (OUTPATIENT)
Dept: SLEEP MEDICINE | Facility: CLINIC | Age: 85
End: 2025-05-07
Payer: MEDICARE

## 2025-05-14 ENCOUNTER — TELEPHONE (OUTPATIENT)
Dept: SLEEP MEDICINE | Facility: CLINIC | Age: 85
End: 2025-05-14
Payer: MEDICARE

## 2025-05-14 NOTE — TELEPHONE ENCOUNTER
"Outpatient Occupational Therapy Progress Note     Patient: Jose A Burkett  : 2011    Beginning/End Dates of Reporting Period:  2017 to 2018    Referring Provider: Dr. Yanick Dyer    Therapy Diagnosis: Other lack of coordination    Client Self Report: Mother stated that child is more anger and outburst     Goals:     Goal Identifier Emotional regulation   Goal Description Jose A will learn concepts from \"How Does Your Engine Run\" with Jose A verbalizing understanding CGA 80%x.     Target Date 18   Date Met      Progress:     Goal Identifier Calming strategies   Goal Description Jose A will identify 5 calming/grounding activities that he can use for calming when he is fidgeting, mad, or frustrated this reporting period.   Target Date 18   Date Met      Progress:     Goal Identifier Shoe tying   Goal Description Jose A will independently tie his own shoes at least 4 times this reporting period to increase independence with BADL.    Target Date 18   Date Met      Progress:     Goal Identifier FM writing skills   Goal Description Jose A will form simple shapes with straight lines/sharp corners minimal assist 80%x.    Target Date 18   Date Met      Progress:     Goal Identifier Coordination   Goal Description Jose A will dribble followed by catch/throw ball with accuracy less than 5 verbal cues 50%x.      Target Date 18   Date Met      Progress:     Goal Identifier Attention   Goal Description Child will demonstrate improved ability to sit by attending to seated tasks for no less than 10 minutes with 2 or less verbal redirections to increase success with school/home tasks, at least 4 times this reporting period.   Target Date 18   Date Met      Progress:         Progress Toward Goals:   Progress this reporting period: Child is making progress in identifying zones of regulation.  He is also been advancing his skills to tie his shoes learning new steps.  He has " Spoke w/ Pt, informed him that I will send message to HELIO Christianson   not been as good about practicing at home/completing HEP.  Although he has made some progress mother is reporting at home his outburst are increasing.  Will continue to introduce new calming strategies.     Plan:  Continue therapy per current plan of care.    Discharge:  No

## 2025-05-14 NOTE — TELEPHONE ENCOUNTER
----- Message from Dalila sent at 5/14/2025  3:25 PM CDT -----  Type:  Needs Medical AdviceWho Called: PtSymptoms (please be specific): Cpap MachineWould the patient rather a call back or a response via [facility]? CallBest Call Back Number:  [phone] Information: Pt would like to speak with nurse in office regarding Cpap machine

## 2025-05-28 NOTE — TELEPHONE ENCOUNTER
Inflammation tests, tests for gout and blood count are normal.  Patient does not use portal account please call him and relay results, thank you   Subjective     Patient ID: Kandi is a 41 year old female.    Chief Complaint   Patient presents with    Follow-up     Routine f/u       HPI  41yoF with fibromyalgia, crohn's disease, asthma, eczema who presents for follow up.     Last appt with me 5/2023    Multiple ER visits over the past 1-2 months  Has been feeling better since getting regular infusions of remicade for crohn's disease  Now doing it every 8 weeks  Was on skyrizi previously but was not fully controlling inflammation    Follows with GI, Dr. David Del Castillo  Sees him every 6 months    Has not been able to get syringes for B12 injections  Needs IM length  Pharmacy told her she needs a new script    Feels like she is dealing with water retention  Swelling in feet and hands  Worsening over the past few months  Now happening almost every day  Worse after on her feet for longer periods of time  Does wear compression socks, which helps    Hands are worse in the morning  Better throughout the day   Not painful     Woke up with lip swelling last week  Half of upper lip  Took some benadryl, which helped  Denies any new meds, detergents, soaps, pillows  Did try a new pancake product the day before   No recurrence since then  Does have an epipen at home if needed    Review of Systems  As per HPI.    Objective   Visit Vitals  /82   Pulse 83   Temp 98.2 °F (36.8 °C)   Resp 18   Ht 5' 3\" (1.6 m)   Wt 101.1 kg (222 lb 12.4 oz)   LMP 05/20/2025 (Exact Date)   SpO2 99%   BMI 39.46 kg/m²     Physical Exam  Vitals reviewed.   Constitutional:       General: She is not in acute distress.     Appearance: Normal appearance.   Cardiovascular:      Rate and Rhythm: Normal rate and regular rhythm.      Heart sounds: Normal heart sounds. No murmur heard.  Pulmonary:      Effort: Pulmonary effort is normal. No respiratory distress.      Breath sounds: Normal breath sounds. No wheezing.   Skin:     General: Skin is warm and dry.      Findings: No rash.   Neurological:       Mental Status: She is alert. Mental status is at baseline.   Psychiatric:         Mood and Affect: Mood normal.         Behavior: Behavior normal.         Past Medical History:   Diagnosis Date    Acne vulgaris 06/30/2016    Arthritis     Fibromyalgia     Gastritis     Patellofemoral pain syndrome of both knees 01/20/2020    PID (acute pelvic inflammatory disease)     RAD (reactive airway disease) (CMD)     Ulcerative colitis (CMD)        Current Outpatient Medications   Medication Sig    fluticasone (FLONASE) 50 MCG/ACT nasal spray Spray 2 sprays in each nostril daily.    levocetirizine (XYZAL) 5 MG tablet Take 1 tablet by mouth every evening. (Patient not taking: Reported on 5/28/2025)    inFLIXimab (Remicade) 100 MG injection 1 each.    cyclobenzaprine (FLEXERIL) 5 MG tablet Take 5 mg by mouth 3 times daily as needed for Muscle spasms.    pregabalin (LYRICA) 50 MG capsule Take 50 mg by mouth in the morning and 50 mg in the evening.    Ferrous Sulfate (Iron) 325 (65 Fe) MG Tab Take 1 tablet by mouth every other day.    docusate sodium (COLACE) 100 MG capsule Take 1 capsule by mouth in the morning and 1 capsule in the evening. (Patient not taking: Reported on 5/28/2025)    cyanocobalamin 1000 MCG/ML injection Inject 1,000 mcg subcutaneous once weekly for 1 month, then inject subcutaneous once a month    oMALizumab (XOLAIR) 150 MG injection     montelukast (SINGULAIR) 10 MG tablet Take 1 tablet by mouth nightly.    Multiple Vitamin (MULTIVITAMIN ADULT PO) Take 1 tablet by mouth.    diclofenac (VOLTAREN) 75 MG EC tablet Take 75 mg by mouth 2 times daily as needed. (Patient not taking: Reported on 5/28/2025)    albuterol 108 (90 Base) MCG/ACT inhaler Inhale 2 puffs into the lungs every 4 hours as needed for Shortness of Breath or Wheezing.    albuterol (ACCUNEB) 0.63 MG/3ML nebulizer solution Take 3 mLs by nebulization every 6 hours as needed for Wheezing or Shortness of Breath.    acetaminophen (TYLENOL) 500 MG  tablet Take 1 tablet by mouth every 6 hours as needed for Pain or Fever.    VEDOLizumab (Entyvio) 300 MG injection Inject 300 mg into the vein. (Patient not taking: Reported on 5/28/2025)    diclofenac (VOLTAREN) 1 % gel     capsaicin (ZOSTRIX) 0.025 % cream     dicyclomine (BENTYL) 10 MG capsule Take 10 mg by mouth.    DULoxetine (CYMBALTA) 60 MG capsule TAKE 1 CAPSULE BY MOUTH ONE TIME A DAY (SWALLOW WHOLE)    pantoprazole (PROTONIX) 40 MG tablet Take 40 mg by mouth.     No current facility-administered medications for this visit.       Patient Active Problem List   Diagnosis    Crohn's disease  (CMD)    Osteitis condensans ilii    Fibromyalgia    Lumbar spondylosis    HLA B27 positive    Facet arthritis of lumbar region    Chronic pain of both knees    Chronic bilateral low back pain    Varicose veins of legs    Uterine polyp    Acid reflux disease    Eczema    Depression with anxiety    Mild intermittent asthma without complication (CMD)    Anemia    Hemorrhoids    Hepatic hemangioma    Hepatic cyst    Adnexal cyst    Umbilical hernia    Obesity (BMI 30-39.9)       Health Maintenance   Topic Date Due    COVID-19 Vaccine (4 - 2024-25 season) 09/01/2024    Depression Screening  04/04/2026    Breast Cancer Screening  03/26/2027    Cervical Cancer Screening  03/19/2030    DTaP/Tdap/Td Vaccine (2 - Td or Tdap) 02/13/2033    Influenza Vaccine  Completed    Pneumococcal Vaccine 0-49  Completed    Hepatitis A Vaccine  Aged Out    Meningococcal Vaccine  Aged Out    Meningococcal Serogroup B Vaccine  Aged Out    HPV Vaccine  Aged Out       Assessment /Plan  Problem List Items Addressed This Visit          ENT    Lip swelling    -unclear etiology at this time, especially as swelling was only limited to half of the upper lip  -improved with benadryl  -advised to monitor for recurrence as if it was an allergic reaction, may be stronger on next exposure  -discussed when to use epipen and go to ER            Endocrine and  Metabolic    B12 deficiency - Primary    Relevant Medications    Syringe, Disposable, 1 ML Misc       Gastrointestinal and Abdominal    Crohn's disease  (CMD)    -follows with GI, Dr. Del Castillo  -currently on remicade with good improvement so far  -management per GI            Symptoms and Signs    Dependent edema    -continue compression socks, elevating above the level of the heart  -will monitor          Follow up in 4-6 months

## 2025-06-03 ENCOUNTER — OFFICE VISIT (OUTPATIENT)
Dept: SLEEP MEDICINE | Facility: CLINIC | Age: 85
End: 2025-06-03
Attending: PSYCHIATRY & NEUROLOGY
Payer: MEDICARE

## 2025-06-03 VITALS
BODY MASS INDEX: 33.11 KG/M2 | OXYGEN SATURATION: 95 % | SYSTOLIC BLOOD PRESSURE: 126 MMHG | HEIGHT: 71 IN | WEIGHT: 236.5 LBS | HEART RATE: 60 BPM | DIASTOLIC BLOOD PRESSURE: 61 MMHG

## 2025-06-03 DIAGNOSIS — G47.00 INSOMNIA, UNSPECIFIED TYPE: ICD-10-CM

## 2025-06-03 DIAGNOSIS — G47.33 OBSTRUCTIVE SLEEP APNEA: Primary | ICD-10-CM

## 2025-06-03 DIAGNOSIS — Z78.9 DIFFICULTY USING CONTINUOUS POSITIVE AIRWAY PRESSURE (CPAP) DEVICE: ICD-10-CM

## 2025-06-03 PROCEDURE — 99999 PR PBB SHADOW E&M-EST. PATIENT-LVL III: CPT | Mod: PBBFAC,HCNC,,

## 2025-06-03 RX ORDER — RAMELTEON 8 MG/1
8 TABLET ORAL NIGHTLY
Qty: 30 TABLET | Refills: 5 | Status: SHIPPED | OUTPATIENT
Start: 2025-06-03 | End: 2025-06-05 | Stop reason: ALTCHOICE

## 2025-06-05 DIAGNOSIS — G47.00 INSOMNIA, UNSPECIFIED TYPE: Primary | ICD-10-CM

## 2025-06-05 DIAGNOSIS — N40.1 BENIGN PROSTATIC HYPERPLASIA WITH URINARY FREQUENCY: ICD-10-CM

## 2025-06-05 DIAGNOSIS — R35.0 BENIGN PROSTATIC HYPERPLASIA WITH URINARY FREQUENCY: ICD-10-CM

## 2025-06-05 RX ORDER — TAMSULOSIN HYDROCHLORIDE 0.4 MG/1
1 CAPSULE ORAL DAILY
Qty: 90 CAPSULE | Refills: 3 | Status: SHIPPED | OUTPATIENT
Start: 2025-06-05

## 2025-06-05 RX ORDER — TRAZODONE HYDROCHLORIDE 50 MG/1
TABLET ORAL
Qty: 30 TABLET | Refills: 11 | Status: SHIPPED | OUTPATIENT
Start: 2025-06-05

## 2025-06-10 DIAGNOSIS — G47.00 INSOMNIA, UNSPECIFIED TYPE: ICD-10-CM

## 2025-06-10 RX ORDER — TRAZODONE HYDROCHLORIDE 50 MG/1
TABLET ORAL
Qty: 30 TABLET | Refills: 11 | Status: SHIPPED | OUTPATIENT
Start: 2025-06-10

## 2025-06-23 ENCOUNTER — TELEPHONE (OUTPATIENT)
Dept: SLEEP MEDICINE | Facility: CLINIC | Age: 85
End: 2025-06-23
Payer: MEDICARE

## 2025-06-23 DIAGNOSIS — G47.00 INSOMNIA, UNSPECIFIED TYPE: Primary | ICD-10-CM

## 2025-06-23 RX ORDER — RAMELTEON 8 MG/1
8 TABLET ORAL NIGHTLY
Qty: 30 TABLET | Refills: 5 | Status: SHIPPED | OUTPATIENT
Start: 2025-06-23

## 2025-06-23 NOTE — TELEPHONE ENCOUNTER
Copied from CRM #[id number]. Topic: Medications - Medication Authorization  >> Jun 20, 2025  1:22 PM Antonia wrote:  Type: Patient Call Back    Who called: Patient    What is the request in detail: Please call patient he would like to speak with Dr. Lawrence about a PA for a medication    Can the clinic reply by [facility]?    Would the patient rather a call back or a response via [facility]? call    Best call back number: [phone]    Additional Information: Called patient to let him know that I will let Farshad know to give him a call back about the PA he concern about.

## 2025-06-23 NOTE — TELEPHONE ENCOUNTER
"Spoke with patient regarding ongoining insomnia. Failed trazodone, caused next day sedation and made him feel "sick". Stopped taking. Will try to get ramelteon approved. Other medications not safe due to untreated severe obstructive sleep apnea. [facility] Consult Pending.   "

## 2025-06-24 ENCOUNTER — CLINICAL SUPPORT (OUTPATIENT)
Dept: CARDIOLOGY | Facility: CLINIC | Age: 85
End: 2025-06-24

## 2025-06-24 ENCOUNTER — CLINICAL SUPPORT (OUTPATIENT)
Dept: CARDIOLOGY | Facility: CLINIC | Age: 85
End: 2025-06-24
Attending: INTERNAL MEDICINE
Payer: MEDICARE

## 2025-06-24 DIAGNOSIS — R00.1 BRADYCARDIA, UNSPECIFIED: ICD-10-CM

## 2025-06-24 DIAGNOSIS — Z95.0 PRESENCE OF CARDIAC PACEMAKER: ICD-10-CM

## 2025-06-24 PROCEDURE — 93296 REM INTERROG EVL PM/IDS: CPT | Mod: HCNC,PN | Performed by: INTERNAL MEDICINE

## 2025-06-24 PROCEDURE — 93294 REM INTERROG EVL PM/LDLS PM: CPT | Mod: HCNC,S$GLB,, | Performed by: INTERNAL MEDICINE

## 2025-06-25 LAB
OHS CV AF BURDEN PERCENT: < 1
OHS CV DC REMOTE DEVICE TYPE: NORMAL
OHS CV RV PACING PERCENT: 42 %

## 2025-07-07 ENCOUNTER — TELEPHONE (OUTPATIENT)
Dept: SLEEP MEDICINE | Facility: CLINIC | Age: 85
End: 2025-07-07

## 2025-07-09 ENCOUNTER — OFFICE VISIT (OUTPATIENT)
Dept: DERMATOLOGY | Facility: CLINIC | Age: 85
End: 2025-07-09
Attending: FAMILY MEDICINE
Payer: MEDICARE

## 2025-07-09 DIAGNOSIS — L98.9 SKIN LESION: ICD-10-CM

## 2025-07-09 DIAGNOSIS — D48.5 NEOPLASM OF UNCERTAIN BEHAVIOR OF SKIN: Primary | ICD-10-CM

## 2025-07-09 DIAGNOSIS — L82.1 SEBORRHEIC KERATOSES: ICD-10-CM

## 2025-07-09 DIAGNOSIS — L81.4 LENTIGINES: ICD-10-CM

## 2025-07-09 DIAGNOSIS — Z12.83 SCREENING EXAM FOR SKIN CANCER: ICD-10-CM

## 2025-07-09 PROCEDURE — 1126F AMNT PAIN NOTED NONE PRSNT: CPT | Mod: CPTII,HCNC,S$GLB, | Performed by: STUDENT IN AN ORGANIZED HEALTH CARE EDUCATION/TRAINING PROGRAM

## 2025-07-09 PROCEDURE — 1159F MED LIST DOCD IN RCRD: CPT | Mod: CPTII,HCNC,S$GLB, | Performed by: STUDENT IN AN ORGANIZED HEALTH CARE EDUCATION/TRAINING PROGRAM

## 2025-07-09 PROCEDURE — 99999 PR PBB SHADOW E&M-EST. PATIENT-LVL III: CPT | Mod: PBBFAC,HCNC,, | Performed by: STUDENT IN AN ORGANIZED HEALTH CARE EDUCATION/TRAINING PROGRAM

## 2025-07-09 PROCEDURE — 69100 BIOPSY OF EXTERNAL EAR: CPT | Mod: HCNC,S$GLB,, | Performed by: STUDENT IN AN ORGANIZED HEALTH CARE EDUCATION/TRAINING PROGRAM

## 2025-07-09 PROCEDURE — 1160F RVW MEDS BY RX/DR IN RCRD: CPT | Mod: CPTII,HCNC,S$GLB, | Performed by: STUDENT IN AN ORGANIZED HEALTH CARE EDUCATION/TRAINING PROGRAM

## 2025-07-09 PROCEDURE — 1157F ADVNC CARE PLAN IN RCRD: CPT | Mod: CPTII,HCNC,S$GLB, | Performed by: STUDENT IN AN ORGANIZED HEALTH CARE EDUCATION/TRAINING PROGRAM

## 2025-07-09 PROCEDURE — 99203 OFFICE O/P NEW LOW 30 MIN: CPT | Mod: 25,HCNC,S$GLB, | Performed by: STUDENT IN AN ORGANIZED HEALTH CARE EDUCATION/TRAINING PROGRAM

## 2025-07-09 PROCEDURE — 3288F FALL RISK ASSESSMENT DOCD: CPT | Mod: CPTII,HCNC,S$GLB, | Performed by: STUDENT IN AN ORGANIZED HEALTH CARE EDUCATION/TRAINING PROGRAM

## 2025-07-09 PROCEDURE — 1101F PT FALLS ASSESS-DOCD LE1/YR: CPT | Mod: CPTII,HCNC,S$GLB, | Performed by: STUDENT IN AN ORGANIZED HEALTH CARE EDUCATION/TRAINING PROGRAM

## 2025-07-09 PROCEDURE — 88305 TISSUE EXAM BY PATHOLOGIST: CPT | Mod: TC,HCNC | Performed by: STUDENT IN AN ORGANIZED HEALTH CARE EDUCATION/TRAINING PROGRAM

## 2025-07-09 NOTE — PROGRESS NOTES
Subjective:      Patient ID:  Korey Santos is a 84 y.o. male who presents for   Chief Complaint   Patient presents with    Skin Check     TBSE        Korey Santos is a 84 y.o. male who presents for: FBSE screening exam for skin cancer.    New patient    The patient has the following lesions of concern:  Location: bilateral ears   Duration: > 1 yr   Symptoms: rough texture and tender to pressure   Relieving factors/Previous treatments: none     Patient with new area of concern:   Location: left dorsal hand   Duration: 1 yr   Symptoms: none   Previous treatments: none  Pt notes it site of IV.       Patient with new area of concern:   Location: Right cheek   Duration: 3 yr   Symptoms: none   Previous treatments: none      Pertinent history:  History of blistering sunburns: No  History of tanning bed use: No  Family history of melanoma: No  Personal history of mole removal: No  Personal history of skin cancer: No          Review of Systems   Skin:  Positive for wears hat. Negative for daily sunscreen use, activity-related sunscreen use and recent sunburn.   Hematologic/Lymphatic: Does not bruise/bleed easily.       Objective:   Physical Exam   Constitutional: He appears well-developed and well-nourished.   Neurological: He is alert and oriented to person, place, and time. He is not disoriented.   Psychiatric: He has a normal mood and affect.   Skin:   Areas Examined (abnormalities noted in diagram):   Scalp / Hair Palpated and Inspected  Head / Face Inspection Performed  Neck Inspection Performed  Chest / Axilla Inspection Performed  Abdomen Inspection Performed  Genitals / Buttocks / Groin Inspection Performed  Back Inspection Performed  RUE Inspected  LUE Inspection Performed  RLE Inspected  LLE Inspection Performed  Nails and Digits Inspection Performed                 Diagram Legend     Erythematous scaling macule/papule c/w actinic keratosis       Vascular papule c/w angioma      Pigmented verrucoid  papule/plaque c/w seborrheic keratosis      Yellow umbilicated papule c/w sebaceous hyperplasia      Irregularly shaped tan macule c/w lentigo     1-2 mm smooth white papules consistent with Milia      Movable subcutaneous cyst with punctum c/w epidermal inclusion cyst      Subcutaneous movable cyst c/w pilar cyst      Firm pink to brown papule c/w dermatofibroma      Pedunculated fleshy papule(s) c/w skin tag(s)      Evenly pigmented macule c/w junctional nevus     Mildly variegated pigmented, slightly irregular-bordered macule c/w mildly atypical nevus      Flesh colored to evenly pigmented papule c/w intradermal nevus       Pink pearly papule/plaque c/w basal cell carcinoma      Erythematous hyperkeratotic cursted plaque c/w SCC      Surgical scar with no sign of skin cancer recurrence      Open and closed comedones      Inflammatory papules and pustules      Verrucoid papule consistent consistent with wart     Erythematous eczematous patches and plaques     Dystrophic onycholytic nail with subungual debris c/w onychomycosis     Umbilicated papule    Erythematous-base heme-crusted tan verrucoid plaque consistent with inflamed seborrheic keratosis     Erythematous Silvery Scaling Plaque c/w Psoriasis     See annotation              Assessment / Plan:      Pathology Orders:       Normal Orders This Visit    Specimen to Pathology, Dermatology     Questions:    Procedure Type: Dermatology and skin neoplasms    Number of Specimens: 1    ------------------------: -------------------------    Spec 1 Procedure: Shave Biopsy    Spec 1 Clinical Impression: erythematous tender scaly nodule- ddx CNH v SCC    Spec 1 Source: L helix    Clinical Information: see EPIC    Clinical History: see EPIC    Specimen Source: Skin    Release to patient: Immediate    Send normal result to authorizing provider's In Basket if patient is active on MyChart: Yes          Neoplasm of uncertain behavior of skin  -     Specimen to Pathology,  Dermatology  Shave biopsy procedure note:    Shave biopsy performed after verbal consent including risk of infection, scar, recurrence, need for additional treatment of site. Area prepped with alcohol, anesthetized with approximately 1.0cc of 1% lidocaine with epinephrine. Lesional tissue shaved with razor blade. Hemostasis achieved with application of aluminum chloride followed by hyfrecation. No complications. Dressing applied. Wound care explained.    Seborrheic keratoses  These are benign inherited growths without a malignant potential. Reassurance given to patient. No treatment is necessary.     Lentigines  This is a benign hyperpigmented sun induced lesion. Recommend daily sun protection/avoidance and use of at least SPF 30, broad spectrum sunscreen (OTC drug) will reduce the number of new lesions. Treatment of these benign lesions are considered cosmetic.    Screening exam for skin cancer  Total body skin examination performed today including at least 12 points as noted in physical examination. No lesions suspicious for malignancy noted.    Recommend daily sun protection/avoidance, use of at least SPF 30, broad spectrum sunscreen (OTC drug), skin self examinations, and routine physician surveillance to optimize early detection    RTC pending bx results

## 2025-07-11 LAB
ESTROGEN SERPL-MCNC: NORMAL PG/ML
INSULIN SERPL-ACNC: NORMAL U[IU]/ML
LAB AP CLINICAL INFORMATION: NORMAL
LAB AP GROSS DESCRIPTION: NORMAL
LAB AP REPORT FOOTNOTES: NORMAL
T3RU NFR SERPL: NORMAL %

## 2025-07-18 ENCOUNTER — TELEPHONE (OUTPATIENT)
Dept: DERMATOLOGY | Facility: CLINIC | Age: 85
End: 2025-07-18
Payer: MEDICARE

## 2025-07-21 ENCOUNTER — OFFICE VISIT (OUTPATIENT)
Dept: ENDOCRINOLOGY | Facility: CLINIC | Age: 85
End: 2025-07-21
Attending: FAMILY MEDICINE
Payer: MEDICARE

## 2025-07-21 ENCOUNTER — LAB VISIT (OUTPATIENT)
Dept: LAB | Facility: HOSPITAL | Age: 85
End: 2025-07-21
Payer: MEDICARE

## 2025-07-21 VITALS
WEIGHT: 237.88 LBS | OXYGEN SATURATION: 97 % | HEIGHT: 71 IN | DIASTOLIC BLOOD PRESSURE: 68 MMHG | SYSTOLIC BLOOD PRESSURE: 116 MMHG | HEART RATE: 60 BPM | BODY MASS INDEX: 33.3 KG/M2

## 2025-07-21 DIAGNOSIS — E04.2 MULTIPLE THYROID NODULES: ICD-10-CM

## 2025-07-21 LAB — TSH SERPL-ACNC: 1.78 UIU/ML (ref 0.4–4)

## 2025-07-21 PROCEDURE — 3078F DIAST BP <80 MM HG: CPT | Mod: CPTII,HCNC,S$GLB, | Performed by: STUDENT IN AN ORGANIZED HEALTH CARE EDUCATION/TRAINING PROGRAM

## 2025-07-21 PROCEDURE — 3074F SYST BP LT 130 MM HG: CPT | Mod: CPTII,HCNC,S$GLB, | Performed by: STUDENT IN AN ORGANIZED HEALTH CARE EDUCATION/TRAINING PROGRAM

## 2025-07-21 PROCEDURE — 1160F RVW MEDS BY RX/DR IN RCRD: CPT | Mod: CPTII,HCNC,S$GLB, | Performed by: STUDENT IN AN ORGANIZED HEALTH CARE EDUCATION/TRAINING PROGRAM

## 2025-07-21 PROCEDURE — 1125F AMNT PAIN NOTED PAIN PRSNT: CPT | Mod: CPTII,HCNC,S$GLB, | Performed by: STUDENT IN AN ORGANIZED HEALTH CARE EDUCATION/TRAINING PROGRAM

## 2025-07-21 PROCEDURE — 84443 ASSAY THYROID STIM HORMONE: CPT | Mod: HCNC

## 2025-07-21 PROCEDURE — 1157F ADVNC CARE PLAN IN RCRD: CPT | Mod: CPTII,HCNC,S$GLB, | Performed by: STUDENT IN AN ORGANIZED HEALTH CARE EDUCATION/TRAINING PROGRAM

## 2025-07-21 PROCEDURE — 3288F FALL RISK ASSESSMENT DOCD: CPT | Mod: CPTII,HCNC,S$GLB, | Performed by: STUDENT IN AN ORGANIZED HEALTH CARE EDUCATION/TRAINING PROGRAM

## 2025-07-21 PROCEDURE — 1159F MED LIST DOCD IN RCRD: CPT | Mod: CPTII,HCNC,S$GLB, | Performed by: STUDENT IN AN ORGANIZED HEALTH CARE EDUCATION/TRAINING PROGRAM

## 2025-07-21 PROCEDURE — 99204 OFFICE O/P NEW MOD 45 MIN: CPT | Mod: HCNC,S$GLB,, | Performed by: STUDENT IN AN ORGANIZED HEALTH CARE EDUCATION/TRAINING PROGRAM

## 2025-07-21 PROCEDURE — 1101F PT FALLS ASSESS-DOCD LE1/YR: CPT | Mod: CPTII,HCNC,S$GLB, | Performed by: STUDENT IN AN ORGANIZED HEALTH CARE EDUCATION/TRAINING PROGRAM

## 2025-07-21 PROCEDURE — 36415 COLL VENOUS BLD VENIPUNCTURE: CPT | Mod: HCNC

## 2025-07-21 PROCEDURE — 99999 PR PBB SHADOW E&M-EST. PATIENT-LVL IV: CPT | Mod: PBBFAC,HCNC,, | Performed by: STUDENT IN AN ORGANIZED HEALTH CARE EDUCATION/TRAINING PROGRAM

## 2025-07-21 NOTE — PATIENT INSTRUCTIONS
Talk to your PCP about a GI referral and an EGD to evaluate the difficulty swallowing, it is not due to your thyroid.     You can repeat your ultrasound in 2 years and get a TSH yearly. If you develop any of the symptoms we discussed in clinic then you can let us know. Otherwise there's no need to follow up with endocrinology.

## 2025-07-21 NOTE — PROGRESS NOTES
Subjective     Patient ID: Korey Santos is a 84 y.o. male.    Chief Complaint: Thyroid Nodule    84 y.o M with HTN, HLD, CAD, CHB s/p PPM, BPH, DALE presenting for evaluation of multiple thyroid nodules.     Patient's most recent thyroid U/S is from July 2024:   FINDINGS:  The right thyroid lobe measures 4.7 x 1.9 x 1.2 cm, and the left thyroid lobe measures 4.7 x 1.4 x 1.2 cm. The isthmus measures 2 mm in thickness.     5 mm hypoechoic inferior pole right thyroid nodule, unchanged. 6 mm minimally complicated cystic mid right thyroid nodule has increased in size although a is more cystic and less solid currently. 6 mm complicated cystic nodule within the posterior mid left thyroid gland has increased in size by 2 mm although appears more cystic. 2 mm inferior pole left thyroid cyst noted.     The adjacent soft tissues are normal.     Impression:   1. Stable multinodular goiter. Complicated cystic nodules bilaterally have slightly increased in size although appear more cystic and less solid currently. TI rads grade of 3.      Most recent TSH 1.626 from 7/9/24. Denies weight gain/loss, cold/heat intolerance, dry skin, brittle nails, palpitations, myalgias, edema.     Endorses dysphagia which is chronic for him and has been worked up in the past. No dyspnea while recumbent. No voice changes.       Review of Systems   Constitutional:  Negative for chills, fatigue and fever.   HENT:  Positive for trouble swallowing. Negative for voice change.    Eyes:  Negative for visual disturbance.   Cardiovascular:  Negative for palpitations.   Gastrointestinal:  Negative for abdominal pain.   Endocrine: Negative for cold intolerance, polydipsia and polyuria.          Objective     Physical Exam  Vitals reviewed.   Constitutional:       Appearance: Normal appearance. He is not ill-appearing.   HENT:      Head: Normocephalic.      Mouth/Throat:      Mouth: Mucous membranes are moist.      Pharynx: Oropharynx is clear.   Eyes:       Extraocular Movements: Extraocular movements intact.      Conjunctiva/sclera: Conjunctivae normal.   Cardiovascular:      Rate and Rhythm: Normal rate.      Pulses: Normal pulses.   Pulmonary:      Effort: Pulmonary effort is normal.   Abdominal:      Palpations: Abdomen is soft.   Musculoskeletal:         General: Normal range of motion.      Cervical back: Normal range of motion.   Skin:     General: Skin is warm.      Capillary Refill: Capillary refill takes less than 2 seconds.   Neurological:      Mental Status: He is alert. Mental status is at baseline.   Psychiatric:         Mood and Affect: Mood normal.          Assessment and Plan     1. Multiple thyroid nodules: see u/s 7/23  Overview:  -7/24/2023 US for dysphagia    Assessment & Plan:  - Known hx of thyroid nodules  - Prior thyroid ultrasounds reviewed. Patient with largely stable thyroid nodules. Denies s/sx of overt hypo/hyperthyroidism.   - Given small size and location of nodules do not believe them to be contributing to patient's dysphagia/globus sensation  - I have reached out to patient's PCP Dr. Bustamante and recommended GI vs ENT consultation for evaluation of his swallowing problems.   - Regarding his nodules, they can be followed with yearly TSH and a repeat U/S in 2 years (July 2026). If no change in nodule size or TSH abnormality or emergence of new symptoms, no need to repeat U/S after that.   - No need for dedicated endocrinology follow up unless patient becomes symptomatic or nodules increase >20% in at least two dimensions  or > 2 mm/year    Orders:  -     Ambulatory referral/consult to Endocrinology  -     TSH; Future; Expected date: 07/21/2025      Robyn Arrington MD  [facility], [address]th Floor  [address] [place], [address]    Office: [phone]  Fax: [phone]

## 2025-07-21 NOTE — ASSESSMENT & PLAN NOTE
- Known hx of thyroid nodules  - Prior thyroid ultrasounds reviewed. Patient with largely stable thyroid nodules. Denies s/sx of overt hypo/hyperthyroidism.   - Given small size and location of nodules do not believe them to be contributing to patient's dysphagia/globus sensation  - I have reached out to patient's PCP Dr. Bustamante and recommended GI vs ENT consultation for evaluation of his swallowing problems.   - Regarding his nodules, they can be followed with yearly TSH and a repeat U/S in 2 years (July 2026). If no change in nodule size or TSH abnormality or emergence of new symptoms, no need to repeat U/S after that.   - No need for dedicated endocrinology follow up unless patient becomes symptomatic or nodules increase >20% in at least two dimensions  or > 2 mm/year

## 2025-07-21 NOTE — PROGRESS NOTES
I have reviewed and concur with Dr. Arrington's history, physical, assessment, and plan.  I have personally interviewed and examined the patient.    Subcentimeter thyroid nodules known since 2023 No hx of FNA. Dysphagia should be  evaluated by GI, as it is unlikely to be caused by very small thyroid nodules.      Yudelka Robert MD

## 2025-07-22 ENCOUNTER — TELEPHONE (OUTPATIENT)
Dept: INTERNAL MEDICINE | Facility: CLINIC | Age: 85
End: 2025-07-22
Payer: MEDICARE

## 2025-07-22 ENCOUNTER — PATIENT MESSAGE (OUTPATIENT)
Dept: INTERNAL MEDICINE | Facility: CLINIC | Age: 85
End: 2025-07-22
Payer: MEDICARE

## 2025-07-22 DIAGNOSIS — R13.14 DYSPHAGIA, PHARYNGOESOPHAGEAL: ICD-10-CM

## 2025-07-22 DIAGNOSIS — E04.2 MULTIPLE THYROID NODULES: Primary | ICD-10-CM

## 2025-07-22 NOTE — ASSESSMENT & PLAN NOTE
See Dr. Arrington note 7/21:    - Known hx of thyroid nodules  - Prior thyroid ultrasounds reviewed. Patient with largely stable thyroid nodules. Denies s/sx of overt hypo/hyperthyroidism.   - Given small size and location of nodules do not believe them to be contributing to patient's dysphagia/globus sensation  - I have reached out to patient's PCP Dr. Bustamante and recommended GI vs ENT consultation for evaluation of his swallowing problems.   - Regarding his nodules, they can be followed with yearly TSH and a repeat U/S in 2 years (July 2026). If no change in nodule size or TSH abnormality or emergence of new symptoms, no need to repeat U/S after that.   - No need for dedicated endocrinology follow up unless patient becomes symptomatic or nodules increase >20% in at least two dimensions  or > 2 mm/year

## 2025-07-22 NOTE — TELEPHONE ENCOUNTER
Please call patient and explain that the endocrinologist who saw him for thyroid nodules recommended a GI or ENT consult for swallowing difficulties.  Did not think this was coming from thyroid nodules.    I would like to refer to the gastroenterology department for further evaluation and treatment.    Please see referral orders and please call patient to schedule.     Thank you.

## 2025-07-24 ENCOUNTER — TELEPHONE (OUTPATIENT)
Dept: SPORTS MEDICINE | Facility: CLINIC | Age: 85
End: 2025-07-24
Payer: MEDICARE

## 2025-07-24 NOTE — TELEPHONE ENCOUNTER
Called and LVM regarding the pt's appt with Dr. Gilliam at the [place] location in [facility]. Stated to arrive 15 minutes prior to his appt time to ensure enough time to check-in and do intake on the 1st floor before being directed to the 4th floor of the same building to be seen by Dr. Gilliam.

## 2025-07-28 ENCOUNTER — TELEPHONE (OUTPATIENT)
Dept: INTERNAL MEDICINE | Facility: CLINIC | Age: 85
End: 2025-07-28
Payer: MEDICARE

## 2025-07-28 ENCOUNTER — TELEPHONE (OUTPATIENT)
Dept: DERMATOLOGY | Facility: CLINIC | Age: 85
End: 2025-07-28
Payer: MEDICARE

## 2025-07-28 DIAGNOSIS — E04.2 MULTIPLE THYROID NODULES: Primary | ICD-10-CM

## 2025-07-28 NOTE — ASSESSMENT & PLAN NOTE
See Dr. Arrington note 7/21/2025:  - Known hx of thyroid nodules  - Prior thyroid ultrasounds reviewed. Patient with largely stable thyroid nodules. Denies s/sx of overt hypo/hyperthyroidism.   - Given small size and location of nodules do not believe them to be contributing to patient's dysphagia/globus sensation  - I have reached out to patient's PCP Dr. Bustamante and recommended GI vs ENT consultation for evaluation of his swallowing problems.   - Regarding his nodules, they can be followed with yearly TSH and a repeat U/S in 2 years (July 2026). If no change in nodule size or TSH abnormality or emergence of new symptoms, no need to repeat U/S after that.   - No need for dedicated endocrinology follow up unless patient becomes symptomatic or nodules increase >20% in at least two dimensions  or > 2 mm/year    >>    Gastroenterology referral was placed and nursing staff asked to reach out to patient to schedule

## 2025-07-28 NOTE — TELEPHONE ENCOUNTER
----- Message from Yudelka Robert MD sent at 7/22/2025  8:45 AM CDT -----  TSH can be checked annually with other annual labs. Does not need endocrine f/u.  ----- Message -----  From: Lab, Background User  Sent: 7/21/2025   4:41 PM CDT  To: Yudelka Robert MD

## 2025-07-31 ENCOUNTER — HOSPITAL ENCOUNTER (OUTPATIENT)
Dept: RADIOLOGY | Facility: HOSPITAL | Age: 85
Discharge: HOME | End: 2025-07-31
Attending: STUDENT IN AN ORGANIZED HEALTH CARE EDUCATION/TRAINING PROGRAM
Payer: MEDICARE

## 2025-07-31 ENCOUNTER — OFFICE VISIT (OUTPATIENT)
Dept: SPORTS MEDICINE | Facility: CLINIC | Age: 85
End: 2025-07-31
Payer: MEDICARE

## 2025-07-31 VITALS
BODY MASS INDEX: 33.15 KG/M2 | HEART RATE: 60 BPM | SYSTOLIC BLOOD PRESSURE: 122 MMHG | WEIGHT: 236.75 LBS | DIASTOLIC BLOOD PRESSURE: 75 MMHG | HEIGHT: 71 IN

## 2025-07-31 DIAGNOSIS — M17.11 PRIMARY OSTEOARTHRITIS OF RIGHT KNEE: ICD-10-CM

## 2025-07-31 DIAGNOSIS — M25.561 CHRONIC PAIN OF RIGHT KNEE: ICD-10-CM

## 2025-07-31 DIAGNOSIS — M79.642 BILATERAL HAND PAIN: Primary | ICD-10-CM

## 2025-07-31 DIAGNOSIS — M79.641 BILATERAL HAND PAIN: Primary | ICD-10-CM

## 2025-07-31 DIAGNOSIS — M16.11 PRIMARY OSTEOARTHRITIS OF RIGHT HIP: ICD-10-CM

## 2025-07-31 DIAGNOSIS — G89.29 CHRONIC PAIN OF RIGHT KNEE: ICD-10-CM

## 2025-07-31 DIAGNOSIS — M25.551 RIGHT HIP PAIN: Primary | ICD-10-CM

## 2025-07-31 DIAGNOSIS — M79.642 BILATERAL HAND PAIN: ICD-10-CM

## 2025-07-31 DIAGNOSIS — M79.641 BILATERAL HAND PAIN: ICD-10-CM

## 2025-07-31 PROCEDURE — 99999 PR PBB SHADOW E&M-EST. PATIENT-LVL IV: CPT | Mod: PBBFAC,HCNC,, | Performed by: STUDENT IN AN ORGANIZED HEALTH CARE EDUCATION/TRAINING PROGRAM

## 2025-07-31 PROCEDURE — 73130 X-RAY EXAM OF HAND: CPT | Mod: 26,50,HCNC, | Performed by: RADIOLOGY

## 2025-07-31 PROCEDURE — 73130 X-RAY EXAM OF HAND: CPT | Mod: TC,50,HCNC

## 2025-07-31 RX ORDER — TRIAMCINOLONE ACETONIDE 40 MG/ML
40 INJECTION, SUSPENSION INTRA-ARTICULAR; INTRAMUSCULAR
Status: DISCONTINUED | OUTPATIENT
Start: 2025-07-31 | End: 2025-07-31 | Stop reason: HOSPADM

## 2025-07-31 RX ADMIN — TRIAMCINOLONE ACETONIDE 40 MG: 40 INJECTION, SUSPENSION INTRA-ARTICULAR; INTRAMUSCULAR at 01:07

## 2025-07-31 NOTE — PROCEDURES
Large Joint Aspiration/Injection: R hip joint    Date/Time: 7/31/2025 1:00 PM    Performed by: Chela Gilliam MD  Authorized by: Chela Gilliam MD    Site marked: the procedure site was marked    Timeout: prior to procedure the correct patient, procedure, and site was verified      Local anesthesia used?: Yes    Anesthesia:  Local infiltration  Local anesthetic:  Co-phenylcaine spray    Details:  Needle Size:  22 G  Ultrasonic Guidance for needle placement?: Yes (Ultrasound guidance used to avoid neurovascular injury and/or to improve accuracy given body habitus.)    Images are saved and documented.  Approach:  Anterior  Location:  Hip  Site:  R hip joint  Medications:  40 mg triamcinolone acetonide 40 mg/mL  Medications comment:  Ropivacaine 0.2% 2mL  Patient tolerance:  Patient tolerated the procedure well with no immediate complications     TECHNIQUE: Real time ultrasound examination of the right femoroacetabular joint was performed with SonTrovete Edge 2, C1-5 MHz probe(s). Ultrasound guidance was used for needle localization. Images were saved and stored for documentation. Dynamic visualization of the needle was continuous throughout the procedures and maintained in good position.

## 2025-07-31 NOTE — PROGRESS NOTES
CC: right knee pain, right hip pain    84 y.o. Male presents today for his Synvisc-One injection of his right knee.     Attempted treatments: cane, tylenol 650mg PRN  Pain score: 2/10 sitting, 6/10 walking   History of trauma/injury: none since last visit  Affecting ADLs: yes      84 y.o. Male presents today for his CSI injection of his right hip joint. Pt unsure of when pain returned, but states he will keep track for next time.     Attempted treatments: cane, tylenol 650mg PRN   Pain score: 2/10 sitting, 7/10 walking  History of trauma/injury: none since last visit  Affecting ADLs: yes     Pt also notes left thumb pain since receiving IV for a procedure and trigger fingers in the right hand.     REVIEW OF SYSTEMS:   Constitution: Patient denies fever or chills.  Eyes: Patient denies eye pain or vision changes.  HEENT: Patient denies ear pain, sore throat, or nasal discharge.  CVS: Patient denies chest pain.  Lungs: Patient denies shortness of breath or cough.  Skin: Patient denies skin rash or itching.  Pt notes spots on his arms due to medication.   Musculoskeletal: Patient denies recent falls. See HPI.  Psych: Patient denies any current anxiety or nervousness.    PAST MEDICAL HISTORY:   Past Medical History:   Diagnosis Date    Arthritis     Back pain, chronic     BPH with urinary obstruction     Chronic constipation     Closed fracture of right shoulder 01/28/2021    Closed nondisplaced fracture of greater tuberosity of right humerus 02/24/2021    Colon polyp     DJD (degenerative joint disease)     Hip    Elevated troponin 07/11/2024    Hyperlipidemia     Hypertension     Laryngopharyngeal reflux     Left hand pain 12/20/2023    Left inguinal hernia     Lumbar herniated disc     Lumbar stenosis     Neck mass 10/07/2014    -4/29/2022 path - lipoma    OA (osteoarthritis) of knee     Bilateral    Paradoxical insomnia     Sinus trouble        MEDICATIONS:   Current Medications[1]    ALLERGIES:   Review of  "patient's allergies indicates:   Allergen Reactions    Clindamycin Swelling     Throat swells    Lisinopril Swelling and Other (See Comments)     Throat swelling    Shellfish containing products         PHYSICAL EXAMINATION:  /75 (Patient Position: Sitting)   Pulse 60   Ht 5' 11" (1.803 m)   Wt 107.4 kg (236 lb 12.4 oz)   BMI 33.02 kg/m²   There are no signs of infection at the injection site, including no rubor, calor, or skin lesions.  Gen: NAD.  Psych: Affect & judgment nl.  Neuro: Grossly CNI. CARRINGTON.  HEENT: -Trach dev. -Eye d/c. -Rhinorrhea.  CV: Color nl. -E/C/C. WWPx4.  Pulm: -Dyspnea. -Cough.  Lymph: -Edema.  Int: -Rash/lesion noted. Skin is warm and dry    ASSESSMENT:      ICD-10-CM ICD-9-CM   1. Right hip pain  M25.551 719.45   2. Primary osteoarthritis of right hip  M16.11 715.15   3. Chronic pain of right knee  M25.561 719.46    G89.29 338.29   4. Primary osteoarthritis of right knee  M17.11 715.16         PLAN:  1-2:  Lengthy discussion had with patient today regarding his right hip pain and he did have relief of symptoms following right intra-articular corticosteroid injection we months ago.  He does not pinpoint when pain came back but he does endorse having great relief following injection.  We will move forward with repeat corticosteroid injection to the right hip joint at today's visit and plan for repeat on or after 10/31/2025.    3-4:  Ultrasound guidance injection of the right knee with Synvisc-One performed at today's visit.    Future planning includes - Continue exercise program    Risks and benefits were discussed with patient prior to receiving injection.  Depending on injection type, risks include the possibility of infection, pain, disruptions in blood pressure and blood sugar, and cosmetic deformity at site of injection.    All questions were answered to the best of my ability and all concerns were addressed at this time.    Follow up in-person in 3 months, or sooner if need " be.    This note is dictated using the M*Modal Fluency Direct word recognition program. There are word recognition mistakes that are occasionally missed on review.    Time spent on the encounter includes face to face time and non-face to face time preparing to see the patient (eg, review of test), obtaining and/or reviewing separately obtained history, documenting clinical information in the electronic or other health record, independently interpreting results (not separately reported) and communicating results to the patient/family/caregiver, or care coordination (not separately reported). I reviewed Primary care, and other specialty's notes to better coordinate patient's care. I also counseled patient  on common and most usual side effect of prescribed medications,the purppose of any new tests that may have been ordered, and possible next steps in management.  Patient voiced understanding.     EST MINUTES X   20046 5    70345 10    11213 15    71646 25    [id number] 40 X   NEW     25103 10    04222 20    26020 30    14180 45    97986 60    PHONE      5-10    64800 11-20    19227 21-30                 [1]   Current Outpatient Medications:     atorvastatin (LIPITOR) 80 MG tablet, Take 1 tablet (80 mg total) by mouth once daily., Disp: 90 tablet, Rfl: 3    finasteride (PROSCAR) 5 mg tablet, Take 1 tablet (5 mg total) by mouth once daily., Disp: 90 tablet, Rfl: 3    fluticasone propionate (FLONASE) 50 mcg/actuation nasal spray, 2 sprays (100 mcg total) by Each Nostril route once daily., Disp: 18.2 mL, Rfl: 2    ipratropium (ATROVENT) 21 mcg (0.03 %) nasal spray, 2 sprays by Each Nostril route 3 (three) times daily as needed (runny nose)., Disp: 30 mL, Rfl: 2    ketorolac 0.5% (ACULAR) 0.5 % Drop, Place 1 drop into both eyes 4 (four) times daily., Disp: , Rfl:     linaCLOtide (LINZESS) 145 mcg Cap capsule, TAKE 1 CAPSULE BY MOUTH ONCE DAILY AS NEEDED FOR  CONSTIPATION, Disp: 90 capsule, Rfl: 3    mirtazapine (REMERON)  15 MG tablet, Take 1 tablet by mouth in the evening, Disp: 90 tablet, Rfl: 2    multivitamin capsule, Take 1 capsule by mouth once daily., Disp: , Rfl:     neomycin-polymyxin-dexamethasone (DEXACINE) 3.5 mg/g-10,000 unit/g-0.1 % Oint, , Disp: , Rfl:     NIFEdipine (PROCARDIA-XL) 90 MG (OSM) 24 hr tablet, Take 90 mg by mouth., Disp: , Rfl:     ofloxacin (OCUFLOX) 0.3 % ophthalmic solution, Place 1 drop into both eyes 4 (four) times daily., Disp: , Rfl:     prednisoLONE acetate (PRED FORTE) 1 % DrpS, Place into the left eye 4 (four) times daily., Disp: , Rfl:     ramelteon (ROZEREM) 8 mg tablet, Take 1 tablet (8 mg total) by mouth every evening., Disp: 30 tablet, Rfl: 5    tamsulosin (FLOMAX) 0.4 mg Cap, Take 1 capsule (0.4 mg total) by mouth once daily., Disp: 90 capsule, Rfl: 3    aspirin 81 MG Chew, Take 1 tablet (81 mg total) by mouth once daily., Disp: 30 tablet, Rfl: 11    clopidogreL (PLAVIX) 75 mg tablet, Take 1 tablet (75 mg total) by mouth once daily., Disp: 30 tablet, Rfl: 11    losartan (COZAAR) 100 MG tablet, Take 1 tablet (100 mg total) by mouth once daily., Disp: 90 tablet, Rfl: 3    metoprolol succinate (TOPROL-XL) 25 MG 24 hr tablet, Take 1 tablet (25 mg total) by mouth once daily., Disp: 90 tablet, Rfl: 3

## 2025-07-31 NOTE — PROCEDURES
Large Joint Aspiration/Injection: R knee    Date/Time: 7/31/2025 1:00 PM    Performed by: Chela Gilliam MD  Authorized by: Chela Gilliam MD    Site marked: the procedure site was marked    Timeout: prior to procedure the correct patient, procedure, and site was verified      Local anesthesia used?: Yes    Anesthesia:  Local infiltration  Local anesthetic:  Co-phenylcaine spray    Details:  Needle Size:  22 G  Ultrasonic Guidance for needle placement?: Yes (Ultrasound guidance used to avoid neurovascular injury and/or to improve accuracy given body habitus.)    Images are saved and documented.  Approach: Superolateral.  Location:  Knee  Site:  R knee  Medications:  48 mg hylan g-f 20 48 mg/6 mL  Medications comment:  Ropivacaine 0.2% 2mL  Patient tolerance:  Patient tolerated the procedure well with no immediate complications     TECHNIQUE: Real time ultrasound examination of the right knee was performed with Sonzkipsterte Edge 2, 9-L MHz linear probe(s). Ultrasound guidance was used for needle localization. Images were saved and stored for documentation. Dynamic visualization of the needle was continuous throughout the procedures and maintained in good position.

## 2025-08-02 ENCOUNTER — TELEPHONE (OUTPATIENT)
Dept: INTERNAL MEDICINE | Facility: CLINIC | Age: 85
End: 2025-08-02
Payer: MEDICARE

## 2025-08-02 DIAGNOSIS — R13.14 DYSPHAGIA, PHARYNGOESOPHAGEAL: Primary | ICD-10-CM

## 2025-08-02 NOTE — TELEPHONE ENCOUNTER
----- Message from Robyn Arrington MD sent at 7/21/2025  2:49 PM CDT -----  Regarding: Thyroid nodules  Hey Dr. Bustamante,    We saw Mr. Santos in endocrinology clinic today. We reviewed his ultrasound from 2024. The nodules are very small and we do not think that they are contributing to his dysphagia at all. Please refer him to GI for further evaluation and EGD.     From our standpoint he only needs yearly TSH and another ultrasound in 2 years (2026). If the nodules are stable, then there's no need to repeat ultrasound after that.     He does not need repeat follow up with Endocrinology unless nodules increase 20% in 2 out of the 3 measurements or he starts having overt hypo/hyperthyroid symptoms.     Please let me know if you have any questions or concerns.     Thank you so much    Robyn Arrington MD  Endocrinology Fellow

## 2025-08-02 NOTE — ASSESSMENT & PLAN NOTE
07/21/2025 endocrinology correspondence recommends refer to GI for possible EGD regarding dysphagia.  Chart reviewed patient has appointment with gastro on 08/26 pending

## 2025-08-11 ENCOUNTER — OFFICE VISIT (OUTPATIENT)
Dept: OTOLARYNGOLOGY | Facility: CLINIC | Age: 85
End: 2025-08-11
Payer: MEDICARE

## 2025-08-11 VITALS
HEART RATE: 60 BPM | DIASTOLIC BLOOD PRESSURE: 83 MMHG | WEIGHT: 237.44 LBS | SYSTOLIC BLOOD PRESSURE: 146 MMHG | BODY MASS INDEX: 33.12 KG/M2

## 2025-08-11 DIAGNOSIS — J34.3 HYPERTROPHY OF INFERIOR NASAL TURBINATE: ICD-10-CM

## 2025-08-11 DIAGNOSIS — Z78.9 DIFFICULTY USING CONTINUOUS POSITIVE AIRWAY PRESSURE (CPAP) DEVICE: ICD-10-CM

## 2025-08-11 DIAGNOSIS — J31.0 CHRONIC RHINITIS: Primary | ICD-10-CM

## 2025-08-11 DIAGNOSIS — Z79.02 LONG TERM CURRENT USE OF ANTITHROMBOTICS/ANTIPLATELETS: ICD-10-CM

## 2025-08-11 DIAGNOSIS — G47.33 OBSTRUCTIVE SLEEP APNEA: ICD-10-CM

## 2025-08-11 DIAGNOSIS — J30.0 VASOMOTOR RHINITIS: ICD-10-CM

## 2025-08-11 DIAGNOSIS — J34.2 DEVIATED NASAL SEPTUM: ICD-10-CM

## 2025-08-11 PROCEDURE — 99999 PR PBB SHADOW E&M-EST. PATIENT-LVL IV: CPT | Mod: PBBFAC,,, | Performed by: PHYSICIAN ASSISTANT

## 2025-08-26 ENCOUNTER — TELEPHONE (OUTPATIENT)
Dept: GASTROENTEROLOGY | Facility: CLINIC | Age: 85
End: 2025-08-26
Payer: MEDICARE

## 2025-08-31 ENCOUNTER — HOSPITAL ENCOUNTER (EMERGENCY)
Facility: HOSPITAL | Age: 85
Discharge: HOME | End: 2025-08-31
Attending: EMERGENCY MEDICINE
Payer: MEDICARE

## 2025-08-31 VITALS
HEIGHT: 71 IN | RESPIRATION RATE: 18 BRPM | OXYGEN SATURATION: 98 % | WEIGHT: 235 LBS | DIASTOLIC BLOOD PRESSURE: 71 MMHG | BODY MASS INDEX: 32.9 KG/M2 | SYSTOLIC BLOOD PRESSURE: 159 MMHG | TEMPERATURE: 98 F | HEART RATE: 61 BPM

## 2025-08-31 DIAGNOSIS — R07.89 CHEST WALL PAIN: ICD-10-CM

## 2025-08-31 DIAGNOSIS — I82.442: ICD-10-CM

## 2025-08-31 DIAGNOSIS — R07.9 CHEST PAIN: Primary | ICD-10-CM

## 2025-08-31 LAB
ABSOLUTE EOSINOPHIL (OHS): 0.4 K/UL
ABSOLUTE MONOCYTE (OHS): 0.9 K/UL (ref 0.3–1)
ABSOLUTE NEUTROPHIL COUNT (OHS): 3.67 K/UL (ref 1.8–7.7)
ALBUMIN SERPL BCP-MCNC: 4.3 G/DL (ref 3.5–5.2)
ALP SERPL-CCNC: 84 UNIT/L (ref 38–126)
ALT SERPL W/O P-5'-P-CCNC: 24 UNIT/L (ref 10–44)
ANION GAP (OHS): 8 MMOL/L (ref 8–16)
AST SERPL-CCNC: 27 UNIT/L (ref 15–46)
BASOPHILS # BLD AUTO: 0.16 K/UL
BASOPHILS NFR BLD AUTO: 1.8 %
BILIRUB SERPL-MCNC: 0.5 MG/DL (ref 0.1–1)
BUN SERPL-MCNC: 18 MG/DL (ref 2–20)
CALCIUM SERPL-MCNC: 9.1 MG/DL (ref 8.7–10.5)
CHLORIDE SERPL-SCNC: 107 MMOL/L (ref 95–110)
CO2 SERPL-SCNC: 24 MMOL/L (ref 23–29)
CREAT SERPL-MCNC: 0.8 MG/DL (ref 0.5–1.4)
D DIMER PPP IA.FEU-MCNC: 1.06 MG/L FEU
ERYTHROCYTE [DISTWIDTH] IN BLOOD BY AUTOMATED COUNT: 13.3 % (ref 11.5–14.5)
GFR SERPLBLD CREATININE-BSD FMLA CKD-EPI: >60 ML/MIN/1.73/M2
GLUCOSE SERPL-MCNC: 109 MG/DL (ref 70–110)
HCT VFR BLD AUTO: 43.4 % (ref 40–54)
HGB BLD-MCNC: 14.6 GM/DL (ref 14–18)
IMM GRANULOCYTES # BLD AUTO: 0.08 K/UL (ref 0–0.04)
IMM GRANULOCYTES NFR BLD AUTO: 0.9 % (ref 0–0.5)
LYMPHOCYTES # BLD AUTO: 3.89 K/UL (ref 1–4.8)
MCH RBC QN AUTO: 30.6 PG (ref 27–31)
MCHC RBC AUTO-ENTMCNC: 33.6 G/DL (ref 32–36)
MCV RBC AUTO: 91 FL (ref 82–98)
NT-PROBNP SERPL-MCNC: 203 PG/ML (ref 5–1800)
NUCLEATED RBC (/100WBC) (OHS): 0 /100 WBC
PLATELET # BLD AUTO: 231 K/UL (ref 150–450)
PMV BLD AUTO: 10.7 FL (ref 9.2–12.9)
POTASSIUM SERPL-SCNC: 4.5 MMOL/L (ref 3.5–5.1)
PROT SERPL-MCNC: 7.3 GM/DL (ref 6–8.4)
RBC # BLD AUTO: 4.77 M/UL (ref 4.6–6.2)
RELATIVE EOSINOPHIL (OHS): 4.4 %
RELATIVE LYMPHOCYTE (OHS): 42.7 % (ref 18–48)
RELATIVE MONOCYTE (OHS): 9.9 % (ref 4–15)
RELATIVE NEUTROPHIL (OHS): 40.3 % (ref 38–73)
SODIUM SERPL-SCNC: 139 MMOL/L (ref 136–145)
TROPONIN I SERPL DL<=0.01 NG/ML-MCNC: <0.012 NG/ML (ref 0–0.03)
TROPONIN I SERPL DL<=0.01 NG/ML-MCNC: <0.012 NG/ML (ref 0–0.03)
WBC # BLD AUTO: 9.1 K/UL (ref 3.9–12.7)

## 2025-08-31 PROCEDURE — 85379 FIBRIN DEGRADATION QUANT: CPT | Mod: HCNC,ER | Performed by: EMERGENCY MEDICINE

## 2025-08-31 PROCEDURE — 93010 ELECTROCARDIOGRAM REPORT: CPT | Mod: HCNC,,, | Performed by: INTERNAL MEDICINE

## 2025-08-31 PROCEDURE — 84484 ASSAY OF TROPONIN QUANT: CPT | Mod: HCNC,ER | Performed by: EMERGENCY MEDICINE

## 2025-08-31 PROCEDURE — 83880 ASSAY OF NATRIURETIC PEPTIDE: CPT | Mod: HCNC,ER | Performed by: EMERGENCY MEDICINE

## 2025-08-31 PROCEDURE — 85025 COMPLETE CBC W/AUTO DIFF WBC: CPT | Mod: HCNC,ER | Performed by: EMERGENCY MEDICINE

## 2025-08-31 PROCEDURE — 80053 COMPREHEN METABOLIC PANEL: CPT | Mod: HCNC,ER | Performed by: EMERGENCY MEDICINE

## 2025-08-31 PROCEDURE — 25500020 PHARM REV CODE 255: Mod: HCNC | Performed by: EMERGENCY MEDICINE

## 2025-08-31 PROCEDURE — 25000003 PHARM REV CODE 250: Mod: HCNC | Performed by: EMERGENCY MEDICINE

## 2025-08-31 PROCEDURE — 93005 ELECTROCARDIOGRAM TRACING: CPT | Mod: HCNC,ER

## 2025-08-31 PROCEDURE — 99285 EMERGENCY DEPT VISIT HI MDM: CPT | Mod: 25,HCNC,ER

## 2025-08-31 RX ORDER — DIPHENHYDRAMINE HYDROCHLORIDE 50 MG/ML
50 INJECTION, SOLUTION INTRAMUSCULAR; INTRAVENOUS
Status: DISCONTINUED | OUTPATIENT
Start: 2025-08-31 | End: 2025-08-31

## 2025-08-31 RX ADMIN — APIXABAN 10 MG: 5 TABLET, FILM COATED ORAL at 01:08

## 2025-08-31 RX ADMIN — IOHEXOL 100 ML: 350 INJECTION, SOLUTION INTRAVENOUS at 10:08

## 2025-09-02 ENCOUNTER — TELEPHONE (OUTPATIENT)
Dept: CARDIOLOGY | Facility: CLINIC | Age: 85
End: 2025-09-02
Payer: MEDICARE

## 2025-09-02 ENCOUNTER — HOSPITAL ENCOUNTER (EMERGENCY)
Facility: HOSPITAL | Age: 85
Discharge: HOME | End: 2025-09-02
Attending: STUDENT IN AN ORGANIZED HEALTH CARE EDUCATION/TRAINING PROGRAM
Payer: MEDICARE

## 2025-09-02 VITALS
OXYGEN SATURATION: 98 % | BODY MASS INDEX: 32.9 KG/M2 | SYSTOLIC BLOOD PRESSURE: 155 MMHG | HEIGHT: 71 IN | DIASTOLIC BLOOD PRESSURE: 70 MMHG | RESPIRATION RATE: 20 BRPM | HEART RATE: 60 BPM | TEMPERATURE: 98 F | WEIGHT: 235 LBS

## 2025-09-02 DIAGNOSIS — K62.5 RECTAL BLEEDING: Primary | ICD-10-CM

## 2025-09-02 LAB
ABSOLUTE EOSINOPHIL (OHS): 0.15 K/UL
ABSOLUTE MONOCYTE (OHS): 0.73 K/UL (ref 0.3–1)
ABSOLUTE NEUTROPHIL COUNT (OHS): 4.15 K/UL (ref 1.8–7.7)
ALBUMIN SERPL BCP-MCNC: 3.8 G/DL (ref 3.5–5.2)
ALP SERPL-CCNC: 75 UNIT/L (ref 38–126)
ALT SERPL W/O P-5'-P-CCNC: 21 UNIT/L (ref 10–44)
ANION GAP (OHS): 5 MMOL/L (ref 8–16)
APTT PPP: 27.5 SECONDS (ref 21–32)
AST SERPL-CCNC: 23 UNIT/L (ref 15–46)
BASOPHILS # BLD AUTO: 0.11 K/UL
BASOPHILS NFR BLD AUTO: 1.5 %
BILIRUB SERPL-MCNC: 0.3 MG/DL (ref 0.1–1)
BUN SERPL-MCNC: 17 MG/DL (ref 2–20)
CALCIUM SERPL-MCNC: 9.1 MG/DL (ref 8.7–10.5)
CHLORIDE SERPL-SCNC: 107 MMOL/L (ref 95–110)
CO2 SERPL-SCNC: 26 MMOL/L (ref 23–29)
CREAT SERPL-MCNC: 0.8 MG/DL (ref 0.5–1.4)
ERYTHROCYTE [DISTWIDTH] IN BLOOD BY AUTOMATED COUNT: 13.2 % (ref 11.5–14.5)
GFR SERPLBLD CREATININE-BSD FMLA CKD-EPI: >60 ML/MIN/1.73/M2
GLUCOSE SERPL-MCNC: 110 MG/DL (ref 70–110)
HCT VFR BLD AUTO: 41.1 % (ref 40–54)
HGB BLD-MCNC: 13.9 GM/DL (ref 14–18)
IMM GRANULOCYTES # BLD AUTO: 0.01 K/UL (ref 0–0.04)
IMM GRANULOCYTES NFR BLD AUTO: 0.1 % (ref 0–0.5)
INR PPP: 1 (ref 0.8–1.2)
LYMPHOCYTES # BLD AUTO: 2.17 K/UL (ref 1–4.8)
MCH RBC QN AUTO: 30.8 PG (ref 27–31)
MCHC RBC AUTO-ENTMCNC: 33.8 G/DL (ref 32–36)
MCV RBC AUTO: 91 FL (ref 82–98)
NUCLEATED RBC (/100WBC) (OHS): 0 /100 WBC
OB PNL STL: NEGATIVE
OHS QRS DURATION: 146 MS
OHS QTC CALCULATION: 436 MS
PLATELET # BLD AUTO: 207 K/UL (ref 150–450)
PMV BLD AUTO: 10.3 FL (ref 9.2–12.9)
POTASSIUM SERPL-SCNC: 4.2 MMOL/L (ref 3.5–5.1)
PROT SERPL-MCNC: 6.6 GM/DL (ref 6–8.4)
PROTHROMBIN TIME: 10.9 SECONDS (ref 9–12.5)
RBC # BLD AUTO: 4.51 M/UL (ref 4.6–6.2)
RELATIVE EOSINOPHIL (OHS): 2 %
RELATIVE LYMPHOCYTE (OHS): 29.6 % (ref 18–48)
RELATIVE MONOCYTE (OHS): 10 % (ref 4–15)
RELATIVE NEUTROPHIL (OHS): 56.8 % (ref 38–73)
SODIUM SERPL-SCNC: 138 MMOL/L (ref 136–145)
WBC # BLD AUTO: 7.32 K/UL (ref 3.9–12.7)

## 2025-09-02 PROCEDURE — 82272 OCCULT BLD FECES 1-3 TESTS: CPT | Mod: HCNC,ER

## 2025-09-02 PROCEDURE — 85025 COMPLETE CBC W/AUTO DIFF WBC: CPT | Mod: HCNC,ER

## 2025-09-02 PROCEDURE — 99283 EMERGENCY DEPT VISIT LOW MDM: CPT | Mod: HCNC,ER

## 2025-09-02 PROCEDURE — 85730 THROMBOPLASTIN TIME PARTIAL: CPT | Mod: HCNC,ER

## 2025-09-02 PROCEDURE — 85610 PROTHROMBIN TIME: CPT | Mod: HCNC,ER

## 2025-09-02 PROCEDURE — 82040 ASSAY OF SERUM ALBUMIN: CPT | Mod: HCNC,ER

## 2025-09-03 ENCOUNTER — OFFICE VISIT (OUTPATIENT)
Dept: CARDIOLOGY | Facility: CLINIC | Age: 85
End: 2025-09-03
Payer: MEDICARE

## 2025-09-03 VITALS
OXYGEN SATURATION: 95 % | BODY MASS INDEX: 33.27 KG/M2 | DIASTOLIC BLOOD PRESSURE: 61 MMHG | HEART RATE: 60 BPM | SYSTOLIC BLOOD PRESSURE: 101 MMHG | WEIGHT: 237.63 LBS | HEIGHT: 71 IN

## 2025-09-03 DIAGNOSIS — I25.10 CORONARY ARTERY DISEASE INVOLVING NATIVE CORONARY ARTERY OF NATIVE HEART WITHOUT ANGINA PECTORIS: ICD-10-CM

## 2025-09-03 DIAGNOSIS — Z95.5 STENTED CORONARY ARTERY: ICD-10-CM

## 2025-09-03 DIAGNOSIS — I47.19 ATRIAL TACHYCARDIA: ICD-10-CM

## 2025-09-03 DIAGNOSIS — Z95.0 CARDIAC PACEMAKER IN SITU: ICD-10-CM

## 2025-09-03 DIAGNOSIS — E78.5 HYPERLIPIDEMIA, UNSPECIFIED HYPERLIPIDEMIA TYPE: Primary | ICD-10-CM

## 2025-09-03 DIAGNOSIS — I44.2 COMPLETE HEART BLOCK: ICD-10-CM

## 2025-09-03 PROCEDURE — 1157F ADVNC CARE PLAN IN RCRD: CPT | Mod: CPTII,HCNC,S$GLB, | Performed by: INTERNAL MEDICINE

## 2025-09-03 PROCEDURE — 1160F RVW MEDS BY RX/DR IN RCRD: CPT | Mod: CPTII,HCNC,S$GLB, | Performed by: INTERNAL MEDICINE

## 2025-09-03 PROCEDURE — 1125F AMNT PAIN NOTED PAIN PRSNT: CPT | Mod: CPTII,HCNC,S$GLB, | Performed by: INTERNAL MEDICINE

## 2025-09-03 PROCEDURE — 99214 OFFICE O/P EST MOD 30 MIN: CPT | Mod: HCNC,S$GLB,, | Performed by: INTERNAL MEDICINE

## 2025-09-03 PROCEDURE — 3078F DIAST BP <80 MM HG: CPT | Mod: CPTII,HCNC,S$GLB, | Performed by: INTERNAL MEDICINE

## 2025-09-03 PROCEDURE — 99999 PR PBB SHADOW E&M-EST. PATIENT-LVL III: CPT | Mod: PBBFAC,HCNC,, | Performed by: INTERNAL MEDICINE

## 2025-09-03 PROCEDURE — 1101F PT FALLS ASSESS-DOCD LE1/YR: CPT | Mod: CPTII,HCNC,S$GLB, | Performed by: INTERNAL MEDICINE

## 2025-09-03 PROCEDURE — 1159F MED LIST DOCD IN RCRD: CPT | Mod: CPTII,HCNC,S$GLB, | Performed by: INTERNAL MEDICINE

## 2025-09-03 PROCEDURE — 3288F FALL RISK ASSESSMENT DOCD: CPT | Mod: CPTII,HCNC,S$GLB, | Performed by: INTERNAL MEDICINE

## 2025-09-03 PROCEDURE — 3074F SYST BP LT 130 MM HG: CPT | Mod: CPTII,HCNC,S$GLB, | Performed by: INTERNAL MEDICINE

## 2025-09-05 ENCOUNTER — TELEPHONE (OUTPATIENT)
Dept: PHARMACY | Facility: CLINIC | Age: 85
End: 2025-09-05
Payer: MEDICARE

## 2025-09-06 ENCOUNTER — NURSE TRIAGE (OUTPATIENT)
Dept: ADMINISTRATIVE | Facility: CLINIC | Age: 85
End: 2025-09-06
Payer: MEDICARE

## 2025-09-06 DIAGNOSIS — K59.04 CHRONIC IDIOPATHIC CONSTIPATION: ICD-10-CM

## (undated) DEVICE — ELECTRODE REM PLYHSV RETURN 9

## (undated) DEVICE — GLOVE BIOGEL SKINSENSE PI 8.0

## (undated) DEVICE — HEMOSTAT VASC BAND X-LONG 29CM

## (undated) DEVICE — GUIDE POST LP QUATTRO SHORT

## (undated) DEVICE — GUIDEWIRE EMERALD .035IN 260CM

## (undated) DEVICE — Device

## (undated) DEVICE — NDL 20GX1-1/2IN IB

## (undated) DEVICE — DRESSING AQUACEL AG ADV 3.5X12

## (undated) DEVICE — SUT VICRYL+ 1 CT1 18IN

## (undated) DEVICE — DRAPE C-ARM ELAS CLIP 42X120IN

## (undated) DEVICE — TUBE FRAZIER 5MM 2FT SOFT TIP

## (undated) DEVICE — PAD ABD 8X10 STERILE

## (undated) DEVICE — KIT SURGIFLO HEMOSTATIC MATRIX

## (undated) DEVICE — DRAPE C-ARMOR EQUIPMENT COVER

## (undated) DEVICE — KIT TOTAL KNEE TKOFG

## (undated) DEVICE — CLOSURE SKIN 1X5 STERI-STRIP

## (undated) DEVICE — SEE MEDLINE ITEM 152515

## (undated) DEVICE — DRAPE T CYSTOSCOPY STERILE

## (undated) DEVICE — JELLY LUBRICANT STERILE 4 OZ

## (undated) DEVICE — SPIKE SHORT LG BORE 1-WAY 2IN

## (undated) DEVICE — GAUZE SPONGE 4X4 12PLY

## (undated) DEVICE — KIT GLIDESHEATH SLEND 6FR 10CM

## (undated) DEVICE — DRESSING TRANS 4X4 TEGADERM

## (undated) DEVICE — SPONGE LAP 4X18 PREWASHED

## (undated) DEVICE — SOL NACL IRR 3000ML

## (undated) DEVICE — TOWEL OR DISP STRL BLUE 4/PK

## (undated) DEVICE — TRAY CATH FOL SIL URIMTR 16FR

## (undated) DEVICE — DRESSING AQUACEL FOAM 5 X 5

## (undated) DEVICE — PAD DEFIB CADENCE ADULT R2

## (undated) DEVICE — NDL HYPO 27G X 1 1/2

## (undated) DEVICE — SUT 2-0 SILK 30IN BLK BRAID

## (undated) DEVICE — PUMP COLD THERAPY

## (undated) DEVICE — MANIFOLD 4 PORT

## (undated) DEVICE — PAD CAST SPECIALIST STRL 6

## (undated) DEVICE — APPLICATOR CHLORAPREP ORN 26ML

## (undated) DEVICE — BANDAGE ESMARK 6X12

## (undated) DEVICE — HAND CONTROL ELECTRODE PENCIL

## (undated) DEVICE — SEE MEDLINE ITEM 156905

## (undated) DEVICE — KIT LEFT HEART MANIFOLD CUSTOM

## (undated) DEVICE — VALVE CONTROL COPILOT

## (undated) DEVICE — DRAPE INCISE IOBAN 2 23X17IN

## (undated) DEVICE — PAD GROUNDING DISPER ELECTRODE

## (undated) DEVICE — SUT MCRYL PLUS 4-0 PS2 27IN

## (undated) DEVICE — NDL MAYO 2

## (undated) DEVICE — SHEATH INTRODUCER 5FR 10CM

## (undated) DEVICE — SYR ONLY LUER LOCK 20CC

## (undated) DEVICE — COVER PROBE US 5.5X58L NON LTX

## (undated) DEVICE — CATH RADIAL TIG 6FR 4.0 110CM

## (undated) DEVICE — HEMOSTAT VASC BAND REG 24CM

## (undated) DEVICE — NDL SPINAL 18GX3.5 SPINOCAN

## (undated) DEVICE — SPONGE GAUZE 16PLY 4X4

## (undated) DEVICE — SYR IRRIGATION BULB STER 60ML

## (undated) DEVICE — SYR 50ML CATH TIP

## (undated) DEVICE — BAG URINARY DRN 2000ML

## (undated) DEVICE — TRAY FOLEY 16FR INFECTION CONT

## (undated) DEVICE — COVER BACK TBL HD 2-TIER 72IN

## (undated) DEVICE — SYS DELIVERY REZUM

## (undated) DEVICE — SUT 2/0 30IN SILK BLK BRAI

## (undated) DEVICE — SOL IRR NACL .9% 3000ML

## (undated) DEVICE — PAD COLD THERAPY KNEE WRAP ON

## (undated) DEVICE — DRESSING AQUACEL SACRAL 9 X 9

## (undated) DEVICE — BIT DRILL REAM GPS 3.5MM

## (undated) DEVICE — SYR ONLY 60CC TOOMEY

## (undated) DEVICE — INFLATOR ENCORE 26 BLLN INFL

## (undated) DEVICE — DRESSING AQUACEL FOAM 3 X 3

## (undated) DEVICE — SEE MEDLINE ITEM 146298

## (undated) DEVICE — BANDAGE ACE ELASTIC 6"

## (undated) DEVICE — SCALPEL SZ 15 RETRACTABLE

## (undated) DEVICE — KIT INTRODUCER MICROPUNCTR 4F

## (undated) DEVICE — KIT SPINAL PATIENT CARE JACK

## (undated) DEVICE — BLADE MILL BONE MEDIUM

## (undated) DEVICE — DRAPE ANGIO BRACH 38X44IN

## (undated) DEVICE — GOWN SMART IMP BREATHABLE XXLG

## (undated) DEVICE — BLADE SURG #15 CARBON STEEL

## (undated) DEVICE — SYS CLSR DERMABOND PRINEO 22CM

## (undated) DEVICE — BLADE 4IN EDGE INSULATED

## (undated) DEVICE — DRESSING MEPORE ADH 3.5X12

## (undated) DEVICE — TOURNIQUET SB QC DP 34X4IN

## (undated) DEVICE — CLOSURE SKIN STERI STRIP 1/2X4

## (undated) DEVICE — SUTURE STRATAFIX PGA PCL 3-0

## (undated) DEVICE — SET MICRO PUNCT 4FR/MPIS-401

## (undated) DEVICE — KIT IRR SUCTION HND PIECE

## (undated) DEVICE — DRAPE PLASTIC U 60X72

## (undated) DEVICE — DRESSING XEROFORM FOIL PK 1X8

## (undated) DEVICE — TUBE CONTRAST SQUEEZE

## (undated) DEVICE — BAG LINGEMAN DRAIN UROLOGY

## (undated) DEVICE — BOWL CEMENT

## (undated) DEVICE — CONTAINER SPECIMEN OR STER 4OZ

## (undated) DEVICE — CORD BIPOLAR 12 FOOT

## (undated) DEVICE — CATH 5FR BALLOON PT HEART PACE

## (undated) DEVICE — SLEEVE INTRODUCER CATH 60CM ST

## (undated) DEVICE — TIP YANKAUERS BULB NO VENT

## (undated) DEVICE — SPIKE LOW PROFILE QUATTRO SH

## (undated) DEVICE — SEE MEDLINE ITEM 157216

## (undated) DEVICE — SPONGE COTTON TRAY 4X4IN

## (undated) DEVICE — CATH NC EMERGE MR 3X12MM

## (undated) DEVICE — DRESSING MEPILEX FLEX 3X3IN

## (undated) DEVICE — BUR BONE CUT MICRO TPS 3X3.8MM

## (undated) DEVICE — CATH EAGLE EYE PLATINUM

## (undated) DEVICE — COVER TABLE HVY DTY 60X90IN

## (undated) DEVICE — CATH ANG PIGTAIL 4FR INFINITY

## (undated) DEVICE — BLADE RECP OFFSET 77.5X11X1.23

## (undated) DEVICE — SEE MEDLINE ITEM 152529

## (undated) DEVICE — SUT 2-0 VICRYL / FS-1

## (undated) DEVICE — TRAY SKIN SCRUB WET PREMIUM

## (undated) DEVICE — CATH EMERGE MR 12 X 2.50

## (undated) DEVICE — DRESSING TELFA STRL 4X3 LF

## (undated) DEVICE — MARKER SKIN STND TIP BLUE BARR

## (undated) DEVICE — SUT VICRYL PLUS 2-0 CT1 18

## (undated) DEVICE — DRILL BIT SURG GPS HI SPD 4MM

## (undated) DEVICE — DRAPE ABDOMINAL TIBURON 14X11

## (undated) DEVICE — DRAPE CORETEMP FLD WRM 56X62IN

## (undated) DEVICE — DRAPE TOP 53X102IN

## (undated) DEVICE — VISIPAQUE 320 200ML +PK

## (undated) DEVICE — KIT PACEMAKER CUSTOM KENNER

## (undated) DEVICE — ADAPTER PACING CABLE

## (undated) DEVICE — SLING SWATHE UNIVERSAL FOAM

## (undated) DEVICE — DRESSING LEUKOPLAST FLEX 1X3IN

## (undated) DEVICE — GOWN POLY REINF BRTH SLV XL

## (undated) DEVICE — DRAPE STERI-DRAPE 1000 17X11IN

## (undated) DEVICE — SOL NACL 0.9% INJ PF/100 150

## (undated) DEVICE — GUIDEWIRE RUNTHROUGH EF 180CM

## (undated) DEVICE — CATH NC EMERGE MR 2.75X12MM

## (undated) DEVICE — INTRODUCER OPTISEAL 6F 13CM

## (undated) DEVICE — HOOD T-5 TEAR AWAY STERILE

## (undated) DEVICE — TUBING HPCIL ROT M/F ADPT 48IN

## (undated) DEVICE — DRESSING MEPILEX BORDER 4 X 4

## (undated) DEVICE — SEE MEDLINE ITEM 154981

## (undated) DEVICE — GUIDE LAUNCHER 6FR EBU 3.5

## (undated) DEVICE — VISE RADIFOCUS MULTI TORQUE

## (undated) DEVICE — KIT EVACUATOR 3-SPRING 1/8 DRN

## (undated) DEVICE — NDL 22GA X1 1/2 REG BEVEL

## (undated) DEVICE — SUT 3/0 27IN COATED VICRYL

## (undated) DEVICE — KIT INTRODUCER W/GUIDEWIRE

## (undated) DEVICE — CONTRAST VISIPAQUE 150ML

## (undated) DEVICE — HEMOSTAT VASC BAND LONG 27CM